# Patient Record
Sex: MALE | Race: WHITE | NOT HISPANIC OR LATINO | Employment: OTHER | URBAN - METROPOLITAN AREA
[De-identification: names, ages, dates, MRNs, and addresses within clinical notes are randomized per-mention and may not be internally consistent; named-entity substitution may affect disease eponyms.]

---

## 2017-01-14 ENCOUNTER — OFFICE VISIT (OUTPATIENT)
Dept: URGENT CARE | Facility: CLINIC | Age: 82
End: 2017-01-14
Payer: MEDICARE

## 2017-01-14 PROCEDURE — G0463 HOSPITAL OUTPT CLINIC VISIT: HCPCS

## 2017-01-14 PROCEDURE — 99213 OFFICE O/P EST LOW 20 MIN: CPT

## 2017-01-16 ENCOUNTER — APPOINTMENT (EMERGENCY)
Dept: RADIOLOGY | Facility: HOSPITAL | Age: 82
End: 2017-01-16
Attending: EMERGENCY MEDICINE
Payer: MEDICARE

## 2017-01-16 ENCOUNTER — HOSPITAL ENCOUNTER (EMERGENCY)
Facility: HOSPITAL | Age: 82
Discharge: HOME | End: 2017-01-16
Admitting: EMERGENCY MEDICINE
Payer: MEDICARE

## 2017-01-16 VITALS
SYSTOLIC BLOOD PRESSURE: 149 MMHG | DIASTOLIC BLOOD PRESSURE: 73 MMHG | HEART RATE: 85 BPM | TEMPERATURE: 97.6 F | OXYGEN SATURATION: 95 % | WEIGHT: 160 LBS | HEIGHT: 66 IN | BODY MASS INDEX: 25.71 KG/M2 | RESPIRATION RATE: 20 BRPM

## 2017-01-16 DIAGNOSIS — J20.9 ACUTE BRONCHITIS: Primary | ICD-10-CM

## 2017-01-16 LAB
ALBUMIN SERPL BCP-MCNC: 3.7 G/DL (ref 3.5–5)
ALP SERPL-CCNC: 88 U/L (ref 46–116)
ALT SERPL W P-5'-P-CCNC: 28 U/L (ref 12–78)
ANION GAP SERPL CALCULATED.3IONS-SCNC: 13 MMOL/L (ref 4–13)
APTT PPP: 37 SECONDS (ref 24–36)
AST SERPL W P-5'-P-CCNC: 30 U/L (ref 5–45)
ATRIAL RATE: 99 BPM
BASOPHILS # BLD AUTO: 0.02 THOUSANDS/ΜL (ref 0–0.1)
BASOPHILS NFR BLD AUTO: 0 % (ref 0–1)
BILIRUB SERPL-MCNC: 0.5 MG/DL (ref 0.2–1)
BUN SERPL-MCNC: 31 MG/DL (ref 5–25)
CALCIUM SERPL-MCNC: 9.1 MG/DL (ref 8.3–10.1)
CHLORIDE SERPL-SCNC: 100 MMOL/L (ref 100–108)
CO2 SERPL-SCNC: 21 MMOL/L (ref 21–32)
CREAT SERPL-MCNC: 2.09 MG/DL (ref 0.6–1.3)
EOSINOPHIL # BLD AUTO: 0.1 THOUSAND/ΜL (ref 0–0.61)
EOSINOPHIL NFR BLD AUTO: 1 % (ref 0–6)
ERYTHROCYTE [DISTWIDTH] IN BLOOD BY AUTOMATED COUNT: 13.6 % (ref 11.6–15.1)
FLUAV AG SPEC QL IA: NEGATIVE
FLUAV AG SPEC QL: DETECTED
FLUBV AG SPEC QL IA: NEGATIVE
FLUBV AG SPEC QL: ABNORMAL
GFR SERPL CREATININE-BSD FRML MDRD: 30.4 ML/MIN/1.73SQ M
GLUCOSE SERPL-MCNC: 102 MG/DL (ref 65–140)
HCT VFR BLD AUTO: 43.4 % (ref 36.5–49.3)
HGB BLD-MCNC: 14.5 G/DL (ref 12–17)
INR PPP: 1.03 (ref 0.86–1.16)
LACTATE SERPL-SCNC: 1.1 MMOL/L (ref 0.5–2)
LYMPHOCYTES # BLD AUTO: 2.16 THOUSANDS/ΜL (ref 0.6–4.47)
LYMPHOCYTES NFR BLD AUTO: 22 % (ref 14–44)
MCH RBC QN AUTO: 31 PG (ref 26.8–34.3)
MCHC RBC AUTO-ENTMCNC: 33.4 G/DL (ref 31.4–37.4)
MCV RBC AUTO: 93 FL (ref 82–98)
MONOCYTES # BLD AUTO: 1.23 THOUSAND/ΜL (ref 0.17–1.22)
MONOCYTES NFR BLD AUTO: 13 % (ref 4–12)
NEUTROPHILS # BLD AUTO: 6.26 THOUSANDS/ΜL (ref 1.85–7.62)
NEUTS SEG NFR BLD AUTO: 64 % (ref 43–75)
P AXIS: 62 DEGREES
PLATELET # BLD AUTO: 149 THOUSANDS/UL (ref 149–390)
PMV BLD AUTO: 9.3 FL (ref 8.9–12.7)
POTASSIUM SERPL-SCNC: 4.1 MMOL/L (ref 3.5–5.3)
PR INTERVAL: 150 MS
PROT SERPL-MCNC: 8.6 G/DL (ref 6.4–8.2)
PROTHROMBIN TIME: 13.4 SECONDS (ref 12–14.3)
QRS AXIS: 43 DEGREES
QRSD INTERVAL: 88 MS
QT INTERVAL: 354 MS
QTC INTERVAL: 454 MS
RBC # BLD AUTO: 4.67 MILLION/UL (ref 3.88–5.62)
RSV B RNA SPEC QL NAA+PROBE: ABNORMAL
SODIUM SERPL-SCNC: 134 MMOL/L (ref 136–145)
T WAVE AXIS: 65 DEGREES
VENTRICULAR RATE: 99 BPM
WBC # BLD AUTO: 9.77 THOUSAND/UL (ref 4.31–10.16)

## 2017-01-16 PROCEDURE — 71020 HB CHEST X-RAY 2VW FRONTAL&LATL: CPT

## 2017-01-16 PROCEDURE — 85730 THROMBOPLASTIN TIME PARTIAL: CPT | Performed by: PHYSICIAN ASSISTANT

## 2017-01-16 PROCEDURE — 93005 ELECTROCARDIOGRAM TRACING: CPT | Performed by: PHYSICIAN ASSISTANT

## 2017-01-16 PROCEDURE — 85025 COMPLETE CBC W/AUTO DIFF WBC: CPT | Performed by: PHYSICIAN ASSISTANT

## 2017-01-16 PROCEDURE — 87400 INFLUENZA A/B EACH AG IA: CPT | Performed by: PHYSICIAN ASSISTANT

## 2017-01-16 PROCEDURE — 87798 DETECT AGENT NOS DNA AMP: CPT | Performed by: PHYSICIAN ASSISTANT

## 2017-01-16 PROCEDURE — 85610 PROTHROMBIN TIME: CPT | Performed by: PHYSICIAN ASSISTANT

## 2017-01-16 PROCEDURE — 87040 BLOOD CULTURE FOR BACTERIA: CPT | Performed by: PHYSICIAN ASSISTANT

## 2017-01-16 PROCEDURE — 36415 COLL VENOUS BLD VENIPUNCTURE: CPT | Performed by: PHYSICIAN ASSISTANT

## 2017-01-16 PROCEDURE — 80053 COMPREHEN METABOLIC PANEL: CPT | Performed by: PHYSICIAN ASSISTANT

## 2017-01-16 PROCEDURE — 96360 HYDRATION IV INFUSION INIT: CPT

## 2017-01-16 PROCEDURE — 83605 ASSAY OF LACTIC ACID: CPT | Performed by: PHYSICIAN ASSISTANT

## 2017-01-16 PROCEDURE — 99285 EMERGENCY DEPT VISIT HI MDM: CPT

## 2017-01-16 RX ORDER — CEFUROXIME AXETIL 500 MG/1
500 TABLET ORAL EVERY 12 HOURS SCHEDULED
COMMUNITY
End: 2017-05-26 | Stop reason: ALTCHOICE

## 2017-01-16 RX ORDER — 0.9 % SODIUM CHLORIDE 0.9 %
3 VIAL (ML) INJECTION AS NEEDED
Status: DISCONTINUED | OUTPATIENT
Start: 2017-01-16 | End: 2017-01-16 | Stop reason: HOSPADM

## 2017-01-16 RX ORDER — BENZONATATE 100 MG/1
100 CAPSULE ORAL 3 TIMES DAILY PRN
COMMUNITY
End: 2017-05-26 | Stop reason: ALTCHOICE

## 2017-01-16 RX ORDER — OMEPRAZOLE 20 MG/1
20 CAPSULE, DELAYED RELEASE ORAL DAILY
COMMUNITY
End: 2018-06-23 | Stop reason: SDUPTHER

## 2017-01-16 RX ADMIN — SODIUM CHLORIDE 2178 ML: 0.9 INJECTION, SOLUTION INTRAVENOUS at 11:27

## 2017-01-17 ENCOUNTER — GENERIC CONVERSION - ENCOUNTER (OUTPATIENT)
Dept: OTHER | Facility: OTHER | Age: 82
End: 2017-01-17

## 2017-01-20 ENCOUNTER — ALLSCRIPTS OFFICE VISIT (OUTPATIENT)
Dept: OTHER | Facility: OTHER | Age: 82
End: 2017-01-20

## 2017-01-21 LAB
BACTERIA BLD CULT: NORMAL
BACTERIA BLD CULT: NORMAL

## 2017-01-31 ENCOUNTER — ALLSCRIPTS OFFICE VISIT (OUTPATIENT)
Dept: OTHER | Facility: OTHER | Age: 82
End: 2017-01-31

## 2017-02-07 ENCOUNTER — ALLSCRIPTS OFFICE VISIT (OUTPATIENT)
Dept: OTHER | Facility: OTHER | Age: 82
End: 2017-02-07

## 2017-02-08 ENCOUNTER — ALLSCRIPTS OFFICE VISIT (OUTPATIENT)
Dept: OTHER | Facility: OTHER | Age: 82
End: 2017-02-08

## 2017-03-13 ENCOUNTER — ALLSCRIPTS OFFICE VISIT (OUTPATIENT)
Dept: OTHER | Facility: OTHER | Age: 82
End: 2017-03-13

## 2017-03-24 DIAGNOSIS — I65.29 OCCLUSION AND STENOSIS OF UNSPECIFIED CAROTID ARTERY: ICD-10-CM

## 2017-05-02 ENCOUNTER — ALLSCRIPTS OFFICE VISIT (OUTPATIENT)
Dept: OTHER | Facility: OTHER | Age: 82
End: 2017-05-02

## 2017-05-12 ENCOUNTER — TRANSCRIBE ORDERS (OUTPATIENT)
Dept: ADMINISTRATIVE | Facility: HOSPITAL | Age: 82
End: 2017-05-12

## 2017-05-12 ENCOUNTER — HOSPITAL ENCOUNTER (OUTPATIENT)
Dept: RADIOLOGY | Facility: HOSPITAL | Age: 82
Discharge: HOME | End: 2017-05-12
Payer: MEDICARE

## 2017-05-12 ENCOUNTER — GENERIC CONVERSION - ENCOUNTER (OUTPATIENT)
Dept: OTHER | Facility: OTHER | Age: 82
End: 2017-05-12

## 2017-05-12 DIAGNOSIS — J44.1 CHRONIC OBSTRUCTIVE PULMONARY DISEASE WITH ACUTE EXACERBATION (HCC): ICD-10-CM

## 2017-05-12 DIAGNOSIS — R93.89 ABNORMAL FINDINGS ON DIAGNOSTIC IMAGING OF OTHER SPECIFIED BODY STRUCTURES: ICD-10-CM

## 2017-05-12 DIAGNOSIS — N18.30 CHRONIC KIDNEY DISEASE, STAGE III (MODERATE) (HCC): Primary | ICD-10-CM

## 2017-05-12 PROCEDURE — 71020 HB CHEST X-RAY 2VW FRONTAL&LATL: CPT

## 2017-05-16 ENCOUNTER — GENERIC CONVERSION - ENCOUNTER (OUTPATIENT)
Dept: OTHER | Facility: OTHER | Age: 82
End: 2017-05-16

## 2017-05-26 ENCOUNTER — APPOINTMENT (OUTPATIENT)
Dept: LAB | Facility: HOSPITAL | Age: 82
End: 2017-05-26
Payer: MEDICARE

## 2017-05-26 ENCOUNTER — HOSPITAL ENCOUNTER (EMERGENCY)
Facility: HOSPITAL | Age: 82
Discharge: HOME | End: 2017-05-26
Attending: EMERGENCY MEDICINE | Admitting: EMERGENCY MEDICINE
Payer: MEDICARE

## 2017-05-26 VITALS
HEART RATE: 73 BPM | WEIGHT: 170 LBS | BODY MASS INDEX: 29.02 KG/M2 | SYSTOLIC BLOOD PRESSURE: 132 MMHG | RESPIRATION RATE: 20 BRPM | HEIGHT: 64 IN | OXYGEN SATURATION: 98 % | DIASTOLIC BLOOD PRESSURE: 72 MMHG | TEMPERATURE: 97.8 F

## 2017-05-26 DIAGNOSIS — M79.10 MYALGIA: Primary | ICD-10-CM

## 2017-05-26 DIAGNOSIS — N18.30 CHRONIC KIDNEY DISEASE, STAGE III (MODERATE) (HCC): ICD-10-CM

## 2017-05-26 LAB
ALBUMIN SERPL BCP-MCNC: 3.5 G/DL (ref 3.5–5)
ALP SERPL-CCNC: 63 U/L (ref 46–116)
ALT SERPL W P-5'-P-CCNC: 21 U/L (ref 12–78)
ANION GAP SERPL CALCULATED.3IONS-SCNC: 8 MMOL/L (ref 4–13)
AST SERPL W P-5'-P-CCNC: 19 U/L (ref 5–45)
BILIRUB SERPL-MCNC: 0.3 MG/DL (ref 0.2–1)
BUN SERPL-MCNC: 26 MG/DL (ref 5–25)
CALCIUM SERPL-MCNC: 9 MG/DL (ref 8.3–10.1)
CHLORIDE SERPL-SCNC: 103 MMOL/L (ref 100–108)
CHOLEST SERPL-MCNC: 204 MG/DL (ref 50–200)
CK SERPL-CCNC: 61 U/L (ref 39–308)
CO2 SERPL-SCNC: 26 MMOL/L (ref 21–32)
CREAT SERPL-MCNC: 1.82 MG/DL (ref 0.6–1.3)
ERYTHROCYTE [DISTWIDTH] IN BLOOD BY AUTOMATED COUNT: 13.9 % (ref 11.6–15.1)
ERYTHROCYTE [SEDIMENTATION RATE] IN BLOOD: 55 MM/HOUR (ref 0–10)
GFR SERPL CREATININE-BSD FRML MDRD: 35.7 ML/MIN/1.73SQ M
GLUCOSE P FAST SERPL-MCNC: 97 MG/DL (ref 65–99)
HCT VFR BLD AUTO: 41.1 % (ref 36.5–49.3)
HDLC SERPL-MCNC: 35 MG/DL (ref 40–60)
HGB BLD-MCNC: 13.1 G/DL (ref 12–17)
LDLC SERPL CALC-MCNC: 125 MG/DL (ref 0–100)
MCH RBC QN AUTO: 29.4 PG (ref 26.8–34.3)
MCHC RBC AUTO-ENTMCNC: 31.9 G/DL (ref 31.4–37.4)
MCV RBC AUTO: 92 FL (ref 82–98)
PLATELET # BLD AUTO: 135 THOUSANDS/UL (ref 149–390)
PMV BLD AUTO: 9.3 FL (ref 8.9–12.7)
POTASSIUM SERPL-SCNC: 4.3 MMOL/L (ref 3.5–5.3)
PROT SERPL-MCNC: 7.5 G/DL (ref 6.4–8.2)
RBC # BLD AUTO: 4.45 MILLION/UL (ref 3.88–5.62)
SODIUM SERPL-SCNC: 137 MMOL/L (ref 136–145)
T4 FREE SERPL-MCNC: 0.89 NG/DL (ref 0.76–1.46)
TRIGL SERPL-MCNC: 218 MG/DL
TSH SERPL DL<=0.05 MIU/L-ACNC: 4.75 UIU/ML (ref 0.36–3.74)
WBC # BLD AUTO: 5.55 THOUSAND/UL (ref 4.31–10.16)

## 2017-05-26 PROCEDURE — 86618 LYME DISEASE ANTIBODY: CPT | Performed by: EMERGENCY MEDICINE

## 2017-05-26 PROCEDURE — 80053 COMPREHEN METABOLIC PANEL: CPT

## 2017-05-26 PROCEDURE — 99283 EMERGENCY DEPT VISIT LOW MDM: CPT

## 2017-05-26 PROCEDURE — 84443 ASSAY THYROID STIM HORMONE: CPT

## 2017-05-26 PROCEDURE — 85652 RBC SED RATE AUTOMATED: CPT | Performed by: EMERGENCY MEDICINE

## 2017-05-26 PROCEDURE — 82550 ASSAY OF CK (CPK): CPT | Performed by: EMERGENCY MEDICINE

## 2017-05-26 PROCEDURE — 84439 ASSAY OF FREE THYROXINE: CPT

## 2017-05-26 PROCEDURE — 36415 COLL VENOUS BLD VENIPUNCTURE: CPT

## 2017-05-26 PROCEDURE — 85027 COMPLETE CBC AUTOMATED: CPT

## 2017-05-26 PROCEDURE — 80061 LIPID PANEL: CPT

## 2017-05-28 LAB
B BURGDOR IGG SER IA-ACNC: 0.24
B BURGDOR IGM SER IA-ACNC: 0.07

## 2017-06-12 ENCOUNTER — ALLSCRIPTS OFFICE VISIT (OUTPATIENT)
Dept: OTHER | Facility: OTHER | Age: 82
End: 2017-06-12

## 2017-06-15 ENCOUNTER — HOSPITAL ENCOUNTER (OUTPATIENT)
Dept: CT IMAGING | Facility: HOSPITAL | Age: 82
Discharge: HOME | End: 2017-06-15
Payer: MEDICARE

## 2017-06-15 ENCOUNTER — HOSPITAL ENCOUNTER (OUTPATIENT)
Dept: NON INVASIVE DIAGNOSTICS | Facility: CLINIC | Age: 82
Discharge: HOME | End: 2017-06-15
Payer: MEDICARE

## 2017-06-15 DIAGNOSIS — R93.89 ABNORMAL FINDINGS ON DIAGNOSTIC IMAGING OF OTHER SPECIFIED BODY STRUCTURES: ICD-10-CM

## 2017-06-15 DIAGNOSIS — I65.29 OCCLUSION AND STENOSIS OF UNSPECIFIED CAROTID ARTERY: ICD-10-CM

## 2017-06-15 PROCEDURE — 71250 CT THORAX DX C-: CPT

## 2017-06-15 PROCEDURE — 93880 EXTRACRANIAL BILAT STUDY: CPT

## 2017-06-19 ENCOUNTER — GENERIC CONVERSION - ENCOUNTER (OUTPATIENT)
Dept: OTHER | Facility: OTHER | Age: 82
End: 2017-06-19

## 2017-07-22 ENCOUNTER — TRANSCRIBE ORDERS (OUTPATIENT)
Dept: ADMINISTRATIVE | Facility: HOSPITAL | Age: 82
End: 2017-07-22

## 2017-07-22 ENCOUNTER — APPOINTMENT (OUTPATIENT)
Dept: LAB | Facility: HOSPITAL | Age: 82
End: 2017-07-22
Attending: UROLOGY
Payer: MEDICARE

## 2017-07-22 DIAGNOSIS — N13.9 URINARY OBSTRUCTION, UNSPECIFIED: Primary | ICD-10-CM

## 2017-07-22 DIAGNOSIS — R97.20 ELEVATED PROSTATE SPECIFIC ANTIGEN (PSA): ICD-10-CM

## 2017-07-22 DIAGNOSIS — R97.20 ELEVATED PROSTATE SPECIFIC ANTIGEN (PSA): Primary | ICD-10-CM

## 2017-07-22 DIAGNOSIS — N28.1 ACQUIRED CYST OF KIDNEY: ICD-10-CM

## 2017-07-22 LAB — PSA SERPL-MCNC: 75.4 NG/ML (ref 0–4)

## 2017-07-22 PROCEDURE — 36415 COLL VENOUS BLD VENIPUNCTURE: CPT

## 2017-07-22 PROCEDURE — G0103 PSA SCREENING: HCPCS

## 2017-07-25 ENCOUNTER — HOSPITAL ENCOUNTER (OUTPATIENT)
Dept: ULTRASOUND IMAGING | Facility: CLINIC | Age: 82
Discharge: HOME | End: 2017-07-25
Payer: MEDICARE

## 2017-07-25 DIAGNOSIS — N13.9 URINARY OBSTRUCTION, UNSPECIFIED: ICD-10-CM

## 2017-07-25 DIAGNOSIS — N28.1 ACQUIRED CYST OF KIDNEY: ICD-10-CM

## 2017-07-25 PROCEDURE — 76770 US EXAM ABDO BACK WALL COMP: CPT

## 2017-07-26 ENCOUNTER — GENERIC CONVERSION - ENCOUNTER (OUTPATIENT)
Dept: OTHER | Facility: OTHER | Age: 82
End: 2017-07-26

## 2017-08-16 ENCOUNTER — ALLSCRIPTS OFFICE VISIT (OUTPATIENT)
Dept: OTHER | Facility: OTHER | Age: 82
End: 2017-08-16

## 2017-08-28 DIAGNOSIS — E03.9 HYPOTHYROIDISM: ICD-10-CM

## 2017-08-28 DIAGNOSIS — E78.5 HYPERLIPIDEMIA: ICD-10-CM

## 2017-08-31 ENCOUNTER — TRANSCRIBE ORDERS (OUTPATIENT)
Dept: ADMINISTRATIVE | Facility: HOSPITAL | Age: 82
End: 2017-08-31

## 2017-08-31 ENCOUNTER — APPOINTMENT (OUTPATIENT)
Dept: LAB | Facility: HOSPITAL | Age: 82
End: 2017-08-31
Payer: MEDICARE

## 2017-08-31 DIAGNOSIS — E03.9 HYPOTHYROIDISM: ICD-10-CM

## 2017-08-31 DIAGNOSIS — E78.5 HYPERLIPIDEMIA: ICD-10-CM

## 2017-08-31 LAB
ALBUMIN SERPL BCP-MCNC: 3.7 G/DL (ref 3.5–5)
ALP SERPL-CCNC: 75 U/L (ref 46–116)
ALT SERPL W P-5'-P-CCNC: 24 U/L (ref 12–78)
ANION GAP SERPL CALCULATED.3IONS-SCNC: 8 MMOL/L (ref 4–13)
AST SERPL W P-5'-P-CCNC: 20 U/L (ref 5–45)
BILIRUB SERPL-MCNC: 0.4 MG/DL (ref 0.2–1)
BUN SERPL-MCNC: 17 MG/DL (ref 5–25)
CALCIUM SERPL-MCNC: 9.3 MG/DL (ref 8.3–10.1)
CHLORIDE SERPL-SCNC: 105 MMOL/L (ref 100–108)
CHOLEST SERPL-MCNC: 184 MG/DL (ref 50–200)
CO2 SERPL-SCNC: 28 MMOL/L (ref 21–32)
CREAT SERPL-MCNC: 1.69 MG/DL (ref 0.6–1.3)
ERYTHROCYTE [DISTWIDTH] IN BLOOD BY AUTOMATED COUNT: 14 % (ref 11.6–15.1)
GFR SERPL CREATININE-BSD FRML MDRD: 36 ML/MIN/1.73SQ M
GLUCOSE P FAST SERPL-MCNC: 90 MG/DL (ref 65–99)
HCT VFR BLD AUTO: 42 % (ref 36.5–49.3)
HDLC SERPL-MCNC: 26 MG/DL (ref 40–60)
HGB BLD-MCNC: 13.7 G/DL (ref 12–17)
LDLC SERPL CALC-MCNC: 112 MG/DL (ref 0–100)
MCH RBC QN AUTO: 30.3 PG (ref 26.8–34.3)
MCHC RBC AUTO-ENTMCNC: 32.6 G/DL (ref 31.4–37.4)
MCV RBC AUTO: 93 FL (ref 82–98)
PLATELET # BLD AUTO: 120 THOUSANDS/UL (ref 149–390)
PMV BLD AUTO: 9.4 FL (ref 8.9–12.7)
POTASSIUM SERPL-SCNC: 4.4 MMOL/L (ref 3.5–5.3)
PROT SERPL-MCNC: 7.6 G/DL (ref 6.4–8.2)
RBC # BLD AUTO: 4.52 MILLION/UL (ref 3.88–5.62)
SODIUM SERPL-SCNC: 141 MMOL/L (ref 136–145)
T4 FREE SERPL-MCNC: 1 NG/DL (ref 0.76–1.46)
TRIGL SERPL-MCNC: 232 MG/DL
TSH SERPL DL<=0.05 MIU/L-ACNC: 4.16 UIU/ML (ref 0.36–3.74)
WBC # BLD AUTO: 5.38 THOUSAND/UL (ref 4.31–10.16)

## 2017-08-31 PROCEDURE — 84443 ASSAY THYROID STIM HORMONE: CPT

## 2017-08-31 PROCEDURE — 85027 COMPLETE CBC AUTOMATED: CPT

## 2017-08-31 PROCEDURE — 80053 COMPREHEN METABOLIC PANEL: CPT

## 2017-08-31 PROCEDURE — 36415 COLL VENOUS BLD VENIPUNCTURE: CPT

## 2017-08-31 PROCEDURE — 80061 LIPID PANEL: CPT

## 2017-08-31 PROCEDURE — 84439 ASSAY OF FREE THYROXINE: CPT

## 2017-09-15 ENCOUNTER — GENERIC CONVERSION - ENCOUNTER (OUTPATIENT)
Dept: OTHER | Facility: OTHER | Age: 82
End: 2017-09-15

## 2017-10-18 ENCOUNTER — HOSPITAL ENCOUNTER (EMERGENCY)
Facility: HOSPITAL | Age: 82
Discharge: HOME | End: 2017-10-18
Admitting: EMERGENCY MEDICINE
Payer: MEDICARE

## 2017-10-18 ENCOUNTER — GENERIC CONVERSION - ENCOUNTER (OUTPATIENT)
Dept: OTHER | Facility: OTHER | Age: 82
End: 2017-10-18

## 2017-10-18 ENCOUNTER — APPOINTMENT (EMERGENCY)
Dept: CT IMAGING | Facility: HOSPITAL | Age: 82
End: 2017-10-18
Payer: MEDICARE

## 2017-10-18 VITALS
HEART RATE: 69 BPM | WEIGHT: 170 LBS | OXYGEN SATURATION: 98 % | RESPIRATION RATE: 20 BRPM | SYSTOLIC BLOOD PRESSURE: 163 MMHG | TEMPERATURE: 97.2 F | BODY MASS INDEX: 29.18 KG/M2 | DIASTOLIC BLOOD PRESSURE: 71 MMHG

## 2017-10-18 DIAGNOSIS — R51.9 HEADACHE: Primary | ICD-10-CM

## 2017-10-18 DIAGNOSIS — F41.9 ANXIETY: ICD-10-CM

## 2017-10-18 LAB
ALBUMIN SERPL BCP-MCNC: 3.5 G/DL (ref 3.5–5)
ALP SERPL-CCNC: 75 U/L (ref 46–116)
ALT SERPL W P-5'-P-CCNC: 28 U/L (ref 12–78)
ANION GAP SERPL CALCULATED.3IONS-SCNC: 10 MMOL/L (ref 4–13)
AST SERPL W P-5'-P-CCNC: 22 U/L (ref 5–45)
BASOPHILS # BLD AUTO: 0.02 THOUSANDS/ΜL (ref 0–0.1)
BASOPHILS NFR BLD AUTO: 0 % (ref 0–1)
BILIRUB SERPL-MCNC: 0.6 MG/DL (ref 0.2–1)
BUN SERPL-MCNC: 25 MG/DL (ref 5–25)
CALCIUM SERPL-MCNC: 9.3 MG/DL (ref 8.3–10.1)
CHLORIDE SERPL-SCNC: 103 MMOL/L (ref 100–108)
CO2 SERPL-SCNC: 25 MMOL/L (ref 21–32)
CREAT SERPL-MCNC: 1.88 MG/DL (ref 0.6–1.3)
EOSINOPHIL # BLD AUTO: 0.14 THOUSAND/ΜL (ref 0–0.61)
EOSINOPHIL NFR BLD AUTO: 2 % (ref 0–6)
ERYTHROCYTE [DISTWIDTH] IN BLOOD BY AUTOMATED COUNT: 14 % (ref 11.6–15.1)
GFR SERPL CREATININE-BSD FRML MDRD: 32 ML/MIN/1.73SQ M
GLUCOSE SERPL-MCNC: 116 MG/DL (ref 65–140)
HCT VFR BLD AUTO: 41.8 % (ref 36.5–49.3)
HGB BLD-MCNC: 13.6 G/DL (ref 12–17)
LYMPHOCYTES # BLD AUTO: 1.89 THOUSANDS/ΜL (ref 0.6–4.47)
LYMPHOCYTES NFR BLD AUTO: 30 % (ref 14–44)
MCH RBC QN AUTO: 29.7 PG (ref 26.8–34.3)
MCHC RBC AUTO-ENTMCNC: 32.5 G/DL (ref 31.4–37.4)
MCV RBC AUTO: 91 FL (ref 82–98)
MONOCYTES # BLD AUTO: 0.82 THOUSAND/ΜL (ref 0.17–1.22)
MONOCYTES NFR BLD AUTO: 13 % (ref 4–12)
NEUTROPHILS # BLD AUTO: 3.41 THOUSANDS/ΜL (ref 1.85–7.62)
NEUTS SEG NFR BLD AUTO: 55 % (ref 43–75)
PLATELET # BLD AUTO: 125 THOUSANDS/UL (ref 149–390)
PMV BLD AUTO: 9.4 FL (ref 8.9–12.7)
POTASSIUM SERPL-SCNC: 4.1 MMOL/L (ref 3.5–5.3)
PROT SERPL-MCNC: 7.4 G/DL (ref 6.4–8.2)
RBC # BLD AUTO: 4.58 MILLION/UL (ref 3.88–5.62)
SODIUM SERPL-SCNC: 138 MMOL/L (ref 136–145)
WBC # BLD AUTO: 6.28 THOUSAND/UL (ref 4.31–10.16)

## 2017-10-18 PROCEDURE — 36415 COLL VENOUS BLD VENIPUNCTURE: CPT | Performed by: PHYSICIAN ASSISTANT

## 2017-10-18 PROCEDURE — 80053 COMPREHEN METABOLIC PANEL: CPT | Performed by: PHYSICIAN ASSISTANT

## 2017-10-18 PROCEDURE — 93005 ELECTROCARDIOGRAM TRACING: CPT | Performed by: PHYSICIAN ASSISTANT

## 2017-10-18 PROCEDURE — 70450 CT HEAD/BRAIN W/O DYE: CPT

## 2017-10-18 PROCEDURE — 99284 EMERGENCY DEPT VISIT MOD MDM: CPT

## 2017-10-18 PROCEDURE — 85025 COMPLETE CBC W/AUTO DIFF WBC: CPT | Performed by: PHYSICIAN ASSISTANT

## 2017-10-18 RX ORDER — ZOLPIDEM TARTRATE 5 MG/1
TABLET ORAL
COMMUNITY
Start: 2016-08-08 | End: 2018-03-16 | Stop reason: SDUPTHER

## 2017-10-18 RX ORDER — LORAZEPAM 0.5 MG/1
0.5 TABLET ORAL EVERY 8 HOURS PRN
Qty: 10 TABLET | Refills: 0 | Status: SHIPPED | OUTPATIENT
Start: 2017-10-18 | End: 2018-01-27 | Stop reason: SDUPTHER

## 2017-10-18 RX ORDER — MORPHINE SULFATE 2 MG/ML
2 INJECTION, SOLUTION INTRAMUSCULAR; INTRAVENOUS ONCE
Status: DISCONTINUED | OUTPATIENT
Start: 2017-10-18 | End: 2017-10-18

## 2017-10-18 RX ORDER — LORAZEPAM 0.5 MG/1
0.5 TABLET ORAL ONCE
Status: COMPLETED | OUTPATIENT
Start: 2017-10-18 | End: 2017-10-18

## 2017-10-18 RX ORDER — LEVOTHYROXINE SODIUM 0.07 MG/1
37.5 TABLET ORAL DAILY
COMMUNITY
Start: 2012-12-12 | End: 2018-03-18 | Stop reason: SDUPTHER

## 2017-10-18 RX ORDER — DOXYCYCLINE HYCLATE 100 MG
TABLET ORAL
COMMUNITY
Start: 2017-08-16 | End: 2018-03-22 | Stop reason: ALTCHOICE

## 2017-10-18 RX ORDER — ONDANSETRON 2 MG/ML
4 INJECTION INTRAMUSCULAR; INTRAVENOUS ONCE
Status: DISCONTINUED | OUTPATIENT
Start: 2017-10-18 | End: 2017-10-18

## 2017-10-18 RX ADMIN — LORAZEPAM 0.5 MG: 0.5 TABLET ORAL at 15:41

## 2017-10-18 NOTE — ED PROVIDER NOTES
History  Chief Complaint   Patient presents with    Pressure Behind the Eyes     To ED with c/o pressure in "head and behind eyes" started approx 2 days ago  Denies any headache  States that he's dfalling apart"  Patient anxious, treaful, crying  Patient presents to the ED with pressure behind his eyes that started 2 days ago  He rates the pressure at a 9/10  Patient did not take anything for his pain  He states he has blurred vision, but this is chronic and seems to be worsening with age  He denies fevers/chills/neck pain or stiffness  He denies sinus pain or congestion  Patient denies any recent head injury  He states he did run out of ativan 1 week ago  History provided by:  Patient  Headache   Pain location:  Frontal  Quality:  Dull  Radiates to:  Does not radiate  Severity currently:  9/10  Severity at highest:  9/10  Onset quality:  Gradual  Duration:  2 days  Timing:  Constant  Progression:  Unchanged  Chronicity:  New  Similar to prior headaches: yes    Relieved by:  Nothing  Worsened by:  Nothing  Ineffective treatments:  None tried  Associated symptoms: blurred vision (chronic problem) and eye pain    Associated symptoms: no abdominal pain, no back pain, no congestion, no cough, no diarrhea (started today), no dizziness, no ear pain, no facial pain, no fever, no focal weakness, no hearing loss, no loss of balance, no myalgias, no nausea, no neck pain, no neck stiffness, no numbness, no paresthesias, no photophobia, no seizures, no sinus pressure, no URI, no visual change, no vomiting and no weakness        Prior to Admission Medications   Prescriptions Last Dose Informant Patient Reported? Taking? B Complex Vitamins (B COMPLEX-B12 PO)   Yes No   Sig: B Complex-B12 Oral Tablet TAKE 1 TABLET DAILY  Refills: 0   , M D ;  Started 6-Mar-2013 Active   LORazepam (ATIVAN) 0 5 mg tablet   Yes No   Sig: LORazepam 0 5 MG Oral Tablet TAKE 1 TABLET 3 TIMES DAILY AS NEEDED    Quantity: 50; Refills: 5    Via Silke Davis MD;  Started  Active   Multiple Vitamins-Minerals (VISION FORMULA/LUTEIN PO)   Yes No   Sig: Vision Formula/Lutein Oral Tablet Take twice a day  Refills: 0    Via Silke Davis MD;  Started 15-Nov-2014 Active   atenolol (TENORMIN) 50 mg tablet   Yes No   Si mg Atenolol 50 MG Oral Tablet take 1/2 tablet by mouth once daily  Quantity: 15; Refills: 5    Via Silke Davis MD;  Started 12-Dec-2012 Active    doxycycline hyclate (VIBRA-TABS) 100 mg tablet   Yes Yes   Sig: Take by mouth   levothyroxine 75 mcg tablet   Yes No   Sig: Levothyroxine Sodium 75 MCG Oral Tablet TAKE 1/2 TALBET IN THE MORNING DAILY  Quantity: 30;  Refills: 5    Via Silke Davis MD;  Started 12-Dec-2012 Active   levothyroxine 75 mcg tablet   Yes Yes   Sig: Take by mouth   omeprazole (PriLOSEC) 20 mg delayed release capsule   Yes No   Sig: Take 20 mg by mouth daily   zolpidem (AMBIEN) 5 mg tablet   Yes Yes   Sig: Take by mouth      Facility-Administered Medications: None       Past Medical History:   Diagnosis Date    COPD [facility]chronic obstructive pulmonary disease) ([facility] [address] )     Disease of thyroid gland     Heart attack     Hyperlipidemia     Hypertension     Renal disorder        Past Surgical History:   Procedure Laterality Date    HEMORRHOID SURGERY      HERNIA REPAIR         Family History   Problem Relation Age of Onset    Hypertension Mother     Hypertension Father      I have reviewed and agree with the history as documented  Social History   Substance Use Topics    Smoking status: Former Smoker    Smokeless tobacco: Never Used    Alcohol use No        Review of Systems   Constitutional: Negative for chills and fever  HENT: Negative for congestion, ear pain, hearing loss and sinus pressure  Eyes: Positive for blurred vision (chronic problem) and pain  Negative for photophobia  Respiratory: Negative for cough and shortness of breath      Cardiovascular: Negative for chest pain and leg swelling  Gastrointestinal: Negative for abdominal pain, diarrhea (started today), nausea and vomiting  Musculoskeletal: Negative for back pain, myalgias, neck pain and neck stiffness  Skin: Negative for color change and rash  Neurological: Positive for headaches  Negative for dizziness, tremors, focal weakness, seizures, syncope, facial asymmetry, speech difficulty, weakness, light-headedness, numbness, paresthesias and loss of balance  Psychiatric/Behavioral: Negative for confusion  All other systems reviewed and are negative  Physical Exam  ED Triage Vitals [10/18/17 1518]   Temperature Pulse Respirations Blood Pressure SpO2   (!) 97 2 °F (36 2 °C) 69 20 163/71 98 %      Temp Source Heart Rate Source Patient Position - Orthostatic VS BP Location FiO2 (%)   Tympanic Monitor Lying Right arm --      Pain Score       No Pain           Physical Exam   Constitutional: He is oriented to person, place, and time  He appears well-developed and well-nourished  He is active and cooperative  He does not appear ill  No distress  HENT:   Head: Normocephalic and atraumatic  Right Ear: Hearing normal    Left Ear: Hearing normal    Nose: Nose normal    Mouth/Throat: Uvula is midline, oropharynx is clear and moist and mucous membranes are normal    Eyes: Conjunctivae and EOM are normal  Pupils are equal, round, and reactive to light  Neck: Normal range of motion  Neck supple  No spinous process tenderness and no muscular tenderness present  Cardiovascular: Normal rate, regular rhythm and normal heart sounds  No murmur heard  Pulmonary/Chest: Effort normal and breath sounds normal  He has no wheezes  He has no rhonchi  He has no rales  Abdominal: Soft  Normal appearance and bowel sounds are normal  There is no tenderness  Musculoskeletal: Normal range of motion  He exhibits no edema or deformity  Neurological: He is alert and oriented to person, place, and time   He has normal strength and normal reflexes  No cranial nerve deficit or sensory deficit  Coordination and gait normal  GCS eye subscore is 4  GCS verbal subscore is 5  GCS motor subscore is 6  Skin: Skin is warm and dry  No rash noted  He is not diaphoretic  No pallor  Psychiatric: His speech is normal  His mood appears anxious  Cognition and memory are normal    Nursing note and vitals reviewed        ED Medications  Medications   LORazepam (ATIVAN) tablet 0 5 mg (0 5 mg Oral Given 10/18/17 1541)       Diagnostic Studies  Labs Reviewed   CBC AND DIFFERENTIAL - Abnormal        Result Value Ref Range Status    Platelets 963 (*) 758 - 390 Thousands/uL Final    Monocytes Relative 13 (*) 4 - 12 % Final    WBC 6 28  4 31 - 10 16 Thousand/uL Final    RBC 4 58  3 88 - 5 62 Million/uL Final    Hemoglobin 13 6  12 0 - 17 0 g/dL Final    Hematocrit 41 8  36 5 - 49 3 % Final    MCV 91  82 - 98 fL Final    MCH 29 7  26 8 - 34 3 pg Final    MCHC 32 5  31 4 - 37 4 g/dL Final    RDW 14 0  11 6 - 15 1 % Final    MPV 9 4  8 9 - 12 7 fL Final    Neutrophils Relative 55  43 - 75 % Final    Lymphocytes Relative 30  14 - 44 % Final    Eosinophils Relative 2  0 - 6 % Final    Basophils Relative 0  0 - 1 % Final    Neutrophils Absolute 3 41  1 85 - 7 62 Thousands/µL Final    Lymphocytes Absolute 1 89  0 60 - 4 47 Thousands/µL Final    Monocytes Absolute 0 82  0 17 - 1 22 Thousand/µL Final    Eosinophils Absolute 0 14  0 00 - 0 61 Thousand/µL Final    Basophils Absolute 0 02  0 00 - 0 10 Thousands/µL Final   COMPREHENSIVE METABOLIC PANEL - Abnormal     Creatinine 1 88 (*) 0 60 - 1 30 mg/dL Final    Comment: Standardized to IDMS reference method    Sodium 138  136 - 145 mmol/L Final    Potassium 4 1  3 5 - 5 3 mmol/L Final    Chloride 103  100 - 108 mmol/L Final    CO2 25  21 - 32 mmol/L Final    Anion Gap 10  4 - 13 mmol/L Final    BUN 25  5 - 25 mg/dL Final    Glucose 116  65 - 140 mg/dL Final    Comment:   If the patient is fasting, the ADA then defines impaired fasting glucose as > 100 mg/dL and diabetes as > or equal to 123 mg/dL  Specimen collection should occur prior to Sulfasalazine administration due to the potential for falsely depressed results  Specimen collection should occur prior to Sulfapyridine administration due to the potential for falsely elevated results  Calcium 9 3  8 3 - 10 1 mg/dL Final    AST 22  5 - 45 U/L Final    Comment: Verified by Repeat Analysis  Specimen collection should occur prior to Sulfasalazine administration due to the potential for falsely depressed results  ALT 28  12 - 78 U/L Final    Comment:   Specimen collection should occur prior to Sulfasalazine administration due to the potential for falsely depressed results  Alkaline Phosphatase 75  46 - 116 U/L Final    Total Protein 7 4  6 4 - 8 2 g/dL Final    Albumin 3 5  3 5 - 5 0 g/dL Final    Total Bilirubin 0 60  0 20 - 1 00 mg/dL Final    eGFR 32  ml/min/1 73sq m Final    Narrative:     National Kidney Disease Education Program recommendations are as follows:  GFR calculation is accurate only with a steady state creatinine  Chronic Kidney disease less than 60 ml/min/1 73 sq  meters  Kidney failure less than 15 ml/min/1 73 sq  meters  CT head without contrast   Final Result      No acute intracranial abnormality  Microangiopathic changes  Workstation performed: [id number]             Procedures  Procedures      Phone Contacts  ED Phone Contact    ED Course  ED Course as of Oct 18 1720   Wed Oct 18, 2017   1645 Patient feeling better, headache 2/10  MDM  Number of Diagnoses or Management Options  Anxiety: minor  Headache: new and requires workup  Diagnosis management comments: Patient with a headache and diarrhea, most likely from benzo withdrawal   Will order CT scan to r/o tumor or hemorrhage  Will give patient a script for a couple days of ativan so patient does not have withdrawal seizure           Amount and/or Complexity of Data Reviewed  Clinical lab tests: ordered and reviewed  Tests in the radiology section of CPT®: ordered and reviewed    Patient Progress  Patient progress: improved    CritCare Time    Disposition  Final diagnoses:   Headache   Anxiety     ED Disposition     ED Disposition Condition Comment    Discharge  Daphne Turpin discharge to home/self care  Condition at discharge: Stable        Follow-up Information     Follow up With Specialties Details Why Contact Info    Elizabeth Leavitt MD Family Medicine In 2 days For recheck Humberto  [address] [facility]  [address] [facility] [phone]          Discharge Medication List as of 10/18/2017  4:50 PM      START taking these medications    Details   !! LORazepam (ATIVAN) 0 5 mg tablet Take 1 tablet by mouth every 8 (eight) hours as needed for anxiety, Starting Wed 10/18/2017, Print       !! - Potential duplicate medications found  Please discuss with provider  CONTINUE these medications which have NOT CHANGED    Details   doxycycline hyclate (VIBRA-TABS) 100 mg tablet Take by mouth, Starting Wed 8/16/2017, Historical Med      !! levothyroxine 75 mcg tablet Take by mouth, Starting Wed 12/12/2012, Historical Med      zolpidem (AMBIEN) 5 mg tablet Take by mouth, Starting Mon 8/8/2016, Historical Med      atenolol (TENORMIN) 50 mg tablet 25 mg Atenolol 50 MG Oral Tablet take 1/2 tablet by mouth once daily  Quantity: 15; Refills: 5    Via Silke Davis MD;  Started 12-Dec-2012 Active , Starting 12/12/2012, Until Discontinued, Historical Med      B Complex Vitamins (B COMPLEX-B12 PO) B Complex-B12 Oral Tablet TAKE 1 TABLET DAILY    Refills: 0   , M D ;  Started 6-Mar-2013 Active, Starting 3/6/2013, Until Discontinued, Historical Med      !! levothyroxine 75 mcg tablet Levothyroxine Sodium 75 MCG Oral Tablet TAKE 1/2 TALBET IN THE MORNING DAILY  Quantity: 30;  Refills: 5    Via Silke Davis MD;  Started 12-Dec-2012 Active, Starting 12/12/2012, Until Discontinued, Historical Med      !! LORazepam (ATIVAN) 0 5 mg tablet LORazepam 0 5 MG Oral Tablet TAKE 1 TABLET 3 TIMES DAILY AS NEEDED  Quantity: 50;  Refills: 5    Juana Sparks MD;  Started 26-June-2013 Active, Starting 6/26/2013, Until Discontinued, Historical Med      Multiple Vitamins-Minerals (VISION FORMULA/LUTEIN PO) Vision Formula/Lutein Oral Tablet Take twice a day  Refills: 0    Juana Sparks MD;  Started 15-Nov-2014 Active, Starting 11/15/2014, Until Discontinued, Historical Med      omeprazole (PriLOSEC) 20 mg delayed release capsule Take 20 mg by mouth daily, Until Discontinued, Historical Med       !! - Potential duplicate medications found  Please discuss with provider  No discharge procedures on file      ED Provider  Electronically Signed by       Luz Vela PA-C  10/18/17 8558

## 2017-10-18 NOTE — DISCHARGE INSTRUCTIONS
Acute Headache, Ambulatory Care   GENERAL INFORMATION:   An acute headache  is pain or discomfort that starts suddenly and gets worse quickly  The cause of an acute headache may not be known  It may be triggered by stress, fatigue, hormones, food, or trauma  Common related symptoms include the following:   · Fever    · Sinus pressure    · Loss of memory    · Nausea or vomiting    · Problems with your vision, such as watery or red eyes, loss of vision, or pain in bright light    · Stiff neck    · Tenderness of the head and neck area    · Trouble staying awake, or being less alert than usual     · Weakness or less energy  Seek immediate care for the following symptoms:   · Severe pain    · A headache that occurs after a blow to the head, a fall, or other trauma     · Confusion or forgetfulness    · Numbness on one side of your face or body  Treatment for an acute headache  may include medicine to decrease pain  You may also need biofeedback or cognitive behavioral therapy  Ask your healthcare provider about these and other treatments for an acute headache  Manage my symptoms:   · Apply heat  on your head for 20 to 30 minutes every 2 hours for as many days as directed  Heat helps decrease pain and muscle spasms  You may alternate heat and ice  · Apply ice  on your head for 15 to 20 minutes every hour or as directed  Use an ice pack, or put crushed ice in a plastic bag  Cover it with a towel  Ice helps decrease pain  · Relax your muscles  Lie down in a comfortable position and close your eyes  Relax your muscles slowly  Start at your toes and work your way up your body  · Keep a record of your headaches  Write down when your headaches start and stop  Include your symptoms and what you were doing when the headache began  Record what you ate or drank for 24 hours before the headache started  Describe the pain and where it hurts  Keep track of what you did to treat your headache and whether it worked    Follow up with your healthcare provider as directed:  Bring your headache record with you when you see your healthcare provider  Write down your questions so you remember to ask them during your visits  CARE AGREEMENT:   You have the right to help plan your care  Learn about your health condition and how it may be treated  Discuss treatment options with your caregivers to decide what care you want to receive  You always have the right to refuse treatment  The above information is an  only  It is not intended as medical advice for individual conditions or treatments  Talk to your doctor, nurse or pharmacist before following any medical regimen to see if it is safe and effective for you  © 2014 [address] is for End User's use only and may not be sold, redistributed or otherwise used for commercial purposes  All illustrations and images included in CareNotes® are the copyrighted property of [employer]  or [employer]  Anxiety   WHAT YOU SHOULD KNOW:   Anxiety is a condition that causes you to feel excessive worry, uneasiness, or fear  Family or work stress, smoking, caffeine, and alcohol can increase your risk for anxiety  Certain medicines or health conditions can also increase your risk  Anxiety may begin gradually, and can become a long-term condition if it is not managed or treated  AFTER YOU LEAVE:   Medicines:   · Medicines  can help you feel more calm and relaxed, and decrease your symptoms  · Take your medicine as directed  Contact your healthcare provider if you think your medicine is not helping or if you have side effects  Tell him if you are allergic to any medicine  Keep a list of the medicines, vitamins, and herbs you take  Include the amounts, and when and why you take them  Bring the list or the pill bottles to follow-up visits  Carry your medicine list with you in case of an emergency    Follow up with your healthcare provider within 2 weeks or as directed:  Write down your questions so you remember to ask them during your visits  Manage anxiety:   · Go to counseling as directed  Cognitive behavioral therapy can help you understand and change how you react to events that trigger your symptoms  · Find ways to manage your symptoms  Activities such as exercise, meditation, or listening to music can help you relax  · Practice deep breathing  Breathing can change how your body reacts to stress  Focus on taking slow, deep breaths several times a day, or during an anxiety attack  Breathe in through your nose, and out through your mouth  · Avoid caffeine  Caffeine can make your symptoms worse  Avoid foods or drinks that are meant to increase your energy level  · Limit or avoid alcohol  Ask your healthcare provider if alcohol is safe for you  You may not be able to drink alcohol if you take certain anxiety or depression medicines  Limit alcohol to 1 drink per day if you are a woman  Limit alcohol to 2 drinks per day if you are a man  A drink of alcohol is 12 ounces of beer, 5 ounces of wine, or 1½ ounces of liquor  Contact your healthcare provider if:   · Your symptoms get worse or do not get better with treatment  · You think your medicine may be causing side effects  · Your anxiety keeps you from doing your regular daily activities  · You have new symptoms since your last visit  · You have questions or concerns about your condition or care  Seek care immediately or call 911 if:   · You have chest pain, tightness, or heaviness that may spread to your shoulders, arms, jaw, neck, or back  · You feel like hurting yourself or someone else  · You feel dizzy, lightheaded, or faint  © 2014 [address] is for End User's use only and may not be sold, redistributed or otherwise used for commercial purposes   All illustrations and images included in CareNotes® are the copyrighted property of [employer]  or [employer]  The above information is an  only  It is not intended as medical advice for individual conditions or treatments  Talk to your doctor, nurse or pharmacist before following any medical regimen to see if it is safe and effective for you

## 2017-10-19 LAB
ATRIAL RATE: 70 BPM
P AXIS: 72 DEGREES
PR INTERVAL: 162 MS
QRS AXIS: 55 DEGREES
QRSD INTERVAL: 90 MS
QT INTERVAL: 392 MS
QTC INTERVAL: 423 MS
T WAVE AXIS: 49 DEGREES
VENTRICULAR RATE: 70 BPM

## 2017-10-24 ENCOUNTER — ALLSCRIPTS OFFICE VISIT (OUTPATIENT)
Dept: OTHER | Facility: OTHER | Age: 82
End: 2017-10-24

## 2017-10-27 NOTE — PROGRESS NOTES
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) (F13 239)   4  Hypertension (401 9) (I10)    Plan  Anxiety disorder due to general medical condition    · LORazepam 0 5 MG Oral Tablet; TAKE 1 TABLET 3 TIMES DAILY AS NEEDED    Discussion/Summary    BP good controlconsistency of Lorazepam doses  Greater risk with irregular dose vs consistency of low dose  Possible side effects of new medications were reviewed with the patient/guardian today  The treatment plan was reviewed with the patient/guardian  The patient/guardian understands and agrees with the treatment plan     Self Referrals: No      Chief Complaint  Patient is here today for follow up of chronic conditions described in HPI  History of Present Illness  Pain followup  in ER for headache and anxiety flare  Caused by withdrawal from Lorazepam because he couldnât get it through his insurance  Out for 5 dayshas been good       Review of Systems    Constitutional: not feeling poorly-- and-- not feeling tired  Cardiovascular: no chest pain,-- no intermittent leg claudication,-- no palpitations-- and-- no extremity edema  Respiratory: no cough-- and-- no shortness of breath during exertion  Musculoskeletal: no arthralgias  Neurological: no headache-- and-- no dizziness  Psychiatric: anxiety  Active Problems  1  Abnormal chest xray (793 2) (R93 8)   2  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)   3  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,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)   4  Arthritis (716 90) (M19 90)   5  Benign prostatic hypertrophy with lower urinary tract symptoms (LUTS) (600 01) (N40 1)   6  Carotid stenosis (433 10) (I65 29)   7  Chronic GERD (530 81) (K21 9)   8  Chronic kidney disease, stage 3 (585 3) (N18 3)   9  Chronic obstructive pulmonary disease (496) (J44 9)   10   Chronic right-sided thoracic back pain (724 1,338 29) (M54 6,G89 29)   11  COPD exacerbation (491 21) (J44 1)   12  Depression with anxiety (300 4) (F41 8)   13  Dermatosis (709 9) (L98 9)   14  Dizziness (780 4) (R42)   15  Flu vaccine need (V04 81) (Z23)   16  Headache (784 0) (R51)   17  Hydronephrosis, right (591) (N13 30)   18  Hyperlipidemia (272 4) (E78 5)   19  Hypertension (401 9) (I10)   20  Hypothyroidism (244 9) (E03 9)   21  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)   22  Insomnia, unspecified type (780 52) (G47 00)   23  Iron deficiency anemia (280 9) (D50 9)   24  Macular degeneration (362 50) (H35 30)   25  Nausea (787 02) (R11 0)   26  Osteoporosis (733 00) (M81 0)   27  Other atopic dermatitis (691 8) (L20 89)   28  Productive cough (786 2) (R05)   29  Pruritus (698 9) (L29 9)   30  Shortness of breath (786 05) (R06 02)   31  Spasmodic cough (786 2) (R05)   32  Transient insomnia (307 41) (F51 02)    Past Medical History  1  History of Alcohol abuse (305 00) (F10 10)   2  History of Benign Polyps Of The Large Intestine (V12 72)   3  History of Compression Fracture Of Thoracic Vertebral Body (805 2)   4  History of polymyalgia rheumatica (V13 59) (Z87 39)   5  Denied: History of substance abuse   6  History of upper gastrointestinal hemorrhage (V12 79) (Z87 19)   7  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)   8  History of Iron deficiency anemia due to chronic blood loss (280 0) (D50 0)   9  History of Ischemic colitis (557 9) (K55 9)   10  Denied: History of Mental health problem   11  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)   12  History of Peptic Ulcer (V12 71)   13  History of Rectal Stricture (569 2)   14  History of Varicose Veins Of Lower Extremities (454 9)    The active problems and past medical history were reviewed and updated today  Surgical History  1  History of CABG   2  History of Carotid Thromboendarterectomy   3  History of Excision Of Renal Cyst   4  History of Hemorrhoidectomy   5   History of Hernia Repair Inguinal Sliding    The surgical history was reviewed and updated today  Family History  Mother    1  Family history of Alcohol abuse  Father    2  Family history of Alcohol abuse  Son    3  Family history of Alcohol abuse  Family History    4  Denied: Family history of substance abuse   5  Denied: Family history of Mental health problem   6  Family history of Reported Family History Of Heart Disease   7  Family history of Stroke Syndrome (V17 1)    The family history was reviewed and updated today  Social History   · Always uses seat belt   · Being A Social Drinker   · Former smoker (L26 76) (O59 769)   · Marital History - Currently   The social history was reviewed and updated today  The social history was reviewed and is unchanged  Current Meds   1  Aleve 220 MG Oral Tablet; TAKE 1 TABLET 3-4 TIMES DAILY AS NEEDED; Therapy: 48DYL2604 to Recorded   2  Atenolol 50 MG Oral Tablet; take 1/2 tablet by mouth once daily; Therapy: 64Gjk8205 to (Evaluate:15Mar2018)  Requested for: 51Njs6086; Last   Rx:25Zlh2178 Ordered   3  B Complex-B12 Oral Tablet; TAKE 1 TABLET DAILY; Therapy: 85ABB4797 to Recorded   4  Calmoseptine 0 44-20 625 % OINT; apply as directed; Therapy: 37FMF8851 to (Last Rx:35Ngl6814) Ordered   5  [employer] Vitamin D CAPS; Therapy: (Min Sizer) to Recorded   6  Doxycycline Hyclate 100 MG Oral Tablet; TAKE ONE TABLET BY MOUTH TWICE A DAY; Therapy: 16INM8524 to (Evaluate:29Fzi4495)  Requested for: 16Oct2017; Last   Rx:38Txs4141 Ordered   7  Iron 65 MG TABS; Therapy: (ZTCTTASQ:10ICZ1955) to Recorded   8  [address] [facility]; Therapy: (JSAKBGRZ:76AMU0682) to Recorded   9  Levothyroxine Sodium 75 MCG Oral Tablet; TAKE ONE TABLET BY MOUTH ONCE DAILY; Therapy: 75Wst1879 to (Last Rx:10Mar2017)  Requested for: 99TIT8879 Ordered   10  LORazepam 0 5 MG Oral Tablet; TAKE 1 TABLET 3 TIMES DAILY AS NEEDED; Therapy: 99JPH5286 to (Evaluate:50Owo3147);  Last Rx:16Oct2017 Ordered   11  Metoprolol Succinate ER 25 MG Oral Tablet Extended Release 24 Hour; TAKE 1 TABLET    ONCE DAILY; Therapy: 30PKQ8084 to (Evaluate:06Czs3908)  Requested for: 51GRV8964; Last    Rx:52Ccu8379 Ordered   12  Nystatin-Triamcinolone 330008-7 1 UNIT/GM-% External Cream; APPLY SPARINGLY TO    AFFECTED AREA(S) TWO TIMES A DAY; Therapy: 45GFD5204 to (Felix Roblero)  Requested for: 76Tqd5559; Last    Rx:99Mqg7006 Ordered   13  Omeprazole 20 MG Oral Capsule Delayed Release; take 1 capsule daily  Requested for:    63EFO9498; Last Rx:27Yhj9941 Ordered   14  Vision Formula/Lutein Oral Tablet; Take twice a day; Therapy: 25GBA8393 to Recorded   15  Zolpidem Tartrate 5 MG Oral Tablet; take 1 tablet at bedtime as needed; Therapy: 24Aym6566 to (Evaluate:08Our5011); Last Rx:78Kqb0221 Ordered    The medication list was reviewed and updated today  Allergies  1  Colestid GRAN   2  Lipitor TABS   3  Livalo TABS   4  Pravachol TABS   5  Statins   6  Tricor TABS   7  Tylenol with Codeine #3 TABS   8  Vicoprofen TABS   9  Niaspan TBCR   10  Pneumovax 23 INJ   11  Zocor TABS  12  IVP Dye    Vitals  Vital Signs    Recorded: 47VXN0693 02:30PM   Temperature 98 5 F, Tympanic   Heart Rate 68   Systolic 604, Sitting   Diastolic 80, Sitting   Height 5 ft 5 in   Weight 169 lb 6 4 oz   BMI Calculated 28 19   BSA Calculated 1 84     Physical Exam    Constitutional   General appearance: No acute distress, well appearing and well nourished  Pulmonary   Respiratory effort: No increased work of breathing or signs of respiratory distress  Auscultation of lungs: Clear to auscultation, equal breath sounds bilaterally, no wheezes, no rales, no rhonci  Cardiovascular   Auscultation of heart: Normal rate and rhythm, normal S1 and S2, without murmurs  Examination of extremities for edema and/or varicosities: Normal     Carotid pulses: Normal     Lymphatic   Palpation of lymph nodes in neck: No lymphadenopathy  Musculoskeletal   Gait and station: Normal     Skin   Skin and subcutaneous tissue: Normal without rashes or lesions  Psychiatric   Orientation to person, place and time: Normal     Mood and affect: Normal          Results/Data  PHQ-9 Adult Depression Screening 59QDX1905 02:34PM User, Lila     Test Name Result Flag Reference   PHQ-9 Adult Depression Score 0     Over the last two weeks, how often have you been bothered by any of the following problems? Little interest or pleasure in doing things: Not at all - 0  Feeling down, depressed, or hopeless: Not at all - 0  Trouble falling or staying asleep, or sleeping too much: Not at all - 0  Feeling tired or having little energy: Not at all - 0  Poor appetite or over eating: Not at all - 0  Feeling bad about yourself - or that you are a failure or have let yourself or your family down: Not at all - 0  Trouble concentrating on things, such as reading the newspaper or watching television: Not at all - 0  Moving or speaking so slowly that other people could have noticed  Or the opposite -  being so fidgety or restless that you have been moving around a lot more than usual: Not at all - 0  Thoughts that you would be better off dead, or of hurting yourself in some way: Not at all - 0   PHQ-9 Adult Depression Screening Negative     PHQ-9 Difficulty Level Not difficult at all     PHQ-9 Severity No Depression         Health Management  History of Encounter for screening colonoscopy   COLONOSCOPY; every 5 years; Last 85CGA5699; Next Due: 58GKW5041; Active  Health Maintenance   COLONOSCOPY; every 5 years; Last 29JCA7854; Next Due: 71LFG0940; Active  Medicare Annual Wellness Visit; every 1 year; Last 11BZR6156; Next Due: 78III5917;  Overdue    Future Appointments    Date/Time Provider Specialty Site   12/18/2017 08:00 AM Lesa Vaughn MD Family Medicine Tiffanie Ramos MD     Signatures   Electronically signed by : Cedric Coleman MD; Oct 26 2017  8:21PM EST (Author)

## 2017-12-05 ENCOUNTER — GENERIC CONVERSION - ENCOUNTER (OUTPATIENT)
Dept: FAMILY MEDICINE CLINIC | Facility: HOSPITAL | Age: 82
End: 2017-12-05

## 2017-12-18 ENCOUNTER — ALLSCRIPTS OFFICE VISIT (OUTPATIENT)
Dept: OTHER | Facility: OTHER | Age: 82
End: 2017-12-18

## 2017-12-19 NOTE — PROGRESS NOTES
Assessment  1  Chronic obstructive pulmonary disease (496) (J44 9)   2  Chronic kidney disease, stage 3 (585 3) (N18 3)   3  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,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)   4  Hypertension (401 9) (I10)   5  Chronic gastroesophageal reflux disease (530 81) (K21 9)    Plan  COPD exacerbation    · Doxycycline Hyclate 100 MG Oral Tablet; TAKE ONE TABLET BY MOUTH TWICE ADAY    Discussion/Summary    BP excellent controlCAD asymptomatic COPD mild exacerbation  Continue Breo and give antibiotic Rx to hold in case of worseningArthralgia of neck likely DDDGERD good control with Omeprazole  The patient was counseled regarding diagnostic results,-- instructions for management,-- risk factor reductions,-- impressions  Possible side effects of new medications were reviewed with the patient/guardian today  The treatment plan was reviewed with the patient/guardian  The patient/guardian understands and agrees with the treatment plan     Self Referrals: No      Chief Complaint  Patient is here today for follow up of chronic conditions described in HPI  History of Present Illness  3 month followup has off and on pain in neck, using BenGay and son massages his neck  Some increase with chest congestion and does best with the blue inhaler  Sputum is staying white  No recent injury or fall  Continues with workouts at the gym      Review of Systems   Constitutional: not feeling poorly,-- no recent weight gain,-- not feeling tired-- and-- no recent weight loss  ENT: no nasal discharge  Cardiovascular: no chest pain,-- no intermittent leg claudication,-- no palpitations-- and-- no extremity edema  Respiratory: cough,-- wheezing-- and-- shortness of breath during exertion  Musculoskeletal: arthralgias, but-- as noted in HPI  Integumentary: no rashes  Neurological: no headache  Hematologic/Lymphatic: no tendency for easy bruising  Active Problems  1  Abnormal chest xray (793 2) (R93 8)   2   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)   3  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,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)   4  Arthritis (716 90) (M19 90)   5  Benign prostatic hypertrophy with lower urinary tract symptoms (LUTS) (600 01) (N40 1)   6  Carotid stenosis (433 10) (I65 29)   7  Chronic gastroesophageal reflux disease (530 81) (K21 9)   8  Chronic kidney disease, stage 3 (585 3) (N18 3)   9  Chronic obstructive pulmonary disease (496) (J44 9)   10  Chronic right-sided thoracic back pain (724 1,338 29) (M54 6,G89 29)   11  COPD exacerbation (491 21) (J44 1)   12  Depression with anxiety (300 4) (F41 8)   13  Dermatosis (709 9) (L98 9)   14  Dizziness (780 4) (R42)   15  Flu vaccine need (V04 81) (Z23)   16  Headache (784 0) (R51)   17  Hydronephrosis, right (591) (N13 30)   18  Hyperlipidemia (272 4) (E78 5)   19  Hypertension (401 9) (I10)   20  Hypothyroidism (244 9) (E03 9)   21  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)   22  Insomnia, unspecified type (780 52) (G47 00)   23  Iron deficiency anemia (280 9) (D50 9)   24  Macular degeneration (362 50) (H35 30)   25  Nausea (787 02) (R11 0)   26  Osteoporosis (733 00) (M81 0)   27  Other atopic dermatitis (691 8) (L20 89)   28  Productive cough (786 2) (R05)   29  Pruritus (698 9) (L29 9)   30  Shortness of breath (786 05) (R06 02)   31  Spasmodic cough (786 2) (R05)   32  Transient insomnia (307 41) (F51 02)   33  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, hypnotic, or anxiolytic drug (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 0,304 10) (F13 239)    Past Medical History  1  History of Alcohol abuse (305 00) (F10 10)   2  History of Benign Polyps Of The Large Intestine (V12 72)   3  History of Compression Fracture Of Thoracic Vertebral Body (805 2)   4  History of polymyalgia rheumatica (V13 59) (Z87 39)   5  Denied: History of substance abuse   6  History of upper gastrointestinal hemorrhage (V12 79) (Z87 19)   7  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)   8   History of Iron deficiency anemia due to chronic blood loss (280 0) (D50 0)   9  History of Ischemic colitis (557 9) (K55 9)   10  Denied: History of Mental health problem   11  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)   12  History of Peptic Ulcer (V12 71)   13  History of Rectal Stricture (569 2)   14  History of Varicose Veins Of Lower Extremities (454 9)    The active problems and past medical history were reviewed and updated today  Surgical History  1  History of CABG   2  History of Carotid Thromboendarterectomy   3  History of Excision Of Renal Cyst   4  History of Hemorrhoidectomy   5  History of Hernia Repair Inguinal Sliding    The surgical history was reviewed and updated today  Family History  Mother    1  Family history of Alcohol abuse  Father    2  Family history of Alcohol abuse  Son    3  Family history of Alcohol abuse  Family History    4  Denied: Family history of substance abuse   5  Denied: Family history of Mental health problem   6  Family history of Reported Family History Of Heart Disease   7  Family history of Stroke Syndrome (V17 1)    The family history was reviewed and updated today  Social History   · Always uses seat belt   · Being A Social Drinker   · Former smoker (Z25 15) (W26 386)   · Marital History - Currently   The social history was reviewed and updated today  The social history was reviewed and is unchanged  Current Meds   1  Aleve 220 MG Oral Tablet; TAKE 1 TABLET 3-4 TIMES DAILY AS NEEDED; Therapy: 42MYU9811 to Recorded   2  Atenolol 50 MG Oral Tablet; take 1/2 tablet by mouth once daily; Therapy: 67Gef3885 to (Evaluate:15Mar2018)  Requested for: 16Sep2017; Last Rx:24Dmt7625 Ordered   3  B Complex-B12 Oral Tablet; TAKE 1 TABLET DAILY; Therapy: 73QXR3615 to Recorded   4  Breo Ellipta 100-25 MCG/INH Inhalation Aerosol Powder Breath Activated; One inhalation daily; Therapy: 33PFB9138 to Recorded   5  Calmoseptine 0 44-20 625 % OINT; apply as directed;  Therapy: 23ABQ1881 to (Last Rx:08Nus1798) Ordered   6  [employer] Vitamin D CAPS; Therapy: (Pia Valerio) to Recorded   7  Doxycycline Hyclate 100 MG Oral Tablet; TAKE ONE TABLET BY MOUTH TWICE A DAY; Therapy: 38UMO5977 to (Evaluate:85Rwl2466)  Requested for: 74VHP6978; Last Rx:20Nov2017 Ordered   8  Iron 65 MG TABS; Therapy: (HQNITAZQ:99DJB5813) to Recorded   9  [address]; Therapy: (ACMAGRZK:24CCW0297) to Recorded   10  Levothyroxine Sodium 75 MCG Oral Tablet; take one tablet once daily; Therapy: 38Cjs0851 to (Evaluate:16May2018)  Requested for: 18UJN2562; Last  Rx:17Nov2017 Ordered   11  LORazepam 0 5 MG Oral Tablet; TAKE 1 TABLET 3 TIMES DAILY AS NEEDED; Therapy: 63VDY1493 to (Evaluate:22Jan2018); Last Rx:24Oct2017 Ordered   12  Metoprolol Succinate ER 25 MG Oral Tablet Extended Release 24 Hour; TAKE 1 TABLET  ONCE DAILY; Therapy: 41TGB4073 to (KJVJJVLB:05KJX0753)  Requested for: 98LNJ8685; Last  Rx:22Oct2017 Ordered   13  Nystatin-Triamcinolone 566573-5 1 UNIT/GM-% External Cream; APPLY TO AFFECTED  AREA(S) TWICE A DAY; USE SPARINGLY; Therapy: 33MDG0723 to (Carlos Leyllor)  Requested for: 72QPG1005; Last  Rx:30Nov2017 Ordered   14  Omeprazole 20 MG Oral Capsule Delayed Release; take 1 capsule daily  Requested for:  83QFP9188; Last Rx:16Jun2017 Ordered   15  Vision Formula/Lutein Oral Tablet; Take twice a day; Therapy: 74DKT4310 to Recorded   16  Zolpidem Tartrate 5 MG Oral Tablet; take 1 tablet at bedtime as needed; Therapy: 64Unh9980 to (Evaluate:60Uqe2780); Last Rx:30Nov2017 Ordered    The medication list was reviewed and updated today  Allergies  1  Colestid GRAN   2  Lipitor TABS   3  Livalo TABS   4  Pravachol TABS   5  Statins   6  Tricor TABS   7  Tylenol with Codeine #3 TABS   8  Vicoprofen TABS   9  Niaspan TBCR   10  Pneumovax 23 INJ   11  Zocor TABS  12   IVP Dye    Vitals  Vital Signs    Recorded: 15TWA7694 07:53AM   Temperature 98 5 F, Tympanic   Heart Rate 76, L Radial   Pulse Quality Regular, L Radial   Systolic 678, LUE, Sitting   Diastolic 76, LUE, Sitting   BP CUFF SIZE Large   Height 5 ft 5 in   Weight 168 lb 12 8 oz   BMI Calculated 28 09   BSA Calculated 1 84     Physical Exam   Constitutional  General appearance: No acute distress, well appearing and well nourished  Pulmonary  Respiratory effort: Abnormal  -- scattered rhonchi  Cardiovascular  Auscultation of heart: Normal rate and rhythm, normal S1 and S2, without murmurs  Examination of extremities for edema and/or varicosities: Normal    Carotid pulses: Normal    Lymphatic  Palpation of lymph nodes in neck: No lymphadenopathy  Musculoskeletal  Gait and station: Normal    Skin  Skin and subcutaneous tissue: Normal without rashes or lesions  Psychiatric  Orientation to person, place and time: Normal    Mood and affect: Normal          Health Management  History of Encounter for screening colonoscopy   COLONOSCOPY; every 5 years; Last 94UEZ7269; Next Due: 13HBY2355; Active  Health Maintenance   COLONOSCOPY; every 5 years; Last 12CUJ1466; Next Due: 30IWI3008; Active  Medicare Annual Wellness Visit; every 1 year; Last 38IYI2214; Next Due: 25QFT5275;  Overdue    Signatures   Electronically signed by : Fadi Schumacher MD; Dec 18 2017  8:17AM EST                       (Author)

## 2018-01-10 NOTE — PROGRESS NOTES
Assessment    1  COPD exacerbation (491 21) (J44 1)   2  Spasmodic cough (786 2) (R05)   3  Other atopic dermatitis (691 8) (L20 89)   4  Hypertension (401 9) (I10)    Plan  COPD exacerbation    · Doxycycline Hyclate 100 MG Oral Tablet; Take one twice a day    Discussion/Summary    Samples Anoro given  Due to chronicity of illness and high frequency of respiratory decline will start Doxycycline  May need Prednisone if not improving quickly  Possible side effects of new medications were reviewed with the patient/guardian today  The treatment plan was reviewed with the patient/guardian  The patient/guardian understands and agrees with the treatment plan     Self Referrals: No      Chief Complaint  Possible croup      History of Present Illness  HPI: coughing for the past month like whooping cough for past 2 months  not keeping him awake  Not using inhaler and not taking any OTC meds      Review of Systems    Constitutional: feeling poorly, but no fever and not feeling tired  ENT: sore throat and nasal discharge, but no earache and no hoarseness  Cardiovascular: no chest pain  Respiratory: cough, wheezing and shortness of breath during exertion  Gastrointestinal: no abdominal pain  Musculoskeletal: no arthralgias  Active Problems    1  Abnormal chest xray (793 2) (R93 8)   2  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)   3  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,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)   4  Arthritis (716 90) (M19 90)   5  Benign prostatic hypertrophy with lower urinary tract symptoms (LUTS) (600 01) (N40 1)   6  Carotid stenosis (433 10) (I65 29)   7  Chronic GERD (530 81) (K21 9)   8  Chronic kidney disease, stage 3 (585 3) (N18 3)   9  Chronic obstructive pulmonary disease (496) (J44 9)   10  Chronic right-sided thoracic back pain (724 1,338 29) (M54 6,G89 29)   11  COPD exacerbation (491 21) (J44 1)   12  Depression with anxiety (300 4) (F41 8)   13   Dizziness (780  4) (R42)   14  Headache (784 0) (R51)   15  Hydronephrosis, right (591) (N13 30)   16  Hyperlipidemia (272 4) (E78 5)   17  Hypertension (401 9) (I10)   18  Hypothyroidism (244 9) (E03 9)   19  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)   20  Insomnia, unspecified type (780 52) (G47 00)   21  Iron deficiency anemia (280 9) (D50 9)   22  Macular degeneration (362 50) (H35 30)   23  Nausea (787 02) (R11 0)   24  Osteoporosis (733 00) (M81 0)   25  Productive cough (786 2) (R05)   26  Pruritus (698 9) (L29 9)   27  Shortness of breath (786 05) (R06 02)   28  Transient insomnia (307 41) (F51 02)    Past Medical History    1  History of Alcohol abuse (305 00) (F10 10)   2  History of Benign Polyps Of The Large Intestine (V12 72)   3  History of Compression Fracture Of Thoracic Vertebral Body (805 2)   4  History of polymyalgia rheumatica (V13 59) (Z87 39)   5  Denied: History of substance abuse   6  History of upper gastrointestinal hemorrhage (V12 79) (Z87 19)   7  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)   8  History of Iron deficiency anemia due to chronic blood loss (280 0) (D50 0)   9  History of Ischemic colitis (557 9) (K55 9)   10  Denied: History of Mental health problem   11  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)   12  History of Peptic Ulcer (V12 71)   13  History of Rectal Stricture (569 2)   14  History of Varicose Veins Of Lower Extremities (454 9)  Active Problems And Past Medical History Reviewed: The active problems and past medical history were reviewed and updated today  Family History  Mother    1  Family history of Alcohol abuse  Father    2  Family history of Alcohol abuse  Son    3  Family history of Alcohol abuse  Family History    4  Denied: Family history of substance abuse   5  Denied: Family history of Mental health problem   6  Family history of Reported Family History Of Heart Disease   7  Family history of Stroke Syndrome (V17 1)  Family History Reviewed:    The family history was reviewed and updated today  Social History    · Always uses seat belt   · Being A Social Drinker   · Former smoker (O10 69) (D17 223)   · Marital History - Currently   The social history was reviewed and updated today  The social history was reviewed and is unchanged  Surgical History    1  History of CABG   2  History of Carotid Thromboendarterectomy   3  History of Excision Of Renal Cyst   4  History of Hemorrhoidectomy   5  History of Hernia Repair Inguinal Sliding  Surgical History Reviewed: The surgical history was reviewed and updated today  Current Meds   1  Aleve 220 MG Oral Tablet; TAKE 1 TABLET 3-4 TIMES DAILY AS NEEDED; Therapy: 14LNO9386 to Recorded   2  Anoro Ellipta 62 5-25 MCG/INH Inhalation Aerosol Powder Breath Activated; One   inhalation once a day; Therapy: 47MZL6547 to Recorded   3  Atenolol 50 MG Oral Tablet; take 1/2 tablet by mouth once daily; Therapy: 21Rhs7115 to (Magdaleno Chauhan)  Requested for: 03Apr2017; Last   Rx:03Apr2017 Ordered   4  B Complex-B12 Oral Tablet; TAKE 1 TABLET DAILY; Therapy: 68ODL9653 to Recorded   5  Calmoseptine 0 44-20 625 % OINT; apply as directed; Therapy: 39KQR3270 to (Last Rx:34Jpy2598) Ordered   6  [employer] Vitamin D CAPS; Therapy: (James Calhoun) to Recorded   7  Levothyroxine Sodium 75 MCG Oral Tablet; TAKE ONE TABLET BY MOUTH ONCE   DAILY; Therapy: 58Hpl2289 to (Last Rx:10Mar2017)  Requested for: 84GRH2310 Ordered   8  LORazepam 0 5 MG Oral Tablet; TAKE 1 TABLET 3 TIMES DAILY AS NEEDED; Therapy: 96GJZ7517 to (Evaluate:66Vwg1068); Last Rx:01Bri4403 Ordered   9  Meclizine HCl - 25 MG Oral Tablet; TAKE 1 TABLET BY MOUTH EVERY 8 HOURS as   needed for dizziness; Therapy: 01Cto8761 to (Evaluate:62Zfy2030)  Requested for: 93Pak6054; Last   Rx:14Wxf4544 Ordered   10  Nystatin-Triamcinolone 895014-0 1 UNIT/GM-% External Cream; apply sparingly to    affected area(s) twice daily;     Therapy: 17WOU8343 to (Evaluate:67Czg3009) Requested for: 59IFQ8681; Last    XF:19HBE1236 Ordered   11  Omeprazole 20 MG Oral Capsule Delayed Release; take 1 capsule daily  Requested for:    18JNU0538; Last Rx:16Jun2017 Ordered   12  Oseltamivir Phosphate 75 MG Oral Capsule; TAKE 1 CAPSULE TWICE DAILY; Therapy: 25DYS7015 to (Evaluate:25Jan2017)  Requested for: 20Jan2017; Last    Rx:20Jan2017 Ordered   13  Vision Formula/Lutein Oral Tablet; Take twice a day; Therapy: 86XOH2744 to Recorded   14  Zolpidem Tartrate 5 MG Oral Tablet; take 1 tablet at bedtime as needed; Therapy: 02Zuv3698 to (Evaluate:06Sep2017); Last Rx:01Cjp5854 Ordered    The medication list was reviewed and updated today  Allergies    1  Colestid GRAN   2  Lipitor TABS   3  Livalo TABS   4  Pravachol TABS   5  Statins   6  Tricor TABS   7  Tylenol with Codeine #3 TABS   8  Vicoprofen TABS   9  Niaspan TBCR   10  Pneumovax 23 INJ   11  Zocor TABS    12  IVP Dye    Vitals   Recorded: 16Aug2017 06:21PM   Temperature 97 6 F, Tympanic   Heart Rate 68   Systolic 854, Sitting   Diastolic 90, Sitting   Height 5 ft 5 in   Weight 175 lb 3 2 oz   BMI Calculated 29 16   BSA Calculated 1 87     Physical Exam    Constitutional   General appearance: No acute distress, well appearing and well nourished  Eyes   Conjunctiva and lids: No swelling, erythema, or discharge  Ears, Nose, Mouth, and Throat   External inspection of ears and nose: Normal     Otoscopic examination: Tympanic membrance translucent with normal light reflex  Canals patent without erythema  Nasal mucosa, septum, and turbinates: Normal without edema or erythema  Oropharynx: Normal with no erythema, edema, exudate or lesions  Pulmonary   Respiratory effort: Abnormal   cough and increased work of breathing  Auscultation of lungs: Abnormal   scattered wheeze and rhonchi  Cardiovascular   Auscultation of heart: Normal rate and rhythm, normal S1 and S2, without murmurs      Examination of extremities for edema and/or varicosities: Normal     Carotid pulses: Normal          Future Appointments    Date/Time Provider Specialty Site   08/28/2017 01:00 PM NANDINI Nixon   Cardiology [facility] CARDIOLOGY [facility]   09/15/2017 08:40 AM Jerri Corral MD 5 [facility] MD     Signatures   Electronically signed by : Lindy Nazario MD; Aug 20 2017  8:54PM EST                       (Author)

## 2018-01-11 NOTE — PROGRESS NOTES
Assessment    1  Encounter for preventive health examination (V70 0) (Z00 00)   2  Chronic kidney disease, stage 3 (585 3) (N18 3)   3  Chronic obstructive pulmonary disease (496) (J44 9)   4  Carotid stenosis (433 10) (I65 29)   5  Abnormal chest xray (793 2) (R93 8)   6  Hypertension (401 9) (I10)   7  Hyperlipidemia (272 4) (E78 5)    Plan  Health Maintenance    · *VB - Fall Risk Assessment  (Dx Z13 89 Screen for Neurologic Disorder);  Status:Complete;   Done: 55IWC0424 07:15AM   · *VB - Urinary Incontinence Screen (Dx Z13 89 Screen for UI); Status:Complete;   Done:  95NLD4276 07:15AM   · Eat a low fat and low cholesterol diet ; Status:Complete;   Done: 71OSM0680   · Stretch and warm up your muscles during the first 10 minutes , then cool down your  muscles for the last 10 minutes of exercise ; Status:Complete;   Done: 56VJD4956   · These are things you can do to prevent falls in and around the home ; Status:Complete;    Done: 40YMH0657   · We recommend that you create an advance directive ; Status:Complete;   Done:  40GDM0534    Discussion/Summary    BP excellent control  COPD stable  Myalgias from Livalo and other statins  OK to use supplement [employer]  OK use supplement glucosamine and curcumin from a friend's recommendations  Impression: Subsequent Annual Wellness Visit, with preventive exam as well as age and risk appropriate counseling completed  Cardiovascular screening and counseling: screening is current  Diabetes screening and counseling: screening is current  Colorectal cancer screening and counseling: screening not indicated  Prostate cancer screening and counseling: screening not indicated  Osteoporosis screening and counseling: screening not indicated  Abdominal aortic aneurysm screening and counseling: screening not indicated  Glaucoma screening and counseling: screening is current  HIV screening and counseling: screening not indicated   Immunizations: influenza vaccine is due today, the lifetime pneumococcal vaccine has been completed, hepatitis B vaccination series is not indicated at this time due to the patient's low risk of sara the disease, Zostavax vaccination up to date, Td vaccination up to date and Tdap vaccination up to date  Advance Directive Planning: complete and up to date  Advice and education were given regarding fall risk reduction  He was referred to podiatry  Medical Equipment/Suppliers: none  Patient Discussion: plan discussed with the patient, follow-up visit needed in 3 months  Possible side effects of new medications were reviewed with the patient/guardian today  The treatment plan was reviewed with the patient/guardian  The patient/guardian understands and agrees with the treatment plan     Self Referrals: No      Chief Complaint  HM      Advance Directives  Advance Directive [facility]:   YES - Patient has an advance health care directive  The patient has a living will located  in patient's home  Capacity/Competence: This patient has full decision making capacity for discussion of advance care planning and This patient has full decision making competency for discussion of advance care planning  History of Present Illness  HPI: Was in ER for myalgias, presumed from Livalo and he did stop this  To get CT chest for STACIE abnormality on CXR       Welcome to Medicare and Wellness Visits: The patient is being seen for the subsequent annual wellness visit  Medicare Screening and Risk Factors   Hospitalizations: he has been previously hospitalizied and he has been hospitalized 5 times  Once per lifetime medicare screening tests: ECG ~U() and AAA screening US has not yet been done  Medicare Screening Tests Risk Questions   Abdominal aortic aneurysm risk assessment: over 72years of age, but none indicated  Osteoporosis risk assessment:  and over 48years of age, but none indicated  HIV risk assessment: none indicated     Drug and Alcohol Use: The patient is a former cigarette smoker and quit smoking 1993  The patient reports rare alcohol use  He has never used illicit drugs  Diet and Physical Activity: Current diet includes well balanced meals, low fat food choices, 3 servings of fruit per day, 3 servings of vegetables per day, 3 servings of meat per day, 2 servings of whole grains per day, 1 servings of simple carbohydrates per day, 3 servings of dairy products per day, 2 cups of coffee per day, 2 cups of tea per day, 0 cans of regular soda per day and 1/2 cans of diet soda per day  He exercises daily and exercises 3-4 times per week  Exercise: walking, stretching, strength training 45 minutes per day  Mood Disorder and Cognitive Impairment Screening: PHQ-9 Depression Scale   Depression screening  negative for symptoms  He denies feeling down, depressed, or hopeless over the past two weeks  He denies feeling little interest or pleasure in doing things over the past two weeks  Cognitive impairment screening: denies difficulty learning/retaining new information, denies difficulty handling complex tasks, denies difficulty with reasoning, denies difficulty with spatial ability and orientation, denies difficulty with language and denies difficulty with behavior  Functional Ability/Level of Safety: Hearing is slightly decreased, slightly decreased in the right ear, slightly decreased in the left ear and a hearing aid is used  He reports hearing difficulties  The patient is currently able to do activities of daily living without limitations, able to do instrumental activities of daily living without limitations, able to participate in social activities without limitations and able to drive without limitations   Activities of daily living details: does not need help using the phone, no transportation help needed, does not need help shopping, no meal preparation help needed, does not need help doing housework, does not need help doing laundry, does not need help managing medications and does not need help managing money  Fall risk factors: The patient fell 0 times in the past 12 months , no polypharmacy, no postural hypotension, no visual impairment, no cognitive impairment and no previous fall  Home safety risk factors:  no unfamiliar surroundings, no loose rugs and no poor household lighting  Advance Directives: Advance directives: living will, durable power of  for health care directives and advance directives  end of life decisions were reviewed with the patient and I agree with the patient's decisions  Co-Managers and Medical Equipment/Suppliers: See Patient Care Team       Reviewed Updated [facility]:   Last Medicare Wellness Visit Information was reviewed, patient interviewed and updates made to the record today  Preventive Quality Program 65 and Older: Falls Risk: The patient fell 0 times in the past 12 months  The patient is currently asymptomatic Symptoms Include:  Associated symptoms:  No associated symptoms are reported  The patient currently has no urinary incontinence symptoms  Patient Care Team    Care Team Member Role Specialty Office Number   Karolina Alfaro MD  Vascular Surgery [phone]   Esvin Hastings MD  Gastroenterology Adult [phone]   Maximiliano Fuller MD  Cardiology [phone]   Uyen Bear MD  Family Medicine [phone]   [facility]  [facility]iatry [phone]     Review of Systems    Constitutional: no malaise  ENT: no nasal congestion  Cardiovascular: the heart is not racing and no lightheadedness  Respiratory: no shortness of breath and no cough  Gastrointestinal: no abdominal pain  Genitourinary: no dysuria  Musculoskeletal: diffuse joint pain and generalized muscle aches  Integumentary and Breasts: no rashes  Neurological: no headache and no leg numbness  Psychiatric: anxiety  Hematologic and Lymphatic: no tendency for easy bruising           Over the past 2 weeks, how often have you been bothered by the following problems? 1 ) Little interest or pleasure in doing things? Not at all    2 ) Feeling down, depressed or hopeless? Not at all    3 ) Trouble falling asleep or sleeping too much? Not at all    5 ) Poor appetite or overeating? Not at all    6 ) Feeling bad about yourself, or that you are a failure, or have let yourself or your family down? Not at all    7 ) Trouble concentrating on things, such as reading a newspaper or watching television? Several days  8 ) Moving or speaking so slowly that other people could have noticed, or the opposite, moving or speaking faster than usual? Not at all  How difficult have these problems made it for you to do your work, take care of things at home, or get along with people? 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,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)   4  Arthritis (716 90) (M19 90)   5  Benign prostatic hypertrophy with lower urinary tract symptoms (LUTS) (600 01) (N40 1)   6  Carotid stenosis (433 10) (I65 29)   7  Chronic GERD (530 81) (K21 9)   8  Chronic kidney disease, stage 3 (585 3) (N18 3)   9  Chronic obstructive pulmonary disease (496) (J44 9)   10  Chronic right-sided thoracic back pain (724 1,338 29) (M54 6,G89 29)   11  COPD exacerbation (491 21) (J44 1)   12  Depression with anxiety (300 4) (F41 8)   13  Dizziness (780 4) (R42)   14  Headache (784 0) (R51)   15  Hydronephrosis, right (591) (N13 30)   16  Hyperlipidemia (272 4) (E78 5)   17  Hypertension (401 9) (I10)   18  Hypothyroidism (244 9) (E03 9)   19  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)   20  Insomnia, unspecified type (780 52) (G47 00)   21  Iron deficiency anemia (280 9) (D50 9)   22  Macular degeneration (362 50) (H35 30)   23  Nausea (787 02) (R11 0)   24  Osteoporosis (733 00) (M81 0)   25   Productive cough (786  2) (R05)   26  Pruritus (698 9) (L29 9)   27  Shortness of breath (786 05) (R06 02)   28  Transient insomnia (307 41) (F51 02)    Past Medical History    · History of Benign Polyps Of The Large Intestine (V12 72)   · History of Compression Fracture Of Thoracic Vertebral Body (805 2)   · History of polymyalgia rheumatica (V13 59) (Z87 39)   · History of upper gastrointestinal hemorrhage (V12 79) (Z87 19)   · 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)   · History of Iron deficiency anemia due to chronic blood loss (280 0) (D50 0)   · History of Ischemic colitis (557 9) (K55 9)   · 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)   · History of Peptic Ulcer (V12 71)   · History of Rectal Stricture (569 2)   · History of Varicose Veins Of Lower Extremities (454 9)    The active problems and past medical history were reviewed and updated today  Surgical History    · History of CABG   · History of Carotid Thromboendarterectomy   · History of Excision Of Renal Cyst   · History of Hemorrhoidectomy   · History of Hernia Repair Inguinal Sliding    The surgical history was reviewed and updated today  Family History  Family History    · Family history of Reported Family History Of Heart Disease   · Family history of Stroke Syndrome (V17 1)    The family history was reviewed and updated today  Social History    · Being A Social Drinker   · Former smoker (H71 23) (W19 220)   · Marital History - Currently   The social history was reviewed and updated today  The social history was reviewed and is unchanged  Current Meds   1  Aleve 220 MG Oral Tablet; TAKE 1 TABLET 3-4 TIMES DAILY AS NEEDED; Therapy: 05ZGM5098 to Recorded   2  Anoro Ellipta 62 5-25 MCG/INH Inhalation Aerosol Powder Breath Activated; One   inhalation once a day; Therapy: 54ODS1502 to Recorded   3  Atenolol 50 MG Oral Tablet; take 1/2 tablet by mouth once daily;    Therapy: 60Tbt9526 to (Stanislaw Deal)  Requested for: 03Apr2017; Last Rx:83Hbn8676 Ordered   4  B Complex-B12 Oral Tablet; TAKE 1 TABLET DAILY; Therapy: 70DER2164 to Recorded   5  Calmoseptine 0 44-20 625 % OINT; apply as directed; Therapy: 51JEG2445 to (Last Rx:50Zsm1379) Ordered   6  [employer] Vitamin D CAPS; Therapy: (Marcelina Bosworth) to Recorded   7  Levothyroxine Sodium 75 MCG Oral Tablet; TAKE ONE TABLET BY MOUTH ONCE   DAILY; Therapy: 85Dqe4232 to (Last Rx:10Mar2017)  Requested for: 68KWS2434 Ordered   8  LORazepam 0 5 MG Oral Tablet; TAKE 1 TABLET 3 TIMES DAILY AS NEEDED; Therapy: 04RBW9959 to (Evaluate:14Oct2017); Last Rx:26Itm8577 Ordered   9  Meclizine HCl - 25 MG Oral Tablet; TAKE 1 TABLET BY MOUTH EVERY 8 HOURS as   needed for dizziness; Therapy: 14Nli9528 to (Evaluate:29Fav2156)  Requested for: 86Xvv0137; Last   Rx:25Tff9773 Ordered   10  Nystatin-Triamcinolone 861079-2 1 UNIT/GM-% External Cream; apply sparingly to    affected area(s) twice daily; Therapy: 07UWU3388 to (Evaluate:13Jga0480)  Requested for: 39JZZ1758; Last    Rx:35Wzh8535 Ordered   11  Omeprazole 20 MG Oral Capsule Delayed Release; take 1 capsule daily  Requested for:    64TUR5719; Last Rx:24Qsp7138 Ordered   12  Oseltamivir Phosphate 75 MG Oral Capsule; TAKE 1 CAPSULE TWICE DAILY; Therapy: 93HQW3198 to (Evaluate:25Jan2017)  Requested for: 20Jan2017; Last    Rx:20Jan2017 Ordered   13  Vision Formula/Lutein Oral Tablet; Take twice a day; Therapy: 38GBN5755 to Recorded   14  Zolpidem Tartrate 5 MG Oral Tablet; take 1 tablet at bedtime as needed; Therapy: 67Dkr7763 to (Evaluate:15Hnf8214); Last Rx:08Mar2017 Ordered    The medication list was reviewed and updated today  Allergies    1  Colestid GRAN   2  Lipitor TABS   3  Livalo TABS   4  Pravachol TABS   5  Statins   6  Tricor TABS   7  Tylenol with Codeine #3 TABS   8  Vicoprofen TABS   9  Niaspan TBCR   10  Pneumovax 23 INJ   11  Zocor TABS    12  IVP Dye    Immunizations   ** Printed in Appendix #1 below       Vitals  Signs Temperature: 98 4 F, Tympanic  Heart Rate: 72, L Radial  Pulse Quality: Regular, L Radial  Systolic: 666, LUE, Sitting  Diastolic: 62, LUE, Sitting  BP Cuff Size: Large  Height: 5 ft 5 in  Weight: 172 lb   BMI Calculated: 28 62  BSA Calculated: 1 86    Physical Exam    Constitutional   General appearance: No acute distress, well appearing and well nourished  Neck   Neck: Supple, symmetric, trachea midline, no masses  Pulmonary   Respiratory effort: No increased work of breathing or signs of respiratory distress  Auscultation of lungs: Clear to auscultation  Cardiovascular   Auscultation of heart: Normal rate and rhythm, normal S1 and S2, no murmurs  Carotid pulses: 2+ bilaterally  Examination of extremities for edema and/or varicosities: Normal     Lymphatic   Palpation of lymph nodes in neck: No lymphadenopathy  Skin   Skin and subcutaneous tissue: Normal without rashes or lesions  Psychiatric   Judgment and insight: Normal     Orientation to person, place and time: Normal     Recent and remote memory: Intact  Mood and affect: Normal        Results/Data  *VB - Fall Risk Assessment  (Dx Z13 89 Screen for Neurologic Disorder) 69NGV2696 07:15AM Ollis Davis     Test Name Result Flag Reference   Falls Risk      No falls in the past year     *VB - Urinary Incontinence Screen (Dx Z13 89 Screen for UI) 66GOA9107 07:15AM Ollis Davis     Test Name Result Flag Reference   Urinary Incontinence Assessment 75Jfl2195         Procedure    Procedure: Visual Acuity Test    Indication: routine screening  Inforrmation supplied by a Snellen chart  Results: 20/70 in the right eye with corrective device, 20/100 in the left eye with corrective device      Health Management  History of Encounter for screening colonoscopy   COLONOSCOPY; every 5 years; Last 27NEY1525; Next Due: 52XXQ4863; Active  Health Maintenance   COLONOSCOPY; every 5 years; Last 11JUW0107; Next Due: 54WUF3858;  Active  Medicare Annual Wellness Visit; every 1 year; Last 52GRE2582; Next Due: 50DHM5315;   Overdue    Signatures   Electronically signed by : Elizabeth Leavitt MD; 2017  9:08AM EST                       (Author)    Appendix #1     Patient: Bree Galvan; : 1932; MRN: [id number]      1 2 3 4 5    Influenza  10-Sep-2012 02-Oct-2013 [id number] 21-Oct-2015 17-Sep-2016    PPSV  Permanently Deferred: Medical Deferral, Had Large Reaction, 92MTE2706 [id number]      Tdap  04-Aug-2014        Zoster  2011

## 2018-01-12 VITALS
WEIGHT: 165.2 LBS | TEMPERATURE: 97 F | DIASTOLIC BLOOD PRESSURE: 76 MMHG | HEIGHT: 65 IN | SYSTOLIC BLOOD PRESSURE: 128 MMHG | BODY MASS INDEX: 27.52 KG/M2 | HEART RATE: 68 BPM

## 2018-01-12 VITALS
HEIGHT: 65 IN | HEART RATE: 64 BPM | TEMPERATURE: 98.2 F | WEIGHT: 169.8 LBS | SYSTOLIC BLOOD PRESSURE: 142 MMHG | BODY MASS INDEX: 28.29 KG/M2 | DIASTOLIC BLOOD PRESSURE: 70 MMHG

## 2018-01-12 NOTE — RESULT NOTES
Message   pt aware     Verified Results  (1) CBC/PLT/DIFF 05Gog5415 06:57AM Delores Perez     Test Name Result Flag Reference   WBC COUNT 6 51 Thousand/uL  4 31-10 16   RBC COUNT 4 87 Million/uL  3 88-5 62   HEMOGLOBIN 14 3 g/dL  12 0-17 0   HEMATOCRIT 44 3 %  36 5-49 3   MCV 91 fL  82-98   MCH 29 4 pg  26 8-34 3   MCHC 32 3 g/dL  31 4-37 4   RDW 13 8 %  11 6-15 1   MPV 9 6 fL  8 9-12 7   PLATELET COUNT 698 Thousands/uL L 149-390   NEUTROPHILS RELATIVE PERCENT 59 %  43-75   LYMPHOCYTES RELATIVE PERCENT 26 %  14-44   MONOCYTES RELATIVE PERCENT 11 %  4-12   EOSINOPHILS RELATIVE PERCENT 4 %  0-6   BASOPHILS RELATIVE PERCENT 0 %  0-1   NEUTROPHILS ABSOLUTE COUNT 3 86 Thousands/?L  1 85-7 62   LYMPHOCYTES ABSOLUTE COUNT 1 70 Thousands/?L  0 60-4 47   MONOCYTES ABSOLUTE COUNT 0 70 Thousand/?L  0 17-1 22   EOSINOPHILS ABSOLUTE COUNT 0 23 Thousand/?L  0 00-0 61   BASOPHILS ABSOLUTE COUNT 0 02 Thousands/?L  0 00-0 10     (1) COMPREHENSIVE METABOLIC PANEL 52CFK5530 23:95RJ Delores Perez     Test Name Result Flag Reference   GLUCOSE,RANDM 96 mg/dL     If the patient is fasting, the ADA then defines impaired fasting glucose as > 100 mg/dL and diabetes as > or equal to 123 mg/dL     SODIUM 139 mmol/L  136-145   POTASSIUM 4 4 mmol/L  3 5-5 3   CHLORIDE 102 mmol/L  100-108   CARBON DIOXIDE 29 mmol/L  21-32   ANION GAP (CALC) 8 mmol/L  4-13   BLOOD UREA NITROGEN 17 mg/dL  5-25   CREATININE 1 49 mg/dL H 0 60-1 30   Standardized to IDMS reference method   CALCIUM 9 1 mg/dL  8 3-10 1   BILI, TOTAL 0 50 mg/dL  0 20-1 00   ALK PHOSPHATAS 72 U/L     ALT (SGPT) 22 U/L  12-78   AST(SGOT) 17 U/L  5-45   ALBUMIN 3 6 g/dL  3 5-5 0   TOTAL PROTEIN 7 6 g/dL  6 4-8 2   eGFR Non-African American 44 9 ml/min/1 73sq [facility] Disease Education Program recommendations are as follows:  GFR calculation is accurate only with a steady state creatinine  Chronic Kidney disease less than 60 ml/min/1 73 sq  meters  Kidney failure less than 15 ml/min/1 73 sq  meters  (1) LIPID PANEL, FASTING 01Aug2016 06:57AM Zhou Heiya     Test Name Result Flag Reference   CHOLESTEROL 176 mg/dL     HDL,DIRECT 28 mg/dL L 40-60   Specimen collection should occur prior to Metamizole administration due to the potential for falsely depressed results  LDL CHOLESTEROL CALCULATED 95 mg/dL  0-100   Triglyceride:         Normal              <150 mg/dl       Borderline High    150-199 mg/dl       High               200-499 mg/dl       Very High          >499 mg/dl  Cholesterol:         Desirable        <200 mg/dl      Borderline High  200-239 mg/dl      High             >239 mg/dl  HDL Cholesterol:        High    >59 mg/dL      Low     <41 mg/dL  LDL CALCULATED:    This screening LDL is a calculated result  It does not have the accuracy of the Direct Measured LDL in the monitoring of patients with hyperlipidemia and/or statin therapy  Direct Measure LDL (GQC029) must be ordered separately in these patients  TRIGLYCERIDES 263 mg/dL H <=150   Specimen collection should occur prior to N-Acetylcysteine or Metamizole administration due to the potential for falsely depressed results  (1) TSH WITH FT4 REFLEX 01Aug2016 06:57AM Zhou Heiya     Test Name Result Flag Reference   TSH 2 416 uIU/mL  0 358-3 740   Patients undergoing fluorescein dye angiography may retain small amounts of fluorescein in the body for 48-72 hours post procedure  Samples containing fluorescein can produce falsely depressed TSH values  If the patient had this procedure,a specimen should be resubmitted post fluorescein clearance  (1) FERRITIN 01Aug2016 06:57AM Oswaldo Sears     Test Name Result Flag Reference   FERRITIN 35 ng/mL  8-388     (1) HEMOGLOBIN A1C 05Rme0240 06:57AM Oswaldo Sears     Test Name Result Flag Reference   HEMOGLOBIN A1C 5 3 %  4 2-6 3   EST  AVG  GLUCOSE 105 mg/dl         Discussion/Summary   All labs are excellent    Keep same doses and medications

## 2018-01-12 NOTE — MISCELLANEOUS
Message   Recorded as Task   Date: 01/16/2017 11:15 AM, Created By: Liv Santana   Task Name: Medical Complaint Callback   Assigned To: [facility]   Regarding Patient: Delia Carballo, Status: Active   Comment:    Ayah Lucas - 16 Jan 2017 11:15 AM     TASK CREATED  CallJoel Shirley, Adult Child; Medical Complaint; [phone]  PT went to [facility], [place] [address] Now Sat 14th  acute bronchitis, a lot of pain from coughing   He is wimmpering when he talks   Sixto Clubs - 16 Jan 2017 11:48 AM     TASK REPLIED TO: Previously Assigned To [facility] for callback   Joy Alexis - 17 Jan 2017 7:27 AM     TASK EDITED  Patient went to ER  Has appt friday with you  Active Problems    1  Abdominal pain (789 00) (R10 9)   2  Abnormal blood chemistry (790 6) (R79 9)   3  Acute bronchitis (466 0) (J20 9)   4  Angina pectoris (413 9) (I20 9)   5  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)   6  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,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)   7  Arthritis (716 90) (M19 90)   8  Benign prostatic hypertrophy with lower urinary tract symptoms (LUTS) (600 01) (N40 1)   9  Carotid stenosis (433 10) (I65 29)   10  Cataract, left (366 9) (H26 9)   11  Cataract, right (366 9) (H26 9)   12  Chronic GERD (530 81) (K21 9)   13  Chronic kidney disease, stage 3 (585 3) (N18 3)   14  Chronic obstructive pulmonary disease (496) (J44 9)   15  Chronic right-sided thoracic back pain (724 1,338 29) (M54 6,G89 29)   16  Depression with anxiety (300 4) (F41 8)   17  Dizziness (780 4) (R42)   18  Encounter for screening colonoscopy (V76 51) (Z12 11)   19  Encounter for screening for malignant neoplasm of prostate (V76 44) (Z12 5)   20  Fatigue (780 79) (R53 83)   21  Fever (780 60) (R50 9)   22  Flu vaccine need (V04 81) (Z23)   23  Grief reaction (309 0) (F43 20)   24  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)   25   Hydronephrosis, right (591) (N13 30)   26  Hyperlipidemia (272 4) (E78 5)   27  Hypertension (401 9) (I10)   28  Hypothyroidism (244 9) (E03 9)   29  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)   30  Insomnia, unspecified type (780 52) (G47 00)   31  Iron deficiency anemia (280 9) (D50 9)   32  Ischemic colitis (557 9) (K55 9)   33  Macular degeneration (362 50) (H35 30)   34  Nausea (787 02) (R11 0)   35  Olecranon bursitis, unspecified laterality (726 33) (M70 20)   36  Osteoporosis (733 00) (M81 0)   37  Productive cough (786 2) (R05)   38  Pruritus (698 9) (L29 9)   39  Scalp pain (784 0) (R51)   40  Screening for genitourinary condition (V81 6) (Z13 89)   41  Shortness of breath (786 05) (R06 02)   42  Tinea cruris (110 3) (B35 6)   43  Transient insomnia (307 41) (F51 02)    Current Meds   1  Atenolol 50 MG Oral Tablet; take 1/2 tablet by mouth once daily; Therapy: 03Ogf2148 to (Evaluate:16Mar2017)  Requested for: 13Fcg8091; Last   Rx:86Nvz9888 Ordered   2  B Complex-B12 Oral Tablet; TAKE 1 TABLET DAILY; Therapy: 36CFD6407 to Recorded   3  Benzonatate 100 MG Oral Capsule; 1 - 2 caps q6h prn; Therapy: 21JJP5982 to (Last Rx:14Jan2017)  Requested for: 70UJU5806 Ordered   4  Calmoseptine 0 44-20 625 % OINT; apply as directed; Therapy: 02YTE5902 to (Last Rx:85Qsy8004) Ordered   5  Cefuroxime Axetil 500 MG Oral Tablet; TAKE 1 TABLET EVERY 12 HOURS DAILY; Therapy: 66CDQ8738 to [phone]  Requested for: 29AOV7009; Last   Rx:14Jan2017 Ordered   6  [employer] Vitamin D CAPS; Therapy: (Sheba Beasley) to Recorded   7  Levothyroxine Sodium 75 MCG Oral Tablet; TAKE ONE TABLET BY MOUTH ONCE   DAILY; Therapy: 27Nwg0022 to (Last Rx:46Oxn1521)  Requested for: 93Hpa1838 Ordered   8  Livalo 4 MG Oral Tablet; Take 1 tablet daily  Requested for: 08NJP3368; Last   Rx:00Nun5708 Ordered   9  LORazepam 0 5 MG Oral Tablet; TAKE 1 TABLET 3 TIMES DAILY AS NEEDED; Therapy: 64YHR8153 to (Evaluate:25Jun2017); Last Rx:50Kkh3707 Ordered   10   Meclizine HCl - 25 MG Oral Tablet; TAKE 1 TABLET BY MOUTH EVERY 8 HOURS as    needed for dizziness; Therapy: 03Ljk6872 to (Evaluate:89Tyh7526)  Requested for: 73Ciz5714; Last    Rx:18Zqo5703 Ordered   11  Nystatin-Triamcinolone 128414-2 1 UNIT/GM-% External Cream; apply sparingly to    affected area(s) twice daily; Therapy: 93HPZ0033 to (Evaluate:96Ril3008)  Requested for: 72Skr6009; Last    Rx:83Tse8218 Ordered   12  Omeprazole 20 MG Oral Capsule Delayed Release; take 1 capsule daily  Requested for:    29JIU7106; Last Rx:91Ezq9896 Ordered   13  Vision Formula/Lutein Oral Tablet; Take twice a day; Therapy: 21RNE7100 to Recorded   14  Zolpidem Tartrate 5 MG Oral Tablet; take 1 tablet at bedtime as needed; Therapy: 34Kus2674 to (Evaluate:26Apr2017); Last Rx:27Uxh3824 Ordered    Allergies    1  Colestid GRAN   2  Lipitor TABS   3  Pravachol TABS   4  Statins   5  Tricor TABS   6  Tylenol with Codeine #3 TABS   7  Vicoprofen TABS   8  Niaspan TBCR   9  Pneumovax 23 INJ   10  Zocor TABS    11   IVP Dye    Signatures   Electronically signed by : Didier Zhu MD; Jan 17 2017  7:34AM EST                       (Author)

## 2018-01-13 VITALS
WEIGHT: 164.5 LBS | SYSTOLIC BLOOD PRESSURE: 134 MMHG | HEART RATE: 89 BPM | DIASTOLIC BLOOD PRESSURE: 60 MMHG | BODY MASS INDEX: 27.41 KG/M2 | RESPIRATION RATE: 16 BRPM | HEIGHT: 65 IN

## 2018-01-13 VITALS
HEART RATE: 76 BPM | DIASTOLIC BLOOD PRESSURE: 70 MMHG | BODY MASS INDEX: 28.46 KG/M2 | HEIGHT: 65 IN | TEMPERATURE: 99 F | WEIGHT: 170.8 LBS | SYSTOLIC BLOOD PRESSURE: 136 MMHG

## 2018-01-13 VITALS
TEMPERATURE: 98.7 F | HEART RATE: 72 BPM | WEIGHT: 163.8 LBS | HEIGHT: 65 IN | DIASTOLIC BLOOD PRESSURE: 68 MMHG | SYSTOLIC BLOOD PRESSURE: 120 MMHG | BODY MASS INDEX: 27.29 KG/M2

## 2018-01-13 VITALS
HEIGHT: 65 IN | SYSTOLIC BLOOD PRESSURE: 140 MMHG | TEMPERATURE: 98.2 F | BODY MASS INDEX: 27.86 KG/M2 | HEART RATE: 72 BPM | WEIGHT: 167.2 LBS | DIASTOLIC BLOOD PRESSURE: 74 MMHG

## 2018-01-13 VITALS
WEIGHT: 172 LBS | HEART RATE: 72 BPM | DIASTOLIC BLOOD PRESSURE: 62 MMHG | SYSTOLIC BLOOD PRESSURE: 110 MMHG | HEIGHT: 65 IN | BODY MASS INDEX: 28.66 KG/M2 | TEMPERATURE: 98.4 F

## 2018-01-14 VITALS
TEMPERATURE: 97.6 F | SYSTOLIC BLOOD PRESSURE: 152 MMHG | HEART RATE: 68 BPM | DIASTOLIC BLOOD PRESSURE: 90 MMHG | HEIGHT: 65 IN | WEIGHT: 175.2 LBS | BODY MASS INDEX: 29.19 KG/M2

## 2018-01-14 VITALS
HEIGHT: 65 IN | DIASTOLIC BLOOD PRESSURE: 80 MMHG | BODY MASS INDEX: 28.22 KG/M2 | HEART RATE: 68 BPM | WEIGHT: 169.4 LBS | TEMPERATURE: 98.5 F | SYSTOLIC BLOOD PRESSURE: 126 MMHG

## 2018-01-15 NOTE — RESULT NOTES
Discussion/Summary    CT chest is good  No tumors or pneumonia  No further testing at this time  pt aware ems 6/19/2017         Verified Results  * CT CHEST WO CONTRAST 28BLH2434 12:07PM Shelly Cordero Order Number: [id number]    - Patient Instructions: To schedule this appointment, please contact Central Scheduling at [phone]  Test Name Result Flag Reference   CT CHEST WO CONTRAST (Report)     This is a summary report  The complete report is available in the patient's medical record  If you cannot access the medical record, please contact the sending organization for a detailed fax or copy  CT CHEST WITHOUT IV CONTRAST     INDICATION: Abnormal findings on diagnostic imaging of other specified body structures [R93 8 (ICD-10-CM)]  Lung nodule seen on chest x-ray  COMPARISON: Chest x-rays of 5/12/2017 and 1/16/2017  Chest CT 2/11/2008  Abdominal CT 5/14/2016  TECHNIQUE: CT examination of the chest was performed without intravenous contrast  Reformatted images were created in axial, sagittal, and coronal planes  Radiation dose length product (DLP) for this visit: 314 16 mGy-cm   This examination, like all CT scans performed in the [facility], was performed utilizing techniques to minimize radiation dose exposure, including the use of iterative   reconstruction and automated exposure control  FINDINGS:     LUNGS: There are no nodules in the left upper lobe to correspond with the findings on the prior plain films  There is a 1 6 cm faint groundglass nodule in the right middle lobe on image 2/27  There is linear atelectasis or scarring in the right upper    lobe  Stable focally dilated left lower lobe segmental artery noted  Mild centrilobular emphysematous changes bilaterally in the upper lobes  There is no tracheal or endobronchial lesion  PLEURA: Unremarkable  HEART/GREAT VESSELS: Normal heart size  Coronary artery calcifications noted   Thoracic aorta within normal limits in caliber, with diffuse atherosclerotic calcifications  MEDIASTINUM AND JOSE C: Unremarkable  CHEST WALL AND LOWER NECK: Unremarkable  VISUALIZED STRUCTURES IN THE UPPER ABDOMEN: Partially imaged bilateral renal cysts, and several tiny calcified granulomas in the spleen again identified  OSSEOUS STRUCTURES: No acute fracture  No destructive osseous lesion  Stable chronic wedge deformities of T7 and T12  IMPRESSION:     1 6 cm groundglass nodule in the right middle lobe  Retrospectively, this has been stable for at least 2 years, since an abdominal CT scan of 2/25/2015  Recommend annual follow-up to ensure stability for a 5 year period from the scan of 2015  No left upper lobe nodule identified to correspond with the radiographic abnormality  Mild COPD       ##fuslh12##fuslh12       Workstation performed: [id number]     Signed by:   Kenn Burdick MD   6/19/17   RAD_DOSE   Modality Radiation Exposure Data    Order Radiation    Type Dose Range    Radiation Dose 314 16 mGy-cm 0 - 6000 mGy-cm

## 2018-01-16 NOTE — MISCELLANEOUS
Message   Recorded as Task   Date: 10/18/2017 02:12 PM, Created By: Vi Ríos   Task Name: Medical Complaint Callback   Assigned To: [facility],Team   Regarding Patient: Tamar Esqueda, Status: In Progress   Comment:    JimMarisol - 18 Oct 2017 2:12 PM     TASK CREATED  pt said they are having extreme head pressure, like it's going to explode  I asked if they had any other symptoms, he said none at all, the only other thing i could get out of him was possible loss of vision but he said its hard to tell because he has macular degeneration  I put him in with [facility] tonight but i just want to make sure he's ok and doesnt need to go to ER, he sounded kind of distressed   Ariadna Paredes - 18 Oct 2017 2:17 PM     TASK IN PROGRESS   Ariadna Paredes - 18 Oct 2017 2:18 PM     TASK REASSIGNED: Previously Assigned To [address] [place] [address]  I spoke with patient, suggested ER and canceled appointment Erasmo Garcia - 18 Oct 2017 4:10 PM     TASK REASSIGNED: Previously Assigned To Erasmo Leija  Noted        Active Problems    1  Abnormal chest xray (793 2) (R93 8)   2  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)   3  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,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)   4  Arthritis (716 90) (M19 90)   5  Benign prostatic hypertrophy with lower urinary tract symptoms (LUTS) (600 01) (N40 1)   6  Carotid stenosis (433 10) (I65 29)   7  Chronic GERD (530 81) (K21 9)   8  Chronic kidney disease, stage 3 (585 3) (N18 3)   9  Chronic obstructive pulmonary disease (496) (J44 9)   10  Chronic right-sided thoracic back pain (724 1,338 29) (M54 6,G89 29)   11  COPD exacerbation (491 21) (J44 1)   12  Depression with anxiety (300 4) (F41 8)   13  Dermatosis (709 9) (L98 9)   14  Dizziness (780 4) (R42)   15  Flu vaccine need (V04 81) (Z23)   16  Headache (784 0) (R51)   17  Hydronephrosis, right (591) (N13 30)   18   Hyperlipidemia (272 4) (E78 5)   19  Hypertension (401 9) (I10)   20  Hypothyroidism (244 9) (E03 9)   21  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)   22  Insomnia, unspecified type (780 52) (G47 00)   23  Iron deficiency anemia (280 9) (D50 9)   24  Macular degeneration (362 50) (H35 30)   25  Nausea (787 02) (R11 0)   26  Osteoporosis (733 00) (M81 0)   27  Other atopic dermatitis (691 8) (L20 89)   28  Productive cough (786 2) (R05)   29  Pruritus (698 9) (L29 9)   30  Shortness of breath (786 05) (R06 02)   31  Spasmodic cough (786 2) (R05)   32  Transient insomnia (307 41) (F51 02)    Current Meds   1  Aleve 220 MG Oral Tablet; TAKE 1 TABLET 3-4 TIMES DAILY AS NEEDED; Therapy: 73MQU4931 to Recorded   2  Anoro Ellipta 62 5-25 MCG/INH Inhalation Aerosol Powder Breath Activated; One   inhalation once a day; Therapy: 07YDY5010 to Recorded   3  Atenolol 50 MG Oral Tablet; take 1/2 tablet by mouth once daily; Therapy: 06Oqz5732 to (Evaluate:15Mar2018)  Requested for: 53Qbj1900; Last   Rx:64Myh5076 Ordered   4  B Complex-B12 Oral Tablet; TAKE 1 TABLET DAILY; Therapy: 88FDZ6957 to Recorded   5  Calmoseptine 0 44-20 625 % OINT; apply as directed; Therapy: 46GYG7755 to (Last Rx:80Wec9338) Ordered   6  [employer] Vitamin D CAPS; Therapy: (Mohit Nathan) to Recorded   7  Doxycycline Hyclate 100 MG Oral Tablet; TAKE ONE TABLET BY MOUTH TWICE A DAY; Therapy: 86FIP2032 to (Evaluate:34Mbz4937)  Requested for: 16Oct2017; Last   Rx:47Zix1394 Ordered   8  Levothyroxine Sodium 75 MCG Oral Tablet; TAKE ONE TABLET BY MOUTH ONCE   DAILY; Therapy: 34Pmk6096 to (Last Rx:10Mar2017)  Requested for: 98LKI4779 Ordered   9  LORazepam 0 5 MG Oral Tablet; TAKE 1 TABLET 3 TIMES DAILY AS NEEDED; Therapy: 17MGM4012 to (Evaluate:15Oeo1793); Last Rx:16Oct2017 Ordered   10  Nystatin-Triamcinolone 383072-2 1 UNIT/GM-% External Cream; APPLY SPARINGLY TO    AFFECTED AREA(S) TWO TIMES A DAY;     Therapy: 09EEF7109 to (Dianne Villalba)  Requested for: 14Hzq5185; Last Rx: 44RVI6697 Ordered   11  Omeprazole 20 MG Oral Capsule Delayed Release; take 1 capsule daily  Requested for:    41QZR6758; Last Rx:16Jun2017 Ordered   12  Vision Formula/Lutein Oral Tablet; Take twice a day; Therapy: 25KTK8922 to Recorded   13  Zolpidem Tartrate 5 MG Oral Tablet; take 1 tablet at bedtime as needed; Therapy: 10Rxf0176 to (Evaluate:58Oto0241); Last Rx:16Oct2017 Ordered    Allergies    1  Colestid GRAN   2  Lipitor TABS   3  Livalo TABS   4  Pravachol TABS   5  Statins   6  Tricor TABS   7  Tylenol with Codeine #3 TABS   8  Vicoprofen TABS   9  Niaspan TBCR   10  Pneumovax 23 INJ   11  Zocor TABS    12   IVP Dye    Signatures   Electronically signed by : Aman Beckford MD; Oct 19 2017  9:30AM EST                       (Author)

## 2018-01-17 NOTE — MISCELLANEOUS
Message   Recorded as Task   Date: 05/12/2017 10:32 AM, Created By: Leah Spears   Task Name: Medical Complaint Callback   Assigned To: [address] [place] [address]   Regarding Patient: Amol Muller, Status: Active   Comment:    Leah Spears - 12 May 2017 10:32 AM     TASK CREATED  Caller: Self; Medical Complaint; [phone] (Home)  just finished abx - still not better - coughing - a lot of mucous - please advise   Frances Mcgregor - 12 May 2017 10:44 AM     TASK REASSIGNED: Previously Assigned To [address] [place] [address]  did you want to do cxr or renew abx? 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)   3  Arthritis (716 90) (M19 90)   4  Benign prostatic hypertrophy with lower urinary tract symptoms (LUTS) (600 01) (N40 1)   5  Carotid stenosis (433 10) (I65 29)   6  Chronic GERD (530 81) (K21 9)   7  Chronic kidney disease, stage 3 (585 3) (N18 3)   8  Chronic obstructive pulmonary disease (496) (J44 9)   9  Chronic right-sided thoracic back pain (724 1,338 29) (M54 6,G89 29)   10  COPD exacerbation (491 21) (J44 1)   11  Depression with anxiety (300 4) (F41 8)   12  Dizziness (780 4) (R42)   13  Headache (784 0) (R51)   14  Hydronephrosis, right (591) (N13 30)   15  Hyperlipidemia (272 4) (E78 5)   16  Hypertension (401 9) (I10)   17  Hypothyroidism (244 9) (E03 9)   18  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)   19  Insomnia, unspecified type (780 52) (G47 00)   20  Iron deficiency anemia (280 9) (D50 9)   21   Macular degeneration (362 50) (H35 30) 22  Nausea (787 02) (R11 0)   23  Osteoporosis (733 00) (M81 0)   24  Productive cough (786 2) (R05)   25  Pruritus (698 9) (L29 9)   26  Shortness of breath (786 05) (R06 02)   27  Transient insomnia (307 41) (F51 02)    Current Meds   1  Aleve 220 MG Oral Tablet; TAKE 1 TABLET 3-4 TIMES DAILY AS NEEDED; Therapy: 26AIS1150 to Recorded   2  Anoro Ellipta 62 5-25 MCG/INH Inhalation Aerosol Powder Breath Activated; One   inhalation once a day; Therapy: 94GEP7892 to Recorded   3  Atenolol 50 MG Oral Tablet; take 1/2 tablet by mouth once daily; Therapy: 38Qwv1875 to (Yong Duane)  Requested for: 03Apr2017; Last   Rx:03Apr2017 Ordered   4  Azithromycin 250 MG Oral Tablet; Take two tablets today, then one daily until done; Therapy: 78CNB9573 to (Last GK:43HDF2656)  Requested for: 67JEI4765 Ordered   5  B Complex-B12 Oral Tablet; TAKE 1 TABLET DAILY; Therapy: 33XTR2877 to Recorded   6  Benzonatate 100 MG Oral Capsule; TAKE ONE TO TWO CAPSULES BY MOUTH EVERY   8 HOURS AS NEEDED FOR COUGH; Therapy: 94YBK0970 to (Last Rx:20Jan2017)  Requested for: 20Jan2017 Ordered   7  Calmoseptine 0 44-20 625 % OINT; apply as directed; Therapy: 75DTL5201 to (Last Rx:92Tnw1388) Ordered   8  [employer]S Vitamin D CAPS; Therapy: (Ingrid Mcgarry) to Recorded   9  LevoFLOXacin 500 MG Oral Tablet; TAKE 1 TABLET DAILY; Therapy: 06PAA7209 to (Evaluate:94Bzd6992)  Requested for: 16TKX7189; Last   Rx:98Tee3822 Ordered   10  Levothyroxine Sodium 75 MCG Oral Tablet; TAKE ONE TABLET BY MOUTH ONCE    DAILY; Therapy: 64Apn1956 to (Last Rx:10Mar2017)  Requested for: 23UXQ4820 Ordered   11  Livalo 4 MG Oral Tablet; Take 1 tablet daily  Requested for: 69RLQ8717; Last    Rx:26Xvc5978 Ordered   12  LORazepam 0 5 MG Oral Tablet; TAKE 1 TABLET 3 TIMES DAILY AS NEEDED; Therapy: 60VLI1112 to (Evaluate:10Zfm4154); Last Rx:51Pyl2647 Ordered   13   Meclizine HCl - 25 MG Oral Tablet; TAKE 1 TABLET BY MOUTH EVERY 8 HOURS as    needed for dizziness; Therapy: 74Cwg7130 to (Evaluate:39Rej9196)  Requested for: 12Dfh7273; Last    Rx:08Ygu1498 Ordered   14  Nystatin-Triamcinolone 270203-7 1 UNIT/GM-% External Cream; apply sparingly to    affected area(s) twice daily; Therapy: 69RAV8312 to (Evaluate:56Fvp5550)  Requested for: 40FBW9257; Last    Rx:46Gox3603 Ordered   15  Omeprazole 20 MG Oral Capsule Delayed Release; take 1 capsule daily  Requested for:    76VPB4499; Last Rx:07Uax0850 Ordered   16  Oseltamivir Phosphate 75 MG Oral Capsule; TAKE 1 CAPSULE TWICE DAILY; Therapy: 20BYC8508 to (Evaluate:25Jan2017)  Requested for: 20Jan2017; Last    Rx:20Jan2017 Ordered   17  PredniSONE 10 MG Oral Tablet; Take 4 tablets today, then 3 tablets for 2 days, 2 tablets    for 2 days, then 1 tablet for 2 days; Therapy: 87DWQ7103 to (Last NC:93GBK1775)  Requested for: 55QPJ8640 Ordered   18  Vision Formula/Lutein Oral Tablet; Take twice a day; Therapy: 01HTY4214 to Recorded   19  Zolpidem Tartrate 5 MG Oral Tablet; take 1 tablet at bedtime as needed; Therapy: 45Lcm6727 to (Evaluate:53Bjp1771); Last Rx:08Mar2017 Ordered    Allergies    1  Colestid GRAN   2  Lipitor TABS   3  Pravachol TABS   4  Statins   5  Tricor TABS   6  Tylenol with Codeine #3 TABS   7  Vicoprofen TABS   8  Niaspan TBCR   9  Pneumovax 23 INJ   10  Zocor TABS    11   IVP Dye    Signatures   Electronically signed by : Ignacio Flores MD; May 12 2017  1:52PM EST                       (Co-author)

## 2018-01-17 NOTE — MISCELLANEOUS
Assessment    1  Chronic GERD (530 81) (K21 9)   2  Chronic obstructive pulmonary disease (496) (J44 9)   3  Depression with anxiety (300 4) (F41 8)   4  Hypertension (401 9) (I10)   5  Hydronephrosis, right (591) (N13 30)    Plan  Chronic obstructive pulmonary disease    · Follow-up visit in 1 month Evaluation and Treatment  Follow-up  Status: Hold For -  Scheduling  Requested for: 35NIB8252   Ordered; For: Chronic obstructive pulmonary disease; Ordered By: Maikol Kohler Performed:  Due: 55JIO6764    Chief Complaint  Chief Complaint Free Text Note Form: [facility] visit      History of Present Illness  TCM Communication [facility]: The patient is being contacted for follow-up after hospitalization  Hospital records were reviewed  He was hospitalized at [facility]  The date of admission: 05/14/2016, date of discharge: 05/17/2016  Diagnosis: epigastric pain/chest pain  He was discharged to home  Medications reviewed and updated today  He scheduled a follow up appointment  The patient is currently asymptomatic  Counseling was provided to the patient  Communication performed and completed by Sonia Crabtree 5/17/2016   HPI: Feeling well at this time  Diagnosed chest pain with GERD  Seeing Dr Yaneth Pena for R hydronephrosis  Active Problems    1  Abdominal pain (789 00) (R10 9)   2  Abnormal blood chemistry (790 6) (R79 9)   3  Angina pectoris (413 9) (I20 9)   4  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)   5  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,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)   6  Arthritis (716 90) (M19 90)   7  Benign prostatic hypertrophy with lower urinary tract symptoms (LUTS) (600 01) (N40 1)   8  Carotid stenosis (433 10) (I65 29)   9  Chronic obstructive pulmonary disease (496) (J44 9)   10  Depression with anxiety (300 4) (F41 8)   11  Dizziness (780 4) (R42)   12  Encounter for screening colonoscopy (V76 51) (Z12 11)   13   Encounter for screening for malignant neoplasm of prostate (V76 44) (Z12 5)   14  Fatigue (780 79) (R53 83)   15  Fever (780 60) (R50 9)   16  Flu vaccine need (V04 81) (Z23)   17  GERD (gastroesophageal reflux disease) (530 81) (K21 9)   18  Grief reaction (309 0) (F43 20)   19  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)   20  Hyperlipidemia (272 4) (E78 5)   21  Hypertension (401 9) (I10)   22  Hypothyroidism (244 9) (E03 9)   23  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)   24  Iron deficiency anemia (280 9) (D50 9)   25  Ischemic colitis (557 9) (K55 9)   26  Macular degeneration (362 50) (H35 30)   27  Nausea (787 02) (R11 0)   28  Olecranon bursitis, unspecified laterality (726 33) (M70 20)   29  Osteoporosis (733 00) (M81 0)   30  Pruritus (698 9) (L29 9)   31  Shortness of breath (786 05) (R06 02)   32  Tinea cruris (110 3) (B35 6)    Past Medical History    1  History of Acute upper respiratory infection (465 9) (J06 9)   2  History of Benign Polyps Of The Large Intestine (V12 72)   3  History of Closed Fracture Of The 10th Rib (807 00)   4  History of Compression Fracture Of Thoracic Vertebral Body (805 2)   5  History of acute bronchitis (V12 69) (Z87 09)   6  History of acute gastritis (V12 70) (Z87 19)   7  History of nausea (V12 79) (Z87 898)   8  History of polymyalgia rheumatica (V13 59) (Z87 39)   9  History of upper gastrointestinal hemorrhage (V12 79) (Z87 19)   10  History of vasomotor rhinitis (V12 69) (Z87 09)   11  History of Iron deficiency anemia due to chronic blood loss (280 0) (D50 0)   12  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)   13  History of Peptic Ulcer (V12 71)   14  History of Rectal Stricture (569 2)   15  History of Skin Rash Generalized   16  History of Sprained Ribs (848 3)   17  History of Tenosynovitis Of The Finger(S) (727 05)   18  History of Trigger middle finger of left hand (727 03) (A86 332)   19  History of Trigger ring finger of left hand (727 03) (U80 342)   20   History of Varicose Veins Of Lower Extremities (454 9)    Surgical History    1  History of CABG   2  History of Carotid Thromboendarterectomy   3  History of Excision Of Renal Cyst   4  History of Hemorrhoidectomy   5  History of Hernia Repair Inguinal Sliding  Surgical History Reviewed: The surgical history was reviewed and updated today  Family History  Family History    1  Family history of Reported Family History Of Heart Disease   2  Family history of Stroke Syndrome (V17 1)  Family History Reviewed: The family history was reviewed and updated today  Social History    · Being A Social Drinker   · Former smoker (S55 63) (H35 128)   · Marital History - Currently    · Non-smoker (V49 89) (Z78 9)  Social History Reviewed: The social history was reviewed and updated today  The social history was reviewed and is unchanged  Current Meds   1  Atenolol 50 MG Oral Tablet; take 1/2 tablet by mouth once daily; Therapy: 52Vmt9835 to (Evaluate:43Hzb7137)  Requested for: 53Ani0374; Last   Rx:21Ulv7962 Ordered   2  B Complex-B12 Oral Tablet; TAKE 1 TABLET DAILY; Therapy: 40NTW0177 to Recorded   3  Calmoseptine 0 44-20 625 % OINT; apply as directed; Therapy: 92UCN2421 to (Last Rx:58Sto3355) Ordered   4  Ferralet 90 90-1 MG Oral Tablet; Take one daily for 15 days; Therapy: 48FPN6921 to Recorded   5  Ketoconazole 2 % External Cream; APPLY A THIN LAYER TO AFFECTED AREA(S) TWICE   DAILY; Therapy: 95JYA3240 to (96 155074)  Requested for: 30Tut6806; Last   Rx:70Lnf8079 Ordered   6  Levothyroxine Sodium 75 MCG Oral Tablet; TAKE ONE TABLET BY MOUTH ONCE DAILY; Therapy: 82Smh0268 to (Evaluate:36Xcg7842)  Requested for: 70VEY2507; Last   Rx:56Ups9610 Ordered   7  Livalo 4 MG Oral Tablet; Take 1 tablet daily  Requested for: 80ZUO2458; Last   Rx:46Mfm4941 Ordered   8  LORazepam 0 5 MG Oral Tablet; TAKE 1 TABLET 3 TIMES DAILY AS NEEDED; Therapy: 13VMB9032 to (Last Rx:29Lgh8697) Ordered   9   Meclizine HCl - 25 MG Oral Tablet; TAKE 1 TABLET BY MOUTH EVERY 8 HOURS as   needed for dizziness; Therapy: 27Mzw2633 to (Evaluate:27Noh7400)  Requested for: 29Epm2943; Last   Rx:75Ghw6484 Ordered   10  Nystatin-Triamcinolone 322207-4 1 UNIT/GM-% External Cream; APPLY SPARINGLY TO    AFFECTED AREA(S) TWICE DAILY; Therapy: 60WIP6175 to (Evaluate:21Jun2016)  Requested for: 25QWG8241; Last    Rx:73Jrn0926 Ordered   11  Omeprazole 20 MG Oral Capsule Delayed Release; take 1 capsule daily; Therapy: (Recorded:60Xrl8549) to Recorded   12  Tramadol-Acetaminophen 37 5-325 MG Oral Tablet; Take one every 6 hours as needed; Therapy: 39ZZS2631 to (Last Rx:40Vmo5940) Ordered   13  Vision Formula/Lutein Oral Tablet; Take twice a day; Therapy: 66VTE8724 to Recorded    Allergies    1  Colestid GRAN   2  Lipitor TABS   3  Pravachol TABS   4  Statins   5  Tricor TABS   6  Tylenol with Codeine #3 TABS   7  Vicoprofen TABS   8  Niaspan TBCR   9  Pneumovax 23 INJ   10  Zocor TABS    11  IVP Dye    Physical Exam    Constitutional   General appearance: No acute distress, well appearing and well nourished  Pulmonary   Respiratory effort: No increased work of breathing or signs of respiratory distress  Auscultation of lungs: Clear to auscultation, equal breath sounds bilaterally, no wheezes, no rales, no rhonci  Cardiovascular   Auscultation of heart: Normal rate and rhythm, normal S1 and S2, without murmurs  Examination of extremities for edema and/or varicosities: Normal     Carotid pulses: Normal     Musculoskeletal   Gait and station: Normal     Skin   Skin and subcutaneous tissue: Normal without rashes or lesions  Psychiatric   Orientation to person, place and time: Normal     Mood and affect: Normal          Health Management  Encounter for screening colonoscopy   COLONOSCOPY; every 5 years; Last 38ALG2209; Next Due: 60CEQ8799; Active  Health Maintenance   COLONOSCOPY; every 5 years; Last 50YFS0130; Next Due: 28JEX7397;  Active  Medicare Annual Wellness Visit; every 1 year; Last 51JEJ9157; Next Due: 53NJZ6624;  Active    Message   Recorded as Task   Date: 05/17/2016 11:28 AM, Created By: System   Task Name: Hospital [facility]   Assigned To: [facility]   Regarding Patient: Chari Morales, Status: Active   Comment:    System - 17 May 2016 11:28 AM     Patient discharged from hospital   Patient Name: Marilyn Johnson  Patient YOB: 1932  Discharge Date: 5/17/2016  Facility: [facility] 02 May 2016 2:29 PM     TASK REASSIGNED: Previously Assigned To Victorina Rizzo     Future Appointments    Date/Time Provider Specialty Site   06/28/2016 08:00 AM Victorina Rizzo  [facility] MD     Signatures   Electronically signed by : Leni Welch, ; May 17 2016  3:16PM EST                       (Author)    Electronically signed by : Enriqueta Romeo MD; Jun 5 2016  9:14PM EST                       (Author)

## 2018-01-22 VITALS
BODY MASS INDEX: 28.49 KG/M2 | SYSTOLIC BLOOD PRESSURE: 142 MMHG | WEIGHT: 171 LBS | HEART RATE: 100 BPM | TEMPERATURE: 97.5 F | HEIGHT: 65 IN | DIASTOLIC BLOOD PRESSURE: 92 MMHG

## 2018-01-22 VITALS
BODY MASS INDEX: 28.12 KG/M2 | WEIGHT: 168.8 LBS | SYSTOLIC BLOOD PRESSURE: 132 MMHG | TEMPERATURE: 98.5 F | HEART RATE: 76 BPM | HEIGHT: 65 IN | DIASTOLIC BLOOD PRESSURE: 76 MMHG

## 2018-01-27 DIAGNOSIS — F41.9 ANXIETY: Primary | ICD-10-CM

## 2018-01-28 RX ORDER — LORAZEPAM 0.5 MG/1
0.5 TABLET ORAL EVERY 8 HOURS PRN
Qty: 90 TABLET | Refills: 0 | OUTPATIENT
Start: 2018-01-28 | End: 2018-03-06 | Stop reason: SDUPTHER

## 2018-03-05 ENCOUNTER — TELEPHONE (OUTPATIENT)
Dept: FAMILY MEDICINE CLINIC | Facility: HOSPITAL | Age: 83
End: 2018-03-05

## 2018-03-06 DIAGNOSIS — F41.9 ANXIETY: ICD-10-CM

## 2018-03-06 RX ORDER — LORAZEPAM 0.5 MG/1
0.5 TABLET ORAL EVERY 8 HOURS PRN
Qty: 90 TABLET | Refills: 0 | OUTPATIENT
Start: 2018-03-06 | End: 2018-03-20 | Stop reason: SDUPTHER

## 2018-03-15 PROBLEM — L20.89 OTHER ATOPIC DERMATITIS: Status: ACTIVE | Noted: 2017-08-16

## 2018-03-15 PROBLEM — R51.9 HEADACHE: Status: ACTIVE | Noted: 2017-01-20

## 2018-03-15 RX ORDER — LEVOTHYROXINE SODIUM 0.07 MG/1
1 TABLET ORAL DAILY
COMMUNITY
Start: 2012-12-12 | End: 2018-03-18 | Stop reason: SDUPTHER

## 2018-03-15 RX ORDER — PNV NO.95/FERROUS FUM/FOLIC AC 28MG-0.8MG
TABLET ORAL EVERY OTHER DAY
COMMUNITY

## 2018-03-15 RX ORDER — DOXYCYCLINE HYCLATE 100 MG
1 TABLET ORAL 2 TIMES DAILY
COMMUNITY
Start: 2017-08-16 | End: 2018-03-18 | Stop reason: SDUPTHER

## 2018-03-15 RX ORDER — OMEPRAZOLE 20 MG/1
1 CAPSULE, DELAYED RELEASE ORAL DAILY
COMMUNITY
Start: 2017-12-18 | End: 2018-03-18 | Stop reason: SDUPTHER

## 2018-03-15 RX ORDER — FLUTICASONE FUROATE AND VILANTEROL 100; 25 UG/1; UG/1
POWDER RESPIRATORY (INHALATION) DAILY
COMMUNITY
Start: 2014-05-27 | End: 2018-06-15

## 2018-03-15 RX ORDER — ZOLPIDEM TARTRATE 5 MG/1
1 TABLET ORAL
COMMUNITY
Start: 2016-08-08 | End: 2018-03-18 | Stop reason: SDUPTHER

## 2018-03-15 RX ORDER — MENTHOL AND ZINC OXIDE .44; 20.625 G/100G; G/100G
OINTMENT TOPICAL
COMMUNITY
Start: 2014-08-04 | End: 2019-03-28

## 2018-03-15 RX ORDER — METOPROLOL SUCCINATE 25 MG/1
1 TABLET, EXTENDED RELEASE ORAL DAILY
COMMUNITY
Start: 2017-10-22 | End: 2018-04-14 | Stop reason: SDUPTHER

## 2018-03-15 RX ORDER — BIOTIN 5 MG
TABLET ORAL
COMMUNITY
End: 2019-03-28

## 2018-03-15 RX ORDER — LORAZEPAM 0.5 MG/1
1 TABLET ORAL 3 TIMES DAILY PRN
COMMUNITY
Start: 2013-06-26 | End: 2018-03-18 | Stop reason: SDUPTHER

## 2018-03-15 RX ORDER — NAPROXEN SODIUM 220 MG
1 TABLET ORAL
COMMUNITY
Start: 2017-01-20 | End: 2019-04-01 | Stop reason: HOSPADM

## 2018-03-16 ENCOUNTER — OFFICE VISIT (OUTPATIENT)
Dept: FAMILY MEDICINE CLINIC | Facility: HOSPITAL | Age: 83
End: 2018-03-16
Payer: MEDICARE

## 2018-03-16 VITALS
TEMPERATURE: 97.9 F | BODY MASS INDEX: 29.09 KG/M2 | HEART RATE: 68 BPM | HEIGHT: 64 IN | SYSTOLIC BLOOD PRESSURE: 154 MMHG | DIASTOLIC BLOOD PRESSURE: 74 MMHG | WEIGHT: 170.4 LBS

## 2018-03-16 DIAGNOSIS — F51.01 PRIMARY INSOMNIA: ICD-10-CM

## 2018-03-16 DIAGNOSIS — I10 ESSENTIAL HYPERTENSION: Primary | ICD-10-CM

## 2018-03-16 DIAGNOSIS — I25.10 CORONARY ARTERY DISEASE INVOLVING NATIVE HEART WITHOUT ANGINA PECTORIS, UNSPECIFIED VESSEL OR LESION TYPE: ICD-10-CM

## 2018-03-16 DIAGNOSIS — E03.9 ACQUIRED HYPOTHYROIDISM: ICD-10-CM

## 2018-03-16 DIAGNOSIS — N18.30 CKD (CHRONIC KIDNEY DISEASE), STAGE III (HCC): ICD-10-CM

## 2018-03-16 DIAGNOSIS — E78.2 MIXED HYPERLIPIDEMIA: ICD-10-CM

## 2018-03-16 PROCEDURE — 99214 OFFICE O/P EST MOD 30 MIN: CPT | Performed by: FAMILY MEDICINE

## 2018-03-16 RX ORDER — ZOLPIDEM TARTRATE 5 MG/1
5 TABLET ORAL
Qty: 30 TABLET | Refills: 5 | Status: SHIPPED | OUTPATIENT
Start: 2018-03-16 | End: 2018-09-28 | Stop reason: SDUPTHER

## 2018-03-16 NOTE — PROGRESS NOTES
Assessment/Plan:       Diagnoses and all orders for this visit:    Essential hypertension  Comments:  Excellent control  Orders:  -     Comprehensive metabolic panel; Future    Primary insomnia  Comments:  OK continue low dose Zolpidem  Orders:  -     zolpidem (AMBIEN) 5 mg tablet; Take 1 tablet (5 mg total) by mouth daily at bedtime as needed for sleep    Acquired hypothyroidism  Comments:  Continue 1/2 of 75 mcg  Orders:  -     TSH, 3rd generation with T4 reflex; Future    Coronary artery disease involving native heart without angina pectoris, unspecified vessel or lesion type  Comments:  Asymptomatic    CKD (chronic kidney disease), stage III    Mixed hyperlipidemia  -     CBC and differential; Future  -     Lipid Panel with Direct LDL reflex; Future          Subjective:      Patient ID: Rut Ochoa is a 80 y o  male  Having neck pain for past few weeks, goes up the back of his head  Using [employer] with benefit  Working out at [employer] regularly  No illness recently  Breathing has been good  Needs Zolpidem for sleep        The following portions of the patient's history were reviewed and updated as appropriate: allergies, current medications, past family history, past medical history, past social history, past surgical history and problem list     Review of Systems   Constitutional: Negative for chills, fatigue and fever  HENT: Positive for congestion  Negative for sinus pressure  Eyes: Negative for pain  Respiratory: Negative for cough, chest tightness and shortness of breath  Cardiovascular: Negative for chest pain, palpitations and leg swelling  Gastrointestinal: Negative for abdominal pain  Genitourinary: Negative for difficulty urinating  Musculoskeletal: Positive for arthralgias and neck pain  Neurological: Negative for dizziness  Psychiatric/Behavioral: Positive for sleep disturbance  The patient is nervous/anxious            Objective:      /74 (BP Location: Left arm, Patient Position: Sitting, Cuff Size: Large)   Pulse 68   Temp 97 9 °F (36 6 °C) (Tympanic)   Ht 5' 4" (1 626 m)   Wt 77 3 kg (170 lb 6 4 oz)   BMI 29 25 kg/m²          Physical Exam   Constitutional: He is oriented to person, place, and time  He appears well-developed and well-nourished  Neck: Neck supple  Cardiovascular: Normal rate and regular rhythm  Pulmonary/Chest: Effort normal and breath sounds normal    Musculoskeletal: He exhibits no edema  Neurological: He is alert and oriented to person, place, and time  Psychiatric: He has a normal mood and affect  His behavior is normal  Judgment and thought content normal    Nursing note and vitals reviewed

## 2018-03-18 ENCOUNTER — HOSPITAL ENCOUNTER (EMERGENCY)
Facility: HOSPITAL | Age: 83
Discharge: HOME | End: 2018-03-18
Attending: EMERGENCY MEDICINE
Payer: MEDICARE

## 2018-03-18 ENCOUNTER — APPOINTMENT (EMERGENCY)
Dept: RADIOLOGY | Facility: HOSPITAL | Age: 83
End: 2018-03-18
Payer: MEDICARE

## 2018-03-18 VITALS
WEIGHT: 170 LBS | TEMPERATURE: 99.6 F | HEART RATE: 95 BPM | RESPIRATION RATE: 28 BRPM | SYSTOLIC BLOOD PRESSURE: 150 MMHG | HEIGHT: 64 IN | DIASTOLIC BLOOD PRESSURE: 76 MMHG | OXYGEN SATURATION: 98 % | BODY MASS INDEX: 29.02 KG/M2

## 2018-03-18 DIAGNOSIS — R51.9 HEADACHE: Primary | ICD-10-CM

## 2018-03-18 DIAGNOSIS — B34.9 ACUTE VIRAL SYNDROME: ICD-10-CM

## 2018-03-18 DIAGNOSIS — E87.1 ACUTE HYPONATREMIA: ICD-10-CM

## 2018-03-18 DIAGNOSIS — E86.0 DEHYDRATION: ICD-10-CM

## 2018-03-18 LAB
ALBUMIN SERPL BCP-MCNC: 4 G/DL (ref 3.5–5)
ALP SERPL-CCNC: 99 U/L (ref 46–116)
ALT SERPL W P-5'-P-CCNC: 32 U/L (ref 12–78)
ANION GAP SERPL CALCULATED.3IONS-SCNC: 3 MMOL/L (ref 4–13)
AST SERPL W P-5'-P-CCNC: 23 U/L (ref 5–45)
BACTERIA UR QL AUTO: ABNORMAL /HPF
BASOPHILS # BLD AUTO: 0.02 THOUSANDS/ΜL (ref 0–0.1)
BASOPHILS NFR BLD AUTO: 0 % (ref 0–1)
BILIRUB SERPL-MCNC: 0.7 MG/DL (ref 0.2–1)
BILIRUB UR QL STRIP: NEGATIVE
BUN SERPL-MCNC: 24 MG/DL (ref 5–25)
CALCIUM SERPL-MCNC: 9.6 MG/DL (ref 8.3–10.1)
CHLORIDE SERPL-SCNC: 96 MMOL/L (ref 100–108)
CLARITY UR: CLEAR
CO2 SERPL-SCNC: 27 MMOL/L (ref 21–32)
COLOR UR: YELLOW
CREAT SERPL-MCNC: 1.77 MG/DL (ref 0.6–1.3)
EOSINOPHIL # BLD AUTO: 0.18 THOUSAND/ΜL (ref 0–0.61)
EOSINOPHIL NFR BLD AUTO: 3 % (ref 0–6)
ERYTHROCYTE [DISTWIDTH] IN BLOOD BY AUTOMATED COUNT: 14.2 % (ref 11.6–15.1)
GFR SERPL CREATININE-BSD FRML MDRD: 34 ML/MIN/1.73SQ M
GLUCOSE SERPL-MCNC: 100 MG/DL (ref 65–140)
GLUCOSE UR STRIP-MCNC: NEGATIVE MG/DL
HCT VFR BLD AUTO: 47.6 % (ref 36.5–49.3)
HGB BLD-MCNC: 15.6 G/DL (ref 12–17)
HGB UR QL STRIP.AUTO: NEGATIVE
KETONES UR STRIP-MCNC: NEGATIVE MG/DL
LACTATE SERPL-SCNC: 1.1 MMOL/L (ref 0.5–2)
LEUKOCYTE ESTERASE UR QL STRIP: NEGATIVE
LYMPHOCYTES # BLD AUTO: 1.03 THOUSANDS/ΜL (ref 0.6–4.47)
LYMPHOCYTES NFR BLD AUTO: 16 % (ref 14–44)
MCH RBC QN AUTO: 30.5 PG (ref 26.8–34.3)
MCHC RBC AUTO-ENTMCNC: 32.8 G/DL (ref 31.4–37.4)
MCV RBC AUTO: 93 FL (ref 82–98)
MONOCYTES # BLD AUTO: 1.05 THOUSAND/ΜL (ref 0.17–1.22)
MONOCYTES NFR BLD AUTO: 17 % (ref 4–12)
MUCOUS THREADS UR QL AUTO: ABNORMAL
NEUTROPHILS # BLD AUTO: 4.1 THOUSANDS/ΜL (ref 1.85–7.62)
NEUTS SEG NFR BLD AUTO: 64 % (ref 43–75)
NITRITE UR QL STRIP: NEGATIVE
NON-SQ EPI CELLS URNS QL MICRO: ABNORMAL /HPF
PH UR STRIP.AUTO: 6 [PH] (ref 4.5–8)
PLATELET # BLD AUTO: 112 THOUSANDS/UL (ref 149–390)
PMV BLD AUTO: 9.4 FL (ref 8.9–12.7)
POTASSIUM SERPL-SCNC: 4.5 MMOL/L (ref 3.5–5.3)
PROT SERPL-MCNC: 8.6 G/DL (ref 6.4–8.2)
PROT UR STRIP-MCNC: ABNORMAL MG/DL
RBC # BLD AUTO: 5.11 MILLION/UL (ref 3.88–5.62)
RBC #/AREA URNS AUTO: ABNORMAL /HPF
SODIUM SERPL-SCNC: 126 MMOL/L (ref 136–145)
SP GR UR STRIP.AUTO: 1.02 (ref 1–1.03)
UROBILINOGEN UR QL STRIP.AUTO: 0.2 E.U./DL
WBC # BLD AUTO: 6.38 THOUSAND/UL (ref 4.31–10.16)
WBC #/AREA URNS AUTO: ABNORMAL /HPF

## 2018-03-18 PROCEDURE — 81001 URINALYSIS AUTO W/SCOPE: CPT | Performed by: EMERGENCY MEDICINE

## 2018-03-18 PROCEDURE — 71046 X-RAY EXAM CHEST 2 VIEWS: CPT

## 2018-03-18 PROCEDURE — 96374 THER/PROPH/DIAG INJ IV PUSH: CPT

## 2018-03-18 PROCEDURE — 87798 DETECT AGENT NOS DNA AMP: CPT | Performed by: EMERGENCY MEDICINE

## 2018-03-18 PROCEDURE — 87040 BLOOD CULTURE FOR BACTERIA: CPT | Performed by: EMERGENCY MEDICINE

## 2018-03-18 PROCEDURE — 99284 EMERGENCY DEPT VISIT MOD MDM: CPT

## 2018-03-18 PROCEDURE — 85025 COMPLETE CBC W/AUTO DIFF WBC: CPT | Performed by: EMERGENCY MEDICINE

## 2018-03-18 PROCEDURE — 80053 COMPREHEN METABOLIC PANEL: CPT | Performed by: EMERGENCY MEDICINE

## 2018-03-18 PROCEDURE — 96361 HYDRATE IV INFUSION ADD-ON: CPT

## 2018-03-18 PROCEDURE — 83605 ASSAY OF LACTIC ACID: CPT | Performed by: EMERGENCY MEDICINE

## 2018-03-18 PROCEDURE — 36415 COLL VENOUS BLD VENIPUNCTURE: CPT | Performed by: EMERGENCY MEDICINE

## 2018-03-18 RX ORDER — ACETAMINOPHEN 325 MG/1
650 TABLET ORAL ONCE
Status: COMPLETED | OUTPATIENT
Start: 2018-03-18 | End: 2018-03-18

## 2018-03-18 RX ORDER — SODIUM CHLORIDE 9 MG/ML
250 INJECTION, SOLUTION INTRAVENOUS CONTINUOUS
Status: DISCONTINUED | OUTPATIENT
Start: 2018-03-18 | End: 2018-03-18

## 2018-03-18 RX ORDER — OSELTAMIVIR PHOSPHATE 30 MG/1
30 CAPSULE ORAL ONCE
Status: COMPLETED | OUTPATIENT
Start: 2018-03-18 | End: 2018-03-18

## 2018-03-18 RX ORDER — OSELTAMIVIR PHOSPHATE 30 MG/1
30 CAPSULE ORAL DAILY
Qty: 4 CAPSULE | Refills: 0 | Status: SHIPPED | OUTPATIENT
Start: 2018-03-19 | End: 2018-03-23

## 2018-03-18 RX ORDER — LORAZEPAM 2 MG/ML
0.5 INJECTION INTRAMUSCULAR ONCE
Status: COMPLETED | OUTPATIENT
Start: 2018-03-18 | End: 2018-03-18

## 2018-03-18 RX ADMIN — SODIUM CHLORIDE 250 ML/HR: 0.9 INJECTION, SOLUTION INTRAVENOUS at 20:27

## 2018-03-18 RX ADMIN — LORAZEPAM 0.5 MG: 2 INJECTION INTRAMUSCULAR; INTRAVENOUS at 20:27

## 2018-03-18 RX ADMIN — SODIUM CHLORIDE 1000 ML: 0.9 INJECTION, SOLUTION INTRAVENOUS at 21:18

## 2018-03-18 RX ADMIN — OSELTAMIVIR PHOSPHATE 30 MG: 30 CAPSULE ORAL at 21:17

## 2018-03-18 RX ADMIN — ACETAMINOPHEN 650 MG: 325 TABLET, FILM COATED ORAL at 18:41

## 2018-03-18 NOTE — ED PROVIDER NOTES
History  Chief Complaint   Patient presents with    URI     Cough and fever since yesterday (or possibly longer?)     Rapid onset fever yesterday afternoon  + cough  History provided by:  Patient   used: No    URI   Presenting symptoms: cough and fever    Severity:  Moderate  Onset quality:  Sudden  Duration:  1 day  Progression:  Worsening  Chronicity:  New  Relieved by:  Nothing  Worsened by:  Nothing  Risk factors: being elderly        Prior to Admission Medications   Prescriptions Last Dose Informant Patient Reported? Taking? B Complex Vitamins (B COMPLEX-B12 PO)   Yes No   Sig: Take 1 tablet by mouth daily   Cholecalciferol ([employer] VITAMIN D) 2000 units CAPS   Yes No   Sig: Take by mouth   Ferrous Sulfate (IRON) 325 (65 Fe) MG TABS   Yes No   Sig: Take by mouth   Krill Oil 1000 MG CAPS   Yes No   Sig: Take by mouth   LORazepam (ATIVAN) 0 5 mg tablet   No No   Sig: Take 1 tablet (0 5 mg total) by mouth every 8 (eight) hours as needed for anxiety   Multiple Vitamins-Minerals (VISION FORMULA/LUTEIN PO)   Yes No   Sig: Take by mouth   atenolol (TENORMIN) 50 mg tablet   Yes No   Si mg Atenolol 50 MG Oral Tablet take 1/2 tablet by mouth once daily  Quantity: 15;   Refills: 5    Tenzin Moctezuma MD;  Started 12-Dec-2012 Active    doxycycline hyclate (VIBRA-TABS) 100 mg tablet   Yes No   Sig: Take by mouth   fluticasone furoate-vilanterol (BREO ELLIPTA)   Yes No   Sig: Inhale daily   levothyroxine 75 mcg tablet   Yes No   Sig: Levothyroxine Sodium 75 MCG Oral Tablet TAKE 1/2 TALBET IN THE MORNING DAILY  Quantity: 30;  Refills: 5    Tenzin Moctezuma MD;  Started 12-Dec-2012 Active   menthol-zinc oxide (CALMOSPETINE) 0 44-20 625 %   Yes No   Sig: Apply topically   metoprolol succinate (TOPROL-XL) 25 mg 24 hr tablet   Yes No   Sig: Take 1 tablet by mouth daily   naproxen sodium (ALEVE) 220 MG tablet   Yes No   Sig: Take 1 tablet by mouth   nystatin-triamcinolone (MYCOLOG-II) cream   Yes No Sig: Apply topically   omeprazole (PriLOSEC) 20 mg delayed release capsule   Yes No   Sig: Take 20 mg by mouth daily   zolpidem (AMBIEN) 5 mg tablet   No No   Sig: Take 1 tablet (5 mg total) by mouth daily at bedtime as needed for sleep      Facility-Administered Medications: None       Past Medical History:   Diagnosis Date    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    Compression fracture of thoracic vertebra ([facility] )     last assessed 05/07/2012    COPD (chronic obstructive pulmonary disease) ([facility] )     Disease of thyroid gland     Heart attack     Hollenhorst plaque, right eye     last assessed 04/08/2013    Hyperlipidemia     Hypertension     Iron deficiency anemia due to chronic blood loss     last assessed 09/10/2012    Ischemic colitis ([facility] )     last assessed 05/27/2014    Osteoarthritis of both hands     last assessedn 04/30/2013    Peptic ulcer     last assessed 06/14/2013    Polymyalgia rheumatica ([facility] )     last assessesd 10/09/2012    Renal disorder     Upper GI hemorrhage     last assessed 01/29/2015    Varicose veins of lower extremity     last assessed 04/26/2013       Past Surgical History:   Procedure Laterality Date    CORONARY ARTERY BYPASS GRAFT      HEMORRHOID SURGERY      HERNIA REPAIR      RENAL CYST EXCISION      resolved 05/2010    THROMBOENDARTERECTOMY Right     carotid, last assessed 06/18/2013       Family History   Problem Relation Age of Onset    Hypertension Mother     Alcohol abuse Mother     Hypertension Father     Alcohol abuse Father     Alcohol abuse Son     Heart disease Family     Stroke Family      syndrome     I have reviewed and agree with the history as documented  Social History   Substance Use Topics    Smoking status: Former Smoker    Smokeless tobacco: Never Used    Alcohol use No      Comment: Social Drinker per [employer]        Review of Systems   Constitutional: Positive for fever  Respiratory: Positive for cough  All other systems reviewed and are negative  Physical Exam  ED Triage Vitals   Temperature Pulse Respirations Blood Pressure SpO2   03/18/18 1753 03/18/18 1753 03/18/18 1753 03/18/18 1753 03/18/18 1753   99 6 °F (37 6 °C) 98 (!) 26 (!) 186/93 97 %      Temp Source Heart Rate Source Patient Position - Orthostatic VS BP Location FiO2 (%)   03/18/18 1753 03/18/18 1753 03/18/18 1753 03/18/18 1753 --   Temporal Monitor Lying Right arm       Pain Score       03/18/18 1800       7           Orthostatic Vital Signs  Vitals:    03/18/18 2030 03/18/18 2100 03/18/18 2130 03/18/18 2145   BP:   150/76    Pulse: 98 100 92 95   Patient Position - Orthostatic VS:           Physical Exam   Constitutional: He is oriented to person, place, and time  He appears well-developed and well-nourished  HENT:   Nose: Nose normal    Eyes: Pupils are equal, round, and reactive to light  Neck: Normal range of motion  Cardiovascular: Normal rate and regular rhythm  Pulmonary/Chest: Effort normal  He has rales (bibasilar rhonchi)  Abdominal: Soft  Neurological: He is alert and oriented to person, place, and time  Skin: Skin is warm and dry  Psychiatric: He has a normal mood and affect  His behavior is normal  Judgment and thought content normal    Nursing note and vitals reviewed        ED Medications  Medications   acetaminophen (TYLENOL) tablet 650 mg (650 mg Oral Given 3/18/18 1841)   LORazepam (ATIVAN) 2 mg/mL injection 0 5 mg (0 5 mg Intravenous Given 3/18/18 2027)   sodium chloride 0 9 % bolus 1,000 mL (0 mL Intravenous Stopped 3/18/18 2210)   oseltamivir (TAMIFLU) capsule 30 mg (30 mg Oral Given 3/18/18 2117)       Diagnostic Studies  Results Reviewed     Procedure Component Value Units Date/Time    Influenza A/B and RSV by PCR (indicated for patients >2 mo of age) [[id number]]  (Normal) Collected:  03/18/18 1851    Lab Status:  Final result Specimen:  Nasopharyngeal from Nasopharyngeal Swab Updated:  03/19/18 0102 INFLU A PCR None Detected     INFLU B PCR None Detected     RSV PCR None Detected    Urine Microscopic [[id number]]  (Abnormal) Collected:  03/18/18 2011    Lab Status:  Final result Specimen:  Urine from Urine, Clean Catch Updated:  03/18/18 2120     RBC, UA 0-1 (A) /hpf      WBC, UA 0-1 (A) /hpf      Epithelial Cells Occasional /hpf      Bacteria, UA Occasional /hpf      MUCOUS THREADS Occasional    UA w Reflex to Microscopic [[id number]]  (Abnormal) Collected:  03/18/18 2011    Lab Status:  Final result Specimen:  Urine from Urine, Clean Catch Updated:  03/18/18 2040     Color, UA Yellow     Clarity, UA Clear     Specific [place], UA 1 020     pH, UA 6 0     Leukocytes, UA Negative     Nitrite, UA Negative     Protein, UA Trace (A) mg/dl      Glucose, UA Negative mg/dl      Ketones, UA Negative mg/dl      Urobilinogen, UA 0 2 E U /dl      Bilirubin, UA Negative     Blood, UA Negative    Lactic acid, plasma [[id number]]  (Normal) Collected:  03/18/18 1820    Lab Status:  Final result Specimen:  Blood Updated:  03/18/18 1843     LACTIC ACID 1 1 mmol/L     Narrative:         Result may be elevated if tourniquet was used during collection      Comprehensive metabolic panel [[id number]]  (Abnormal) Collected:  03/18/18 1820    Lab Status:  Final result Specimen:  Blood Updated:  03/18/18 1840     Sodium 126 (L) mmol/L      Potassium 4 5 mmol/L      Chloride 96 (L) mmol/L      CO2 27 mmol/L      Anion Gap 3 (L) mmol/L      BUN 24 mg/dL      Creatinine 1 77 (H) mg/dL      Glucose 100 mg/dL      Calcium 9 6 mg/dL      AST 23 U/L      ALT 32 U/L      Alkaline Phosphatase 99 U/L      Total Protein 8 6 (H) g/dL      Albumin 4 0 g/dL      Total Bilirubin 0 70 mg/dL      eGFR 34 ml/min/1 73sq m     Narrative:         National Kidney Disease Education Program recommendations are as follows:  GFR calculation is accurate only with a steady state creatinine  Chronic Kidney disease less than 60 ml/min/1 73 sq  meters  Kidney failure less than 15 ml/min/1 73 sq  meters  CBC and differential [[id number]]  (Abnormal) Collected:  03/18/18 1820    Lab Status:  Final result Specimen:  Blood Updated:  03/18/18 1823     WBC 6 38 Thousand/uL      RBC 5 11 Million/uL      Hemoglobin 15 6 g/dL      Hematocrit 47 6 %      MCV 93 fL      MCH 30 5 pg      MCHC 32 8 g/dL      RDW 14 2 %      MPV 9 4 fL      Platelets 199 (L) Thousands/uL      Neutrophils Relative 64 %      Lymphocytes Relative 16 %      Monocytes Relative 17 (H) %      Eosinophils Relative 3 %      Basophils Relative 0 %      Neutrophils Absolute 4 10 Thousands/µL      Lymphocytes Absolute 1 03 Thousands/µL      Monocytes Absolute 1 05 Thousand/µL      Eosinophils Absolute 0 18 Thousand/µL      Basophils Absolute 0 02 Thousands/µL     Blood culture #1 [[id number]] Collected:  03/18/18 1820    Lab Status: In process Specimen:  Blood Updated:  03/18/18 1820    Blood culture #2 [[id number]] Collected:  03/18/18 1819    Lab Status: In process Specimen:  Blood Updated:  03/18/18 1819                 XR chest 2 views   ED Interpretation by Carolyn Truong DO (03/18 1956)   No acute abnl      Final Result by Darlene Alvarado MD (03/18 2048)      No acute cardiopulmonary disease  Workstation performed: [id number]                    Procedures  Procedures       Phone Contacts  ED Phone Contact    ED Course  ED Course                          Initial Sepsis Screening     [address] Name 03/18/18 1810                Is the patient's history suggestive of a new or worsening infection?  (!)  Yes (Proceed)  -TF        Suspected source of infection pneumonia  -TF        Are two or more of the following signs & symptoms of infection both present and new to the patient?  --        Indicate SIRS criteria Tachycardia > 90 bpm;Tachypnea > 20 resp per min  -TF        If the answer is yes to both questions, suspicion of sepsis is present  --        If severe sepsis is present AND tissue hypoperfusion perists in the hour after fluid resuscitation or lactate > 4, the patient meets criteria for SEPTIC SHOCK  --        Are any of the following organ dysfunction criteria present within 6 hours of suspected infection and SIRS criteria that are NOT considered to be chronic conditions?  No  -MARIA C        Organ dysfunction  --        Date of presentation of severe sepsis  --        Time of presentation of severe sepsis  --        Tissue hypoperfusion persists in the hour after crystalloid fluid administration, evidenced, by either:  --        Was hypotension present within one hour of the conclusion of crystalloid fluid administration?  --        Date of presentation of septic shock  --        Time of presentation of septic shock  --          User Key  (r) = Recorded By, (t) = Taken By, (c) = Cosigned By    [address] Name Provider Type    Chelsea Baez DO Physician    TF Veena Hillman MD Physician                  MDM  Number of Diagnoses or Management Options  Acute hyponatremia: new and requires workup  Acute viral syndrome: new and requires workup  Dehydration: new and requires workup  Headache: new and requires workup     Amount and/or Complexity of Data Reviewed  Clinical lab tests: ordered and reviewed  Tests in the radiology section of CPT®: ordered and reviewed    Patient Progress  Patient progress: stable    CritCare Time    Disposition  Final diagnoses:   Headache   Acute hyponatremia   Dehydration   Acute viral syndrome     Time reflects when diagnosis was documented in both MDM as applicable and the Disposition within this note     Time User Action Codes Description Comment    3/18/2018  9:27 PM Genia Hoops Add [R51] Headache     3/18/2018  9:27 PM Genia Hoops Add [E87 1] Acute hyponatremia     3/18/2018  9:27 PM Genia Hoops Add [E86 0] Dehydration     3/18/2018  9:27 PM Genia Hoops Add [B34 9] Acute viral syndrome       ED Disposition     ED Disposition Condition Comment    Discharge  Sunny Marquez discharge to home/self care  Condition at discharge: Good        Follow-up Information     Follow up With Specialties Details Why Contact Info    Enriqueta Romeo MD Family Medicine In 2 days  [address]  [phone]          Discharge Medication List as of 3/18/2018  9:57 PM      START taking these medications    Details   oseltamivir (TAMIFLU) 30 MG capsule Take 1 capsule (30 mg total) by mouth daily for 4 days, Starting Mon 3/19/2018, Until Fri 3/23/2018, Normal         CONTINUE these medications which have NOT CHANGED    Details   atenolol (TENORMIN) 50 mg tablet 25 mg Atenolol 50 MG Oral Tablet take 1/2 tablet by mouth once daily  Quantity: 15;   Refills: 5    Victorina Rizzo MD;  Started 12-Dec-2012 Active , Starting 12/12/2012, Until Discontinued, Historical Med      B Complex Vitamins (B COMPLEX-B12 PO) Take 1 tablet by mouth daily, Starting Wed 3/6/2013, Historical Med      Cholecalciferol ([employer] VITAMIN D) 2000 units CAPS Take by mouth, Historical Med      doxycycline hyclate (VIBRA-TABS) 100 mg tablet Take by mouth, Starting Wed 8/16/2017, Historical Med      Ferrous Sulfate (IRON) 325 (65 Fe) MG TABS Take by mouth, Historical Med      fluticasone furoate-vilanterol (BREO ELLIPTA) Inhale daily, Starting Tue 5/27/2014, Historical Med      Krill Oil 1000 MG CAPS Take by mouth, Historical Med      levothyroxine 75 mcg tablet Levothyroxine Sodium 75 MCG Oral Tablet TAKE 1/2 TALBET IN THE MORNING DAILY  Quantity: 30;  Refills: 5    Victorina Rizzo MD;  Started 12-Dec-2012 Active, Starting 12/12/2012, Until Discontinued, Historical Med      LORazepam (ATIVAN) 0 5 mg tablet Take 1 tablet (0 5 mg total) by mouth every 8 (eight) hours as needed for anxiety, Starting Tue 3/6/2018, Phone In      menthol-zinc oxide (CALMOSPETINE) 0 44-20 625 % Apply topically, Starting Mon 8/4/2014, Historical Med      metoprolol succinate (TOPROL-XL) 25 mg 24 hr tablet Take 1 tablet by mouth daily, Starting Sun 10/22/2017, Historical Med      Multiple Vitamins-Minerals (VISION FORMULA/LUTEIN PO) Take by mouth, Starting Sat 11/15/2014, Historical Med      naproxen sodium (ALEVE) 220 MG tablet Take 1 tablet by mouth, Starting Fri 1/20/2017, Historical Med      nystatin-triamcinolone (MYCOLOG-II) cream Apply topically, Starting Tue 11/9/2010, Historical Med      omeprazole (PriLOSEC) 20 mg delayed release capsule Take 20 mg by mouth daily, Until Discontinued, Historical Med      zolpidem (AMBIEN) 5 mg tablet Take 1 tablet (5 mg total) by mouth daily at bedtime as needed for sleep, Starting Fri 3/16/2018, Print           No discharge procedures on file      ED Provider  Electronically Signed by           Edwin Ortega MD  03/19/18 6222

## 2018-03-18 NOTE — ED CARE HANDOFF
Emergency Department Sign Out Note        Sign out and transfer of care from [facility]*  See Separate Emergency Department note  The patient, Timmy Kelly, was evaluated by the previous provider for *cough and fever**  Workup Completed:  Sodium 126 - patient now has headache    ED Course / Workup Pending (followup): Reviewed records, headache 10-17 relieved with lorazepam in emergency dept  CT negative then  Patient feels better after small dose of lorazepam   Giving fluids then recommend short follow-up with primary care  ED Course as of Mar 19 0519   Mya Due Mar 18, 2018   2007 Probable hypotonic hyponatremia - serum osm 266  Appears dehydrated      Procedures  MDM  Number of Diagnoses or Management Options  Acute hyponatremia: new and requires workup  Acute viral syndrome: new and requires workup  Dehydration: new and requires workup  Headache: new and requires workup     Amount and/or Complexity of Data Reviewed  Clinical lab tests: reviewed  Tests in the radiology section of CPT®: reviewed  Discuss the patient with other providers: yes    Patient Progress  Patient progress: improved    CritCare Time      Disposition  Final diagnoses:   Headache   Acute hyponatremia   Dehydration   Acute viral syndrome     Time reflects when diagnosis was documented in both MDM as applicable and the Disposition within this note     Time User Action Codes Description Comment    3/18/2018  9:27 PM Larance Natividad Add [R51] Headache     3/18/2018  9:27 PM Larance Natividad Add [E87 1] Acute hyponatremia     3/18/2018  9:27 PM Larance Natividad Add [E86 0] Dehydration     3/18/2018  9:27 PM Larance Natividad Add [B34 9] Acute viral syndrome       ED Disposition     ED Disposition Condition Comment    Discharge  Timmy Kelly discharge to home/self care      Condition at discharge: Good        Follow-up Information     Follow up With Specialties Details Why Camilla Campos MD [facility] Medicine In 2 days  [place]  [address] [facility]  [address] [facility] [address] [phone]          Discharge Medication List as of 3/18/2018  9:57 PM      START taking these medications    Details   oseltamivir (TAMIFLU) 30 MG capsule Take 1 capsule (30 mg total) by mouth daily for 4 days, Starting Mon 3/19/2018, Until Fri 3/23/2018, Normal         CONTINUE these medications which have NOT CHANGED    Details   atenolol (TENORMIN) 50 mg tablet 25 mg Atenolol 50 MG Oral Tablet take 1/2 tablet by mouth once daily  Quantity: 15;   Refills: 5    Ayush Oneill MD;  Started 12-Dec-2012 Active , Starting 12/12/2012, Until Discontinued, Historical Med      B Complex Vitamins (B COMPLEX-B12 PO) Take 1 tablet by mouth daily, Starting Wed 3/6/2013, Historical Med      Cholecalciferol ([employer]S VITAMIN D) 2000 units CAPS Take by mouth, Historical Med      doxycycline hyclate (VIBRA-TABS) 100 mg tablet Take by mouth, Starting Wed 8/16/2017, Historical Med      Ferrous Sulfate (IRON) 325 (65 Fe) MG TABS Take by mouth, Historical Med      fluticasone furoate-vilanterol (BREO ELLIPTA) Inhale daily, Starting Tue 5/27/2014, Historical Med      Krill Oil 1000 MG CAPS Take by mouth, Historical Med      levothyroxine 75 mcg tablet Levothyroxine Sodium 75 MCG Oral Tablet TAKE 1/2 TALBET IN THE MORNING DAILY  Quantity: 30;  Refills: 5    yAush Oneill MD;  Started 12-Dec-2012 Active, Starting 12/12/2012, Until Discontinued, Historical Med      LORazepam (ATIVAN) 0 5 mg tablet Take 1 tablet (0 5 mg total) by mouth every 8 (eight) hours as needed for anxiety, Starting Tue 3/6/2018, Phone In      menthol-zinc oxide (CALMOSPETINE) 0 44-20 625 % Apply topically, Starting Mon 8/4/2014, Historical Med      metoprolol succinate (TOPROL-XL) 25 mg 24 hr tablet Take 1 tablet by mouth daily, Starting Sun 10/22/2017, Historical Med      Multiple Vitamins-Minerals (VISION FORMULA/LUTEIN PO) Take by mouth, Starting Sat 11/15/2014, Historical Med      naproxen sodium (ALEVE) 220 MG tablet Take 1 tablet by mouth, Starting Fri 1/20/2017, Historical Med      nystatin-triamcinolone (MYCOLOG-II) cream Apply topically, Starting Tue 11/9/2010, Historical Med      omeprazole (PriLOSEC) 20 mg delayed release capsule Take 20 mg by mouth daily, Until Discontinued, Historical Med      zolpidem (AMBIEN) 5 mg tablet Take 1 tablet (5 mg total) by mouth daily at bedtime as needed for sleep, Starting Fri 3/16/2018, Print           No discharge procedures on file         ED Provider  Electronically Signed by     Mian Gusman, DO  03/19/18 9660

## 2018-03-19 ENCOUNTER — TELEPHONE (OUTPATIENT)
Dept: FAMILY MEDICINE CLINIC | Facility: HOSPITAL | Age: 83
End: 2018-03-19

## 2018-03-19 LAB
FLUAV AG SPEC QL: NORMAL
FLUBV AG SPEC QL: NORMAL
RSV B RNA SPEC QL NAA+PROBE: NORMAL

## 2018-03-19 NOTE — ED NOTES
Pt ambulatory to the bathroom independently without any difficulty       Elizabeth Ziegler RN  03/18/18 6868

## 2018-03-19 NOTE — DISCHARGE INSTRUCTIONS
Acute Headache   AMBULATORY CARE:   An acute headache  is pain or discomfort that starts suddenly and gets worse quickly  You may have an acute headache only when you feel stress or eat certain foods  Other acute headache pain can happen every day, and sometimes several times a day  The cause of an acute headache may not be known  It may be triggered by stress, fatigue, hormones, food, or trauma  Common types of acute headache:   · Tension headache  is the most common type of headache  These headaches typically occur in the late afternoon and go away by evening  The pain is usually mild or moderate  You may have problems tolerating bright light or loud noise  The pain is usually across the forehead or in the back of the head, often only on one side  These headaches may occur every day  · Migraine headaches  cause moderate or severe pain  The headache generally lasts from 1 to 3 days and tends to come back  Pain is usually on only one side, but it may change sides  Migraines often occur in the temple, the back of the head, or behind the eye  The pain may throb or be sharp and steady  · A migraine with aura  means you see or feel something before a migraine  You may see a small spot surrounded by bright zigzag lines  Other signs or symptoms may follow the aura  · Cluster headache  pain is usually only on one side  It often causes severe pain, and can last for 30 minutes to 2 hours  These headaches may occur 1 or 2 times each day, more often at night  The pain may wake you  Seek care immediately if:   · You have severe pain  · You have numbness or weakness on one side of your face or body  · You have a headache that occurs after a blow to the head, a fall, or other trauma  · You have a headache, are forgetful or confused, or have trouble speaking  · You have a headache, stiff neck, and a fever  Contact your healthcare provider if:   · You have a constant headache and are vomiting      · You have a headache each day that does not get better, even after treatment  · You have changes in your headaches, or new symptoms that occur when you have a headache  · You have questions or concerns about your condition or care  Treatment:   · Medicine  may be given to decrease pain  The medicine your healthcare provider recommends will depend on the kind of headaches you have  You will need to take prescription headache medicines as directed to prevent a problem called rebound headache  These headaches happen with regular use of pain relievers for headache disorders  NSAIDs or acetaminophen may help some kinds of headaches  · Biofeedback  may help you learn how to change stress reactions  For example, you learn to slow your heart rate when you become upset  You may also learn to prevent certain headaches by combining heat with relaxation  · Cognitive behavior therapy,  or stress management, may be used with other therapies to prevent headaches  Manage your symptoms:   · Apply heat or ice  on the headache area  Use a heat or ice pack  For an ice pack, you can also put crushed ice in a plastic bag  Cover the pack or bag with a towel before you apply it to your skin  Ice and heat both help decrease pain, and heat helps decrease muscle spasms  Apply heat for 20 to 30 minutes every 2 hours  Apply ice for 15 to 20 minutes every hour  Apply heat or ice for as long and for as many days as directed  You may alternate heat and ice  · Relax your muscles  Lie down in a comfortable position and close your eyes  Relax your muscles slowly  Start at your toes and work your way up your body  · Keep a record of your headaches  Write down when your headaches start and stop  Include your symptoms and what you were doing when the headache began  Record what you ate or drank for 24 hours before the headache started  Describe the pain and where it hurts   Keep track of what you did to treat your headache and if it worked  Prevent an acute headache:   · Avoid anything that triggers an acute headache  Examples include exposure to chemicals, going to high altitude, or not getting enough sleep  Create a regular sleep routine  Go to sleep at the same time and wake up at the same time each day  Do not use electronic devices before bedtime  These may trigger a headache or prevent you from sleeping well  · Do not smoke  Nicotine and other chemicals in cigarettes and cigars can trigger an acute headache or make it worse  Ask your healthcare provider for information if you currently smoke and need help to quit  E-cigarettes or smokeless tobacco still contain nicotine  Talk to your healthcare provider before you use these products  · Limit alcohol as directed  Alcohol can trigger an acute headache or make it worse  If you have cluster headaches, do not drink alcohol during an episode  For other types of headaches, ask your healthcare provider if it is safe for you to drink alcohol  Ask how much is safe for you to drink, and how often  · Exercise as directed  Exercise can reduce tension and help with headache pain  Aim for 30 minutes of physical activity on most days of the week  Your healthcare provider can help you create an exercise plan  · Eat a variety of healthy foods  Healthy foods include fruits, vegetables, low-fat dairy products, lean meats, fish, whole grains, and cooked beans  Your healthcare provider or dietitian can help you create meals plans if you need to avoid foods that trigger headaches  Follow up with your healthcare provider as directed:  Bring your headache record with you when you see your healthcare provider  Write down your questions so you remember to ask them during your visits  © 2017 [address] [employer] Information is for End User's use only and may not be sold, redistributed or otherwise used for commercial purposes   All illustrations and images included in CareNotes® are the copyrighted property of [employer]  or [employer]  The above information is an  only  It is not intended as medical advice for individual conditions or treatments  Talk to your doctor, nurse or pharmacist before following any medical regimen to see if it is safe and effective for you  Dehydration   WHAT YOU NEED TO KNOW:   Dehydration is a condition that develops when your body does not have enough fluid  You may become dehydrated if you do not drink enough water or lose too much fluid  Fluid loss may also cause loss of electrolytes (minerals), such as sodium  DISCHARGE INSTRUCTIONS:   Seek care immediately if:   · You have a seizure  · You are confused or cannot think clearly  · You are extremely sleepy, or another person cannot wake you  · You become dizzy or faint when you stand  · You are not able to urinate  · You have trouble breathing  · You have a fast or irregular heartbeat  · Your hands or feet are cold, or your face is pale  Contact your healthcare provider if:   · You have trouble drinking liquids because you are vomiting  · Your symptoms get worse  · You have a fever  · You feel very weak or tired  · You have questions or concerns about your condition or care  Follow up with your healthcare provider as directed:  Write down your questions so you remember to ask them during your visits  Prevent or manage dehydration:   · Drink liquids as directed  Liquids that contain water, sugar, and minerals can help your body hold in fluid and help prevent dehydration  Drink liquids throughout the day, not just when you feel thirsty  Men should drink about 3 liters (13 eight-ounce cups) of liquid each day  Women should drink about 2 liters (9 eight-ounce cups) of liquid each day  Drink even more liquid if you will be outdoors, in the sun for a long time, or exercising  · Stay cool    Limit the time you spend outdoors during the hottest part of the day  Dress in lightweight clothes  · Keep track of how often you urinate  If you urinate less than usual or your urine is darker, drink more liquids  © 2017 [address] [employer] Information is for End User's use only and may not be sold, redistributed or otherwise used for commercial purposes  All illustrations and images included in CareNotes® are the copyrighted property of [employer]  or [employer] Craw[employer]  The above information is an  only  It is not intended as medical advice for individual conditions or treatments  Talk to your doctor, nurse or pharmacist before following any medical regimen to see if it is safe and effective for you  Hyponatremia   WHAT YOU NEED TO KNOW:   What is hyponatremia? Hyponatremia occurs when the amount of sodium (salt) in your blood is lower than normal  Sodium is an electrolyte (mineral) that helps your muscles, heart, and digestive system work properly  It helps control blood pressure and fluid balance  What causes hyponatremia? Hyponatremia happens when too much sodium leaves your body, or when more water than sodium stays in your blood  Any of the following conditions can lead to hyponatremia:  · A diet that is low in sodium    · Drinking too much water or receiving too much fluid through an IV    · Intense and prolonged exercise that causes excessive sweating    · Medical conditions, such as Becker disease, kidney disease, congestive heart failure, liver cirrhosis, or cancer    · Medicines, such as diuretics, antidepressants, pain medicines, or illegal drugs such as ecstasy    · Dehydration  What are the signs and symptoms of hyponatremia? You may have no signs or symptoms   Symptoms may start to appear when the amount of sodium in your blood drops too low or too fast  You may have any of the following:  · Abdominal cramps, nausea, or vomiting    · Headache, confusion, hallucinations, or trouble staying awake    · Muscle weakness or cramps     · Seizures or coma  How is hyponatremia diagnosed? Your healthcare provider will ask you about the medicines you take  He will do a physical exam to look for signs of swelling caused by fluid retention (extra water in your body)  · Blood tests  will be done to check the level of sodium in your blood  They may also be done to find the cause of your hyponatremia  · Urine sodium  is a test that checks the level of sodium in your urine  A sample of your urine is collected and is sent to a lab for tests  How is hyponatremia treated? Treatment depends on the cause of your hyponatremia and how severe it is  Healthcare providers may limit the amount of liquids you drink if you are retaining water  A salt solution may be given through an IV to increase the amount of sodium in your blood  Medicines may also be given to help get rid of extra fluid in your body  You may urinate more often while taking these medicines  When should I contact my healthcare provider? · You have muscle cramps or twitching  · You feel very weak or tired  · You have nausea or are vomiting  · You have questions or concerns about your condition or care  When should I seek immediate care or call 911? · You have a seizure  · You have an irregular heartbeat  · You have trouble breathing  · You cannot move your arms and legs  · You are confused or cannot think clearly  CARE AGREEMENT:   You have the right to help plan your care  Learn about your health condition and how it may be treated  Discuss treatment options with your caregivers to decide what care you want to receive  You always have the right to refuse treatment  The above information is an  only  It is not intended as medical advice for individual conditions or treatments  Talk to your doctor, nurse or pharmacist before following any medical regimen to see if it is safe and effective for you    © 2017 [employer] [address] is for End User's use only and may not be sold, redistributed or otherwise used for commercial purposes  All illustrations and images included in CareNotes® are the copyrighted property of [employer]  or [employer] Crawley  Viral Syndrome   AMBULATORY CARE:   Viral syndrome  is a term used for general symptoms of a viral infection that has no clear cause  Viruses are spread easily from person to person through the air and on shared items  Common symptoms include the following:   · Fever and chills    · A runny or stuffy nose     · Cough, sore throat, or hoarseness     · Headache, or pain and pressure around your eyes     · Muscle aches and joint pain     · Shortness of breath or wheezing     · Abdominal pain, cramps, and diarrhea     · Nausea, vomiting, or loss of appetite  Call 911 for the following:   · You have a seizure  · You cannot be woken  · You have chest pain or trouble breathing  Seek care immediately if:   · You have a stiff neck, a bad headache, and sensitivity to light  · You feel weak, dizzy, or confused  · You stop urinating or urinate a lot less than normal      · You cough up blood or thick, yellow or green, mucus  · You have severe abdominal pain or your abdomen is larger than usual   Contact your healthcare provider if:   · Your symptoms do not get better with treatment, or get worse, after 3 days  · You have a rash or ear pain  · You have burning when you urinate  · You have questions or concerns about your condition or care  Treatment for viral syndrome  may include medicines to manage your symptoms  You may  need any of the following:  · Acetaminophen  decreases pain and fever  It is available without a doctor's order  Ask how much medicine to take and how often to take it  Follow directions  Acetaminophen can cause liver damage if not taken correctly       · NSAIDs , such as ibuprofen, help decrease swelling, pain, and fever  NSAIDs can cause stomach bleeding or kidney problems in certain people  If you take blood thinner medicine, always ask your healthcare provider if NSAIDs are safe for you  Always read the medicine label and follow directions  · Cold medicine  helps decrease swelling, control a cough, and relieve chest or nasal congestion  · Saline nasal spray  helps decrease nasal congestion  · Take your medicine as directed  Contact your healthcare provider if you think your medicine is not helping or if you have side effects  Tell him of her if you are allergic to any medicine  Keep a list of the medicines, vitamins, and herbs you take  Include the amounts, and when and why you take them  Bring the list or the pill bottles to follow-up visits  Carry your medicine list with you in case of an emergency  Manage your symptoms:   · Drink liquids as directed  to prevent dehydration  Ask how much liquid to drink each day and which liquids are best for you  Ask if you should drink an oral rehydration solution (ORS)  An ORS has the right amounts of water, salts, and sugar you need to replace body fluids  This may help prevent dehydration caused by vomiting or diarrhea  Do not drink liquids with caffeine  Drinks with caffeine can make dehydration worse  · Get plenty of rest  to help your body heal  Take naps throughout the day  Ask your healthcare provider when you can return to work and your normal activities  · Use a cool mist humidifier  to help you breathe easier if you have nasal or chest congestion  Ask your healthcare provider how to use a cool mist humidifier  · Eat honey or use cough drops  to help decrease throat discomfort  Ask your healthcare provider how much honey you should eat each day  Cough drops are available without a doctor's order  Follow directions for taking cough drops  · Do not smoke and stay away from others who smoke    Nicotine and other chemicals in cigarettes and cigars can cause lung damage  Smoking can also delay healing  Ask your healthcare provider for information if you currently smoke and need help to quit  E-cigarettes or smokeless tobacco still contain nicotine  Talk to your healthcare provider before you use these products  · Wash your hands frequently  to prevent the spread of germs to others  Use soap and water  Use gel hand  when soap and water are not available  Wash your hands after you use the bathroom, cough, or sneeze  Wash your hands before you prepare or eat food  Follow up with your healthcare provider as directed:  Write down your questions so you remember to ask them during your visits  © 2017 [facility] [facility] Information is for End User's use only and may not be sold, redistributed or otherwise used for commercial purposes  All illustrations and images included in CareNotes® are the copyrighted property of [employer]  or [employer]  The above information is an  only  It is not intended as medical advice for individual conditions or treatments  Talk to your doctor, nurse or pharmacist before following any medical regimen to see if it is safe and effective for you

## 2018-03-19 NOTE — SEPSIS NOTE
Sepsis Note   Kalani Matthew 80 y o  male MRN: [id number]  Unit/Bed#: ED 01 Encounter: [id number]            Initial Sepsis Screening     Row Name 03/18/18 1810                Is the patient's history suggestive of a new or worsening infection? (!)  Yes (Proceed)  -TF        Suspected source of infection pneumonia  -TF        Are two or more of the following signs & symptoms of infection both present and new to the patient?  --        Indicate SIRS criteria Tachycardia > 90 bpm;Tachypnea > 20 resp per min  -TF        If the answer is yes to both questions, suspicion of sepsis is present  --        If severe sepsis is present AND tissue hypoperfusion perists in the hour after fluid resuscitation or lactate > 4, the patient meets criteria for SEPTIC SHOCK  --        Are any of the following organ dysfunction criteria present within 6 hours of suspected infection and SIRS criteria that are NOT considered to be chronic conditions?  No  -MARIA C        Organ dysfunction  --        Date of presentation of severe sepsis  --        Time of presentation of severe sepsis  --        Tissue hypoperfusion persists in the hour after crystalloid fluid administration, evidenced, by either:  --        Was hypotension present within one hour of the conclusion of crystalloid fluid administration?  --        Date of presentation of septic shock  --        Time of presentation of septic shock  --          User Key  (r) = Recorded By, (t) = Taken By, (c) = Cosigned By    Initials Name Provider Type    Myesha Cruz DO Physician    TF Liza Lewis MD Physician

## 2018-03-20 DIAGNOSIS — F41.9 ANXIETY: ICD-10-CM

## 2018-03-20 RX ORDER — LORAZEPAM 0.5 MG/1
0.5 TABLET ORAL EVERY 8 HOURS PRN
Qty: 90 TABLET | Refills: 0 | OUTPATIENT
Start: 2018-03-20 | End: 2018-04-25 | Stop reason: SDUPTHER

## 2018-03-22 ENCOUNTER — OFFICE VISIT (OUTPATIENT)
Dept: FAMILY MEDICINE CLINIC | Facility: HOSPITAL | Age: 83
End: 2018-03-22
Payer: MEDICARE

## 2018-03-22 VITALS
RESPIRATION RATE: 14 BRPM | HEART RATE: 84 BPM | DIASTOLIC BLOOD PRESSURE: 60 MMHG | HEIGHT: 64 IN | WEIGHT: 167.6 LBS | TEMPERATURE: 97.7 F | BODY MASS INDEX: 28.61 KG/M2 | SYSTOLIC BLOOD PRESSURE: 118 MMHG

## 2018-03-22 DIAGNOSIS — J20.9 ACUTE BRONCHITIS, UNSPECIFIED ORGANISM: ICD-10-CM

## 2018-03-22 DIAGNOSIS — J44.9 CHRONIC OBSTRUCTIVE PULMONARY DISEASE, UNSPECIFIED COPD TYPE (HCC): Primary | ICD-10-CM

## 2018-03-22 PROCEDURE — 99214 OFFICE O/P EST MOD 30 MIN: CPT | Performed by: FAMILY MEDICINE

## 2018-03-22 RX ORDER — ALBUTEROL SULFATE 90 UG/1
2 AEROSOL, METERED RESPIRATORY (INHALATION) EVERY 6 HOURS PRN
Qty: 1 INHALER | Refills: 1 | Status: SHIPPED | OUTPATIENT
Start: 2018-03-22 | End: 2018-06-13 | Stop reason: SDUPTHER

## 2018-03-22 RX ORDER — CODEINE PHOSPHATE AND GUAIFENESIN 10; 100 MG/5ML; MG/5ML
5 SOLUTION ORAL 3 TIMES DAILY PRN
Qty: 118 ML | Refills: 0 | Status: SHIPPED | OUTPATIENT
Start: 2018-03-22 | End: 2019-03-28

## 2018-03-22 RX ORDER — DOXYCYCLINE HYCLATE 100 MG/1
100 CAPSULE ORAL EVERY 12 HOURS SCHEDULED
Qty: 20 CAPSULE | Refills: 0 | Status: SHIPPED | OUTPATIENT
Start: 2018-03-22 | End: 2018-04-01

## 2018-03-22 NOTE — PATIENT INSTRUCTIONS
COPD (Chronic Obstructive Pulmonary Disease)   WHAT YOU NEED TO KNOW:   Chronic obstructive pulmonary disease (COPD) is a lung disease that makes it hard for you to breathe  It is usually a result of lung damage caused by years of irritation and inflammation in your lungs  DISCHARGE INSTRUCTIONS:   Call 911 if:   · You feel lightheaded, short of breath, and have chest pain  Return to the emergency department if:   · You are confused, dizzy, or feel faint  · Your arm or leg feels warm, tender, and painful  It may look swollen and red  · You cough up blood  Contact your healthcare provider if:   · You have more shortness of breath than usual      · You need more medicine than usual to control your symptoms  · You are coughing or wheezing more than usual      · You are coughing up more mucus, or it is a different color or has a different odor  · You gain more than 3 pounds in a week  · You have a fever, a runny or stuffy nose, and a sore throat, or other cold or flu symptoms  · Your skin, lips, or nails start to turn blue  · You have swelling in your legs or ankles  · You are very tired or weak for more than a day  · You notice changes in your mood, or changes in your ability to think or concentrate  · You have questions or concerns about your condition or care  Medicines:   · Medicines  may be used to open your airways, decrease swelling and inflammation in your lungs, or treat an infection  You may need 2 or more medicines  A short-acting medicine relieves symptoms quickly  Long-acting medicines will control or prevent symptoms  Ask for more information about the medicines you are given and how to use them safely  · Take your medicine as directed  Contact your healthcare provider if you think your medicine is not helping or if you have side effects  Tell him or her if you are allergic to any medicine  Keep a list of the medicines, vitamins, and herbs you take  Include the amounts, and when and why you take them  Bring the list or the pill bottles to follow-up visits  Carry your medicine list with you in case of an emergency  Help make breathing easier:   · Use pursed-lip breathing any time you feel short of breath  Take a deep breath in through your nose  Slowly breathe out through your mouth with your lips pursed for twice as long as you inhaled  You can also practice this breathing pattern while you bend, lift, climb stairs, or exercise  It slows down your breathing and helps move more air in and out of your lungs  · Do not smoke, and avoid others who smoke  Nicotine and other substances can cause lung irritation or damage and make it harder for you to breathe  Do not use e-cigarettes or smokeless tobacco  They still contain nicotine  Ask your healthcare provider for information if you currently smoke and need help to quit  For support and more information:  ¨ [employer]  Phone: 0- 945 - 597-0854  Web Address: [employer]      · Be aware of and avoid anything that makes your symptoms worse  Stay out of high altitudes and places with high humidity  Stay inside, or cover your mouth and nose with a scarf when you are outside during cold weather  Stay inside on days when air pollution or pollen counts are high  Do not use aerosol sprays such as deodorant, bug spray, and hair spray  Manage COPD and help prevent exacerbations:  COPD is a serious condition that gets worse over time  A COPD exacerbation means your symptoms suddenly get worse  It is important to prevent exacerbations  An exacerbation can cause more lung damage  COPD cannot be cured, but you can take action to feel better and prevent COPD exacerbations:  · Protect yourself from germs  Germs can get into your lungs and cause an infection  An infection in your lungs can create more mucus and make it harder to breathe   An infection can also create swelling in your airways and prevent air from getting in  You can decrease your risk for infection by doing the following:     [facility] your hands often with soap and water  Carry germ-killing gel with you  You can use the gel to clean your hands when soap and water are not available  ¨ Do not touch your eyes, nose, or mouth unless you have washed your hands first      ¨ Always cover your mouth when you cough  Cough into a tissue or your shirtsleeve so you do not spread germs from your hands  ¨ Try to avoid people who have a cold or the flu  If you are sick, stay away from others as much as possible  · Drink more liquids  This will help to keep your air passages moist and help you cough up mucus  Ask how much liquid to drink each day and which liquids are best for you  · Exercise daily  Exercise for at least 20 minutes each day to help increase your energy and decrease shortness of breath  Walking or riding a bike are good ways to exercise  Talk to your healthcare provider about the best exercise plan for you  · Ask about vaccines  Your healthcare provider may recommend that you get regular flu and pneumonia vaccines  Pneumonia can become life-threatening for a person who has COPD  Ask about other vaccines you may need  Ask your healthcare provider about the flu and pneumonia vaccines  All adults should get the flu (influenza) vaccine every year as soon as it becomes available  The pneumonia vaccine is given to adults aged 72 or older to prevent pneumococcal disease, such as pneumonia  Adults aged 23 to 59 years who are at high risk for pneumococcal disease also should get the pneumococcal vaccine  It may need to be repeated 1 or 5 years later  Pulmonary rehabilitation:  Your healthcare provider may recommend a program to help you manage your symptoms and improve your quality of life  It may include nutritional counseling and exercise to strengthen your lungs     Make decisions about your choices for future treatment:  Ask for information about advanced medical directives and living chaudhari  These documents help you decide and write down your choices for treatment and end-of-life care  It is best to complete them when you feel well and can think clearly about your wishes  The information can then be kept for future use if you are in the hospital or become very ill  Follow up with your healthcare provider as directed: You may need more tests  Your healthcare provider may refer you to a pulmonary (lung) specialist  Write down your questions so you remember to ask them during your visits  © 2017 [facility] Information is for End User's use only and may not be sold, redistributed or otherwise used for commercial purposes  All illustrations and images included in CareNotes® are the copyrighted property of [employer]  or [employer] Craw[employer]  The above information is an  only  It is not intended as medical advice for individual conditions or treatments  Talk to your doctor, nurse or pharmacist before following any medical regimen to see if it is safe and effective for you  Acute Bronchitis   WHAT YOU NEED TO KNOW:   Acute bronchitis is swelling and irritation in the air passages of your lungs  This irritation may cause you to cough or have other breathing problems  Acute bronchitis often starts because of another illness, such as a cold or the flu  The illness spreads from your nose and throat to your windpipe and airways  Bronchitis is often called a chest cold  Acute bronchitis lasts about 3 to 6 weeks and is usually not a serious illness  Your cough can last for several weeks  DISCHARGE INSTRUCTIONS:   Return to the emergency department if:   · You cough up blood  · Your lips or fingernails turn blue  · You feel like you are not getting enough air when you breathe  Contact your healthcare provider if:   · You have a fever  · Your breathing problems do not go away or get worse      · Your cough does not get better within 4 weeks  · You have questions or concerns about your condition or care  Self-care:   · Get more rest   Rest helps your body to heal  Slowly start to do more each day  Rest when you feel it is needed  · Avoid irritants in the air  Avoid chemicals, fumes, and dust  Wear a face mask if you must work around dust or fumes  Stay inside on days when air pollution levels are high  If you have allergies, stay inside when pollen counts are high  Do not use aerosol products, such as spray-on deodorant, bug spray, and hair spray  · Do not smoke or be around others who smoke  Nicotine and other chemicals in cigarettes and cigars damages the cilia that move mucus out of your lungs  Ask your healthcare provider for information if you currently smoke and need help to quit  E-cigarettes or smokeless tobacco still contain nicotine  Talk to your healthcare provider before you use these products  · Drink liquids as directed  Liquids help keep your air passages moist and help you cough up mucus  You may need to drink more liquids when you have acute bronchitis  Ask how much liquid to drink each day and which liquids are best for you  · Use a humidifier or vaporizer  Use a cool mist humidifier or a vaporizer to increase air moisture in your home  This may make it easier for you to breathe and help decrease your cough  Decrease risk for acute bronchitis:   · Get the vaccinations you need  Ask your healthcare provider if you should get vaccinated against the flu or pneumonia  · Prevent the spread of germs  You can decrease your risk of acute bronchitis and other illnesses by doing the following:     [facility] your hands often with soap and water  Carry germ-killing hand lotion or gel with you  You can use the lotion or gel to clean your hands when soap and water are not available      ¨ Do not touch your eyes, nose, or mouth unless you have washed your hands first     ¨ Always cover your mouth when you cough to prevent the spread of germs  It is best to cough into a tissue or your shirt sleeve instead of into your hand  Ask those around you cover their mouths when they cough  ¨ Try to avoid people who have a cold or the flu  If you are sick, stay away from others as much as possible  Medicines: Your healthcare provider may  give you any of the following:  · Ibuprofen or acetaminophen  are medicines that help lower your fever  They are available without a doctor's order  Ask your healthcare provider which medicine is right for you  Ask how much to take and how often to take it  Follow directions  These medicines can cause stomach bleeding if not taken correctly  Ibuprofen can cause kidney damage  Do not take ibuprofen if you have kidney disease, an ulcer, or allergies to aspirin  Acetaminophen can cause liver damage  Do not take more than 4,000 milligrams in 24 hours  · Decongestants  help loosen mucus in your lungs and make it easier to cough up  This can help you breathe easier  · Cough suppressants  decrease your urge to cough  If your cough produces mucus, do not take a cough suppressant unless your healthcare provider tells you to  Your healthcare provider may suggest that you take a cough suppressant at night so you can rest     · Inhalers  may be given  Your healthcare provider may give you one or more inhalers to help you breathe easier and cough less  An inhaler gives your medicine to open your airways  Ask your healthcare provider to show you how to use your inhaler correctly  · Take your medicine as directed  Contact your healthcare provider if you think your medicine is not helping or if you have side effects  Tell him of her if you are allergic to any medicine  Keep a list of the medicines, vitamins, and herbs you take  Include the amounts, and when and why you take them  Bring the list or the pill bottles to follow-up visits   Carry your medicine list with you in case of an emergency  Follow up with your healthcare provider as directed:  Write down questions you have so you will remember to ask them during your follow-up visits  © 2017 [facility] Information is for End User's use only and may not be sold, redistributed or otherwise used for commercial purposes  All illustrations and images included in CareNotes® are the copyrighted property of [employer]  or [employer] Crawley  The above information is an  only  It is not intended as medical advice for individual conditions or treatments  Talk to your doctor, nurse or pharmacist before following any medical regimen to see if it is safe and effective for you

## 2018-03-22 NOTE — PROGRESS NOTES
[facility]  [address] [place] [address]  Tel: [phone]    Patient is here for an acute visit     51-year-old male with a history of COPD  He is here as 5 days ago he was seen in the emergency room for shortness of breath, coughing, fever  He was treated as influenza  Was given renal dose of Tamiflu  He is here for follow-up  Noting increased shortness of breath, increased sputum production, increased coughing  Cough is worse at night  He has had no fever or muscle aches  He does could take Breo on a regular basis  Review of emergency room visit shows a negative chest x-ray  Influenza a and B were negative  His blood work was unremarkable  Cough   Associated symptoms include shortness of breath and wheezing  Pertinent negatives include no chest pain or chills  -----------------------------  Review of Systems   Constitutional: Negative  Negative for activity change, appetite change, chills and diaphoresis  HENT: Negative for congestion, dental problem, sinus pain, sinus pressure, trouble swallowing and voice change  Respiratory: Positive for cough, shortness of breath and wheezing  Negative for apnea and chest tightness  Cardiovascular: Negative  Negative for chest pain, palpitations and leg swelling  Gastrointestinal: Negative  Negative for abdominal distention, abdominal pain, constipation, diarrhea and nausea  Genitourinary: Negative  Negative for difficulty urinating, dysuria and frequency  Social Hx reviewed:    Social History     Social History    Marital status:      Spouse name: N/A    Number of children: N/A    Years of education: N/A     Occupational History    Not on file       Social History Main Topics    Smoking status: Former Smoker    Smokeless tobacco: Never Used    Alcohol use No      Comment: Social Drinker per [employer]    Drug use: No    Sexual activity: Not on file     Other Topics Concern    Not on file     Social History Narrative     per [employer]    Always uses seat belt       Past Medical History:   Diagnosis Date    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    [facility] thoracic vertebra ([facility] )     last assessed 05/07/2012    COPD (chronic obstructive pulmonary disease) ([place] [address] )     Disease of thyroid gland     Heart attack     Hollenhorst plaque, right eye     last assessed 04/08/2013    Hyperlipidemia     Hypertension     Iron deficiency anemia due to chronic blood loss     last assessed 09/10/2012    Ischemic colitis ([place] [address] )     last assessed 05/27/2014    Osteoarthritis of both hands     last assessedn 04/30/2013    Peptic ulcer     last assessed 06/14/2013    Polymyalgia rheumatica ([place] [address] )     last assessesd 10/09/2012    Renal disorder     Upper GI hemorrhage     last assessed 01/29/2015    Varicose veins of lower extremity     last assessed 04/26/2013           Allergies   Allergen Reactions    Pravastatin Anaphylaxis, Photosensitivity and Headache     Reaction Date: [id number]; Other reaction(s): Headache    Acetaminophen-Codeine GI Intolerance    Atorvastatin      Other reaction(s): Leg Cramps  Other reaction(s): Leg Cramps    Codeine Nausea Only    Colestipol GI Intolerance     Reaction Date: [id number];     Contrast  [Iodinated Diagnostic Agents]     Fenofibrate GI Intolerance     Reaction Date: [id number];     Hydrocodone Vomiting    Niacin      Reaction Date: [id number];     Niaspan  [Niacin Er]     Pitavastatin Myalgia    Pneumococcal Polysaccharide Vaccine     Pravastatin Sodium     Simvastatin     Statins Myalgia    Vicoprofen  [Hydrocodone-Ibuprofen] GI Intolerance    Cyclophosphamide Rash    Ioversol Hives         Vitals:    03/22/18 1438   BP: 118/60   Pulse: 84   Resp: 14   Temp: 97 7 °F (36 5 °C)     Physical Exam   Constitutional: He is oriented to person, place, and time   He appears well-developed and well-nourished  HENT:   Head: Normocephalic and atraumatic  Eyes: EOM are normal  Pupils are equal, round, and reactive to light  Neck: Normal range of motion  Neck supple  Cardiovascular: Normal rate, regular rhythm and normal heart sounds  Pulmonary/Chest: Effort normal  He has no decreased breath sounds  He has no wheezes  He has no rhonchi  He has no rales  Abdominal: Soft  Bowel sounds are normal    Neurological: He is alert and oriented to person, place, and time  Skin: Skin is warm and dry  Psychiatric: He has a normal mood and affect  His behavior is normal    Nursing note and vitals reviewed  Problem List Items Addressed This Visit     Chronic obstructive pulmonary disease (HCC) - Primary    Relevant Medications    doxycycline hyclate (VIBRAMYCIN) 100 mg capsule    guaiFENesin-codeine (ROBITUSSIN AC) 100-10 mg/5 mL oral solution    albuterol (VENTOLIN HFA) 90 mcg/act inhaler      Other Visit Diagnoses     Acute bronchitis, unspecified organism        Relevant Medications    doxycycline hyclate (VIBRAMYCIN) 100 mg capsule    guaiFENesin-codeine (ROBITUSSIN AC) 100-10 mg/5 mL oral solution    albuterol (VENTOLIN HFA) 90 mcg/act inhaler      Patient with an acute bronchitis with underlying COPD  Due to worsening of symptoms will treat with doxycycline 100 mg twice a day  Given cough medicine as well as albuterol to use  He is to call our office within next few days if he is not getting better  To call our office if any concerns/questions at [phone]

## 2018-03-23 LAB
BACTERIA BLD CULT: NORMAL
BACTERIA BLD CULT: NORMAL

## 2018-04-14 DIAGNOSIS — I10 ESSENTIAL HYPERTENSION: Primary | ICD-10-CM

## 2018-04-15 RX ORDER — METOPROLOL SUCCINATE 25 MG/1
TABLET, EXTENDED RELEASE ORAL
Qty: 30 TABLET | Refills: 5 | Status: ON HOLD | OUTPATIENT
Start: 2018-04-15 | End: 2019-03-30 | Stop reason: ALTCHOICE

## 2018-04-21 DIAGNOSIS — J01.90 SUBACUTE SINUSITIS, UNSPECIFIED LOCATION: Primary | ICD-10-CM

## 2018-04-22 RX ORDER — DOXYCYCLINE HYCLATE 100 MG
TABLET ORAL
Qty: 20 TABLET | Refills: 0 | Status: SHIPPED | OUTPATIENT
Start: 2018-04-22 | End: 2018-05-08 | Stop reason: SDUPTHER

## 2018-04-25 DIAGNOSIS — F41.9 ANXIETY: ICD-10-CM

## 2018-04-25 RX ORDER — LORAZEPAM 0.5 MG/1
TABLET ORAL
Qty: 90 TABLET | Refills: 0 | Status: SHIPPED | OUTPATIENT
Start: 2018-04-25 | End: 2018-05-25 | Stop reason: SDUPTHER

## 2018-05-08 DIAGNOSIS — J01.90 SUBACUTE SINUSITIS, UNSPECIFIED LOCATION: ICD-10-CM

## 2018-05-09 RX ORDER — DOXYCYCLINE HYCLATE 100 MG
TABLET ORAL
Qty: 20 TABLET | Refills: 0 | Status: SHIPPED | OUTPATIENT
Start: 2018-05-09 | End: 2018-05-19

## 2018-05-21 DIAGNOSIS — J01.90 SUBACUTE SINUSITIS, UNSPECIFIED LOCATION: ICD-10-CM

## 2018-05-25 DIAGNOSIS — F41.9 ANXIETY: ICD-10-CM

## 2018-05-25 RX ORDER — LORAZEPAM 0.5 MG/1
TABLET ORAL
Qty: 90 TABLET | Refills: 3 | Status: SHIPPED | OUTPATIENT
Start: 2018-05-25 | End: 2018-09-28 | Stop reason: SDUPTHER

## 2018-05-29 ENCOUNTER — TELEPHONE (OUTPATIENT)
Dept: FAMILY MEDICINE CLINIC | Facility: HOSPITAL | Age: 83
End: 2018-05-29

## 2018-05-30 RX ORDER — DOXYCYCLINE HYCLATE 100 MG
TABLET ORAL
Qty: 20 TABLET | Refills: 0 | OUTPATIENT
Start: 2018-05-30 | End: 2018-06-09

## 2018-06-01 ENCOUNTER — HOSPITAL ENCOUNTER (INPATIENT)
Facility: HOSPITAL | Age: 83
LOS: 1 days | Discharge: HOME | DRG: 303 | End: 2018-06-02
Attending: EMERGENCY MEDICINE | Admitting: INTERNAL MEDICINE
Payer: MEDICARE

## 2018-06-01 ENCOUNTER — APPOINTMENT (EMERGENCY)
Dept: CT IMAGING | Facility: HOSPITAL | Age: 83
DRG: 303 | End: 2018-06-01
Payer: MEDICARE

## 2018-06-01 ENCOUNTER — APPOINTMENT (EMERGENCY)
Dept: NON INVASIVE DIAGNOSTICS | Facility: HOSPITAL | Age: 83
DRG: 303 | End: 2018-06-01
Payer: MEDICARE

## 2018-06-01 ENCOUNTER — APPOINTMENT (EMERGENCY)
Dept: RADIOLOGY | Facility: HOSPITAL | Age: 83
DRG: 303 | End: 2018-06-01
Payer: MEDICARE

## 2018-06-01 DIAGNOSIS — I20.0 UNSTABLE ANGINA (HCC): Primary | ICD-10-CM

## 2018-06-01 DIAGNOSIS — I25.10 CORONARY ARTERY DISEASE INVOLVING NATIVE HEART WITHOUT ANGINA PECTORIS, UNSPECIFIED VESSEL OR LESION TYPE: ICD-10-CM

## 2018-06-01 LAB
ALBUMIN SERPL BCP-MCNC: 3.9 G/DL (ref 3.5–5)
ALP SERPL-CCNC: 81 U/L (ref 46–116)
ALT SERPL W P-5'-P-CCNC: 26 U/L (ref 12–78)
ANION GAP SERPL CALCULATED.3IONS-SCNC: 11 MMOL/L (ref 4–13)
APTT PPP: 36 SECONDS (ref 24–36)
AST SERPL W P-5'-P-CCNC: 24 U/L (ref 5–45)
ATRIAL RATE: 100 BPM
BASOPHILS # BLD AUTO: 0.02 THOUSANDS/ΜL (ref 0–0.1)
BASOPHILS NFR BLD AUTO: 0 % (ref 0–1)
BILIRUB SERPL-MCNC: 0.7 MG/DL (ref 0.2–1)
BUN SERPL-MCNC: 36 MG/DL (ref 5–25)
CALCIUM SERPL-MCNC: 9.4 MG/DL (ref 8.3–10.1)
CHLORIDE SERPL-SCNC: 101 MMOL/L (ref 100–108)
CO2 SERPL-SCNC: 25 MMOL/L (ref 21–32)
CREAT SERPL-MCNC: 1.72 MG/DL (ref 0.6–1.3)
EOSINOPHIL # BLD AUTO: 0.13 THOUSAND/ΜL (ref 0–0.61)
EOSINOPHIL NFR BLD AUTO: 2 % (ref 0–6)
ERYTHROCYTE [DISTWIDTH] IN BLOOD BY AUTOMATED COUNT: 13.9 % (ref 11.6–15.1)
GFR SERPL CREATININE-BSD FRML MDRD: 35 ML/MIN/1.73SQ M
GLUCOSE SERPL-MCNC: 120 MG/DL (ref 65–140)
HCT VFR BLD AUTO: 44.7 % (ref 36.5–49.3)
HGB BLD-MCNC: 14.9 G/DL (ref 12–17)
IMM GRANULOCYTES # BLD AUTO: 0.02 THOUSAND/UL (ref 0–0.2)
IMM GRANULOCYTES NFR BLD AUTO: 0 % (ref 0–2)
INR PPP: 1.07 (ref 0.86–1.17)
LYMPHOCYTES # BLD AUTO: 1.46 THOUSANDS/ΜL (ref 0.6–4.47)
LYMPHOCYTES NFR BLD AUTO: 17 % (ref 14–44)
MCH RBC QN AUTO: 30.5 PG (ref 26.8–34.3)
MCHC RBC AUTO-ENTMCNC: 33.3 G/DL (ref 31.4–37.4)
MCV RBC AUTO: 91 FL (ref 82–98)
MONOCYTES # BLD AUTO: 1.2 THOUSAND/ΜL (ref 0.17–1.22)
MONOCYTES NFR BLD AUTO: 14 % (ref 4–12)
NEUTROPHILS # BLD AUTO: 5.9 THOUSANDS/ΜL (ref 1.85–7.62)
NEUTS SEG NFR BLD AUTO: 67 % (ref 43–75)
NT-PROBNP SERPL-MCNC: 201 PG/ML
P AXIS: 66 DEGREES
PLATELET # BLD AUTO: 133 THOUSANDS/UL (ref 149–390)
PMV BLD AUTO: 9.3 FL (ref 8.9–12.7)
POTASSIUM SERPL-SCNC: 4.2 MMOL/L (ref 3.5–5.3)
PR INTERVAL: 158 MS
PROT SERPL-MCNC: 8.2 G/DL (ref 6.4–8.2)
PROTHROMBIN TIME: 13.3 SECONDS (ref 11.8–14.2)
QRS AXIS: 62 DEGREES
QRSD INTERVAL: 88 MS
QT INTERVAL: 340 MS
QTC INTERVAL: 438 MS
RBC # BLD AUTO: 4.89 MILLION/UL (ref 3.88–5.62)
SODIUM SERPL-SCNC: 137 MMOL/L (ref 136–145)
SPECIMEN SOURCE: NORMAL
T WAVE AXIS: 47 DEGREES
TROPONIN I BLD-MCNC: 0 NG/ML (ref 0–0.08)
TROPONIN I SERPL-MCNC: <0.02 NG/ML
TROPONIN I SERPL-MCNC: <0.02 NG/ML
VENTRICULAR RATE: 100 BPM
WBC # BLD AUTO: 8.73 THOUSAND/UL (ref 4.31–10.16)

## 2018-06-01 PROCEDURE — 96361 HYDRATE IV INFUSION ADD-ON: CPT

## 2018-06-01 PROCEDURE — 71046 X-RAY EXAM CHEST 2 VIEWS: CPT

## 2018-06-01 PROCEDURE — 96376 TX/PRO/DX INJ SAME DRUG ADON: CPT

## 2018-06-01 PROCEDURE — 93005 ELECTROCARDIOGRAM TRACING: CPT

## 2018-06-01 PROCEDURE — 84484 ASSAY OF TROPONIN QUANT: CPT | Performed by: EMERGENCY MEDICINE

## 2018-06-01 PROCEDURE — 71250 CT THORAX DX C-: CPT

## 2018-06-01 PROCEDURE — 85610 PROTHROMBIN TIME: CPT | Performed by: EMERGENCY MEDICINE

## 2018-06-01 PROCEDURE — 85025 COMPLETE CBC W/AUTO DIFF WBC: CPT | Performed by: EMERGENCY MEDICINE

## 2018-06-01 PROCEDURE — 99285 EMERGENCY DEPT VISIT HI MDM: CPT

## 2018-06-01 PROCEDURE — 94664 DEMO&/EVAL PT USE INHALER: CPT

## 2018-06-01 PROCEDURE — 85730 THROMBOPLASTIN TIME PARTIAL: CPT | Performed by: EMERGENCY MEDICINE

## 2018-06-01 PROCEDURE — 93306 TTE W/DOPPLER COMPLETE: CPT

## 2018-06-01 PROCEDURE — 83880 ASSAY OF NATRIURETIC PEPTIDE: CPT | Performed by: EMERGENCY MEDICINE

## 2018-06-01 PROCEDURE — 80053 COMPREHEN METABOLIC PANEL: CPT | Performed by: EMERGENCY MEDICINE

## 2018-06-01 PROCEDURE — 96374 THER/PROPH/DIAG INJ IV PUSH: CPT

## 2018-06-01 PROCEDURE — 93010 ELECTROCARDIOGRAM REPORT: CPT | Performed by: INTERNAL MEDICINE

## 2018-06-01 PROCEDURE — 84484 ASSAY OF TROPONIN QUANT: CPT

## 2018-06-01 PROCEDURE — 84484 ASSAY OF TROPONIN QUANT: CPT | Performed by: INTERNAL MEDICINE

## 2018-06-01 PROCEDURE — 36415 COLL VENOUS BLD VENIPUNCTURE: CPT

## 2018-06-01 PROCEDURE — 99223 1ST HOSP IP/OBS HIGH 75: CPT | Performed by: INTERNAL MEDICINE

## 2018-06-01 RX ORDER — ZOLPIDEM TARTRATE 5 MG/1
5 TABLET ORAL
Status: DISCONTINUED | OUTPATIENT
Start: 2018-06-01 | End: 2018-06-02 | Stop reason: HOSPADM

## 2018-06-01 RX ORDER — LEVOTHYROXINE SODIUM 0.07 MG/1
75 TABLET ORAL
Status: DISCONTINUED | OUTPATIENT
Start: 2018-06-02 | End: 2018-06-02 | Stop reason: HOSPADM

## 2018-06-01 RX ORDER — KETOROLAC TROMETHAMINE 30 MG/ML
15 INJECTION, SOLUTION INTRAMUSCULAR; INTRAVENOUS ONCE
Status: COMPLETED | OUTPATIENT
Start: 2018-06-01 | End: 2018-06-01

## 2018-06-01 RX ORDER — ALBUTEROL SULFATE 90 UG/1
2 AEROSOL, METERED RESPIRATORY (INHALATION) EVERY 6 HOURS PRN
Status: DISCONTINUED | OUTPATIENT
Start: 2018-06-01 | End: 2018-06-01 | Stop reason: SDUPTHER

## 2018-06-01 RX ORDER — ASPIRIN 325 MG
325 TABLET ORAL DAILY
Status: DISCONTINUED | OUTPATIENT
Start: 2018-06-02 | End: 2018-06-02 | Stop reason: HOSPADM

## 2018-06-01 RX ORDER — METOPROLOL SUCCINATE 25 MG/1
25 TABLET, EXTENDED RELEASE ORAL DAILY
Status: DISCONTINUED | OUTPATIENT
Start: 2018-06-02 | End: 2018-06-02 | Stop reason: HOSPADM

## 2018-06-01 RX ORDER — ASPIRIN 81 MG/1
81 TABLET, CHEWABLE ORAL ONCE
Status: DISCONTINUED | OUTPATIENT
Start: 2018-06-01 | End: 2018-06-02 | Stop reason: HOSPADM

## 2018-06-01 RX ORDER — SODIUM CHLORIDE 9 MG/ML
75 INJECTION, SOLUTION INTRAVENOUS CONTINUOUS
Status: DISCONTINUED | OUTPATIENT
Start: 2018-06-01 | End: 2018-06-02 | Stop reason: HOSPADM

## 2018-06-01 RX ORDER — ACETAMINOPHEN 325 MG/1
650 TABLET ORAL ONCE
Status: COMPLETED | OUTPATIENT
Start: 2018-06-01 | End: 2018-06-01

## 2018-06-01 RX ORDER — FENTANYL CITRATE 50 UG/ML
25 INJECTION, SOLUTION INTRAMUSCULAR; INTRAVENOUS ONCE
Status: COMPLETED | OUTPATIENT
Start: 2018-06-01 | End: 2018-06-01

## 2018-06-01 RX ORDER — ATENOLOL 25 MG/1
25 TABLET ORAL DAILY
Status: DISCONTINUED | OUTPATIENT
Start: 2018-06-02 | End: 2018-06-02 | Stop reason: HOSPADM

## 2018-06-01 RX ORDER — ALBUTEROL SULFATE 90 UG/1
2 AEROSOL, METERED RESPIRATORY (INHALATION) EVERY 4 HOURS PRN
Status: DISCONTINUED | OUTPATIENT
Start: 2018-06-01 | End: 2018-06-02 | Stop reason: HOSPADM

## 2018-06-01 RX ORDER — SODIUM CHLORIDE 9 MG/ML
125 INJECTION, SOLUTION INTRAVENOUS CONTINUOUS
Status: DISCONTINUED | OUTPATIENT
Start: 2018-06-01 | End: 2018-06-01

## 2018-06-01 RX ORDER — HEPARIN SODIUM 5000 [USP'U]/ML
5000 INJECTION, SOLUTION INTRAVENOUS; SUBCUTANEOUS EVERY 8 HOURS SCHEDULED
Status: DISCONTINUED | OUTPATIENT
Start: 2018-06-01 | End: 2018-06-02 | Stop reason: HOSPADM

## 2018-06-01 RX ORDER — ONDANSETRON 2 MG/ML
4 INJECTION INTRAMUSCULAR; INTRAVENOUS EVERY 6 HOURS PRN
Status: DISCONTINUED | OUTPATIENT
Start: 2018-06-01 | End: 2018-06-02 | Stop reason: HOSPADM

## 2018-06-01 RX ORDER — IPRATROPIUM BROMIDE AND ALBUTEROL SULFATE 2.5; .5 MG/3ML; MG/3ML
3 SOLUTION RESPIRATORY (INHALATION) ONCE
Status: COMPLETED | OUTPATIENT
Start: 2018-06-01 | End: 2018-06-01

## 2018-06-01 RX ORDER — LORAZEPAM 0.5 MG/1
0.5 TABLET ORAL EVERY 8 HOURS PRN
Status: DISCONTINUED | OUTPATIENT
Start: 2018-06-01 | End: 2018-06-02 | Stop reason: HOSPADM

## 2018-06-01 RX ORDER — PANTOPRAZOLE SODIUM 40 MG/1
40 TABLET, DELAYED RELEASE ORAL
Status: DISCONTINUED | OUTPATIENT
Start: 2018-06-01 | End: 2018-06-02 | Stop reason: HOSPADM

## 2018-06-01 RX ORDER — SODIUM CHLORIDE FOR INHALATION 0.9 %
VIAL, NEBULIZER (ML) INHALATION
Status: COMPLETED
Start: 2018-06-01 | End: 2018-06-01

## 2018-06-01 RX ORDER — NITROGLYCERIN 0.4 MG/1
0.4 TABLET SUBLINGUAL
Status: DISCONTINUED | OUTPATIENT
Start: 2018-06-01 | End: 2018-06-02 | Stop reason: HOSPADM

## 2018-06-01 RX ADMIN — FENTANYL CITRATE 25 MCG: 50 INJECTION, SOLUTION INTRAMUSCULAR; INTRAVENOUS at 16:19

## 2018-06-01 RX ADMIN — FENTANYL CITRATE 25 MCG: 50 INJECTION, SOLUTION INTRAMUSCULAR; INTRAVENOUS at 15:15

## 2018-06-01 RX ADMIN — SODIUM CHLORIDE 75 ML/HR: 0.9 INJECTION, SOLUTION INTRAVENOUS at 18:24

## 2018-06-01 RX ADMIN — KETOROLAC TROMETHAMINE 15 MG: 30 INJECTION, SOLUTION INTRAMUSCULAR; INTRAVENOUS at 18:27

## 2018-06-01 RX ADMIN — IPRATROPIUM BROMIDE AND ALBUTEROL SULFATE 3 ML: 2.5; .5 SOLUTION RESPIRATORY (INHALATION) at 13:12

## 2018-06-01 RX ADMIN — SODIUM CHLORIDE 125 ML/HR: 0.9 INJECTION, SOLUTION INTRAVENOUS at 13:45

## 2018-06-01 RX ADMIN — ACETAMINOPHEN 650 MG: 325 TABLET, FILM COATED ORAL at 14:02

## 2018-06-01 RX ADMIN — HEPARIN SODIUM 5000 UNITS: 5000 INJECTION, SOLUTION INTRAVENOUS; SUBCUTANEOUS at 21:25

## 2018-06-01 RX ADMIN — NITROGLYCERIN 0.5 INCH: 20 OINTMENT TOPICAL at 13:09

## 2018-06-01 RX ADMIN — FENTANYL CITRATE 25 MCG: 50 INJECTION INTRAMUSCULAR; INTRAVENOUS at 23:51

## 2018-06-01 RX ADMIN — ISODIUM CHLORIDE 3 ML: 0.03 SOLUTION RESPIRATORY (INHALATION) at 13:12

## 2018-06-01 NOTE — H&P
History and Physical  Trisha Mercer 80 y o  male MRN: [id number]  Unit/Bed#: ED 05 Encounter: [id number]    Admitting Diagnosis:   Principal Problem:    Unstable angina ([address] )  Active Problems:    CAD (coronary artery disease)    HTN (hypertension)    CKD (chronic kidney disease), stage III    Anxiety disorder due to general medical condition    Benign prostatic hyperplasia with lower urinary tract symptoms    GERD (gastroesophageal reflux disease)    Chronic obstructive pulmonary disease (HCC)    Hyperlipidemia    Hypothyroidism  Resolved Problems:    * No resolved hospital problems  *      Plan:  Admit to med surgical floor on telemetry  · Unstable angina rule out ACS  Monitor on telemetry  Serial EKG, troponin and echocardiogram   Cardiology consult  Lipid profile  Continue on aspirin, atenolol, Toprol-XL  Patient is allergic to statins  Nitro sublingual p r n  Gentle IV fluids  · Chronic kidney disease stage 3-stable  Monitor renal function  · Hypothyroidism-on Synthroid  · COPD without exacerbation  Continue on bronchodilators  · Anxiety disorder-on p r n  Ativan  · GI/DVT prophylaxis  · Discussed with patient in detail  Anticipated length of stay greater than 2 midnights  Chief Complaint   Patient presents with    Chest Pain     Patient reports pressure acorss entire chest that woke him from sleepo at 0200  reports that it has been continuous  Denies that pain radiates  Reports pain is 10/10  Denies n/v, diaphoresis  Admits to increased SOB        HPI:  Trisha Mercer is a 80 y o  male who presented to the emergency department with complain of chest pain  Patient states that he told chest pain which woke him up at 8:00 p m  a m  His chest pain is substernal in location, 10/10 intensity, sharp, constant, was radiating into his neck this morning  Denies any associated nausea, vomiting, diaphoresis, palpitation, shortness of breath or any other complaints    Patient has history of coronary artery disease status post MI  He is a former smoker  In ER patient complained of chest pain but stated it is not radiating anymore  Patient was given aspirin, fentanyl, nitro patch in the ER but chest pain did not resolve or improve  At this time of my interview patient still complains of chest pain which is 8/10 in intensity, sharp, constant, nonradiating  He did tell this writer that he went bowling yesterday  Denies any fever, chills, abdominal pain, nausea, vomiting, diarrhea, dizziness or any other complaints  Patient had CT chest done in ER which showed some element of pulmonary artery hypertension      Historical Information   Past Medical History:   Diagnosis Date    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    Compression fracture of thoracic vertebra (HCC)     last assessed 05/07/2012    COPD [facility]chronic obstructive [facility] disease) [facility]     Disease of thyroid gland     Heart attack ([facility] )     Hollenhorst plaque, right eye     last assessed 04/08/2013    Hyperlipidemia     Hypertension     Iron deficiency anemia due to chronic blood loss     last assessed 09/10/2012    [facility]     last assessed 05/27/2014    Osteoarthritis of both hands     last assessedn 04/30/2013    Peptic ulcer     last assessed 06/14/2013    Polymyalgia rheumatica ([facility] )     last assessesd 10/09/2012    Renal disorder     Upper GI hemorrhage     last assessed 01/29/2015    Varicose veins of lower extremity     last assessed 04/26/2013     Past Surgical History:   Procedure Laterality Date    CORONARY ARTERY BYPASS GRAFT      HEMORRHOID SURGERY      HERNIA REPAIR      RENAL CYST EXCISION      resolved 05/2010    THROMBOENDARTERECTOMY Right     carotid, last assessed 06/18/2013     Social History   History   Alcohol Use No     Comment: Social Drinker per [employer]     History   Drug Use No     History   Smoking Status    Former Smoker   Smokeless Tobacco    Never Used     Family History   Problem Relation Age of Onset    Hypertension Mother     Alcohol abuse Mother     Hypertension Father     Alcohol abuse Father     Alcohol abuse Son     Heart disease Family     Stroke Family      syndrome       Meds/Allergies   Allergies   Allergen Reactions    Pravastatin Anaphylaxis, Photosensitivity and Headache     Reaction Date: [id number]; Other reaction(s): Headache    Acetaminophen-Codeine GI Intolerance    Atorvastatin      Other reaction(s): Leg Cramps  Other reaction(s): Leg Cramps    Codeine Nausea Only    Colestipol GI Intolerance     Reaction Date: [id number];     Contrast  [Iodinated Diagnostic Agents]     Fenofibrate GI Intolerance     Reaction Date: [id number];     Hydrocodone Vomiting    Niacin      Reaction Date: [id number];     Niaspan  [Niacin Er]     Pitavastatin Myalgia    Pneumococcal Polysaccharide Vaccine     Pravastatin Sodium     Simvastatin     Statins Myalgia    Vicoprofen  [Hydrocodone-Ibuprofen] GI Intolerance    Cyclophosphamide Rash    Ioversol Hives       Meds:    Current Facility-Administered Medications:     aspirin chewable tablet 81 mg, 81 mg, Oral, Once, Luis Daniel Harrington DO    sodium chloride 0 9 % infusion, 125 mL/hr, Intravenous, Continuous, Luis Daniel Rhymstu, DO, Last Rate: 125 mL/hr at 06/01/18 1345, 125 mL/hr at 06/01/18 1345    Current Outpatient Prescriptions:     albuterol (VENTOLIN HFA) 90 mcg/act inhaler, Inhale 2 puffs every 6 (six) hours as needed for wheezing, Disp: 1 Inhaler, Rfl: 1    atenolol (TENORMIN) 50 mg tablet, 25 mg Atenolol 50 MG Oral Tablet take 1/2 tablet by mouth once daily  Quantity: 15;   Refills: 5    Neo Parker MD;  Started 12-Dec-2012 Active , Disp: , Rfl:     B Complex Vitamins (B COMPLEX-B12 PO), Take 1 tablet by mouth daily, Disp: , Rfl:     Cholecalciferol (CVS VITAMIN D) 2000 units CAPS, Take by mouth, Disp: , Rfl:     Ferrous Sulfate (IRON) 325 (65 Fe) MG TABS, Take by mouth, Disp: , Rfl:     fluticasone furoate-vilanterol (BREO ELLIPTA), Inhale daily, Disp: , Rfl:     guaiFENesin-codeine (ROBITUSSIN AC) 100-10 mg/5 mL oral solution, Take 5 mL by mouth 3 (three) times a day as needed for cough, Disp: 118 mL, Rfl: 0    Krill Oil 1000 MG CAPS, Take by mouth, Disp: , Rfl:     levothyroxine 75 mcg tablet, Levothyroxine Sodium 75 MCG Oral Tablet TAKE 1/2 TALBET IN THE MORNING DAILY  Quantity: 30;  Refills: 5    Lon Lara MD;  Started 12-Dec-2012 Active, Disp: , Rfl:     LORazepam (ATIVAN) 0 5 mg tablet, TAKE ONE TABLET BY MOUTH EVERY 8 HOURS AS NEEDED FOR ANXIETY, Disp: 90 tablet, Rfl: 3    menthol-zinc oxide (CALMOSPETINE) 0 44-20 625 %, Apply topically, Disp: , Rfl:     metoprolol succinate (TOPROL-XL) 25 mg 24 hr tablet, TAKE ONE TABLET BY MOUTH EVERY DAY, Disp: 30 tablet, Rfl: 5    Multiple Vitamins-Minerals (VISION FORMULA/LUTEIN PO), Take by mouth, Disp: , Rfl:     naproxen sodium (ALEVE) 220 MG tablet, Take 1 tablet by mouth, Disp: , Rfl:     nystatin-triamcinolone (MYCOLOG-II) cream, Apply topically, Disp: , Rfl:     omeprazole (PriLOSEC) 20 mg delayed release capsule, Take 20 mg by mouth daily, Disp: , Rfl:     zolpidem (AMBIEN) 5 mg tablet, Take 1 tablet (5 mg total) by mouth daily at bedtime as needed for sleep, Disp: 30 tablet, Rfl: 5      (Not in a hospital admission)      Review of Systems   Constitutional: Positive for activity change and fatigue  HENT: Negative  Eyes: Negative  Respiratory: Negative  Cardiovascular: Positive for chest pain  Gastrointestinal: Negative  Endocrine: Negative  Genitourinary: Negative  Musculoskeletal: Negative  Skin: Negative  Allergic/Immunologic: Negative  Neurological: Negative  Hematological: Negative  Psychiatric/Behavioral: Negative          Current Vitals:   Blood Pressure: 161/73 (06/01/18 1600)  Pulse: (!) 107 (06/01/18 1600)  Temperature: 97 9 °F (36 6 °C) (06/01/18 1232)  Temp Source: Temporal (06/01/18 1232)  Respirations: 16 (06/01/18 1600)  Height: 5' 6" (167 6 cm) (06/01/18 1232)  Weight - Scale: 75 3 kg (166 lb 0 1 oz) (06/01/18 1232)  SpO2: 95 % (06/01/18 1545)  SPO2 RA Rest      ED from 6/1/2018 in [facility]   SpO2  95 %   SpO2 Activity  At Rest   O2 Device  None (Room air)   O2 Flow Rate            Intake/Output Summary (Last 24 hours) at 06/01/18 1723  Last data filed at 06/01/18 1504   Gross per 24 hour   Intake                0 ml   Output              225 ml   Net             -225 ml     Body mass index is 26 79 kg/m²  Physical Exam   Constitutional: He is oriented to person, place, and time  He appears well-developed and well-nourished  No distress  HENT:   Head: Normocephalic and atraumatic  Eyes: Conjunctivae and EOM are normal  Pupils are equal, round, and reactive to light  No scleral icterus  Neck: Normal range of motion  Neck supple  No JVD present  Cardiovascular: Normal rate, regular rhythm, normal heart sounds and intact distal pulses  Pulmonary/Chest: Effort normal and breath sounds normal  He has no wheezes  He has no rales  Abdominal: Soft  Bowel sounds are normal  There is no tenderness  There is no rebound and no guarding  Musculoskeletal: Normal range of motion  He exhibits no edema, tenderness or deformity  Neurological: He is alert and oriented to person, place, and time  No cranial nerve deficit  No focal deficits   Skin: Skin is warm  No rash noted  No erythema  Psychiatric: He has a normal mood and affect  His behavior is normal  Judgment normal    Nursing note and vitals reviewed        Lab Results:   CBC:   Lab Results   Component Value Date    WBC 8 73 06/01/2018    HGB 14 9 06/01/2018    HCT 44 7 06/01/2018    MCV 91 06/01/2018     (L) 06/01/2018    MCH 30 5 06/01/2018    MCHC 33 3 06/01/2018    RDW 13 9 06/01/2018    MPV 9 3 06/01/2018     CMP:  Lab Results   Component Value Date    NA 137 06/01/2018     08/28/2015     06/01/2018     08/28/2015    CO2 25 06/01/2018    CO2 28 4 08/28/2015    ANIONGAP 11 06/01/2018    ANIONGAP 10 08/28/2015    BUN 36 (H) 06/01/2018    BUN 28 (H) 08/28/2015    CREATININE 1 72 (H) 06/01/2018    CREATININE 1 79 (H) 08/28/2015    GLUCOSE 120 06/01/2018    GLUCOSE 92 08/28/2015    CALCIUM 9 4 06/01/2018    CALCIUM 9 7 08/28/2015    AST 24 06/01/2018    AST 18 08/28/2015    ALT 26 06/01/2018    ALT 27 08/28/2015    ALKPHOS 81 06/01/2018    ALKPHOS 73 08/28/2015    PROT 8 2 06/01/2018    PROT 7 7 08/28/2015    BILITOT 0 70 06/01/2018    BILITOT 0 45 08/28/2015    EGFR 35 06/01/2018     Lab Results   Component Value Date    TROPONINI <0 02 06/01/2018    TROPONINI <0 04 01/21/2015    CKTOTAL 61 05/26/2017    CKTOTAL 75 04/06/2014     Coagulation:   Lab Results   Component Value Date    INR 1 07 06/01/2018    INR 1 05 08/28/2015    Urinalysis:  Lab Results   Component Value Date    COLORU Yellow 03/18/2018    COLORU Yellow 03/14/2015    CLARITYU Clear 03/18/2018    CLARITYU Clear 03/14/2015    SPECGRAV 1 020 03/18/2018    SPECGRAV <=1 005 03/14/2015    PHUR 6 0 03/18/2018    PHUR 6 0 03/14/2015    LEUKOCYTESUR Negative 03/18/2018    LEUKOCYTESUR Negative 03/14/2015    NITRITE Negative 03/18/2018    NITRITE Negative 03/14/2015    PROTEINUA Trace (A) 03/18/2018    PROTEINUA Negative 03/14/2015    GLUCOSEU Negative 03/18/2018    GLUCOSEU Negative 03/14/2015    KETONESU Negative 03/18/2018    KETONESU Negative 03/14/2015    BILIRUBINUR Negative 03/18/2018    BILIRUBINUR Negative 03/14/2015    BLOODU Negative 03/18/2018    BLOODU Negative 03/14/2015      Amylase: No results found for: AMYLASE  Lipase:   Lab Results   Component Value Date    LIPASE 225 05/14/2016    LIPASE 250 03/14/2015        Imaging: X-ray Chest 2 Views    Result Date: 6/1/2018  Narrative: CHEST INDICATION:   chest pain  Shortness of breath  COMPARISON:  March 18, 2018  February 15, 2017  EXAM PERFORMED/VIEWS:  XR CHEST PA & LATERAL FINDINGS: Heart shadow appears unremarkable  Atherosclerotic vascular calcifications are noted  Subsegmental atelectatic changes are noted in the anterior right midlung  The lungs are otherwise clear  No pneumothorax or pleural effusion  Osseous structures are osteopenic  Mild anterior wedge compression deformity of lower thoracic vertebral body is noted, unchanged from March 18, 2018  Impression: No acute cardiopulmonary disease  Workstation performed: [id number]     Ct Chest Without Contrast    Result Date: 6/1/2018  Narrative: CT CHEST WITHOUT IV CONTRAST INDICATION:   Lung nodules  Chest pain and shortness of breath  Patient is allergic to intravenous contrast  COMPARISON: Lili 15, 2017  TECHNIQUE: CT examination of the chest was performed without intravenous contrast   Axial, sagittal, and coronal 2D reformatted images were created from the source data and submitted for interpretation  Radiation dose length product (DLP) for this visit:  293 43 mGy-cm   This examination, like all CT scans performed in the [facility], was performed utilizing techniques to minimize radiation dose exposure, including the use of iterative  reconstruction and automated exposure control  FINDINGS: LUNGS: Again noted are a 1 6 cm faint groundglass nodule in the right middle lobe and a subtle groundglass density with lucent center measuring 10 mm in the superior segment right lower lobe, both visible on image 28 of series 2, both unchanged from prior examination, and both demonstrating no associated solid tissue  Again noted is linear atelectasis or scarring in the right upper lobe  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 measuring up to 15 mm is increased from 13 mm on the prior examination  Mild centrilobular emphysematous changes bilaterally in the upper lobes  No new or suspicious pulmonary mass  No focal airspace opacity to suggest pneumonia  There is no tracheal or endobronchial lesion  PLEURA:  Unremarkable  HEART/GREAT VESSELS:  Normal heart size  Coronary artery calcifications noted  Thoracic aorta within normal limits in caliber, with diffuse atherosclerotic calcifications  Prominence of central pulmonary arterial tree suggests some element of pulmonary arterial hypertension  MEDIASTINUM AND JOSE C:  Unremarkable  CHEST WALL AND LOWER NECK:  Unremarkable  VISUALIZED STRUCTURES IN THE UPPER ABDOMEN:  Partially imaged bilateral renal cysts, and several tiny calcified granulomas in the enlarged spleen again identified  OSSEOUS STRUCTURES:  No acute fracture  No destructive osseous lesion  Stable chronic wedge deformities of T7 and T12  Impression: No acute noncontrast CT abnormality in the chest to account for the patient's shortness of breath  Chronic findings which are unchanged from prior examinations include emphysematous changes as well as prominence of central pulmonary arterial tree suggesting some element of pulmonary artery hypertension  Coronary artery calcifications and cardiomegaly  are noted  Unchanged groundglass right mid chest pulmonary nodules are reidentified  Additional follow-up chest CT in 2 years is recommended for these groundglass nodules without associated solid tissue, similar at least as far back as 2015  Renal cysts, splenomegaly, and compression deformities, similar from prior examination  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, currently measuring 15 mm in greatest dimension increased from 13 mm in June 2017  This demonstrated vascular enhancement on abdomen CT of April 23, 2010 when this measured only 9 mm  Workstation performed: [id number]     EKG, Pathology, and Other Studies: I have personally reviewed the results    VTE Pharmacologic Prophylaxis: Heparin  VTE Mechanical Prophylaxis: sequential compression device    Code Status: Prior    Counseling / Coordination of Care  Total floor / unit time spent today 75 minutes  Greater than 50% of total time was spent with the patient and / or family counseling and / or coordination of care       "This note has been constructed using a voice recognition system"      Padmini Lemus MD  6/1/2018, 5:23 PM

## 2018-06-01 NOTE — ED NOTES
Patient reports that CP remains the same  Denies any improvement  Will continue to monitor        Charity Harley, BRADEN  06/01/18 [id number]

## 2018-06-01 NOTE — ED NOTES
Patient reports that he is not allergic tylenol just the tylenol with codeine        Rosalva Lanes, RN  06/01/18 4620

## 2018-06-01 NOTE — ED NOTES
Physician notified that chest pain continues   Awaiting physician orders     Dagoberto Graner, RN  06/01/18 2245

## 2018-06-01 NOTE — ED PROVIDER NOTES
History  Chief Complaint   Patient presents with    Chest Pain     Patient reports pressure acorss entire chest that woke him from sleepo at 0200  reports that it has been continuous  Denies that pain radiates  Reports pain is 10/10  Denies n/v, diaphoresis  Admits to increased SOB      This is an 60-year-old male who presents with substernal chest pressure that radiates into his neck since this morning associated with shortness of breath  Denies any nausea vomiting  He does have a history myocardial infarction in the past he also has hypertension hypercholesterolemia  and is a former smoker  History provided by:  Patient  Chest Pain   Pain location:  Substernal area  Pain quality: pressure    Pain radiates to:  Neck  Pain radiates to the back: no    Pain severity:  Moderate  Onset quality:  Unable to specify  Duration: This an  Timing:  Constant  Progression:  Unchanged  Chronicity:  New  Relieved by:  Nothing  Worsened by:  Exertion  Associated symptoms: shortness of breath    Risk factors: coronary artery disease, high cholesterol and hypertension    Risk factors: no smoking        Prior to Admission Medications   Prescriptions Last Dose Informant Patient Reported? Taking?    B Complex Vitamins (B COMPLEX-B12 PO)   Yes No   Sig: Take 1 tablet by mouth daily   Cholecalciferol ([employer] VITAMIN D) 2000 units CAPS   Yes No   Sig: Take by mouth   Ferrous Sulfate (IRON) 325 (65 Fe) MG TABS   Yes No   Sig: Take by mouth   Krill Oil 1000 MG CAPS   Yes No   Sig: Take by mouth   LORazepam (ATIVAN) 0 5 mg tablet   No No   Sig: TAKE ONE TABLET BY MOUTH EVERY 8 HOURS AS NEEDED FOR ANXIETY   Multiple Vitamins-Minerals (VISION FORMULA/LUTEIN PO)   Yes No   Sig: Take by mouth   albuterol (VENTOLIN HFA) 90 mcg/act inhaler   No No   Sig: Inhale 2 puffs every 6 (six) hours as needed for wheezing   atenolol (TENORMIN) 50 mg tablet   Yes No   Si mg Atenolol 50 MG Oral Tablet take 1/2 tablet by mouth once daily  Quantity: 15;  Refills: 5    Crescencio Perez MD;  Started 12-Dec-2012 Active    fluticasone furoate-vilanterol (BREO ELLIPTA)   Yes No   Sig: Inhale daily   guaiFENesin-codeine (ROBITUSSIN AC) 100-10 mg/5 mL oral solution   No No   Sig: Take 5 mL by mouth 3 (three) times a day as needed for cough   levothyroxine 75 mcg tablet   Yes No   Sig: Levothyroxine Sodium 75 MCG Oral Tablet TAKE 1/2 TALBET IN THE MORNING DAILY  Quantity: 30;  Refills: 5    Crescencio Perez MD;  Started 12-Dec-2012 Active   menthol-zinc oxide (CALMOSPETINE) 0 44-20 625 %   Yes No   Sig: Apply topically   metoprolol succinate (TOPROL-XL) 25 mg 24 hr tablet   No No   Sig: TAKE ONE TABLET BY MOUTH EVERY DAY   naproxen sodium (ALEVE) 220 MG tablet   Yes No   Sig: Take 1 tablet by mouth   nystatin-triamcinolone (MYCOLOG-II) cream   Yes No   Sig: Apply topically   omeprazole (PriLOSEC) 20 mg delayed release capsule   Yes No   Sig: Take 20 mg by mouth daily   zolpidem (AMBIEN) 5 mg tablet   No No   Sig: Take 1 tablet (5 mg total) by mouth daily at bedtime as needed for sleep      Facility-Administered Medications: None       Past Medical History:   Diagnosis Date    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    Compression fracture of thoracic vertebra (HCC)     last assessed 05/07/2012    COPD (chronic obstructive pulmonary disease) (HCC)     Disease of thyroid gland     [facility] [address] [facility]     Hollenhorst plaque, right eye     last assessed 04/08/2013    Hyperlipidemia     Hypertension     Iron deficiency anemia due to chronic blood loss     last assessed 09/10/2012    Ischemic colitis ([address] )     last assessed 05/27/2014    Osteoarthritis of both hands     last assessedn 04/30/2013    Peptic ulcer     last assessed 06/14/2013    Polymyalgia rheumatica ([facility] [address] )     last assessesd 10/09/2012    Renal disorder     Upper GI hemorrhage     last assessed 01/29/2015    Varicose veins of lower extremity     last assessed 04/26/2013 Past Surgical History:   Procedure Laterality Date    CORONARY ARTERY BYPASS GRAFT      HEMORRHOID SURGERY      HERNIA REPAIR      RENAL CYST EXCISION      resolved 05/2010    THROMBOENDARTERECTOMY Right     carotid, last assessed 06/18/2013       Family History   Problem Relation Age of Onset    Hypertension Mother     Alcohol abuse Mother     Hypertension Father     Alcohol abuse Father     Alcohol abuse Son     Heart disease Family     Stroke Family      syndrome     I have reviewed and agree with the history as documented  Social History   Substance Use Topics    Smoking status: Former Smoker    Smokeless tobacco: Never Used    Alcohol use No      Comment: Social Drinker per [employer]        Review of Systems   Respiratory: Positive for shortness of breath  Cardiovascular: Positive for chest pain  All other systems reviewed and are negative  Physical Exam  Physical Exam   Constitutional: He is oriented to person, place, and time  He appears well-developed and well-nourished  No distress  HENT:   Head: Normocephalic and atraumatic  Right Ear: External ear normal    Left Ear: External ear normal    Nose: Nose normal    Mouth/Throat: Oropharynx is clear and moist    Eyes: EOM are normal  Pupils are equal, round, and reactive to light  No scleral icterus  Neck: Normal range of motion  Neck supple  No tracheal deviation present  Cardiovascular: Normal rate and regular rhythm  Exam reveals no gallop and no friction rub  No murmur heard  Pulmonary/Chest: Effort normal  No stridor  No respiratory distress  He has wheezes  He has no rales  Abdominal: Soft  Bowel sounds are normal  He exhibits no distension  There is no tenderness  Musculoskeletal: Normal range of motion  He exhibits no edema, tenderness or deformity  Neurological: He is alert and oriented to person, place, and time  No cranial nerve deficit  Skin: Skin is warm and dry  He is not diaphoretic  Psychiatric: He has a normal mood and affect  His behavior is normal  Thought content normal    Nursing note and vitals reviewed        Vital Signs  ED Triage Vitals   Temperature Pulse Respirations Blood Pressure SpO2   06/01/18 1232 06/01/18 1232 06/01/18 1232 06/01/18 1315 06/01/18 1232   97 9 °F (36 6 °C) 97 22 127/72 98 %      Temp Source Heart Rate Source Patient Position - Orthostatic VS BP Location FiO2 (%)   06/01/18 1232 06/01/18 1232 06/01/18 1315 06/01/18 1315 --   Temporal Monitor Sitting Left arm       Pain Score       06/01/18 1235       Worst Possible Pain           Vitals:    06/01/18 1415 06/01/18 1518 06/01/18 1545 06/01/18 1600   BP: 131/70 138/73 135/69 161/73   Pulse: (!) 108 (!) 110 105 (!) 107   Patient Position - Orthostatic VS: Sitting Lying         Visual Acuity      ED Medications  Medications   aspirin chewable tablet 81 mg (81 mg Oral Not Given 6/1/18 1309)   sodium chloride 0 9 % infusion (125 mL/hr Intravenous New Bag 6/1/18 1345)   ipratropium-albuterol (DUO-NEB) 0 5-2 5 mg/3 mL inhalation solution 3 mL (3 mL Nebulization Given 6/1/18 1312)   nitroglycerin (NITRO-BID) 2 % TD ointment 0 5 inch (0 5 inches Topical Given 6/1/18 1309)   sodium chloride 0 9 % inhalation solution **AcuDose Override Pull** (3 mL  Given 6/1/18 1312)   acetaminophen (TYLENOL) tablet 650 mg (650 mg Oral Given 6/1/18 1402)   fentanyl citrate (PF) 100 MCG/2ML 25 mcg (25 mcg Intravenous Given 6/1/18 1515)   fentanyl citrate (PF) 100 MCG/2ML 25 mcg (25 mcg Intravenous Given 6/1/18 1619)       Diagnostic Studies  Results Reviewed     Procedure Component Value Units Date/Time    POCT troponin [[id number]] Collected:  06/01/18 1558    Lab Status:  Final result Updated:  06/01/18 1611     POC Troponin I 0 00 ng/ml      Specimen Type VENOUS    Narrative:         [employer] i-Stat handheld analyzer 99% cutoff is > 0 08ng/mL in network Emergency Departments    o cTnI 99% cutoff is useful only when applied to patients in the clinical setting of myocardial ischemia  o cTnI 99% cutoff should be interpreted in the context of clinical history, ECG findings and possibly cardiac imaging to establish correct diagnosis  o cTnI 99% cutoff may be suggestive but clearly not indicative of a coronary event without the clinical setting of myocardial ischemia  Comprehensive metabolic panel [[id number]]  (Abnormal) Collected:  06/01/18 1237    Lab Status:  Final result Specimen:  Blood from Arm, Right Updated:  06/01/18 1435     Sodium 137 mmol/L      Potassium 4 2 mmol/L      Chloride 101 mmol/L      CO2 25 mmol/L      Anion Gap 11 mmol/L      BUN 36 (H) mg/dL      Creatinine 1 72 (H) mg/dL      Glucose 120 mg/dL      Calcium 9 4 mg/dL      AST 24 U/L      ALT 26 U/L      Alkaline Phosphatase 81 U/L      Total Protein 8 2 g/dL      Albumin 3 9 g/dL      Total Bilirubin 0 70 mg/dL      eGFR 35 ml/min/1 73sq m     Narrative:         National Kidney Disease Education Program recommendations are as follows:  GFR calculation is accurate only with a steady state creatinine  Chronic Kidney disease less than 60 ml/min/1 73 sq  meters  Kidney failure less than 15 ml/min/1 73 sq  meters  B-type natriuretic peptide [[id number]]  (Normal) Collected:  06/01/18 1237    Lab Status:  Final result Specimen:  Blood from Arm, Right Updated:  06/01/18 1322     NT-proBNP 201 pg/mL     Troponin I [[id number]]  (Normal) Collected:  06/01/18 1237    Lab Status:  Final result Specimen:  Blood from Arm, Right Updated:  06/01/18 1316     Troponin I <0 02 ng/mL     Narrative:         [employer] Chemistry analyzer 99% cutoff is > 0 04 ng/mL in network labs    o cTnI 99% cutoff is useful only when applied to patients in the clinical setting of myocardial ischemia  o cTnI 99% cutoff should be interpreted in the context of clinical history, ECG findings and possibly cardiac imaging to establish correct diagnosis    o cTnI 99% cutoff may be suggestive but clearly not indicative of a coronary event without the clinical setting of myocardial ischemia  Protime-INR [[id number]]  (Normal) Collected:  06/01/18 1237    Lab Status:  Final result Specimen:  Blood from Arm, Right Updated:  06/01/18 1309     Protime 13 3 seconds      INR 1 07    APTT [[id number]]  (Normal) Collected:  06/01/18 1237    Lab Status:  Final result Specimen:  Blood from Arm, Right Updated:  06/01/18 1309     PTT 36 seconds     CBC and differential [[id number]]  (Abnormal) Collected:  06/01/18 1237    Lab Status:  Final result Specimen:  Blood from Arm, Right Updated:  06/01/18 1255     WBC 8 73 Thousand/uL      RBC 4 89 Million/uL      Hemoglobin 14 9 g/dL      Hematocrit 44 7 %      MCV 91 fL      MCH 30 5 pg      MCHC 33 3 g/dL      RDW 13 9 %      MPV 9 3 fL      Platelets 504 (L) Thousands/uL      Neutrophils Relative 67 %      Immat GRANS % 0 %      Lymphocytes Relative 17 %      Monocytes Relative 14 (H) %      Eosinophils Relative 2 %      Basophils Relative 0 %      Neutrophils Absolute 5 90 Thousands/µL      Immature Grans Absolute 0 02 Thousand/uL      Lymphocytes Absolute 1 46 Thousands/µL      Monocytes Absolute 1 20 Thousand/µL      Eosinophils Absolute 0 13 Thousand/µL      Basophils Absolute 0 02 Thousands/µL                  CT chest without contrast   Final Result by Nan Vargas MD (06/01 1526)      No acute noncontrast CT abnormality in the chest to account for the patient's shortness of breath  Chronic findings which are unchanged from prior examinations include emphysematous changes as well as prominence of central pulmonary arterial tree suggesting some element of pulmonary artery hypertension  Coronary artery calcifications and cardiomegaly    are noted  Unchanged groundglass right mid chest pulmonary nodules are reidentified  Additional follow-up chest CT in 2 years is recommended for these groundglass nodules without associated solid tissue, similar at least as far back as 2015        Renal cysts, splenomegaly, and compression deformities, similar from prior examination  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, currently measuring 15 mm in greatest dimension increased from 13 mm in June 2017  This demonstrated vascular enhancement on abdomen CT of April 23, 2010 when this    measured only 9 mm  Workstation performed: [id number]         X-ray chest 2 views   ED Interpretation by Juliann Chavez DO (06/01 1258)   No acute infiltrate or pneumothorax      Final Result by Elisa Mae MD (06/01 9699)      No acute cardiopulmonary disease  Workstation performed: [id number]                    Procedures  ECG 12 Lead Documentation  Date/Time: 6/1/2018 12:55 PM  Performed by: Rashi Rodgers by: Leno Littlejohn     ECG reviewed by me, the ED Provider: yes    Patient location:  ED  Rhythm:     Rhythm: sinus rhythm    Ectopy:     Ectopy: none    T waves:     T waves: normal             Phone Contacts  ED Phone Contact    ED Course  ED Course as of Jun 01 1632   Fri Jun 01, 2018   1537  Discussed case with Dr Laird Paget  He requests repeat troponin    1629  Repeat EKG and troponin are negative awaiting  return call from Internal Medicine patient and family aware that Cardiology may not be present in the hospital over the weekend  Patient and family were also informed that he needs a repeat CT scan follow-up in 2 years for the pulmonary nodule            HEART Risk Score      Most Recent Value   History  1 Filed at: 06/01/2018 1347   ECG  0 Filed at: 06/01/2018 1347   Age  2 Filed at: 06/01/2018 1347   Risk Factors  2 Filed at: 06/01/2018 1347   Troponin  0 Filed at: 06/01/2018 1347   Heart Score Risk Calculator   History  1 Filed at: 06/01/2018 1347   ECG  0 Filed at: 06/01/2018 1347   Age  2 Filed at: 06/01/2018 1347   Risk Factors  2 Filed at: 06/01/2018 1347   Troponin  0 Filed at: 06/01/2018 1347   HEART Score  5 Filed at: 06/01/2018 1347   HEART Score  5 Filed at: 06/01/2018 1347                            [facility]  Number of Diagnoses or Management Options  Diagnosis management comments:  Chest pain differential includes acute coronary syndrome versus pneumonia pneumothorax musculoskeletal pain  Low clinical suspicion for pulmonary embolism       Amount and/or Complexity of Data Reviewed  Clinical lab tests: ordered  Tests in the radiology section of CPT®: ordered      CritCare Time    Disposition  Final diagnoses:   Unstable angina ([address] )     Time reflects when diagnosis was documented in both MDM as applicable and the Disposition within this note     Time User Action Codes Description Comment    6/1/2018  4:31 PM Diaz Johnson [I20 [id number]] Unstable angina [facility])       ED Disposition     ED Disposition Condition Comment    Admit  Case was discussed with *Dr Maria Luisa Lacey** and the patient's admission status was agreed to be Admission Status: inpatient status to the service of Dr Maria Luisa Lacey   Follow-up Information    None         Patient's Medications   Discharge Prescriptions    No medications on file     No discharge procedures on file      ED Provider  Electronically Signed by           Ashlyn Amin DO  06/01/18 3804

## 2018-06-01 NOTE — PLAN OF CARE
Problem: PAIN - ADULT  Goal: Verbalizes/displays adequate comfort level or baseline comfort level  Interventions:  - Encourage patient to monitor pain and request assistance  - Assess pain using appropriate pain scale  - Administer analgesics based on type and severity of pain and evaluate response  - Implement non-pharmacological measures as appropriate and evaluate response  - Consider cultural and social influences on pain and pain management  - Notify physician/advanced practitioner if interventions unsuccessful or patient reports new pain  Outcome: Progressing      Problem: SAFETY ADULT  Goal: Patient will remain free of falls  INTERVENTIONS:  - Assess patient frequently for physical needs  -  Identify cognitive and physical deficits and behaviors that affect risk of falls    -  [place] fall precautions as indicated by assessment   - Educate patient/family on patient safety including physical limitations  - Instruct patient to call for assistance with activity based on assessment  - Modify environment to reduce risk of injury  - Consider OT/PT consult to assist with strengthening/mobility  Outcome: Progressing      Problem: DISCHARGE PLANNING  Goal: Discharge to home or other facility with appropriate resources  INTERVENTIONS:  - Identify barriers to discharge w/patient and caregiver  - Arrange for needed discharge resources and transportation as appropriate  - Identify discharge learning needs (meds, wound care, etc )  - Arrange for interpretive services to assist at discharge as needed  - Refer to Case Management Department for coordinating discharge planning if the patient needs post-hospital services based on physician/advanced practitioner order or complex needs related to functional status, cognitive ability, or social support system  Outcome: Progressing      Problem: Knowledge Deficit  Goal: Patient/family/caregiver demonstrates understanding of disease process, treatment plan, medications, and discharge instructions  Complete learning assessment and assess knowledge base  Interventions:  - Provide teaching at level of understanding  - Provide teaching via preferred learning methods  Outcome: Progressing      Problem: CARDIOVASCULAR - ADULT  Goal: Maintains optimal cardiac output and hemodynamic stability  INTERVENTIONS:  - Monitor I/O, vital signs and rhythm  - Monitor for S/S and trends of decreased cardiac output i e  bleeding, hypotension  - Administer and titrate ordered vasoactive medications to optimize hemodynamic stability  - Assess quality of pulses, skin color and temperature  - Assess for signs of decreased coronary artery perfusion - ex   Angina  - Instruct patient to report change in severity of symptoms  Outcome: Progressing    Goal: Absence of cardiac dysrhythmias or at baseline rhythm  INTERVENTIONS:  - Continuous cardiac monitoring, monitor vital signs, obtain 12 lead EKG if indicated  - Administer antiarrhythmic and heart rate control medications as ordered  - Monitor electrolytes and administer replacement therapy as ordered  Outcome: Progressing      Problem: METABOLIC, FLUID AND ELECTROLYTES - ADULT  Goal: Electrolytes maintained within normal limits  INTERVENTIONS:  - Monitor labs and assess patient for signs and symptoms of electrolyte imbalances  - Administer electrolyte replacement as ordered  - Monitor response to electrolyte replacements, including repeat lab results as appropriate  - Instruct patient on fluid and nutrition as appropriate  Outcome: Progressing      Problem: SKIN/TISSUE INTEGRITY - ADULT  Goal: Skin integrity remains intact  INTERVENTIONS  - Identify patients at risk for skin breakdown  - Assess and monitor skin integrity  - Assess and monitor nutrition and hydration status  - Monitor labs (i e  albumin)  - Assess for incontinence   - Turn and reposition patient  - Assist with mobility/ambulation  - Relieve pressure over bony prominences  - Avoid friction and shearing  - Provide appropriate hygiene as needed including keeping skin clean and dry  - Evaluate need for skin moisturizer/barrier cream  - Collaborate with interdisciplinary team (i e  Nutrition, Rehabilitation, etc )   - Patient/family teaching  Outcome: Progressing      Problem: MUSCULOSKELETAL - ADULT  Goal: Maintain or return mobility to safest level of function  INTERVENTIONS:  - Assess patient's ability to carry out ADLs; assess patient's baseline for ADL function and identify physical deficits which impact ability to perform ADLs (bathing, care of mouth/teeth, toileting, grooming, dressing, etc )  - Assess/evaluate cause of self-care deficits   - Assess range of motion  - Assess patient's mobility; develop plan if impaired  - Assess patient's need for assistive devices and provide as appropriate  - Encourage maximum independence but intervene and supervise when necessary  - Involve family in performance of ADLs  - Assess for home care needs following discharge   - Request OT consult to assist with ADL evaluation and planning for discharge  - Provide patient education as appropriate  Outcome: Progressing      Problem: Nutrition/Hydration-ADULT  Goal: Nutrient/Hydration intake appropriate for improving, restoring or maintaining nutritional needs  Monitor and assess patient's nutrition/hydration status for malnutrition (ex- brittle hair, bruises, dry skin, pale skin and conjunctiva, muscle wasting, smooth red tongue, and disorientation)  Collaborate with interdisciplinary team and initiate plan and interventions as ordered  Monitor patient's weight and dietary intake as ordered or per policy  Utilize nutrition screening tool and intervene per policy  Determine patient's food preferences and provide high-protein, high-caloric foods as appropriate       INTERVENTIONS:  - Monitor oral intake, urinary output, labs, and treatment plans  - Assess nutrition and hydration status and recommend course of action  - Evaluate amount of meals eaten  - Assist patient with eating if necessary   - Allow adequate time for meals  - Recommend/ encourage appropriate diets, oral nutritional supplements, and vitamin/mineral supplements  - Order, calculate, and assess calorie counts as needed  - Recommend, monitor, and adjust tube feedings and TPN/PPN based on assessed needs  - Assess need for intravenous fluids  - Provide specific nutrition/hydration education as appropriate  - Include patient/family/caregiver in decisions related to nutrition  Outcome: Progressing

## 2018-06-02 VITALS
TEMPERATURE: 98.7 F | HEART RATE: 89 BPM | OXYGEN SATURATION: 93 % | HEIGHT: 66 IN | WEIGHT: 168.65 LBS | SYSTOLIC BLOOD PRESSURE: 113 MMHG | RESPIRATION RATE: 18 BRPM | BODY MASS INDEX: 27.1 KG/M2 | DIASTOLIC BLOOD PRESSURE: 63 MMHG

## 2018-06-02 PROBLEM — I20.0 UNSTABLE ANGINA (HCC): Status: RESOLVED | Noted: 2018-06-01 | Resolved: 2018-06-02

## 2018-06-02 LAB
ALBUMIN SERPL BCP-MCNC: 3 G/DL (ref 3.5–5)
ALP SERPL-CCNC: 64 U/L (ref 46–116)
ALT SERPL W P-5'-P-CCNC: 16 U/L (ref 12–78)
ANION GAP SERPL CALCULATED.3IONS-SCNC: 10 MMOL/L (ref 4–13)
AST SERPL W P-5'-P-CCNC: 15 U/L (ref 5–45)
BASOPHILS # BLD AUTO: 0.03 THOUSANDS/ΜL (ref 0–0.1)
BASOPHILS NFR BLD AUTO: 0 % (ref 0–1)
BILIRUB SERPL-MCNC: 1 MG/DL (ref 0.2–1)
BUN SERPL-MCNC: 28 MG/DL (ref 5–25)
CALCIUM SERPL-MCNC: 8.5 MG/DL (ref 8.3–10.1)
CHLORIDE SERPL-SCNC: 105 MMOL/L (ref 100–108)
CHOLEST SERPL-MCNC: 165 MG/DL (ref 50–200)
CO2 SERPL-SCNC: 23 MMOL/L (ref 21–32)
CREAT SERPL-MCNC: 1.67 MG/DL (ref 0.6–1.3)
EOSINOPHIL # BLD AUTO: 0.1 THOUSAND/ΜL (ref 0–0.61)
EOSINOPHIL NFR BLD AUTO: 1 % (ref 0–6)
ERYTHROCYTE [DISTWIDTH] IN BLOOD BY AUTOMATED COUNT: 13.3 % (ref 11.6–15.1)
GFR SERPL CREATININE-BSD FRML MDRD: 37 ML/MIN/1.73SQ M
GLUCOSE SERPL-MCNC: 82 MG/DL (ref 65–140)
HCT VFR BLD AUTO: 37.6 % (ref 36.5–49.3)
HDLC SERPL-MCNC: 29 MG/DL (ref 40–60)
HGB BLD-MCNC: 12.5 G/DL (ref 12–17)
IMM GRANULOCYTES # BLD AUTO: 0.04 THOUSAND/UL (ref 0–0.2)
IMM GRANULOCYTES NFR BLD AUTO: 1 % (ref 0–2)
LDLC SERPL CALC-MCNC: 111 MG/DL (ref 0–100)
LYMPHOCYTES # BLD AUTO: 1.35 THOUSANDS/ΜL (ref 0.6–4.47)
LYMPHOCYTES NFR BLD AUTO: 18 % (ref 14–44)
MAGNESIUM SERPL-MCNC: 1.7 MG/DL (ref 1.6–2.6)
MCH RBC QN AUTO: 30.6 PG (ref 26.8–34.3)
MCHC RBC AUTO-ENTMCNC: 33.2 G/DL (ref 31.4–37.4)
MCV RBC AUTO: 92 FL (ref 82–98)
MONOCYTES # BLD AUTO: 1.13 THOUSAND/ΜL (ref 0.17–1.22)
MONOCYTES NFR BLD AUTO: 15 % (ref 4–12)
NEUTROPHILS # BLD AUTO: 4.79 THOUSANDS/ΜL (ref 1.85–7.62)
NEUTS SEG NFR BLD AUTO: 65 % (ref 43–75)
NONHDLC SERPL-MCNC: 136 MG/DL
NRBC BLD AUTO-RTO: 0 /100 WBCS
PHOSPHATE SERPL-MCNC: 2.7 MG/DL (ref 2.3–4.1)
PLATELET # BLD AUTO: 93 THOUSANDS/UL (ref 149–390)
PMV BLD AUTO: 9.7 FL (ref 8.9–12.7)
POTASSIUM SERPL-SCNC: 4.1 MMOL/L (ref 3.5–5.3)
PROT SERPL-MCNC: 6.6 G/DL (ref 6.4–8.2)
RBC # BLD AUTO: 4.09 MILLION/UL (ref 3.88–5.62)
SODIUM SERPL-SCNC: 138 MMOL/L (ref 136–145)
TRIGL SERPL-MCNC: 123 MG/DL
TROPONIN I SERPL-MCNC: <0.02 NG/ML
WBC # BLD AUTO: 7.44 THOUSAND/UL (ref 4.31–10.16)

## 2018-06-02 PROCEDURE — 93005 ELECTROCARDIOGRAM TRACING: CPT

## 2018-06-02 PROCEDURE — 83735 ASSAY OF MAGNESIUM: CPT | Performed by: INTERNAL MEDICINE

## 2018-06-02 PROCEDURE — 84100 ASSAY OF PHOSPHORUS: CPT | Performed by: INTERNAL MEDICINE

## 2018-06-02 PROCEDURE — 94760 N-INVAS EAR/PLS OXIMETRY 1: CPT

## 2018-06-02 PROCEDURE — 85025 COMPLETE CBC W/AUTO DIFF WBC: CPT | Performed by: INTERNAL MEDICINE

## 2018-06-02 PROCEDURE — 80053 COMPREHEN METABOLIC PANEL: CPT | Performed by: INTERNAL MEDICINE

## 2018-06-02 PROCEDURE — 80061 LIPID PANEL: CPT | Performed by: INTERNAL MEDICINE

## 2018-06-02 PROCEDURE — 99239 HOSP IP/OBS DSCHRG MGMT >30: CPT | Performed by: INTERNAL MEDICINE

## 2018-06-02 RX ORDER — ACETAMINOPHEN 325 MG/1
650 TABLET ORAL EVERY 4 HOURS PRN
Status: DISCONTINUED | OUTPATIENT
Start: 2018-06-02 | End: 2018-06-02 | Stop reason: HOSPADM

## 2018-06-02 RX ORDER — ASPIRIN 81 MG/1
81 TABLET, CHEWABLE ORAL DAILY
Status: DISCONTINUED | OUTPATIENT
Start: 2018-06-02 | End: 2018-06-02 | Stop reason: HOSPADM

## 2018-06-02 RX ORDER — FENTANYL CITRATE 50 UG/ML
50 INJECTION, SOLUTION INTRAMUSCULAR; INTRAVENOUS
Status: DISCONTINUED | OUTPATIENT
Start: 2018-06-02 | End: 2018-06-02 | Stop reason: HOSPADM

## 2018-06-02 RX ORDER — ACETAMINOPHEN 325 MG/1
650 TABLET ORAL EVERY 6 HOURS PRN
Status: DISCONTINUED | OUTPATIENT
Start: 2018-06-02 | End: 2018-06-02

## 2018-06-02 RX ADMIN — PANTOPRAZOLE SODIUM 40 MG: 40 TABLET, DELAYED RELEASE ORAL at 05:44

## 2018-06-02 RX ADMIN — ACETAMINOPHEN 650 MG: 325 TABLET, FILM COATED ORAL at 01:39

## 2018-06-02 RX ADMIN — FENTANYL CITRATE 50 MCG: 50 INJECTION INTRAMUSCULAR; INTRAVENOUS at 01:38

## 2018-06-02 RX ADMIN — ASPIRIN 325 MG ORAL TABLET 325 MG: 325 PILL ORAL at 08:16

## 2018-06-02 RX ADMIN — ATENOLOL 25 MG: 25 TABLET ORAL at 08:16

## 2018-06-02 RX ADMIN — HEPARIN SODIUM 5000 UNITS: 5000 INJECTION, SOLUTION INTRAVENOUS; SUBCUTANEOUS at 05:44

## 2018-06-02 RX ADMIN — ACETAMINOPHEN 650 MG: 325 TABLET, FILM COATED ORAL at 05:44

## 2018-06-02 RX ADMIN — METOPROLOL SUCCINATE 25 MG: 25 TABLET, EXTENDED RELEASE ORAL at 08:16

## 2018-06-02 RX ADMIN — LEVOTHYROXINE SODIUM 75 MCG: 75 TABLET ORAL at 05:44

## 2018-06-02 NOTE — DISCHARGE INSTRUCTIONS
Follow-up with PCP in 1 week  Follow-up with Cardiology as outpatient for possible stress test   Return to ER with any worsening chest pain, shortness of breath, palpitation or any other alarming symptoms

## 2018-06-02 NOTE — DISCHARGE SUMMARY
Discharge Summary - Valerie Lynn 80 y o  male MRN: [id number]  Unit/Bed#: [address] [place] [address] Encounter: [id number]    Admission Date:    6/1/2018   Discharge Date:   06/02/18   Admitting Diagnosis:   Unstable angina ([address] ) [I20 0]  Chest pain [R07 9]  Admitting Provider:   Judi Cherry MD  Discharge Provider:   Judi Cherry MD     Primary Care Physician at Discharge:   KG Eaton,[phone]    HPI:   44-year-old male who presented to emergency department with complain of chest pain  For a detailed HPI please refer to the admission note  Procedures Performed:   Orders Placed This Encounter   Procedures    ED ECG Documentation Only       Hospital Course:   Patient was admitted to med surgical floor with chest pain/unstable angina  Patient had serial troponins done which were all negative  Patient follows up with cardiologist Dr Lance Herring as outpatient  Dr Lance Herring is on call and discussed the case with him and as per him patient's echocardiogram showed normal ejection fraction with mild MR and mild TR  He recommended that patient can be discharged home and follow up with him as outpatient  This morning patient is completely chest pain free  He is ambulating freely in the hallway without any discomfort  Patient made unexpected improvement and will be discharged home in hemodynamically stable condition  Patient refused any home care services  Follow-up with PCP in 1 week  Follow-up with Cardiology as outpatient for possible stress test   Return to ER with any worsening chest pain, shortness of breath, palpitation or any other alarming symptoms      Consulting Providers   Cardiology    Complications:  None    Labs:   Lab Results   Component Value Date    WBC 7 44 06/02/2018    WBC 5 52 10/16/2015    RBC 4 09 06/02/2018    RBC 4 73 10/16/2015    HGB 12 5 06/02/2018    HGB 13 8 10/16/2015    HCT 37 6 06/02/2018    HCT 43 0 10/16/2015    MCV 92 06/02/2018    MCV 91 10/16/2015    MCH 30 6 06/02/2018    MCH 29 2 10/16/2015    RDW 13 3 06/02/2018    RDW 13 7 10/16/2015    PLT 93 (L) 06/02/2018     (L) 10/16/2015     Lab Results   Component Value Date    CREATININE 1 67 (H) 06/02/2018    CREATININE 1 79 (H) 08/28/2015    BUN 28 (H) 06/02/2018    BUN 28 (H) 08/28/2015     06/02/2018     08/28/2015    K 4 1 06/02/2018    K 4 5 08/28/2015     06/02/2018     08/28/2015    CO2 23 06/02/2018    CO2 28 4 08/28/2015    GLUCOSE 82 06/02/2018    GLUCOSE 92 08/28/2015    PROT 6 6 06/02/2018    PROT 7 7 08/28/2015    ALKPHOS 64 06/02/2018    ALKPHOS 73 08/28/2015    ALT 16 06/02/2018    ALT 27 08/28/2015    AST 15 06/02/2018    AST 18 08/28/2015       Treatments:  Toprol-XL, aspirin, Synthroid, atenolol and pain medication  Discharge Diagnosis:   Principal Problem:    Unstable angina (HCC)  Active Problems:    CAD (coronary artery disease)    HTN (hypertension)    CKD (chronic kidney disease), stage III    Anxiety disorder due to general medical condition    Benign prostatic hyperplasia with lower urinary tract symptoms    GERD (gastroesophageal reflux disease)    Chronic obstructive pulmonary disease (HCC)    Hyperlipidemia    Hypothyroidism  Resolved Problems:    * No resolved hospital problems  *      Condition at Discharge:   Good     Code Status: Level 1 - Full Code  Advance Directive and Living Will: Received  Power of :    POLST:      Discharge instructions/Information to patient and family:   See after visit summary for information provided to patient and family  Provisions for Follow-Up Care:  See after visit summary for information related to follow-up care and any pertinent home health orders  Disposition:   Home    Planned Readmission:   No    Discharge Statement   I spent 35 minutes discharging the patient  This time was spent on the day of discharge  I had direct contact with the patient on the day of discharge  Greater than 50% of the total time was spent examining patient, answering all patient questions, arranging and discussing plan of care with patient as well as directly providing post-discharge instructions  Additional time then spent on discharge activities  Discharge Medications:  See after visit summary for reconciled discharge medications provided to patient and family        "This note has been constructed using a voice recognition system"    Kemal Irizarry MD  6/2/2018,10:29 AM

## 2018-06-02 NOTE — PROGRESS NOTES
Pt awoke from sleep with c/o 10/10 aching/stabbing chest pain  First 2 troponins normal  3rd troponin being drawn now  EKG obtained  No ST changes noted on EKG  Upon exam pain is worsened with light palpation  Will order 1x dose of Fentanyl

## 2018-06-03 LAB
ATRIAL RATE: 82 BPM
ATRIAL RATE: 99 BPM
P AXIS: 67 DEGREES
P AXIS: 68 DEGREES
PR INTERVAL: 158 MS
PR INTERVAL: 158 MS
QRS AXIS: 20 DEGREES
QRS AXIS: 44 DEGREES
QRSD INTERVAL: 94 MS
QRSD INTERVAL: 96 MS
QT INTERVAL: 352 MS
QT INTERVAL: 372 MS
QTC INTERVAL: 434 MS
QTC INTERVAL: 451 MS
T WAVE AXIS: 33 DEGREES
T WAVE AXIS: 41 DEGREES
VENTRICULAR RATE: 82 BPM
VENTRICULAR RATE: 99 BPM

## 2018-06-03 PROCEDURE — 93010 ELECTROCARDIOGRAM REPORT: CPT | Performed by: INTERNAL MEDICINE

## 2018-06-04 ENCOUNTER — TRANSITIONAL CARE MANAGEMENT (OUTPATIENT)
Dept: FAMILY MEDICINE CLINIC | Facility: HOSPITAL | Age: 83
End: 2018-06-04

## 2018-06-04 ENCOUNTER — APPOINTMENT (EMERGENCY)
Dept: RADIOLOGY | Facility: HOSPITAL | Age: 83
DRG: 191 | End: 2018-06-04
Payer: MEDICARE

## 2018-06-04 ENCOUNTER — HOSPITAL ENCOUNTER (INPATIENT)
Facility: HOSPITAL | Age: 83
LOS: 3 days | Discharge: LEFT BEFORE DISCHARGE | DRG: 191 | End: 2018-06-07
Attending: EMERGENCY MEDICINE | Admitting: INTERNAL MEDICINE
Payer: MEDICARE

## 2018-06-04 ENCOUNTER — APPOINTMENT (INPATIENT)
Dept: NON INVASIVE DIAGNOSTICS | Facility: HOSPITAL | Age: 83
DRG: 191 | End: 2018-06-04
Payer: MEDICARE

## 2018-06-04 ENCOUNTER — APPOINTMENT (INPATIENT)
Dept: NUCLEAR MEDICINE | Facility: HOSPITAL | Age: 83
DRG: 191 | End: 2018-06-04
Payer: MEDICARE

## 2018-06-04 DIAGNOSIS — R06.00 DYSPNEA, UNSPECIFIED TYPE: Primary | ICD-10-CM

## 2018-06-04 DIAGNOSIS — R06.00 DYSPNEA: ICD-10-CM

## 2018-06-04 PROBLEM — R06.02 SHORTNESS OF BREATH: Status: ACTIVE | Noted: 2018-06-04

## 2018-06-04 PROBLEM — R00.0 TACHYCARDIA: Status: ACTIVE | Noted: 2018-06-04

## 2018-06-04 LAB
ALBUMIN SERPL BCP-MCNC: 3.6 G/DL (ref 3.5–5)
ALP SERPL-CCNC: 75 U/L (ref 46–116)
ALT SERPL W P-5'-P-CCNC: 21 U/L (ref 12–78)
ANION GAP SERPL CALCULATED.3IONS-SCNC: 15 MMOL/L (ref 4–13)
APTT PPP: 36 SECONDS (ref 24–36)
ARTERIAL PATENCY WRIST A: ABNORMAL
AST SERPL W P-5'-P-CCNC: 19 U/L (ref 5–45)
ATRIAL RATE: 130 BPM
BASE EXCESS BLDA CALC-SCNC: -5 MMOL/L (ref -2–3)
BASOPHILS # BLD AUTO: 0.02 THOUSANDS/ΜL (ref 0–0.1)
BASOPHILS NFR BLD AUTO: 0 % (ref 0–1)
BILIRUB SERPL-MCNC: 0.8 MG/DL (ref 0.2–1)
BUN SERPL-MCNC: 26 MG/DL (ref 5–25)
CALCIUM SERPL-MCNC: 10 MG/DL (ref 8.3–10.1)
CHLORIDE SERPL-SCNC: 102 MMOL/L (ref 100–108)
CO2 SERPL-SCNC: 22 MMOL/L (ref 21–32)
CREAT SERPL-MCNC: 1.82 MG/DL (ref 0.6–1.3)
DEPRECATED D DIMER PPP: 2025 NG/ML (FEU) (ref 0–424)
EOSINOPHIL # BLD AUTO: 0.18 THOUSAND/ΜL (ref 0–0.61)
EOSINOPHIL NFR BLD AUTO: 2 % (ref 0–6)
ERYTHROCYTE [DISTWIDTH] IN BLOOD BY AUTOMATED COUNT: 13.6 % (ref 11.6–15.1)
FIO2 GAS DIL.REBREATH: 21 L
GFR SERPL CREATININE-BSD FRML MDRD: 33 ML/MIN/1.73SQ M
GLUCOSE SERPL-MCNC: 159 MG/DL (ref 65–140)
HCO3 BLDA-SCNC: 18.1 MMOL/L (ref 22–28)
HCT VFR BLD AUTO: 44.9 % (ref 36.5–49.3)
HGB BLD-MCNC: 15 G/DL (ref 12–17)
IMM GRANULOCYTES # BLD AUTO: 0.03 THOUSAND/UL (ref 0–0.2)
IMM GRANULOCYTES NFR BLD AUTO: 0 % (ref 0–2)
INR PPP: 1.07 (ref 0.86–1.17)
LYMPHOCYTES # BLD AUTO: 1.15 THOUSANDS/ΜL (ref 0.6–4.47)
LYMPHOCYTES NFR BLD AUTO: 13 % (ref 14–44)
MCH RBC QN AUTO: 30.9 PG (ref 26.8–34.3)
MCHC RBC AUTO-ENTMCNC: 33.4 G/DL (ref 31.4–37.4)
MCV RBC AUTO: 92 FL (ref 82–98)
MONOCYTES # BLD AUTO: 1.04 THOUSAND/ΜL (ref 0.17–1.22)
MONOCYTES NFR BLD AUTO: 11 % (ref 4–12)
NEUTROPHILS # BLD AUTO: 6.69 THOUSANDS/ΜL (ref 1.85–7.62)
NEUTS SEG NFR BLD AUTO: 74 % (ref 43–75)
NT-PROBNP SERPL-MCNC: 966 PG/ML
P AXIS: 59 DEGREES
PCO2 BLD: 19 MMOL/L (ref 21–32)
PCO2 BLD: 29.1 MM HG (ref 36–44)
PH BLD: 7.4 [PH] (ref 7.35–7.45)
PLATELET # BLD AUTO: 155 THOUSANDS/UL (ref 149–390)
PMV BLD AUTO: 9.3 FL (ref 8.9–12.7)
PO2 BLD: 71 MM HG (ref 75–129)
POTASSIUM SERPL-SCNC: 4.2 MMOL/L (ref 3.5–5.3)
PR INTERVAL: 156 MS
PROT SERPL-MCNC: 8.6 G/DL (ref 6.4–8.2)
PROTHROMBIN TIME: 13.3 SECONDS (ref 11.8–14.2)
QRS AXIS: 77 DEGREES
QRSD INTERVAL: 84 MS
QT INTERVAL: 306 MS
QTC INTERVAL: 450 MS
RBC # BLD AUTO: 4.86 MILLION/UL (ref 3.88–5.62)
SAMPLE SITE: ABNORMAL
SAO2 % BLD FROM PO2: 94 % (ref 95–98)
SODIUM SERPL-SCNC: 139 MMOL/L (ref 136–145)
SPECIMEN SOURCE: ABNORMAL
T WAVE AXIS: 51 DEGREES
TROPONIN I SERPL-MCNC: 0.02 NG/ML
TROPONIN I SERPL-MCNC: 0.04 NG/ML
TSH SERPL DL<=0.05 MIU/L-ACNC: 2.82 UIU/ML (ref 0.36–3.74)
VENTRICULAR RATE: 130 BPM
WBC # BLD AUTO: 9.11 THOUSAND/UL (ref 4.31–10.16)

## 2018-06-04 PROCEDURE — 94760 N-INVAS EAR/PLS OXIMETRY 1: CPT

## 2018-06-04 PROCEDURE — 99223 1ST HOSP IP/OBS HIGH 75: CPT | Performed by: INTERNAL MEDICINE

## 2018-06-04 PROCEDURE — 80053 COMPREHEN METABOLIC PANEL: CPT

## 2018-06-04 PROCEDURE — 84484 ASSAY OF TROPONIN QUANT: CPT

## 2018-06-04 PROCEDURE — 36415 COLL VENOUS BLD VENIPUNCTURE: CPT

## 2018-06-04 PROCEDURE — 84443 ASSAY THYROID STIM HORMONE: CPT | Performed by: EMERGENCY MEDICINE

## 2018-06-04 PROCEDURE — 94640 AIRWAY INHALATION TREATMENT: CPT

## 2018-06-04 PROCEDURE — 84484 ASSAY OF TROPONIN QUANT: CPT | Performed by: EMERGENCY MEDICINE

## 2018-06-04 PROCEDURE — 93010 ELECTROCARDIOGRAM REPORT: CPT | Performed by: INTERNAL MEDICINE

## 2018-06-04 PROCEDURE — 71046 X-RAY EXAM CHEST 2 VIEWS: CPT

## 2018-06-04 PROCEDURE — 93970 EXTREMITY STUDY: CPT | Performed by: SURGERY

## 2018-06-04 PROCEDURE — 85025 COMPLETE CBC W/AUTO DIFF WBC: CPT

## 2018-06-04 PROCEDURE — 85610 PROTHROMBIN TIME: CPT

## 2018-06-04 PROCEDURE — 82803 BLOOD GASES ANY COMBINATION: CPT

## 2018-06-04 PROCEDURE — 36600 WITHDRAWAL OF ARTERIAL BLOOD: CPT

## 2018-06-04 PROCEDURE — 85379 FIBRIN DEGRADATION QUANT: CPT | Performed by: EMERGENCY MEDICINE

## 2018-06-04 PROCEDURE — 93005 ELECTROCARDIOGRAM TRACING: CPT

## 2018-06-04 PROCEDURE — 93970 EXTREMITY STUDY: CPT

## 2018-06-04 PROCEDURE — 83880 ASSAY OF NATRIURETIC PEPTIDE: CPT

## 2018-06-04 PROCEDURE — 78582 LUNG VENTILAT&PERFUS IMAGING: CPT

## 2018-06-04 PROCEDURE — 94664 DEMO&/EVAL PT USE INHALER: CPT

## 2018-06-04 PROCEDURE — 85730 THROMBOPLASTIN TIME PARTIAL: CPT

## 2018-06-04 PROCEDURE — 93306 TTE W/DOPPLER COMPLETE: CPT | Performed by: INTERNAL MEDICINE

## 2018-06-04 PROCEDURE — 99285 EMERGENCY DEPT VISIT HI MDM: CPT

## 2018-06-04 PROCEDURE — A9558 XE133 XENON 10MCI: HCPCS

## 2018-06-04 PROCEDURE — A9540 TC99M MAA: HCPCS

## 2018-06-04 RX ORDER — ACETAMINOPHEN 325 MG/1
975 TABLET ORAL ONCE
Status: COMPLETED | OUTPATIENT
Start: 2018-06-04 | End: 2018-06-04

## 2018-06-04 RX ORDER — HEPARIN SODIUM 1000 [USP'U]/ML
6000 INJECTION, SOLUTION INTRAVENOUS; SUBCUTANEOUS AS NEEDED
Status: DISCONTINUED | OUTPATIENT
Start: 2018-06-04 | End: 2018-06-04

## 2018-06-04 RX ORDER — HEPARIN SODIUM 1000 [USP'U]/ML
6000 INJECTION, SOLUTION INTRAVENOUS; SUBCUTANEOUS ONCE
Status: COMPLETED | OUTPATIENT
Start: 2018-06-04 | End: 2018-06-04

## 2018-06-04 RX ORDER — GUAIFENESIN 100 MG/5ML
200 SOLUTION ORAL EVERY 4 HOURS PRN
Status: DISCONTINUED | OUTPATIENT
Start: 2018-06-04 | End: 2018-06-07 | Stop reason: HOSPADM

## 2018-06-04 RX ORDER — ALBUTEROL SULFATE 90 UG/1
2 AEROSOL, METERED RESPIRATORY (INHALATION) EVERY 6 HOURS PRN
Status: DISCONTINUED | OUTPATIENT
Start: 2018-06-04 | End: 2018-06-07 | Stop reason: HOSPADM

## 2018-06-04 RX ORDER — ACETAMINOPHEN 325 MG/1
650 TABLET ORAL EVERY 6 HOURS PRN
Status: DISCONTINUED | OUTPATIENT
Start: 2018-06-04 | End: 2018-06-07 | Stop reason: HOSPADM

## 2018-06-04 RX ORDER — HEPARIN SODIUM 5000 [USP'U]/ML
5000 INJECTION, SOLUTION INTRAVENOUS; SUBCUTANEOUS EVERY 12 HOURS SCHEDULED
Status: DISCONTINUED | OUTPATIENT
Start: 2018-06-05 | End: 2018-06-07 | Stop reason: HOSPADM

## 2018-06-04 RX ORDER — ASPIRIN 81 MG/1
81 TABLET ORAL DAILY
Status: DISCONTINUED | OUTPATIENT
Start: 2018-06-04 | End: 2018-06-07 | Stop reason: HOSPADM

## 2018-06-04 RX ORDER — LORAZEPAM 0.5 MG/1
0.5 TABLET ORAL EVERY 8 HOURS PRN
Status: DISCONTINUED | OUTPATIENT
Start: 2018-06-04 | End: 2018-06-07 | Stop reason: HOSPADM

## 2018-06-04 RX ORDER — LORAZEPAM 0.5 MG/1
0.5 TABLET ORAL ONCE
Status: COMPLETED | OUTPATIENT
Start: 2018-06-04 | End: 2018-06-04

## 2018-06-04 RX ORDER — HEPARIN SODIUM 10000 [USP'U]/100ML
3-30 INJECTION, SOLUTION INTRAVENOUS
Status: DISCONTINUED | OUTPATIENT
Start: 2018-06-04 | End: 2018-06-04

## 2018-06-04 RX ORDER — TEMAZEPAM 15 MG/1
15 CAPSULE ORAL
Status: DISCONTINUED | OUTPATIENT
Start: 2018-06-04 | End: 2018-06-07 | Stop reason: HOSPADM

## 2018-06-04 RX ORDER — ATENOLOL 50 MG/1
50 TABLET ORAL DAILY
Status: DISCONTINUED | OUTPATIENT
Start: 2018-06-04 | End: 2018-06-07 | Stop reason: HOSPADM

## 2018-06-04 RX ORDER — PANTOPRAZOLE SODIUM 40 MG/1
40 TABLET, DELAYED RELEASE ORAL
Status: DISCONTINUED | OUTPATIENT
Start: 2018-06-05 | End: 2018-06-07 | Stop reason: HOSPADM

## 2018-06-04 RX ORDER — LEVALBUTEROL 1.25 MG/.5ML
1.25 SOLUTION, CONCENTRATE RESPIRATORY (INHALATION) ONCE
Status: COMPLETED | OUTPATIENT
Start: 2018-06-04 | End: 2018-06-04

## 2018-06-04 RX ORDER — LEVOTHYROXINE SODIUM 0.07 MG/1
75 TABLET ORAL
Status: DISCONTINUED | OUTPATIENT
Start: 2018-06-05 | End: 2018-06-07 | Stop reason: HOSPADM

## 2018-06-04 RX ORDER — HEPARIN SODIUM 1000 [USP'U]/ML
3000 INJECTION, SOLUTION INTRAVENOUS; SUBCUTANEOUS AS NEEDED
Status: DISCONTINUED | OUTPATIENT
Start: 2018-06-04 | End: 2018-06-04

## 2018-06-04 RX ADMIN — LEVALBUTEROL 1.25 MG: 1.25 SOLUTION, CONCENTRATE RESPIRATORY (INHALATION) at 10:59

## 2018-06-04 RX ADMIN — ATENOLOL 50 MG: 50 TABLET ORAL at 16:56

## 2018-06-04 RX ADMIN — LORAZEPAM 0.5 MG: 0.5 TABLET ORAL at 19:10

## 2018-06-04 RX ADMIN — HEPARIN SODIUM 6000 UNITS: 1000 INJECTION, SOLUTION INTRAVENOUS; SUBCUTANEOUS at 11:38

## 2018-06-04 RX ADMIN — TEMAZEPAM 15 MG: 15 CAPSULE ORAL at 21:57

## 2018-06-04 RX ADMIN — ACETAMINOPHEN 975 MG: 325 TABLET, FILM COATED ORAL at 09:57

## 2018-06-04 RX ADMIN — ACETAMINOPHEN 650 MG: 325 TABLET, FILM COATED ORAL at 19:10

## 2018-06-04 RX ADMIN — HEPARIN SODIUM AND DEXTROSE 18 UNITS/KG/HR: 10000; 5 INJECTION INTRAVENOUS at 11:37

## 2018-06-04 RX ADMIN — LORAZEPAM 0.5 MG: 0.5 TABLET ORAL at 09:57

## 2018-06-04 RX ADMIN — GUAIFENESIN 200 MG: 100 SOLUTION ORAL at 21:57

## 2018-06-04 RX ADMIN — FLUTICASONE PROPIONATE AND SALMETEROL 1 PUFF: 50; 250 POWDER RESPIRATORY (INHALATION) at 19:58

## 2018-06-04 NOTE — ED NOTES
Patient reports that left arm pain has ceased and he feels more relaxed  Patient restign in bed with eyes closed, symmetrical chest rise, no facial grimace, and comfortable position noted  Physician notified of continued tachcardia  Will continue to monitor             Katherine Perez RN  06/04/18 0389

## 2018-06-04 NOTE — CASE MANAGEMENT
Initial Clinical Review    Admission: Date/Time/Statement: 6/1/18 @ 1632     Orders Placed This Encounter   Procedures    Inpatient Admission (expected length of stay for this patient is greater than two midnights)     Standing Status:   Standing     Number of Occurrences:   1     Order Specific Question:   Admitting Physician     Answer:   Cary Edmonds [[id number]]     Order Specific Question:   Level of Care     Answer:   Med Surg [16]     Order Specific Question:   Estimated length of stay     Answer:   More than 2 Midnights     Order Specific Question:   Certification     Answer:   I certify that inpatient services are medically necessary for this patient for a duration of greater than two midnights  See H&P and MD Progress Notes for additional information about the patient's course of treatment  ED: Date/Time/Mode of Arrival:   ED Arrival Information     Expected Arrival Acuity Means of Arrival Escorted By Service Admission Type    - 6/1/2018 12:26 Emergent [address]  [facility] Emergency    Arrival Complaint    chest pain      Chief Complaint:   Chief Complaint   Patient presents with    Chest Pain     Patient reports pressure acorss entire chest that woke him from sleepo at 0200  reports that it has been continuous  Denies that pain radiates  Reports pain is 10/10  Denies n/v, diaphoresis  Admits to increased SOB   History of Illness:   55-year-old male who presents with substernal chest pressure that radiates into his neck since this morning associated with shortness of breath  Denies any nausea vomiting  He does have a history myocardial infarction in the past he also has hypertension hypercholesterolemia  and is a former smoker    ED Vital Signs:   ED Triage Vitals   Temperature Pulse Respirations Blood Pressure SpO2   06/01/18 1232 06/01/18 1232 06/01/18 1232 06/01/18 1315 06/01/18 1232   97 9 °F (36 6 °C) 97 22 127/72 98 %      Temp Source Heart Rate Source Patient Position - Orthostatic VS BP Location FiO2 (%)   06/01/18 1232 06/01/18 1232 06/01/18 1315 06/01/18 1315 --   Temporal Monitor Sitting Left arm       Pain Score       06/01/18 1235       Worst Possible Pain        Wt Readings from Last 1 Encounters:   06/04/18 76 5 kg (168 lb 10 4 oz)   Vital Signs (abnormal):   , 100, 100  Abnormal Labs/Diagnostic Test Results:   BUN 36 CR 1 72 GFR 35 PLT  133   TROP NEG    CXR=No acute cardiopulmonary disease  CT CHEST= No acute noncontrast CT abnormality in the chest to account for the patient's shortness of breath  Chronic findings which are unchanged from prior examinations include emphysematous changes as well as prominence of central pulmonary arterial tree suggesting some element of pulmonary artery hypertension  Coronary artery calcifications and cardiomegaly   are noted  Unchanged groundglass right mid chest pulmonary nodules are reidentified  Additional follow-up chest CT in 2 years is recommended for these groundglass nodules without associated solid tissue, similar at least as far back as 2015  Renal cysts, splenomegaly, and compression deformities, similar from prior examination  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, currently measuring 15 mm in greatest dimension increased from 13 mm in June 2017  This demonstrated vascular enhancement on abdomen CT of April 23, 2010 when this   measured only 9 mm    EKG=  Ventricular Rate     Atrial Rate     IA Interval ms 158    QRSD Interval ms 88    QT Interval ms 340    QTC Interval ms 438    P Axis degrees 66    QRS Axis degrees 62    T Wave Axis degrees 47      Normal sinus rhythm  Low voltage QRS  Borderline ECG  When compared with ECG of 18-OCT-2017 15:17,  Premature atrial complexes are no longer Present      ED Treatment:   Medication Administration from 06/01/2018 1225 to 06/01/2018 1800       Date/Time Order Dose Route Action Action by Comments     06/01/2018 1312 ipratropium-albuterol (DUO-NEB) 0 5-2 5 mg/3 mL inhalation solution 3 mL 3 mL Nebulization Given Tobin Hernandez, RN      06/01/2018 1309 nitroglycerin (NITRO-BID) 2 % TD ointment 0 5 inch 0 5 inch Topical Given Tobin Hernandez, RN      06/01/2018 1309 aspirin chewable tablet 81 mg 81 mg Oral Not Given Tobin Hernandez, RN states makes his "belly bleed"     06/01/2018 1312 sodium chloride 0 9 % inhalation solution **AcuDose Override Pull** 3 mL  Given Tobin Hernandez, BRADEN      06/01/2018 1345 sodium chloride 0 9 % infusion 125 mL/hr Intravenous New Bag Tobin Hernandez, RN      06/01/2018 1402 acetaminophen (TYLENOL) tablet 650 mg 650 mg Oral Given Tobin Hernandez, BRADEN      06/01/2018 1515 fentanyl citrate (PF) 100 MCG/2ML 25 mcg 25 mcg Intravenous Given Francisca Louis RN      06/01/2018 1619 fentanyl citrate (PF) 100 MCG/2ML 25 mcg 25 mcg Intravenous Given Francisca Louis RN       Past Medical/Surgical History:    Active Ambulatory Problems     Diagnosis Date Noted    Hydronephrosis of right kidney 05/14/2016    CAD (coronary artery disease) 05/14/2016    Carotid stenosis 05/14/2016    HTN (hypertension) 05/14/2016    CKD (chronic kidney disease), stage III 05/14/2016    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/26/2013    Benign prostatic hyperplasia with lower urinary tract symptoms 05/07/2012    GERD (gastroesophageal reflux disease) 06/06/2014    Chronic obstructive pulmonary disease ([facility] ) 05/07/2012    Depression with anxiety 04/08/2013    Headache 01/20/2017    Hyperlipidemia 05/07/2012    Hypothyroidism 05/07/2012    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[facility] ) 06/11/2015    Insomnia 08/18/2016    Iron deficiency anemia 10/09/2012    Macular degeneration 11/15/2014    Osteoporosis 05/07/2012    Other atopic dermatitis 08/16/2017    Shortness of breath 06/04/2018    Tachycardia 06/04/2018     Resolved Ambulatory Problems     Diagnosis Date Noted    Chest pain 05/14/2016    Epigastric pain 05/14/2016  H/O: GI bleed 05/14/2016     Past Medical History:   Diagnosis Date    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    [facility]ression [facility] thoracic vertebra [facility])     COPD (chronic obstructive pulmonary disease) ([facility])     Disease of thyroid gland     Heart attack ([facility] )     Hollenhorst plaque, right eye     Hyperlipidemia     Hypertension     Iron deficiency anemia due to chronic blood loss     Ischemic colitis ([facility] )     Osteoarthritis of both hands     Peptic ulcer     Polymyalgia rheumatica ([facility])     Renal disorder     Upper GI hemorrhage     Varicose veins of lower extremity    Admitting Diagnosis: Unstable angina ([place] [address] ) [I20 0]  Chest pain [R07 9]  Age/Sex: 80 y o  male  Assessment/Plan:   Admit to med surgical floor on telemetry  · Unstable angina rule out ACS  Monitor on telemetry  Serial EKG, troponin and echocardiogram   Cardiology consult  Lipid profile  Continue on aspirin, atenolol, Toprol-XL  Patient is allergic to statins  Nitro sublingual p r n  Gentle IV fluids  · Chronic kidney disease stage 3-stable  Monitor renal function  · Hypothyroidism-on Synthroid  · COPD without exacerbation  Continue on bronchodilators  · Anxiety disorder-on p r n  Ativan  · GI/DVT prophylaxis    Admission Orders:  TELEMETRY  CONSULT INTERNAL MEDICINE  PT EVAL & TX  CONSULT CARDIO  O2 TO KEEP SATS>92%  VENODYNES  SCHEDULED MEDS  HEPARIN SQ Q8H  IVF @ 75CC/HR

## 2018-06-04 NOTE — ED NOTES
Called lab to notify them of added specimens   Troy Mccullough verbalized understanding      Duran Canales RN  06/04/18 2457

## 2018-06-04 NOTE — H&P
History and Physical - Saniya Black 80 y o  male MRN: [id number]  Unit/Bed#: ED 07 Encounter: [id number]    Assessment/Plan     Assessment:  Principal Problem:    Shortness of breath  Active Problems:    CAD (coronary artery disease)    HTN (hypertension)    CKD (chronic kidney disease), stage III    Chronic obstructive pulmonary disease (HCC)    Hypothyroidism    Tachycardia      Plan:  Admit Med surge telemetry  Will require greater than 2 midnight stay  Because of the resting tachycardia in the abrupt onset of shortness of breath, need to rule out pulmonary emboli  Because of his chronic kidney disease CT scan cannot be done thus will do V/Q scan as well as scan his legs  Patient is empirically on heparin  He does have a remote history of peptic ulcer disease  Alternatively this could be his COPD  He the patient does have some wheezing  She requests level 3 DNR status  Also consult Cardiology  I do not believe they saw him last week in the echocardiogram that was done has an interpretation that is pending  History of Present Illness   HPI: Saniya Black is a 80y o  year old male who presents with shortness of breath  Patient was admitted as an observation patient last week for chest pain which lasted for about 10-12 hours  He was discharged after myocardial ischemia was ruled out  He has had no recurrence of that pain  However yesterday the patient went to bed feeling fine  This morning when he got out of bed to make his breakfast he had exaggerated shortness of breath with minimal exertion  He had no chest pain  The patient had a transition of care call from his primary care physician's office andthey noted he was so dyspneic they sent to the emergency room for evaluation  While in the emergency room patient was noted to have a resting tachycardia with a rate of 115-120 per minute sitting there  This was not the case on his EKG last week    He will be admitted the [facility] for evaluation stabilization    Review of Systems   Constitutional: Negative  Respiratory: Positive for shortness of breath  Cardiovascular: Negative  Negative for leg swelling  Gastrointestinal: Negative  Genitourinary: Negative  Historical Information   Past Medical History:   Diagnosis Date    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    Compression fracture of thoracic vertebra ([facility] )     last assessed 05/07/2012    COPD (chronic obstructive pulmonary disease) ([facility] )     Disease of thyroid gland     Heart attack ([facility] )     Hollenhorst plaque, right eye     last assessed 04/08/2013    Hyperlipidemia     Hypertension     Iron deficiency anemia due to chronic blood loss     last assessed 09/10/2012    Ischemic colitis ([facility] )     last assessed 05/27/2014    Osteoarthritis of both hands     last assessedn 04/30/2013    Peptic ulcer     last assessed 06/14/2013    Polymyalgia rheumatica ([facility] )     last assessesd 10/09/2012    Renal disorder     Upper GI hemorrhage     last assessed 01/29/2015    Varicose veins of lower extremity     last assessed 04/26/2013     Past Surgical History:   Procedure Laterality Date    CORONARY ARTERY BYPASS GRAFT      HEMORRHOID SURGERY      HERNIA REPAIR      RENAL CYST EXCISION      resolved 05/2010    THROMBOENDARTERECTOMY Right     carotid, last assessed 06/18/2013     Social History   History   Alcohol Use No     Comment: Last drink 30 yrs  ago     History   Drug Use No     History   Smoking Status    Former Smoker   Smokeless Tobacco    Never Used     Comment: Quit 40 yrs   ago     Family History:   Family History   Problem Relation Age of Onset    Hypertension Mother     Alcohol abuse Mother     Hypertension Father     Alcohol abuse Father     Alcohol abuse Son     Heart disease Family     Stroke Family      syndrome       Meds/Allergies      Current Facility-Administered Medications   Medication Dose Route Frequency    heparin (porcine) 25,000 units in 250 mL infusion (premix)  3-30 Units/kg/hr (Order-Specific) Intravenous Titrated    heparin (porcine) injection 3,000 Units  3,000 Units Intravenous PRN    heparin (porcine) injection 6,000 Units  6,000 Units Intravenous PRN         (Not in a hospital admission)  Allergies   Allergen Reactions    Pravastatin Anaphylaxis, Photosensitivity and Headache     Reaction Date: [id number]; Other reaction(s): Headache    Acetaminophen-Codeine GI Intolerance    Atorvastatin      Other reaction(s): Leg Cramps  Other reaction(s): Leg Cramps    Codeine Nausea Only    Colestipol GI Intolerance     Reaction Date: [id number];     Contrast  [Iodinated Diagnostic Agents]     Fenofibrate GI Intolerance     Reaction Date: [id number];     Hydrocodone Vomiting    Niacin      Reaction Date: [id number];     Niaspan  [Niacin Er]     Pitavastatin Myalgia    Pneumococcal Polysaccharide Vaccine     Pravastatin Sodium     Simvastatin     Statins Myalgia    Vicoprofen  [Hydrocodone-Ibuprofen] GI Intolerance    Cyclophosphamide Rash    Ioversol Hives       Objective   Vitals: Blood pressure 127/60, pulse (!) 115, temperature 98 9 °F (37 2 °C), temperature source Temporal, resp  rate (!) 24, height 5' 6" (1 676 m), weight 76 5 kg (168 lb 10 4 oz), SpO2 96 %  No intake or output data in the 24 hours ending 06/04/18 1225  Invasive Devices     Peripheral Intravenous Line            Peripheral IV 06/04/18 Right Antecubital less than 1 day                Physical Exam   Constitutional: He is oriented to person, place, and time  He appears well-developed and well-nourished  HENT:   Head: Normocephalic  Neck: No JVD present  No thyromegaly present  Cardiovascular: Normal rate and regular rhythm  No murmur heard  Pulmonary/Chest:   Few wheezes are noted  Abdominal: Soft   Bowel sounds are normal    Musculoskeletal:   Perhaps right calf is slightly bigger than the left there is no warmth or rubor   Neurological: He is alert and oriented to person, place, and time  Skin: Skin is warm and dry         Lab Results:   Admission on 06/04/2018   Component Date Value    Sodium 06/04/2018 139     Potassium 06/04/2018 4 2     Chloride 06/04/2018 102     CO2 06/04/2018 22     Anion Gap 06/04/2018 15*    BUN 06/04/2018 26*    Creatinine 06/04/2018 1 82*    Glucose 06/04/2018 159*    Calcium 06/04/2018 10 0     AST 06/04/2018 19     ALT 06/04/2018 21     Alkaline Phosphatase 06/04/2018 75     Total Protein 06/04/2018 8 6*    Albumin 06/04/2018 3 6     Total Bilirubin 06/04/2018 0 80     eGFR 06/04/2018 33     WBC 06/04/2018 9 11     RBC 06/04/2018 4 86     Hemoglobin 06/04/2018 15 0     Hematocrit 06/04/2018 44 9     MCV 06/04/2018 92     MCH 06/04/2018 30 9     MCHC 06/04/2018 33 4     RDW 06/04/2018 13 6     MPV 06/04/2018 9 3     Platelets 34/18/4924 155     Neutrophils Relative 06/04/2018 74     Immat GRANS % 06/04/2018 0     Lymphocytes Relative 06/04/2018 13*    Monocytes Relative 06/04/2018 11     Eosinophils Relative 06/04/2018 2     Basophils Relative 06/04/2018 0     Neutrophils Absolute 06/04/2018 6 69     Immature Grans Absolute 06/04/2018 0 03     Lymphocytes Absolute 06/04/2018 1 15     Monocytes Absolute 06/04/2018 1 04     Eosinophils Absolute 06/04/2018 0 18     Basophils Absolute 06/04/2018 0 02     Troponin I 06/04/2018 0 04     Protime 06/04/2018 13 3     INR 06/04/2018 1 07     PTT 06/04/2018 36     NT-proBNP 06/04/2018 966*    TSH 3RD GENERATON 06/04/2018 2 820     D-Dimer, Quant 06/04/2018 2025*    Troponin I 06/04/2018 0 02     pH, Art i-STAT 06/04/2018 7 400     pCO2, Art i-STAT 06/04/2018 29 1*    pO2, ART i-STAT 06/04/2018 71 0*    BE, i-STAT 06/04/2018 -5*    HCO3, Art i-STAT 06/04/2018 18 1*    CO2, i-STAT 06/04/2018 19*    O2 Sat, i-STAT 06/04/2018 94*    POC FIO2 06/04/2018 21     Specimen Type 06/04/2018 ARTERIAL  SITE 06/04/2018 Right Radial     CATARINO TEST 06/04/2018 Postive Catarino Test     Ventricular Rate 06/04/2018 130     Atrial Rate 06/04/2018 130     [place] Interval 06/04/2018 156     QRSD Interval 06/04/2018 84     QT Interval 06/04/2018 306     QTC Interval 06/04/2018 450     P Axis 06/04/2018 59     QRS Axis 06/04/2018 77     T Wave Axis 06/04/2018 51      Imaging Studies: I have personally reviewed pertinent reports  EKG, Pathology, and Other Studies: I have personally reviewed pertinent reports  VTE  Prophylaxis: Algorithmic determination of appropriate prophylaxis determined  This note has been constructed using a voice recognition system         Julian Han MD

## 2018-06-04 NOTE — ED PROVIDER NOTES
History  Chief Complaint   Patient presents with    Shortness of Breath     Patient states he became SOB with left shoulder pain this morning  Patient complains of short of breath, white mucous cough, left shoulder pain started about 1 hour ago while making breakfast   Pain sharp worse with movement  Patient took his ativan this morning  Took an inhaler as well without relief  Patient was called by his primary care provider and they informed him to come in  His neighbor brought him in  Prior to Admission Medications   Prescriptions Last Dose Informant Patient Reported? Taking? B Complex Vitamins (B COMPLEX-B12 PO)   Yes No   Sig: Take 1 tablet by mouth daily   Cholecalciferol ([employer] VITAMIN D) 2000 units CAPS   Yes No   Sig: Take by mouth   Ferrous Sulfate (IRON) 325 (65 Fe) MG TABS   Yes No   Sig: Take by mouth   Krill Oil 1000 MG CAPS   Yes No   Sig: Take by mouth   LORazepam (ATIVAN) 0 5 mg tablet   No No   Sig: TAKE ONE TABLET BY MOUTH EVERY 8 HOURS AS NEEDED FOR ANXIETY   Multiple Vitamins-Minerals (VISION FORMULA/LUTEIN PO)   Yes No   Sig: Take by mouth   albuterol (VENTOLIN HFA) 90 mcg/act inhaler   No No   Sig: Inhale 2 puffs every 6 (six) hours as needed for wheezing   aspirin 81 MG tablet   No No   Sig: Take 1 tablet (81 mg total) by mouth daily for 30 days   atenolol (TENORMIN) 50 mg tablet   Yes No   Si mg Atenolol 50 MG Oral Tablet take 1/2 tablet by mouth once daily  Quantity: 15;   Refills: 5    Oswaldo Navarrete MD;  [facility] 12-Dec-2012 Active    fluticasone furoate-vilanterol (BREO ELLIPTA)   Yes No   Sig: Inhale daily   guaiFENesin-codeine (ROBITUSSIN AC) 100-10 mg/5 mL oral solution   No No   Sig: Take 5 mL by mouth 3 (three) times a day as needed for cough   levothyroxine 75 mcg tablet   Yes No   Sig: Levothyroxine Sodium 75 MCG Oral Tablet TAKE 1/2 TALBET IN THE MORNING DAILY  Quantity: 30;  Refills: 5    Oswaldo Navarrete MD;  Started 12-Dec-2012 Active   menthol-zinc oxide (CALMOSPETINE) 0 44-20 625 %   Yes No   Sig: Apply topically   metoprolol succinate (TOPROL-XL) 25 mg 24 hr tablet   No No   Sig: TAKE ONE TABLET BY MOUTH EVERY DAY   naproxen sodium (ALEVE) 220 MG tablet   Yes No   Sig: Take 1 tablet by mouth   nystatin-triamcinolone (MYCOLOG-II) cream   Yes No   Sig: Apply topically   omeprazole (PriLOSEC) 20 mg delayed release capsule   Yes No   Sig: Take 20 mg by mouth daily   zolpidem (AMBIEN) 5 mg tablet   No No   Sig: Take 1 tablet (5 mg total) by mouth daily at bedtime as needed for sleep      Facility-Administered Medications: None       Past Medical History:   Diagnosis Date    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    Compression fracture of thoracic vertebra (HCC)     last assessed 05/07/2012    COPD [facility]chronic obstructive pulmonary disease) ([facility])     Disease of thyroid gland     Heart attack ([facility] [address] )     Hollenhorst plaque, right eye     last assessed 04/08/2013    Hyperlipidemia     Hypertension     Iron deficiency anemia due to chronic blood loss     last assessed 09/10/2012    Ischemic colitis ([facility] [address] )     last assessed 05/27/2014    Osteoarthritis of both hands     last assessedn 04/30/2013    Peptic ulcer     last assessed 06/14/2013    Polymyalgia rheumatica ([facility] [address] )     last assessesd 10/09/2012    Renal disorder     Upper GI hemorrhage     last assessed 01/29/2015    Varicose veins of lower extremity     last assessed 04/26/2013       Past Surgical History:   Procedure Laterality Date    CORONARY ARTERY BYPASS GRAFT      HEMORRHOID SURGERY      HERNIA REPAIR      RENAL CYST EXCISION      resolved 05/2010    THROMBOENDARTERECTOMY Right     carotid, last assessed 06/18/2013       Family History   Problem Relation Age of Onset    Hypertension Mother     Alcohol abuse Mother     Hypertension Father     Alcohol abuse Father     Alcohol abuse Son     Heart disease Family     Stroke Family      syndrome     I have reviewed and agree with the history as documented  Social History   Substance Use Topics    Smoking status: Former Smoker     Types: Cigarettes    Smokeless tobacco: Never Used      Comment: Quit 40 yrs  ago    Alcohol use No      Comment: Last drink 30 yrs  ago        Review of Systems   Constitutional: Negative for fever  Respiratory: Positive for shortness of breath  All other systems reviewed and are negative  Physical Exam  Physical Exam   Constitutional: He is oriented to person, place, and time  He appears well-developed and well-nourished  HENT:   Mouth/Throat: Oropharynx is clear and moist    Eyes: Conjunctivae and EOM are normal  Pupils are equal, round, and reactive to light  Neck: Normal range of motion  Neck supple  No spinous process tenderness present  Cardiovascular: Regular rhythm, normal heart sounds and intact distal pulses  Tachycardia present  Pulmonary/Chest: Effort normal and breath sounds normal  No respiratory distress  He has no wheezes  He exhibits no tenderness  Abdominal: Soft  Bowel sounds are normal  He exhibits no distension  There is no tenderness  Musculoskeletal: Normal range of motion  Left shoulder: He exhibits tenderness, pain and decreased strength  He exhibits normal range of motion, no swelling and normal pulse  Weakness and pain with left shoulder abduction   Neurological: He is alert and oriented to person, place, and time  He has normal strength  No sensory deficit  GCS eye subscore is 4  GCS verbal subscore is 5  GCS motor subscore is 6  Skin: Skin is warm and dry  No rash noted  Psychiatric: His mood appears anxious  Nursing note and vitals reviewed        Vital Signs  ED Triage Vitals [06/04/18 0916]   Temperature Pulse Respirations Blood Pressure SpO2   98 9 °F (37 2 °C) (!) 123 (!) 24 154/87 95 %      Temp Source Heart Rate Source Patient Position - Orthostatic VS BP Location FiO2 (%)   Temporal Monitor Lying Right arm --      Pain Score       7           Vitals:    06/04/18 1130 06/04/18 1500 06/04/18 1648 06/04/18 2320   BP: 127/60 135/74 131/78 107/63   Pulse: (!) 115 103 (!) 117 75   Patient Position - Orthostatic VS: Lying Lying Lying Lying       Visual Acuity  Visual Acuity      Most Recent Value   L Pupil Size (mm)  3   R Pupil Size (mm)  3          ED Medications  Medications   albuterol (PROVENTIL HFA,VENTOLIN HFA) inhaler 2 puff (not administered)   aspirin (ECOTRIN LOW STRENGTH) EC tablet 81 mg (81 mg Oral Not Given 6/4/18 1654)   atenolol (TENORMIN) tablet 50 mg (50 mg Oral Given 6/4/18 1656)   fluticasone-salmeterol (ADVAIR) 250-50 mcg/dose inhaler 1 puff (1 puff Inhalation Not Given 6/4/18 2000)   levothyroxine tablet 75 mcg (75 mcg Oral Given 6/5/18 0512)   LORazepam (ATIVAN) tablet 0 5 mg (0 5 mg Oral Given 6/5/18 0418)   pantoprazole (PROTONIX) EC tablet 40 mg (40 mg Oral Given 6/5/18 0512)   acetaminophen (TYLENOL) tablet 650 mg (650 mg Oral Given 6/5/18 0417)   temazepam (RESTORIL) capsule 15 mg (15 mg Oral Given 6/4/18 2157)   heparin (porcine) subcutaneous injection 5,000 Units (5,000 Units Subcutaneous Given 6/5/18 0512)   guaiFENesin (ROBITUSSIN) oral solution 200 mg (200 mg Oral Given 6/5/18 0418)   LORazepam (ATIVAN) tablet 0 5 mg (0 5 mg Oral Given 6/4/18 0957)   acetaminophen (TYLENOL) tablet 975 mg (975 mg Oral Given 6/4/18 0957)   levalbuterol (XOPENEX) inhalation solution 1 25 mg (1 25 mg Nebulization Given 6/4/18 1059)   heparin (porcine) injection 6,000 Units (6,000 Units Intravenous Given 6/4/18 1138)       Diagnostic Studies  Results Reviewed     Procedure Component Value Units Date/Time    Basic metabolic panel [[id number]]  (Abnormal) Collected:  06/05/18 0515    Lab Status:  Final result Specimen:  Blood from Arm, Right Updated:  06/05/18 0645     Sodium 138 mmol/L      Potassium 4 1 mmol/L      Chloride 105 mmol/L      CO2 22 mmol/L      Anion Gap 11 mmol/L      BUN 22 mg/dL      Creatinine 1 62 (H) mg/dL Glucose 107 mg/dL      Calcium 9 0 mg/dL      eGFR 38 ml/min/1 73sq m     Narrative:         National Kidney Disease Education Program recommendations are as follows:  GFR calculation is accurate only with a steady state creatinine  Chronic Kidney disease less than 60 ml/min/1 73 sq  meters  Kidney failure less than 15 ml/min/1 73 sq  meters  POCT Blood Gas (G3+) [[id number]]  (Abnormal) Collected:  06/04/18 1139    Lab Status:  Final result Updated:  06/04/18 1143     pH, Art i-STAT 7 400     pCO2, Art i-STAT 29 1 (LL) mm HG      pO2, ART i-STAT 71 0 (L) mm HG      BE, i-STAT -5 (L) mmol/L      HCO3, Art i-STAT 18 1 (L) mmol/L      CO2, i-STAT 19 (L) mmol/L      O2 Sat, i-STAT 94 (L) %      POC FIO2 21 L      Specimen Type ARTERIAL     SITE Right Radial     CATARINO TEST Postive Catarino Test    Troponin I [[id number]]  (Normal) Collected:  06/04/18 1104    Lab Status:  Final result Specimen:  Blood from Arm, Right Updated:  06/04/18 1140     Troponin I 0 02 ng/mL     Narrative:         [employer] Chemistry analyzer 99% cutoff is > 0 04 ng/mL in network labs    o cTnI 99% cutoff is useful only when applied to patients in the clinical setting of myocardial ischemia  o cTnI 99% cutoff should be interpreted in the context of clinical history, ECG findings and possibly cardiac imaging to establish correct diagnosis  o cTnI 99% cutoff may be suggestive but clearly not indicative of a coronary event without the clinical setting of myocardial ischemia  TSH [[id number]]  (Normal) Collected:  06/04/18 0917    Lab Status:  Final result Specimen:  Blood from Arm, Right Updated:  06/04/18 1018     TSH 3RD GENERATON 2 820 uIU/mL     Narrative:         Patients undergoing fluorescein dye angiography may retain small amounts of fluorescein in the body for 48-72 hours post procedure  Samples containing fluorescein can produce falsely depressed TSH values   If the patient had this procedure,a specimen should be resubmitted post fluorescein clearance  D-dimer, quantitative [[id number]]  (Abnormal) Collected:  06/04/18 0917    Lab Status:  Final result Specimen:  Blood from Arm, Right Updated:  06/04/18 1006     D-Dimer, Quant 2,025 (H) ng/ml (FEU)     B-type natriuretic peptide [[id number]]  (Abnormal) Collected:  06/04/18 0917    Lab Status:  Final result Specimen:  Blood from Arm, Right Updated:  06/04/18 0957     NT-proBNP 966 (H) pg/mL     Comprehensive metabolic panel [[id number]]  (Abnormal) Collected:  06/04/18 0916    Lab Status:  Final result Specimen:  Blood from Arm, Right Updated:  06/04/18 0957     Sodium 139 mmol/L      Potassium 4 2 mmol/L      Chloride 102 mmol/L      CO2 22 mmol/L      Anion Gap 15 (H) mmol/L      BUN 26 (H) mg/dL      Creatinine 1 82 (H) mg/dL      Glucose 159 (H) mg/dL      Calcium 10 0 mg/dL      AST 19 U/L      ALT 21 U/L      Alkaline Phosphatase 75 U/L      Total Protein 8 6 (H) g/dL      Albumin 3 6 g/dL      Total Bilirubin 0 80 mg/dL      eGFR 33 ml/min/1 73sq m     Narrative:         National Kidney Disease Education Program recommendations are as follows:  GFR calculation is accurate only with a steady state creatinine  Chronic Kidney disease less than 60 ml/min/1 73 sq  meters  Kidney failure less than 15 ml/min/1 73 sq  meters  Troponin I [[id number]]  (Normal) Collected:  06/04/18 0917    Lab Status:  Final result Specimen:  Blood from Arm, Right Updated:  06/04/18 0952     Troponin I 0 04 ng/mL     Narrative:         [employer] Chemistry analyzer 99% cutoff is > 0 04 ng/mL in network labs    o cTnI 99% cutoff is useful only when applied to patients in the clinical setting of myocardial ischemia  o cTnI 99% cutoff should be interpreted in the context of clinical history, ECG findings and possibly cardiac imaging to establish correct diagnosis  o cTnI 99% cutoff may be suggestive but clearly not indicative of a coronary event without the clinical setting of myocardial ischemia      Protime-INR [[id number]]  (Normal) Collected:  06/04/18 0917    Lab Status:  Final result Specimen:  Blood from Arm, Right Updated:  06/04/18 0940     Protime 13 3 seconds      INR 1 07    APTT [[id number]]  (Normal) Collected:  06/04/18 0917    Lab Status:  Final result Specimen:  Blood from Arm, Right Updated:  06/04/18 0940     PTT 36 seconds     CBC and differential [[id number]]  (Abnormal) Collected:  06/04/18 0917    Lab Status:  Final result Specimen:  Blood from Arm, Right Updated:  06/04/18 0929     WBC 9 11 Thousand/uL      RBC 4 86 Million/uL      Hemoglobin 15 0 g/dL      Hematocrit 44 9 %      MCV 92 fL      MCH 30 9 pg      MCHC 33 4 g/dL      RDW 13 6 %      MPV 9 3 fL      Platelets 351 Thousands/uL      Neutrophils Relative 74 %      Immat GRANS % 0 %      Lymphocytes Relative 13 (L) %      Monocytes Relative 11 %      Eosinophils Relative 2 %      Basophils Relative 0 %      Neutrophils Absolute 6 69 Thousands/µL      Immature Grans Absolute 0 03 Thousand/uL      Lymphocytes Absolute 1 15 Thousands/µL      Monocytes Absolute 1 04 Thousand/µL      Eosinophils Absolute 0 18 Thousand/µL      Basophils Absolute 0 02 Thousands/µL                  VAS lower limb venous duplex study, complete bilateral   Final Result by DO Conchis (06/04 1914)      NM lung ventilation / perfusion   Final Result by Zhen Richards MD (06/04 1519)      The probability for pulmonary embolus is low  Workstation performed: [id number]         X-ray chest 2 views   ED Interpretation by Loki Musa DO (06/04 7030)   No acute abnl      Final Result by Ben Staples MD (06/04 1011)      Stable cardiomegaly  No active pulmonary disease              Workstation performed: [id number]                    Procedures  ECG 12 Lead Documentation  Date/Time: 6/4/2018 9:21 AM  Performed by: Zaki Balderas  Authorized by: Zaki Balderas     Indications / Diagnosis:  SOB  ECG reviewed by me, the ED Provider: yes    Patient location:  ED  Previous ECG:     Previous ECG:  Compared to current    Comparison ECG info:  2 DAYS AGO    Similarity:  Changes noted  Interpretation:     Interpretation: non-specific    Rate:     ECG rate:  130    ECG rate assessment: tachycardic    Rhythm:     Rhythm: sinus tachycardia    Ectopy:     Ectopy: PVCs      PVCs:  Infrequent  QRS:     QRS axis:  Normal    QRS intervals:  Normal  Conduction:     Conduction: normal    ST segments:     ST segments:  Normal  T waves:     T waves: normal               Phone Contacts  ED Phone Contact    ED Course  ED Course as of Jun 05 0756   Mon Jun 04, 2018   2832 Patient signed and I faxed back the 201 and intake to [phone] Paged Jase Barefoot to review - shoulder plan resolved, still SOB tachypnea, tachycardia at rest       1056 Reviewed records as patient was admitted here recently a few days ago and serial troponins were negative  Recent CT chest without contrast revealed no acute abnormality  CT showed chronic emphysematous changes as well as prominence of central pulmonary arterial tree suggesting some element of pulmonary artery hypertension  Coronary artery calcifications and cardiomegaly   are noted  Unchanged groundglass right mid chest pulmonary nodules are reidentified  Additional follow-up chest CT in 2 years is recommended for these groundglass nodules without associated solid tissue, similar at least as far back as 2015  Renal cysts, splenomegaly, and compression deformities, similar from prior examination  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 from vascular notified of study ordered    454 1364 ABG with resp alk and compensation possible hyperventilation but patient still tachycardic while sleeping in the room    1200 Spoke with Mendy Mehta - she will scan legs now then Claudene Bush will do Vq scan 2 pm                                [facility]  Number of Diagnoses or Management Options  Dyspnea, unspecified type: new and requires workup     Amount and/or Complexity of Data Reviewed  Clinical lab tests: ordered and reviewed  Tests in the radiology section of CPT®: ordered and reviewed  Obtain history from someone other than the patient: yes  Discuss the patient with other providers: yes    Patient Progress  Patient progress: improved    CritCare Time    Disposition  Final diagnoses:   Dyspnea, unspecified type - possible pulmonary embolism     Time reflects when diagnosis was documented in both MDM as applicable and the Disposition within this note     Time User Action Codes Description Comment    6/4/2018 10:48 AM Arnoldo Whit Add [R06 00] Dyspnea, unspecified type     6/4/2018 11:16 AM Arnoldo Whit Modify [R06 00] Dyspnea, unspecified type possible pulmonary embolism    6/4/2018  3:56 PM Frankie Mae Add [R06 00] Dyspnea       ED Disposition     ED Disposition Condition Comment    Admit  Case was discussed with Reyes Urrutia** and the patient's admission status was agreed to be Admission Status: inpatient status to the service of Dr Jamilah Bautista           Follow-up Information    None         Current Discharge Medication List      CONTINUE these medications which have NOT CHANGED    Details   albuterol (VENTOLIN HFA) 90 mcg/act inhaler Inhale 2 puffs every 6 (six) hours as needed for wheezing  Qty: 1 Inhaler, Refills: 1    Associated Diagnoses: Chronic obstructive pulmonary disease, unspecified COPD type ([facility] [address] )      aspirin 81 MG tablet Take 1 tablet (81 mg total) by mouth daily for 30 days  Qty: 30 tablet, Refills: 0    Associated Diagnoses: Coronary artery disease involving native heart without angina pectoris, unspecified vessel or lesion type      atenolol (TENORMIN) 50 mg tablet 25 mg Atenolol 50 MG Oral Tablet take 1/2 tablet by mouth once daily  Quantity: 15;  Refills: 5    Oswaldo Navarrete MD;  Started 12-Dec-2012 Active       B Complex Vitamins (B COMPLEX-B12 PO) Take 1 tablet by mouth daily      Cholecalciferol ([employer] VITAMIN D) 2000 units CAPS Take by mouth      Ferrous Sulfate (IRON) 325 (65 Fe) MG TABS Take by mouth      fluticasone furoate-vilanterol (BREO ELLIPTA) Inhale daily      guaiFENesin-codeine (ROBITUSSIN AC) 100-10 mg/5 mL oral solution Take 5 mL by mouth 3 (three) times a day as needed for cough  Qty: 118 mL, Refills: 0    Comments: Has had this in the past and tolerates well  Other opiods in the past have caused Gi upset  Associated Diagnoses: Acute bronchitis, unspecified organism      Krill Oil 1000 MG CAPS Take by mouth      levothyroxine 75 mcg tablet Levothyroxine Sodium 75 MCG Oral Tablet TAKE 1/2 TALBET IN THE MORNING DAILY  Quantity: 30;  Refills: 5    Oswaldo Navarrete MD;  Nicolette Passer 12-Dec-2012 Active      LORazepam (ATIVAN) 0 5 mg tablet TAKE ONE TABLET BY MOUTH EVERY 8 HOURS AS NEEDED FOR ANXIETY  Qty: 90 tablet, Refills: 3    Associated Diagnoses: Anxiety      menthol-zinc oxide (CALMOSPETINE) 0 44-20 625 % Apply topically      metoprolol succinate (TOPROL-XL) 25 mg 24 hr tablet TAKE ONE TABLET BY MOUTH EVERY DAY  Qty: 30 tablet, Refills: 5    Associated Diagnoses: Essential hypertension      Multiple Vitamins-Minerals (VISION FORMULA/LUTEIN PO) Take by mouth      naproxen sodium (ALEVE) 220 MG tablet Take 1 tablet by mouth      nystatin-triamcinolone (MYCOLOG-II) cream Apply topically      omeprazole (PriLOSEC) 20 mg delayed release capsule Take 20 mg by mouth daily      zolpidem (AMBIEN) 5 mg tablet Take 1 tablet (5 mg total) by mouth daily at bedtime as needed for sleep  Qty: 30 tablet, Refills: 5    Associated Diagnoses: Primary insomnia           No discharge procedures on file      ED Provider  Electronically Signed by           Victoria Benavides DO  06/05/18 9320

## 2018-06-05 LAB
ANION GAP SERPL CALCULATED.3IONS-SCNC: 11 MMOL/L (ref 4–13)
BUN SERPL-MCNC: 22 MG/DL (ref 5–25)
CALCIUM SERPL-MCNC: 9 MG/DL (ref 8.3–10.1)
CHLORIDE SERPL-SCNC: 105 MMOL/L (ref 100–108)
CO2 SERPL-SCNC: 22 MMOL/L (ref 21–32)
CREAT SERPL-MCNC: 1.62 MG/DL (ref 0.6–1.3)
GFR SERPL CREATININE-BSD FRML MDRD: 38 ML/MIN/1.73SQ M
GLUCOSE SERPL-MCNC: 107 MG/DL (ref 65–140)
POTASSIUM SERPL-SCNC: 4.1 MMOL/L (ref 3.5–5.3)
SODIUM SERPL-SCNC: 138 MMOL/L (ref 136–145)

## 2018-06-05 PROCEDURE — 94760 N-INVAS EAR/PLS OXIMETRY 1: CPT

## 2018-06-05 PROCEDURE — 94640 AIRWAY INHALATION TREATMENT: CPT

## 2018-06-05 PROCEDURE — 99222 1ST HOSP IP/OBS MODERATE 55: CPT | Performed by: INTERNAL MEDICINE

## 2018-06-05 PROCEDURE — 80048 BASIC METABOLIC PNL TOTAL CA: CPT | Performed by: INTERNAL MEDICINE

## 2018-06-05 PROCEDURE — 99232 SBSQ HOSP IP/OBS MODERATE 35: CPT | Performed by: INTERNAL MEDICINE

## 2018-06-05 RX ORDER — METHYLPREDNISOLONE SODIUM SUCCINATE 40 MG/ML
20 INJECTION, POWDER, LYOPHILIZED, FOR SOLUTION INTRAMUSCULAR; INTRAVENOUS 3 TIMES DAILY
Status: DISCONTINUED | OUTPATIENT
Start: 2018-06-05 | End: 2018-06-06

## 2018-06-05 RX ADMIN — GUAIFENESIN 200 MG: 100 SOLUTION ORAL at 10:04

## 2018-06-05 RX ADMIN — FLUTICASONE PROPIONATE AND SALMETEROL 1 PUFF: 50; 250 POWDER RESPIRATORY (INHALATION) at 08:23

## 2018-06-05 RX ADMIN — TEMAZEPAM 15 MG: 15 CAPSULE ORAL at 21:18

## 2018-06-05 RX ADMIN — LORAZEPAM 0.5 MG: 0.5 TABLET ORAL at 12:43

## 2018-06-05 RX ADMIN — LORAZEPAM 0.5 MG: 0.5 TABLET ORAL at 21:18

## 2018-06-05 RX ADMIN — METHYLPREDNISOLONE SODIUM SUCCINATE 20 MG: 40 INJECTION, POWDER, FOR SOLUTION INTRAMUSCULAR; INTRAVENOUS at 12:45

## 2018-06-05 RX ADMIN — GUAIFENESIN 200 MG: 100 SOLUTION ORAL at 17:01

## 2018-06-05 RX ADMIN — ATENOLOL 50 MG: 50 TABLET ORAL at 10:06

## 2018-06-05 RX ADMIN — METHYLPREDNISOLONE SODIUM SUCCINATE 20 MG: 40 INJECTION, POWDER, FOR SOLUTION INTRAMUSCULAR; INTRAVENOUS at 21:22

## 2018-06-05 RX ADMIN — ACETAMINOPHEN 650 MG: 325 TABLET, FILM COATED ORAL at 04:17

## 2018-06-05 RX ADMIN — ACETAMINOPHEN 650 MG: 325 TABLET, FILM COATED ORAL at 17:01

## 2018-06-05 RX ADMIN — HEPARIN SODIUM 5000 UNITS: 5000 INJECTION, SOLUTION INTRAVENOUS; SUBCUTANEOUS at 05:12

## 2018-06-05 RX ADMIN — HEPARIN SODIUM 5000 UNITS: 5000 INJECTION, SOLUTION INTRAVENOUS; SUBCUTANEOUS at 17:05

## 2018-06-05 RX ADMIN — METHYLPREDNISOLONE SODIUM SUCCINATE 20 MG: 40 INJECTION, POWDER, FOR SOLUTION INTRAMUSCULAR; INTRAVENOUS at 16:58

## 2018-06-05 RX ADMIN — PANTOPRAZOLE SODIUM 40 MG: 40 TABLET, DELAYED RELEASE ORAL at 05:12

## 2018-06-05 RX ADMIN — LORAZEPAM 0.5 MG: 0.5 TABLET ORAL at 04:18

## 2018-06-05 RX ADMIN — LEVOTHYROXINE SODIUM 75 MCG: 75 TABLET ORAL at 05:12

## 2018-06-05 RX ADMIN — GUAIFENESIN 200 MG: 100 SOLUTION ORAL at 04:18

## 2018-06-05 RX ADMIN — GUAIFENESIN 200 MG: 100 SOLUTION ORAL at 21:18

## 2018-06-05 NOTE — CONSULTS
Consultation - Cardiology   Gregorio Phillip 80 y o  male MRN: [id number]  Unit/Bed#: [address] [place] [address] Encounter: [id number]      Assessment:  Principal Problem:    Shortness of breath  Active Problems:    CAD (coronary artery disease)    HTN (hypertension)    CKD (chronic kidney disease), stage III    Chronic obstructive pulmonary disease (HCC)    Hypothyroidism    Tachycardia      Plan:    1  Shortness of breath with exertion - Chronically this appears to be multifactorial, with contributing factors that include CHF and COPD  However I do feel the acuity of his event and this hospitalization appear to be more pulmonary related  With his blood pressure under control, given his COPD, I would suggest decreasing his atenolol dose or even switching to another agent  We could consider a repeat ischemic workup, as his last stress test was in 2014  I did discuss this with him him he did not wish to pursue a stress test at this time  2   Chronic diastolic CHF - He appears to carry this history  His echocardiogram shows no significant change from his prior  He is not on diuretic therapy at home, and this could be considered, especially if he does not have as good of a response as expected to his COPD treatment  3   Hypertension - His med rec has him on both metoprolol and atenolol, which should be sorted out  As stated above we could even decrease or change his beta-blocker given his COPD and wheezing  4  CAD - He carries a prior history of an inferior MI  As stated we could do updated ischemic testing, but this could be reserved and discussed as an outpatient with his primary cardiologist Dr Itz Pham  History of Present Illness   Physician Requesting Consult: Priyank Mariano MD  Reason for Consult / Principal Problem: SOB    HPI: Gregorio Phillip is a 80y o  year old male who presents with worsening shortness of breath over the last few days    The patient was in the hospital this past Friday due to chest pain and shortness of breath and was ruled out for any acute coronary issue  An echocardiogram was performed which showed no significant change from prior echoes  The patient did go home, however came back within a few days with worsening shortness of breath with minimal exertion  He denies any current chest pain at this admission  He denies any lightheadedness, presyncope or syncope  He is not showing any worsening signs of volume overload  He appears to have a chronic diastolic CHF component to his shortness of breath, but also does have COPD  He does carry a history of coronary artery disease and a prior history of an inferior MI, and is overdue for an appointment with Dr Melanie Murillo  His last stress nuclear study was in 2014 which did not have any significant findings  His ejection fraction is normal     Upon arrival use very tachycardic, and given his recent hospitalization for chest pain as well he was ruled out for pulmonary embolism and DV T  He had a V/Q scan and lower extremity duplexes which were normal   He was on IV heparin, but this was discontinued  He is currently feeling better with treatment of his COPD, but does not quite feel back to his baseline  Consults    Review of Systems:  Please refer to the HPI for all cardiac and pulmonary review of systems  All other 10 systems were reviewed and are negative      Historical Information   Past Medical History:   Diagnosis Date    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    Compression fracture of thoracic vertebra (HCC)     last assessed 05/07/2012    COPD [facility]chronic obstructive pulmonary disease) [facility] [address] [facility]     Disease of thyroid gland     Heart attack ([facility] [address] )     Hollenhorst plaque, right eye     last assessed 04/08/2013    Hyperlipidemia     Hypertension     Iron deficiency anemia due to chronic blood loss     last assessed 09/10/2012    Ischemic colitis ([facility] [address] )     last assessed 05/27/2014    Osteoarthritis of both hands     last assessedn 04/30/2013    Peptic ulcer     last assessed 06/14/2013    Polymyalgia rheumatica ([facility] [address] )     last assessesd 10/09/2012    Renal disorder     Upper GI hemorrhage     last assessed 01/29/2015    Varicose veins of lower extremity     last assessed 04/26/2013     Past Surgical History:   Procedure Laterality Date    CORONARY ARTERY BYPASS GRAFT      HEMORRHOID SURGERY      HERNIA REPAIR      RENAL CYST EXCISION      resolved 05/2010    THROMBOENDARTERECTOMY Right     carotid, last assessed 06/18/2013     History   Alcohol Use No     Comment: Last drink 30 yrs  ago     History   Drug Use No     History   Smoking Status    Former Smoker    Types: Cigarettes   Smokeless Tobacco    Never Used     Comment: Quit 40 yrs  ago     Family History: non-contributory    Meds/Allergies   all current active meds have been reviewed  Allergies   Allergen Reactions    Pravastatin Anaphylaxis, Photosensitivity and Headache     Reaction Date: [id number];    Other reaction(s): Headache    Acetaminophen-Codeine GI Intolerance    Atorvastatin      Other reaction(s): Leg Cramps  Other reaction(s): Leg Cramps    Codeine Nausea Only    Colestipol GI Intolerance     Reaction Date: [id number];     Contrast  [Iodinated Diagnostic Agents]     Fenofibrate GI Intolerance     Reaction Date: [id number];     Hydrocodone Vomiting    Niacin      Reaction Date: [id number];     Niaspan  [Niacin Er]     Pitavastatin Myalgia    Pneumococcal Polysaccharide Vaccine     Pravastatin Sodium     Simvastatin     Statins Myalgia    Vicoprofen  [Hydrocodone-Ibuprofen] GI Intolerance    Cyclophosphamide Rash    Ioversol Hives         Current Facility-Administered Medications:     acetaminophen (TYLENOL) tablet 650 mg, 650 mg, Oral, Q6H PRN, Carol Vaughn MD, 650 mg at 06/05/18 0417    albuterol (PROVENTIL HFA,VENTOLIN HFA) inhaler 2 puff, 2 puff, Inhalation, Q6H PRN, Carol Vaughn MD    aspirin (ECOTRIN LOW STRENGTH) EC tablet 81 mg, 81 mg, Oral, Daily, Lyla Sarmiento MD    atenolol (TENORMIN) tablet 50 mg, 50 mg, Oral, Daily, Lyla Sarmiento MD, 50 mg at 06/05/18 1006    fluticasone-salmeterol (ADVAIR) 250-50 mcg/dose inhaler 1 puff, 1 puff, Inhalation, Q12H [address], Lyla Sarmiento MD, 1 puff at 06/05/18 0823    guaiFENesin (ROBITUSSIN) oral solution 200 mg, 200 mg, Oral, Q4H PRN, Tan Rivas PA-C, 200 mg at 06/05/18 1004    heparin (porcine) subcutaneous injection 5,000 Units, 5,000 Units, Subcutaneous, Q12H [address], Lyla Sarmiento MD, 5,000 Units at 06/05/18 7751    levothyroxine tablet 75 mcg, 75 mcg, Oral, Early Morning, Lyla Sarmiento MD, 75 mcg at 06/05/18 7955    LORazepam (ATIVAN) tablet 0 5 mg, 0 5 mg, Oral, Q8H PRN, Lyla Sarmiento MD, 0 5 mg at 06/05/18 1243    methylPREDNISolone sodium succinate (Solu-MEDROL) injection 20 mg, 20 mg, Intravenous, TID, Michelle Morales DO, 20 mg at 06/05/18 1245    pantoprazole (PROTONIX) EC tablet 40 mg, 40 mg, Oral, Early Morning, Lyla Sarmiento MD, 40 mg at 06/05/18 0512    temazepam (RESTORIL) capsule 15 mg, 15 mg, Oral, HS PRN, Lyla Sarmiento MD, 15 mg at 06/04/18 7117    Objective   Vitals: Blood pressure 126/58, pulse 75, temperature (!) 97 3 °F (36 3 °C), temperature source Oral, resp  rate 20, height 5' 6" (1 676 m), weight 76 kg (167 lb 8 8 oz), SpO2 96 %  , Body mass index is 27 04 kg/m² , Orthostatic Blood Pressures      Most Recent Value   Blood Pressure  126/58 filed at 06/05/2018 0755   Patient Position - Orthostatic VS  Sitting filed at 06/05/2018 0755          No intake or output data in the 24 hours ending 06/05/18 3824    Physical Exam:  GEN: Bishop Marr appears well, alert and oriented x 3, pleasant and cooperative   HEENT: pupils equal, round, and reactive to light; extraocular muscles intact  NECK: supple, no carotid bruits   Mild JVD lying flat   HEART: regular rhythm, Distant S1 and S2, soft YESENIA, no clicks, gallops or rubs   LUNGS:  Diminished bilaterally; no wheezes, rales, or rhonchi   ABDOMEN: normal bowel sounds, soft, no tenderness, no distention  EXTREMITIES: peripheral pulses normal; no clubbing, cyanosis, or significant edema  NEURO: no focal findings   SKIN: normal without suspicious lesions on exposed skin    Lab Results:   Admission on 06/04/2018   Component Date Value    Sodium 06/04/2018 139     Potassium 06/04/2018 4 2     Chloride 06/04/2018 102     CO2 06/04/2018 22     Anion Gap 06/04/2018 15*    BUN 06/04/2018 26*    Creatinine 06/04/2018 1 82*    Glucose 06/04/2018 159*    Calcium 06/04/2018 10 0     AST 06/04/2018 19     ALT 06/04/2018 21     Alkaline Phosphatase 06/04/2018 75     Total Protein 06/04/2018 8 6*    Albumin 06/04/2018 3 6     Total Bilirubin 06/04/2018 0 80     eGFR 06/04/2018 33     WBC 06/04/2018 9 11     RBC 06/04/2018 4 86     Hemoglobin 06/04/2018 15 0     Hematocrit 06/04/2018 44 9     MCV 06/04/2018 92     MCH 06/04/2018 30 9     MCHC 06/04/2018 33 4     RDW 06/04/2018 13 6     MPV 06/04/2018 9 3     Platelets 55/87/5549 155     Neutrophils Relative 06/04/2018 74     Immat GRANS % 06/04/2018 0     Lymphocytes Relative 06/04/2018 13*    Monocytes Relative 06/04/2018 11     Eosinophils Relative 06/04/2018 2     Basophils Relative 06/04/2018 0     Neutrophils Absolute 06/04/2018 6 69     Immature Grans Absolute 06/04/2018 0 03     Lymphocytes Absolute 06/04/2018 1 15     Monocytes Absolute 06/04/2018 1 04     Eosinophils Absolute 06/04/2018 0 18     Basophils Absolute 06/04/2018 0 02     Troponin I 06/04/2018 0 04     Protime 06/04/2018 13 3     INR 06/04/2018 1 07     PTT 06/04/2018 36     NT-proBNP 06/04/2018 966*    TSH 3RD GENERATON 06/04/2018 2 820     D-Dimer, Quant 06/04/2018 2025*    Troponin I 06/04/2018 0 02     pH, Art i-STAT 06/04/2018 7 400     pCO2, Art i-STAT 06/04/2018 29 1*    pO2, ART i-STAT 06/04/2018 71 0*    BE, i-STAT 06/04/2018 -5*    HCO3, Art i-STAT 06/04/2018 18 1*  CO2, i-STAT 06/04/2018 19*    O2 Sat, i-STAT 06/04/2018 94*    POC FIO2 06/04/2018 21     Specimen Type 06/04/2018 ARTERIAL     SITE 06/04/2018 Right Radial     CATARINO TEST 06/04/2018 Postive Catarino Test     Ventricular Rate 06/04/2018 130     Atrial Rate 06/04/2018 130     [place] Interval 06/04/2018 156     QRSD Interval 06/04/2018 84     QT Interval 06/04/2018 306     QTC Interval 06/04/2018 450     P Axis 06/04/2018 59     QRS Axis 06/04/2018 77     T Wave Axis 06/04/2018 51     Sodium 06/05/2018 138     Potassium 06/05/2018 4 1     Chloride 06/05/2018 105     CO2 06/05/2018 22     Anion Gap 06/05/2018 11     BUN 06/05/2018 22     Creatinine 06/05/2018 1 62*    Glucose 06/05/2018 107     Calcium 06/05/2018 9 0     eGFR 06/05/2018 38      Labs & Results:      Results from last 7 days  Lab Units 06/04/18  1104 06/04/18  0917 06/01/18  2334   TROPONIN I ng/mL 0 02 0 04 <0 02       Results from last 7 days  Lab Units 06/04/18  0917 06/02/18  0456 06/01/18  1237   WBC Thousand/uL 9 11 7 44 8 73   HEMOGLOBIN g/dL 15 0 12 5 14 9   HEMATOCRIT % 44 9 37 6 44 7   PLATELETS Thousands/uL 155 93* 133*       Results from last 7 days  Lab Units 06/02/18  0456   CHOLESTEROL mg/dL 165   TRIGLYCERIDES mg/dL 123   HDL mg/dL 29*       Results from last 7 days  Lab Units 06/05/18  0515 06/04/18  0916 06/02/18  0456 06/01/18  1237   SODIUM mmol/L 138 139 138 137   POTASSIUM mmol/L 4 1 4 2 4 1 4 2   CHLORIDE mmol/L 105 102 105 101   CO2 mmol/L 22 22 23 25   BUN mg/dL 22 26* 28* 36*   CREATININE mg/dL 1 62* 1 82* 1 67* 1 72*   CALCIUM mg/dL 9 0 10 0 8 5 9 4   TOTAL PROTEIN g/dL  --  8 6* 6 6 8 2   BILIRUBIN TOTAL mg/dL  --  0 80 1 00 0 70   ALK PHOS U/L  --  75 64 81   ALT U/L  --  21 16 26   AST U/L  --  19 15 24   GLUCOSE RANDOM mg/dL 107 159* 82 120       Results from last 7 days  Lab Units 06/04/18  0917 06/01/18  1237   INR  1 07 1 07   PTT seconds 36 36       Results from last 7 days  Lab Units 06/02/18  0454 MAGNESIUM mg/dL 1 7         Imaging: I have personally reviewed pertinent reports  X-ray Chest 2 Views    Result Date: 6/4/2018  Narrative: CHEST INDICATION:   Chest pain  COMPARISON:  Chest x-ray of 6/1/2018 EXAM PERFORMED/VIEWS:  AP portable semierect and lateral FINDINGS: There is stable moderate cardiomegaly  Thoracic aorta is uncoiled  Right hemidiaphragm is mild to moderately elevated, stable  The lungs are clear  No pneumothorax or pleural effusion  There is moderate anterior wedging of a mid thoracic and lower thoracic vertebral body, unchanged  Scattered degenerative spurring is noted along the spine  Impression: Stable cardiomegaly  No active pulmonary disease  Workstation performed: [id number]     X-ray Chest 2 Views    Result Date: 6/1/2018  Narrative: CHEST INDICATION:   chest pain  Shortness of breath  COMPARISON:  March 18, 2018  February 15, 2017  EXAM PERFORMED/VIEWS:  XR CHEST PA & LATERAL FINDINGS: Heart shadow appears unremarkable  Atherosclerotic vascular calcifications are noted  Subsegmental atelectatic changes are noted in the anterior right midlung  The lungs are otherwise clear  No pneumothorax or pleural effusion  Osseous structures are osteopenic  Mild anterior wedge compression deformity of lower thoracic vertebral body is noted, unchanged from March 18, 2018  Impression: No acute cardiopulmonary disease  Workstation performed: [id number]     Ct Chest Without Contrast    Result Date: 6/1/2018  Narrative: CT CHEST WITHOUT IV CONTRAST INDICATION:   Lung nodules  Chest pain and shortness of breath  Patient is allergic to intravenous contrast  COMPARISON: Lili 15, 2017  TECHNIQUE: CT examination of the chest was performed without intravenous contrast   Axial, sagittal, and coronal 2D reformatted images were created from the source data and submitted for interpretation  Radiation dose length product (DLP) for this visit:  293 43 mGy-cm     This examination, like all CT scans performed in the [facility], was performed utilizing techniques to minimize radiation dose exposure, including the use of iterative  reconstruction and automated exposure control  FINDINGS: LUNGS: Again noted are a 1 6 cm faint groundglass nodule in the right middle lobe and a subtle groundglass density with lucent center measuring 10 mm in the superior segment right lower lobe, both visible on image 28 of series 2, both unchanged from prior examination, and both demonstrating no associated solid tissue  Again noted is linear atelectasis or scarring in the right upper lobe  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 measuring up to 15 mm is increased from 13 mm on the prior examination  Mild centrilobular emphysematous changes bilaterally in the upper lobes  No new or suspicious pulmonary mass  No focal airspace opacity to suggest pneumonia  There is no tracheal or endobronchial lesion  PLEURA:  Unremarkable  HEART/GREAT VESSELS:  Normal heart size  Coronary artery calcifications noted  Thoracic aorta within normal limits in caliber, with diffuse atherosclerotic calcifications  Prominence of central pulmonary arterial tree suggests some element of pulmonary arterial hypertension  MEDIASTINUM AND JOSE C:  Unremarkable  CHEST WALL AND LOWER NECK:  Unremarkable  VISUALIZED STRUCTURES IN THE UPPER ABDOMEN:  Partially imaged bilateral renal cysts, and several tiny calcified granulomas in the enlarged spleen again identified  OSSEOUS STRUCTURES:  No acute fracture  No destructive osseous lesion  Stable chronic wedge deformities of T7 and T12  Impression: No acute noncontrast CT abnormality in the chest to account for the patient's shortness of breath  Chronic findings which are unchanged from prior examinations include emphysematous changes as well as prominence of central pulmonary arterial tree suggesting some element of pulmonary artery hypertension    Coronary artery calcifications and cardiomegaly  are noted  Unchanged groundglass right mid chest pulmonary nodules are reidentified  Additional follow-up chest CT in 2 years is recommended for these groundglass nodules without associated solid tissue, similar at least as far back as 2015  Renal cysts, splenomegaly, and compression deformities, similar from prior examination  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, currently measuring 15 mm in greatest dimension increased from 13 mm in June 2017  This demonstrated vascular enhancement on abdomen CT of April 23, 2010 when this measured only 9 mm  Workstation performed: [id number]     Nm Lung Ventilation / Perfusion    Result Date: 6/4/2018  Narrative: VENTILATION AND PERFUSION SCAN INDICATION:  Chest pain, shortness of breath  Dyspnea, cardiac origin suspected COMPARISON:  Chest radiograph  6/4/2018 TECHNIQUE:  Posterior ventilation imaging was performed after the inhalation of 13 35 mCi Xe-133 gas  Multiplanar perfusion imaging was next performed following the intravenous administration of 4 mCi Tc-99m labeled MAA  FINDINGS:  Ventilation imaging demonstrates minimally heterogeneous distribution of radiopharmaceutical throughout both lungs  Mildly diminished ventilation in the left lung as compared to the right  Mild left lung air trapping  Perfusion imaging demonstrates mildly heterogeneous distribution of the radiopharmaceutical throughout both lungs  Overall pattern matches the ventilation pattern  There is no significant  segmental or subsegmental mismatched defect  Impression: The probability for pulmonary embolus is low  Workstation performed: [id number]     Vas Lower Limb Venous Duplex Study, Complete Bilateral    Result Date: 6/4/2018  Narrative:  [facility] REPORT CLINICAL: Indications: Patient presents with shortness of breath for the past 4 days   Patient reports he has had shortness of breath since last week with no pain or swelling of his legs  Currently on blood thinner  Operative History: 2013-06-10 Right Standard (ICA, ECA and CCA) endarterectomy  FINDINGS:  Segment  Right            Left              Impression       Impression       CFV      Normal (Patent)  Normal (Patent)     CONCLUSION:  Impression: RIGHT LOWER LIMB: NORMAL No evidence of acute or chronic deep vein thrombosis  No evidence of superficial thrombophlebitis noted  Doppler evaluation shows a normal response to augmentation maneuvers  Popliteal and posterior tibial arterial Doppler waveforms are biphasic  LEFT LOWER LIMB: NORMAL No evidence of acute or chronic deep vein thrombosis  No evidence of superficial thrombophlebitis noted  Doppler evaluation shows a normal response to augmentation maneuvers  Popliteal and posterior tibial arterial Doppler waveforms are biphasic/monophasic  SIGNATURE: Electronically Signed by: Harris Dominique on 2018-06-04 07:14:37 PM      EKG:  Sinus tachycardia with PVCs  No significant arrhythmias seen on telemetry review  ECHO:  LEFT VENTRICLE:  Systolic function was normal  Ejection fraction was estimated to be 60 %  Although no diagnostic regional wall motion abnormality was identified, this possibility cannot be completely excluded on the basis of this study  Wall thickness was at the upper limits of normal   Doppler parameters were consistent with abnormal left ventricular relaxation (grade 1 diastolic dysfunction)      LEFT ATRIUM:  The atrium was mildly dilated      MITRAL VALVE:  There was mild annular calcification  There was mild regurgitation      AORTIC VALVE:  There was very mild stenosis  There was trace regurgitation      TRICUSPID VALVE:  There was mild regurgitation  Pulmonary artery systolic pressure was mildly increased  Estimated peak PA pressure was 40 mmHg      AORTA:  There was mild dilatation of the ascending aorta        Counseling / Coordination of Care  Total floor / unit time spent today 40 minutes  Greater than 50% of total time was spent with the patient and / or family counseling and / or coordination of care

## 2018-06-05 NOTE — SOCIAL WORK
Met with patient  Explained role of care management  Patient lives in a one story home alone with 3 PILAR  He is independent adl's and ambulation, provides own meals, son or friends transport  DME - denies  Past services - denies  Pharmacy of choice is [facility] in [place]  He plans on returning home at discharge and does not anticipate any discharge needs  Called and confirmed above with son Con Bell and he is agreeable to patient returning home without services

## 2018-06-05 NOTE — PROGRESS NOTES
Progress Note - Andrea Bella 80 y o  male MRN: [id number]    Unit/Bed#: [address] [place] [address] Encounter: [id number]      Assessment:  Principal Problem:    Shortness of breath  Active Problems:    Chronic obstructive pulmonary disease (HCC)    Tachycardia    CAD (coronary artery disease)    HTN (hypertension)    CKD (chronic kidney disease), stage III    Hypothyroidism  Resolved Problems:    * No resolved hospital problems  *        Plan:  · Dyspnea -likely due to exacerbation of his COPD-he states he was on 3 inhalers with his primary physician Tay Nguyen, but he is unsure of the names of those now  Will give IV steroids as he does have some wheezing today  V/Q scan was negative for PE  · Chronic kidney disease stage 3-improving creatinine down to 1 62 today troponins stable 0 04 and 0 02  · Chronic diastolic CHF-BNP  elevated at 966 on arrival  · Hypertension-stable control    Subjective:   Patient reports having less shortness of breath today  No productive sputum but has occasional cough  Denies fever chills  ROS  Comprehensive system review negative other than noted above    Objective:     Vitals: Blood pressure 126/58, pulse 75, temperature (!) 97 3 °F (36 3 °C), temperature source Oral, resp  rate 20, height 5' 6" (1 676 m), weight 76 kg (167 lb 8 8 oz), SpO2 96 %  ,Body mass index is 27 04 kg/m²    Current Facility-Administered Medications   Medication Dose Route Frequency Provider Last Rate Last Dose    acetaminophen (TYLENOL) tablet 650 mg  650 mg Oral Q6H PRN Julian Han MD   650 mg at 06/05/18 0417    albuterol (PROVENTIL HFA,VENTOLIN HFA) inhaler 2 puff  2 puff Inhalation Q6H PRN Julian Han MD        aspirin (ECOTRIN LOW STRENGTH) EC tablet 81 mg  81 mg Oral Daily Julian Han MD        atenolol (TENORMIN) tablet 50 mg  50 mg Oral Daily Julian Han MD   50 mg at 06/05/18 1006    fluticasone-salmeterol (ADVAIR) 250-50 mcg/dose inhaler 1 puff  1 puff Inhalation Q12H [address] Julian Han MD   1 puff at 06/05/18 0823    guaiFENesin (ROBITUSSIN) oral solution 200 mg  200 mg Oral Q4H PRN Johan Angeles PA-C   200 mg at 06/05/18 0418    heparin (porcine) subcutaneous injection 5,000 Units  5,000 Units Subcutaneous Q12H [facility] Priyank Mariano MD   5,000 Units at 06/05/18 2112    levothyroxine tablet 75 mcg  75 mcg Oral Early Morning Priyank Mariano MD   75 mcg at 06/05/18 7431    LORazepam (ATIVAN) tablet 0 5 mg  0 5 mg Oral Q8H PRN Priyank Mariano MD   0 5 mg at 06/05/18 0418    pantoprazole (PROTONIX) EC tablet 40 mg  40 mg Oral Early Morning Priyank Mariano MD   40 mg at 06/05/18 6458    temazepam (RESTORIL) capsule 15 mg  15 mg Oral HS PRN Priyank Mariano MD   15 mg at 06/04/18 2157     Prescriptions Prior to Admission   Medication    albuterol (VENTOLIN HFA) 90 mcg/act inhaler    aspirin 81 MG tablet    atenolol (TENORMIN) 50 mg tablet    B Complex Vitamins (B COMPLEX-B12 PO)    Cholecalciferol ([employer] VITAMIN D) 2000 units CAPS    Ferrous Sulfate (IRON) 325 (65 Fe) MG TABS    fluticasone furoate-vilanterol (BREO ELLIPTA)    guaiFENesin-codeine (ROBITUSSIN AC) 100-10 mg/5 mL oral solution    Krill Oil 1000 MG CAPS    levothyroxine 75 mcg tablet    LORazepam (ATIVAN) 0 5 mg tablet    menthol-zinc oxide (CALMOSPETINE) 0 44-20 625 %    metoprolol succinate (TOPROL-XL) 25 mg 24 hr tablet    Multiple Vitamins-Minerals (VISION FORMULA/LUTEIN PO)    naproxen sodium (ALEVE) 220 MG tablet    nystatin-triamcinolone (MYCOLOG-II) cream    omeprazole (PriLOSEC) 20 mg delayed release capsule    zolpidem (AMBIEN) 5 mg tablet       No intake or output data in the 24 hours ending 06/05/18 1132    Physical Exam:  General appearance: alert and oriented, in no acute distress  Neck: no adenopathy, no JVD, supple, symmetrical, trachea midline and thyroid not enlarged, symmetric, no tenderness/mass/nodules  Lungs: wheezes  Heart: regular rate and rhythm, S1, S2 normal, no murmur, click, rub or gallop  Abdomen: soft, non-tender; bowel sounds normal; no masses,  no organomegaly  Extremities: extremities normal, warm and well-perfused; no cyanosis, clubbing, or edema  Skin: Skin color, texture, turgor normal  No rashes or lesions  Neurologic: Grossly normal       Lab, Imaging and other studies: I have personally reviewed pertinent reports  Results from last 7 days  Lab Units 06/04/18  0917 06/02/18  0456 06/01/18  1237   WBC Thousand/uL 9 11 7 44 8 73   HEMOGLOBIN g/dL 15 0 12 5 14 9   HEMATOCRIT % 44 9 37 6 44 7   PLATELETS Thousands/uL 155 93* 133*   NEUTROS PCT % 74 65 67   LYMPHS PCT % 13* 18 17   MONOS PCT % 11 15* 14*   EOS PCT % 2 1 2       Results from last 7 days  Lab Units 06/05/18  0515 06/04/18  0916 06/02/18  0456 06/01/18  1237   SODIUM mmol/L 138 139 138 137   POTASSIUM mmol/L 4 1 4 2 4 1 4 2   CHLORIDE mmol/L 105 102 105 101   CO2 mmol/L 22 22 23 25   BUN mg/dL 22 26* 28* 36*   CREATININE mg/dL 1 62* 1 82* 1 67* 1 72*   CALCIUM mg/dL 9 0 10 0 8 5 9 4   TOTAL PROTEIN g/dL  --  8 6* 6 6 8 2   BILIRUBIN TOTAL mg/dL  --  0 80 1 00 0 70   ALK PHOS U/L  --  75 64 81   ALT U/L  --  21 16 26   AST U/L  --  19 15 24   GLUCOSE RANDOM mg/dL 107 159* 82 120     Lab Results   Component Value Date    TROPONINI 0 02 06/04/2018    TROPONINI 0 04 06/04/2018    TROPONINI <0 02 06/01/2018    TROPONINI <0 04 01/21/2015    TROPONINI <0 04 01/21/2015    TROPONINI 0 04 01/21/2015    CKTOTAL 61 05/26/2017    CKTOTAL 75 04/06/2014       Results from last 7 days  Lab Units 06/04/18  0917 06/01/18  1237   INR  1 07 1 07     Lab Results   Component Value Date    BLOODCX No Growth After 5 Days  03/18/2018    BLOODCX No Growth After 5 Days  03/18/2018    BLOODCX No Growth After 5 Days  01/16/2017    BLOODCX No Growth After 5 Days  01/16/2017       Imaging:  Results for orders placed in visit on 01/21/15   XR chest portable    Narrative CHEST  PORTABLE   INDICATION- Vomiting blood since 4-30 AM   Tachycardia      COMPARISON-  Chest radiographs April 6, 2014  VIEWS-   AP frontal    1 image   FINDINGS-   The cardiomediastinal silhouette is enlarged  Atherosclerotic changes   in the aorta  Lung volumes diminished  Elevation of right hemidiaphragm  Bony thorax unremarkable  IMPRESSION- Diminished inspiration  No active disease  Transcribed on- [id number]           [facility]         Reading Radiologist- DANNY Ding DO        Electronically Signed- DANNY Ding DO        Released Date Time- 01/21/15 0912      ------------------------------------------------------------------------------   Muna ROUSE      Results for orders placed during the hospital encounter of 06/04/18   X-ray chest 2 views    Narrative CHEST     INDICATION:   Chest pain  COMPARISON:  Chest x-ray of 6/1/2018    EXAM PERFORMED/VIEWS:  AP portable semierect and lateral      FINDINGS:    There is stable moderate cardiomegaly  Thoracic aorta is uncoiled  Right hemidiaphragm is mild to moderately elevated, stable  The lungs are clear  No pneumothorax or pleural effusion  There is moderate anterior wedging of a mid thoracic and lower thoracic vertebral body, unchanged  Scattered degenerative spurring is noted along the spine  Impression Stable cardiomegaly  No active pulmonary disease  Workstation performed: [id number]         PATIENT CENTERED ROUNDS: I have performed rounds with the nursing staff            Ang De La Cruz DO

## 2018-06-05 NOTE — CASE MANAGEMENT
Initial Clinical Review    Admission: Date/Time/Statement: 6/4/18 @ 1117     Orders Placed This Encounter   Procedures    Inpatient Admission (expected length of stay for this patient is greater than two midnights)     Standing Status:   Standing     Number of Occurrences:   1     Order Specific Question:   Admitting Physician     Answer:   Anel Elmore [[id number]]     Order Specific Question:   Level of Care     Answer:   Med Surg [[id number]]     Order Specific Question:   Estimated length of stay     Answer:   More than 2 Midnights     Order Specific Question:   Certification     Answer:   I certify that inpatient services are medically necessary for this patient for a duration of greater than two midnights  See H&P and MD Progress Notes for additional information about the patient's course of treatment  ED: Date/Time/Mode of Arrival:   ED Arrival Information     Expected Arrival Acuity Means of Arrival Escorted By Service Admission Type    - 6/4/2018 09:04 Emergent Walk-In Other General Medicine Emergency    Arrival Complaint    chest pain          Chief Complaint:   Chief Complaint   Patient presents with    Shortness of Breath     Patient states he became SOB with left shoulder pain this morning  History of Illness: Ivanna Rose is a 80y o  year old male who presents with shortness of breath  Patient was admitted as an observation patient last week for chest pain which lasted for about 10-12 hours  He was discharged after myocardial ischemia was ruled out  He has had no recurrence of that pain  However yesterday the patient went to bed feeling fine  This morning when he got out of bed to make his breakfast he had exaggerated shortness of breath with minimal exertion  He had no chest pain  The patient had a transition of care call from his primary care physician's office andthey noted he was so dyspneic they sent to the emergency room for evaluation    While in the emergency room patient was noted to have a resting tachycardia with a rate of 115-120 per minute sitting there  This was not the case on his EKG last week  He will be admitted the hospital for evaluation stabilization    ED Vital Signs:   ED Triage Vitals [06/04/18 0916]   Temperature Pulse Respirations Blood Pressure SpO2   98 9 °F (37 2 °C) (!) 123 (!) 24 154/87 95 %      Temp Source Heart Rate Source Patient Position - Orthostatic VS BP Location FiO2 (%)   Temporal Monitor Lying Right arm --      Pain Score       7        Wt Readings from Last 1 Encounters:   06/04/18 76 kg (167 lb 8 8 oz)       Vital Signs (abnormal):   above    Abnormal Labs/Diagnostic Test Results:    VAS  B/L  LE:  No evidence  DVT  D dimer   2,025  BNP   966  AG  15  BUN/Creat   26/1 82  NM   Lung scan;  Low  prob  PE  CXR:    NAD   CM    ED Treatment:   Medication Administration from 06/04/2018 0904 to 06/04/2018 1619       Date/Time Order Dose Route Action Action by Comments     06/04/2018 0957 LORazepam (ATIVAN) tablet 0 5 mg 0 5 mg Oral Given Abigail Carrera RN      06/04/2018 0957 acetaminophen (TYLENOL) tablet 975 mg 975 mg Oral Given Abigali Carrera RN      06/04/2018 1059 levalbuterol (Radha Richer) inhalation solution 1 25 mg 1 25 mg Nebulization Given Mike Feldman RN      06/04/2018 1138 heparin (porcine) injection 6,000 Units 6,000 Units Intravenous Given Mike Feldman RN      06/04/2018 1137 heparin (porcine) 25,000 units in 250 mL infusion (premix) 18 Units/kg/hr Intravenous New Bag Mike Feldman RN           Past Medical/Surgical History:    Active Ambulatory Problems     Diagnosis Date Noted    Hydronephrosis of right kidney 05/14/2016    CAD (coronary artery disease) 05/14/2016    Carotid stenosis 05/14/2016    HTN (hypertension) 05/14/2016    CKD (chronic kidney disease), stage III 05/14/2016    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/26/2013    Benign prostatic hyperplasia with lower urinary tract symptoms 05/07/2012    GERD (gastroesophageal reflux disease) 06/06/2014    Chronic obstructive pulmonary disease ([place] [address] ) 05/07/2012    Depression with anxiety 04/08/2013    Headache 01/20/2017    Hyperlipidemia 05/07/2012    Hypothyroidism 05/07/2012    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    Insomnia 08/18/2016    Iron deficiency anemia 10/09/2012    Macular degeneration 11/15/2014    Osteoporosis 05/07/2012    Other atopic dermatitis 08/16/2017     Resolved Ambulatory Problems     Diagnosis Date Noted    Chest pain 05/14/2016    Epigastric pain 05/14/2016    H/O: GI bleed 05/14/2016    Unstable angina ([place] [address] ) 06/01/2018     Past Medical History:   Diagnosis Date    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    [facility] vertebra [facility]     COPD (chronic obstructive pulmonary disease) [facility]     Disease of thyroid gland     Heart attack ([place] [address] )     Hollenhorst plaque, right eye     Hyperlipidemia     Hypertension     Iron deficiency anemia due to chronic blood loss     Ischemic colitis ([place] [address] )     Osteoarthritis of both hands     Peptic ulcer     Polymyalgia rheumatica ([facility])     Renal disorder     Upper GI hemorrhage     Varicose veins of lower extremity        Admitting Diagnosis: Chest pain [R07 9]  Dyspnea [R06 00]  Dyspnea, unspecified type [R06 00]    Age/Sex: 80 y o  male    Assessment/Plan: Shortness of breath  Active Problems:    CAD (coronary artery disease)    HTN (hypertension)    CKD (chronic kidney disease), stage III    Chronic obstructive pulmonary disease (HCC)    Hypothyroidism    Tachycardia        Plan:  Admit Med surge telemetry  Will require greater than 2 midnight stay  Because of the resting tachycardia in the abrupt onset of shortness of breath, need to rule out pulmonary emboli  Because of his chronic kidney disease CT scan cannot be done thus will do V/Q scan as well as scan his legs  Patient is empirically on heparin    He does have a remote history of peptic ulcer disease      Alternatively this could be his COPD  He the patient does have some wheezing       She requests level 3 DNR status      Admission Orders:   IP    6/4  @   1117  Scheduled Meds:   Current Facility-Administered Medications:  acetaminophen 650 mg Oral Q6H PRN Tahir Marin MD   albuterol 2 puff Inhalation Q6H PRN Tahir Marin MD   aspirin 81 mg Oral Daily Tahir Marin MD   atenolol 50 mg Oral Daily Tahir Marin MD   fluticasone-salmeterol 1 puff Inhalation Q12H [facility] Tahir Marin MD   guaiFENesin 200 mg Oral Q4H PRN Shanell Gonsales PA-C   heparin (porcine) 5,000 Units Subcutaneous Q12H [facility] Tahir Marin MD   levothyroxine 75 mcg Oral Early Morning aThir Marin MD   LORazepam 0 5 mg Oral Q8H PRN Tahir Marin MD   methylPREDNISolone sodium succinate 20 mg Intravenous TID Danya Serrano DO   pantoprazole 40 mg Oral Early Morning Tahir Marin MD   temazepam 15 mg Oral HS PRN Tahir Marin MD     Continuous Infusions:    PRN Meds:   acetaminophen    albuterol    guaiFENesin    LORazepam    temazepam     Cons cardiology  4 Gm Na  diet

## 2018-06-05 NOTE — PHYSICIAN ADVISOR
Current patient class: Inpatient  The patient is currently on Hospital Day: 2      The patient was admitted to the hospital at 03 91 12 17 13 on 6/1/18 for the following diagnosis:  Unstable angina ([address] ) [I20 0]  Chest pain [R07 9]       There is documentation in the medical record of an expected length of stay of at least 2 midnights  The patient is therefore expected to satisfy the 2 midnight benchmark and given the 2 midnight presumption is appropriate for INPATIENT ADMISSION  Given this expectation of a satisfying stay, CMS instructs us that the patient is most often appropriate for inpatient admission under part A provided medical necessity is documented in the chart  After review of the relevant documentation, labs, vital signs and test results, the patient is appropriate for INPATIENT ADMISSION  Admission to the hospital as an inpatient is a complex decision making process which requires the practitioner to consider the patients presenting complaint, history and physical examination and all relevant testing  With this in mind, in this case, the patient was deemed appropriate for INPATIENT ADMISSION  After review of the documentation and testing available at the time of the admission I concur with this clinical determination of medical necessity  Rationale is as follows: The patient is a 80 yrs old Male who presented to the ED at 6/1/2018 12:28 PM with a chief complaint of Chest Pain (Patient reports pressure acorss entire chest that woke him from sleepo at 0200  reports that it has been continuous  Denies that pain radiates  Reports pain is 10/10  Denies n/v, diaphoresis  Admits to increased SOB)     The patient was admitted to the hospital as an inpatient with an expected length of stay  There is documentation present in the medical record that the patient had an unexpected early recovery   Given the information present at the time of admission and the expectation of a 2 midnight length of stay an inpatient order was appropriate  Given the documentation of unexpected early recovery, which is one of the exclusion criteria for the 2 midnight benchmark, the patient is appropriate to remain in an inpatient status  The patient is appropriate for INPATIENT ADMISSION  Given the need for further hospitalization, and along with the documentation of medical necessity present in the chart, the patient is appropriate for inpatient admission  The patient is expected to satisfy the 2 midnight benchmark, and will require further acute medical care  The patient does have comorbid conditions which increases the risk for significant adverse outcome  Given this the patient is appropriate for inpatient admission        The patients vitals on arrival were ED Triage Vitals   Temperature Pulse Respirations Blood Pressure SpO2   06/01/18 1232 06/01/18 1232 06/01/18 1232 06/01/18 1315 06/01/18 1232   97 9 °F (36 6 °C) 97 22 127/72 98 %      Temp Source Heart Rate Source Patient Position - Orthostatic VS BP Location FiO2 (%)   06/01/18 1232 06/01/18 1232 06/01/18 1315 06/01/18 1315 --   Temporal Monitor Sitting Left arm       Pain Score       06/01/18 1235       Worst Possible Pain           Past Medical History:   Diagnosis Date    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    Compression fracture of thoracic vertebra (HCC)     last assessed 05/07/2012    [facility]     Disease of thyroid gland     [facility]     Hollenhorst plaque, right eye     last assessed 04/08/2013    Hyperlipidemia     Hypertension     Iron deficiency anemia due to chronic blood loss     last assessed 09/10/2012    Ischemic colitis ([facility] )     last assessed 05/27/2014    Osteoarthritis of both hands     last assessedn 04/30/2013    Peptic ulcer     last assessed 06/14/2013    Polymyalgia rheumatica ([facility] )     last assessesd 10/09/2012    Renal disorder     Upper GI hemorrhage last assessed 01/29/2015    Varicose veins of lower extremity     last assessed 04/26/2013     Past Surgical History:   Procedure Laterality Date    CORONARY ARTERY BYPASS GRAFT      HEMORRHOID SURGERY      HERNIA REPAIR      RENAL CYST EXCISION      resolved 05/2010    THROMBOENDARTERECTOMY Right     carotid, last assessed 06/18/2013           Consults have been placed to:   None    Vitals:    06/01/18 1825 06/01/18 2343 06/02/18 0700 06/02/18 0817   BP: 136/72 144/70 113/63    BP Location: Right arm Left arm Left arm    Pulse: 100 100 89    Resp: 22 20 18    Temp: 98 1 °F (36 7 °C) 99 1 °F (37 3 °C) 98 7 °F (37 1 °C)    TempSrc: Oral Oral Oral    SpO2: 94% 91% 93% 93%   Weight: 75 kg (165 lb 5 5 oz)  76 5 kg (168 lb 10 4 oz)    Height: 5' 6" (1 676 m)          Most recent labs:    Recent Labs      06/02/18   0456  06/04/18   0916  06/04/18   0917  06/04/18   1104   WBC  7 44   --   9 11   --    HGB  12 5   --   15 0   --    HCT  37 6   --   44 9   --    PLT  93*   --   155   --    K  4 1  4 2   --    --    NA  138  139   --    --    CALCIUM  8 5  10 0   --    --    BUN  28*  26*   --    --    CREATININE  1 67*  1 82*   --    --    INR   --    --   1 07   --    TROPONINI   --    --   0 04  0 02   AST  15  19   --    --    ALT  16  21   --    --    ALKPHOS  64  75   --    --    BILITOT  1 00  0 80   --    --        Scheduled Meds:  Facility-Administered Medications Ordered in Other Encounters:  acetaminophen 650 mg Oral Q6H PRN Tete Martin MD   albuterol 2 puff Inhalation Q6H PRN Tete Martin MD   aspirin 81 mg Oral Daily Tete Martin MD   atenolol 50 mg Oral Daily Tete Martin MD   fluticasone-salmeterol 1 puff Inhalation Q12H [address] Tete Martin MD   guaiFENesin 200 mg Oral Q4H PRN Hernán Cosme PA-C   [START ON 6/5/2018] heparin (porcine) 5,000 Units Subcutaneous Q12H [address] Tete Martin MD   [START ON 6/5/2018] levothyroxine 75 mcg Oral Early Morning Tete Martin MD   LORazepam 0 5 mg Oral Q8H PRN MELANIE Daniel Espinoza MD   [START ON 6/5/2018] pantoprazole 40 mg Oral Early Morning Desmond Cottrell MD   temazepam 15 mg Oral HS PRN Desmond Cottrell MD     Continuous Infusions:  No current facility-administered medications for this encounter  PRN Meds:      Surgical procedures (if appropriate):

## 2018-06-05 NOTE — PLAN OF CARE
Problem: Potential for Falls  Goal: Patient will remain free of falls  INTERVENTIONS:  - Assess patient frequently for physical needs  -  Identify cognitive and physical deficits and behaviors that affect risk of falls    -  [place] fall precautions as indicated by assessment   - Educate patient/family on patient safety including physical limitations  - Instruct patient to call for assistance with activity based on assessment  - Modify environment to reduce risk of injury  - Consider OT/PT consult to assist with strengthening/mobility   Outcome: Progressing

## 2018-06-05 NOTE — RESPIRATORY THERAPY NOTE
RT Protocol Note  Cristiana Mage 80 y o  male MRN: [id number]  Unit/Bed#: [address] [place] [address] Encounter: [id number]    Assessment    Principal Problem:    Shortness of breath  Active Problems:    CAD (coronary artery disease)    HTN (hypertension)    CKD (chronic kidney disease), stage III    Chronic obstructive pulmonary disease (HCC)    Hypothyroidism    Tachycardia      Home Pulmonary Medications:Albuterol 2p q 6 prn       Past Medical History:   Diagnosis Date    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    Compression fracture of thoracic vertebra (HCC)     last assessed 05/07/2012    COPD [facility]chronic obstructive [facility] disease) [facility] [address] [facility]     Disease of thyroid gland     [facility] attack ([facility] )     Hollenhorst plaque, right eye     last assessed 04/08/2013    Hyperlipidemia     Hypertension     Iron deficiency anemia due to chronic blood loss     last assessed 09/10/2012    Ischemic colitis ([facility] )     last assessed 05/27/2014    Osteoarthritis of both hands     last assessedn 04/30/2013    Peptic ulcer     last assessed 06/14/2013    Polymyalgia rheumatica ([facility] )     last assessesd 10/09/2012    Renal disorder     Upper GI hemorrhage     last assessed 01/29/2015    Varicose veins of lower extremity     last assessed 04/26/2013     Social History     Social History    Marital status:      Spouse name: N/A    Number of children: N/A    Years of education: N/A     Social History Main Topics    Smoking status: Former Smoker    Smokeless tobacco: Never Used      Comment: Quit 40 yrs  ago    Alcohol use No      Comment: Last drink 30 yrs   ago    Drug use: No    Sexual activity: Not Asked     Other Topics Concern    None     Social History Narrative     per [employer]    Always uses seat belt       Subjective         Objective    Physical Exam:   Assessment Type: Pre-treatment  General Appearance: Alert  Respiratory Pattern: Normal  Chest Assessment: Chest expansion symmetrical  Bilateral Breath Sounds: Diminished, Clear  Cough: Strong, Dry, Non-productive    Vitals:  Blood pressure 131/78, pulse (!) 117, temperature 97 9 °F (36 6 °C), temperature source Oral, resp  rate 21, height 5' 6" (1 676 m), weight 76 kg (167 lb 8 8 oz), SpO2 95 %  Results from last 7 days  Lab Units 06/04/18  1139   CATARINO TEST  Postive Catarino Test       Imaging and other studies: I have personally reviewed pertinent reports              Plan    Respiratory Plan: Home Bronchodilator Patient pathway

## 2018-06-06 VITALS
RESPIRATION RATE: 18 BRPM | HEART RATE: 66 BPM | SYSTOLIC BLOOD PRESSURE: 120 MMHG | DIASTOLIC BLOOD PRESSURE: 68 MMHG | BODY MASS INDEX: 26.93 KG/M2 | OXYGEN SATURATION: 93 % | WEIGHT: 167.55 LBS | HEIGHT: 66 IN | TEMPERATURE: 97.5 F

## 2018-06-06 PROBLEM — R00.0 TACHYCARDIA: Status: RESOLVED | Noted: 2018-06-04 | Resolved: 2018-06-06

## 2018-06-06 PROCEDURE — 94761 N-INVAS EAR/PLS OXIMETRY MLT: CPT

## 2018-06-06 PROCEDURE — 94760 N-INVAS EAR/PLS OXIMETRY 1: CPT

## 2018-06-06 PROCEDURE — 99232 SBSQ HOSP IP/OBS MODERATE 35: CPT | Performed by: INTERNAL MEDICINE

## 2018-06-06 PROCEDURE — 94640 AIRWAY INHALATION TREATMENT: CPT

## 2018-06-06 RX ORDER — FUROSEMIDE 20 MG/1
20 TABLET ORAL DAILY
Status: DISCONTINUED | OUTPATIENT
Start: 2018-06-06 | End: 2018-06-07 | Stop reason: HOSPADM

## 2018-06-06 RX ORDER — PREDNISONE 20 MG/1
20 TABLET ORAL 2 TIMES DAILY WITH MEALS
Status: DISCONTINUED | OUTPATIENT
Start: 2018-06-06 | End: 2018-06-07 | Stop reason: HOSPADM

## 2018-06-06 RX ADMIN — PREDNISONE 20 MG: 20 TABLET ORAL at 17:10

## 2018-06-06 RX ADMIN — FUROSEMIDE 20 MG: 20 TABLET ORAL at 12:35

## 2018-06-06 RX ADMIN — FLUTICASONE PROPIONATE AND SALMETEROL 1 PUFF: 50; 250 POWDER RESPIRATORY (INHALATION) at 08:39

## 2018-06-06 RX ADMIN — HEPARIN SODIUM 5000 UNITS: 5000 INJECTION, SOLUTION INTRAVENOUS; SUBCUTANEOUS at 05:53

## 2018-06-06 RX ADMIN — ACETAMINOPHEN 650 MG: 325 TABLET, FILM COATED ORAL at 02:05

## 2018-06-06 RX ADMIN — HEPARIN SODIUM 5000 UNITS: 5000 INJECTION, SOLUTION INTRAVENOUS; SUBCUTANEOUS at 17:10

## 2018-06-06 RX ADMIN — LORAZEPAM 0.5 MG: 0.5 TABLET ORAL at 19:50

## 2018-06-06 RX ADMIN — LEVOTHYROXINE SODIUM 75 MCG: 75 TABLET ORAL at 05:53

## 2018-06-06 RX ADMIN — GUAIFENESIN 200 MG: 100 SOLUTION ORAL at 06:51

## 2018-06-06 RX ADMIN — GUAIFENESIN 200 MG: 100 SOLUTION ORAL at 02:05

## 2018-06-06 RX ADMIN — METHYLPREDNISOLONE SODIUM SUCCINATE 20 MG: 40 INJECTION, POWDER, FOR SOLUTION INTRAMUSCULAR; INTRAVENOUS at 09:57

## 2018-06-06 RX ADMIN — PANTOPRAZOLE SODIUM 40 MG: 40 TABLET, DELAYED RELEASE ORAL at 05:53

## 2018-06-06 RX ADMIN — GUAIFENESIN 200 MG: 100 SOLUTION ORAL at 14:58

## 2018-06-06 RX ADMIN — FLUTICASONE PROPIONATE AND SALMETEROL 1 PUFF: 50; 250 POWDER RESPIRATORY (INHALATION) at 19:46

## 2018-06-06 RX ADMIN — ATENOLOL 50 MG: 50 TABLET ORAL at 09:57

## 2018-06-06 RX ADMIN — TEMAZEPAM 15 MG: 15 CAPSULE ORAL at 21:10

## 2018-06-06 RX ADMIN — GUAIFENESIN 200 MG: 100 SOLUTION ORAL at 10:10

## 2018-06-06 RX ADMIN — LORAZEPAM 0.5 MG: 0.5 TABLET ORAL at 09:57

## 2018-06-06 RX ADMIN — GUAIFENESIN 200 MG: 100 SOLUTION ORAL at 20:27

## 2018-06-06 NOTE — PHYSICIAN ADVISOR
Current patient class: Inpatient  The patient is currently on Hospital Day: 3      The patient was admitted to the hospital  on 6/4/18 at 1117 for the following diagnosis:  Chest pain [R07 9]  Dyspnea [R06 00]  Dyspnea, unspecified type [R06 00]       There is documentation in the medical record of an expected length of stay of at least 2 midnights  The patient is therefore expected to satisfy the 2 midnight benchmark and given the 2 midnight presumption is appropriate for INPATIENT ADMISSION  Given this expectation of a satisfying stay, CMS instructs us that the patient is most often appropriate for inpatient admission under part A provided medical necessity is documented in the chart  After review of the relevant documentation, labs, vital signs and test results, the patient is appropriate for INPATIENT ADMISSION  Admission to the hospital as an inpatient is a complex decision making process which requires the practitioner to consider the patients presenting complaint, history and physical examination and all relevant testing  With this in mind, in this case, the patient was deemed appropriate for INPATIENT ADMISSION  After review of the documentation and testing available at the time of the admission I concur with this clinical determination of medical necessity  The patient does have an inpatient admission within the previous 30 days  The patient was admitted on 6/1/18 and discharged on 6/2/18 as an inpatient  The patient therefore required readmission review  In this case the patient should be considered a RELATED INPATIENT ADMISSION  Rationale is as follows:     The patient is a 80 yrs   Male who presented to the ED at 6/4/2018  9:07 AM with a chief complaint of Shortness of Breath (Patient states he became SOB with left shoulder pain this morning )     Given the need for further hospitalization, and along with the documentation of medical necessity present in the chart, the patient is appropriate for inpatient admission  The patient is expected to satisfy the 2 midnight benchmark, and will require further acute medical care  The patient does have comorbid conditions which increases the risk for significant adverse outcome  Given this the patient is appropriate for inpatient admission        The patients vitals on arrival were ED Triage Vitals [06/04/18 0916]   Temperature Pulse Respirations Blood Pressure SpO2   98 9 °F (37 2 °C) (!) 123 (!) 24 154/87 95 %      Temp Source Heart Rate Source Patient Position - Orthostatic VS BP Location FiO2 (%)   Temporal Monitor Lying Right arm --      Pain Score       7           Past Medical History:   Diagnosis Date    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    Compression fracture of thoracic vertebra ([facility] [address] [facility] )     last assessed 05/07/2012    COPD (chronic obstructive pulmonary disease) ([facility] [address] )     Disease of thyroid gland     Heart attack ([facility] [address] )     Hollenhorst plaque, right eye     last assessed 04/08/2013    Hyperlipidemia     Hypertension     Iron deficiency anemia due to chronic blood loss     last assessed 09/10/2012    Ischemic colitis ([facility] [address] )     last assessed 05/27/2014    Osteoarthritis of both hands     last assessedn 04/30/2013    Peptic ulcer     last assessed 06/14/2013    Polymyalgia rheumatica ([facility] [address] )     last assessesd 10/09/2012    Renal disorder     Upper GI hemorrhage     last assessed 01/29/2015    Varicose veins of lower extremity     last assessed 04/26/2013     Past Surgical History:   Procedure Laterality Date    CORONARY ARTERY BYPASS GRAFT      HEMORRHOID SURGERY      HERNIA REPAIR      RENAL CYST EXCISION      resolved 05/2010    THROMBOENDARTERECTOMY Right     carotid, last assessed 06/18/2013           Consults have been placed to:   IP CONSULT TO CARDIOLOGY  IP CONSULT TO CASE MANAGEMENT    Vitals:    06/05/18 0755 06/05/18 0829 06/05/18 1533 06/05/18 2348   BP: 126/58  123/61 153/75   BP Location: Left arm  Right arm Right arm   Pulse: 75  79 80   Resp: 20  20 18   Temp: (!) 97 3 °F (36 3 °C)  98 5 °F (36 9 °C) (!) 97 3 °F (36 3 °C)   TempSrc: Oral  Oral Oral   SpO2: 93% 96% 92% 94%   Weight:       Height:           Most recent labs:    Recent Labs      06/04/18   0916   06/04/18   0917  06/04/18   1104  06/05/18   0515   WBC   --    --   9 11   --    --    HGB   --    --   15 0   --    --    HCT   --    --   44 9   --    --    PLT   --    --   155   --    --    K  4 2   --    --    --   4 1   NA  139   --    --    --   138   CALCIUM  10 0   --    --    --   9 0   BUN  26*   --    --    --   22   CREATININE  1 82*   --    --    --   1 62*   INR   --    --   1 07   --    --    TROPONINI   --    < >  0 04  0 02   --    AST  19   --    --    --    --    ALT  21   --    --    --    --    ALKPHOS  75   --    --    --    --    BILITOT  0 80   --    --    --    --     < > = values in this interval not displayed         Scheduled Meds:  Current Facility-Administered Medications:  acetaminophen 650 mg Oral Q6H PRN Lilliam Grissom MD   albuterol 2 puff Inhalation Q6H PRN Lilliam Grissom MD   aspirin 81 mg Oral Daily Lilliam Grissom MD   atenolol 50 mg Oral Daily Lilliam Grissom MD   fluticasone-salmeterol 1 puff Inhalation Q12H [address] Lilliam Grissom MD   guaiFENesin 200 mg Oral Q4H PRN Jerilee Sandifer, PA-C   heparin (porcine) 5,000 Units Subcutaneous Q12H [address] Lilliam Grissom MD   levothyroxine 75 mcg Oral Early Morning Lilliam Grissom MD   LORazepam 0 5 mg Oral Q8H PRN Lilliam Grissom MD   methylPREDNISolone sodium succinate 20 mg Intravenous TID Henrique Foote DO   pantoprazole 40 mg Oral Early Morning Lilliam Grissom MD   temazepam 15 mg Oral HS PRN Ephriam MD Praveena     Continuous Infusions:   PRN Meds:   acetaminophen    albuterol    guaiFENesin    LORazepam    temazepam    Surgical procedures (if appropriate):

## 2018-06-06 NOTE — PROGRESS NOTES
Pt observed to be sleeping for approx half of hrly rounds overnight; states Tylenol effective for c/o headache and PRN cough med effective  Denies pain/dyspnea

## 2018-06-06 NOTE — PROGRESS NOTES
Progress Note - Daphne Turpin 80 y o  male MRN: [id number]    Unit/Bed#: [address] [place] [address] [id number] Encounter: [id number]      Assessment:  Principal Problem:    Shortness of breath  Active Problems:    Chronic obstructive pulmonary disease (HCC)    CAD (coronary artery disease)    HTN (hypertension)    CKD (chronic kidney disease), stage III    Hypothyroidism  Resolved Problems:    Tachycardia        Plan:  · COPD exacerbation-will change to p o  prednisone from the IV steroids--shortness of breath is gradually improving-hope to have him stable for discharge tomorrow  · Coronary artery disease-cardiology input appreciated-will start on low-dose Lasix at 20 mg daily as well  · Hypertension-had been listed as being on 2 beta-blockers on presentation now on atenolol and blood pressure control is good at 138/65  · Hypothyroidism-TSH is normal at 2 82-continue on current doses of Synthroid 75 mcg daily  · Chronic kidney disease stage 3-stable-baseline creatinine now down to 1 62 with estimated GFR of 38    Subjective:   Patient with less cough and shortness of breath this morning  He denies any chest pain or lightheadedness  ROS  Comprehensive system review negative other than noted above    Objective:     Vitals: Blood pressure 138/65, pulse 76, temperature 98 7 °F (37 1 °C), temperature source Axillary, resp  rate 18, height 5' 6" (1 676 m), weight 76 kg (167 lb 8 8 oz), SpO2 94 %  ,Body mass index is 27 04 kg/m²    Current Facility-Administered Medications   Medication Dose Route Frequency Provider Last Rate Last Dose    acetaminophen (TYLENOL) tablet 650 mg  650 mg Oral Q6H PRN Yudith Bueno MD   650 mg at 06/06/18 0205    albuterol (PROVENTIL HFA,VENTOLIN HFA) inhaler 2 puff  2 puff Inhalation Q6H PRN Yudith Bueon MD        aspirin (ECOTRIN LOW STRENGTH) EC tablet 81 mg  81 mg Oral Daily Yudith Bueno MD        atenolol (TENORMIN) tablet 50 mg  50 mg Oral Daily Yudith Bueno MD   50 mg at 06/06/18 0957    fluticasone-salmeterol (ADVAIR) 250-50 mcg/dose inhaler 1 puff  1 puff Inhalation Q12H [address] Ingris London MD   1 puff at 06/06/18 0839    furosemide (LASIX) tablet 20 mg  20 mg Oral Daily Michelle Fly, DO        guaiFENesin (ROBITUSSIN) oral solution 200 mg  200 mg Oral Q4H PRN Marjorie Espinal PA-C   200 mg at 06/06/18 1010    heparin (porcine) subcutaneous injection 5,000 Units  5,000 Units Subcutaneous Q12H [address] Ingris London MD   5,000 Units at 06/06/18 0553    levothyroxine tablet 75 mcg  75 mcg Oral Early Morning Ingris London MD   75 mcg at 06/06/18 0553    LORazepam (ATIVAN) tablet 0 5 mg  0 5 mg Oral Q8H PRN Ingris London MD   0 5 mg at 06/06/18 0957    pantoprazole (PROTONIX) EC tablet 40 mg  40 mg Oral Early Morning Ingris London MD   40 mg at 06/06/18 0553    predniSONE tablet 20 mg  20 mg Oral BID With Meals Michelle Fly, DO        temazepam (RESTORIL) capsule 15 mg  15 mg Oral HS PRN Inrgis London MD   15 mg at 06/05/18 2118     Prescriptions Prior to Admission   Medication    albuterol (VENTOLIN HFA) 90 mcg/act inhaler    aspirin 81 MG tablet    atenolol (TENORMIN) 50 mg tablet    B Complex Vitamins (B COMPLEX-B12 PO)    Cholecalciferol ([employer] VITAMIN D) 2000 units CAPS    Ferrous Sulfate (IRON) 325 (65 Fe) MG TABS    fluticasone furoate-vilanterol (BREO ELLIPTA)    guaiFENesin-codeine (ROBITUSSIN AC) 100-10 mg/5 mL oral solution    Krill Oil 1000 MG CAPS    levothyroxine 75 mcg tablet    LORazepam (ATIVAN) 0 5 mg tablet    menthol-zinc oxide (CALMOSPETINE) 0 44-20 625 %    metoprolol succinate (TOPROL-XL) 25 mg 24 hr tablet    Multiple Vitamins-Minerals (VISION FORMULA/LUTEIN PO)    naproxen sodium (ALEVE) 220 MG tablet    nystatin-triamcinolone (MYCOLOG-II) cream    omeprazole (PriLOSEC) 20 mg delayed release capsule    zolpidem (AMBIEN) 5 mg tablet       No intake or output data in the 24 hours ending 06/06/18 1122    Physical Exam:  General appearance: distracted  Neck: no adenopathy, no JVD and thyroid not enlarged, symmetric, no tenderness/mass/nodules  Lungs: No stridor, minimal forced end-expiratory wheezes  No crackles in bases  Heart: regular rate and rhythm, S1, S2 normal, no murmur, click, rub or gallop  Abdomen: soft, non-tender; bowel sounds normal; no masses,  no organomegaly  Extremities: extremities normal, warm and well-perfused; no cyanosis, clubbing, or edema  Skin: Skin color, texture, turgor normal  No rashes or lesions  Neurologic:  No focal motor deficits  Speech is clear       Lab, Imaging and other studies: I have personally reviewed pertinent reports  Results from last 7 days  Lab Units 06/04/18  0917 06/02/18  0456 06/01/18  1237   WBC Thousand/uL 9 11 7 44 8 73   HEMOGLOBIN g/dL 15 0 12 5 14 9   HEMATOCRIT % 44 9 37 6 44 7   PLATELETS Thousands/uL 155 93* 133*   NEUTROS PCT % 74 65 67   LYMPHS PCT % 13* 18 17   MONOS PCT % 11 15* 14*   EOS PCT % 2 1 2       Results from last 7 days  Lab Units 06/05/18  0515 06/04/18  0916 06/02/18  0456 06/01/18  1237   SODIUM mmol/L 138 139 138 137   POTASSIUM mmol/L 4 1 4 2 4 1 4 2   CHLORIDE mmol/L 105 102 105 101   CO2 mmol/L 22 22 23 25   BUN mg/dL 22 26* 28* 36*   CREATININE mg/dL 1 62* 1 82* 1 67* 1 72*   CALCIUM mg/dL 9 0 10 0 8 5 9 4   TOTAL PROTEIN g/dL  --  8 6* 6 6 8 2   BILIRUBIN TOTAL mg/dL  --  0 80 1 00 0 70   ALK PHOS U/L  --  75 64 81   ALT U/L  --  21 16 26   AST U/L  --  19 15 24   GLUCOSE RANDOM mg/dL 107 159* 82 120     Lab Results   Component Value Date    TROPONINI 0 02 06/04/2018    TROPONINI 0 04 06/04/2018    TROPONINI <0 02 06/01/2018    TROPONINI <0 04 01/21/2015    TROPONINI <0 04 01/21/2015    TROPONINI 0 04 01/21/2015    CKTOTAL 61 05/26/2017    CKTOTAL 75 04/06/2014       Results from last 7 days  Lab Units 06/04/18  0917 06/01/18  1237   INR  1 07 1 07     Lab Results   Component Value Date    BLOODCX No Growth After 5 Days  03/18/2018    BLOODCX No Growth After 5 Days   03/18/2018    BLOODCX No Growth After 5 Days  01/16/2017    BLOODCX No Growth After 5 Days  01/16/2017       Imaging:  Results for orders placed in visit on 01/21/15   XR chest portable    Narrative CHEST  PORTABLE   INDICATION- Vomiting blood since 4-30 AM   Tachycardia  COMPARISON-  Chest radiographs April 6, 2014  VIEWS-   AP frontal    1 image   FINDINGS-   The cardiomediastinal silhouette is enlarged  Atherosclerotic changes   in the aorta  Lung volumes diminished  Elevation of right hemidiaphragm  Bony thorax unremarkable  IMPRESSION- Diminished inspiration  No active disease  Transcribed on- [id number]           [address] [facility]         Reading Radiologist- DANNY Herrera DO        Electronically Signed- DANNY Herrera DO        Released Date Time- 01/21/15 0912      ------------------------------------------------------------------------------   Rubin ROUSE      Results for orders placed during the hospital encounter of 06/04/18   X-ray chest 2 views    Narrative CHEST     INDICATION:   Chest pain  COMPARISON:  Chest x-ray of 6/1/2018    EXAM PERFORMED/VIEWS:  AP portable semierect and lateral      FINDINGS:    There is stable moderate cardiomegaly  Thoracic aorta is uncoiled  Right hemidiaphragm is mild to moderately elevated, stable  The lungs are clear  No pneumothorax or pleural effusion  There is moderate anterior wedging of a mid thoracic and lower thoracic vertebral body, unchanged  Scattered degenerative spurring is noted along the spine  Impression Stable cardiomegaly  No active pulmonary disease  Workstation performed: [id number]         PATIENT CENTERED ROUNDS: I have performed rounds with the nursing staff            Hugo Kahn,

## 2018-06-06 NOTE — RESPIRATORY THERAPY NOTE
sl oxygen          Home Oxygen Qualifying Test       Patient name: Ortiz Barajas        : 1932   Date of Test:  2018  Diagnosis:      Home Oxygen Test:    **Medicare Guidelines require item(s) 1-5 on all ambulatory patients or 1 and 2 on on-ambulatory patients  1   Baseline SPO2 on Room Air at rest  95  %  If = or < 88% on room air add O2 via NC and titrate patient  Patient must be ambulated with O2 and titrated to > 88% with exertion  2   SPO2 on Oxygen at rest 95 %  3   SPO2 during exercise on Room Air 92% to 95 %  4   SPO2 during exercise on Oxygen  % at a liter flow of  lpm     5   Exercise performed:    [x] Walking     [] Stairs     [x] Duration  (min )     [x] Distance 350  (ft )       []  Supplemental Home Oxygen is indicated  [x]  Client does not qualify for home oxygen        King Crum , RT

## 2018-06-07 ENCOUNTER — TRANSITIONAL CARE MANAGEMENT (OUTPATIENT)
Dept: FAMILY MEDICINE CLINIC | Facility: HOSPITAL | Age: 83
End: 2018-06-07

## 2018-06-07 ENCOUNTER — PATIENT OUTREACH (OUTPATIENT)
Dept: FAMILY MEDICINE CLINIC | Facility: HOSPITAL | Age: 83
End: 2018-06-07

## 2018-06-07 PROCEDURE — 99238 HOSP IP/OBS DSCHRG MGMT 30/<: CPT | Performed by: NURSE PRACTITIONER

## 2018-06-07 NOTE — PROGRESS NOTES
Outpatient Care Management Note:    Hernán Ortezorest following up after recent hospital admission SLQ  He left AMA at 01:15 am 6/7/18  He states that he was frustrated with medication issues: stating, " I had to tell them when my meds were due or I never got them"  He complained that he was getting conflicting instructions on how to use his inhalers and then he never got his sleeping med  He decided that it would be better to leave  Care manager expressed my concern that he was on Lasix and Prednisone in the hospital to help with his SOB, and now he does not have these prescriptions  Maciel Casas states he scheduled an appointment with Dr Celine Burkett on 6/13/18  He denies SOB currently  He told me that he needs to get off the phone now  Care manager gave him my contact number and requested he call me if he has any issues or concerns

## 2018-06-07 NOTE — DISCHARGE SUMMARY
Discharge- Lan Wilson 6/30/1932, 80 y o  male MRN: [id number]    Unit/Bed#: [address] [place] [address] Encounter: [id number]    Primary Care Provider: Surjit Caban MD   Date and time admitted to hospital: 6/4/2018  9:07 AM        Hypothyroidism   Assessment & Plan    Continues on levothyroxine  Chronic obstructive pulmonary disease (HCC)   Assessment & Plan    Placed on IV steroids and changed to PO as breathing has improved  CKD (chronic kidney disease), stage III   Assessment & Plan    Creatinine at baseline  HTN (hypertension)   Assessment & Plan    Controlled on atenolol  CAD (coronary artery disease)   Assessment & Plan    Remains on aspirin  * Shortness of breath   Assessment & Plan    Most likely due to exacerbation of his COPD  V/Q scan negative for PE  Resolved Problems  Date Reviewed: 6/7/2018          Resolved    Tachycardia 6/6/2018     Resolved by  Sajan Chou DO          Admission Date:   Admission Orders     Ordered        06/04/18 1117  Inpatient Admission (expected length of stay for this patient is greater than two midnights)  Once               Admitting Diagnosis: Chest pain [R07 9]  Dyspnea [R06 00]  Dyspnea, unspecified type [R06 00]    HPI: Pt presented to ED with SOB worse with exertion  While in ED pt was noted to have resting tachycardia  Procedures Performed:   Orders Placed This Encounter   Procedures    ED ECG Documentation Only       Summary of Hospital Course:  He was placed on Heparin drip for possible PE  Due to CKD a CT to rule out PE could not be done  A V/Q scan was done along with venous duplex which ruled out a PE  Pt was placed on IV steroids for COPD exacerbation which were then changed to PO as pt was improving  He was started on lasix 20mg daily by Cardiology due to CAD  Pt became frustrated with not getting his medications when he needed them  Pt called his son to pick him up and he left AMA         Significant Findings, Care, Treatment and Services Provided:None    Complications: None    Condition at Discharge: stable         Discharge instructions/Information to patient and family:   See after visit summary for information provided to patient and family  Provisions for Follow-Up Care:  See after visit summary for information related to follow-up care and any pertinent home health orders  PCP: Aman Beckford MD    Disposition: Left against medical advice    Planned Readmission: No    Discharge Statement   I spent 15 minutes discharging the patient  This time was spent on the day of discharge  I had direct contact with the patient on the day of discharge  Additional documentation is required if more than 30 minutes were spent on discharge  Discharge Medications:  See after visit summary for reconciled discharge medications provided to patient and family

## 2018-06-07 NOTE — PLAN OF CARE

## 2018-06-13 ENCOUNTER — OFFICE VISIT (OUTPATIENT)
Dept: FAMILY MEDICINE CLINIC | Facility: HOSPITAL | Age: 83
End: 2018-06-13
Payer: MEDICARE

## 2018-06-13 ENCOUNTER — TELEPHONE (OUTPATIENT)
Dept: PULMONOLOGY | Facility: CLINIC | Age: 83
End: 2018-06-13

## 2018-06-13 VITALS
TEMPERATURE: 98.9 F | DIASTOLIC BLOOD PRESSURE: 78 MMHG | HEIGHT: 66 IN | HEART RATE: 100 BPM | WEIGHT: 163.6 LBS | SYSTOLIC BLOOD PRESSURE: 130 MMHG | BODY MASS INDEX: 26.29 KG/M2

## 2018-06-13 DIAGNOSIS — I25.10 CORONARY ARTERY DISEASE INVOLVING NATIVE CORONARY ARTERY OF NATIVE HEART WITHOUT ANGINA PECTORIS: ICD-10-CM

## 2018-06-13 DIAGNOSIS — I10 ESSENTIAL HYPERTENSION: ICD-10-CM

## 2018-06-13 DIAGNOSIS — F06.4 ANXIETY DISORDER DUE TO GENERAL MEDICAL CONDITION: ICD-10-CM

## 2018-06-13 DIAGNOSIS — J44.9 CHRONIC OBSTRUCTIVE PULMONARY DISEASE, UNSPECIFIED COPD TYPE (HCC): ICD-10-CM

## 2018-06-13 DIAGNOSIS — J44.1 CHRONIC OBSTRUCTIVE PULMONARY DISEASE WITH ACUTE EXACERBATION (HCC): Primary | ICD-10-CM

## 2018-06-13 PROCEDURE — 99496 TRANSJ CARE MGMT HIGH F2F 7D: CPT | Performed by: FAMILY MEDICINE

## 2018-06-13 RX ORDER — ALBUTEROL SULFATE 90 UG/1
2 AEROSOL, METERED RESPIRATORY (INHALATION) EVERY 6 HOURS PRN
Qty: 1 INHALER | Refills: 3 | Status: ON HOLD | OUTPATIENT
Start: 2018-06-13 | End: 2019-03-30 | Stop reason: CLARIF

## 2018-06-13 NOTE — TELEPHONE ENCOUNTER
Alessandra Redman from Dr Anirudh Ellis office calling saying that Dr Real Ruiz would like Renzo Goldman seen ASAP  He has been admitted into the hospital twice this month already  He has COPD and stable lung nodules  The doctor says it is not cardiac related  He is requesting for him to be seen in [facility]  Please check with Dr Omaira Hurtado and Dr Derek Love to see who will squeeze him

## 2018-06-13 NOTE — PROGRESS NOTES
Assessment/Plan:         Diagnoses and all orders for this visit:    Chronic obstructive pulmonary disease with acute exacerbation ([facility] )  Comments:  Oximetry on RA at rest was 98  Orders:  -     fluticasone-umeclidinium-vilanterol (TRELEGY ELLIPTA) 100-62 5-25 MCG/INH inhaler; Inhale 1 puff daily Rinse mouth after use  -     Ambulatory referral to Pulmonology; Future    Coronary artery disease involving native coronary artery of native heart without angina pectoris  Comments:  Currently stable cardiac status, will need to follow up with Dr Dana Hernandez in the future    Essential hypertension  Comments:  Excellent stability    Chronic obstructive pulmonary disease, unspecified COPD type ([facility] )  -     albuterol (VENTOLIN HFA) 90 mcg/act inhaler; Inhale 2 puffs every 6 (six) hours as needed for wheezing    Anxiety disorder due to general medical condition  Comments:  OK for now to continue Lorazepam to help reduce his dyspnea exacerbated by anxiety          Subjective:      Patient ID: Gwendolyn Jalloh is a 80 y o  male  Seen in hospital followup with his daughter-in-law for two recent hospital stays  First inpatient for chest pain and SOB, evaluated for cardiac and pulmonary sources and was to follow up with myself and with Dr Dana Hernandez  Before those followups could occur, he returned to hospital for increasing cough, SOB and chest pain  He was very unhappy with his nursing care and that he wasn't having anything done that he couldn't do at home and so left AMA in the middle of the night    Since then he is continuing to cough with anterior chest pain and some whitish sputum production  He is frequently using his rescue inhaler and stopped the Breo inhaler last week because he couldn't tell that he was getting anything out of it  He also has been taking Coricidin every 4 hours for several weeks and had been seeking repeated refills of Doxycycline      He is unable to drive and hasnt been back to the gym as was his regular habit  Family is very concerned because he is seeming to be inadequately managed on what we are doing  He is chronically anxious and has been reliant on Lorazepam dosing 3X a day  The following portions of the patient's history were reviewed and updated as appropriate: allergies, current medications, past family history, past medical history, past social history, past surgical history and problem list     Review of Systems   Constitutional: Positive for chills  Negative for fever and unexpected weight change  HENT: Positive for congestion and postnasal drip  Negative for nosebleeds and sore throat  Eyes: Negative for itching  Respiratory: Positive for cough, shortness of breath and wheezing  Negative for chest tightness  Cardiovascular: Positive for chest pain  Negative for palpitations and leg swelling  Gastrointestinal: Negative  Genitourinary: Negative  Musculoskeletal: Negative  Neurological: Positive for dizziness and light-headedness  Negative for headaches  Psychiatric/Behavioral: The patient is nervous/anxious  Objective:      /78 (BP Location: Left arm, Patient Position: Sitting, Cuff Size: Large)   Pulse 100   Temp 98 9 °F (37 2 °C) (Tympanic)   Ht 5' 6" (1 676 m)   Wt 74 2 kg (163 lb 9 6 oz)   BMI 26 41 kg/m²          Physical Exam   Constitutional: He is oriented to person, place, and time  He appears well-developed and well-nourished  Cardiovascular: Normal rate, regular rhythm, normal heart sounds and intact distal pulses  Musculoskeletal: Normal range of motion  He exhibits no edema  Neurological: He is alert and oriented to person, place, and time  Psychiatric: His speech is normal and behavior is normal  Judgment and thought content normal  His mood appears anxious  Cognition and memory are normal  He exhibits a depressed mood  Nursing note and vitals reviewed

## 2018-06-14 ENCOUNTER — PATIENT OUTREACH (OUTPATIENT)
Dept: FAMILY MEDICINE CLINIC | Facility: HOSPITAL | Age: 83
End: 2018-06-14

## 2018-06-14 NOTE — PROGRESS NOTES
Outpatient Care Management Note:    Vladimir Arzate  He states that he is feeling well  He denies any further chest pain or SOB  He states that he still has a little cough and is bringing up a small amount of white sputum  He does have his pulmonary appointment tomorrow with Dr Sonia Dewitt at 9:20 am  Danny Smith states he filled his prescription for the trelegy ellipta inhaler and has no questions about how to use it  He states that he is taking his medications as ordered  He takes them from the bottles directly and states he never misses a dose  He lives alone, but his son and daughter in law come each week to assist him and drive him to appointments  He denies any falls or pain  Danny Smith has not scheduled a cardiology f/u appt  Care manager will call and get him an appointment  Patient notified of upcoming cardiology appointment: First available appointment is Tuesday 8/21/18 at 10:40 am  Danyn Smith notified that the office has placed him on a cancellation list and will call if a sooner appointment comes available  He does not want to be placed on a cancellation list for other offices-he prefers [facility] only

## 2018-06-15 ENCOUNTER — OFFICE VISIT (OUTPATIENT)
Dept: PULMONOLOGY | Facility: HOSPITAL | Age: 83
End: 2018-06-15
Payer: MEDICARE

## 2018-06-15 VITALS
BODY MASS INDEX: 27.31 KG/M2 | SYSTOLIC BLOOD PRESSURE: 118 MMHG | HEART RATE: 123 BPM | HEIGHT: 64 IN | WEIGHT: 160 LBS | OXYGEN SATURATION: 95 % | RESPIRATION RATE: 14 BRPM | TEMPERATURE: 98.4 F | DIASTOLIC BLOOD PRESSURE: 60 MMHG

## 2018-06-15 DIAGNOSIS — J84.9 INTERSTITIAL LUNG DISORDERS (HCC): ICD-10-CM

## 2018-06-15 DIAGNOSIS — R06.02 SHORTNESS OF BREATH: Primary | ICD-10-CM

## 2018-06-15 DIAGNOSIS — G47.19 EXCESSIVE DAYTIME SLEEPINESS: ICD-10-CM

## 2018-06-15 DIAGNOSIS — R05.9 COUGH: ICD-10-CM

## 2018-06-15 DIAGNOSIS — J44.9 CHRONIC OBSTRUCTIVE PULMONARY DISEASE, UNSPECIFIED COPD TYPE (HCC): ICD-10-CM

## 2018-06-15 DIAGNOSIS — R06.83 SNORING: ICD-10-CM

## 2018-06-15 PROCEDURE — 94618 PULMONARY STRESS TESTING: CPT | Performed by: INTERNAL MEDICINE

## 2018-06-15 PROCEDURE — 99215 OFFICE O/P EST HI 40 MIN: CPT | Performed by: INTERNAL MEDICINE

## 2018-06-15 PROCEDURE — 94060 EVALUATION OF WHEEZING: CPT | Performed by: INTERNAL MEDICINE

## 2018-06-15 RX ORDER — ALBUTEROL SULFATE 2.5 MG/3ML
2.5 SOLUTION RESPIRATORY (INHALATION)
Status: DISCONTINUED | OUTPATIENT
Start: 2018-06-15 | End: 2019-03-30 | Stop reason: CLARIF

## 2018-06-15 RX ORDER — ARFORMOTEROL TARTRATE 15 UG/2ML
15 SOLUTION RESPIRATORY (INHALATION) 2 TIMES DAILY
Qty: 60 VIAL | Refills: 3 | Status: SHIPPED | OUTPATIENT
Start: 2018-06-15 | End: 2018-10-20 | Stop reason: SDUPTHER

## 2018-06-15 RX ORDER — BUDESONIDE 0.5 MG/2ML
0.5 INHALANT ORAL 2 TIMES DAILY
Qty: 60 VIAL | Refills: 3 | Status: SHIPPED | OUTPATIENT
Start: 2018-06-15 | End: 2018-10-20 | Stop reason: SDUPTHER

## 2018-06-15 NOTE — LETTER
June 17, 2018     MD Humberto Trevizo  [address] [facility]  [address] [address]    Patient: Chuy Pandya   YOB: 1932   Date of Visit: 6/15/2018       Dear Dr Bakari Peterson: Thank you for referring Rodrigo Tam to me for evaluation  Below are my notes for this consultation  If you have questions, please do not hesitate to call me  I look forward to following your patient along with you  Sincerely,        Krissy Curtis DO        CC: No Recipients  Krissy Curtis DO  6/15/2018 10:28 PM  Sign at close encounter  Pulmonary Consultation   Chuy Pandya 80 y o  male MRN: [id number]      Reason for consultation: COPD    Requesting physician: Dr Bakari Peterson    Assessment/Plan  79 y/o M with PMHx of HTN, HLD, CAD s/p MI, ischemic colitis, anxiety and COPD with chronic bronchitis who comes in for evaluation and treatment of COPD  1  Dyspnea - multifactorial   A component related to emphysema, interstitial lung disease and pulmonary hypertension secondary to above      -  Manageent of each as below    2  COPD - emphysema noted on CT but PFTS show no obstruction         - Patient is already on Trelegy  More samples of Trelegy were given        -  Overall I think he has poor inhaler technique and will benefit more from nebulizers        -  Will order brovana and budesonide BID via nebulizer to see if that helps         -  Will add on PRN duonebs as needed    3  Abnormal CT - with ground glass and nodular opacities - stable for 2 years  No known exposures       -  Follow repeat CT in the future    4  Chronic cough/post nasal gtt       -  I explained the unconrolled post nasal drip is what is contributing the most to the cough  I recommended a 2 week trial of mucinex and nebulizer  5  Snoring/Excessive daytime sleepiness and pulmonary hypertension  He is at risk for obstructive sleep apnea based on ST0P BANG survey        -  Check a home study to assess for obstructive sleep apnea      -  I discussed in depth the diagnostic studies and treatment options involved with obstructive sleep apnea      -  I also discussed in depth the risk of leaving sleep apnea untreated including hypertension, heart failure, arrhythmia, MI and stroke  -  The patient is agreeable to undergo testing and treatment of obstructive sleep apnea  He understands that pitfalls she may encounter along the way and is willing to attempt CPAP treatment    HPI  79 y/o M with PMHx of th PMHx of HTN, HLD, CAD s/p MI, ischemic colitis, anxiety and COPD w bronchitis who comes in for evaluation and treatment of COPD with exacerbation  He states that his breathing has been progressively getting worse  In addition he has had a bad cough which has been getting worse as well  He complains of lots of post nasal gtt  He has tried over the counter nasal sprays but has not done them with any consistency so as a result they have not been helpful  His breathing has also been getting worse and his PCP had given him samples of Trelegy to replace anoro  He has not yet tried it as he still has anoro left  He also admits to chronic cough and has summered with recurring bronchitis, especially in the cold air for winter  He had a significant smoking history and in addition he had 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[address], 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  COPD    As far as sleep goes he does take ambien to fall asleep  He will still wake up in the middle of the night  He also had snoring and possible witnessed apneas  He is very tired through the day and would like help with that as well        ROS:   Review of Systems Constitutional: Positive for chills, fatigue and fever  Negative for activity change and unexpected weight change  HENT: Positive for congestion, hearing loss and postnasal drip  Negative for drooling, sinus pain and sore throat  Eyes: Negative  Cardiovascular: Negative  Gastrointestinal: Negative  Endocrine: Negative  Genitourinary: Negative  Musculoskeletal: Negative  Skin: Negative  Allergic/Immunologic: Negative  Neurological: Positive for weakness  Negative for syncope and facial asymmetry  Hematological: Bruises/bleeds easily  Psychiatric/Behavioral: Negative for agitation and decreased concentration  The patient is nervous/anxious          Historical Information   Past Medical History:   Diagnosis Date    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    Compression fracture of thoracic vertebra (HCC)     last assessed 05/07/2012    COPD (chronic obstructive pulmonary disease) ([facility] )     Disease of thyroid gland     Heart attack ([facility] )     Hollenhorst plaque, right eye     last assessed 04/08/2013    Hyperlipidemia     Hypertension     Iron deficiency anemia due to chronic blood loss     last assessed 09/10/2012    Ischemic colitis ([facility] )     last assessed 05/27/2014    Osteoarthritis of both hands     last assessedn 04/30/2013    Peptic ulcer     last assessed 06/14/2013    Polymyalgia rheumatica ([facility] )     last assessesd 10/09/2012    Renal disorder     Upper GI hemorrhage     last assessed 01/29/2015    Varicose veins of lower extremity     last assessed 04/26/2013     Past Surgical History:   Procedure Laterality Date    CORONARY ARTERY BYPASS GRAFT      HEMORRHOID SURGERY      HERNIA REPAIR      RENAL CYST EXCISION      resolved 05/2010    THROMBOENDARTERECTOMY Right     carotid, last assessed 06/18/2013     Family History   Problem Relation Age of Onset    Hypertension Mother     Alcohol abuse Mother     Hypertension Father     Alcohol abuse Father     Alcohol abuse Son     Heart disease Family     Stroke Family         syndrome     Social History     Social History    Marital status:      Spouse name: N/A    Number of children: N/A    Years of education: N/A     Occupational History    Not on file  Social History Main Topics    Smoking status: Former Smoker     Packs/day: 2 00     Years: 50 00     Types: Cigarettes     Quit date: 1993    Smokeless tobacco: Never Used      Comment: Quit 40 yrs  ago    Alcohol use No      Comment: Last drink 30 yrs  ago    Drug use: No    Sexual activity: Not on file     Other Topics Concern    Not on file     Social History Narrative     per [employer]    Always uses seat belt       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[address], welding, , 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    Meds/Allergies   Allergies   Allergen Reactions    Pravastatin Anaphylaxis, Photosensitivity and Headache     Reaction Date: [id number]; Other reaction(s): Headache    Acetaminophen-Codeine GI Intolerance    Atorvastatin      Other reaction(s): Leg Cramps  Other reaction(s): Leg Cramps    Codeine Nausea Only    Colestipol GI Intolerance     Reaction Date: [id number];     Contrast  [Iodinated Diagnostic Agents]     Fenofibrate GI Intolerance     Reaction Date: [id number];     Hydrocodone Vomiting    Niacin      Reaction Date: [id number];     Niaspan  [Niacin Er]     Pitavastatin Myalgia    Pneumococcal Polysaccharide Vaccine     Pravastatin Sodium     Simvastatin     Statins Myalgia    Vicoprofen  [Hydrocodone-Ibuprofen] GI Intolerance    Cyclophosphamide Rash    Ioversol Hives       Home medications:  Prior to Admission medications    Medication Sig Start Date End Date Taking?  Authorizing Provider   albuterol (VENTOLIN HFA) 90 mcg/act inhaler Inhale 2 puffs every 6 (six) hours as needed for wheezing 6/13/18  Yes Liberty Renae MD   B Complex Vitamins (B COMPLEX-B12 PO) Take 1 tablet by mouth daily 3/6/13  Yes Historical Provider, MD   Cholecalciferol ([employer] VITAMIN D) 2000 units CAPS Take by mouth   Yes Historical Provider, MD   Ferrous Sulfate (IRON) 325 (65 Fe) MG TABS Take by mouth   Yes Historical Provider, MD   fluticasone-umeclidinium-vilanterol (TRELEGY ELLIPTA) 100-62 5-25 MCG/INH inhaler Inhale 1 puff daily Rinse mouth after use  6/13/18  Yes MD Brandon Scott Islam Oil 1000 MG CAPS Take by mouth   Yes Historical Provider, MD   levothyroxine 75 mcg tablet Levothyroxine Sodium 75 MCG Oral Tablet TAKE 1/2 TALBET IN THE MORNING DAILY  Quantity: 30;  Refills: 5    Sharmaine Thompson MD;  Started 12-Dec-2012 Active 12/12/12  Yes Historical Provider, MD   LORazepam (ATIVAN) 0 5 mg tablet TAKE ONE TABLET BY MOUTH EVERY 8 HOURS AS NEEDED FOR ANXIETY 5/25/18  Yes Elizabeth Leavitt MD   menthol-zinc oxide (CALMOSPETINE) 0 44-20 625 % Apply topically 8/4/14  Yes Historical Provider, MD   metoprolol succinate (TOPROL-XL) 25 mg 24 hr tablet TAKE ONE TABLET BY MOUTH EVERY DAY 4/15/18  Yes Elizabeth Leavitt MD   Multiple Vitamins-Minerals (VISION FORMULA/LUTEIN PO) Take by mouth 11/15/14  Yes Historical Provider, MD   naproxen sodium (ALEVE) 220 MG tablet Take 1 tablet by mouth 1/20/17  Yes Historical Provider, MD   omeprazole (PriLOSEC) 20 mg delayed release capsule Take 20 mg by mouth daily   Yes Historical Provider, MD   zolpidem (AMBIEN) 5 mg tablet Take 1 tablet (5 mg total) by mouth daily at bedtime as needed for sleep 3/16/18  Yes Elizabeth Leavitt MD   arformoterol (BROVANA) 15 mcg/2 mL nebulizer solution Take 1 vial (15 mcg total) by nebulization 2 (two) times a day 6/15/18   Mervin Wetzel DO   atenolol (TENORMIN) 50 mg tablet 25 mg Atenolol 50 MG Oral Tablet take 1/2 tablet by mouth once daily  Quantity: 15;   Refills: 5    Sharmaine Thompson MD;  Started 12-Dec-2012 Active  12/12/12   Historical Provider, MD   budesonide (PULMICORT) 0 5 mg/2 mL nebulizer solution Take 1 vial (0 5 mg total) by nebulization 2 (two) times a day Rinse mouth after use  6/15/18   Anu Ewing DO   guaiFENesin-codeine (ROBITUSSIN AC) 100-10 mg/5 mL oral solution Take 5 mL by mouth 3 (three) times a day as needed for cough 3/22/18   Erasmo Leija MD   ipratropium (ATROVENT) 0 02 % nebulizer solution Take 1 vial (0 5 mg total) by nebulization 4 (four) times a day 6/15/18   Anu Ewing DO   nystatin-triamcinolone (MYCOLOG-II) cream Apply topically 11/9/10   Historical Provider, MD   aspirin 81 MG tablet Take 1 tablet (81 mg total) by mouth daily for 30 days 6/2/18 6/15/18  Man Prater MD   fluticasone furoate-vilanterol (BREO ELLIPTA) Inhale daily 5/27/14 6/15/18  Historical Provider, MD       Vitals:   Blood pressure 118/60, pulse (!) 123, temperature 98 4 °F (36 9 °C), resp  rate 14, height 5' 4" (1 626 m), weight 72 6 kg (160 lb), SpO2 95 % , RA, Body mass index is 27 46 kg/m²  Physical Exam  General: Thin, Awake alert and oriented x 3, conversant without conversational dyspnea, NAD, normal affect  HEENT:  PERRL, Sclera noninjected, nonicteric OU, Nares patent,  no craniofacial abnormalities, Mucous membranes, moist, no oral lesions, normal dentition,   NECK:  Trachea midline, no accessory muscle use, no stridor, no cervical or supraclavicular adenopathy, JVP not elevated  CARDIAC: Reg, single s1/S2, no m/r/g  PULM: Decreased breath sounds, no wheezing or rhonchi  ABD: Normoactive bowel sounds, soft nontender, nondistended, no rebound, no rigidity, no guarding  EXT: No cyanosis, no clubbing, no edema, normal capillary refill  NEURO: no focal neurologic deficits, AAOx3, moving all extremities appropriately    Labs: I have personally reviewed pertinent lab results    Lab Results   Component Value Date    WBC 9 11 06/04/2018    HGB 15 0 06/04/2018    HCT 44 9 06/04/2018    MCV 92 06/04/2018     06/04/2018      Lab Results   Component Value Date    GLUCOSE 107 06/05/2018    CALCIUM 9 0 06/05/2018     06/05/2018    K 4 1 06/05/2018    CO2 22 06/05/2018     06/05/2018    BUN 22 06/05/2018    CREATININE 1 62 (H) 06/05/2018       Imaging and other studies: I have personally reviewed pertinent reports  V/Q scan - 6/4/18  low probability for PE    US lower extremities 6/4/18 - negative for DVT    6/1/18 - IMPRESSION:     No acute noncontrast CT abnormality in the chest to account for the patient's shortness of breath      Chronic findings which are unchanged from prior examinations include emphysematous changes as well as prominence of central pulmonary arterial tree suggesting some element of pulmonary artery hypertension  Coronary artery calcifications and cardiomegaly   are noted      Unchanged groundglass right mid chest pulmonary nodules are reidentified  Additional follow-up chest CT in 2 years is recommended for these groundglass nodules without associated solid tissue, similar at least as far back as 2015      Renal cysts, splenomegaly, and compression deformities, similar from prior examination      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, currently measuring 15 mm in greatest dimension increased from 13 mm in June 2017  This demonstrated vascular enhancement on abdomen CT of April 23, 2010 when this   measured only 9 mm  Pulmonary function testing:   Ching Cruise - FVC - 79%               FEV1 - 85%               FEV1/FVC - 103%    EKG, Pathology, and Other Studies: I have personally reviewed pertinent reports  SUMMARY     LEFT VENTRICLE:  Systolic function was normal  Ejection fraction was estimated to be 60 %  Although no diagnostic regional wall motion abnormality was identified, this possibility cannot be completely excluded on the basis of this study    Wall thickness was at the upper limits of normal   Doppler parameters were consistent with abnormal left ventricular relaxation (grade 1 diastolic dysfunction)      LEFT ATRIUM:  The atrium was mildly dilated      MITRAL VALVE:  There was mild annular calcification  There was mild regurgitation      AORTIC VALVE:  There was very mild stenosis  There was trace regurgitation      TRICUSPID VALVE:  There was mild regurgitation  Pulmonary artery systolic pressure was mildly increased    Estimated peak PA pressure was 40 mmHg      AORTA:  There was mild dilatation of the ascending aorta    Dixon Arroyo, DO  [facility]

## 2018-06-16 NOTE — PROGRESS NOTES
Pulmonary Consultation   Bishop Marr 80 y o  male MRN: [id number]      Reason for consultation: COPD    Requesting physician: Dr Blane Ronquillo    Assessment/Plan  81 y/o M with PMHx of HTN, HLD, CAD s/p MI, ischemic colitis, anxiety and COPD with chronic bronchitis who comes in for evaluation and treatment of COPD  1  Dyspnea - multifactorial   A component related to emphysema, interstitial lung disease and pulmonary hypertension secondary to above      -  Management of each as below    2  COPD - emphysema noted on CT but PFTS show no obstruction         - Patient is already on Trelegy  More samples of Trelegy were given        -  Overall I think he has poor inhaler technique and will benefit more from nebulizers        -  Will order brovana and budesonide BID via nebulizer to see if that helps         -  Will add on PRN duonebs as needed    3  Abnormal CT - with ground glass and nodular opacities - stable for 2 years  No known exposures       -  Follow repeat CT in the future    4  Chronic cough/post nasal gtt       -  I explained the unconrolled post nasal drip is what is contributing the most to the cough  I recommended a 2 week trial of mucinex and nebulizer  5  Snoring/Excessive daytime sleepiness and pulmonary hypertension  He is at risk for obstructive sleep apnea based on ST0P BANG survey  -  Check a home study to assess for obstructive sleep apnea      -  I discussed in depth the diagnostic studies and treatment options involved with obstructive sleep apnea      -  I also discussed in depth the risk of leaving sleep apnea untreated including hypertension, heart failure, arrhythmia, MI and stroke  -  The patient is agreeable to undergo testing and treatment of obstructive sleep apnea  He understands that pitfalls she may encounter along the way and is willing to attempt CPAP treatment  Laura Cutting Laura Cutting Laura Cutting   HPI  79 y/o M with PMHx of th PMHx of HTN, HLD, CAD s/p MI, ischemic colitis, anxiety and COPD w bronchitis who comes in for evaluation and treatment of COPD with exacerbation  He states that his breathing has been progressively getting worse  In addition he has had a bad cough which has been getting worse as well  He complains of lots of post nasal gtt  He has tried over the counter nasal sprays but has not done them with any consistency so as a result they have not been helpful  His breathing has also been getting worse and his PCP had given him samples of Trelegy to replace anoro  He has not yet tried it as he still has anoro left  He also admits to chronic cough and has summered with recurring bronchitis, especially in the cold air for winter  He had a significant smoking history and in addition he had 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[address], 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  COPD    As far as sleep goes he does take ambien to fall asleep  He will still wake up in the middle of the night  He also had snoring and possible witnessed apneas  He is very tired through the day and would like help with that as well  ROS:   Review of Systems   Constitutional: Positive for chills, fatigue and fever  Negative for activity change and unexpected weight change  HENT: Positive for congestion, hearing loss and postnasal drip  Negative for drooling, sinus pain and sore throat  Eyes: Negative  Cardiovascular: Negative  Gastrointestinal: Negative  Endocrine: Negative  Genitourinary: Negative  Musculoskeletal: Negative  Skin: Negative  Allergic/Immunologic: Negative      Neurological: Positive for weakness  Negative for syncope and facial asymmetry  Hematological: Bruises/bleeds easily  Psychiatric/Behavioral: Negative for agitation and decreased concentration  The patient is nervous/anxious  Historical Information   Past Medical History:   Diagnosis Date    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    Compression fracture of thoracic vertebra (HCC)     last assessed 05/07/2012    [facility]ch[facility] obstructive pulmonary disease) ([facility] )     Disease of thyroid gland     Heart attack ([facility] )     Hollenhorst plaque, right eye     last assessed 04/08/2013    Hyperlipidemia     Hypertension     Iron deficiency anemia due to chronic blood loss     last assessed 09/10/2012    Ischemic colitis ([facility] )     last assessed 05/27/2014    Osteoarthritis of both hands     last assessedn 04/30/2013    Peptic ulcer     last assessed 06/14/2013    Polymyalgia rheumatica ([facility] )     last assessesd 10/09/2012    Renal disorder     Upper GI hemorrhage     last assessed 01/29/2015    Varicose veins of lower extremity     last assessed 04/26/2013     Past Surgical History:   Procedure Laterality Date    CORONARY ARTERY BYPASS GRAFT      HEMORRHOID SURGERY      HERNIA REPAIR      RENAL CYST EXCISION      resolved 05/2010    THROMBOENDARTERECTOMY Right     carotid, last assessed 06/18/2013     Family History   Problem Relation Age of Onset    Hypertension Mother     Alcohol abuse Mother     Hypertension Father     Alcohol abuse Father     Alcohol abuse Son     Heart disease Family     Stroke Family         syndrome     Social History     Social History    Marital status:      Spouse name: N/A    Number of children: N/A    Years of education: N/A     Occupational History    Not on file       Social History Main Topics    Smoking status: Former Smoker     Packs/day: 2 00     Years: 50 00     Types: Cigarettes     Quit date: 1993    Smokeless tobacco: Never Used      Comment: Quit 40 yrs  ago    Alcohol use No      Comment: Last drink 30 yrs  ago    Drug use: No    Sexual activity: Not on file     Other Topics Concern    Not on file     Social History Narrative     per [employer]    Always uses seat belt       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[address], welding, , 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    Meds/Allergies   Allergies   Allergen Reactions    Pravastatin Anaphylaxis, Photosensitivity and Headache     Reaction Date: [id number]; Other reaction(s): Headache    Acetaminophen-Codeine GI Intolerance    Atorvastatin      Other reaction(s): Leg Cramps  Other reaction(s): Leg Cramps    Codeine Nausea Only    Colestipol GI Intolerance     Reaction Date: [id number];     Contrast  [Iodinated Diagnostic Agents]     Fenofibrate GI Intolerance     Reaction Date: [id number];     Hydrocodone Vomiting    Niacin      Reaction Date: [id number];     Niaspan  [Niacin Er]     Pitavastatin Myalgia    Pneumococcal Polysaccharide Vaccine     Pravastatin Sodium     Simvastatin     Statins Myalgia    Vicoprofen  [Hydrocodone-Ibuprofen] GI Intolerance    Cyclophosphamide Rash    Ioversol Hives       Home medications:  Prior to Admission medications    Medication Sig Start Date End Date Taking? Authorizing Provider   albuterol (VENTOLIN HFA) 90 mcg/act inhaler Inhale 2 puffs every 6 (six) hours as needed for wheezing 6/13/18  Yes Jorge Alberto Crockett MD   B Complex Vitamins (B COMPLEX-B12 PO) Take 1 tablet by mouth daily 3/6/13  Yes Historical Provider, MD   Cholecalciferol ([employer] VITAMIN D) 2000 units CAPS Take by mouth   Yes Historical Provider, MD   Ferrous Sulfate (IRON) 325 (65 Fe) MG TABS Take by mouth   Yes Historical Provider, MD   fluticasone-umeclidinium-vilanterol (TRELEGY ELLIPTA) 100-62 5-25 MCG/INH inhaler Inhale 1 puff daily Rinse mouth after use   6/13/18  Yes Jorge Alberto Crockett MD   Mari Like Oil 1000 MG CAPS Take by mouth   Yes Historical Provider, MD   levothyroxine 75 mcg tablet Levothyroxine Sodium 75 MCG Oral Tablet TAKE 1/2 TALBET IN THE MORNING DAILY  Quantity: 30;  Refills: 5    Fortino Pisano MD;  Started 12-Dec-2012 Active 12/12/12  Yes Historical Provider, MD   LORazepam (ATIVAN) 0 5 mg tablet TAKE ONE TABLET BY MOUTH EVERY 8 HOURS AS NEEDED FOR ANXIETY 5/25/18  Yes Fadi Schumacher MD   menthol-zinc oxide (CALMOSPETINE) 0 44-20 625 % Apply topically 8/4/14  Yes Historical Provider, MD   metoprolol succinate (TOPROL-XL) 25 mg 24 hr tablet TAKE ONE TABLET BY MOUTH EVERY DAY 4/15/18  Yes Fadi Schumacher MD   Multiple Vitamins-Minerals (VISION FORMULA/LUTEIN PO) Take by mouth 11/15/14  Yes Historical Provider, MD   naproxen sodium (ALEVE) 220 MG tablet Take 1 tablet by mouth 1/20/17  Yes Historical Provider, MD   omeprazole (PriLOSEC) 20 mg delayed release capsule Take 20 mg by mouth daily   Yes Historical Provider, MD   zolpidem (AMBIEN) 5 mg tablet Take 1 tablet (5 mg total) by mouth daily at bedtime as needed for sleep 3/16/18  Yes Fadi Schumacher MD   arformoterol (BROVANA) 15 mcg/2 mL nebulizer solution Take 1 vial (15 mcg total) by nebulization 2 (two) times a day 6/15/18   Octavia Kirby DO   atenolol (TENORMIN) 50 mg tablet 25 mg Atenolol 50 MG Oral Tablet take 1/2 tablet by mouth once daily  Quantity: 15;   Refills: 5    Fortino Pisano MD;  Started 12-Dec-2012 Active  12/12/12   Historical Provider, MD   budesonide (PULMICORT) 0 5 mg/2 mL nebulizer solution Take 1 vial (0 5 mg total) by nebulization 2 (two) times a day Rinse mouth after use  6/15/18   Octavia Kirby DO   guaiFENesin-codeine (ROBITUSSIN AC) 100-10 mg/5 mL oral solution Take 5 mL by mouth 3 (three) times a day as needed for cough 3/22/18   Erasmo Leija MD   ipratropium (ATROVENT) 0 02 % nebulizer solution Take 1 vial (0 5 mg total) by nebulization 4 (four) times a day 6/15/18   Media [place], DO   nystatin-triamcinolone (MYCOLOG-II) cream Apply topically 11/9/10   Historical Provider, MD   aspirin 81 MG tablet Take 1 tablet (81 mg total) by mouth daily for 30 days 6/2/18 6/15/18  Chalo Cast MD   fluticasone furoate-vilanterol (BREO ELLIPTA) Inhale daily 5/27/14 6/15/18  Historical Provider, MD       Vitals:   Blood pressure 118/60, pulse (!) 123, temperature 98 4 °F (36 9 °C), resp  rate 14, height 5' 4" (1 626 m), weight 72 6 kg (160 lb), SpO2 95 % , RA, Body mass index is 27 46 kg/m²  Physical Exam  General: Thin, Awake alert and oriented x 3, conversant without conversational dyspnea, NAD, normal affect  HEENT:  PERRL, Sclera noninjected, nonicteric OU, Nares patent,  no craniofacial abnormalities, Mucous membranes, moist, no oral lesions, normal dentition,   NECK:  Trachea midline, no accessory muscle use, no stridor, no cervical or supraclavicular adenopathy, JVP not elevated  CARDIAC: Reg, single s1/S2, no m/r/g  PULM: Decreased breath sounds, no wheezing or rhonchi  ABD: Normoactive bowel sounds, soft nontender, nondistended, no rebound, no rigidity, no guarding  EXT: No cyanosis, no clubbing, no edema, normal capillary refill  NEURO: no focal neurologic deficits, AAOx3, moving all extremities appropriately    Labs: I have personally reviewed pertinent lab results  Lab Results   Component Value Date    WBC 9 11 06/04/2018    HGB 15 0 06/04/2018    HCT 44 9 06/04/2018    MCV 92 06/04/2018     06/04/2018      Lab Results   Component Value Date    GLUCOSE 107 06/05/2018    CALCIUM 9 0 06/05/2018     06/05/2018    K 4 1 06/05/2018    CO2 22 06/05/2018     06/05/2018    BUN 22 06/05/2018    CREATININE 1 62 (H) 06/05/2018       Imaging and other studies: I have personally reviewed pertinent reports      V/Q scan - 6/4/18  low probability for PE    US lower extremities 6/4/18 - negative for DVT    6/1/18 - IMPRESSION:     No acute noncontrast CT abnormality in the chest to account for the patient's shortness of breath      Chronic findings which are unchanged from prior examinations include emphysematous changes as well as prominence of central pulmonary arterial tree suggesting some element of pulmonary artery hypertension  Coronary artery calcifications and cardiomegaly   are noted      Unchanged groundglass right mid chest pulmonary nodules are reidentified  Additional follow-up chest CT in 2 years is recommended for these groundglass nodules without associated solid tissue, similar at least as far back as 2015      Renal cysts, splenomegaly, and compression deformities, similar from prior examination      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, currently measuring 15 mm in greatest dimension increased from 13 mm in June 2017  This demonstrated vascular enhancement on abdomen CT of April 23, 2010 when this   measured only 9 mm  Pulmonary function testing:   Unk Moy - FVC - 79%               FEV1 - 85%               FEV1/FVC - 103%    EKG, Pathology, and Other Studies: I have personally reviewed pertinent reports  SUMMARY     LEFT VENTRICLE:  Systolic function was normal  Ejection fraction was estimated to be 60 %  Although no diagnostic regional wall motion abnormality was identified, this possibility cannot be completely excluded on the basis of this study  Wall thickness was at the upper limits of normal   Doppler parameters were consistent with abnormal left ventricular relaxation (grade 1 diastolic dysfunction)      LEFT ATRIUM:  The atrium was mildly dilated      MITRAL VALVE:  There was mild annular calcification  There was mild regurgitation      AORTIC VALVE:  There was very mild stenosis  There was trace regurgitation      TRICUSPID VALVE:  There was mild regurgitation  Pulmonary artery systolic pressure was mildly increased    Estimated peak PA pressure was 40 mmHg      AORTA:  There was mild dilatation of the ascending aorta    DO Frey[facility]

## 2018-06-18 ENCOUNTER — DOCUMENTATION (OUTPATIENT)
Dept: PULMONOLOGY | Facility: CLINIC | Age: 83
End: 2018-06-18

## 2018-06-23 DIAGNOSIS — K21.00 GASTROESOPHAGEAL REFLUX DISEASE WITH ESOPHAGITIS: Primary | ICD-10-CM

## 2018-06-24 RX ORDER — OMEPRAZOLE 20 MG/1
CAPSULE, DELAYED RELEASE ORAL
Qty: 30 CAPSULE | Refills: 5 | Status: SHIPPED | OUTPATIENT
Start: 2018-06-24 | End: 2018-12-15 | Stop reason: SDUPTHER

## 2018-06-25 ENCOUNTER — APPOINTMENT (OUTPATIENT)
Dept: LAB | Facility: HOSPITAL | Age: 83
End: 2018-06-25
Payer: MEDICARE

## 2018-06-25 DIAGNOSIS — I10 ESSENTIAL HYPERTENSION: ICD-10-CM

## 2018-06-25 DIAGNOSIS — E78.2 MIXED HYPERLIPIDEMIA: ICD-10-CM

## 2018-06-25 DIAGNOSIS — E03.9 ACQUIRED HYPOTHYROIDISM: ICD-10-CM

## 2018-06-25 LAB
ALBUMIN SERPL BCP-MCNC: 3.3 G/DL (ref 3.5–5)
ALP SERPL-CCNC: 83 U/L (ref 46–116)
ALT SERPL W P-5'-P-CCNC: 21 U/L (ref 12–78)
ANION GAP SERPL CALCULATED.3IONS-SCNC: 11 MMOL/L (ref 4–13)
AST SERPL W P-5'-P-CCNC: 16 U/L (ref 5–45)
BASOPHILS # BLD AUTO: 0.03 THOUSANDS/ΜL (ref 0–0.1)
BASOPHILS NFR BLD AUTO: 0 % (ref 0–1)
BILIRUB SERPL-MCNC: 0.7 MG/DL (ref 0.2–1)
BUN SERPL-MCNC: 30 MG/DL (ref 5–25)
CALCIUM SERPL-MCNC: 9.1 MG/DL (ref 8.3–10.1)
CHLORIDE SERPL-SCNC: 101 MMOL/L (ref 100–108)
CHOLEST SERPL-MCNC: 193 MG/DL (ref 50–200)
CO2 SERPL-SCNC: 26 MMOL/L (ref 21–32)
CREAT SERPL-MCNC: 1.87 MG/DL (ref 0.6–1.3)
EOSINOPHIL # BLD AUTO: 0.15 THOUSAND/ΜL (ref 0–0.61)
EOSINOPHIL NFR BLD AUTO: 2 % (ref 0–6)
ERYTHROCYTE [DISTWIDTH] IN BLOOD BY AUTOMATED COUNT: 13.4 % (ref 11.6–15.1)
GFR SERPL CREATININE-BSD FRML MDRD: 32 ML/MIN/1.73SQ M
GLUCOSE P FAST SERPL-MCNC: 102 MG/DL (ref 65–99)
HCT VFR BLD AUTO: 40.8 % (ref 36.5–49.3)
HDLC SERPL-MCNC: 28 MG/DL (ref 40–60)
HGB BLD-MCNC: 13.4 G/DL (ref 12–17)
IMM GRANULOCYTES # BLD AUTO: 0.04 THOUSAND/UL (ref 0–0.2)
IMM GRANULOCYTES NFR BLD AUTO: 1 % (ref 0–2)
LDLC SERPL CALC-MCNC: 129 MG/DL (ref 0–100)
LYMPHOCYTES # BLD AUTO: 1.46 THOUSANDS/ΜL (ref 0.6–4.47)
LYMPHOCYTES NFR BLD AUTO: 18 % (ref 14–44)
MCH RBC QN AUTO: 30.1 PG (ref 26.8–34.3)
MCHC RBC AUTO-ENTMCNC: 32.8 G/DL (ref 31.4–37.4)
MCV RBC AUTO: 92 FL (ref 82–98)
MONOCYTES # BLD AUTO: 1.04 THOUSAND/ΜL (ref 0.17–1.22)
MONOCYTES NFR BLD AUTO: 13 % (ref 4–12)
NEUTROPHILS # BLD AUTO: 5.42 THOUSANDS/ΜL (ref 1.85–7.62)
NEUTS SEG NFR BLD AUTO: 66 % (ref 43–75)
NRBC BLD AUTO-RTO: 0 /100 WBCS
PLATELET # BLD AUTO: 183 THOUSANDS/UL (ref 149–390)
PMV BLD AUTO: 9 FL (ref 8.9–12.7)
POTASSIUM SERPL-SCNC: 4.7 MMOL/L (ref 3.5–5.3)
PROT SERPL-MCNC: 7.8 G/DL (ref 6.4–8.2)
RBC # BLD AUTO: 4.45 MILLION/UL (ref 3.88–5.62)
SODIUM SERPL-SCNC: 138 MMOL/L (ref 136–145)
TRIGL SERPL-MCNC: 180 MG/DL
TSH SERPL DL<=0.05 MIU/L-ACNC: 2.43 UIU/ML (ref 0.36–3.74)
WBC # BLD AUTO: 8.14 THOUSAND/UL (ref 4.31–10.16)

## 2018-06-25 PROCEDURE — 85025 COMPLETE CBC W/AUTO DIFF WBC: CPT

## 2018-06-25 PROCEDURE — 80053 COMPREHEN METABOLIC PANEL: CPT

## 2018-06-25 PROCEDURE — 36415 COLL VENOUS BLD VENIPUNCTURE: CPT

## 2018-06-25 PROCEDURE — 84443 ASSAY THYROID STIM HORMONE: CPT

## 2018-06-25 PROCEDURE — 80061 LIPID PANEL: CPT

## 2018-06-26 ENCOUNTER — PATIENT OUTREACH (OUTPATIENT)
Dept: FAMILY MEDICINE CLINIC | Facility: HOSPITAL | Age: 83
End: 2018-06-26

## 2018-06-26 NOTE — PROGRESS NOTES
Outpatient Care Management Note:    Called Mr Carissa Nunn  He states that he is doing okay  He denies any SOB symptoms  He did receive his nebulizer and states he is using it without difficulty  He states it takes time, because there are multiple medications, and it has to be done multiple times per day  He denies any issues with any medications  He clearly did not wish to remain on the phone and discuss other concerns  He thanked me for calling and hung up

## 2018-06-27 ENCOUNTER — TELEPHONE (OUTPATIENT)
Dept: PULMONOLOGY | Facility: CLINIC | Age: 83
End: 2018-06-27

## 2018-06-27 NOTE — TELEPHONE ENCOUNTER
Patient's daughter Jovani Sykes called stated she received a call this morning from patient complaining that since using Nebulizer 06/23/18 condition has gotten worse   Jovani Sykes requesting a phone call to Mr Calderón this morning

## 2018-06-29 NOTE — TELEPHONE ENCOUNTER
Daughter Stan Neri calling back saying she called the other day about her father and said the nurse was supposed to call him to get more info and let the doctor know what is going on  I advised Stan Neir the MA did call her father to get more info and passes a message on to the doctor  Rashad Marcelo I would call her father now to get more info as well  Talked to Robel Montanez  He says since he started using his nebulizer, he is not feeling any better  He is coughing up white sputum  He says he is tired and sleeping all the time  He has no energy  Denies wheeze or SOB  Afebrile  Robel Montanez stopped using his nebulizer the past two nights because he is not feeling better and thinks he is getting too much medicine  Checked with Todd Browne and she will Tiger text Dr Nadege Brannon to have him call patient

## 2018-06-29 NOTE — PROGRESS NOTES
I called the patient and discussed his nebulizers  He states that he has had a lot of fatigue after initiating the nebulizers  He states his breathing and cough have significantly improved but he attributes significant fatigue to the nebulizers  As a results I asked that he trial them off for the weekend to see if it is related though I explained that likely it is not  We will discuss it again on Monday

## 2018-07-13 ENCOUNTER — OFFICE VISIT (OUTPATIENT)
Dept: FAMILY MEDICINE CLINIC | Facility: HOSPITAL | Age: 83
End: 2018-07-13
Payer: MEDICARE

## 2018-07-13 VITALS
TEMPERATURE: 97.6 F | HEART RATE: 90 BPM | HEIGHT: 64 IN | DIASTOLIC BLOOD PRESSURE: 80 MMHG | BODY MASS INDEX: 27.45 KG/M2 | WEIGHT: 160.8 LBS | SYSTOLIC BLOOD PRESSURE: 122 MMHG

## 2018-07-13 DIAGNOSIS — F41.8 DEPRESSION WITH ANXIETY: ICD-10-CM

## 2018-07-13 DIAGNOSIS — J44.1 CHRONIC OBSTRUCTIVE PULMONARY DISEASE WITH ACUTE EXACERBATION (HCC): ICD-10-CM

## 2018-07-13 DIAGNOSIS — F06.4 ANXIETY DISORDER DUE TO GENERAL MEDICAL CONDITION: ICD-10-CM

## 2018-07-13 DIAGNOSIS — Z00.00 MEDICARE ANNUAL WELLNESS VISIT, SUBSEQUENT: Primary | ICD-10-CM

## 2018-07-13 DIAGNOSIS — E78.2 MIXED HYPERLIPIDEMIA: ICD-10-CM

## 2018-07-13 DIAGNOSIS — N18.30 CKD (CHRONIC KIDNEY DISEASE), STAGE III (HCC): ICD-10-CM

## 2018-07-13 DIAGNOSIS — I10 ESSENTIAL HYPERTENSION: ICD-10-CM

## 2018-07-13 PROCEDURE — G0439 PPPS, SUBSEQ VISIT: HCPCS | Performed by: FAMILY MEDICINE

## 2018-07-13 RX ORDER — ATENOLOL 25 MG/1
25 TABLET ORAL DAILY
Qty: 30 TABLET | Refills: 5 | Status: SHIPPED | OUTPATIENT
Start: 2018-07-13 | End: 2019-01-12 | Stop reason: SDUPTHER

## 2018-07-13 NOTE — PROGRESS NOTES
Assessment/Plan:         Diagnoses and all orders for this visit:    Medicare annual wellness visit, subsequent    Anxiety disorder due to general medical condition    Depression with anxiety    Mixed hyperlipidemia    CKD (chronic kidney disease), stage III    Essential hypertension  -     atenolol (TENORMIN) 25 mg tablet; Take 1 tablet (25 mg total) by mouth daily Take 1 tablet daily    Chronic obstructive pulmonary disease with acute exacerbation (HCC)          Subjective:      Patient ID: Hodan Boston is a 80 y o  male  Post nasal drip better  Using nebulizer with         The following portions of the patient's history were reviewed and updated as appropriate: allergies, current medications, past family history, past medical history, past social history, past surgical history and problem list     Review of Systems   Constitutional: Negative  HENT: Negative  Eyes: Negative  Respiratory: Positive for cough, shortness of breath and wheezing  Cardiovascular: Negative  Endocrine: Negative  Genitourinary: Negative  Musculoskeletal: Positive for arthralgias  Psychiatric/Behavioral: Negative          AWV Clinical     ISAR:   Previous hospitalizations?:  Yes   How many hospitalizations have you had in the last year?:  1-2       Once in a Lifetime Medicare Screening:   AAA screening performed? (if performed, please add date to Health Maintenance):  No       Medicare Screening Tests and Risk Assessment:   AAA Risk Assessment     Tobacco use (males only):  No   Age over 72 (males only):  Yes Family history of AAA:  No   Osteoporosis Risk Assessment     Female:  No   :  Yes :  No   Age over 48:  Yes Low body weight (<127lbs):  No   Tobacco use:  No Alcohol use:  No   Low calcium diet:  No PMHX of fractures:  No   FHX of fractures:  No    HIV Risk Assessment        Drug and Alcohol Use:   Tobacco use    Cigarettes:  former smoker    Tobacco use duration    Tobacco Cessation Readiness    Alcohol use    Alcohol use:  rare use    Alcohol Treatment Readiness   Illicit Drug Use    Drug use:  never        Diet & Exercise:   Diet   What is your diet?:  Regular   How many servings a day of the following:   Fruits and Vegetables:  3-4 Meat:  1-2   Whole Grains:  2, 1    Dairy:  2 Soda:  0, 1   Coffee:  1 Tea:  1   Exercise    Do you currently exercise?:  currently not exercising       Cognitive Impairment Screening:   Depression screening preformed:  Yes    Cognitive Impairment Screening    Do you have difficulty learning or retaining new information?:  Yes Do you have difficulty handling new tasks?:  Yes   Do you have difficulty with reasoning?:  No Do you have difficulty with spatial ability and orientation?:  No   Do you have difficulty with language?:  No Do you have difficulty with behavior?:  No       Functional Ability/Level of Safety:   Hearing    Hearing difficulties:  Yes Bilateral:  slightly decreased   Hearing Impairment Assessment    Hearing status:  Hard of hearing   Current Activities    Status:  limited ADL's   Help needed with the folllowing:     Preparing Meals:  No   ADL    Feeding:  Independant   Oral hygiene and Facial grooming:  Independant   Bathing:  Independant   Upper Body Dressing:  Independant   Lower Body Dressing:  Independant   Toileting:  Independant   Bed Mobility:  Independant   Fall Risk   Have you fallen in the last 12 months?:  No    Injury History       Home Safety:   Are there hazards in your environment?:  No   If you fell, would you need help to get back up from the ground?:  Yes Do you have problems or concerns getting in/out of a bed, chair, tub, or toilet?:  No   Do you feel unsteady when walking?:  No Is your activity limited by pain?:  No   Do you have handrails and grab-bars in the home?:  No Are emergency numbers kept by the phone and regularly updated?:  Yes   Are you and/or family members aware of the dangers of smoking in bed?:  No Are firearms stored securely?:  Yes   Do you have working smoke alarms and fire extinguisher?:  Yes    Have you left the stove on unsupervised?:  No    Home Safety Risk Factors   Unfamilar with surroundings:  No Uneven floors:  No   Stairs or handrail saftey risk:  Yes Loose rugs:  No   Household clutter:  No Poor household lighting:  No   No grab bars in bathroom:  Yes        Advanced Directives:   Advanced Directives    Living Will:  Yes Durable POA for healthcare: Yes   Advanced directive:  Yes    Patient's End of Life Decisions        Urinary Incontinence:       Glaucoma:           Provider Screening     Preventative Screening/Counseling:   Cardiovascular Screening/Counseling:   (Labs Q5 years, EKG optional one-time)   General:  Screening Current           Diabetes Screening/Counseling:   (2 tests/year if Pre-Diabetes or 1 test/year if no Diabetes)   General:  Screening Current           Colorectal Cancer Screening/Counseling:   (FOBT Q1 yr; Flex Sig Q4 yrs or Q10 yrs after Screening Colonoscopy; Screening Colonoscpy Q2 yrs High Risk or Q10 yrs Low Risk; Barium Enema Q2 yrs High Risk or Q4 yrs Low Risk)   General:  Screening Not Indicated           Prostate Cancer Screening/Counseling:   (Annual)    General:  Screening Not Indicated          Breast Cancer Screening/Counseling:   (Baseline Age 28 - 43; Annual Age 36+)         Cervical Cancer Screening/Counseling:   (Annual for High Risk or Childbearing Age with Abnormal Pap in Last 3 yrs; Every 2 all others)         Osteoporosis Screening/Counseling:   (Every 2 Yrs if at risk or more if medically necessary)   General:  Screening Not Indicated           AAA Screening/Counseling:   (Once per Lifetime with risk factors)    Family History of AAA:  No Age over 72 (males only):  Yes Tobacco use (males only):  No   General:  Screening Not Indicated           Glaucoma Screening/Counseling:   (Annual)   General:  Screening Current          HIV Screening/Counseling:   (Voluntary;  Once annually for high risk OR 3 times for Pregnancy at diagnosis of IUP; 3rd trimester; and at Labor   General:  Screening Not Indicated           Hepatitis C Screening:             Immunizations:   Influenza (annual): Influenza UTD This Year   Pneumococcal (Once in a Lifetime):  Lifetime Vaccine Completed   Hepatitis B Series (low risk patients):  Series Not Indicated   Zostavax (Medicare D Coverage, Pt >66 yo):  Zostavax Vaccine UTD   TD (Non-Medicare Wellness  Visit required):  Patient Declines   Tdap (Non-Medicare Wellness Visit required):  Patient Declines       Other Preventative Couseling (Non-Medicare Wellness Visit Required):   fall prevention education provided       Referrals (Non-Medicare Wellness Visit Required):       Medical Equipment/Suppliers:   none             Objective:      /80   Pulse 90   Temp 97 6 °F (36 4 °C)   Ht 5' 4 25" (1 632 m)   Wt 72 9 kg (160 lb 12 8 oz)   BMI 27 39 kg/m²          Physical Exam   Constitutional: He is oriented to person, place, and time  He appears well-developed and well-nourished  Cardiovascular: Normal rate and S1 normal     Pulmonary/Chest: He has decreased breath sounds  He has rhonchi  Musculoskeletal: He exhibits no edema  Neurological: He is oriented to person, place, and time  He has normal reflexes  Psychiatric: He has a normal mood and affect  His behavior is normal  Judgment and thought content normal    Nursing note and vitals reviewed

## 2018-07-13 NOTE — PATIENT INSTRUCTIONS

## 2018-07-17 ENCOUNTER — PATIENT OUTREACH (OUTPATIENT)
Dept: FAMILY MEDICINE CLINIC | Facility: HOSPITAL | Age: 83
End: 2018-07-17

## 2018-07-17 NOTE — PROGRESS NOTES
Outpatient Care Management Note:    Care manager called Olive Garner and Nito Velásquez answered the phone  I reviewed with him his father's c/o redness around his eye and throat pain  Nito Velásquez stated that the throat pain is the c/o he had when he last went in the hospital  I explained that he is not having any pain currently, but he mentioned that this occurred  MELANIE did not want me to make an appointment with Dr David Velásquez and Olive Garner will follow up on his complaints  I gave Nito Velásquez my contact number and informed him to call with any issues  Informed Nito Velásquez that I instructed MELANIE to call [phone] with any unrelieved chest pain

## 2018-07-17 NOTE — PROGRESS NOTES
Outpatient Care Management Note:    Care manager called Bri Archer following up on how he is feeling  He states that he is doing well  He did c/o redness around his eye which he states has been there a couple of weeks  He saw Dr Santi Lawler 7/13/18 and there was no mention of redness  He also c/o neck/throat pain in the area where his clavicle meets his neck  He stated that the pain went away on it's own, and he has not had it since  I asked If he ever had issues with indigestion or reflux, and he stated I do burp  He did not recall if he was laying down when he had the pain or the time of day it occurred  I asked if he would like an appointment with Dr Santi Lawler to address these concerns and he said no  He stated that his daughter in law, Sharon Toure, manages all of his medical appointments  He gave me permission to speak with Sharon Toure  He did not take my phone number, because he said he would not be able to read it once he wrote it down  He states he is taking all of his medications and using the nebulizer as ordered  He is aware to call 911 if he has any unrelieved pain in chest area

## 2018-07-24 ENCOUNTER — TELEPHONE (OUTPATIENT)
Dept: PULMONOLOGY | Facility: CLINIC | Age: 83
End: 2018-07-24

## 2018-07-24 NOTE — TELEPHONE ENCOUNTER
Daughter in law called saying Renzo Martínez called her to let us know he is using his nebulizer's but still having a hard time breathing  She did not know any more information  I called patient and he says since last night when he uses his nebulizer's, he feels like he is breathing heavy afterwards  He then used his nebulizer's this morning and afternoon and still feels the same  He is SOB while resting and on exertion, which he says is not normal for him  He says he is tired more than usual as well  Denies cough, wheeze, fever, chills or body aches  He is not using any of his inhalers because he said we told him not to  Please advise

## 2018-07-26 ENCOUNTER — TELEPHONE (OUTPATIENT)
Dept: OTHER | Facility: HOSPITAL | Age: 83
End: 2018-07-26

## 2018-07-26 NOTE — TELEPHONE ENCOUNTER
Patient was called and discussed his concerns with his nebulizer therapy  He reports SOB has resolved with proper nebulizer technique    He reports SOB and cough has improved and denies further questions or concerns

## 2018-08-03 ENCOUNTER — OFFICE VISIT (OUTPATIENT)
Dept: CARDIOLOGY CLINIC | Facility: CLINIC | Age: 83
End: 2018-08-03
Payer: MEDICARE

## 2018-08-03 VITALS
DIASTOLIC BLOOD PRESSURE: 80 MMHG | BODY MASS INDEX: 27.9 KG/M2 | WEIGHT: 163.4 LBS | HEIGHT: 64 IN | HEART RATE: 90 BPM | SYSTOLIC BLOOD PRESSURE: 138 MMHG

## 2018-08-03 DIAGNOSIS — I48.91 ATRIAL FIBRILLATION, UNSPECIFIED TYPE (HCC): Primary | ICD-10-CM

## 2018-08-03 PROCEDURE — 93000 ELECTROCARDIOGRAM COMPLETE: CPT | Performed by: INTERNAL MEDICINE

## 2018-08-03 PROCEDURE — 99214 OFFICE O/P EST MOD 30 MIN: CPT | Performed by: INTERNAL MEDICINE

## 2018-08-03 RX ORDER — FLUTICASONE PROPIONATE 50 MCG
1 SPRAY, SUSPENSION (ML) NASAL 2 TIMES DAILY
COMMUNITY

## 2018-08-03 RX ORDER — GUAIFENESIN 600 MG
1200 TABLET, EXTENDED RELEASE 12 HR ORAL EVERY 12 HOURS SCHEDULED
COMMUNITY

## 2018-08-03 NOTE — PROGRESS NOTES
Cardiology Follow Up    Chuy Pandya  6/30/1932  [id number]  [address] [facility] Akila Salas  [address] [place] [address] [phone]    1  Atrial fibrillation, unspecified type ([facility] [address] )  POCT ECG       Interval History: Recent hospitalization for COPD    He is now on nebulizers for his COPD  He has no angina  He appears stable from a CV standpoint       Problem List     Hydronephrosis of right kidney    CAD (coronary artery disease)    Carotid stenosis    HTN (hypertension)    CKD (chronic kidney disease), stage III    Anxiety disorder due to general medical condition    Benign prostatic hyperplasia with lower urinary tract symptoms    GERD (gastroesophageal reflux disease)    Chronic obstructive pulmonary disease (HCC)    Depression with anxiety    Headache    Hyperlipidemia    Hypothyroidism    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[address] )    Insomnia    Iron deficiency anemia    Overview Signed 3/15/2018 10:04 AM by Kayleen Guzman MD     Transitioned From: Anemia due to GI blood loss         Macular degeneration    Osteoporosis    Other atopic dermatitis    Shortness of breath    Medicare annual wellness visit, subsequent        Past Medical History:   Diagnosis Date    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    Compression fracture of thoracic vertebra ([facility] [address] [facility] )     last assessed 05/07/2012    COPD (chronic obstructive pulmonary disease) ([facility] [address] [facility] )     Disease of thyroid gland     Heart attack ([facility] )     Hollenhorst plaque, right eye     last assessed 04/08/2013    Hyperlipidemia     Hypertension     Iron deficiency anemia due to chronic blood loss     last assessed 09/10/2012    Ischemic colitis ([facility] [address] [facility] )     last assessed 05/27/2014    Osteoarthritis of both hands     last assessedn 04/30/2013    Peptic ulcer     last assessed 06/14/2013    Polymyalgia rheumatica ([facility] [address] )     last assessesd 10/09/2012    Renal disorder     Upper GI hemorrhage     last assessed 01/29/2015    Varicose veins of lower extremity     last assessed 04/26/2013     Social History     Social History    Marital status:      Spouse name: N/A    Number of children: N/A    Years of education: N/A     Occupational History    Not on file  Social History Main Topics    Smoking status: Former Smoker     Packs/day: 2 00     Years: 50 00     Types: Cigarettes     Quit date: 1993    Smokeless tobacco: Never Used      Comment: Quit 40 yrs  ago    Alcohol use No      Comment: Last drink 30 yrs   ago    Drug use: No    Sexual activity: Not on file     Other Topics Concern    Not on file     Social History Narrative     per [employer]    Always uses seat belt      Family History   Problem Relation Age of Onset    Hypertension Mother     Alcohol abuse Mother     Hypertension Father     Alcohol abuse Father     Alcohol abuse Son     Heart disease Family     Stroke Family         syndrome     Past Surgical History:   Procedure Laterality Date    CORONARY ARTERY BYPASS GRAFT      HEMORRHOID SURGERY      HERNIA REPAIR      RENAL CYST EXCISION      resolved 05/2010    THROMBOENDARTERECTOMY Right     carotid, last assessed 06/18/2013       Current Outpatient Prescriptions:     albuterol (VENTOLIN HFA) 90 mcg/act inhaler, Inhale 2 puffs every 6 (six) hours as needed for wheezing, Disp: 1 Inhaler, Rfl: 3    arformoterol (BROVANA) 15 mcg/2 mL nebulizer solution, Take 1 vial (15 mcg total) by nebulization 2 (two) times a day, Disp: 60 vial, Rfl: 3    atenolol (TENORMIN) 25 mg tablet, Take 1 tablet (25 mg total) by mouth daily Take 1 tablet daily, Disp: 30 tablet, Rfl: 5    B Complex Vitamins (B COMPLEX-B12 PO), Take 1 tablet by mouth daily, Disp: , Rfl:     budesonide (PULMICORT) 0 5 mg/2 mL nebulizer solution, Take 1 vial (0 5 mg total) by nebulization 2 (two) times a day Rinse mouth after use , Disp: 60 vial, Rfl: 3    Cholecalciferol (CVS VITAMIN D) 2000 units CAPS, Take by mouth, Disp: , Rfl:     Ferrous Sulfate (IRON) 325 (65 Fe) MG TABS, Take by mouth, Disp: , Rfl:     fluticasone (FLONASE) 50 mcg/act nasal spray, 1 spray into each nostril 2 (two) times a day, Disp: , Rfl:     guaiFENesin (MUCINEX) 600 mg 12 hr tablet, Take 1,200 mg by mouth every 12 (twelve) hours, Disp: , Rfl:     guaiFENesin-codeine (ROBITUSSIN AC) 100-10 mg/5 mL oral solution, Take 5 mL by mouth 3 (three) times a day as needed for cough, Disp: 118 mL, Rfl: 0    ipratropium (ATROVENT) 0 02 % nebulizer solution, Take 1 vial (0 5 mg total) by nebulization 4 (four) times a day, Disp: 120 vial, Rfl: 3    Krill Oil 1000 MG CAPS, Take by mouth, Disp: , Rfl:     levothyroxine 75 mcg tablet, Levothyroxine Sodium 75 MCG Oral Tablet TAKE 1/2 TALBET IN THE MORNING DAILY  Quantity: 30;  Refills: 5    Delores Perez MD;  Started 12-Dec-2012 Active, Disp: , Rfl:     LORazepam (ATIVAN) 0 5 mg tablet, TAKE ONE TABLET BY MOUTH EVERY 8 HOURS AS NEEDED FOR ANXIETY, Disp: 90 tablet, Rfl: 3    menthol-zinc oxide (CALMOSPETINE) 0 44-20 625 %, Apply topically, Disp: , Rfl:     Multiple Vitamins-Minerals (VISION FORMULA/LUTEIN PO), Take by mouth, Disp: , Rfl:     naproxen sodium (ALEVE) 220 MG tablet, Take 1 tablet by mouth, Disp: , Rfl:     nystatin-triamcinolone (MYCOLOG-II) cream, Apply topically, Disp: , Rfl:     zolpidem (AMBIEN) 5 mg tablet, Take 1 tablet (5 mg total) by mouth daily at bedtime as needed for sleep, Disp: 30 tablet, Rfl: 5    fluticasone-umeclidinium-vilanterol (TRELEGY ELLIPTA) 100-62 5-25 MCG/INH inhaler, Inhale 1 puff daily Rinse mouth after use , Disp: 1 Inhaler, Rfl: 0    metoprolol succinate (TOPROL-XL) 25 mg 24 hr tablet, TAKE ONE TABLET BY MOUTH EVERY DAY, Disp: 30 tablet, Rfl: 5    omeprazole (PriLOSEC) 20 mg delayed release capsule, TAKE ONE CAPSULE BY MOUTH EVERY DAY, Disp: 30 capsule, Rfl: 5    Current Facility-Administered Medications:     albuterol inhalation solution 2 5 mg, 2 5 mg, Nebulization, 4x Daily, Niels Runner,   Allergies   Allergen Reactions    Pravastatin Anaphylaxis, Photosensitivity and Headache     Reaction Date: [id number];    Other reaction(s): Headache    Acetaminophen-Codeine GI Intolerance    Atorvastatin      Other reaction(s): Leg Cramps  Other reaction(s): Leg Cramps    Codeine Nausea Only    Colestipol GI Intolerance     Reaction Date: [id number];     Contrast  [Iodinated Diagnostic Agents]     Fenofibrate GI Intolerance     Reaction Date: [id number];     Hydrocodone Vomiting    Niacin      Reaction Date: [id number];     Niaspan  [Niacin Er]     Pitavastatin Myalgia    Pneumococcal Polysaccharide Vaccine     Pravastatin Sodium     Simvastatin     Statins Myalgia    Vicoprofen  [Hydrocodone-Ibuprofen] GI Intolerance    Cyclophosphamide Rash    Ioversol Hives       Labs:     Chemistry        Component Value Date/Time     06/25/2018 0905     08/28/2015 1730    K 4 7 06/25/2018 0905    K 4 5 08/28/2015 1730     06/25/2018 0905     08/28/2015 1730    CO2 26 06/25/2018 0905    CO2 28 4 08/28/2015 1730    BUN 30 (H) 06/25/2018 0905    BUN 28 (H) 08/28/2015 1730    CREATININE 1 87 (H) 06/25/2018 0905    CREATININE 1 79 (H) 08/28/2015 1730        Component Value Date/Time    CALCIUM 9 1 06/25/2018 0905    CALCIUM 9 7 08/28/2015 1730    ALKPHOS 83 06/25/2018 0905    ALKPHOS 73 08/28/2015 1730    AST 16 06/25/2018 0905    AST 18 08/28/2015 1730    ALT 21 06/25/2018 0905    ALT 27 08/28/2015 1730    BILITOT 0 70 06/25/2018 0905    BILITOT 0 45 08/28/2015 1730            Lab Results   Component Value Date    CHOL 193 06/25/2018    CHOL 165 06/02/2018    CHOL 184 08/31/2017     Lab Results   Component Value Date    HDL 28 (L) 06/25/2018    HDL 29 (L) 06/02/2018    HDL 26 (L) 08/31/2017     Lab Results   Component Value Date    LDLCALC 129 (H) 06/25/2018    LDLCALC 111 (H) 06/02/2018    LDLCALC 112 (H) 08/31/2017     Lab Results   Component Value Date    TRIG 180 (H) 06/25/2018    TRIG 123 06/02/2018    TRIG 232 (H) 08/31/2017     No components found for: CHOLHDL    Imaging: No results found  EKG: Normal Sinus Rhythm  Review of Systems   Constitution: Negative  HENT: Negative  Eyes: Negative  Cardiovascular: Positive for dyspnea on exertion  Respiratory: Positive for shortness of breath  Endocrine: Negative  Hematologic/Lymphatic: Negative  Skin: Negative  Musculoskeletal: Negative  Gastrointestinal: Negative  Genitourinary: Negative  Neurological: Negative  Psychiatric/Behavioral: Negative  Allergic/Immunologic: Negative  Vitals:    08/03/18 1505   BP: 138/80   Pulse: 90           Physical Exam   Constitutional: He is oriented to person, place, and time  No distress  HENT:   Mouth/Throat: No oropharyngeal exudate  Eyes: No scleral icterus  Neck: No JVD present  Cardiovascular: Normal rate  No murmur heard  Pulmonary/Chest: Effort normal and breath sounds normal  No respiratory distress  He has no wheezes  He has no rales  Abdominal: Soft  Bowel sounds are normal  He exhibits no distension  There is no tenderness  There is no rebound  Musculoskeletal: He exhibits no edema  Neurological: He is alert and oriented to person, place, and time  Skin: Skin is warm and dry  He is not diaphoretic  Psychiatric: He has a normal mood and affect  His behavior is normal        Discussion/Summary:    1  Coronary Artery Disease/History of Inferior MI: He has no angina  He is off aspirin due to GI intolerance/bleeding    Continue Atenolol  He has no angina at this time       2  Dyslipidemia: Continue Livalo  LDL was 129  Mildred Goldmann He is taking this every other day  No myalgia       3  Carotid Stenosis s/p right CEA  The patient was counseled regarding diagnostic results, instructions for management, risk factor reductions, impressions   total time of encounter was 25 minutes and 15 minutes was spent counseling

## 2018-08-22 ENCOUNTER — OFFICE VISIT (OUTPATIENT)
Dept: FAMILY MEDICINE CLINIC | Facility: HOSPITAL | Age: 83
End: 2018-08-22
Payer: MEDICARE

## 2018-08-22 ENCOUNTER — PATIENT OUTREACH (OUTPATIENT)
Dept: FAMILY MEDICINE CLINIC | Facility: HOSPITAL | Age: 83
End: 2018-08-22

## 2018-08-22 VITALS
TEMPERATURE: 97.5 F | DIASTOLIC BLOOD PRESSURE: 72 MMHG | SYSTOLIC BLOOD PRESSURE: 120 MMHG | WEIGHT: 162.2 LBS | HEIGHT: 64 IN | BODY MASS INDEX: 27.69 KG/M2 | HEART RATE: 90 BPM

## 2018-08-22 DIAGNOSIS — L82.0 SEBORRHEIC KERATOSES, INFLAMED: ICD-10-CM

## 2018-08-22 DIAGNOSIS — L30.9 ECZEMA, UNSPECIFIED TYPE: Primary | ICD-10-CM

## 2018-08-22 PROCEDURE — 99213 OFFICE O/P EST LOW 20 MIN: CPT | Performed by: FAMILY MEDICINE

## 2018-08-22 NOTE — PROGRESS NOTES
Assessment/Plan:         Diagnoses and all orders for this visit:    Eczema, unspecified type  Comments:  OK to continue cortisone  Reassured rash is not shingles  Advised Shingrix vaccine, had Zostavax done 2011  Seborrheic keratoses, inflamed          Subjective:      Patient ID: Sona Ann is a 80 y o  male  Has rash around the R axilla that he is concerned about being shingles  Itchy for the pat week, using cortisone 1 % cream with benefit  Has skin spot L lower abdomen    Seeing eye doctor in [facility] for special prism or camera glasses to help vision    Scheduled for sleep study next week        The following portions of the patient's history were reviewed and updated as appropriate: allergies, current medications, past family history, past medical history, past social history, past surgical history and problem list     Review of Systems   Eyes: Positive for visual disturbance  Skin: Positive for rash  Hematological: Does not bruise/bleed easily  All other systems reviewed and are negative  Objective:      /72   Pulse 90   Temp 97 5 °F (36 4 °C)   Ht 5' 4 25" (1 632 m)   Wt 73 6 kg (162 lb 3 2 oz)   BMI 27 63 kg/m²          Physical Exam   Constitutional: He appears well-developed and well-nourished  Skin:        R pectoral eczema plaque  L lower abdomen seborrheic keratosis   Nursing note and vitals reviewed

## 2018-08-22 NOTE — PROGRESS NOTES
Outpatient Care Management Note:    Care manager called and spoke with MELANIE  He states that he is doing well  He saw Dr Umang Bautista today for a skin issue, and Dr Umang Bautista reassured him that everything was fine  He denies any SOB  He is using his nebulizer and taking his meds as ordered  He states that he has no other issues or concerns  He will call if he needs anything  He has my contact information

## 2018-09-28 ENCOUNTER — PATIENT OUTREACH (OUTPATIENT)
Dept: FAMILY MEDICINE CLINIC | Facility: HOSPITAL | Age: 83
End: 2018-09-28

## 2018-09-28 DIAGNOSIS — F41.9 ANXIETY: ICD-10-CM

## 2018-09-28 DIAGNOSIS — F51.01 PRIMARY INSOMNIA: ICD-10-CM

## 2018-09-28 RX ORDER — ZOLPIDEM TARTRATE 5 MG/1
5 TABLET ORAL
Qty: 30 TABLET | Refills: 3 | Status: SHIPPED | OUTPATIENT
Start: 2018-09-28 | End: 2019-09-28

## 2018-09-28 RX ORDER — LORAZEPAM 0.5 MG/1
TABLET ORAL
Qty: 90 TABLET | Refills: 3 | Status: SHIPPED | OUTPATIENT
Start: 2018-09-28 | End: 2018-09-28 | Stop reason: SDUPTHER

## 2018-09-28 RX ORDER — LORAZEPAM 0.5 MG/1
0.5 TABLET ORAL EVERY 8 HOURS PRN
Qty: 90 TABLET | Refills: 3 | Status: SHIPPED | OUTPATIENT
Start: 2018-09-28 | End: 2019-01-21 | Stop reason: SDUPTHER

## 2018-09-28 RX ORDER — ZOLPIDEM TARTRATE 5 MG/1
TABLET ORAL
Qty: 30 TABLET | Refills: 5 | Status: SHIPPED | OUTPATIENT
Start: 2018-09-28 | End: 2018-09-28 | Stop reason: SDUPTHER

## 2018-09-28 NOTE — PROGRESS NOTES
Outpatient Care Management Note:    Care Manager called Jefferson Funez  He states that he requested 2 med refills, and they are not available yet through his pharmacy  CM will call Dr Hoda Luna office and request that the order be submitted today  Jefferson Funez states he needs to pick them up tomorrow  He is using his nebulizer as ordered  He states that sometimes it makes him cough  He denies bringing up any green or yellow sputum  I instructed him to call Dr Babar Trammell if he develops any signs of infection  He denies any SOB  He is staying active with bowling and other activities  His son and daughter in law are visiting this weekend to assist with various items  CM will close him from care management services  I instructed him to call with any questions or concerns  He has my contact information

## 2018-10-03 ENCOUNTER — HOSPITAL ENCOUNTER (OUTPATIENT)
Dept: RADIOLOGY | Facility: HOSPITAL | Age: 83
Discharge: HOME | End: 2018-10-03
Payer: MEDICARE

## 2018-10-03 ENCOUNTER — OFFICE VISIT (OUTPATIENT)
Dept: FAMILY MEDICINE CLINIC | Facility: HOSPITAL | Age: 83
End: 2018-10-03
Payer: MEDICARE

## 2018-10-03 VITALS
HEART RATE: 76 BPM | BODY MASS INDEX: 28.82 KG/M2 | WEIGHT: 168.8 LBS | DIASTOLIC BLOOD PRESSURE: 80 MMHG | HEIGHT: 64 IN | TEMPERATURE: 98.1 F | SYSTOLIC BLOOD PRESSURE: 132 MMHG

## 2018-10-03 DIAGNOSIS — R49.0 HOARSENESS OF VOICE: ICD-10-CM

## 2018-10-03 DIAGNOSIS — Z91.89 AT RISK FOR RETAINED FOREIGN BODY: ICD-10-CM

## 2018-10-03 DIAGNOSIS — J39.2 THROAT DISORDER: Primary | ICD-10-CM

## 2018-10-03 PROCEDURE — 73130 X-RAY EXAM OF HAND: CPT

## 2018-10-03 PROCEDURE — 99214 OFFICE O/P EST MOD 30 MIN: CPT | Performed by: NURSE PRACTITIONER

## 2018-10-03 NOTE — PROGRESS NOTES
Assessment/Plan:     Throat symptoms along with hoarseness may be r/t postnasal drainage vs AE from Pulmicort  Continue with Flonase twice daily  Switch to mucinex-D for short term  Call if any difficulty swallowing liquids or solids or choking while eating or drinking  Possible retained steel in finger  Check xray to r/o  Diagnoses and all orders for this visit:    Throat disorder    Hoarseness of voice    At risk for retained foreign body  -     XR hand 3+ vw right; Future          Subjective:     Patient ID: Kal Mchugh is a 80 y o  male  Throat feels like it is closing  No throat pain  Has been going on for about 1-2 weeks  Comes and goes  No difficulty swallowing  No coughing with eating or drinking  Just feels like something is in throat closing off his 'windpipe"  Has hoarse voice for the last 2 days  No fever,chills  Has cough  Started before throat sensation  Started Pulmicort neb about 1 1/2 months ago  Has COPD  Follows with pulmonary  Occasional sob and wheezing  Takes Mucinex for cough and uses Flonase twice/day  Denies nasal congestion, runny nose  States his nose used to run a lot until he started the "vaporizer"  Does bring up white to clear mucus from throat  Noticed his right 3rd finger was hurting this morning while pham  Noticed a cut and thinks he may have steel in there  Works around [address] [place] [address] and could have cut himself but does recall such  No redness or swelling of finger  Review of Systems   Constitutional: Negative for chills and fever  HENT: Positive for voice change  Negative for congestion, postnasal drip, rhinorrhea, sore throat and trouble swallowing  Respiratory: Positive for cough, shortness of breath and wheezing      Musculoskeletal:        Finger pain         The following portions of the patient's history were reviewed and updated as appropriate: allergies, current medications, past family history, past medical history, past social history, past surgical history and problem list     Objective:  Vitals:    10/03/18 1513   BP: 132/80   Pulse: 76   Temp: 98 1 °F (36 7 °C)      Physical Exam   Constitutional: He is oriented to person, place, and time  He appears well-developed and well-nourished  HENT:   Right Ear: Tympanic membrane, external ear and ear canal normal    Left Ear: Tympanic membrane, external ear and ear canal normal    Mouth/Throat: Uvula is midline, oropharynx is clear and moist and mucous membranes are normal    Cardiovascular: Normal rate, regular rhythm and normal heart sounds  No murmur heard  Pulmonary/Chest: Effort normal  He has decreased breath sounds  Lymphadenopathy:     He has no cervical adenopathy  Neurological: He is alert and oriented to person, place, and time  Skin: Skin is warm and dry  Palmar aspect of right third finger with 5 mm area of peeling skin  Tender to palpation but unable to feel or see any foreign body  Psychiatric: He has a normal mood and affect  Vitals reviewed

## 2018-10-11 ENCOUNTER — HOSPITAL ENCOUNTER (EMERGENCY)
Facility: HOSPITAL | Age: 83
Discharge: HOME | End: 2018-10-11
Attending: EMERGENCY MEDICINE | Admitting: EMERGENCY MEDICINE
Payer: MEDICARE

## 2018-10-11 VITALS
OXYGEN SATURATION: 96 % | TEMPERATURE: 97.8 F | WEIGHT: 187 LBS | RESPIRATION RATE: 18 BRPM | BODY MASS INDEX: 31.92 KG/M2 | HEART RATE: 94 BPM | HEIGHT: 64 IN

## 2018-10-11 DIAGNOSIS — L85.3 DRY SKIN DERMATITIS: Primary | ICD-10-CM

## 2018-10-11 DIAGNOSIS — J02.9 PHARYNGITIS: ICD-10-CM

## 2018-10-11 DIAGNOSIS — J31.0 RHINITIS: ICD-10-CM

## 2018-10-11 PROCEDURE — 99283 EMERGENCY DEPT VISIT LOW MDM: CPT

## 2018-10-11 RX ADMIN — DEXAMETHASONE SODIUM PHOSPHATE 10 MG: 10 INJECTION, SOLUTION INTRAMUSCULAR; INTRAVENOUS at 21:39

## 2018-10-12 NOTE — DISCHARGE INSTRUCTIONS

## 2018-10-12 NOTE — ED PROVIDER NOTES
History  Chief Complaint   Patient presents with    Rash     Pt presents to the er with a rash all over body thta started three weeks ago  Pt states it started after he got a shot for shingles  80year old male presents for evaluation of diffuse pruritis for the past 3 weeks as well as nasal congestion and intermittent sore throat for the past month  Patient states that he had seen his primary care physician for the nasal congestion and sore throat and was prescribed a nasal spray which helps intermittently  Bob Moreer states that the sore throat seems to worsen after using his nebulizer  He had received a shingles vaccination 3 weeks ago as well  Patient states that when he initially developed the pruritis, he tried calamine lotion briefly with no help  He then used alcohol followed by powder for the symptoms which only seemed to worsen  He denies recent fevers or chills  No shortness of breath or swelling  No nausea or vomiting  History provided by:  Patient  Sore Throat   Location:  Generalized  Quality:  Sore  Severity:  Mild  Onset quality:  Gradual  Duration:  1 month  Timing:  Intermittent  Progression:  Waxing and waning  Chronicity:  New  Relieved by:  Nothing  Exacerbated by: using nebulizer  Ineffective treatments:  None tried  Associated symptoms: postnasal drip    Associated symptoms: no abdominal pain, no chest pain, no cough, no fever, no headaches, no neck stiffness, no rhinorrhea and no shortness of breath        Prior to Admission Medications   Prescriptions Last Dose Informant Patient Reported? Taking?    B Complex Vitamins (B COMPLEX-B12 PO)   Yes No   Sig: Take 1 tablet by mouth daily   Cholecalciferol ([employer] VITAMIN D) 2000 units CAPS   Yes No   Sig: Take by mouth   Ferrous Sulfate (IRON) 325 (65 Fe) MG TABS   Yes No   Sig: Take by mouth   Krill Oil 1000 MG CAPS   Yes No   Sig: Take by mouth   LORazepam (ATIVAN) 0 5 mg tablet   No No   Sig: Take 1 tablet (0 5 mg total) by mouth every 8 (eight) hours as needed for anxiety   Multiple Vitamins-Minerals (VISION FORMULA/LUTEIN PO)   Yes No   Sig: Take by mouth   albuterol (VENTOLIN HFA) 90 mcg/act inhaler   No No   Sig: Inhale 2 puffs every 6 (six) hours as needed for wheezing   arformoterol (BROVANA) 15 mcg/2 mL nebulizer solution   No No   Sig: Take 1 vial (15 mcg total) by nebulization 2 (two) times a day   atenolol (TENORMIN) 25 mg tablet   No No   Sig: Take 1 tablet (25 mg total) by mouth daily Take 1 tablet daily   budesonide (PULMICORT) 0 5 mg/2 mL nebulizer solution   No No   Sig: Take 1 vial (0 5 mg total) by nebulization 2 (two) times a day Rinse mouth after use  fluticasone (FLONASE) 50 mcg/act nasal spray   Yes No   Si spray into each nostril 2 (two) times a day   fluticasone-umeclidinium-vilanterol (TRELEGY ELLIPTA) 100-62 5-25 MCG/INH inhaler   No No   Sig: Inhale 1 puff daily Rinse mouth after use     guaiFENesin (MUCINEX) 600 mg 12 hr tablet   Yes No   Sig: Take 1,200 mg by mouth every 12 (twelve) hours   guaiFENesin-codeine (ROBITUSSIN AC) 100-10 mg/5 mL oral solution   No No   Sig: Take 5 mL by mouth 3 (three) times a day as needed for cough   ipratropium (ATROVENT) 0 02 % nebulizer solution   No No   Sig: Take 1 vial (0 5 mg total) by nebulization 4 (four) times a day   levothyroxine 75 mcg tablet   Yes No   Sig: Levothyroxine Sodium 75 MCG Oral Tablet TAKE 1/2 TALBET IN THE MORNING DAILY  Quantity: 30;  Refills: 5    Senia Galarza MD;  Started 12-Dec-2012 Active   menthol-zinc oxide (CALMOSPETINE) 0 44-20 625 %   Yes No   Sig: Apply topically   metoprolol succinate (TOPROL-XL) 25 mg 24 hr tablet   No No   Sig: TAKE ONE TABLET BY MOUTH EVERY DAY   naproxen sodium (ALEVE) 220 MG tablet   Yes No   Sig: Take 1 tablet by mouth   nystatin-triamcinolone (MYCOLOG-II) cream   Yes No   Sig: Apply topically   omeprazole (PriLOSEC) 20 mg delayed release capsule   No No   Sig: TAKE ONE CAPSULE BY MOUTH EVERY DAY   zolpidem (AMBIEN) 5 mg tablet   No No   Sig: Take 1 tablet (5 mg total) by mouth daily at bedtime as needed for sleep      Facility-Administered Medications Last Administration Doses Remaining   albuterol inhalation solution 2 5 mg None recorded 1460          Past Medical History:   Diagnosis Date    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    Compression fracture of thoracic vertebra (HCC)     last assessed 05/07/2012    COPD (chronic obstructive pulmonary disease) ([facility] )     Disease of thyroid gland     Heart attack ([facility] )     Hollenhorst plaque, right eye     last assessed 04/08/2013    Hyperlipidemia     Hypertension     Iron deficiency anemia due to chronic blood loss     last assessed 09/10/2012    Ischemic colitis ([facility] )     last assessed 05/27/2014    Osteoarthritis of both hands     last assessedn 04/30/2013    Peptic ulcer     last assessed 06/14/2013    Polymyalgia rheumatica ([facility] )     last assessesd 10/09/2012    Renal disorder     Upper GI hemorrhage     last assessed 01/29/2015    Varicose veins of lower extremity     last assessed 04/26/2013       Past Surgical History:   Procedure Laterality Date    CORONARY ARTERY BYPASS GRAFT      HEMORRHOID SURGERY      HERNIA REPAIR      RENAL CYST EXCISION      resolved 05/2010    THROMBOENDARTERECTOMY Right     carotid, last assessed 06/18/2013       Family History   Problem Relation Age of Onset    Hypertension Mother     Alcohol abuse Mother     Hypertension Father     Alcohol abuse Father     Alcohol abuse Son     Heart disease Family     Stroke Family         syndrome     I have reviewed and agree with the history as documented  Social History   Substance Use Topics    Smoking status: Former Smoker     Packs/day: 2 00     Years: 50 00     Types: Cigarettes     Quit date: 1993    Smokeless tobacco: Never Used      Comment: Quit 40 yrs  ago    Alcohol use No      Comment: Last drink 30 yrs   ago        Review of Systems Constitutional: Negative for appetite change, diaphoresis, fatigue and fever  HENT: Positive for congestion, postnasal drip and sore throat  Negative for rhinorrhea  Respiratory: Negative for cough, chest tightness and shortness of breath  Cardiovascular: Negative for chest pain, palpitations and leg swelling  Gastrointestinal: Negative for abdominal pain, constipation, diarrhea, nausea and vomiting  Genitourinary: Negative for difficulty urinating, dysuria, frequency and hematuria  Musculoskeletal: Negative for myalgias, neck pain and neck stiffness  Skin: Negative for pallor  Neurological: Negative for syncope, weakness and headaches  All other systems reviewed and are negative  Physical Exam  Physical Exam   Constitutional: He appears well-developed and well-nourished  Non-toxic appearance  No distress  HENT:   Head: Normocephalic and atraumatic  Nose: Mucosal edema present  Mouth/Throat: Uvula is midline  No posterior oropharyngeal erythema  Eyes: Pupils are equal, round, and reactive to light  EOM are normal    Neck: Normal range of motion  No tracheal deviation present  No thyromegaly present  Cardiovascular: Normal rate, regular rhythm, normal heart sounds and intact distal pulses  Pulmonary/Chest: Effort normal and breath sounds normal    Abdominal: Soft  Bowel sounds are normal  He exhibits no distension  There is no tenderness  Lymphadenopathy:     He has no cervical adenopathy  Skin: Skin is warm and dry  No rash noted  He is not diaphoretic  Diffuse dry, flaky skin   Nursing note and vitals reviewed        Vital Signs  ED Triage Vitals [10/11/18 2119]   Temperature Pulse Respirations BP SpO2   97 8 °F (36 6 °C) 94 18 -- 96 %      Temp Source Heart Rate Source Patient Position - Orthostatic VS BP Location FiO2 (%)   Tympanic Monitor Lying Right arm --      Pain Score       --           Vitals:    10/11/18 2119   Pulse: 94   Patient Position - Orthostatic VS: Lying       Visual Acuity      ED Medications  Medications   dexamethasone 10 mg/mL oral liquid 10 mg 1 mL (10 mg Oral Given 10/11/18 2139)       Diagnostic Studies  Results Reviewed     None                 No orders to display              Procedures  Procedures       Phone Contacts  ED Phone Contact    ED Course                               MDM  Number of Diagnoses or Management Options  Dry skin dermatitis: new and requires workup  Pharyngitis: established and worsening  Rhinitis: established and worsening  Diagnosis management comments: 80year old male parents for evaluation of sore throat and diffuse pruritis  No rash on exam; however, patient has very dry skin throughout  Decadron given for sore throat with significant improvement  Lotion provided for dry skin with significant improvement as well  Patient advised to follow up with his PCP  Discussed return precautions with patient  Patient Progress  Patient progress: stable    CritCare Time    Disposition  Final diagnoses:   Dry skin dermatitis   Rhinitis   Pharyngitis     Time reflects when diagnosis was documented in both MDM as applicable and the Disposition within this note     Time User Action Codes Description Comment    10/11/2018  9:53 PM Claudia TANNER Add [L85 3] Dry skin dermatitis     10/11/2018  9:53 PM Claudia TANNER Add [J31 0] Rhinitis     10/11/2018  9:53 PM Daylin Ohara Add [J02 9] Pharyngitis       ED Disposition     ED Disposition Condition Comment    Discharge  Rut Ochoa discharge to home/self care      Condition at discharge: Stable        Follow-up Information     Follow up With Specialties Details Why Contact Info Additional Information    Abril Villegas MD Family Medicine Schedule an appointment as soon as possible for a visit in 4 days for re-evaluation [address] Emergency Department Emergency Medicine Go to If symptoms worsen [facility]  [address] ED, [address], [place], [place], [address]          Discharge Medication List as of 10/11/2018  9:54 PM      CONTINUE these medications which have NOT CHANGED    Details   albuterol (VENTOLIN HFA) 90 mcg/act inhaler Inhale 2 puffs every 6 (six) hours as needed for wheezing, Starting Wed 6/13/2018, Normal      arformoterol (BROVANA) 15 mcg/2 mL nebulizer solution Take 1 vial (15 mcg total) by nebulization 2 (two) times a day, Starting Fri 6/15/2018, Normal      atenolol (TENORMIN) 25 mg tablet Take 1 tablet (25 mg total) by mouth daily Take 1 tablet daily, Starting Fri 7/13/2018, Normal      B Complex Vitamins (B COMPLEX-B12 PO) Take 1 tablet by mouth daily, Starting Wed 3/6/2013, Historical Med      budesonide (PULMICORT) 0 5 mg/2 mL nebulizer solution Take 1 vial (0 5 mg total) by nebulization 2 (two) times a day Rinse mouth after use , Starting Fri 6/15/2018, Normal      Cholecalciferol ([employer]S VITAMIN D) 2000 units CAPS Take by mouth, Historical Med      Ferrous Sulfate (IRON) 325 (65 Fe) MG TABS Take by mouth, Historical Med      fluticasone (FLONASE) 50 mcg/act nasal spray 1 spray into each nostril 2 (two) times a day, Historical Med      fluticasone-umeclidinium-vilanterol (TRELEGY ELLIPTA) 100-62 5-25 MCG/INH inhaler Inhale 1 puff daily Rinse mouth after use , Starting Wed 6/13/2018, Sample      guaiFENesin (MUCINEX) 600 mg 12 hr tablet Take 1,200 mg by mouth every 12 (twelve) hours, Historical Med      guaiFENesin-codeine (ROBITUSSIN AC) 100-10 mg/5 mL oral solution Take 5 mL by mouth 3 (three) times a day as needed for cough, Starting Thu 3/22/2018, Print      ipratropium (ATROVENT) 0 02 % nebulizer solution Take 1 vial (0 5 mg total) by nebulization 4 (four) times a day, Starting Fri 6/15/2018, Normal      Krill Oil 1000 MG CAPS Take by mouth, Historical Med      levothyroxine 75 mcg tablet Levothyroxine Sodium 75 MCG Oral Tablet TAKE 1/2 TALBET IN THE MORNING DAILY  Quantity: 30;  Refills: 5    Jerri Corral MD;  Started 12-Dec-2012 Active, Starting 12/12/2012, Until Discontinued, Historical Med      LORazepam (ATIVAN) 0 5 mg tablet Take 1 tablet (0 5 mg total) by mouth every 8 (eight) hours as needed for anxiety, Starting Fri 9/28/2018, Normal      menthol-zinc oxide (CALMOSPETINE) 0 44-20 625 % Apply topically, Starting Mon 8/4/2014, Historical Med      metoprolol succinate (TOPROL-XL) 25 mg 24 hr tablet TAKE ONE TABLET BY MOUTH EVERY DAY, Normal      Multiple Vitamins-Minerals (VISION FORMULA/LUTEIN PO) Take by mouth, Starting Sat 11/15/2014, Historical Med      naproxen sodium (ALEVE) 220 MG tablet Take 1 tablet by mouth, Starting Fri 1/20/2017, Historical Med      nystatin-triamcinolone (MYCOLOG-II) cream Apply topically, Starting Tue 11/9/2010, Historical Med      omeprazole (PriLOSEC) 20 mg delayed release capsule TAKE ONE CAPSULE BY MOUTH EVERY DAY, Normal      zolpidem (AMBIEN) 5 mg tablet Take 1 tablet (5 mg total) by mouth daily at bedtime as needed for sleep, Starting Fri 9/28/2018, Normal           No discharge procedures on file      ED Provider  Electronically Signed by           Caroline Dc MD  10/11/18 1538

## 2018-10-20 DIAGNOSIS — J84.9 INTERSTITIAL LUNG DISORDERS (HCC): ICD-10-CM

## 2018-10-20 DIAGNOSIS — J44.9 CHRONIC OBSTRUCTIVE PULMONARY DISEASE, UNSPECIFIED COPD TYPE (HCC): ICD-10-CM

## 2018-10-20 RX ORDER — ARFORMOTEROL TARTRATE 15 UG/2ML
SOLUTION RESPIRATORY (INHALATION)
Qty: 120 ML | Refills: 3 | Status: SHIPPED | OUTPATIENT
Start: 2018-10-20 | End: 2019-03-28

## 2018-10-20 RX ORDER — BUDESONIDE 0.5 MG/2ML
INHALANT ORAL
Qty: 120 ML | Refills: 3 | Status: ON HOLD | OUTPATIENT
Start: 2018-10-20 | End: 2019-03-30 | Stop reason: SDDI

## 2018-10-23 ENCOUNTER — OFFICE VISIT (OUTPATIENT)
Dept: FAMILY MEDICINE CLINIC | Facility: HOSPITAL | Age: 83
End: 2018-10-23
Payer: MEDICARE

## 2018-10-23 VITALS
HEART RATE: 78 BPM | DIASTOLIC BLOOD PRESSURE: 78 MMHG | HEIGHT: 64 IN | BODY MASS INDEX: 28.34 KG/M2 | WEIGHT: 166 LBS | SYSTOLIC BLOOD PRESSURE: 138 MMHG | TEMPERATURE: 97.5 F

## 2018-10-23 DIAGNOSIS — J44.0 CHRONIC OBSTRUCTIVE PULMONARY DISEASE WITH ACUTE LOWER RESPIRATORY INFECTION (HCC): ICD-10-CM

## 2018-10-23 DIAGNOSIS — L20.84 INTRINSIC ECZEMA: ICD-10-CM

## 2018-10-23 DIAGNOSIS — I25.10 CORONARY ARTERY DISEASE INVOLVING NATIVE CORONARY ARTERY OF NATIVE HEART WITHOUT ANGINA PECTORIS: ICD-10-CM

## 2018-10-23 DIAGNOSIS — Z23 NEED FOR INFLUENZA VACCINATION: ICD-10-CM

## 2018-10-23 DIAGNOSIS — Z13.1 SCREENING FOR DIABETES MELLITUS: ICD-10-CM

## 2018-10-23 DIAGNOSIS — Z23 NEED FOR PNEUMOCOCCAL VACCINATION: ICD-10-CM

## 2018-10-23 DIAGNOSIS — E78.2 MIXED HYPERLIPIDEMIA: ICD-10-CM

## 2018-10-23 DIAGNOSIS — D50.8 OTHER IRON DEFICIENCY ANEMIA: ICD-10-CM

## 2018-10-23 DIAGNOSIS — F06.4 ANXIETY DISORDER DUE TO GENERAL MEDICAL CONDITION: ICD-10-CM

## 2018-10-23 DIAGNOSIS — E03.9 ACQUIRED HYPOTHYROIDISM: ICD-10-CM

## 2018-10-23 DIAGNOSIS — I10 ESSENTIAL HYPERTENSION: Primary | ICD-10-CM

## 2018-10-23 PROCEDURE — 90662 IIV NO PRSV INCREASED AG IM: CPT

## 2018-10-23 PROCEDURE — 90670 PCV13 VACCINE IM: CPT

## 2018-10-23 PROCEDURE — G0008 ADMIN INFLUENZA VIRUS VAC: HCPCS

## 2018-10-23 PROCEDURE — G0009 ADMIN PNEUMOCOCCAL VACCINE: HCPCS

## 2018-10-23 PROCEDURE — 99214 OFFICE O/P EST MOD 30 MIN: CPT | Performed by: FAMILY MEDICINE

## 2018-10-23 NOTE — PROGRESS NOTES
Assessment/Plan:         Diagnoses and all orders for this visit:    Essential hypertension  Comments:  Excellent BP control  Orders:  -     Comprehensive metabolic panel; Future    Coronary artery disease involving native coronary artery of native heart without angina pectoris  Comments:  CAD asymptomatic    Chronic obstructive pulmonary disease with acute lower respiratory infection ([facility] )  Comments:  Stable on current inhalers  Hoarseness probably from steroid inhaler  Intrinsic eczema    Anxiety disorder due to general medical condition  Comments:  Stable mood    Mixed hyperlipidemia  -     Lipid Panel with Direct LDL reflex; Future    Other iron deficiency anemia  -     CBC and differential; Future    Screening for diabetes mellitus  -     HEMOGLOBIN A1C W/ EAG ESTIMATION; Future    Acquired hypothyroidism  -     TSH, 3rd generation with Free T4 reflex; Future    Need for influenza vaccination  -     influenza vaccine, 5069-2751, high-dose, PF 0 5 mL, for patients 65 yr+ (FLUZONE HIGH-DOSE)    Need for pneumococcal vaccination  -     PNEUMOCOCCAL CONJUGATE VACCINE 13-VALENT GREATER THAN 6 MONTHS          Subjective:      Patient ID: Ivania Lawrence is a 80 y o  male  3 month followup  Some URI and hoarseness that has been ongoing for quite awhile  No color to his sputum  Not doing too much exercise but he does have his arm spinning exerciser  No chest pain or dizziness  Seen in visit with his daughter-in-law who closely watches his status  Discussed plus and minus about flu shot and I strongly advised getting it  The following portions of the patient's history were reviewed and updated as appropriate: allergies, current medications, past family history, past medical history, past social history, past surgical history and problem list     Review of Systems   Constitutional: Positive for fatigue  Negative for appetite change and unexpected weight change     HENT: Positive for congestion and voice change  Negative for sore throat  Eyes: Positive for visual disturbance  Respiratory: Positive for cough and shortness of breath  Cardiovascular: Negative  Musculoskeletal: Positive for arthralgias  Neurological: Negative for tremors and headaches  Psychiatric/Behavioral: Negative for confusion and hallucinations  The patient is not nervous/anxious  All other systems reviewed and are negative  Objective:      /78   Pulse 78   Temp 97 5 °F (36 4 °C)   Ht 5' 4" (1 626 m)   Wt 75 3 kg (166 lb)   BMI 28 49 kg/m²          Physical Exam   Constitutional: He is oriented to person, place, and time  He appears well-developed and well-nourished  Cardiovascular: Normal rate and regular rhythm  Pulmonary/Chest: He has decreased breath sounds  He has no rales  Musculoskeletal: Normal range of motion  He exhibits no edema  Neurological: He is oriented to person, place, and time  Psychiatric: He has a normal mood and affect  His behavior is normal  Judgment and thought content normal    Nursing note and vitals reviewed

## 2018-11-24 DIAGNOSIS — E03.9 ACQUIRED HYPOTHYROIDISM: Primary | ICD-10-CM

## 2018-11-25 RX ORDER — LEVOTHYROXINE SODIUM 0.07 MG/1
TABLET ORAL
Qty: 30 TABLET | Refills: 5 | Status: SHIPPED | OUTPATIENT
Start: 2018-11-25 | End: 2019-03-28

## 2018-12-15 DIAGNOSIS — K21.00 GASTROESOPHAGEAL REFLUX DISEASE WITH ESOPHAGITIS: ICD-10-CM

## 2018-12-16 RX ORDER — OMEPRAZOLE 20 MG/1
CAPSULE, DELAYED RELEASE ORAL
Qty: 30 CAPSULE | Refills: 5 | Status: SHIPPED | OUTPATIENT
Start: 2018-12-16 | End: 2019-06-29 | Stop reason: SDUPTHER

## 2019-01-03 ENCOUNTER — APPOINTMENT (OUTPATIENT)
Dept: LAB | Facility: HOSPITAL | Age: 84
End: 2019-01-03
Payer: MEDICARE

## 2019-01-03 DIAGNOSIS — I10 ESSENTIAL HYPERTENSION: ICD-10-CM

## 2019-01-03 DIAGNOSIS — Z13.1 SCREENING FOR DIABETES MELLITUS: ICD-10-CM

## 2019-01-03 DIAGNOSIS — E03.9 ACQUIRED HYPOTHYROIDISM: ICD-10-CM

## 2019-01-03 DIAGNOSIS — D50.8 OTHER IRON DEFICIENCY ANEMIA: ICD-10-CM

## 2019-01-03 DIAGNOSIS — E78.2 MIXED HYPERLIPIDEMIA: ICD-10-CM

## 2019-01-03 LAB
ALBUMIN SERPL BCP-MCNC: 3.7 G/DL (ref 3.5–5)
ALP SERPL-CCNC: 76 U/L (ref 46–116)
ALT SERPL W P-5'-P-CCNC: 31 U/L (ref 12–78)
ANION GAP SERPL CALCULATED.3IONS-SCNC: 6 MMOL/L (ref 4–13)
AST SERPL W P-5'-P-CCNC: 20 U/L (ref 5–45)
BASOPHILS # BLD AUTO: 0.02 THOUSANDS/ΜL (ref 0–0.1)
BASOPHILS NFR BLD AUTO: 0 % (ref 0–1)
BILIRUB SERPL-MCNC: 0.6 MG/DL (ref 0.2–1)
BUN SERPL-MCNC: 29 MG/DL (ref 5–25)
CALCIUM SERPL-MCNC: 9.2 MG/DL (ref 8.3–10.1)
CHLORIDE SERPL-SCNC: 98 MMOL/L (ref 100–108)
CHOLEST SERPL-MCNC: 208 MG/DL (ref 50–200)
CO2 SERPL-SCNC: 29 MMOL/L (ref 21–32)
CREAT SERPL-MCNC: 1.74 MG/DL (ref 0.6–1.3)
EOSINOPHIL # BLD AUTO: 0.13 THOUSAND/ΜL (ref 0–0.61)
EOSINOPHIL NFR BLD AUTO: 2 % (ref 0–6)
ERYTHROCYTE [DISTWIDTH] IN BLOOD BY AUTOMATED COUNT: 13.9 % (ref 11.6–15.1)
EST. AVERAGE GLUCOSE BLD GHB EST-MCNC: 111 MG/DL
GFR SERPL CREATININE-BSD FRML MDRD: 35 ML/MIN/1.73SQ M
GLUCOSE P FAST SERPL-MCNC: 87 MG/DL (ref 65–99)
HBA1C MFR BLD: 5.5 % (ref 4.2–6.3)
HCT VFR BLD AUTO: 44.8 % (ref 36.5–49.3)
HDLC SERPL-MCNC: 29 MG/DL (ref 40–60)
HGB BLD-MCNC: 14.2 G/DL (ref 12–17)
IMM GRANULOCYTES # BLD AUTO: 0.03 THOUSAND/UL (ref 0–0.2)
IMM GRANULOCYTES NFR BLD AUTO: 1 % (ref 0–2)
LDLC SERPL CALC-MCNC: 147 MG/DL (ref 0–100)
LYMPHOCYTES # BLD AUTO: 1.63 THOUSANDS/ΜL (ref 0.6–4.47)
LYMPHOCYTES NFR BLD AUTO: 26 % (ref 14–44)
MCH RBC QN AUTO: 30.3 PG (ref 26.8–34.3)
MCHC RBC AUTO-ENTMCNC: 31.7 G/DL (ref 31.4–37.4)
MCV RBC AUTO: 96 FL (ref 82–98)
MONOCYTES # BLD AUTO: 0.73 THOUSAND/ΜL (ref 0.17–1.22)
MONOCYTES NFR BLD AUTO: 12 % (ref 4–12)
NEUTROPHILS # BLD AUTO: 3.81 THOUSANDS/ΜL (ref 1.85–7.62)
NEUTS SEG NFR BLD AUTO: 59 % (ref 43–75)
NRBC BLD AUTO-RTO: 0 /100 WBCS
PLATELET # BLD AUTO: 138 THOUSANDS/UL (ref 149–390)
PMV BLD AUTO: 9.3 FL (ref 8.9–12.7)
POTASSIUM SERPL-SCNC: 4.6 MMOL/L (ref 3.5–5.3)
PROT SERPL-MCNC: 8 G/DL (ref 6.4–8.2)
RBC # BLD AUTO: 4.68 MILLION/UL (ref 3.88–5.62)
SODIUM SERPL-SCNC: 133 MMOL/L (ref 136–145)
TRIGL SERPL-MCNC: 160 MG/DL
TSH SERPL DL<=0.05 MIU/L-ACNC: 2.78 UIU/ML (ref 0.36–3.74)
WBC # BLD AUTO: 6.35 THOUSAND/UL (ref 4.31–10.16)

## 2019-01-03 PROCEDURE — 80061 LIPID PANEL: CPT

## 2019-01-03 PROCEDURE — 36415 COLL VENOUS BLD VENIPUNCTURE: CPT

## 2019-01-03 PROCEDURE — 84443 ASSAY THYROID STIM HORMONE: CPT

## 2019-01-03 PROCEDURE — 83036 HEMOGLOBIN GLYCOSYLATED A1C: CPT

## 2019-01-03 PROCEDURE — 80053 COMPREHEN METABOLIC PANEL: CPT

## 2019-01-03 PROCEDURE — 85025 COMPLETE CBC W/AUTO DIFF WBC: CPT

## 2019-01-12 ENCOUNTER — OFFICE VISIT (OUTPATIENT)
Dept: URGENT CARE | Facility: CLINIC | Age: 84
End: 2019-01-12
Payer: MEDICARE

## 2019-01-12 VITALS
DIASTOLIC BLOOD PRESSURE: 74 MMHG | HEIGHT: 64 IN | HEART RATE: 61 BPM | OXYGEN SATURATION: 98 % | TEMPERATURE: 97.2 F | SYSTOLIC BLOOD PRESSURE: 130 MMHG | WEIGHT: 172.2 LBS | BODY MASS INDEX: 29.4 KG/M2 | RESPIRATION RATE: 61 BRPM

## 2019-01-12 DIAGNOSIS — J20.8 ACUTE BACTERIAL BRONCHITIS: Primary | ICD-10-CM

## 2019-01-12 DIAGNOSIS — R05.9 COUGH: ICD-10-CM

## 2019-01-12 DIAGNOSIS — B96.89 ACUTE BACTERIAL BRONCHITIS: Primary | ICD-10-CM

## 2019-01-12 DIAGNOSIS — I10 ESSENTIAL HYPERTENSION: ICD-10-CM

## 2019-01-12 PROCEDURE — 99213 OFFICE O/P EST LOW 20 MIN: CPT | Performed by: NURSE PRACTITIONER

## 2019-01-12 PROCEDURE — G0463 HOSPITAL OUTPT CLINIC VISIT: HCPCS | Performed by: NURSE PRACTITIONER

## 2019-01-12 RX ORDER — AZITHROMYCIN 250 MG/1
TABLET, FILM COATED ORAL
Qty: 6 TABLET | Refills: 0 | Status: SHIPPED | OUTPATIENT
Start: 2019-01-12 | End: 2019-01-16

## 2019-01-12 RX ORDER — BENZONATATE 100 MG/1
100 CAPSULE ORAL 3 TIMES DAILY PRN
Qty: 20 CAPSULE | Refills: 0 | Status: ON HOLD | OUTPATIENT
Start: 2019-01-12 | End: 2019-03-30 | Stop reason: ALTCHOICE

## 2019-01-12 NOTE — PATIENT INSTRUCTIONS
Follow up with pcp  Take meds as directed   Take zyrtec or allegra as directed  Use antihistamine as directed    Use neb treatment as directed, albuterol   Increase fluids  If symptoms worsen go to the ER

## 2019-01-12 NOTE — PROGRESS NOTES
NAME: Catie Easley is a 80 y o  male  : 1932    MRN: [id number]      Assessment and Plan   Acute bacterial bronchitis [J20 8, B96 89]  1  Acute bacterial bronchitis  azithromycin (ZITHROMAX) 250 mg tablet    benzonatate (TESSALON PERLES) 100 mg capsule   2  Cough  benzonatate (TESSALON PERLES) 100 mg capsule       Tito Wang was seen today for cough  Diagnoses and all orders for this visit:    Acute bacterial bronchitis  -     azithromycin (ZITHROMAX) 250 mg tablet; Take 2 tablets today and only 1 pill daily until finished  -     benzonatate (TESSALON PERLES) 100 mg capsule; Take 1 capsule (100 mg total) by mouth 3 (three) times a day as needed for cough    Cough  -     benzonatate (TESSALON PERLES) 100 mg capsule; Take 1 capsule (100 mg total) by mouth 3 (three) times a day as needed for cough        Patient Instructions   Patient Instructions   Follow up with pcp  Take meds as directed   Take zyrtec or allegra as directed  Use antihistamine as directed    Use neb treatment as directed, albuterol   Increase fluids  If symptoms worsen go to the ER      Proceed to ER if symptoms worsen  Chief Complaint     Chief Complaint   Patient presents with    Cough     x1 week, chills, b/l eye drainage; denies fevers         History of Present Illness     Pt has chest congstion present for the past few days, increased blowing his nose, chest pain when coughing only and when he goes to bed and lays down it keeps him up, coughing up a lot, he is currently taking cholorasetin and taking tylenol for HA and he has had increased drainage from the eyes as well  Clear drainage  He ahs pain between his eyes, and headaches  He has no body aches and has been more tired today than the past few days  He is awake and alert and answering all questions appropriately   He has had loose stools as well for the past two days, no diarrhea but looser than normal          Review of Systems   Review of Systems   Constitutional: Positive for fatigue  Negative for fever  HENT: Positive for congestion, ear pain, postnasal drip, sinus pressure and sore throat  Eyes: Negative  Respiratory: Positive for cough and shortness of breath  Cardiovascular: Negative  Negative for chest pain  Gastrointestinal: Negative  Musculoskeletal: Negative  Neurological: Positive for headaches  Negative for dizziness           Current Medications       Current Outpatient Prescriptions:     atenolol (TENORMIN) 25 mg tablet, Take 1 tablet (25 mg total) by mouth daily Take 1 tablet daily, Disp: 30 tablet, Rfl: 5    B Complex Vitamins (B COMPLEX-B12 PO), Take 1 tablet by mouth daily, Disp: , Rfl:     Cholecalciferol ([employer] VITAMIN D) 2000 units CAPS, Take by mouth, Disp: , Rfl:     Ferrous Sulfate (IRON) 325 (65 Fe) MG TABS, Take by mouth, Disp: , Rfl:     fluticasone (FLONASE) 50 mcg/act nasal spray, 1 spray into each nostril 2 (two) times a day, Disp: , Rfl:     guaiFENesin (MUCINEX) 600 mg 12 hr tablet, Take 1,200 mg by mouth every 12 (twelve) hours, Disp: , Rfl:     Krill Oil 1000 MG CAPS, Take by mouth, Disp: , Rfl:     levothyroxine 75 mcg tablet, TAKE ONE TABLET ONCE DAILY, Disp: 30 tablet, Rfl: 5    LORazepam (ATIVAN) 0 5 mg tablet, Take 1 tablet (0 5 mg total) by mouth every 8 (eight) hours as needed for anxiety, Disp: 90 tablet, Rfl: 3    metoprolol succinate (TOPROL-XL) 25 mg 24 hr tablet, TAKE ONE TABLET BY MOUTH EVERY DAY, Disp: 30 tablet, Rfl: 5    Multiple Vitamins-Minerals (VISION FORMULA/LUTEIN PO), Take by mouth, Disp: , Rfl:     naproxen sodium (ALEVE) 220 MG tablet, Take 1 tablet by mouth, Disp: , Rfl:     omeprazole (PriLOSEC) 20 mg delayed release capsule, TAKE ONE CAPSULE BY MOUTH EVERY DAY, Disp: 30 capsule, Rfl: 5    SHINGRIX 50 MCG SUSR, , Disp: , Rfl:     zolpidem (AMBIEN) 5 mg tablet, Take 1 tablet (5 mg total) by mouth daily at bedtime as needed for sleep, Disp: 30 tablet, Rfl: 3    albuterol (VENTOLIN HFA) 90 mcg/act inhaler, Inhale 2 puffs every 6 (six) hours as needed for wheezing (Patient not taking: Reported on 10/23/2018 ), Disp: 1 Inhaler, Rfl: 3    azithromycin (ZITHROMAX) 250 mg tablet, Take 2 tablets today and only 1 pill daily until finished , Disp: 6 tablet, Rfl: 0    benzonatate (TESSALON PERLES) 100 mg capsule, Take 1 capsule (100 mg total) by mouth 3 (three) times a day as needed for cough, Disp: 20 capsule, Rfl: 0    BROVANA 15 MCG/2ML nebulizer solution, INHALE ONE VIAL (2ML) BY NEBULIZER TWO TIMES A DAY (Patient not taking: Reported on 1/12/2019), Disp: 120 mL, Rfl: 3    budesonide (PULMICORT) 0 5 mg/2 mL nebulizer solution, INHALE ONE VIAL (2ML) BY NEBULIZER TWO TIMES A DAY; RINSE MOUTH AFTER EACH USE (Patient not taking: Reported on 1/12/2019), Disp: 120 mL, Rfl: 3    fluticasone-umeclidinium-vilanterol (TRELEGY ELLIPTA) 100-62 5-25 MCG/INH inhaler, Inhale 1 puff daily Rinse mouth after use   (Patient not taking: Reported on 10/23/2018 ), Disp: 1 Inhaler, Rfl: 0    guaiFENesin-codeine (ROBITUSSIN AC) 100-10 mg/5 mL oral solution, Take 5 mL by mouth 3 (three) times a day as needed for cough (Patient not taking: Reported on 10/23/2018 ), Disp: 118 mL, Rfl: 0    ipratropium (ATROVENT) 0 02 % nebulizer solution, INHALE ONE VIAL (2 5ML) BY NEBULIZER FOUR TIMES A DAY (Patient not taking: Reported on 1/12/2019), Disp: 300 mL, Rfl: 3    menthol-zinc oxide (CALMOSPETINE) 0 44-20 625 %, Apply topically, Disp: , Rfl:     nystatin-triamcinolone (MYCOLOG-II) cream, Apply topically, Disp: , Rfl:     Current Facility-Administered Medications:     albuterol inhalation solution 2 5 mg, 2 5 mg, Nebulization, 4x Daily, Moreno Bedoya, DO    Current Allergies     Allergies as of 01/12/2019 - Reviewed 01/12/2019   Allergen Reaction Noted    Pravastatin Anaphylaxis, Photosensitivity, and Headache 05/18/2011    Acetaminophen-codeine GI Intolerance 05/14/2016    Atorvastatin  05/18/2011    Codeine Nausea Only 06/17/2015    Colestipol GI Intolerance 05/18/2011    Contrast  [iodinated diagnostic agents]  03/27/2013    Fenofibrate GI Intolerance 05/18/2011    Hydrocodone Vomiting 05/07/2012    Niacin  04/22/2012    Niaspan  [niacin er]  05/14/2016    Pitavastatin Myalgia 06/12/2017    Pneumococcal polysaccharide vaccine  05/07/2012    Pravastatin sodium  05/14/2016    Simvastatin  05/14/2016    Statins Myalgia 02/05/2014    Vicoprofen  [hydrocodone-ibuprofen] GI Intolerance 05/14/2016    Cyclophosphamide Rash 05/14/2016    Ioversol Hives 05/14/2016              Past Medical History:   Diagnosis Date    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    Compression fracture of thoracic vertebra (HCC)     last assessed 05/07/2012    [facility] obstructive [facility]) [facility] [address] [facility]     Disease of thyroid gland     Heart attack [facility] [address] [facility]     Hollenhorst plaque, right eye     last assessed 04/08/2013    Hyperlipidemia     Hypertension     Iron deficiency anemia due to chronic blood loss     last assessed 09/10/2012    Ischemic colitis ([facility] [address] )     last assessed 05/27/2014    Osteoarthritis of both hands     last assessedn 04/30/2013    Peptic ulcer     last assessed 06/14/2013    Polymyalgia rheumatica ([facility] [address] )     last assessesd 10/09/2012    Renal disorder     Upper GI hemorrhage     last assessed 01/29/2015    Varicose veins of lower extremity     last assessed 04/26/2013       Past Surgical History:   Procedure Laterality Date    CORONARY ARTERY BYPASS GRAFT      HEMORRHOID SURGERY      HERNIA REPAIR      RENAL CYST EXCISION      resolved 05/2010    THROMBOENDARTERECTOMY Right     carotid, last assessed 06/18/2013       Family History   Problem Relation Age of Onset    Hypertension Mother     Alcohol abuse Mother     Hypertension Father     Alcohol abuse Father     Alcohol abuse Son     Heart disease Family     Stroke Family         syndrome         Medications have been verified  The following portions of the patient's history were reviewed and updated as appropriate: allergies, current medications, past family history, past medical history, past social history, past surgical history and problem list     Objective   /74   Pulse 61   Temp (!) 97 2 °F (36 2 °C) (Tympanic)   Resp (!) 61   Ht 5' 4" (1 626 m)   Wt 78 1 kg (172 lb 3 2 oz)   SpO2 98%   BMI 29 56 kg/m²      Physical Exam     Physical Exam   Constitutional: Vital signs are normal  He appears well-developed and well-nourished  He is cooperative  HENT:   Head: Normocephalic  Right Ear: Decreased hearing is noted  Left Ear: Decreased hearing is noted  Nose: Mucosal edema present  Right sinus exhibits frontal sinus tenderness  Left sinus exhibits frontal sinus tenderness  Mouth/Throat: Uvula is midline  Posterior oropharyngeal edema and posterior oropharyngeal erythema present  Wears hearing aids to both ears, when removed there is dull to Light reflex on the right side, clear on the left ,no FB noted or drainage or erythema present  Mucus noted in back of throat, yellow in color,    Eyes: Pupils are equal, round, and reactive to light  Conjunctivae and EOM are normal    Clear drainage from both eyes present,    Cardiovascular: Normal rate and regular rhythm  Pulmonary/Chest: Effort normal  No respiratory distress  He has decreased breath sounds  Abdominal: Soft  Normal appearance  There is no tenderness  Soft nontender, loose stool from increased post nasal drip   Neurological: He is alert  Skin: Skin is warm and dry         ELISABET Beard

## 2019-01-13 RX ORDER — ATENOLOL 25 MG/1
TABLET ORAL
Qty: 30 TABLET | Refills: 5 | Status: SHIPPED | OUTPATIENT
Start: 2019-01-13 | End: 2019-07-20 | Stop reason: SDUPTHER

## 2019-01-19 DIAGNOSIS — B37.9 MONILIASIS: Primary | ICD-10-CM

## 2019-01-21 ENCOUNTER — OFFICE VISIT (OUTPATIENT)
Dept: FAMILY MEDICINE CLINIC | Facility: HOSPITAL | Age: 84
End: 2019-01-21
Payer: MEDICARE

## 2019-01-21 VITALS
SYSTOLIC BLOOD PRESSURE: 140 MMHG | DIASTOLIC BLOOD PRESSURE: 82 MMHG | HEIGHT: 64 IN | HEART RATE: 76 BPM | WEIGHT: 166 LBS | BODY MASS INDEX: 28.34 KG/M2 | TEMPERATURE: 98.1 F

## 2019-01-21 DIAGNOSIS — F41.9 ANXIETY: ICD-10-CM

## 2019-01-21 DIAGNOSIS — E03.9 ACQUIRED HYPOTHYROIDISM: ICD-10-CM

## 2019-01-21 DIAGNOSIS — D50.8 OTHER IRON DEFICIENCY ANEMIA: ICD-10-CM

## 2019-01-21 DIAGNOSIS — N18.30 CKD (CHRONIC KIDNEY DISEASE), STAGE III (HCC): ICD-10-CM

## 2019-01-21 DIAGNOSIS — J43.9 PULMONARY EMPHYSEMA, UNSPECIFIED EMPHYSEMA TYPE (HCC): ICD-10-CM

## 2019-01-21 DIAGNOSIS — I10 ESSENTIAL HYPERTENSION: Primary | ICD-10-CM

## 2019-01-21 DIAGNOSIS — E78.2 MIXED HYPERLIPIDEMIA: ICD-10-CM

## 2019-01-21 PROCEDURE — 99214 OFFICE O/P EST MOD 30 MIN: CPT | Performed by: FAMILY MEDICINE

## 2019-01-21 RX ORDER — LORAZEPAM 0.5 MG/1
0.5 TABLET ORAL EVERY 8 HOURS PRN
Qty: 90 TABLET | Refills: 3 | Status: SHIPPED | OUTPATIENT
Start: 2019-01-21 | End: 2019-05-13 | Stop reason: SDUPTHER

## 2019-01-21 NOTE — PROGRESS NOTES
Assessment/Plan:         Diagnoses and all orders for this visit:    Essential hypertension  Comments:  Excellent BP control    Acquired hypothyroidism  Comments:  Euthyroid on 75mcg     CKD (chronic kidney disease), stage III (HCC)  Comments:  Stable creatinine 1 74    Other iron deficiency anemia    Mixed hyperlipidemia  Comments:  Adequate lipid control    Pulmonary emphysema, unspecified emphysema type (HCC)  Comments:  Stable COPD, OK to observe off of prior nebulized medications but I gave samples Duoneb to use in nebulizer if flares occur  Anxiety  Comments:  OK tocontinue Lorazepam since benefit exceeds his risk  Orders:  -     LORazepam (ATIVAN) 0 5 mg tablet; Take 1 tablet (0 5 mg total) by mouth every 8 (eight) hours as needed for anxiety    Other orders  -     Cyanocobalamin (B-12 PO); Take by mouth daily  -     B Complex Vitamins (B COMPLEX PO); Take by mouth daily          Subjective:      Patient ID: Leah Monroy is a 80 y o  male  3 month follow up  Had URI and was seen in Urgent Care, put on Azithromycin and Tessalon  Cut out inhaler and nebulizer of Pulmicort, Brovana and Ipratropium    Gets out about three days a week for social interactions  Appetite has been very good  The following portions of the patient's history were reviewed and updated as appropriate: allergies, current medications, past family history, past medical history, past social history, past surgical history and problem list     Review of Systems   Constitutional: Positive for fatigue  Negative for unexpected weight change  Respiratory: Positive for cough and shortness of breath  Gastrointestinal: Negative  Endocrine: Negative  Genitourinary: Positive for frequency and urgency  Musculoskeletal: Negative  Neurological: Negative  Hematological: Negative  Psychiatric/Behavioral: The patient is nervous/anxious  All other systems reviewed and are negative          Objective:      BP 140/82   Pulse 76   Temp 98 1 °F (36 7 °C)   Ht 5' 4" (1 626 m)   Wt 75 3 kg (166 lb)   BMI 28 49 kg/m²          Physical Exam   Constitutional: He appears well-developed and well-nourished  Cardiovascular: Normal rate and regular rhythm  Pulmonary/Chest: He has decreased breath sounds  Musculoskeletal: He exhibits no edema  Skin: No rash noted  Psychiatric: He has a normal mood and affect  His behavior is normal  Judgment and thought content normal    Nursing note and vitals reviewed

## 2019-01-22 ENCOUNTER — TELEPHONE (OUTPATIENT)
Dept: FAMILY MEDICINE CLINIC | Facility: HOSPITAL | Age: 84
End: 2019-01-22

## 2019-01-22 DIAGNOSIS — J01.01 ACUTE RECURRENT MAXILLARY SINUSITIS: Primary | ICD-10-CM

## 2019-01-22 RX ORDER — DOXYCYCLINE HYCLATE 100 MG/1
100 CAPSULE ORAL EVERY 12 HOURS SCHEDULED
Qty: 14 CAPSULE | Refills: 0 | Status: SHIPPED | OUTPATIENT
Start: 2019-01-22 | End: 2019-01-29

## 2019-01-22 NOTE — TELEPHONE ENCOUNTER
patient's sinus infection is not better from his urgent care visit - patient is asking for more ABX - please call back on his cell phone

## 2019-03-28 ENCOUNTER — HOSPITAL ENCOUNTER (INPATIENT)
Facility: HOSPITAL | Age: 84
LOS: 4 days | Discharge: HOME | DRG: 871 | End: 2019-04-01
Attending: EMERGENCY MEDICINE | Admitting: INTERNAL MEDICINE
Payer: MEDICARE

## 2019-03-28 ENCOUNTER — APPOINTMENT (EMERGENCY)
Dept: RADIOLOGY | Facility: HOSPITAL | Age: 84
DRG: 871 | End: 2019-03-28
Payer: MEDICARE

## 2019-03-28 DIAGNOSIS — A41.9 SEPSIS (HCC): ICD-10-CM

## 2019-03-28 DIAGNOSIS — R19.5 LOOSE STOOLS: ICD-10-CM

## 2019-03-28 DIAGNOSIS — J18.9 COMMUNITY ACQUIRED PNEUMONIA: Primary | ICD-10-CM

## 2019-03-28 DIAGNOSIS — R65.20 SEVERE SEPSIS (HCC): ICD-10-CM

## 2019-03-28 DIAGNOSIS — A41.9 SEVERE SEPSIS (HCC): ICD-10-CM

## 2019-03-28 DIAGNOSIS — R10.819 SUPRAPUBIC TENDERNESS: ICD-10-CM

## 2019-03-28 PROBLEM — Z00.00 MEDICARE ANNUAL WELLNESS VISIT, SUBSEQUENT: Status: RESOLVED | Noted: 2018-07-13 | Resolved: 2019-03-28

## 2019-03-28 PROBLEM — R06.02 SHORTNESS OF BREATH: Status: RESOLVED | Noted: 2018-06-04 | Resolved: 2019-03-28

## 2019-03-28 LAB
ALBUMIN SERPL BCP-MCNC: 3.6 G/DL (ref 3.5–5)
ALP SERPL-CCNC: 88 U/L (ref 46–116)
ALT SERPL W P-5'-P-CCNC: 33 U/L (ref 12–78)
ANION GAP SERPL CALCULATED.3IONS-SCNC: 10 MMOL/L (ref 4–13)
APTT PPP: 35 SECONDS (ref 26–38)
AST SERPL W P-5'-P-CCNC: 21 U/L (ref 5–45)
BASOPHILS # BLD AUTO: 0.04 THOUSANDS/ΜL (ref 0–0.1)
BASOPHILS NFR BLD AUTO: 1 % (ref 0–1)
BILIRUB SERPL-MCNC: 0.5 MG/DL (ref 0.2–1)
BUN SERPL-MCNC: 23 MG/DL (ref 5–25)
CALCIUM SERPL-MCNC: 9.7 MG/DL (ref 8.3–10.1)
CHLORIDE SERPL-SCNC: 101 MMOL/L (ref 100–108)
CO2 SERPL-SCNC: 26 MMOL/L (ref 21–32)
CREAT SERPL-MCNC: 1.86 MG/DL (ref 0.6–1.3)
EOSINOPHIL # BLD AUTO: 0.18 THOUSAND/ΜL (ref 0–0.61)
EOSINOPHIL NFR BLD AUTO: 3 % (ref 0–6)
ERYTHROCYTE [DISTWIDTH] IN BLOOD BY AUTOMATED COUNT: 13.2 % (ref 11.6–15.1)
GFR SERPL CREATININE-BSD FRML MDRD: 32 ML/MIN/1.73SQ M
GLUCOSE SERPL-MCNC: 103 MG/DL (ref 65–140)
HCT VFR BLD AUTO: 43.8 % (ref 36.5–49.3)
HGB BLD-MCNC: 14.1 G/DL (ref 12–17)
IMM GRANULOCYTES # BLD AUTO: 0.02 THOUSAND/UL (ref 0–0.2)
IMM GRANULOCYTES NFR BLD AUTO: 0 % (ref 0–2)
INR PPP: 1.1 (ref 0.86–1.17)
LACTATE SERPL-SCNC: 1.5 MMOL/L (ref 0.5–2)
LACTATE SERPL-SCNC: 2.1 MMOL/L (ref 0.5–2)
LYMPHOCYTES # BLD AUTO: 2.16 THOUSANDS/ΜL (ref 0.6–4.47)
LYMPHOCYTES NFR BLD AUTO: 34 % (ref 14–44)
MCH RBC QN AUTO: 30.9 PG (ref 26.8–34.3)
MCHC RBC AUTO-ENTMCNC: 32.2 G/DL (ref 31.4–37.4)
MCV RBC AUTO: 96 FL (ref 82–98)
MONOCYTES # BLD AUTO: 1.05 THOUSAND/ΜL (ref 0.17–1.22)
MONOCYTES NFR BLD AUTO: 17 % (ref 4–12)
NEUTROPHILS # BLD AUTO: 2.92 THOUSANDS/ΜL (ref 1.85–7.62)
NEUTS SEG NFR BLD AUTO: 45 % (ref 43–75)
NRBC BLD AUTO-RTO: 0 /100 WBCS
PLATELET # BLD AUTO: 130 THOUSANDS/UL (ref 149–390)
PMV BLD AUTO: 9.2 FL (ref 8.9–12.7)
POTASSIUM SERPL-SCNC: 4.1 MMOL/L (ref 3.5–5.3)
PROT SERPL-MCNC: 7.7 G/DL (ref 6.4–8.2)
PROTHROMBIN TIME: 13.6 SECONDS (ref 11.8–14.2)
RBC # BLD AUTO: 4.56 MILLION/UL (ref 3.88–5.62)
SODIUM SERPL-SCNC: 137 MMOL/L (ref 136–145)
WBC # BLD AUTO: 6.37 THOUSAND/UL (ref 4.31–10.16)

## 2019-03-28 PROCEDURE — 85610 PROTHROMBIN TIME: CPT | Performed by: PHYSICIAN ASSISTANT

## 2019-03-28 PROCEDURE — 85730 THROMBOPLASTIN TIME PARTIAL: CPT | Performed by: PHYSICIAN ASSISTANT

## 2019-03-28 PROCEDURE — 36415 COLL VENOUS BLD VENIPUNCTURE: CPT | Performed by: PHYSICIAN ASSISTANT

## 2019-03-28 PROCEDURE — 87631 RESP VIRUS 3-5 TARGETS: CPT | Performed by: PHYSICIAN ASSISTANT

## 2019-03-28 PROCEDURE — 80053 COMPREHEN METABOLIC PANEL: CPT | Performed by: PHYSICIAN ASSISTANT

## 2019-03-28 PROCEDURE — 87040 BLOOD CULTURE FOR BACTERIA: CPT | Performed by: PHYSICIAN ASSISTANT

## 2019-03-28 PROCEDURE — 96361 HYDRATE IV INFUSION ADD-ON: CPT

## 2019-03-28 PROCEDURE — 99285 EMERGENCY DEPT VISIT HI MDM: CPT

## 2019-03-28 PROCEDURE — 93005 ELECTROCARDIOGRAM TRACING: CPT

## 2019-03-28 PROCEDURE — 83605 ASSAY OF LACTIC ACID: CPT | Performed by: PHYSICIAN ASSISTANT

## 2019-03-28 PROCEDURE — 94760 N-INVAS EAR/PLS OXIMETRY 1: CPT

## 2019-03-28 PROCEDURE — 96374 THER/PROPH/DIAG INJ IV PUSH: CPT

## 2019-03-28 PROCEDURE — 99223 1ST HOSP IP/OBS HIGH 75: CPT | Performed by: NURSE PRACTITIONER

## 2019-03-28 PROCEDURE — 87081 CULTURE SCREEN ONLY: CPT | Performed by: NURSE PRACTITIONER

## 2019-03-28 PROCEDURE — 85025 COMPLETE CBC W/AUTO DIFF WBC: CPT | Performed by: PHYSICIAN ASSISTANT

## 2019-03-28 PROCEDURE — 71046 X-RAY EXAM CHEST 2 VIEWS: CPT

## 2019-03-28 PROCEDURE — 84145 PROCALCITONIN (PCT): CPT | Performed by: NURSE PRACTITIONER

## 2019-03-28 PROCEDURE — 94640 AIRWAY INHALATION TREATMENT: CPT

## 2019-03-28 RX ORDER — FLUTICASONE PROPIONATE 50 MCG
1 SPRAY, SUSPENSION (ML) NASAL 2 TIMES DAILY
Status: DISCONTINUED | OUTPATIENT
Start: 2019-03-28 | End: 2019-04-01 | Stop reason: HOSPADM

## 2019-03-28 RX ORDER — ACETAMINOPHEN 325 MG/1
650 TABLET ORAL EVERY 6 HOURS PRN
Status: DISCONTINUED | OUTPATIENT
Start: 2019-03-28 | End: 2019-04-01 | Stop reason: HOSPADM

## 2019-03-28 RX ORDER — ONDANSETRON 2 MG/ML
4 INJECTION INTRAMUSCULAR; INTRAVENOUS EVERY 6 HOURS PRN
Status: DISCONTINUED | OUTPATIENT
Start: 2019-03-28 | End: 2019-04-01 | Stop reason: HOSPADM

## 2019-03-28 RX ORDER — HEPARIN SODIUM 5000 [USP'U]/ML
5000 INJECTION, SOLUTION INTRAVENOUS; SUBCUTANEOUS EVERY 8 HOURS SCHEDULED
Status: DISCONTINUED | OUTPATIENT
Start: 2019-03-28 | End: 2019-03-31

## 2019-03-28 RX ORDER — SODIUM CHLORIDE 9 MG/ML
75 INJECTION, SOLUTION INTRAVENOUS CONTINUOUS
Status: DISCONTINUED | OUTPATIENT
Start: 2019-03-28 | End: 2019-04-01 | Stop reason: HOSPADM

## 2019-03-28 RX ORDER — ALBUTEROL SULFATE 90 UG/1
2 AEROSOL, METERED RESPIRATORY (INHALATION) EVERY 6 HOURS PRN
Status: DISCONTINUED | OUTPATIENT
Start: 2019-03-28 | End: 2019-04-01 | Stop reason: HOSPADM

## 2019-03-28 RX ORDER — ATENOLOL 25 MG/1
25 TABLET ORAL DAILY
Status: DISCONTINUED | OUTPATIENT
Start: 2019-03-29 | End: 2019-03-28

## 2019-03-28 RX ORDER — ZOLPIDEM TARTRATE 5 MG/1
5 TABLET ORAL
Status: DISCONTINUED | OUTPATIENT
Start: 2019-03-28 | End: 2019-04-01 | Stop reason: HOSPADM

## 2019-03-28 RX ORDER — PANTOPRAZOLE SODIUM 40 MG/1
40 TABLET, DELAYED RELEASE ORAL
Status: DISCONTINUED | OUTPATIENT
Start: 2019-03-29 | End: 2019-04-01 | Stop reason: HOSPADM

## 2019-03-28 RX ORDER — LORAZEPAM 0.5 MG/1
0.5 TABLET ORAL EVERY 8 HOURS PRN
Status: DISCONTINUED | OUTPATIENT
Start: 2019-03-28 | End: 2019-04-01 | Stop reason: HOSPADM

## 2019-03-28 RX ORDER — CHOLECALCIFEROL (VITAMIN D3) 10 MCG
1 TABLET ORAL DAILY
Status: DISCONTINUED | OUTPATIENT
Start: 2019-03-29 | End: 2019-04-01 | Stop reason: HOSPADM

## 2019-03-28 RX ORDER — BENZONATATE 100 MG/1
100 CAPSULE ORAL 3 TIMES DAILY PRN
Status: DISCONTINUED | OUTPATIENT
Start: 2019-03-28 | End: 2019-04-01 | Stop reason: HOSPADM

## 2019-03-28 RX ORDER — MELATONIN
2000 DAILY
Status: DISCONTINUED | OUTPATIENT
Start: 2019-03-29 | End: 2019-04-01 | Stop reason: HOSPADM

## 2019-03-28 RX ORDER — GUAIFENESIN 600 MG
1200 TABLET, EXTENDED RELEASE 12 HR ORAL EVERY 12 HOURS SCHEDULED
Status: DISCONTINUED | OUTPATIENT
Start: 2019-03-28 | End: 2019-04-01 | Stop reason: HOSPADM

## 2019-03-28 RX ORDER — IPRATROPIUM BROMIDE AND ALBUTEROL SULFATE 2.5; .5 MG/3ML; MG/3ML
3 SOLUTION RESPIRATORY (INHALATION)
Status: DISCONTINUED | OUTPATIENT
Start: 2019-03-28 | End: 2019-03-30

## 2019-03-28 RX ORDER — METOPROLOL SUCCINATE 25 MG/1
25 TABLET, EXTENDED RELEASE ORAL DAILY
Status: DISCONTINUED | OUTPATIENT
Start: 2019-03-29 | End: 2019-03-30

## 2019-03-28 RX ORDER — CEFTRIAXONE 1 G/50ML
1000 INJECTION, SOLUTION INTRAVENOUS ONCE
Status: COMPLETED | OUTPATIENT
Start: 2019-03-28 | End: 2019-03-28

## 2019-03-28 RX ORDER — BUDESONIDE 0.5 MG/2ML
0.5 INHALANT ORAL
Status: DISCONTINUED | OUTPATIENT
Start: 2019-03-28 | End: 2019-03-28

## 2019-03-28 RX ORDER — CEFTRIAXONE 1 G/50ML
1000 INJECTION, SOLUTION INTRAVENOUS EVERY 24 HOURS
Status: DISCONTINUED | OUTPATIENT
Start: 2019-03-29 | End: 2019-04-01 | Stop reason: HOSPADM

## 2019-03-28 RX ADMIN — IPRATROPIUM BROMIDE AND ALBUTEROL SULFATE 3 ML: 2.5; .5 SOLUTION RESPIRATORY (INHALATION) at 21:23

## 2019-03-28 RX ADMIN — HEPARIN SODIUM 5000 UNITS: 5000 INJECTION, SOLUTION INTRAVENOUS; SUBCUTANEOUS at 22:02

## 2019-03-28 RX ADMIN — GUAIFENESIN 1200 MG: 600 TABLET, EXTENDED RELEASE ORAL at 22:02

## 2019-03-28 RX ADMIN — SODIUM CHLORIDE 1000 ML: 0.9 INJECTION, SOLUTION INTRAVENOUS at 18:38

## 2019-03-28 RX ADMIN — AZITHROMYCIN MONOHYDRATE 500 MG: 500 INJECTION, POWDER, LYOPHILIZED, FOR SOLUTION INTRAVENOUS at 20:34

## 2019-03-28 RX ADMIN — BUDESONIDE 0.5 MG: 0.5 INHALANT RESPIRATORY (INHALATION) at 21:23

## 2019-03-28 RX ADMIN — SODIUM CHLORIDE 75 ML/HR: 0.9 INJECTION, SOLUTION INTRAVENOUS at 22:03

## 2019-03-28 RX ADMIN — CEFTRIAXONE 1000 MG: 1 INJECTION, SOLUTION INTRAVENOUS at 19:35

## 2019-03-29 PROBLEM — R10.819 SUPRAPUBIC TENDERNESS: Status: ACTIVE | Noted: 2019-03-29

## 2019-03-29 PROBLEM — R19.5 LOOSE STOOLS: Status: ACTIVE | Noted: 2019-03-29

## 2019-03-29 LAB
ANION GAP SERPL CALCULATED.3IONS-SCNC: 8 MMOL/L (ref 4–13)
ATRIAL RATE: 103 BPM
BASOPHILS # BLD AUTO: 0.02 THOUSANDS/ΜL (ref 0–0.1)
BASOPHILS NFR BLD AUTO: 0 % (ref 0–1)
BILIRUB UR QL STRIP: NEGATIVE
BUN SERPL-MCNC: 21 MG/DL (ref 5–25)
CALCIUM SERPL-MCNC: 8.9 MG/DL (ref 8.3–10.1)
CHLORIDE SERPL-SCNC: 103 MMOL/L (ref 100–108)
CLARITY UR: CLEAR
CO2 SERPL-SCNC: 27 MMOL/L (ref 21–32)
COLOR UR: YELLOW
CREAT SERPL-MCNC: 1.69 MG/DL (ref 0.6–1.3)
EOSINOPHIL # BLD AUTO: 0.12 THOUSAND/ΜL (ref 0–0.61)
EOSINOPHIL NFR BLD AUTO: 2 % (ref 0–6)
ERYTHROCYTE [DISTWIDTH] IN BLOOD BY AUTOMATED COUNT: 13.2 % (ref 11.6–15.1)
FLUAV AG SPEC QL: NOT DETECTED
FLUBV AG SPEC QL: NOT DETECTED
GFR SERPL CREATININE-BSD FRML MDRD: 36 ML/MIN/1.73SQ M
GLUCOSE SERPL-MCNC: 92 MG/DL (ref 65–140)
GLUCOSE UR STRIP-MCNC: NEGATIVE MG/DL
HCT VFR BLD AUTO: 42 % (ref 36.5–49.3)
HGB BLD-MCNC: 13 G/DL (ref 12–17)
HGB UR QL STRIP.AUTO: NEGATIVE
IMM GRANULOCYTES # BLD AUTO: 0.02 THOUSAND/UL (ref 0–0.2)
IMM GRANULOCYTES NFR BLD AUTO: 0 % (ref 0–2)
KETONES UR STRIP-MCNC: NEGATIVE MG/DL
L PNEUMO1 AG UR QL IA.RAPID: NEGATIVE
LEUKOCYTE ESTERASE UR QL STRIP: NEGATIVE
LYMPHOCYTES # BLD AUTO: 1.31 THOUSANDS/ΜL (ref 0.6–4.47)
LYMPHOCYTES NFR BLD AUTO: 26 % (ref 14–44)
MCH RBC QN AUTO: 30 PG (ref 26.8–34.3)
MCHC RBC AUTO-ENTMCNC: 31 G/DL (ref 31.4–37.4)
MCV RBC AUTO: 97 FL (ref 82–98)
MONOCYTES # BLD AUTO: 0.81 THOUSAND/ΜL (ref 0.17–1.22)
MONOCYTES NFR BLD AUTO: 16 % (ref 4–12)
NEUTROPHILS # BLD AUTO: 2.72 THOUSANDS/ΜL (ref 1.85–7.62)
NEUTS SEG NFR BLD AUTO: 56 % (ref 43–75)
NITRITE UR QL STRIP: NEGATIVE
NRBC BLD AUTO-RTO: 0 /100 WBCS
P AXIS: 59 DEGREES
PH UR STRIP.AUTO: 6 [PH]
PLATELET # BLD AUTO: 103 THOUSANDS/UL (ref 149–390)
PMV BLD AUTO: 9.4 FL (ref 8.9–12.7)
POTASSIUM SERPL-SCNC: 4.7 MMOL/L (ref 3.5–5.3)
PR INTERVAL: 162 MS
PROCALCITONIN SERPL-MCNC: <0.05 NG/ML
PROT UR STRIP-MCNC: NEGATIVE MG/DL
QRS AXIS: 60 DEGREES
QRSD INTERVAL: 90 MS
QT INTERVAL: 340 MS
QTC INTERVAL: 445 MS
RBC # BLD AUTO: 4.33 MILLION/UL (ref 3.88–5.62)
RSV B RNA SPEC QL NAA+PROBE: NOT DETECTED
S PNEUM AG UR QL: NEGATIVE
SODIUM SERPL-SCNC: 138 MMOL/L (ref 136–145)
SP GR UR STRIP.AUTO: 1.01 (ref 1–1.03)
T WAVE AXIS: 39 DEGREES
UROBILINOGEN UR QL STRIP.AUTO: 0.2 E.U./DL
VENTRICULAR RATE: 103 BPM
WBC # BLD AUTO: 5 THOUSAND/UL (ref 4.31–10.16)

## 2019-03-29 PROCEDURE — 87493 C DIFF AMPLIFIED PROBE: CPT | Performed by: INTERNAL MEDICINE

## 2019-03-29 PROCEDURE — 94640 AIRWAY INHALATION TREATMENT: CPT

## 2019-03-29 PROCEDURE — G8980 MOBILITY D/C STATUS: HCPCS

## 2019-03-29 PROCEDURE — 97163 PT EVAL HIGH COMPLEX 45 MIN: CPT

## 2019-03-29 PROCEDURE — G8979 MOBILITY GOAL STATUS: HCPCS

## 2019-03-29 PROCEDURE — 93010 ELECTROCARDIOGRAM REPORT: CPT | Performed by: INTERNAL MEDICINE

## 2019-03-29 PROCEDURE — 99232 SBSQ HOSP IP/OBS MODERATE 35: CPT | Performed by: INTERNAL MEDICINE

## 2019-03-29 PROCEDURE — G8978 MOBILITY CURRENT STATUS: HCPCS

## 2019-03-29 PROCEDURE — 87070 CULTURE OTHR SPECIMN AEROBIC: CPT | Performed by: NURSE PRACTITIONER

## 2019-03-29 PROCEDURE — 87147 CULTURE TYPE IMMUNOLOGIC: CPT | Performed by: INTERNAL MEDICINE

## 2019-03-29 PROCEDURE — 87205 SMEAR GRAM STAIN: CPT | Performed by: NURSE PRACTITIONER

## 2019-03-29 PROCEDURE — 81003 URINALYSIS AUTO W/O SCOPE: CPT | Performed by: INTERNAL MEDICINE

## 2019-03-29 PROCEDURE — 80048 BASIC METABOLIC PNL TOTAL CA: CPT | Performed by: NURSE PRACTITIONER

## 2019-03-29 PROCEDURE — 85025 COMPLETE CBC W/AUTO DIFF WBC: CPT | Performed by: NURSE PRACTITIONER

## 2019-03-29 PROCEDURE — 87449 NOS EACH ORGANISM AG IA: CPT | Performed by: NURSE PRACTITIONER

## 2019-03-29 PROCEDURE — 94760 N-INVAS EAR/PLS OXIMETRY 1: CPT

## 2019-03-29 PROCEDURE — 87086 URINE CULTURE/COLONY COUNT: CPT | Performed by: INTERNAL MEDICINE

## 2019-03-29 RX ORDER — TAMSULOSIN HYDROCHLORIDE 0.4 MG/1
0.4 CAPSULE ORAL
Status: DISCONTINUED | OUTPATIENT
Start: 2019-03-29 | End: 2019-04-01 | Stop reason: HOSPADM

## 2019-03-29 RX ADMIN — ALBUTEROL SULFATE 2 PUFF: 90 AEROSOL, METERED RESPIRATORY (INHALATION) at 04:58

## 2019-03-29 RX ADMIN — IPRATROPIUM BROMIDE AND ALBUTEROL SULFATE 3 ML: 2.5; .5 SOLUTION RESPIRATORY (INHALATION) at 07:11

## 2019-03-29 RX ADMIN — GUAIFENESIN 1200 MG: 600 TABLET, EXTENDED RELEASE ORAL at 09:13

## 2019-03-29 RX ADMIN — NEPHROCAP 1 CAPSULE: 1 CAP ORAL at 09:13

## 2019-03-29 RX ADMIN — HEPARIN SODIUM 5000 UNITS: 5000 INJECTION, SOLUTION INTRAVENOUS; SUBCUTANEOUS at 05:23

## 2019-03-29 RX ADMIN — BENZONATATE 100 MG: 100 CAPSULE ORAL at 04:55

## 2019-03-29 RX ADMIN — PANTOPRAZOLE SODIUM 40 MG: 40 TABLET, DELAYED RELEASE ORAL at 04:56

## 2019-03-29 RX ADMIN — FLUTICASONE PROPIONATE 1 SPRAY: 50 SPRAY, METERED NASAL at 09:13

## 2019-03-29 RX ADMIN — ZOLPIDEM TARTRATE 5 MG: 5 TABLET, FILM COATED ORAL at 21:17

## 2019-03-29 RX ADMIN — TAMSULOSIN HYDROCHLORIDE 0.4 MG: 0.4 CAPSULE ORAL at 17:44

## 2019-03-29 RX ADMIN — FLUTICASONE PROPIONATE 1 SPRAY: 50 SPRAY, METERED NASAL at 17:44

## 2019-03-29 RX ADMIN — GUAIFENESIN 1200 MG: 600 TABLET, EXTENDED RELEASE ORAL at 21:17

## 2019-03-29 RX ADMIN — IPRATROPIUM BROMIDE AND ALBUTEROL SULFATE 3 ML: 2.5; .5 SOLUTION RESPIRATORY (INHALATION) at 01:28

## 2019-03-29 RX ADMIN — IPRATROPIUM BROMIDE AND ALBUTEROL SULFATE 3 ML: 2.5; .5 SOLUTION RESPIRATORY (INHALATION) at 13:39

## 2019-03-29 RX ADMIN — VITAMIN D, TAB 1000IU (100/BT) 2000 UNITS: 25 TAB at 09:13

## 2019-03-29 RX ADMIN — CEFTRIAXONE 1000 MG: 1 INJECTION, SOLUTION INTRAVENOUS at 21:18

## 2019-03-29 RX ADMIN — ZOLPIDEM TARTRATE 5 MG: 5 TABLET, FILM COATED ORAL at 01:40

## 2019-03-29 RX ADMIN — METOPROLOL SUCCINATE 25 MG: 25 TABLET, EXTENDED RELEASE ORAL at 09:13

## 2019-03-29 RX ADMIN — HEPARIN SODIUM 5000 UNITS: 5000 INJECTION, SOLUTION INTRAVENOUS; SUBCUTANEOUS at 21:22

## 2019-03-29 RX ADMIN — HEPARIN SODIUM 5000 UNITS: 5000 INJECTION, SOLUTION INTRAVENOUS; SUBCUTANEOUS at 14:07

## 2019-03-29 RX ADMIN — BENZONATATE 100 MG: 100 CAPSULE ORAL at 21:55

## 2019-03-29 RX ADMIN — LORAZEPAM 0.5 MG: 0.5 TABLET ORAL at 21:17

## 2019-03-30 LAB
ANION GAP SERPL CALCULATED.3IONS-SCNC: 11 MMOL/L (ref 4–13)
BACTERIA UR CULT: NORMAL
BASOPHILS # BLD AUTO: 0.02 THOUSANDS/ΜL (ref 0–0.1)
BASOPHILS NFR BLD AUTO: 0 % (ref 0–1)
BUN SERPL-MCNC: 18 MG/DL (ref 5–25)
C DIFF TOX GENS STL QL NAA+PROBE: NORMAL
CALCIUM SERPL-MCNC: 9.1 MG/DL (ref 8.3–10.1)
CHLORIDE SERPL-SCNC: 102 MMOL/L (ref 100–108)
CO2 SERPL-SCNC: 24 MMOL/L (ref 21–32)
CREAT SERPL-MCNC: 1.73 MG/DL (ref 0.6–1.3)
EOSINOPHIL # BLD AUTO: 0.07 THOUSAND/ΜL (ref 0–0.61)
EOSINOPHIL NFR BLD AUTO: 1 % (ref 0–6)
ERYTHROCYTE [DISTWIDTH] IN BLOOD BY AUTOMATED COUNT: 13.3 % (ref 11.6–15.1)
GFR SERPL CREATININE-BSD FRML MDRD: 35 ML/MIN/1.73SQ M
GLUCOSE SERPL-MCNC: 105 MG/DL (ref 65–140)
HCT VFR BLD AUTO: 43.3 % (ref 36.5–49.3)
HGB BLD-MCNC: 13.9 G/DL (ref 12–17)
IMM GRANULOCYTES # BLD AUTO: 0.02 THOUSAND/UL (ref 0–0.2)
IMM GRANULOCYTES NFR BLD AUTO: 0 % (ref 0–2)
LYMPHOCYTES # BLD AUTO: 1.15 THOUSANDS/ΜL (ref 0.6–4.47)
LYMPHOCYTES NFR BLD AUTO: 20 % (ref 14–44)
MCH RBC QN AUTO: 30.5 PG (ref 26.8–34.3)
MCHC RBC AUTO-ENTMCNC: 32.1 G/DL (ref 31.4–37.4)
MCV RBC AUTO: 95 FL (ref 82–98)
MONOCYTES # BLD AUTO: 0.88 THOUSAND/ΜL (ref 0.17–1.22)
MONOCYTES NFR BLD AUTO: 15 % (ref 4–12)
MRSA NOSE QL CULT: NORMAL
NEUTROPHILS # BLD AUTO: 3.63 THOUSANDS/ΜL (ref 1.85–7.62)
NEUTS SEG NFR BLD AUTO: 64 % (ref 43–75)
NRBC BLD AUTO-RTO: 0 /100 WBCS
PLATELET # BLD AUTO: 115 THOUSANDS/UL (ref 149–390)
PMV BLD AUTO: 9.5 FL (ref 8.9–12.7)
POTASSIUM SERPL-SCNC: 4.6 MMOL/L (ref 3.5–5.3)
RBC # BLD AUTO: 4.55 MILLION/UL (ref 3.88–5.62)
SODIUM SERPL-SCNC: 137 MMOL/L (ref 136–145)
WBC # BLD AUTO: 5.77 THOUSAND/UL (ref 4.31–10.16)

## 2019-03-30 PROCEDURE — 99232 SBSQ HOSP IP/OBS MODERATE 35: CPT | Performed by: INTERNAL MEDICINE

## 2019-03-30 PROCEDURE — 80048 BASIC METABOLIC PNL TOTAL CA: CPT | Performed by: INTERNAL MEDICINE

## 2019-03-30 PROCEDURE — 85025 COMPLETE CBC W/AUTO DIFF WBC: CPT | Performed by: INTERNAL MEDICINE

## 2019-03-30 RX ORDER — ATENOLOL 25 MG/1
TABLET ORAL
COMMUNITY
Start: 2019-01-13 | End: 2019-04-10

## 2019-03-30 RX ORDER — LEVOTHYROXINE SODIUM 0.07 MG/1
75 TABLET ORAL DAILY
COMMUNITY
End: 2020-01-15 | Stop reason: SDUPTHER

## 2019-03-30 RX ORDER — LEVOTHYROXINE SODIUM 0.07 MG/1
37.5 TABLET ORAL DAILY
Status: DISCONTINUED | OUTPATIENT
Start: 2019-03-31 | End: 2019-04-01 | Stop reason: HOSPADM

## 2019-03-30 RX ORDER — GUAIFENESIN 100 MG/5ML
200 SOLUTION ORAL ONCE
Status: COMPLETED | OUTPATIENT
Start: 2019-03-30 | End: 2019-03-30

## 2019-03-30 RX ORDER — IPRATROPIUM BROMIDE AND ALBUTEROL SULFATE 2.5; .5 MG/3ML; MG/3ML
3 SOLUTION RESPIRATORY (INHALATION) EVERY 6 HOURS PRN
Status: DISCONTINUED | OUTPATIENT
Start: 2019-03-30 | End: 2019-04-01 | Stop reason: HOSPADM

## 2019-03-30 RX ORDER — ATENOLOL 25 MG/1
25 TABLET ORAL DAILY
Status: DISCONTINUED | OUTPATIENT
Start: 2019-03-31 | End: 2019-04-01 | Stop reason: HOSPADM

## 2019-03-30 RX ADMIN — HEPARIN SODIUM 5000 UNITS: 5000 INJECTION, SOLUTION INTRAVENOUS; SUBCUTANEOUS at 15:00

## 2019-03-30 RX ADMIN — METOPROLOL SUCCINATE 25 MG: 25 TABLET, EXTENDED RELEASE ORAL at 09:14

## 2019-03-30 RX ADMIN — SODIUM CHLORIDE 75 ML/HR: 0.9 INJECTION, SOLUTION INTRAVENOUS at 00:26

## 2019-03-30 RX ADMIN — NEPHROCAP 1 CAPSULE: 1 CAP ORAL at 09:14

## 2019-03-30 RX ADMIN — HEPARIN SODIUM 5000 UNITS: 5000 INJECTION, SOLUTION INTRAVENOUS; SUBCUTANEOUS at 20:59

## 2019-03-30 RX ADMIN — LORAZEPAM 0.5 MG: 0.5 TABLET ORAL at 20:53

## 2019-03-30 RX ADMIN — TAMSULOSIN HYDROCHLORIDE 0.4 MG: 0.4 CAPSULE ORAL at 16:39

## 2019-03-30 RX ADMIN — CEFTRIAXONE 1000 MG: 1 INJECTION, SOLUTION INTRAVENOUS at 20:52

## 2019-03-30 RX ADMIN — FLUTICASONE PROPIONATE 1 SPRAY: 50 SPRAY, METERED NASAL at 09:54

## 2019-03-30 RX ADMIN — GUAIFENESIN 1200 MG: 600 TABLET, EXTENDED RELEASE ORAL at 09:14

## 2019-03-30 RX ADMIN — ZOLPIDEM TARTRATE 5 MG: 5 TABLET, FILM COATED ORAL at 20:53

## 2019-03-30 RX ADMIN — HEPARIN SODIUM 5000 UNITS: 5000 INJECTION, SOLUTION INTRAVENOUS; SUBCUTANEOUS at 05:24

## 2019-03-30 RX ADMIN — PANTOPRAZOLE SODIUM 40 MG: 40 TABLET, DELAYED RELEASE ORAL at 05:24

## 2019-03-30 RX ADMIN — FLUTICASONE PROPIONATE 1 SPRAY: 50 SPRAY, METERED NASAL at 17:48

## 2019-03-30 RX ADMIN — SODIUM CHLORIDE 75 ML/HR: 0.9 INJECTION, SOLUTION INTRAVENOUS at 13:44

## 2019-03-30 RX ADMIN — GUAIFENESIN 200 MG: 100 SOLUTION ORAL at 00:22

## 2019-03-30 RX ADMIN — VITAMIN D, TAB 1000IU (100/BT) 2000 UNITS: 25 TAB at 09:14

## 2019-03-30 RX ADMIN — BENZONATATE 100 MG: 100 CAPSULE ORAL at 20:53

## 2019-03-30 RX ADMIN — GUAIFENESIN 1200 MG: 600 TABLET, EXTENDED RELEASE ORAL at 20:53

## 2019-03-31 PROBLEM — D69.1 THROMBOCYTOPATHIA (HCC): Status: ACTIVE | Noted: 2019-03-31

## 2019-03-31 PROBLEM — D69.6 THROMBOCYTOPENIA (HCC): Status: ACTIVE | Noted: 2019-03-31

## 2019-03-31 LAB
BACTERIA SPT RESP CULT: ABNORMAL
BACTERIA SPT RESP CULT: ABNORMAL
GRAM STN SPEC: ABNORMAL

## 2019-03-31 PROCEDURE — 99232 SBSQ HOSP IP/OBS MODERATE 35: CPT | Performed by: INTERNAL MEDICINE

## 2019-03-31 RX ORDER — HEPARIN SODIUM 5000 [USP'U]/ML
5000 INJECTION, SOLUTION INTRAVENOUS; SUBCUTANEOUS EVERY 12 HOURS SCHEDULED
Status: DISCONTINUED | OUTPATIENT
Start: 2019-03-31 | End: 2019-04-01 | Stop reason: HOSPADM

## 2019-03-31 RX ORDER — LOPERAMIDE HYDROCHLORIDE 2 MG/1
2 CAPSULE ORAL EVERY 4 HOURS PRN
Status: DISCONTINUED | OUTPATIENT
Start: 2019-03-31 | End: 2019-04-01 | Stop reason: HOSPADM

## 2019-03-31 RX ADMIN — HEPARIN SODIUM 5000 UNITS: 5000 INJECTION, SOLUTION INTRAVENOUS; SUBCUTANEOUS at 20:47

## 2019-03-31 RX ADMIN — BENZONATATE 100 MG: 100 CAPSULE ORAL at 09:50

## 2019-03-31 RX ADMIN — ACETAMINOPHEN 650 MG: 325 TABLET ORAL at 01:42

## 2019-03-31 RX ADMIN — TAMSULOSIN HYDROCHLORIDE 0.4 MG: 0.4 CAPSULE ORAL at 15:59

## 2019-03-31 RX ADMIN — LOPERAMIDE HYDROCHLORIDE 2 MG: 2 CAPSULE ORAL at 20:46

## 2019-03-31 RX ADMIN — LEVOTHYROXINE SODIUM 37.5 MCG: 75 TABLET ORAL at 06:02

## 2019-03-31 RX ADMIN — ACETAMINOPHEN 650 MG: 325 TABLET ORAL at 23:25

## 2019-03-31 RX ADMIN — HEPARIN SODIUM 5000 UNITS: 5000 INJECTION, SOLUTION INTRAVENOUS; SUBCUTANEOUS at 06:02

## 2019-03-31 RX ADMIN — ATENOLOL 25 MG: 25 TABLET ORAL at 09:14

## 2019-03-31 RX ADMIN — CEFTRIAXONE 1000 MG: 1 INJECTION, SOLUTION INTRAVENOUS at 20:45

## 2019-03-31 RX ADMIN — ACETAMINOPHEN 650 MG: 325 TABLET ORAL at 09:48

## 2019-03-31 RX ADMIN — GUAIFENESIN 1200 MG: 600 TABLET, EXTENDED RELEASE ORAL at 09:14

## 2019-03-31 RX ADMIN — FLUTICASONE PROPIONATE 1 SPRAY: 50 SPRAY, METERED NASAL at 17:49

## 2019-03-31 RX ADMIN — GUAIFENESIN 1200 MG: 600 TABLET, EXTENDED RELEASE ORAL at 20:46

## 2019-03-31 RX ADMIN — LORAZEPAM 0.5 MG: 0.5 TABLET ORAL at 06:07

## 2019-03-31 RX ADMIN — VITAMIN D, TAB 1000IU (100/BT) 2000 UNITS: 25 TAB at 09:14

## 2019-03-31 RX ADMIN — ZOLPIDEM TARTRATE 5 MG: 5 TABLET, FILM COATED ORAL at 22:00

## 2019-03-31 RX ADMIN — BENZONATATE 100 MG: 100 CAPSULE ORAL at 20:37

## 2019-03-31 RX ADMIN — FLUTICASONE PROPIONATE 1 SPRAY: 50 SPRAY, METERED NASAL at 09:15

## 2019-03-31 RX ADMIN — PANTOPRAZOLE SODIUM 40 MG: 40 TABLET, DELAYED RELEASE ORAL at 06:02

## 2019-03-31 RX ADMIN — LORAZEPAM 0.5 MG: 0.5 TABLET ORAL at 22:00

## 2019-03-31 RX ADMIN — NEPHROCAP 1 CAPSULE: 1 CAP ORAL at 09:14

## 2019-04-01 VITALS
BODY MASS INDEX: 30.11 KG/M2 | HEIGHT: 64 IN | WEIGHT: 176.37 LBS | SYSTOLIC BLOOD PRESSURE: 116 MMHG | HEART RATE: 85 BPM | OXYGEN SATURATION: 95 % | DIASTOLIC BLOOD PRESSURE: 65 MMHG | RESPIRATION RATE: 20 BRPM | TEMPERATURE: 97.4 F

## 2019-04-01 PROBLEM — J40 BRONCHITIS: Status: ACTIVE | Noted: 2019-03-28

## 2019-04-01 PROBLEM — E66.09 CLASS 1 OBESITY DUE TO EXCESS CALORIES WITH SERIOUS COMORBIDITY AND BODY MASS INDEX (BMI) OF 30.0 TO 30.9 IN ADULT: Status: ACTIVE | Noted: 2019-04-01

## 2019-04-01 LAB
ANION GAP SERPL CALCULATED.3IONS-SCNC: 11 MMOL/L (ref 4–13)
BASOPHILS # BLD AUTO: 0.03 THOUSANDS/ΜL (ref 0–0.1)
BASOPHILS NFR BLD AUTO: 0 % (ref 0–1)
BUN SERPL-MCNC: 22 MG/DL (ref 5–25)
CALCIUM SERPL-MCNC: 9.5 MG/DL (ref 8.3–10.1)
CHLORIDE SERPL-SCNC: 101 MMOL/L (ref 100–108)
CO2 SERPL-SCNC: 25 MMOL/L (ref 21–32)
CREAT SERPL-MCNC: 1.77 MG/DL (ref 0.6–1.3)
EOSINOPHIL # BLD AUTO: 0.17 THOUSAND/ΜL (ref 0–0.61)
EOSINOPHIL NFR BLD AUTO: 3 % (ref 0–6)
ERYTHROCYTE [DISTWIDTH] IN BLOOD BY AUTOMATED COUNT: 13.3 % (ref 11.6–15.1)
GFR SERPL CREATININE-BSD FRML MDRD: 34 ML/MIN/1.73SQ M
GLUCOSE SERPL-MCNC: 100 MG/DL (ref 65–140)
HCT VFR BLD AUTO: 44.9 % (ref 36.5–49.3)
HGB BLD-MCNC: 14.5 G/DL (ref 12–17)
IMM GRANULOCYTES # BLD AUTO: 0.02 THOUSAND/UL (ref 0–0.2)
IMM GRANULOCYTES NFR BLD AUTO: 0 % (ref 0–2)
LYMPHOCYTES # BLD AUTO: 1.96 THOUSANDS/ΜL (ref 0.6–4.47)
LYMPHOCYTES NFR BLD AUTO: 29 % (ref 14–44)
MCH RBC QN AUTO: 31.1 PG (ref 26.8–34.3)
MCHC RBC AUTO-ENTMCNC: 32.3 G/DL (ref 31.4–37.4)
MCV RBC AUTO: 96 FL (ref 82–98)
MONOCYTES # BLD AUTO: 0.94 THOUSAND/ΜL (ref 0.17–1.22)
MONOCYTES NFR BLD AUTO: 14 % (ref 4–12)
NEUTROPHILS # BLD AUTO: 3.58 THOUSANDS/ΜL (ref 1.85–7.62)
NEUTS SEG NFR BLD AUTO: 54 % (ref 43–75)
NRBC BLD AUTO-RTO: 0 /100 WBCS
PLATELET # BLD AUTO: 140 THOUSANDS/UL (ref 149–390)
PMV BLD AUTO: 9.2 FL (ref 8.9–12.7)
POTASSIUM SERPL-SCNC: 4.1 MMOL/L (ref 3.5–5.3)
RBC # BLD AUTO: 4.66 MILLION/UL (ref 3.88–5.62)
SODIUM SERPL-SCNC: 137 MMOL/L (ref 136–145)
WBC # BLD AUTO: 6.7 THOUSAND/UL (ref 4.31–10.16)

## 2019-04-01 PROCEDURE — 80048 BASIC METABOLIC PNL TOTAL CA: CPT | Performed by: INTERNAL MEDICINE

## 2019-04-01 PROCEDURE — 85025 COMPLETE CBC W/AUTO DIFF WBC: CPT | Performed by: INTERNAL MEDICINE

## 2019-04-01 PROCEDURE — 99239 HOSP IP/OBS DSCHRG MGMT >30: CPT | Performed by: HOSPITALIST

## 2019-04-01 RX ORDER — TAMSULOSIN HYDROCHLORIDE 0.4 MG/1
0.4 CAPSULE ORAL
Qty: 30 CAPSULE | Refills: 0 | Status: SHIPPED | OUTPATIENT
Start: 2019-04-01 | End: 2019-04-10 | Stop reason: SDUPTHER

## 2019-04-01 RX ORDER — CEFUROXIME AXETIL 500 MG/1
500 TABLET ORAL EVERY 12 HOURS SCHEDULED
Qty: 10 TABLET | Refills: 0 | Status: SHIPPED | OUTPATIENT
Start: 2019-04-01 | End: 2019-04-06

## 2019-04-01 RX ORDER — LOPERAMIDE HYDROCHLORIDE 2 MG/1
2 CAPSULE ORAL EVERY 4 HOURS PRN
Qty: 30 CAPSULE | Refills: 0
Start: 2019-04-01 | End: 2019-04-10

## 2019-04-01 RX ADMIN — TAMSULOSIN HYDROCHLORIDE 0.4 MG: 0.4 CAPSULE ORAL at 16:47

## 2019-04-01 RX ADMIN — LORAZEPAM 0.5 MG: 0.5 TABLET ORAL at 05:29

## 2019-04-01 RX ADMIN — ATENOLOL 25 MG: 25 TABLET ORAL at 09:20

## 2019-04-01 RX ADMIN — GUAIFENESIN 1200 MG: 600 TABLET, EXTENDED RELEASE ORAL at 09:20

## 2019-04-01 RX ADMIN — LEVOTHYROXINE SODIUM 37.5 MCG: 75 TABLET ORAL at 05:15

## 2019-04-01 RX ADMIN — NEPHROCAP 1 CAPSULE: 1 CAP ORAL at 09:24

## 2019-04-01 RX ADMIN — BENZONATATE 100 MG: 100 CAPSULE ORAL at 16:31

## 2019-04-01 RX ADMIN — PANTOPRAZOLE SODIUM 40 MG: 40 TABLET, DELAYED RELEASE ORAL at 05:16

## 2019-04-01 RX ADMIN — LOPERAMIDE HYDROCHLORIDE 2 MG: 2 CAPSULE ORAL at 05:23

## 2019-04-01 RX ADMIN — VITAMIN D, TAB 1000IU (100/BT) 2000 UNITS: 25 TAB at 09:21

## 2019-04-01 RX ADMIN — FLUTICASONE PROPIONATE 1 SPRAY: 50 SPRAY, METERED NASAL at 09:20

## 2019-04-01 RX ADMIN — ACETAMINOPHEN 650 MG: 325 TABLET ORAL at 16:31

## 2019-04-01 RX ADMIN — HEPARIN SODIUM 5000 UNITS: 5000 INJECTION, SOLUTION INTRAVENOUS; SUBCUTANEOUS at 09:21

## 2019-04-02 ENCOUNTER — EPISODE CHANGES (OUTPATIENT)
Dept: CASE MANAGEMENT | Facility: HOSPITAL | Age: 84
End: 2019-04-02

## 2019-04-02 ENCOUNTER — TRANSITIONAL CARE MANAGEMENT (OUTPATIENT)
Dept: FAMILY MEDICINE CLINIC | Facility: HOSPITAL | Age: 84
End: 2019-04-02

## 2019-04-02 ENCOUNTER — TELEPHONE (OUTPATIENT)
Dept: FAMILY MEDICINE CLINIC | Facility: HOSPITAL | Age: 84
End: 2019-04-02

## 2019-04-03 LAB
BACTERIA BLD CULT: NORMAL
BACTERIA BLD CULT: NORMAL

## 2019-04-04 ENCOUNTER — PATIENT OUTREACH (OUTPATIENT)
Dept: FAMILY MEDICINE CLINIC | Facility: HOSPITAL | Age: 84
End: 2019-04-04

## 2019-04-10 ENCOUNTER — OFFICE VISIT (OUTPATIENT)
Dept: FAMILY MEDICINE CLINIC | Facility: HOSPITAL | Age: 84
End: 2019-04-10
Payer: MEDICARE

## 2019-04-10 VITALS
BODY MASS INDEX: 28.92 KG/M2 | HEART RATE: 80 BPM | HEIGHT: 65 IN | DIASTOLIC BLOOD PRESSURE: 70 MMHG | WEIGHT: 173.6 LBS | OXYGEN SATURATION: 96 % | SYSTOLIC BLOOD PRESSURE: 130 MMHG | TEMPERATURE: 98.9 F

## 2019-04-10 DIAGNOSIS — I10 ESSENTIAL HYPERTENSION: ICD-10-CM

## 2019-04-10 DIAGNOSIS — I48.91 ATRIAL FIBRILLATION, UNSPECIFIED TYPE (HCC): ICD-10-CM

## 2019-04-10 DIAGNOSIS — I50.32 CHRONIC DIASTOLIC HEART FAILURE (HCC): ICD-10-CM

## 2019-04-10 DIAGNOSIS — N13.9 URINARY OBSTRUCTION: ICD-10-CM

## 2019-04-10 DIAGNOSIS — R10.819 SUPRAPUBIC TENDERNESS: ICD-10-CM

## 2019-04-10 DIAGNOSIS — J18.9 COMMUNITY ACQUIRED PNEUMONIA, UNSPECIFIED LATERALITY: Primary | ICD-10-CM

## 2019-04-10 DIAGNOSIS — J41.8 MIXED SIMPLE AND MUCOPURULENT CHRONIC BRONCHITIS (HCC): ICD-10-CM

## 2019-04-10 PROCEDURE — 99495 TRANSJ CARE MGMT MOD F2F 14D: CPT | Performed by: FAMILY MEDICINE

## 2019-04-10 RX ORDER — TAMSULOSIN HYDROCHLORIDE 0.4 MG/1
0.4 CAPSULE ORAL
Qty: 30 CAPSULE | Refills: 5 | Status: SHIPPED | OUTPATIENT
Start: 2019-04-10 | End: 2019-11-30 | Stop reason: SDUPTHER

## 2019-04-13 DIAGNOSIS — F51.01 PRIMARY INSOMNIA: ICD-10-CM

## 2019-04-13 RX ORDER — ZOLPIDEM TARTRATE 5 MG/1
TABLET ORAL
Qty: 30 TABLET | Refills: 5 | Status: SHIPPED | OUTPATIENT
Start: 2019-04-13 | End: 2019-07-10

## 2019-05-13 DIAGNOSIS — F41.9 ANXIETY: ICD-10-CM

## 2019-05-13 RX ORDER — LORAZEPAM 0.5 MG/1
TABLET ORAL
Qty: 90 TABLET | Refills: 3 | Status: SHIPPED | OUTPATIENT
Start: 2019-05-13 | End: 2019-08-31 | Stop reason: SDUPTHER

## 2019-06-17 ENCOUNTER — APPOINTMENT (OUTPATIENT)
Dept: RADIOLOGY | Facility: CLINIC | Age: 84
End: 2019-06-17
Payer: MEDICARE

## 2019-06-17 ENCOUNTER — TELEPHONE (OUTPATIENT)
Dept: FAMILY MEDICINE CLINIC | Facility: HOSPITAL | Age: 84
End: 2019-06-17

## 2019-06-17 ENCOUNTER — OFFICE VISIT (OUTPATIENT)
Dept: URGENT CARE | Facility: CLINIC | Age: 84
End: 2019-06-17
Payer: MEDICARE

## 2019-06-17 VITALS
BODY MASS INDEX: 30.05 KG/M2 | WEIGHT: 176 LBS | SYSTOLIC BLOOD PRESSURE: 124 MMHG | HEIGHT: 64 IN | TEMPERATURE: 98.1 F | DIASTOLIC BLOOD PRESSURE: 72 MMHG | RESPIRATION RATE: 24 BRPM | HEART RATE: 86 BPM

## 2019-06-17 DIAGNOSIS — M54.2 NECK PAIN, ACUTE: ICD-10-CM

## 2019-06-17 DIAGNOSIS — M54.2 NECK PAIN, ACUTE: Primary | ICD-10-CM

## 2019-06-17 PROCEDURE — 99213 OFFICE O/P EST LOW 20 MIN: CPT | Performed by: NURSE PRACTITIONER

## 2019-06-17 PROCEDURE — G0463 HOSPITAL OUTPT CLINIC VISIT: HCPCS | Performed by: NURSE PRACTITIONER

## 2019-06-17 PROCEDURE — 72050 X-RAY EXAM NECK SPINE 4/5VWS: CPT

## 2019-06-17 RX ORDER — METAXALONE 400 MG/1
400 TABLET ORAL 3 TIMES DAILY
Qty: 15 TABLET | Refills: 0 | Status: SHIPPED | OUTPATIENT
Start: 2019-06-17 | End: 2019-06-24 | Stop reason: SDUPTHER

## 2019-06-17 RX ORDER — ALBUTEROL SULFATE 90 UG/1
2 AEROSOL, METERED RESPIRATORY (INHALATION) EVERY 6 HOURS PRN
COMMUNITY
End: 2019-06-25 | Stop reason: SDUPTHER

## 2019-06-17 RX ORDER — NAPROXEN SODIUM 220 MG
220 TABLET ORAL 2 TIMES DAILY WITH MEALS
COMMUNITY
End: 2019-09-11 | Stop reason: HOSPADM

## 2019-06-17 RX ORDER — BUDESONIDE 0.5 MG/2ML
0.5 INHALANT ORAL 2 TIMES DAILY
COMMUNITY
End: 2021-01-25 | Stop reason: ALTCHOICE

## 2019-06-17 RX ORDER — ARFORMOTEROL TARTRATE 15 UG/2ML
15 SOLUTION RESPIRATORY (INHALATION) 2 TIMES DAILY
COMMUNITY
End: 2021-01-25 | Stop reason: ALTCHOICE

## 2019-06-20 PROBLEM — M47.812 CERVICAL SPINE ARTHRITIS: Status: ACTIVE | Noted: 2019-06-20

## 2019-06-24 DIAGNOSIS — M54.2 NECK PAIN, ACUTE: ICD-10-CM

## 2019-06-24 RX ORDER — METAXALONE 400 MG/1
400 TABLET ORAL 3 TIMES DAILY
Qty: 30 TABLET | Refills: 0 | Status: SHIPPED | OUTPATIENT
Start: 2019-06-24 | End: 2019-07-10 | Stop reason: SDUPTHER

## 2019-06-25 ENCOUNTER — OFFICE VISIT (OUTPATIENT)
Dept: FAMILY MEDICINE CLINIC | Facility: HOSPITAL | Age: 84
End: 2019-06-25
Payer: MEDICARE

## 2019-06-25 VITALS
TEMPERATURE: 97.5 F | DIASTOLIC BLOOD PRESSURE: 72 MMHG | SYSTOLIC BLOOD PRESSURE: 126 MMHG | HEART RATE: 74 BPM | WEIGHT: 179 LBS | OXYGEN SATURATION: 96 % | HEIGHT: 64 IN | BODY MASS INDEX: 30.56 KG/M2

## 2019-06-25 DIAGNOSIS — M47.812 CERVICAL SPINE ARTHRITIS: ICD-10-CM

## 2019-06-25 DIAGNOSIS — J42 CHRONIC BRONCHITIS, UNSPECIFIED CHRONIC BRONCHITIS TYPE (HCC): Primary | ICD-10-CM

## 2019-06-25 DIAGNOSIS — I10 ESSENTIAL HYPERTENSION: ICD-10-CM

## 2019-06-25 PROCEDURE — 99213 OFFICE O/P EST LOW 20 MIN: CPT | Performed by: FAMILY MEDICINE

## 2019-06-25 RX ORDER — PREDNISONE 10 MG/1
TABLET ORAL
Qty: 16 TABLET | Refills: 0 | Status: SHIPPED | OUTPATIENT
Start: 2019-06-25 | End: 2019-07-10

## 2019-06-25 RX ORDER — METAXALONE 800 MG/1
TABLET ORAL
COMMUNITY
Start: 2019-06-17 | End: 2019-07-10

## 2019-06-25 RX ORDER — ALBUTEROL SULFATE 90 UG/1
2 AEROSOL, METERED RESPIRATORY (INHALATION) EVERY 6 HOURS PRN
Qty: 1 INHALER | Refills: 3 | Status: SHIPPED | OUTPATIENT
Start: 2019-06-25 | End: 2020-09-17 | Stop reason: SDUPTHER

## 2019-06-29 DIAGNOSIS — K21.00 GASTROESOPHAGEAL REFLUX DISEASE WITH ESOPHAGITIS: ICD-10-CM

## 2019-06-29 RX ORDER — OMEPRAZOLE 20 MG/1
CAPSULE, DELAYED RELEASE ORAL
Qty: 30 CAPSULE | Refills: 5 | Status: SHIPPED | OUTPATIENT
Start: 2019-06-29 | End: 2019-12-21 | Stop reason: SDUPTHER

## 2019-07-01 ENCOUNTER — PATIENT OUTREACH (OUTPATIENT)
Dept: FAMILY MEDICINE CLINIC | Facility: HOSPITAL | Age: 84
End: 2019-07-01

## 2019-07-01 NOTE — PROGRESS NOTES
Outpatient Care Management Note:    No response to telephone calls or unable to reach letter mailed to patient  BPCI episode ended 6/29/19  Patient closed to care management

## 2019-07-10 ENCOUNTER — OFFICE VISIT (OUTPATIENT)
Dept: FAMILY MEDICINE CLINIC | Facility: HOSPITAL | Age: 84
End: 2019-07-10
Payer: MEDICARE

## 2019-07-10 VITALS
HEART RATE: 83 BPM | WEIGHT: 175.8 LBS | OXYGEN SATURATION: 94 % | DIASTOLIC BLOOD PRESSURE: 76 MMHG | SYSTOLIC BLOOD PRESSURE: 118 MMHG | BODY MASS INDEX: 30.01 KG/M2 | TEMPERATURE: 98.3 F | HEIGHT: 64 IN

## 2019-07-10 DIAGNOSIS — J41.8 MIXED SIMPLE AND MUCOPURULENT CHRONIC BRONCHITIS (HCC): ICD-10-CM

## 2019-07-10 DIAGNOSIS — I10 ESSENTIAL HYPERTENSION: ICD-10-CM

## 2019-07-10 DIAGNOSIS — I25.10 CORONARY ARTERY DISEASE INVOLVING NATIVE CORONARY ARTERY OF NATIVE HEART WITHOUT ANGINA PECTORIS: ICD-10-CM

## 2019-07-10 DIAGNOSIS — H35.3230 BILATERAL EXUDATIVE AGE-RELATED MACULAR DEGENERATION, UNSPECIFIED STAGE (HCC): ICD-10-CM

## 2019-07-10 DIAGNOSIS — D50.8 OTHER IRON DEFICIENCY ANEMIA: ICD-10-CM

## 2019-07-10 DIAGNOSIS — E66.09 CLASS 1 OBESITY DUE TO EXCESS CALORIES WITH SERIOUS COMORBIDITY AND BODY MASS INDEX (BMI) OF 30.0 TO 30.9 IN ADULT: ICD-10-CM

## 2019-07-10 DIAGNOSIS — M54.2 NECK PAIN, ACUTE: ICD-10-CM

## 2019-07-10 DIAGNOSIS — F06.4 ANXIETY DISORDER DUE TO GENERAL MEDICAL CONDITION: ICD-10-CM

## 2019-07-10 DIAGNOSIS — E78.2 MIXED HYPERLIPIDEMIA: ICD-10-CM

## 2019-07-10 DIAGNOSIS — Z00.00 MEDICARE ANNUAL WELLNESS VISIT, SUBSEQUENT: Primary | ICD-10-CM

## 2019-07-10 DIAGNOSIS — M47.812 CERVICAL SPINE ARTHRITIS: ICD-10-CM

## 2019-07-10 PROCEDURE — 99214 OFFICE O/P EST MOD 30 MIN: CPT | Performed by: FAMILY MEDICINE

## 2019-07-10 PROCEDURE — G0439 PPPS, SUBSEQ VISIT: HCPCS | Performed by: FAMILY MEDICINE

## 2019-07-10 RX ORDER — METAXALONE 400 MG/1
TABLET ORAL
Qty: 30 TABLET | Refills: 0 | Status: SHIPPED | OUTPATIENT
Start: 2019-07-10 | End: 2019-08-01 | Stop reason: SDUPTHER

## 2019-07-10 NOTE — PROGRESS NOTES
Assessment/Plan:         Diagnoses and all orders for this visit:    Medicare annual wellness visit, subsequent    Mixed simple and mucopurulent chronic bronchitis (HCC)    Bilateral exudative age-related macular degeneration, unspecified stage (HCC)    Other iron deficiency anemia    Class 1 obesity due to excess calories with serious comorbidity and body mass index (BMI) of 30 0 to 30 9 in adult    Mixed hyperlipidemia    Anxiety disorder due to general medical condition    Coronary artery disease involving native coronary artery of native heart without angina pectoris    Essential hypertension    Cervical spine arthritis          Subjective:      Patient ID: Thais Correa is a 80 y o  male  F/U multiple issues  Seen in visit with son and also with daughter in law on speaker phone  Damon Granado has been somewhat better since last visit   Breathing improved with use of steroid taper and rescue inhaler  He wondered how often to use it and I advised 2 puffs 2-3 times a day especially before activity  Doing well with muscle relaxer for neck pain but too sedating during the day  Daughter in law asked about CBD cream which I was fine with, advising them to look for a reputable source and continue muscle relaxer at bedtime  The following portions of the patient's history were reviewed and updated as appropriate: allergies, current medications, past family history, past medical history, past social history, past surgical history and problem list     Review of Systems   Constitutional: Positive for unexpected weight change  Negative for fatigue and fever  HENT: Negative  Eyes: Positive for visual disturbance  Respiratory: Positive for cough, shortness of breath and wheezing  Negative for chest tightness  Cardiovascular: Negative  Gastrointestinal: Negative  Genitourinary: Negative  Musculoskeletal: Positive for arthralgias, neck pain and neck stiffness  Skin: Negative  Neurological: Positive for headaches  Hematological: Negative  Psychiatric/Behavioral: The patient is nervous/anxious  All other systems reviewed and are negative  Objective:      /76 (Patient Position: Sitting, Cuff Size: Standard)   Pulse 83   Temp 98 3 °F (36 8 °C) (Tympanic)   Ht 5' 4" (1 626 m)   Wt 79 7 kg (175 lb 12 8 oz)   SpO2 94%   BMI 30 18 kg/m²          Physical Exam   Constitutional: He is oriented to person, place, and time  He appears well-developed and well-nourished  Neck: No thyromegaly present  Cardiovascular: Normal rate, regular rhythm, normal heart sounds and intact distal pulses  Pulmonary/Chest: He has decreased breath sounds  He has no wheezes  He has no rales  Musculoskeletal: He exhibits no edema  Cervical back: He exhibits decreased range of motion and spasm  Neurological: He is alert and oriented to person, place, and time  Skin: No rash noted  Nursing note and vitals reviewed  BMI Counseling: Body mass index is 30 18 kg/m²  Discussed the patient's BMI with him  The BMI is above average  BMI counseling and education was provided to the patient  Nutrition recommendations include reducing portion sizes and moderation in carbohydrate intake  Exercise recommendations include strength training exercises

## 2019-07-10 NOTE — PROGRESS NOTES
Assessment and Plan:     Problem List Items Addressed This Visit        Respiratory    Mixed simple and mucopurulent chronic bronchitis (HCC)       Cardiovascular and Mediastinum    CAD (coronary artery disease)    Essential hypertension       Musculoskeletal and Integument    Cervical spine arthritis       Other    Anxiety disorder due to general medical condition    Mixed hyperlipidemia    Iron deficiency anemia    Macular degeneration    Medicare annual wellness visit, subsequent - Primary    Class 1 obesity due to excess calories with serious comorbidity and body mass index (BMI) of 30 0 to 30 9 in adult         History of Present Illness:     Patient presents for Medicare Annual Wellness visit    Patient Care Team:  Liz Bose MD as PCP - MD Rylan Mccord DPM Kennyth Manos, MD Colene Barren, MD     Problem List:     Patient Active Problem List   Diagnosis    Hydronephrosis of right kidney    CAD (coronary artery disease)    Carotid stenosis    Essential hypertension    Stage 3 chronic kidney disease ([address]    Anxiety disorder due to general medical condition    Benign prostatic hyperplasia with lower urinary tract symptoms    GERD (gastroesophageal reflux disease)    Mixed simple and mucopurulent chronic bronchitis ([address]    Headache    Mixed hyperlipidemia    Acquired hypothyroidism    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[address]    Insomnia    Iron deficiency anemia    Macular degeneration    Osteoporosis    Other atopic dermatitis    Medicare annual wellness visit, subsequent    Eczema    Seborrheic keratoses, inflamed    Bronchitis    Severe sepsis ([address]    Suprapubic tenderness    Loose stools    Thrombocytopenia (HCC)    Class 1 obesity due to excess calories with serious comorbidity and body mass index (BMI) of 30 0 to 30 9 in adult    Chronic diastolic heart failure ([address] )    Atrial fibrillation ([address] )    Community acquired pneumonia    Urinary obstruction    Cervical spine arthritis      Past Medical and Surgical History:     Past Medical History:   Diagnosis Date    Anxiety disorder due to general medical condition 2013    Compression fracture of thoracic vertebra (HCC)     last assessed 2012    COPD (chronic obstructive pulmonary disease) ([facility] )     Disease of thyroid gland     Heart attack ([facility] )     History of methicillin resistant staphylococcus aureus (MRSA) 2019    negative nasal swab     Hollenhorst plaque, right eye     last assessed 2013    Hyperlipidemia     Hypertension     Iron deficiency anemia due to chronic blood loss     last assessed 09/10/2012    Ischemic colitis ([facility] )     last assessed 2014    Osteoarthritis of both hands     last assessedn 2013    Peptic ulcer     last assessed 2013    Polymyalgia rheumatica ([facility] )     last assessesd 10/09/2012    Renal disorder     Upper GI hemorrhage     last assessed 2015    Varicose veins of lower extremity     last assessed 2013     Past Surgical History:   Procedure Laterality Date    CORONARY ARTERY BYPASS GRAFT      HEMORRHOID SURGERY      HERNIA REPAIR      RENAL CYST EXCISION      resolved 2010    THROMBOENDARTERECTOMY Right     carotid, last assessed 2013      Family History:     Family History   Problem Relation Age of Onset    Hypertension Mother     Alcohol abuse Mother     Hypertension Father     Alcohol abuse Father     Alcohol abuse Son     Heart disease Family     Stroke Family         syndrome      Social History:     Social History     Tobacco Use   Smoking Status Former Smoker    Packs/day: 2 00    Years: 50 00    Pack years: 100 00    Types: Cigarettes    Last attempt to quit:     Years since quittin 5   Smokeless Tobacco Never Used   Tobacco Comment    Quit 40 yrs   ago     Social History     Substance and Sexual Activity   Alcohol Use Yes    Alcohol/week: 1 0 standard drinks    Types: 1 Glasses of wine per week    Comment: social     Social History     Substance and Sexual Activity   Drug Use No      Medications and Allergies:     Current Outpatient Medications   Medication Sig Dispense Refill    albuterol (PROVENTIL HFA,VENTOLIN HFA) 90 mcg/act inhaler Inhale 2 puffs every 6 (six) hours as needed for wheezing 1 Inhaler 3    arformoterol (BROVANA) 15 mcg/2 mL nebulizer solution Take 15 mcg by nebulization 2 (two) times a day      atenolol (TENORMIN) 25 mg tablet TAKE ONE TABLET BY MOUTH EVERY DAY 30 tablet 5    B Complex Vitamins (B COMPLEX PO) Take by mouth daily      budesonide (PULMICORT) 0 5 mg/2 mL nebulizer solution Take 0 5 mg by nebulization 2 (two) times a day Rinse mouth after use        Chlorphen-PE-Acetaminophen (CORICIDIN D COLD/FLU/SINUS PO) Take by mouth      Cholecalciferol ([employer] VITAMIN D) 2000 units CAPS Take by mouth daily       Cyanocobalamin (B-12 PO) Take by mouth daily      Ferrous Sulfate (IRON) 325 (65 Fe) MG TABS Take by mouth every other day       fluticasone (FLONASE) 50 mcg/act nasal spray 1 spray into each nostril 2 (two) times a day      guaiFENesin (MUCINEX) 600 mg 12 hr tablet Take 1,200 mg by mouth every 12 (twelve) hours      levothyroxine 75 mcg tablet Take 75 mcg by mouth daily 37 5 mg Daily      LORazepam (ATIVAN) 0 5 mg tablet TAKE ONE TABLET BY MOUTH EVERY 8 HOURS AS NEEDED FOR ANXIETY 90 tablet 3    metaxalone (SKELAXIN) 400 MG tablet TAKE ONE TABLET BY MOUTH THREE TIMES A DAY 30 tablet 0    Misc Natural Products (CORTISOL PO) Take by mouth      Multiple Vitamins-Minerals (VISION FORMULA/LUTEIN PO) Take by mouth      naproxen sodium (ALEVE) 220 MG tablet Take 220 mg by mouth 2 (two) times a day with meals      nystatin-triamcinolone (MYCOLOG-II) cream APPLY SPARINGLY TO THE AFFECTED AREA(S) TWO TIMES A DAY 60 g 2    omeprazole (PriLOSEC) 20 mg delayed release capsule TAKE ONE CAPSULE BY MOUTH EVERY DAY 30 capsule 5    tamsulosin (FLOMAX) 0 4 mg Take 1 capsule (0 4 mg total) by mouth daily with dinner 30 capsule 5    zolpidem (AMBIEN) 5 mg tablet Take 1 tablet (5 mg total) by mouth daily at bedtime as needed for sleep 30 tablet 3     No current facility-administered medications for this visit  Allergies   Allergen Reactions    Pravastatin Anaphylaxis, Photosensitivity and Headache     Reaction Date: [id number]; Other reaction(s): Headache    Acetaminophen-Codeine GI Intolerance    Atorvastatin      Other reaction(s): Leg Cramps  Other reaction(s): Leg Cramps    Codeine Nausea Only    Colestipol GI Intolerance     Reaction Date: [id number];     Contrast  [Iodinated Diagnostic Agents]     Fenofibrate GI Intolerance     Reaction Date: [id number];     Hydrocodone Vomiting    Niacin      Reaction Date: [id number];     Niaspan  [Niacin Er]     Pitavastatin Myalgia    Pneumococcal Polysaccharide Vaccine     Pravastatin Sodium     Simvastatin     Statins Myalgia    Vicoprofen  [Hydrocodone-Ibuprofen] GI Intolerance    Cyclophosphamide Rash    Ioversol Hives      Immunizations:     Immunization History   Administered Date(s) Administered    Influenza Split High Dose Preservative Free IM 09/10/2012, 10/02/2013, 11/15/2014, 10/21/2015, 09/17/2016, 09/15/2017    Influenza, high dose seasonal 0 5 mL 10/23/2018    Pneumococcal Conjugate 13-Valent 10/23/2018    Pneumococcal Polysaccharide PPV23 04/01/1999, 01/05/2005    Tdap 08/04/2014    Zoster 01/01/2011    Zoster Vaccine Recombinant 11/10/2018      Medicare Screening Tests and Risk Assessments:     Krish Alejandro is here for his Subsequent Wellness visit  Last Medicare Wellness visit information reviewed, patient interviewed, no change since last AWV  Health Risk Assessment:  Patient rates overall health as fair  Patient feels that their physical health rating is Same  Eyesight was rated as Much worse  Hearing was rated as Much worse   Patient feels that their emotional and mental health rating is Slightly better  Pain experienced by patient in the last 7 days has been A lot  Patient's pain rating has been 8/10  Patient states that he has experienced no weight loss or gain in last 6 months  Emotional/Mental Health:  Patient has been feeling nervous/anxious  PHQ-9 Depression Screening:    Frequency of the following problems over the past two weeks:      1  Little interest or pleasure in doing things: 0 - not at all      2  Feeling down, depressed, or hopeless: 0 - not at all  PHQ-2 Score: 0          Broken Bones/Falls: Fall Risk Assessment:    In the past year, patient has experienced: History of falling in past year    Number of falls: 1          Bladder/Bowel:  Patient has not leaked urine accidently in the last six months  Patient reports no loss of bowel control  Immunizations:  Patient has had a flu vaccination within the last year  Patient has received a pneumonia shot  Patient has received a shingles shot  Patient has received tetanus/diphtheria shot  Date of tetanus/diphtheria shot: 8/4/2014    Home Safety:  Patient does not have trouble with stairs inside or outside of their home  Patient currently reports that there are no safety hazards present in home, working smoke alarms, no working carbon monoxide detectors  Preventative Screenings:   prostate cancer screen performed, colon cancer screen completed, cholesterol screen completed, 1/9/2019  glaucoma eye exam completed,     Nutrition:  Current diet: Regular with servings of the following:    Medications:  Patient is currently taking over-the-counter supplements  Patient is able to manage medications  Lifestyle Choices:  Patient reports no tobacco use  Patient has smoked or used tobacco in the past   Patient has stopped his tobacco use  Tobacco use quit date: 27 years ago  Patient reports no alcohol use  Patient does not drive a vehicle  Patient wears seat belt      Current level of exercise of physical activity described by patient as: sationary bike three times a week  Activities of Daily Living:  Can get out of bed by his or her self, able to dress self, able to make own meals, able to do own shopping, able to bathe self, can do own laundry/housekeeping, can manage own money, pay bills and track expenses    Previous Hospitalizations:  Hospitalization or ED visit in past 12 months  Number of hospitalizations within the last year: 1-2        Advanced Directives:  Patient has decided on a power of   Patient has spoken to designated power of   Patient has completed advanced directive  Preventative Screening/Counseling:      Cardiovascular:      General: Screening Current          Diabetes:      General: Screening Current          Colorectal Cancer:      General: Screening Not Indicated          Prostate Cancer:      General: Screening Not Indicated          Osteoporosis:      General: Screening Not Indicated          AAA:      General: Screening Not Indicated          Glaucoma:      General: Screening Current          HIV:      General: Screening Not Indicated          Hepatitis C:      General: Screening Not Indicated        Advanced Directives:   Patient has living will for healthcare, has durable POA for healthcare, patient has an advanced directive  Information on ACP and/or AD not provided  No 5 wishes given  End of life assessment reviewed with patient  Provider agrees with end of life decisions

## 2019-07-12 NOTE — PATIENT INSTRUCTIONS
Wellness Visit for Adults   AMBULATORY CARE:   A wellness visit  is when you see your healthcare provider to get screened for health problems  You can also get advice on how to stay healthy  Write down your questions so you remember to ask them  Ask your healthcare provider how often you should have a wellness visit  What happens at a wellness visit:  Your healthcare provider will ask about your health, and your family history of health problems  This includes high blood pressure, heart disease, and cancer  He or she will ask if you have symptoms that concern you, if you smoke, and about your mood  You may also be asked about your intake of medicines, supplements, food, and alcohol  Any of the following may be done:  · Your weight  will be checked  Your height may also be checked so your body mass index (BMI) can be calculated  Your BMI shows if you are at a healthy weight  · Your blood pressure  and heart rate will be checked  Your temperature may also be checked  · Blood and urine tests  may be done  Blood tests may be done to check your cholesterol levels  Abnormal cholesterol levels increase your risk for heart disease and stroke  You may also need a blood or urine test to check for diabetes if you are at increased risk  Urine tests may be done to look for signs of an infection or kidney disease  · A physical exam  includes checking your heartbeat and lungs with a stethoscope  Your healthcare provider may also check your skin to look for sun damage  · Screening tests  may be recommended  A screening test is done to check for diseases that may not cause symptoms  The screening tests you may need depend on your age, gender, family history, and lifestyle habits  For example, colorectal screening may be recommended if you are 48years old or older  Screening tests you need if you are a woman:   · A Pap smear  is used to screen for cervical cancer   Pap smears are usually done every 3 to 5 years depending on your age  You may need them more often if you have had abnormal Pap smear test results in the past  Ask your healthcare provider how often you should have a Pap smear  · A mammogram  is an x-ray of your breasts to screen for breast cancer  Experts recommend mammograms every 2 years starting at age 48 years  You may need a mammogram at age 52 years or younger if you have an increased risk for breast cancer  Talk to your healthcare provider about when you should start having mammograms and how often you need them  Vaccines you may need:   · Get an influenza vaccine  every year  The influenza vaccine protects you from the flu  Several types of viruses cause the flu  The viruses change over time, so new vaccines are made each year  · Get a tetanus-diphtheria (Td) booster vaccine  every 10 years  This vaccine protects you against tetanus and diphtheria  Tetanus is a severe infection that may cause painful muscle spasms and lockjaw  Diphtheria is a severe bacterial infection that causes a thick covering in the back of your mouth and throat  · Get a human papillomavirus (HPV) vaccine  if you are female and aged 23 to 32 or male 23 to 24 and never received it  This vaccine protects you from HPV infection  HPV is the most common infection spread by sexual contact  HPV may also cause vaginal, penile, and anal cancers  · Get a pneumococcal vaccine  if you are aged 72 years or older  The pneumococcal vaccine is an injection given to protect you from pneumococcal disease  Pneumococcal disease is an infection caused by pneumococcal bacteria  The infection may cause pneumonia, meningitis, or an ear infection  · Get a shingles vaccine  if you are aged 61 or older, even if you have had shingles before  The shingles vaccine is an injection to protect you from the varicella-zoster virus  This is the same virus that causes chickenpox   Shingles is a painful rash that develops in people who had chickenpox or have been exposed to the virus  How to eat healthy:  My Plate is a model for planning healthy meals  It shows the types and amounts of foods that should go on your plate  Fruits and vegetables make up about half of your plate, and grains and protein make up the other half  A serving of dairy is included on the side of your plate  The amount of calories and serving sizes you need depends on your age, gender, weight, and height  Examples of healthy foods are listed below:  · Eat a variety of vegetables  such as dark green, red, and orange vegetables  You can also include canned vegetables low in sodium (salt) and frozen vegetables without added butter or sauces  · Eat a variety of fresh fruits , canned fruit in 100% juice, frozen fruit, and dried fruit  · Include whole grains  At least half of the grains you eat should be whole grains  Examples include whole-wheat bread, wheat pasta, brown rice, and whole-grain cereals such as oatmeal     · Eat a variety of protein foods such as seafood (fish and shellfish), lean meat, and poultry without skin (turkey and chicken)  Examples of lean meats include pork leg, shoulder, or tenderloin, and beef round, sirloin, tenderloin, and extra lean ground beef  Other protein foods include eggs and egg substitutes, beans, peas, soy products, nuts, and seeds  · Choose low-fat dairy products such as skim or 1% milk or low-fat yogurt, cheese, and cottage cheese  · Limit unhealthy fats  such as butter, hard margarine, and shortening  Exercise:  Exercise at least 30 minutes per day on most days of the week  Some examples of exercise include walking, biking, dancing, and swimming  You can also fit in more physical activity by taking the stairs instead of the elevator or parking farther away from stores  Include muscle strengthening activities 2 days each week  Regular exercise provides many health benefits   It helps you manage your weight, and decreases your risk for type 2 diabetes, heart disease, stroke, and high blood pressure  Exercise can also help improve your mood  Ask your healthcare provider about the best exercise plan for you  General health and safety guidelines:   · Do not smoke  Nicotine and other chemicals in cigarettes and cigars can cause lung damage  Ask your healthcare provider for information if you currently smoke and need help to quit  E-cigarettes or smokeless tobacco still contain nicotine  Talk to your healthcare provider before you use these products  · Limit alcohol  A drink of alcohol is 12 ounces of beer, 5 ounces of wine, or 1½ ounces of liquor  · Lose weight, if needed  Being overweight increases your risk of certain health conditions  These include heart disease, high blood pressure, type 2 diabetes, and certain types of cancer  · Protect your skin  Do not sunbathe or use tanning beds  Use sunscreen with a SPF 15 or higher  Apply sunscreen at least 15 minutes before you go outside  Reapply sunscreen every 2 hours  Wear protective clothing, hats, and sunglasses when you are outside  · Drive safely  Always wear your seatbelt  Make sure everyone in your car wears a seatbelt  A seatbelt can save your life if you are in an accident  Do not use your cell phone when you are driving  This could distract you and cause an accident  Pull over if you need to make a call or send a text message  · Practice safe sex  Use latex condoms if are sexually active and have more than one partner  Your healthcare provider may recommend screening tests for sexually transmitted infections (STIs)  · Wear helmets, lifejackets, and protective gear  Always wear a helmet when you ride a bike or motorcycle, go skiing, or play sports that could cause a head injury  Wear protective equipment when you play sports  Wear a lifejacket when you are on a boat or doing water sports    © 2017 [address] [employer] Information is for End User's use only and may not be sold, redistributed or otherwise used for commercial purposes  All illustrations and images included in CareNotes® are the copyrighted property of [employer]  or [employer] Crawley  The above information is an  only  It is not intended as medical advice for individual conditions or treatments  Talk to your doctor, nurse or pharmacist before following any medical regimen to see if it is safe and effective for you  Obesity   AMBULATORY CARE:   Obesity  is when your body mass index (BMI) is greater than 30  Your healthcare provider will use your height and weight to measure your BMI  The risks of obesity include  many health problems, such as injuries or physical disability  You may need tests to check for the following:  · Diabetes     · High blood pressure or high cholesterol     · Heart disease     · Gallbladder or liver disease     · Cancer of the colon, breast, prostate, liver, or kidney     · Sleep apnea     · Arthritis or gout  Seek care immediately if:   · You have a severe headache, confusion, or difficulty speaking  · You have weakness on one side of your body  · You have chest pain, sweating, or shortness of breath  Contact your healthcare provider if:   · You have symptoms of gallbladder or liver disease, such as pain in your upper abdomen  · You have knee or hip pain and discomfort while walking  · You have symptoms of diabetes, such as intense hunger and thirst, and frequent urination  · You have symptoms of sleep apnea, such as snoring or daytime sleepiness  · You have questions or concerns about your condition or care  Treatment for obesity  focuses on helping you lose weight to improve your health  Even a small decrease in BMI can reduce the risk for many health problems  Your healthcare provider will help you set a weight-loss goal   · Lifestyle changes  are the first step in treating obesity   These include making healthy food choices and getting regular physical activity  Your healthcare provider may suggest a weight-loss program that involves coaching, education, and therapy  · Medicine  may help you lose weight when it is used with a healthy diet and physical activity  · Surgery  can help you lose weight if you are very obese and have other health problems  There are several types of weight-loss surgery  Ask your healthcare provider for more information  Be successful losing weight:   · Set small, realistic goals  An example of a small goal is to walk for 20 minutes 5 days a week  Anther goal is to lose 5% of your body weight  · Tell friends, family members, and coworkers about your goals  and ask for their support  Ask a friend to lose weight with you, or join a weight-loss support group  · Identify foods or triggers that may cause you to overeat , and find ways to avoid them  Remove tempting high-calorie foods from your home and workplace  Place a bowl of fresh fruit on your kitchen counter  If stress causes you to eat, then find other ways to cope with stress  · Keep a diary to track what you eat and drink  Also write down how many minutes of physical activity you do each day  Weigh yourself once a week and record it in your diary  Eating changes: You will need to eat 500 to 1,000 fewer calories each day than you currently eat to lose 1 to 2 pounds a week  The following changes will help you cut calories:  · Eat smaller portions  Use small plates, no larger than 9 inches in diameter  Fill your plate half full of fruits and vegetables  Measure your food using measuring cups until you know what a serving size looks like  · Eat 3 meals and 1 or 2 snacks each day  Plan your meals in advance  Rudi Martínez and eat at home most of the time  Eat slowly  · Eat fruits and vegetables at every meal   They are low in calories and high in fiber, which makes you feel full   Do not add butter, margarine, or cream sauce to vegetables  Use herbs to season steamed vegetables  · Eat less fat and fewer fried foods  Eat more baked or grilled chicken and fish  These protein sources are lower in calories and fat than red meat  Limit fast food  Dress your salads with olive oil and vinegar instead of bottled dressing  · Limit the amount of sugar you eat  Do not drink sugary beverages  Limit alcohol  Activity changes:  Physical activity is good for your body in many ways  It helps you burn calories and build strong muscles  It decreases stress and depression, and improves your mood  It can also help you sleep better  Talk to your healthcare provider before you begin an exercise program   · Exercise for at least 30 minutes 5 days a week  Start slowly  Set aside time each day for physical activity that you enjoy and that is convenient for you  It is best to do both weight training and an activity that increases your heart rate, such as walking, bicycling, or swimming  · Find ways to be more active  Do yard work and housecleaning  Walk up the stairs instead of using elevators  Spend your leisure time going to events that require walking, such as outdoor festivals or fairs  This extra physical activity can help you lose weight and keep it off  Follow up with your healthcare provider as directed: You may need to meet with a dietitian  Write down your questions so you remember to ask them during your visits  © 2017 [employer] [employer] Information is for End User's use only and may not be sold, redistributed or otherwise used for commercial purposes  All illustrations and images included in CareNotes® are the copyrighted property of [employer]  or [employer] Craw[employer]  The above information is an  only  It is not intended as medical advice for individual conditions or treatments   Talk to your doctor, nurse or pharmacist before following any medical regimen to see if it is safe and effective for you

## 2019-07-20 DIAGNOSIS — I10 ESSENTIAL HYPERTENSION: ICD-10-CM

## 2019-07-21 RX ORDER — ATENOLOL 25 MG/1
TABLET ORAL
Qty: 90 TABLET | Refills: 1 | Status: SHIPPED | OUTPATIENT
Start: 2019-07-21 | End: 2020-01-25

## 2019-07-26 ENCOUNTER — OFFICE VISIT (OUTPATIENT)
Dept: CARDIOLOGY CLINIC | Facility: CLINIC | Age: 84
End: 2019-07-26
Payer: MEDICARE

## 2019-07-26 VITALS
BODY MASS INDEX: 30.22 KG/M2 | HEIGHT: 64 IN | SYSTOLIC BLOOD PRESSURE: 104 MMHG | DIASTOLIC BLOOD PRESSURE: 64 MMHG | HEART RATE: 75 BPM | WEIGHT: 177 LBS

## 2019-07-26 DIAGNOSIS — I48.91 ATRIAL FIBRILLATION, UNSPECIFIED TYPE (HCC): Primary | ICD-10-CM

## 2019-07-26 PROCEDURE — 93000 ELECTROCARDIOGRAM COMPLETE: CPT | Performed by: INTERNAL MEDICINE

## 2019-07-26 PROCEDURE — 99214 OFFICE O/P EST MOD 30 MIN: CPT | Performed by: INTERNAL MEDICINE

## 2019-07-26 PROCEDURE — 1123F ACP DISCUSS/DSCN MKR DOCD: CPT | Performed by: INTERNAL MEDICINE

## 2019-07-26 NOTE — PROGRESS NOTES
Cardiology Follow Up    Vern eJrnigan  6/30/1932  [id number]  [facility] [address] [address] [phone]    1  Atrial fibrillation, unspecified type ([facility] [address] )  POCT ECG       Interval History: Followup cad    Has exertional dyspnea however he does feel better with inhaled BDA       Problem List     Hydronephrosis of right kidney    CAD (coronary artery disease)    Carotid stenosis    Essential hypertension    Stage 3 chronic kidney disease (HCC)    Anxiety disorder due to general medical condition    Benign prostatic hyperplasia with lower urinary tract symptoms    GERD (gastroesophageal reflux disease)    Mixed simple and mucopurulent chronic bronchitis (HCC)    Headache    Mixed hyperlipidemia    Acquired hypothyroidism    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[address] )    Insomnia    Iron deficiency anemia    Overview Signed 3/15/2018 10:04 AM by Padma Vail MD     Transitioned From: Anemia due to GI blood loss         Macular degeneration    Osteoporosis    Other atopic dermatitis    Medicare annual wellness visit, subsequent    Eczema    Seborrheic keratoses, inflamed    Bronchitis    Severe sepsis (HCC)    Suprapubic tenderness    Loose stools    Thrombocytopenia (HCC)    Overview Deleted 3/31/2019 11:38 AM by Maria Luisa Rodriguez MD            Class 1 obesity due to excess calories with serious comorbidity and body mass index (BMI) of 30 0 to 30 9 in adult    Chronic diastolic heart failure (HCC)    Wt Readings from Last 3 Encounters:   07/26/19 80 3 kg (177 lb)   07/10/19 79 7 kg (175 lb 12 8 oz)   06/25/19 81 2 kg (179 lb)                 Atrial fibrillation ([address] )    Community acquired pneumonia    Urinary obstruction    Cervical spine arthritis        Past Medical History:   Diagnosis Date    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    Compression fracture of thoracic vertebra ([address] )     last assessed 2012    COPD (chronic obstructive pulmonary disease) (HCC)     Disease of thyroid gland     [facility] ([facility] )     History of methicillin resistant staphylococcus aureus (MRSA) 2019    negative nasal swab     Hollenhorst plaque, right eye     last assessed 2013    Hyperlipidemia     Hypertension     Iron deficiency anemia due to chronic blood loss     last assessed 09/10/2012    Ischemic colitis ([facility] )     last assessed 2014    Osteoarthritis of both hands     last assessedn 2013    Peptic ulcer     last assessed 2013    Polymyalgia rheumatica ([place] [address] )     last assessesd 10/09/2012    Renal disorder     Upper GI hemorrhage     last assessed 2015    Varicose veins of lower extremity     last assessed 2013     Social History     Socioeconomic History    Marital status:      Spouse name: Not on file    Number of children: Not on file    Years of education: Not on file    Highest education level: Not on file   Occupational History    Not on file   Social Needs    Financial resource strain: Not on file    Food insecurity:     Worry: Not on file     Inability: Not on file    Transportation needs:     Medical: Not on file     Non-medical: Not on file   Tobacco Use    Smoking status: Former Smoker     Packs/day: 2 00     Years: 50 00     Pack years: 100 00     Types: Cigarettes     Last attempt to quit:      Years since quittin 5    Smokeless tobacco: Never Used    Tobacco comment: Quit 40 yrs  ago   Substance and Sexual Activity    Alcohol use:  Yes     Alcohol/week: 1 0 standard drinks     Types: 1 Glasses of wine per week     Comment: social    Drug use: No    Sexual activity: Not on file   Lifestyle    Physical activity:     Days per week: Not on file     Minutes per session: Not on file    Stress: Not on file   Relationships    Social connections:     Talks on phone: Not on file     Gets together: Not on file     Attends Anabaptism service: Not on file     Active member of club or organization: Not on file     Attends meetings of clubs or organizations: Not on file     Relationship status: Not on file    Intimate partner violence:     Fear of current or ex partner: Not on file     Emotionally abused: Not on file     Physically abused: Not on file     Forced sexual activity: Not on file   Other Topics Concern    Not on file   Social History Narrative     per [employer]    Always uses seat belt      Family History   Problem Relation Age of Onset    Hypertension Mother     Alcohol abuse Mother     Hypertension Father     Alcohol abuse Father     Alcohol abuse Son     Heart disease Family     Stroke Family         syndrome     Past Surgical History:   Procedure Laterality Date    CORONARY ARTERY BYPASS GRAFT      HEMORRHOID SURGERY      HERNIA REPAIR      RENAL CYST EXCISION      resolved 05/2010    THROMBOENDARTERECTOMY Right     carotid, last assessed 06/18/2013       Current Outpatient Medications:     albuterol (PROVENTIL HFA,VENTOLIN HFA) 90 mcg/act inhaler, Inhale 2 puffs every 6 (six) hours as needed for wheezing, Disp: 1 Inhaler, Rfl: 3    atenolol (TENORMIN) 25 mg tablet, TAKE ONE TABLET BY MOUTH EVERY DAY, Disp: 90 tablet, Rfl: 1    B Complex Vitamins (B COMPLEX PO), Take by mouth daily, Disp: , Rfl:     Chlorphen-PE-Acetaminophen (CORICIDIN D COLD/FLU/SINUS PO), Take by mouth, Disp: , Rfl:     Cholecalciferol ([employer] VITAMIN D) 2000 units CAPS, Take by mouth daily , Disp: , Rfl:     Cyanocobalamin (B-12 PO), Take by mouth daily, Disp: , Rfl:     Ferrous Sulfate (IRON) 325 (65 Fe) MG TABS, Take by mouth every other day , Disp: , Rfl:     fluticasone (FLONASE) 50 mcg/act nasal spray, 1 spray into each nostril 2 (two) times a day, Disp: , Rfl:     guaiFENesin (MUCINEX) 600 mg 12 hr tablet, Take 1,200 mg by mouth every 12 (twelve) hours, Disp: , Rfl:     levothyroxine 75 mcg tablet, Take 75 mcg by mouth daily 37 5 mg Daily, Disp: , Rfl:     LORazepam (ATIVAN) 0 5 mg tablet, TAKE ONE TABLET BY MOUTH EVERY 8 HOURS AS NEEDED FOR ANXIETY, Disp: 90 tablet, Rfl: 3    metaxalone (SKELAXIN) 400 MG tablet, TAKE ONE TABLET BY MOUTH THREE TIMES A DAY, Disp: 30 tablet, Rfl: 0    Multiple Vitamins-Minerals (VISION FORMULA/LUTEIN PO), Take by mouth, Disp: , Rfl:     naproxen sodium (ALEVE) 220 MG tablet, Take 220 mg by mouth 2 (two) times a day with meals, Disp: , Rfl:     nystatin-triamcinolone (MYCOLOG-II) cream, APPLY SPARINGLY TO THE AFFECTED AREA(S) TWO TIMES A DAY, Disp: 60 g, Rfl: 2    omeprazole (PriLOSEC) 20 mg delayed release capsule, TAKE ONE CAPSULE BY MOUTH EVERY DAY, Disp: 30 capsule, Rfl: 5    tamsulosin (FLOMAX) 0 4 mg, Take 1 capsule (0 4 mg total) by mouth daily with dinner, Disp: 30 capsule, Rfl: 5    zolpidem (AMBIEN) 5 mg tablet, Take 1 tablet (5 mg total) by mouth daily at bedtime as needed for sleep, Disp: 30 tablet, Rfl: 3    arformoterol (BROVANA) 15 mcg/2 mL nebulizer solution, Take 15 mcg by nebulization 2 (two) times a day, Disp: , Rfl:     budesonide (PULMICORT) 0 5 mg/2 mL nebulizer solution, Take 0 5 mg by nebulization 2 (two) times a day Rinse mouth after use , Disp: , Rfl:     Misc Natural Products (CORTISOL PO), Take by mouth, Disp: , Rfl:   Allergies   Allergen Reactions    Pravastatin Anaphylaxis, Photosensitivity and Headache     Reaction Date: [id number];    Other reaction(s): Headache    Acetaminophen-Codeine GI Intolerance    Atorvastatin      Other reaction(s): Leg Cramps  Other reaction(s): Leg Cramps    Codeine Nausea Only    Colestipol GI Intolerance     Reaction Date: [id number];     Contrast  [Iodinated Diagnostic Agents]     Fenofibrate GI Intolerance     Reaction Date: [id number];     Hydrocodone Vomiting    Niacin      Reaction Date: [id number];     Niaspan  [Niacin Er]     Pitavastatin Myalgia    Pneumococcal Polysaccharide Vaccine     Pravastatin Sodium     Simvastatin     Statins Myalgia    Vicoprofen  [Hydrocodone-Ibuprofen] GI Intolerance    Cyclophosphamide Rash    Ioversol Hives       Labs:     Chemistry        Component Value Date/Time     08/28/2015 1730    K 4 1 04/01/2019 0519    K 4 5 08/28/2015 1730     04/01/2019 0519     08/28/2015 1730    CO2 25 04/01/2019 0519    CO2 19 (L) 06/04/2018 1139    BUN 22 04/01/2019 0519    BUN 28 (H) 08/28/2015 1730    CREATININE 1 77 (H) 04/01/2019 0519    CREATININE 1 79 (H) 08/28/2015 1730        Component Value Date/Time    CALCIUM 9 5 04/01/2019 0519    CALCIUM 9 7 08/28/2015 1730    ALKPHOS 88 03/28/2019 1848    ALKPHOS 73 08/28/2015 1730    AST 21 03/28/2019 1848    AST 18 08/28/2015 1730    ALT 33 03/28/2019 1848    ALT 27 08/28/2015 1730    BILITOT 0 45 08/28/2015 1730            Lab Results   Component Value Date    CHOL 191 06/10/2015    CHOL 155 08/05/2014    CHOL 133 01/16/2014     Lab Results   Component Value Date    HDL 29 (L) 01/03/2019    HDL 28 (L) 06/25/2018    HDL 29 (L) 06/02/2018     Lab Results   Component Value Date    LDLCALC 147 (H) 01/03/2019    LDLCALC 129 (H) 06/25/2018    LDLCALC 111 (H) 06/02/2018     Lab Results   Component Value Date    TRIG 160 (H) 01/03/2019    TRIG 180 (H) 06/25/2018    TRIG 123 06/02/2018     No results found for: CHOLHDL    Imaging: No results found  Review of Systems   Constitution: Negative  HENT: Negative  Eyes: Negative  Cardiovascular: Negative  Respiratory: Positive for shortness of breath  Endocrine: Negative  Hematologic/Lymphatic: Negative  Skin: Negative  Musculoskeletal: Negative  Gastrointestinal: Negative  Genitourinary: Negative  Neurological: Negative  Psychiatric/Behavioral: Negative  Allergic/Immunologic: Negative  Vitals:    07/26/19 0903   BP: 104/64   Pulse: 75           Physical Exam   Constitutional: He is oriented to person, place, and time  No distress     Eyes: No scleral icterus  Neck: No JVD present  Cardiovascular: Normal rate and regular rhythm  No murmur heard  Pulmonary/Chest: Effort normal and breath sounds normal  No stridor  No respiratory distress  He has no wheezes  Abdominal: Soft  Bowel sounds are normal  He exhibits no distension  There is no tenderness  There is no guarding  Musculoskeletal: He exhibits no edema  Neurological: He is alert and oriented to person, place, and time  Skin: Skin is warm and dry  He is not diaphoretic  Psychiatric: He has a normal mood and affect  His behavior is normal        Discussion/Summary:      1  Coronary Artery Disease/History of Inferior MI: He has no angina  He is off aspirin due to GI intolerance/bleeding    Continue Atenolol  He has no angina at this time       2  Dyslipidemia: has had myalgia on statin therapy         3  Carotid Stenosis s/p right CEA  The patient was counseled regarding diagnostic results, instructions for management, risk factor reductions, impressions  total time of encounter was 25 minutes and 15 minutes was spent counseling

## 2019-08-01 DIAGNOSIS — M54.2 NECK PAIN, ACUTE: ICD-10-CM

## 2019-08-01 RX ORDER — METAXALONE 400 MG/1
TABLET ORAL
Qty: 30 TABLET | Refills: 2 | Status: SHIPPED | OUTPATIENT
Start: 2019-08-01 | End: 2019-09-27 | Stop reason: SDUPTHER

## 2019-08-31 DIAGNOSIS — F41.9 ANXIETY: ICD-10-CM

## 2019-09-01 RX ORDER — LORAZEPAM 0.5 MG/1
TABLET ORAL
Qty: 90 TABLET | Refills: 3 | Status: SHIPPED | OUTPATIENT
Start: 2019-09-01 | End: 2020-06-09

## 2019-09-07 DIAGNOSIS — F41.9 ANXIETY: ICD-10-CM

## 2019-09-08 RX ORDER — LORAZEPAM 0.5 MG/1
TABLET ORAL
Qty: 90 TABLET | Refills: 3 | Status: SHIPPED | OUTPATIENT
Start: 2019-09-08 | End: 2019-09-11 | Stop reason: HOSPADM

## 2019-09-09 ENCOUNTER — APPOINTMENT (OUTPATIENT)
Dept: RADIOLOGY | Facility: CLINIC | Age: 84
End: 2019-09-09
Payer: MEDICARE

## 2019-09-09 ENCOUNTER — OFFICE VISIT (OUTPATIENT)
Dept: URGENT CARE | Facility: CLINIC | Age: 84
End: 2019-09-09
Payer: MEDICARE

## 2019-09-09 ENCOUNTER — HOSPITAL ENCOUNTER (INPATIENT)
Facility: HOSPITAL | Age: 84
LOS: 2 days | Discharge: HOME | DRG: 191 | End: 2019-09-11
Attending: EMERGENCY MEDICINE | Admitting: INTERNAL MEDICINE
Payer: MEDICARE

## 2019-09-09 VITALS
RESPIRATION RATE: 24 BRPM | HEIGHT: 64 IN | BODY MASS INDEX: 29.16 KG/M2 | TEMPERATURE: 98.6 F | WEIGHT: 170.8 LBS | HEART RATE: 107 BPM | OXYGEN SATURATION: 92 %

## 2019-09-09 DIAGNOSIS — J44.1 COPD WITH ACUTE EXACERBATION (HCC): ICD-10-CM

## 2019-09-09 DIAGNOSIS — J44.1 COPD WITH ACUTE EXACERBATION (HCC): Primary | ICD-10-CM

## 2019-09-09 DIAGNOSIS — N17.9 ACUTE KIDNEY INJURY (HCC): ICD-10-CM

## 2019-09-09 DIAGNOSIS — J44.1 COPD EXACERBATION (HCC): Primary | ICD-10-CM

## 2019-09-09 LAB
ALBUMIN SERPL BCP-MCNC: 3.5 G/DL (ref 3.5–5)
ALP SERPL-CCNC: 104 U/L (ref 46–116)
ALT SERPL W P-5'-P-CCNC: 19 U/L (ref 12–78)
ANION GAP SERPL CALCULATED.3IONS-SCNC: 12 MMOL/L (ref 4–13)
APTT PPP: 34 SECONDS (ref 23–37)
AST SERPL W P-5'-P-CCNC: 15 U/L (ref 5–45)
BASOPHILS # BLD AUTO: 0.02 THOUSANDS/ΜL (ref 0–0.1)
BASOPHILS NFR BLD AUTO: 0 % (ref 0–1)
BILIRUB SERPL-MCNC: 0.6 MG/DL (ref 0.2–1)
BILIRUB UR QL STRIP: NEGATIVE
BUN SERPL-MCNC: 27 MG/DL (ref 5–25)
CALCIUM SERPL-MCNC: 9.2 MG/DL (ref 8.3–10.1)
CHLORIDE SERPL-SCNC: 104 MMOL/L (ref 100–108)
CLARITY UR: CLEAR
CO2 SERPL-SCNC: 21 MMOL/L (ref 21–32)
COLOR UR: YELLOW
CREAT SERPL-MCNC: 2.18 MG/DL (ref 0.6–1.3)
EOSINOPHIL # BLD AUTO: 0.17 THOUSAND/ΜL (ref 0–0.61)
EOSINOPHIL NFR BLD AUTO: 2 % (ref 0–6)
ERYTHROCYTE [DISTWIDTH] IN BLOOD BY AUTOMATED COUNT: 14 % (ref 11.6–15.1)
GFR SERPL CREATININE-BSD FRML MDRD: 26 ML/MIN/1.73SQ M
GLUCOSE SERPL-MCNC: 161 MG/DL (ref 65–140)
GLUCOSE UR STRIP-MCNC: NEGATIVE MG/DL
HCT VFR BLD AUTO: 46.8 % (ref 36.5–49.3)
HGB BLD-MCNC: 14.7 G/DL (ref 12–17)
HGB UR QL STRIP.AUTO: NEGATIVE
IMM GRANULOCYTES # BLD AUTO: 0.03 THOUSAND/UL (ref 0–0.2)
IMM GRANULOCYTES NFR BLD AUTO: 0 % (ref 0–2)
INR PPP: 1.09 (ref 0.84–1.19)
KETONES UR STRIP-MCNC: NEGATIVE MG/DL
LACTATE SERPL-SCNC: 1.7 MMOL/L (ref 0.5–2)
LEUKOCYTE ESTERASE UR QL STRIP: NEGATIVE
LYMPHOCYTES # BLD AUTO: 2.05 THOUSANDS/ΜL (ref 0.6–4.47)
LYMPHOCYTES NFR BLD AUTO: 24 % (ref 14–44)
MCH RBC QN AUTO: 29.7 PG (ref 26.8–34.3)
MCHC RBC AUTO-ENTMCNC: 31.4 G/DL (ref 31.4–37.4)
MCV RBC AUTO: 95 FL (ref 82–98)
MONOCYTES # BLD AUTO: 0.99 THOUSAND/ΜL (ref 0.17–1.22)
MONOCYTES NFR BLD AUTO: 12 % (ref 4–12)
NEUTROPHILS # BLD AUTO: 5.14 THOUSANDS/ΜL (ref 1.85–7.62)
NEUTS SEG NFR BLD AUTO: 62 % (ref 43–75)
NITRITE UR QL STRIP: NEGATIVE
PH UR STRIP.AUTO: 5.5 [PH]
PLATELET # BLD AUTO: 114 THOUSANDS/UL (ref 149–390)
PLATELET # BLD AUTO: 132 THOUSANDS/UL (ref 149–390)
PMV BLD AUTO: 9.1 FL (ref 8.9–12.7)
PMV BLD AUTO: 9.9 FL (ref 8.9–12.7)
POTASSIUM SERPL-SCNC: 4.1 MMOL/L (ref 3.5–5.3)
PROCALCITONIN SERPL-MCNC: 0.09 NG/ML
PROT SERPL-MCNC: 7.8 G/DL (ref 6.4–8.2)
PROT UR STRIP-MCNC: NEGATIVE MG/DL
PROTHROMBIN TIME: 13.8 SECONDS (ref 11.6–14.5)
RBC # BLD AUTO: 4.95 MILLION/UL (ref 3.88–5.62)
SODIUM SERPL-SCNC: 137 MMOL/L (ref 136–145)
SP GR UR STRIP.AUTO: 1.02 (ref 1–1.03)
UROBILINOGEN UR QL STRIP.AUTO: 0.2 E.U./DL
WBC # BLD AUTO: 8.4 THOUSAND/UL (ref 4.31–10.16)

## 2019-09-09 PROCEDURE — 87070 CULTURE OTHR SPECIMN AEROBIC: CPT | Performed by: NURSE PRACTITIONER

## 2019-09-09 PROCEDURE — 71046 X-RAY EXAM CHEST 2 VIEWS: CPT

## 2019-09-09 PROCEDURE — 36415 COLL VENOUS BLD VENIPUNCTURE: CPT

## 2019-09-09 PROCEDURE — 83605 ASSAY OF LACTIC ACID: CPT

## 2019-09-09 PROCEDURE — 87184 SC STD DISK METHOD PER PLATE: CPT | Performed by: NURSE PRACTITIONER

## 2019-09-09 PROCEDURE — 87040 BLOOD CULTURE FOR BACTERIA: CPT

## 2019-09-09 PROCEDURE — 94640 AIRWAY INHALATION TREATMENT: CPT | Performed by: NURSE PRACTITIONER

## 2019-09-09 PROCEDURE — G0463 HOSPITAL OUTPT CLINIC VISIT: HCPCS | Performed by: NURSE PRACTITIONER

## 2019-09-09 PROCEDURE — 85730 THROMBOPLASTIN TIME PARTIAL: CPT

## 2019-09-09 PROCEDURE — 99213 OFFICE O/P EST LOW 20 MIN: CPT | Performed by: NURSE PRACTITIONER

## 2019-09-09 PROCEDURE — 85610 PROTHROMBIN TIME: CPT

## 2019-09-09 PROCEDURE — 94760 N-INVAS EAR/PLS OXIMETRY 1: CPT

## 2019-09-09 PROCEDURE — 87205 SMEAR GRAM STAIN: CPT | Performed by: NURSE PRACTITIONER

## 2019-09-09 PROCEDURE — 96372 THER/PROPH/DIAG INJ SC/IM: CPT | Performed by: NURSE PRACTITIONER

## 2019-09-09 PROCEDURE — 96375 TX/PRO/DX INJ NEW DRUG ADDON: CPT

## 2019-09-09 PROCEDURE — 99223 1ST HOSP IP/OBS HIGH 75: CPT | Performed by: INTERNAL MEDICINE

## 2019-09-09 PROCEDURE — 85025 COMPLETE CBC W/AUTO DIFF WBC: CPT

## 2019-09-09 PROCEDURE — 93005 ELECTROCARDIOGRAM TRACING: CPT | Performed by: NURSE PRACTITIONER

## 2019-09-09 PROCEDURE — 99285 EMERGENCY DEPT VISIT HI MDM: CPT

## 2019-09-09 PROCEDURE — 94664 DEMO&/EVAL PT USE INHALER: CPT

## 2019-09-09 PROCEDURE — 1123F ACP DISCUSS/DSCN MKR DOCD: CPT | Performed by: INTERNAL MEDICINE

## 2019-09-09 PROCEDURE — 99285 EMERGENCY DEPT VISIT HI MDM: CPT | Performed by: PHYSICIAN ASSISTANT

## 2019-09-09 PROCEDURE — 80053 COMPREHEN METABOLIC PANEL: CPT

## 2019-09-09 PROCEDURE — 87185 SC STD ENZYME DETCJ PER NZM: CPT | Performed by: NURSE PRACTITIONER

## 2019-09-09 PROCEDURE — 85049 AUTOMATED PLATELET COUNT: CPT | Performed by: NURSE PRACTITIONER

## 2019-09-09 PROCEDURE — 84145 PROCALCITONIN (PCT): CPT | Performed by: NURSE PRACTITIONER

## 2019-09-09 PROCEDURE — 81003 URINALYSIS AUTO W/O SCOPE: CPT | Performed by: NURSE PRACTITIONER

## 2019-09-09 PROCEDURE — 94640 AIRWAY INHALATION TREATMENT: CPT

## 2019-09-09 PROCEDURE — 87077 CULTURE AEROBIC IDENTIFY: CPT | Performed by: NURSE PRACTITIONER

## 2019-09-09 PROCEDURE — 96365 THER/PROPH/DIAG IV INF INIT: CPT

## 2019-09-09 RX ORDER — IPRATROPIUM BROMIDE AND ALBUTEROL SULFATE 2.5; .5 MG/3ML; MG/3ML
3 SOLUTION RESPIRATORY (INHALATION) ONCE
Status: COMPLETED | OUTPATIENT
Start: 2019-09-09 | End: 2019-09-09

## 2019-09-09 RX ORDER — HEPARIN SODIUM 5000 [USP'U]/ML
5000 INJECTION, SOLUTION INTRAVENOUS; SUBCUTANEOUS EVERY 8 HOURS SCHEDULED
Status: DISCONTINUED | OUTPATIENT
Start: 2019-09-10 | End: 2019-09-11

## 2019-09-09 RX ORDER — LEVOTHYROXINE SODIUM 0.07 MG/1
75 TABLET ORAL
Status: DISCONTINUED | OUTPATIENT
Start: 2019-09-10 | End: 2019-09-11 | Stop reason: HOSPADM

## 2019-09-09 RX ORDER — CHOLECALCIFEROL (VITAMIN D3) 10 MCG
1 TABLET ORAL
Status: DISCONTINUED | OUTPATIENT
Start: 2019-09-09 | End: 2019-09-11 | Stop reason: HOSPADM

## 2019-09-09 RX ORDER — METAXALONE 800 MG/1
400 TABLET ORAL 3 TIMES DAILY
Status: DISPENSED | OUTPATIENT
Start: 2019-09-09 | End: 2019-09-10

## 2019-09-09 RX ORDER — FLUTICASONE PROPIONATE 50 MCG
1 SPRAY, SUSPENSION (ML) NASAL 2 TIMES DAILY
Status: DISCONTINUED | OUTPATIENT
Start: 2019-09-09 | End: 2019-09-11 | Stop reason: HOSPADM

## 2019-09-09 RX ORDER — METHYLPREDNISOLONE SODIUM SUCCINATE 40 MG/ML
40 INJECTION, POWDER, LYOPHILIZED, FOR SOLUTION INTRAMUSCULAR; INTRAVENOUS EVERY 8 HOURS
Status: DISCONTINUED | OUTPATIENT
Start: 2019-09-09 | End: 2019-09-10

## 2019-09-09 RX ORDER — SODIUM CHLORIDE FOR INHALATION 0.9 %
3 VIAL, NEBULIZER (ML) INHALATION
Status: DISCONTINUED | OUTPATIENT
Start: 2019-09-09 | End: 2019-09-10

## 2019-09-09 RX ORDER — METHYLPREDNISOLONE SODIUM SUCCINATE 40 MG/ML
40 INJECTION, POWDER, LYOPHILIZED, FOR SOLUTION INTRAMUSCULAR; INTRAVENOUS ONCE
Status: DISCONTINUED | OUTPATIENT
Start: 2019-09-09 | End: 2019-09-09 | Stop reason: HOSPADM

## 2019-09-09 RX ORDER — PANTOPRAZOLE SODIUM 40 MG/1
40 TABLET, DELAYED RELEASE ORAL
Status: DISCONTINUED | OUTPATIENT
Start: 2019-09-10 | End: 2019-09-11 | Stop reason: HOSPADM

## 2019-09-09 RX ORDER — IPRATROPIUM BROMIDE AND ALBUTEROL SULFATE 2.5; .5 MG/3ML; MG/3ML
3 SOLUTION RESPIRATORY (INHALATION) ONCE
Status: DISCONTINUED | OUTPATIENT
Start: 2019-09-09 | End: 2019-09-09 | Stop reason: HOSPADM

## 2019-09-09 RX ORDER — LORAZEPAM 0.5 MG/1
0.5 TABLET ORAL EVERY 8 HOURS PRN
Status: DISCONTINUED | OUTPATIENT
Start: 2019-09-09 | End: 2019-09-11 | Stop reason: HOSPADM

## 2019-09-09 RX ORDER — ZOLPIDEM TARTRATE 5 MG/1
5 TABLET ORAL
Status: DISCONTINUED | OUTPATIENT
Start: 2019-09-09 | End: 2019-09-11 | Stop reason: HOSPADM

## 2019-09-09 RX ORDER — TAMSULOSIN HYDROCHLORIDE 0.4 MG/1
0.4 CAPSULE ORAL
Status: DISCONTINUED | OUTPATIENT
Start: 2019-09-09 | End: 2019-09-11 | Stop reason: HOSPADM

## 2019-09-09 RX ORDER — GUAIFENESIN 600 MG
600 TABLET, EXTENDED RELEASE 12 HR ORAL 2 TIMES DAILY
Status: DISCONTINUED | OUTPATIENT
Start: 2019-09-09 | End: 2019-09-11 | Stop reason: HOSPADM

## 2019-09-09 RX ORDER — SODIUM CHLORIDE FOR INHALATION 0.9 %
3 VIAL, NEBULIZER (ML) INHALATION ONCE
Status: COMPLETED | OUTPATIENT
Start: 2019-09-09 | End: 2019-09-09

## 2019-09-09 RX ORDER — ATENOLOL 25 MG/1
25 TABLET ORAL DAILY
Status: DISCONTINUED | OUTPATIENT
Start: 2019-09-10 | End: 2019-09-11 | Stop reason: HOSPADM

## 2019-09-09 RX ORDER — LEVALBUTEROL 1.25 MG/.5ML
1.25 SOLUTION, CONCENTRATE RESPIRATORY (INHALATION)
Status: DISCONTINUED | OUTPATIENT
Start: 2019-09-09 | End: 2019-09-11 | Stop reason: HOSPADM

## 2019-09-09 RX ORDER — AZITHROMYCIN 250 MG/1
500 TABLET, FILM COATED ORAL EVERY 24 HOURS
Status: DISCONTINUED | OUTPATIENT
Start: 2019-09-10 | End: 2019-09-11 | Stop reason: HOSPADM

## 2019-09-09 RX ORDER — METHYLPREDNISOLONE SODIUM SUCCINATE 40 MG/ML
40 INJECTION, POWDER, LYOPHILIZED, FOR SOLUTION INTRAMUSCULAR; INTRAVENOUS ONCE
Status: COMPLETED | OUTPATIENT
Start: 2019-09-09 | End: 2019-09-09

## 2019-09-09 RX ORDER — HEPARIN SODIUM 5000 [USP'U]/ML
5000 INJECTION, SOLUTION INTRAVENOUS; SUBCUTANEOUS EVERY 8 HOURS SCHEDULED
Status: DISCONTINUED | OUTPATIENT
Start: 2019-09-09 | End: 2019-09-09

## 2019-09-09 RX ORDER — GUAIFENESIN 600 MG
1200 TABLET, EXTENDED RELEASE 12 HR ORAL EVERY 12 HOURS SCHEDULED
Status: DISCONTINUED | OUTPATIENT
Start: 2019-09-09 | End: 2019-09-09 | Stop reason: SDUPTHER

## 2019-09-09 RX ORDER — DOCUSATE SODIUM 100 MG/1
100 CAPSULE, LIQUID FILLED ORAL 2 TIMES DAILY
Status: DISCONTINUED | OUTPATIENT
Start: 2019-09-09 | End: 2019-09-11 | Stop reason: HOSPADM

## 2019-09-09 RX ADMIN — IPRATROPIUM BROMIDE AND ALBUTEROL SULFATE 3 ML: 2.5; .5 SOLUTION RESPIRATORY (INHALATION) at 15:43

## 2019-09-09 RX ADMIN — DOCUSATE SODIUM 100 MG: 100 CAPSULE, LIQUID FILLED ORAL at 18:36

## 2019-09-09 RX ADMIN — METHYLPREDNISOLONE SODIUM SUCCINATE 40 MG: 40 INJECTION, POWDER, FOR SOLUTION INTRAMUSCULAR; INTRAVENOUS at 16:08

## 2019-09-09 RX ADMIN — METHYLPREDNISOLONE SODIUM SUCCINATE 40 MG: 40 INJECTION, POWDER, FOR SOLUTION INTRAMUSCULAR; INTRAVENOUS at 21:18

## 2019-09-09 RX ADMIN — HEPARIN SODIUM 5000 UNITS: 5000 INJECTION, SOLUTION INTRAVENOUS; SUBCUTANEOUS at 18:36

## 2019-09-09 RX ADMIN — TAMSULOSIN HYDROCHLORIDE 0.4 MG: 0.4 CAPSULE ORAL at 20:41

## 2019-09-09 RX ADMIN — ZOLPIDEM TARTRATE 5 MG: 5 TABLET, FILM COATED ORAL at 21:18

## 2019-09-09 RX ADMIN — Medication 1 CAPSULE: at 18:35

## 2019-09-09 RX ADMIN — IPRATROPIUM BROMIDE AND ALBUTEROL SULFATE 3 ML: 2.5; .5 SOLUTION RESPIRATORY (INHALATION) at 14:53

## 2019-09-09 RX ADMIN — AZITHROMYCIN MONOHYDRATE 500 MG: 500 INJECTION, POWDER, LYOPHILIZED, FOR SOLUTION INTRAVENOUS at 16:10

## 2019-09-09 RX ADMIN — GUAIFENESIN 600 MG: 600 TABLET ORAL at 18:36

## 2019-09-09 RX ADMIN — METHYLPREDNISOLONE SODIUM SUCCINATE 40 MG: 40 INJECTION, POWDER, LYOPHILIZED, FOR SOLUTION INTRAMUSCULAR; INTRAVENOUS at 14:53

## 2019-09-09 RX ADMIN — ISODIUM CHLORIDE 3 ML: 0.03 SOLUTION RESPIRATORY (INHALATION) at 15:43

## 2019-09-09 NOTE — ASSESSMENT & PLAN NOTE
-  Reports 2 weeks history of increasing mucus production, cough and wheezing  -  patient initiated on IV Solu-Medrol 40 mg Q 8  -  continue Xopenex and Atrovent  -  Mucinex 600 mg q 12 hours p o   -  respiratory protocol  -  sputum culture  -  pulmonary consult, patient follows at [facility] pulmonology outpatient

## 2019-09-09 NOTE — ASSESSMENT & PLAN NOTE
Wt Readings from Last 3 Encounters:   09/09/19 77 1 kg (170 lb)   09/09/19 77 5 kg (170 lb 12 8 oz)   07/26/19 80 3 kg (177 lb)     -previous echocardiogram completed June 2018 revealed EF of 60% with grade 1 diastolic dysfunction  -  patient currently on atenolol

## 2019-09-09 NOTE — ASSESSMENT & PLAN NOTE
-  Reports 2 weeks history of increasing mucus production, cough and wheezing  -  patient initiated on IV Solu-Medrol 40 mg Q 8  -  continue Xopenex and Atrovent  -  Mucinex 600 mg q 12 hours p o   -  respiratory protocol  -  sputum culture  -  pulmonary consult, patient follows at [address] [place] [address] pulmonology outpatient

## 2019-09-09 NOTE — PATIENT INSTRUCTIONS
Wheezing   WHAT YOU NEED TO KNOW:   Wheezing happens when air flows through a narrowed airway  Wheezing can happen when you breathe in, breathe out, or both  Wheezes may sound like a whistle, squeal, groan, or creak  Wheezes may also sound musical or high-pitched  Wheezing cannot be stopped by coughing  Asthma, allergies, or infection are the most common causes of wheezing  A foreign body, asthma, extra mucus, or smoking can also cause wheezing  DISCHARGE INSTRUCTIONS:   Call 911 if:   · You have sudden trouble breathing  · Your throat feels like it is swelling or feels tight  · You are dizzy, lightheaded, confused, or feel faint  · You have chest pain or tightness  Return to the emergency department if:   · You have shortness of breath  · You are coughing up blood  · You have chest pain  Contact your healthcare provider if:   · You have a fever  · Your wheezing does not get better or it gets worse  · You have questions or concerns about your condition or care  Medicines:   · Medicines  decrease inflammation, open airways, and make it easier to breathe  · Take your medicine as directed  Contact your healthcare provider if you think your medicine is not helping or if you have side effects  Tell him of her if you are allergic to any medicine  Keep a list of the medicines, vitamins, and herbs you take  Include the amounts, and when and why you take them  Bring the list or the pill bottles to follow-up visits  Carry your medicine list with you in case of an emergency  Follow up with your healthcare provider as directed: You may be referred to a specialist  Write down your questions so you remember to ask them during your visits  Manage your symptoms:   · Avoid allergy triggers , such as animals, grass, pollen, or dust     · Return to your usual activity as directed  You may need to limit certain activities until you follow up with your healthcare provider or your symptoms improve  Your child may need to avoid sports until his symptoms improve  · Take deep breaths and cough several times a day  This will decrease your risk for a lung infection and help decrease wheezing  Take a deep breath and hold it for as long as you can  Let the air out and then cough strongly  Deep breaths help open your airway  You may be given an incentive spirometer to help you take deep breaths  Put the plastic piece in your mouth and take a slow, deep breath, then let the air out and cough  Repeat these steps 10 times every hour  · Drink liquids as directed  You may need to drink more liquids than usual to thin your mucus and prevent dehydration  Ask how much liquid to drink each day and which liquids are best for you  © 2017 [address] [facility] Information is for End User's use only and may not be sold, redistributed or otherwise used for commercial purposes  All illustrations and images included in CareNotes® are the copyrighted property of A D A M [employer] Inc  or [employer] Crawley  The above information is an  only  It is not intended as medical advice for individual conditions or treatments  Talk to your doctor, nurse or pharmacist before following any medical regimen to see if it is safe and effective for you

## 2019-09-09 NOTE — ASSESSMENT & PLAN NOTE
Wt Readings from Last 3 Encounters:   09/09/19 77 1 kg (170 lb)   09/09/19 77 5 kg (170 lb 12 8 oz)   07/26/19 80 3 kg (177 lb)     Echo 6/18:  EF 60% with grade 1 diastolic dysfunction  -  follows with Dr Otoole outpatient

## 2019-09-09 NOTE — H&P
H&P- Gwendolyn Jalloh 6/30/1932, 80 y o  male MRN: [id number]    Unit/Bed#: [address] [place] [address] Encounter: [id number]    Primary Care Provider: Sandy Narayanan MD   Date and time admitted to hospital: 9/9/2019  3:24 PM        * COPD with acute exacerbation [facility])  Assessment & Plan  -  Reports 2 weeks history of increasing mucus production, cough and wheezing  -  patient initiated on IV Solu-Medrol 40 mg Q 8  -  continue Xopenex and Atrovent  -  Mucinex 600 mg q 12 hours p o   -  respiratory protocol  -  sputum culture  -  pulmonary consult, patient follows at [address] [place] [address] pulmonology outpatient  Acute renal failure superimposed on stage 3 chronic kidney disease (HCC)  Assessment & Plan  Baseline creatinine 1 6-1 8,      Chronic diastolic heart failure (HCC)  Assessment & Plan  Wt Readings from Last 3 Encounters:   09/09/19 77 1 kg (170 lb)   09/09/19 77 5 kg (170 lb 12 8 oz)   07/26/19 80 3 kg (177 lb)     Echo 6/18:  EF 60% with grade 1 diastolic dysfunction  -  follows with Dr Otoole outpatient      CAD (coronary artery disease)  Assessment & Plan  -  patient follows with Dr Dana Hernandez outpatient  -  continue atenolol    Acquired hypothyroidism  Assessment & Plan  -  continue home Synthroid dose      VTE Prophylaxis: Heparin  / sequential compression device   Code Status:  Level 3 DNR DNI  POLST: POLST form is not discussed and not completed at this time  Anticipated Length of Stay:  Patient will be admitted on an Inpatient basis with an anticipated length of stay of 2 midnights  Justification for Hospital Stay:  Treatment of acute COPD exacerbation  Total Time for Visit, including Counseling / Coordination of Care: 30 minutes  Greater than 50% of this total time spent on direct patient counseling and coordination of care      Chief Complaint:   Shortness of breath and wheezing  History of Present Illness:    Gwendolyn Jalloh is a 80 y o  male who presented originally to urgent care with complaints of 2 weeks of increased wheezing and sputum production  He was given IV Solu-Medrol 40 mg as well as a DuoNeb at the urgent care and was subsequently sent to the emergency department for evaluation  At the emergency department he was given another 40 of Solu-Medrol as well as a DuoNeb with no relief of wheezing  Patient remain mildly tachycardic with a heart rate 100-110 and mildly tachypneic with respiratory rate of 18 to 26  The patient maintained adequate saturations of greater than 91% on room air  A chest x-ray was completed at the urgent care which showed some patchy subsegmental atelectasis in the right base however that no areas concerning for pneumonia or infiltrate  The patient remained afebrile he denies any fever chills or exposure to sick contacts  He also denies any known exposures to fumes, inhalants  He does report increased production of green yellow sputum which is thick  Sputum culture will be sent  Laboratory values obtained on arrival did reveal and a KI on CKD with a creatinine of 2 18, baseline creatinine and identified as 1 6-1 8  Definite cause of a KI unknown at this time however the patient does state he was not drinking well for the past couple of weeks  Review of Systems:    Review of Systems   Constitutional: Negative for chills and fever  Respiratory: Positive for cough, shortness of breath and wheezing  Gastrointestinal: Negative for abdominal distention, diarrhea, nausea and vomiting  Endocrine: Negative for polydipsia, polyphagia and polyuria  Neurological: Positive for weakness  Negative for tremors, light-headedness and headaches         Past Medical and Surgical History:     Past Medical History:   Diagnosis Date    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    Compression fracture of thoracic vertebra (HCC)     last assessed 05/07/2012    COPD (chronic obstructive pulmonary disease) (HCC)     Disease of thyroid gland     Heart attack ([place] [address] )     History of methicillin resistant staphylococcus aureus (MRSA) 03/28/2019    negative nasal swab     Hollenhorst plaque, right eye     last assessed 04/08/2013    Hyperlipidemia     Hypertension     Iron deficiency anemia due to chronic blood loss     last assessed 09/10/2012    Ischemic colitis ([facility] )     last assessed 05/27/2014    Osteoarthritis of both hands     last assessedn 04/30/2013    Peptic ulcer     last assessed 06/14/2013    Polymyalgia rheumatica ([place] [address] )     last assessesd 10/09/2012    Renal disorder     Upper GI hemorrhage     last assessed 01/29/2015    Varicose veins of lower extremity     last assessed 04/26/2013       Past Surgical History:   Procedure Laterality Date    CORONARY ARTERY BYPASS GRAFT      HEMORRHOID SURGERY      HERNIA REPAIR      RENAL CYST EXCISION      resolved 05/2010    THROMBOENDARTERECTOMY Right     carotid, last assessed 06/18/2013       Meds/Allergies:    Prior to Admission medications    Medication Sig Start Date End Date Taking? Authorizing Provider   albuterol (PROVENTIL HFA,VENTOLIN HFA) 90 mcg/act inhaler Inhale 2 puffs every 6 (six) hours as needed for wheezing 6/25/19   Antonio Graham MD   arformoterol (BROVANA) 15 mcg/2 mL nebulizer solution Take 15 mcg by nebulization 2 (two) times a day    Historical Provider, MD   atenolol (TENORMIN) 25 mg tablet TAKE ONE TABLET BY MOUTH EVERY DAY 7/21/19   Antonio Graham MD   B Complex Vitamins (B COMPLEX PO) Take by mouth daily    Historical Provider, MD   budesonide (PULMICORT) 0 5 mg/2 mL nebulizer solution Take 0 5 mg by nebulization 2 (two) times a day Rinse mouth after use      Historical Provider, MD   Chlorphen-PE-Acetaminophen (CORICIDIN D COLD/FLU/SINUS PO) Take by mouth    Historical Provider, MD   Cholecalciferol ([employer] VITAMIN D) 2000 units CAPS Take by mouth daily     Historical Provider, MD   Cyanocobalamin (B-12 PO) Take by mouth daily    Historical Provider, MD   Ferrous Sulfate (IRON) 325 (65 Fe) MG TABS Take by mouth every other day     Historical Provider, MD   fluticasone (FLONASE) 50 mcg/act nasal spray 1 spray into each nostril 2 (two) times a day    Historical Provider, MD   guaiFENesin (MUCINEX) 600 mg 12 hr tablet Take 1,200 mg by mouth every 12 (twelve) hours    Historical Provider, MD   levothyroxine 75 mcg tablet Take 75 mcg by mouth daily 37 5 mg Daily    Historical Provider, MD   LORazepam (ATIVAN) 0 5 mg tablet TAKE ONE TABLET BY MOUTH EVERY 8 HOURS AS NEEDED FOR ANXIETY 9/1/19   Jose M Millard MD   LORazepam (ATIVAN) 0 5 mg tablet TAKE ONE TABLET BY MOUTH EVERY 8 HOURS AS NEEDED FOR ANXIETY 9/8/19   Jose M Millard MD   metaxalone (SKELAXIN) 400 MG tablet TAKE ONE TABLET BY MOUTH THREE TIMES A DAY 8/1/19   Jose M Millard MD   Misc Natural Products (CORTISOL PO) Take by mouth    Historical Provider, MD   Multiple Vitamins-Minerals (VISION FORMULA/LUTEIN PO) Take by mouth 11/15/14   Historical Provider, MD   naproxen sodium (ALEVE) 220 MG tablet Take 220 mg by mouth 2 (two) times a day with meals    Historical Provider, MD   nystatin-triamcinolone (MYCOLOG-II) cream APPLY SPARINGLY TO THE AFFECTED AREA(S) TWO TIMES A DAY 1/19/19   Jose M Millard MD   omeprazole (PriLOSEC) 20 mg delayed release capsule TAKE ONE CAPSULE BY MOUTH EVERY DAY 6/29/19   Jose M Millard MD   tamsulosin [facility]) 0 4 mg Take 1 capsule (0 4 mg total) by mouth daily with dinner 4/10/19   Jose M Millard MD   zolpidem (AMBIEN) 5 mg tablet Take 1 tablet (5 mg total) by mouth daily at bedtime as needed for sleep 9/28/18   Jose M Millard MD     I have reviewed home medications with patient personally  Allergies: Allergies   Allergen Reactions    Pravastatin Anaphylaxis, Photosensitivity and Headache     Reaction Date: [id number];    Other reaction(s): Headache    Acetaminophen-Codeine GI Intolerance    Atorvastatin      Other reaction(s): Leg Cramps  Other reaction(s): Leg Cramps    Codeine Nausea Only    Colestipol GI Intolerance     Reaction Date: ;     Contrast  [Iodinated Diagnostic Agents]     Fenofibrate GI Intolerance     Reaction Date: ;     Hydrocodone Vomiting    Niacin      Reaction Date: ;     Niaspan  [Niacin Er]     Pitavastatin Myalgia    Pneumococcal Polysaccharide Vaccine     Pravastatin Sodium     Simvastatin     Statins Myalgia    Vicoprofen  [Hydrocodone-Ibuprofen] GI Intolerance    Cyclophosphamide Rash    Ioversol Hives       Social History:     Marital Status:    Occupation:  Retired  Patient Pre-hospital Living Situation:  Independent with grandson  Patient Pre-hospital Level of Mobility:  Independent  Patient Pre-hospital Diet Restrictions:  None  Substance Use History:   Social History     Substance and Sexual Activity   Alcohol Use Yes    Alcohol/week: 1 0 standard drinks    Types: 1 Glasses of wine per week    Comment: social     Social History     Tobacco Use   Smoking Status Former Smoker    Packs/day: 2 00    Years: 50 00    Pack years: 100 00    Types: Cigarettes    Last attempt to quit:     Years since quittin 7   Smokeless Tobacco Never Used   Tobacco Comment    Quit 40 yrs  ago     Social History     Substance and Sexual Activity   Drug Use No       Family History:    Family History   Problem Relation Age of Onset    Hypertension Mother     Alcohol abuse Mother     Hypertension Father     Alcohol abuse Father     Alcohol abuse Son     Heart disease Family     Stroke Family         syndrome       Physical Exam:     Vitals:   Blood Pressure: 121/65 (19)  Pulse: 105 (19)  Temperature: 97 5 °F (36 4 °C) (19)  Temp Source: Oral (19)  Respirations: 18 (19)  Height: 5' 4" (162 6 cm) (19)  Weight - Scale: 77 1 kg (170 lb) (19)  SpO2: 93 % (19)    Physical Exam   Constitutional: He appears well-developed and well-nourished   He is cooperative  He appears distressed  HENT:   Head: Normocephalic and atraumatic  Eyes: Pupils are equal, round, and reactive to light  Conjunctivae and EOM are normal    Neck: Normal range of motion  Cardiovascular: Regular rhythm, normal heart sounds and intact distal pulses  Tachycardia present  Exam reveals no gallop and no friction rub  No murmur heard  Pulmonary/Chest: Tachypnea noted  He has decreased breath sounds in the right lower field and the left lower field  He has wheezes in the right upper field, the right middle field, the left upper field and the left middle field  Abdominal: Soft  Bowel sounds are normal  There is no tenderness  Neurological: He is alert  He has normal strength  Additional Data:     Lab Results: I have personally reviewed pertinent reports  Results from last 7 days   Lab Units 09/09/19  1535   WBC Thousand/uL 8 40   HEMOGLOBIN g/dL 14 7   HEMATOCRIT % 46 8   PLATELETS Thousands/uL 132*   NEUTROS PCT % 62   LYMPHS PCT % 24   MONOS PCT % 12   EOS PCT % 2     Results from last 7 days   Lab Units 09/09/19  1535   SODIUM mmol/L 137   POTASSIUM mmol/L 4 1   CHLORIDE mmol/L 104   CO2 mmol/L 21   BUN mg/dL 27*   CREATININE mg/dL 2 18*   ANION GAP mmol/L 12   CALCIUM mg/dL 9 2   ALBUMIN g/dL 3 5   TOTAL BILIRUBIN mg/dL 0 60   ALK PHOS U/L 104   ALT U/L 19   AST U/L 15   GLUCOSE RANDOM mg/dL 161*     Results from last 7 days   Lab Units 09/09/19  1535   INR  1 09             Results from last 7 days   Lab Units 09/09/19  1535   LACTIC ACID mmol/L 1 7       Imaging: I have personally reviewed pertinent reports  No orders to display       [employer] Records Reviewed: Yes     ** Please Note: This note has been constructed using a voice recognition system   **

## 2019-09-09 NOTE — ED PROVIDER NOTES
History  Chief Complaint   Patient presents with    Shortness of Breath     patient presents to the ED with c/o SOB for the past two weeks  patient states he has a hx of COPD      A 9year-old male with history of hypertension, hyperlipidemia, polymyalgia rheumatica, and COPD presents emergency department for evaluation increasing shortness of breath and persistent coughing x2 weeks  He also reports increasing purulent sputum production over the past several days  Denies associated fevers, chest pain, nausea, vomiting, back pain, abdominal pain, or leg swelling  He was seen at urgent care just prior to arrival, treated with 40 mg IM Solu-Medrol and a DuoNeb, states that neither of these treatments benefit in his symptoms  Pre-hospital chest x-ray is reviewed by me, unremarkable for acute infiltrate or consolidation  Prior to Admission Medications   Prescriptions Last Dose Informant Patient Reported? Taking?    B Complex Vitamins (B COMPLEX PO)  Self Yes No   Sig: Take by mouth daily   Chlorphen-PE-Acetaminophen (CORICIDIN D COLD/FLU/SINUS PO)  Self Yes No   Sig: Take by mouth   Cholecalciferol ([employer] VITAMIN D) 2000 units CAPS  Self Yes No   Sig: Take by mouth daily    Cyanocobalamin (B-12 PO)  Self Yes No   Sig: Take by mouth daily   Ferrous Sulfate (IRON) 325 (65 Fe) MG TABS  Self Yes No   Sig: Take by mouth every other day    LORazepam (ATIVAN) 0 5 mg tablet   No No   Sig: TAKE ONE TABLET BY MOUTH EVERY 8 HOURS AS NEEDED FOR ANXIETY   LORazepam (ATIVAN) 0 5 mg tablet   No No   Sig: TAKE ONE TABLET BY MOUTH EVERY 8 HOURS AS NEEDED FOR ANXIETY   Misc Natural Products (CORTISOL PO)  Self Yes No   Sig: Take by mouth   Multiple Vitamins-Minerals (VISION FORMULA/LUTEIN PO)  Self Yes No   Sig: Take by mouth   albuterol (PROVENTIL HFA,VENTOLIN HFA) 90 mcg/act inhaler  Self No No   Sig: Inhale 2 puffs every 6 (six) hours as needed for wheezing   arformoterol (BROVANA) 15 mcg/2 mL nebulizer solution  Self Yes No Sig: Take 15 mcg by nebulization 2 (two) times a day   atenolol (TENORMIN) 25 mg tablet  Self No No   Sig: TAKE ONE TABLET BY MOUTH EVERY DAY   budesonide (PULMICORT) 0 5 mg/2 mL nebulizer solution  Self Yes No   Sig: Take 0 5 mg by nebulization 2 (two) times a day Rinse mouth after use     fluticasone (FLONASE) 50 mcg/act nasal spray  Self Yes No   Si spray into each nostril 2 (two) times a day   guaiFENesin (MUCINEX) 600 mg 12 hr tablet  Self Yes No   Sig: Take 1,200 mg by mouth every 12 (twelve) hours   levothyroxine 75 mcg tablet  Self Yes No   Sig: Take 75 mcg by mouth daily 37 5 mg Daily   metaxalone (SKELAXIN) 400 MG tablet   No No   Sig: TAKE ONE TABLET BY MOUTH THREE TIMES A DAY   naproxen sodium (ALEVE) 220 MG tablet  Self Yes No   Sig: Take 220 mg by mouth 2 (two) times a day with meals   nystatin-triamcinolone (MYCOLOG-II) cream  Self No No   Sig: APPLY SPARINGLY TO THE AFFECTED AREA(S) TWO TIMES A DAY   omeprazole (PriLOSEC) 20 mg delayed release capsule  Self No No   Sig: TAKE ONE CAPSULE BY MOUTH EVERY DAY   tamsulosin (FLOMAX) 0 4 mg  Self No No   Sig: Take 1 capsule (0 4 mg total) by mouth daily with dinner   zolpidem (AMBIEN) 5 mg tablet  Self No No   Sig: Take 1 tablet (5 mg total) by mouth daily at bedtime as needed for sleep      Facility-Administered Medications Last Administration Doses Remaining   ipratropium-albuterol (DUO-NEB) 0 5-2 5 mg/3 mL inhalation solution 3 mL 2019  2:53 PM 0   methylPREDNISolone sodium succinate (Solu-MEDROL) injection 40 mg 2019  2:53 PM 0          Past Medical History:   Diagnosis Date    Anxiety disorder due to general medical condition 2013    Compression fracture of thoracic vertebra (HCC)     last assessed 2012    COPD (chronic obstructive pulmonary disease) (HCC)     Disease of thyroid gland     Heart attack (HCC)     History of methicillin resistant staphylococcus aureus (MRSA) 2019    negative nasal swab     Hollenhorst plaque, right eye     last assessed 2013    Hyperlipidemia     Hypertension     Iron deficiency anemia due to chronic blood loss     last assessed 09/10/2012    Ischemic colitis ([facility] )     last assessed 2014    Osteoarthritis of both hands     last assessedn 2013    Peptic ulcer     last assessed 2013    Polymyalgia rheumatica ([facility] )     last assessesd 10/09/2012    Renal disorder     Upper GI hemorrhage     last assessed 2015    Varicose veins of lower extremity     last assessed 2013       Past Surgical History:   Procedure Laterality Date    CORONARY ARTERY BYPASS GRAFT      HEMORRHOID SURGERY      HERNIA REPAIR      RENAL CYST EXCISION      resolved 2010    THROMBOENDARTERECTOMY Right     carotid, last assessed 2013       Family History   Problem Relation Age of Onset    Hypertension Mother     Alcohol abuse Mother     Hypertension Father     Alcohol abuse Father     Alcohol abuse Son     Heart disease Family     Stroke Family         syndrome     I have reviewed and agree with the history as documented  Social History     Tobacco Use    Smoking status: Former Smoker     Packs/day: 2 00     Years: 50 00     Pack years: 100 00     Types: Cigarettes     Last attempt to quit:      Years since quittin 7    Smokeless tobacco: Never Used    Tobacco comment: Quit 40 yrs  ago   Substance Use Topics    Alcohol use: Yes     Alcohol/week: 1 0 standard drinks     Types: 1 Glasses of wine per week     Comment: social    Drug use: No        Review of Systems   Constitutional: Positive for fatigue  Negative for chills, diaphoresis and fever  Eyes: Negative for visual disturbance  Respiratory: Positive for cough, chest tightness and shortness of breath  Cardiovascular: Negative for chest pain and palpitations  Gastrointestinal: Negative for abdominal pain, diarrhea, nausea and vomiting     Genitourinary: Negative for dysuria, flank pain and frequency  Musculoskeletal: Negative for arthralgias and myalgias  Skin: Negative for color change, rash and wound  Allergic/Immunologic: Negative for immunocompromised state  Neurological: Negative for dizziness and light-headedness  Hematological: Does not bruise/bleed easily  Psychiatric/Behavioral: Negative for confusion  The patient is not nervous/anxious  Physical Exam  Physical Exam   Constitutional: He is oriented to person, place, and time  He appears well-developed and well-nourished  He does not appear ill  No distress  HENT:   Head: Normocephalic and atraumatic  Mouth/Throat: Oropharynx is clear and moist    Eyes: Pupils are equal, round, and reactive to light  No scleral icterus  Neck: No JVD present  Cardiovascular: Normal rate and regular rhythm  Exam reveals no gallop and no friction rub  No murmur heard  Pulmonary/Chest: No accessory muscle usage  Tachypnea noted  No respiratory distress  He has wheezes in the right middle field, the right lower field, the left middle field and the left lower field  He has rhonchi in the right lower field and the left lower field  He has no rales  Abdominal: Soft  There is no tenderness  There is no rebound and no guarding  Musculoskeletal:        Right lower leg: He exhibits no edema  Left lower leg: He exhibits no edema  Neurological: He is alert and oriented to person, place, and time  Skin: Skin is warm and dry  Capillary refill takes less than 2 seconds  He is not diaphoretic  Psychiatric: He has a normal mood and affect  His behavior is normal  His mood appears not anxious  He is not agitated  Vitals reviewed        Vital Signs  ED Triage Vitals [09/09/19 1523]   Temperature Pulse Respirations Blood Pressure SpO2   97 7 °F (36 5 °C) (!) 110 (!) 25 127/65 94 %      Temp Source Heart Rate Source Patient Position - Orthostatic VS BP Location FiO2 (%)   Temporal Monitor Lying Right arm --      Pain Score       8 Vitals:    09/09/19 1600 09/09/19 1615 09/09/19 1630 09/09/19 1645   BP: 114/71 116/72 119/75 120/73   Pulse: 102 (!) 107 (!) 109 (!) 109   Patient Position - Orthostatic VS:             Visual Acuity      ED Medications  Medications   azithromycin (ZITHROMAX) 500 mg in sodium chloride 0 9% 250mL IVPB 500 mg (500 mg Intravenous New Bag 9/9/19 1610)   ipratropium-albuterol (DUO-NEB) 0 5-2 5 mg/3 mL inhalation solution 3 mL (3 mL Nebulization Given 9/9/19 1543)   sodium chloride 0 9 % inhalation solution 3 mL (3 mL Nebulization Given 9/9/19 1543)   methylPREDNISolone sodium succinate (Solu-MEDROL) injection 40 mg (40 mg Intravenous Given 9/9/19 1608)       Diagnostic Studies  Results Reviewed     Procedure Component Value Units Date/Time    Comprehensive metabolic panel [[id number]]  (Abnormal) Collected:  09/09/19 1535    Lab Status:  Final result Specimen:  Blood from Arm, Right Updated:  09/09/19 1610     Sodium 137 mmol/L      Potassium 4 1 mmol/L      Chloride 104 mmol/L      CO2 21 mmol/L      ANION GAP 12 mmol/L      BUN 27 mg/dL      Creatinine 2 18 mg/dL      Glucose 161 mg/dL      Calcium 9 2 mg/dL      AST 15 U/L      ALT 19 U/L      Alkaline Phosphatase 104 U/L      Total Protein 7 8 g/dL      Albumin 3 5 g/dL      Total Bilirubin 0 60 mg/dL      eGFR 26 ml/min/1 73sq m     Narrative:       Robbin guidelines for Chronic Kidney Disease (CKD):     Stage 1 with normal or high GFR (GFR > 90 mL/min/1 73 square meters)    Stage 2 Mild CKD (GFR = 60-89 mL/min/1 73 square meters)    Stage 3A Moderate CKD (GFR = 45-59 mL/min/1 73 square meters)    Stage 3B Moderate CKD (GFR = 30-44 mL/min/1 73 square meters)    Stage 4 Severe CKD (GFR = 15-29 mL/min/1 73 square meters)    Stage 5 End Stage CKD (GFR <15 mL/min/1 73 square meters)  Note: GFR calculation is accurate only with a steady state creatinine    Lactic acid x2 [[id number]]  (Normal) Collected:  09/09/19 1535    Lab Status:  Final result Specimen:  Blood from Arm, Right Updated:  09/09/19 1601     LACTIC ACID 1 7 mmol/L     Narrative:       Result may be elevated if tourniquet was used during collection  Protime-INR [[id number]]  (Normal) Collected:  09/09/19 1535    Lab Status:  Final result Specimen:  Blood from Arm, Right Updated:  09/09/19 1555     Protime 13 8 seconds      INR 1 09    APTT [[id number]]  (Normal) Collected:  09/09/19 1535    Lab Status:  Final result Specimen:  Blood from Arm, Right Updated:  09/09/19 1555     PTT 34 seconds     CBC and differential [[id number]]  (Abnormal) Collected:  09/09/19 1535    Lab Status:  Final result Specimen:  Blood from Arm, Right Updated:  09/09/19 1543     WBC 8 40 Thousand/uL      RBC 4 95 Million/uL      Hemoglobin 14 7 g/dL      Hematocrit 46 8 %      MCV 95 fL      MCH 29 7 pg      MCHC 31 4 g/dL      RDW 14 0 %      MPV 9 1 fL      Platelets 103 Thousands/uL      Neutrophils Relative 62 %      Immat GRANS % 0 %      Lymphocytes Relative 24 %      Monocytes Relative 12 %      Eosinophils Relative 2 %      Basophils Relative 0 %      Neutrophils Absolute 5 14 Thousands/µL      Immature Grans Absolute 0 03 Thousand/uL      Lymphocytes Absolute 2 05 Thousands/µL      Monocytes Absolute 0 99 Thousand/µL      Eosinophils Absolute 0 17 Thousand/µL      Basophils Absolute 0 02 Thousands/µL     Blood culture #1 [[id number]] Collected:  09/09/19 1535    Lab Status: In process Specimen:  Blood from Arm, Right Updated:  09/09/19 1539    Blood culture #2 [[id number]] Collected:  09/09/19 1535    Lab Status:   In process Specimen:  Blood from Hand, Right Updated:  09/09/19 1539    UA w Reflex to Microscopic w Reflex to Culture [[id number]]     Lab Status:  No result Specimen:  Urine     Lactic acid x2 [[id number]]     Lab Status:  No result Specimen:  Blood                  No orders to display              Procedures  ECG 12 Lead Documentation Only  Date/Time: 9/9/2019 3:40 PM  Performed by: Munir Martinez PA-C  Authorized by: Munir Martinez PA-C     Indications / Diagnosis:  SOB  ECG reviewed by me, the ED Provider: yes    Patient location:  ED  Previous ECG:     Previous ECG:  Compared to current    Similarity:  No change  Interpretation:     Interpretation: abnormal    Rate:     ECG rate:  108    ECG rate assessment: tachycardic    Rhythm:     Rhythm: sinus rhythm    Ectopy:     Ectopy: none    QRS:     QRS axis:  Normal    QRS intervals:  Normal  Conduction:     Conduction: normal    ST segments:     ST segments:  Normal  T waves:     T waves: normal             ED Course                               MDM  Number of Diagnoses or Management Options  Acute kidney injury [facility]): new and requires workup  COPD exacerbation [facility]): new and requires workup  Diagnosis management comments: 81 yo male presents with acute COPD exacerbation  No focal consolidation on CXR, no leukocytosis or fever concerning for pneumonia  Tachycardia likely secondary to albuterol usage  Copious amounts of yellow/green sputum on exam  Will cover for bacterial chelsie with azithromycin, improved with nebulized albuterol/ipratropium in the ED          Amount and/or Complexity of Data Reviewed  Clinical lab tests: ordered and reviewed  Tests in the radiology section of CPT®: ordered and reviewed  Tests in the medicine section of CPT®: ordered and reviewed  Review and summarize past medical records: yes  Discuss the patient with other providers: yes  Independent visualization of images, tracings, or specimens: yes        Disposition  Final diagnoses:   COPD exacerbation ([facility] )   Acute kidney injury ([facility] )     Time reflects when diagnosis was documented in both MDM as applicable and the Disposition within this note     Time User Action Codes Description Comment    9/9/2019  4:30 PM Meme Zuleta Add [J44 1] COPD exacerbation ([facility] )     9/9/2019  4:30 PM Meme Learn Add [N17 9] Acute kidney injury ([facility] )     9/9/2019  5:06 PM Mahnaz Valadez Add [J44 1] COPD with acute exacerbation ([facility] )     9/9/2019  5:06 PM Jurgen Flores, April D Modify [J44 1] COPD with acute exacerbation [facility])       ED Disposition     ED Disposition Condition Date/Time Comment    Admit Stable Mon Sep 9, 2019  4:30 PM Case was discussed with Dr Mara Vaughan and the patient's admission status was agreed to be Admission Status: inpatient status to the service of Dr Mara Vaughan   Follow-up Information    None         Patient's Medications   Discharge Prescriptions    No medications on file     No discharge procedures on file      ED Provider  Electronically Signed by           Jersey Ortiz PA-C  09/09/19 3820

## 2019-09-09 NOTE — RESPIRATORY THERAPY NOTE
RT Protocol Note  Hodan Boston 80 y o  male MRN: [id number]  Unit/Bed#: [address] [place] [address] Encounter: [id number]    Assessment    Principal Problem:    COPD with acute exacerbation ([place] [address] )  Active Problems:    CAD (coronary artery disease)    Acute renal failure superimposed on stage 3 chronic kidney disease (HCC)    Acquired hypothyroidism    Chronic diastolic heart failure (HCC)      Home Pulmonary Medications:  yes       Past Medical History:   Diagnosis Date    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    Compression fracture of thoracic vertebra (HCC)     last assessed 05/07/2012    COPD (chronic obstructive pulmonary disease) ([place] [address] )     Disease of thyroid gland     Heart attack ([place] [address] )     History of methicillin resistant staphylococcus aureus (MRSA) 03/28/2019    negative nasal swab     Hollenhorst plaque, right eye     last assessed 04/08/2013    Hyperlipidemia     Hypertension     Iron deficiency anemia due to chronic blood loss     last assessed 09/10/2012    Ischemic colitis ([place] [address] )     last assessed 05/27/2014    Osteoarthritis of both hands     last assessedn 04/30/2013    Peptic ulcer     last assessed 06/14/2013    Polymyalgia rheumatica ([place] [address] )     last assessesd 10/09/2012    Renal disorder     Upper GI hemorrhage     last assessed 01/29/2015    Varicose veins of lower extremity     last assessed 04/26/2013     Social History     Socioeconomic History    Marital status:       Spouse name: None    Number of children: None    Years of education: None    Highest education level: None   Occupational History    None   Social Needs    Financial resource strain: None    Food insecurity:     Worry: None     Inability: None    Transportation needs:     Medical: None     Non-medical: None   Tobacco Use    Smoking status: Former Smoker     Packs/day: 2 00     Years: 50 00     Pack years: 100 00     Types: Cigarettes     Last attempt to quit: 1993     Years since quittin 7    Smokeless tobacco: Never Used    Tobacco comment: Quit 40 yrs  ago   Substance and Sexual Activity    Alcohol use: Yes     Alcohol/week: 1 0 standard drinks     Types: 1 Glasses of wine per week     Comment: social    Drug use: No    Sexual activity: None   Lifestyle    Physical activity:     Days per week: None     Minutes per session: None    Stress: None   Relationships    Social connections:     Talks on phone: None     Gets together: None     Attends Zoroastrianism service: None     Active member of club or organization: None     Attends meetings of clubs or organizations: None     Relationship status: None    Intimate partner violence:     Fear of current or ex partner: None     Emotionally abused: None     Physically abused: None     Forced sexual activity: None   Other Topics Concern    None   Social History Narrative     per [employer]    Always uses seat belt       Subjective         Objective    Physical Exam:   Assessment Type: Assess only  General Appearance: Alert, Awake  Respiratory Pattern: Normal  Chest Assessment: Chest expansion symmetrical  Bilateral Breath Sounds: Diminished  Cough: Productive    Vitals:  Blood pressure 121/65, pulse 105, temperature 97 5 °F (36 4 °C), temperature source Oral, resp  rate 18, height 5' 4" (1 626 m), weight 77 1 kg (170 lb), SpO2 93 %  Imaging and other studies: I have personally reviewed pertinent reports              Plan    Respiratory Plan: Mild Distress pathway

## 2019-09-09 NOTE — ASSESSMENT & PLAN NOTE
-  patient's history of CKD stage 3 with a baseline creatinine of 1 6-1 8, current creatinine 2 18  -  unclear etiology, patient reports decreased fluid intake over the past 2 weeks  -  patient denies seen nephrologist outpatient

## 2019-09-10 LAB
ATRIAL RATE: 116 BPM
P AXIS: 52 DEGREES
PR INTERVAL: 170 MS
PROCALCITONIN SERPL-MCNC: 0.11 NG/ML
QRS AXIS: 38 DEGREES
QRSD INTERVAL: 94 MS
QT INTERVAL: 330 MS
QTC INTERVAL: 458 MS
T WAVE AXIS: 22 DEGREES
VENTRICULAR RATE: 116 BPM

## 2019-09-10 PROCEDURE — 84145 PROCALCITONIN (PCT): CPT | Performed by: NURSE PRACTITIONER

## 2019-09-10 PROCEDURE — 99232 SBSQ HOSP IP/OBS MODERATE 35: CPT | Performed by: INTERNAL MEDICINE

## 2019-09-10 PROCEDURE — 94640 AIRWAY INHALATION TREATMENT: CPT

## 2019-09-10 PROCEDURE — 94760 N-INVAS EAR/PLS OXIMETRY 1: CPT

## 2019-09-10 PROCEDURE — 99222 1ST HOSP IP/OBS MODERATE 55: CPT | Performed by: INTERNAL MEDICINE

## 2019-09-10 PROCEDURE — 93010 ELECTROCARDIOGRAM REPORT: CPT | Performed by: INTERNAL MEDICINE

## 2019-09-10 PROCEDURE — 93005 ELECTROCARDIOGRAM TRACING: CPT

## 2019-09-10 RX ORDER — METAXALONE 400 MG/1
400 TABLET ORAL 3 TIMES DAILY
Status: DISCONTINUED | OUTPATIENT
Start: 2019-09-11 | End: 2019-09-11 | Stop reason: HOSPADM

## 2019-09-10 RX ORDER — METAXALONE 800 MG/1
400 TABLET ORAL 3 TIMES DAILY
Status: DISCONTINUED | OUTPATIENT
Start: 2019-09-10 | End: 2019-09-10

## 2019-09-10 RX ORDER — PREDNISONE 20 MG/1
40 TABLET ORAL DAILY
Status: DISCONTINUED | OUTPATIENT
Start: 2019-09-11 | End: 2019-09-11 | Stop reason: HOSPADM

## 2019-09-10 RX ORDER — METHYLPREDNISOLONE SODIUM SUCCINATE 40 MG/ML
40 INJECTION, POWDER, LYOPHILIZED, FOR SOLUTION INTRAMUSCULAR; INTRAVENOUS EVERY 12 HOURS SCHEDULED
Status: COMPLETED | OUTPATIENT
Start: 2019-09-10 | End: 2019-09-10

## 2019-09-10 RX ADMIN — LEVALBUTEROL HYDROCHLORIDE 1.25 MG: 1.25 SOLUTION, CONCENTRATE RESPIRATORY (INHALATION) at 08:46

## 2019-09-10 RX ADMIN — PANTOPRAZOLE SODIUM 40 MG: 40 TABLET, DELAYED RELEASE ORAL at 05:52

## 2019-09-10 RX ADMIN — TAMSULOSIN HYDROCHLORIDE 0.4 MG: 0.4 CAPSULE ORAL at 17:49

## 2019-09-10 RX ADMIN — ZOLPIDEM TARTRATE 5 MG: 5 TABLET, FILM COATED ORAL at 21:34

## 2019-09-10 RX ADMIN — FLUTICASONE PROPIONATE 1 SPRAY: 50 SPRAY, METERED NASAL at 08:53

## 2019-09-10 RX ADMIN — METHYLPREDNISOLONE SODIUM SUCCINATE 40 MG: 40 INJECTION, POWDER, FOR SOLUTION INTRAMUSCULAR; INTRAVENOUS at 05:52

## 2019-09-10 RX ADMIN — DOCUSATE SODIUM 100 MG: 100 CAPSULE, LIQUID FILLED ORAL at 17:48

## 2019-09-10 RX ADMIN — METAXALONE 400 MG: 800 TABLET ORAL at 21:34

## 2019-09-10 RX ADMIN — METHYLPREDNISOLONE SODIUM SUCCINATE 40 MG: 40 INJECTION, POWDER, FOR SOLUTION INTRAMUSCULAR; INTRAVENOUS at 13:37

## 2019-09-10 RX ADMIN — IPRATROPIUM BROMIDE 0.5 MG: 0.5 SOLUTION RESPIRATORY (INHALATION) at 08:46

## 2019-09-10 RX ADMIN — HEPARIN SODIUM 5000 UNITS: 5000 INJECTION, SOLUTION INTRAVENOUS; SUBCUTANEOUS at 17:49

## 2019-09-10 RX ADMIN — LEVALBUTEROL HYDROCHLORIDE 1.25 MG: 1.25 SOLUTION, CONCENTRATE RESPIRATORY (INHALATION) at 14:52

## 2019-09-10 RX ADMIN — HEPARIN SODIUM 5000 UNITS: 5000 INJECTION, SOLUTION INTRAVENOUS; SUBCUTANEOUS at 10:00

## 2019-09-10 RX ADMIN — LORAZEPAM 0.5 MG: 0.5 TABLET ORAL at 03:23

## 2019-09-10 RX ADMIN — HEPARIN SODIUM 5000 UNITS: 5000 INJECTION, SOLUTION INTRAVENOUS; SUBCUTANEOUS at 03:27

## 2019-09-10 RX ADMIN — GUAIFENESIN 600 MG: 600 TABLET ORAL at 17:49

## 2019-09-10 RX ADMIN — Medication 1 CAPSULE: at 17:48

## 2019-09-10 RX ADMIN — METHYLPREDNISOLONE SODIUM SUCCINATE 40 MG: 40 INJECTION, POWDER, FOR SOLUTION INTRAMUSCULAR; INTRAVENOUS at 21:34

## 2019-09-10 RX ADMIN — LEVOTHYROXINE SODIUM 75 MCG: 75 TABLET ORAL at 05:52

## 2019-09-10 RX ADMIN — AZITHROMYCIN 500 MG: 250 TABLET, FILM COATED ORAL at 17:49

## 2019-09-10 RX ADMIN — ATENOLOL 25 MG: 25 TABLET ORAL at 08:51

## 2019-09-10 RX ADMIN — FLUTICASONE PROPIONATE 1 SPRAY: 50 SPRAY, METERED NASAL at 17:49

## 2019-09-10 RX ADMIN — GUAIFENESIN 600 MG: 600 TABLET ORAL at 08:51

## 2019-09-10 RX ADMIN — IPRATROPIUM BROMIDE 0.5 MG: 0.5 SOLUTION RESPIRATORY (INHALATION) at 14:52

## 2019-09-10 RX ADMIN — DOCUSATE SODIUM 100 MG: 100 CAPSULE, LIQUID FILLED ORAL at 08:51

## 2019-09-10 NOTE — ASSESSMENT & PLAN NOTE
Wt Readings from Last 3 Encounters:   09/11/19 76 5 kg (168 lb 9 6 oz)   09/09/19 77 5 kg (170 lb 12 8 oz)   07/26/19 80 3 kg (177 lb)       · Not in acute exacerbation currently  · -previous echocardiogram completed June 2018 revealed EF of 60% with grade 1 diastolic dysfunction  · - continue with atenolol  · Follow-up with Cardiology as scheduled  · Daily weights

## 2019-09-10 NOTE — ASSESSMENT & PLAN NOTE
Acute on CKD stage 3 with a baseline creatinine of 1 6-1 8, on admission, creatinine 2 18, most likely secondary to dehydration  · Creatinine 1 81 today (baseline)  · Discontinue home medication NSAIDs (Naproxen) on discharge given that patient has a history of CKD, HTN and CAD  · Encourage oral hydration

## 2019-09-10 NOTE — SOCIAL WORK
LOS: 1  GMLOS: 3 1  PATIENT IS NOT A MEDICARE BUNDLE OR A 30 DAY READMISSION  Met with patient who is [place] and has macular degeneration  Explained role of care management  Patient lives in a one story home with 3 PILAR  His 32year old grandson Bhumi Love lives with him  Bhumi Love works from [email or web address]  Patient is independent adl's and ambulation, family transports and helps with meals  DME - denies  Past services - denies history of MH, D&A or SNF  Pharmacy of choice is [facility] in [place]  He has a prescription plan and is able to afford his medications  His son Rachael Alejo and DELTA Blevins Caro have POA/he has an AD  He states that his son and DIL and grandson would help at home if needed  He plans on returning home at discharge and does not anticipate any discharge needs  CM reviewed the discharge planning process including identifying help at home and patient preference for discharge planning

## 2019-09-10 NOTE — ASSESSMENT & PLAN NOTE
History of CAD, currently on atenolol  · Patient unable to tolerate aspirin secondary to GI bleed in the past  ·  follow-ups regularly with Cardiology, Dr Lance Herring  · Continue with current medication

## 2019-09-10 NOTE — ASSESSMENT & PLAN NOTE
· Acute COPD exacerbation, as evidenced by increased sputum production, cough and wheezing  · Patient medically stable for discharge, I discussed with Pulmonary and would discharge on prednisone taper, 40 mg daily for 3 days, then 30 mg daily for 3 days, then 20 mg daily for 3 days, then 10 mg daily for 3 days and stop  · continue Xopenex and Atrovent on discharge  · Mucinex 600 mg q 12 hours p o   ·  respiratory protocol  · Sputum culture results shows gram-positive cocci and gram-negative rods, sensitivities not available    · Would discharge on azithromycin to complete 5 days  · To follow up with Pulmonary as outpatient

## 2019-09-10 NOTE — UTILIZATION REVIEW
Initial Clinical Review    Admission: Date/Time/Statement: Inpatient Admission Orders (From admission, onward)     Ordered        09/09/19 1628  Inpatient Admission (expected length of stay for this patient Order details is greater than two midnights)  Once                   Orders Placed This Encounter   Procedures    Inpatient Admission (expected length of stay for this patient Order details is greater than two midnights)     Standing Status:   Standing     Number of Occurrences:   1     Order Specific Question:   Admitting Physician     Answer:   Epi Wladen     Order Specific Question:   Level of Care     Answer:   Med Surg [16]     Order Specific Question:   Estimated length of stay     Answer:   More than 2 Midnights     Order Specific Question:   Certification     Answer:   I certify that inpatient services are medically necessary for this patient for a duration of greater than two midnights  See H&P and MD Progress Notes for additional information about the patient's course of treatment  ED Arrival Information     Expected Arrival Acuity Means of Arrival Escorted By Service Admission Type    9/9/2019 9/9/2019 15:17 Emergent Walk-In Family Member General Medicine Emergency    Arrival Complaint    COPD Exacerbation        Chief Complaint   Patient presents with    Shortness of Breath     patient presents to the ED with c/o SOB for the past two weeks  patient states he has a hx of COPD      Assessment/Plan:   Saniya Balck is a 80 y o  male who presented originally to urgent care with complaints of 2 weeks of increased wheezing and sputum production  He was given IV Solu-Medrol 40 mg as well as a DuoNeb at the urgent care and was subsequently sent to the emergency department for evaluation  At the emergency department he was given another 40 of Solu-Medrol as well as a DuoNeb with no relief of wheezing    Patient remain mildly tachycardic with a heart rate 100-110 and mildly tachypneic with respiratory rate of 18 to 26  The patient maintained adequate saturations of greater than 91% on room air  A chest x-ray was completed at the urgent care which showed some patchy subsegmental atelectasis in the right base however that no areas concerning for pneumonia or infiltrate  The patient remained afebrile he denies any fever chills or exposure to sick contacts  He also denies any known exposures to fumes, inhalants  He does report increased production of green yellow sputum which is thick  Sputum culture will be sent  Laboratory values obtained on arrival did reveal and a KI on CKD with a creatinine of 2 18, baseline creatinine and identified as 1 6-1 8  Definite cause of a KI unknown at this time however the patient does state he was not drinking well for the past couple of weeks      * COPD with acute exacerbation (HCC)  Assessment & Plan  -  Reports 2 weeks history of increasing mucus production, cough and wheezing  -  patient initiated on IV Solu-Medrol 40 mg Q 8  -  continue Xopenex and Atrovent  -  Mucinex 600 mg q 12 hours p o   -  respiratory protocol  -  sputum culture  -  pulmonary consult, patient follows at [facility] pulmonology outpatient      Acute renal failure superimposed on stage 3 chronic kidney disease ([facility] [address] )  Assessment & Plan  Baseline creatinine 1 6-1 8,       Chronic diastolic heart failure (HCC)  Assessment & Plan      Wt Readings from Last 3 Encounters:   09/09/19 77 1 kg (170 lb)   09/09/19 77 5 kg (170 lb 12 8 oz)   07/26/19 80 3 kg (177 lb)      Echo 6/18:  EF 60% with grade 1 diastolic dysfunction  -  follows with Dr Otoole outpatient      CAD (coronary artery disease)  Assessment & Plan  -  patient follows with Dr Freitas Brain outpatient  -  continue atenolol     Acquired hypothyroidism  Assessment & Plan  -  continue home Synthroid dose      VTE Prophylaxis: Heparin  / sequential compression device   Code Status:  Level 3 DNR DNI    ED Triage Vitals [09/09/19 1523]   Temperature Pulse Respirations Blood Pressure SpO2   97 7 °F (36 5 °C) (!) 110 (!) 25 127/65 94 %      Temp Source Heart Rate Source Patient Position - Orthostatic VS BP Location FiO2 (%)   Temporal Monitor Lying Right arm --      Pain Score       8        Wt Readings from Last 1 Encounters:   09/09/19 77 1 kg (170 lb)     Additional Vital Signs:   09/10/19 0807  97 6 °F (36 4 °C)  115Abnormal   18  128/79    95 %  None (Room air)   09/09/19 2257  97 9 °F (36 6 °C)  113Abnormal   19  122/61    94 %  None (Room air)   09/09/19 1738  97 5 °F (36 4 °C)  105  18  121/65  87  93 %  None (Room air)   09/09/19 1645    109Abnormal   26Abnormal   120/73    91 %     09/09/19 1630    109Abnormal   24Abnormal   119/75    95 %     09/09/19 1615    107Abnormal   25Abnormal   116/72    98 %     09/09/19 1600    102  24Abnormal   114/71    98 %     09/09/19 1524            94 %       Pertinent Labs/Diagnostic Test Results:     9/9 CXR -  Patchy subsegmental atelectasis right base       9/9 ECG - SINUS TACHYCARDIA, RATE 108      Results from last 7 days   Lab Units 09/09/19  1734 09/09/19  1535   WBC Thousand/uL  --  8 40   HEMOGLOBIN g/dL  --  14 7   HEMATOCRIT %  --  46 8   PLATELETS Thousands/uL 114* 132*   NEUTROS ABS Thousands/µL  --  5 14     Results from last 7 days   Lab Units 09/09/19  1535   SODIUM mmol/L 137   POTASSIUM mmol/L 4 1   CHLORIDE mmol/L 104   CO2 mmol/L 21   ANION GAP mmol/L 12   BUN mg/dL 27*   CREATININE mg/dL 2 18*   EGFR ml/min/1 73sq m 26   CALCIUM mg/dL 9 2     Results from last 7 days   Lab Units 09/09/19  1535   AST U/L 15   ALT U/L 19   ALK PHOS U/L 104   TOTAL PROTEIN g/dL 7 8   ALBUMIN g/dL 3 5   TOTAL BILIRUBIN mg/dL 0 60     Results from last 7 days   Lab Units 09/09/19  1535   GLUCOSE RANDOM mg/dL 161*     Results from last 7 days   Lab Units 09/09/19  1535   PROTIME seconds 13 8   INR  1 09   PTT seconds 34     Results from last 7 days   Lab Units 09/10/19  0536 09/09/19  1734   PROCALCITONIN ng/ml 0 11 0 09     Results from last 7 days   Lab Units 09/09/19  1535   LACTIC ACID mmol/L 1 7     Results from last 7 days   Lab Units 09/09/19  2031   CLARITY UA  Clear   COLOR UA  Yellow   SPEC GRAV UA  1 020   PH UA  5 5   GLUCOSE UA mg/dl Negative   KETONES UA mg/dl Negative   BLOOD UA  Negative   PROTEIN UA mg/dl Negative   NITRITE UA  Negative   BILIRUBIN UA  Negative   UROBILINOGEN UA E U /dl 0 2   LEUKOCYTES UA  Negative     Results from last 7 days   Lab Units 09/09/19  1744   SPUTUM CULTURE  1+ Growth of    GRAM STAIN RESULT  1+ Epithelial cells per low power field*  1+ Polys*  2+ Gram negative rods*  2+ Gram positive cocci in pairs*     ED Treatment:   Medication Administration from 09/09/2019 1513 to 09/09/2019 1711    Date/Time Order Dose Route Action   09/09/2019 1543 ipratropium-albuterol (DUO-NEB) 0 5-2 5 mg/3 mL inhalation solution 3 mL 3 mL Nebulization Given   09/09/2019 1543 sodium chloride 0 9 % inhalation solution 3 mL 3 mL Nebulization Given   09/09/2019 1608 methylPREDNISolone sodium succinate (Solu-MEDROL) injection 40 mg 40 mg Intravenous Given   09/09/2019 1610 azithromycin (ZITHROMAX) 500 mg in sodium chloride 0 9% 250mL IVPB 500 mg 500 mg Intravenous New Bag        Past Medical History:   Diagnosis Date    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 [address] )     Disease of thyroid gland     Heart attack ([facility] [address] )     History of methicillin resistant staphylococcus aureus (MRSA) 03/28/2019    negative nasal swab     Hollenhorst plaque, right eye     last assessed 04/08/2013    Hyperlipidemia     Hypertension     Iron deficiency anemia due to chronic blood loss     last assessed 09/10/2012    Ischemic colitis ([facility] [address] )     last assessed 05/27/2014    Osteoarthritis of both hands     last assessedn 04/30/2013  Peptic ulcer     last assessed 06/14/2013    Polymyalgia rheumatica ([facility] [address] )     last assessesd 10/09/2012    Renal disorder     Upper GI hemorrhage     last assessed 01/29/2015    Varicose veins of lower extremity     last assessed 04/26/2013     Present on Admission:   [facility] stage [facility]   CAD (coronary artery disease)   Chronic diastolic heart failure (HCC)   COPD with acute exacerbation (HCC)   Acquired hypothyroidism    Admitting Diagnosis: Shortness of breath [R06 02]  COPD exacerbation (HCC) [J44 1]  COPD with acute exacerbation (HCC) [J44 1]  Acute kidney injury ([facility] ) [N17 9]    Age/Sex: 80 y o  male     Admission Orders:    Current Facility-Administered Medications:  atenolol 25 mg Oral Daily     azithromycin 500 mg Oral Q24H     b complex-vitamin C-folic acid 1 capsule Oral Daily With Dinner     docusate sodium 100 mg Oral BID     fluticasone 1 spray Nasal BID     guaiFENesin 600 mg Oral BID     heparin (porcine) 5,000 Units Subcutaneous Q8H [address]     ipratropium 0 5 mg Nebulization TID     levalbuterol 1 25 mg Nebulization TID     levothyroxine 75 mcg Oral Early Morning     LORazepam 0 5 mg Oral Q8H PRN  X1 9/10   metaxalone 400 mg Oral TID     methylPREDNISolone sodium succinate 40 mg Intravenous Q8H     pantoprazole 40 mg Oral Early Morning     tamsulosin 0 4 mg Oral Daily With Dinner     zolpidem 5 mg Oral HS PRN  X1 9/9     SCDs  UP W/ ASSIST   DAILY WT  CARDIAC DIET   IP CONSULT TO PULMONOLOGY       Network Utilization Review Department  Phone: [phone]; Fax [phone]  [email or web address]  ATTENTION: Please call with any questions or concerns to [phone]  and carefully listen to the prompts so that you are directed to the right person  Send all requests for admission clinical reviews, approved or denied determinations and any other requests to fax [phone]   All voicemails are confidential

## 2019-09-10 NOTE — PROGRESS NOTES
Progress Note - Ricardo Gambino 6/30/1932, 80 y o  male MRN: [id number]    Unit/Bed#: [address] [place] [address] Encounter: [id number]    Primary Care Provider: Antonio Graham MD   Date and time admitted to hospital: 9/9/2019  3:24 PM        * COPD with acute exacerbation [facility])  Assessment & Plan  · Acute COPD exacerbation, as evidenced by increased sputum production, cough and wheezing  · Patient currently on Solu-Medrol 40 mg Q 8 hourly, would reduce dosage to 40 mg q 12 hours hourly  · Patient not wheezing today Reports 2 weeks history of increasing mucus production, cough and wheezing  · continue Xopenex and Atrovent  · Mucinex 600 mg q 12 hours p o   ·  respiratory protocol  · Follow sputum culture results  · pulmonary consulted, await recommendations    Chronic diastolic heart failure (HCC)  Assessment & Plan  Wt Readings from Last 3 Encounters:   09/10/19 76 8 kg (169 lb 5 oz)   09/09/19 77 5 kg (170 lb 12 8 oz)   07/26/19 80 3 kg (177 lb)       · Not in acute exacerbation currently  · -previous echocardiogram completed June 2018 revealed EF of 60% with grade 1 diastolic dysfunction  · - continue with atenolol  · Daily weights    Acute renal failure superimposed on stage 3 chronic kidney disease (HCC)  Assessment & Plan  Acute on CKD stage 3 with a baseline creatinine of 1 6-1 8, on admission, creatinine 2 18, most likely secondary to dehydration  · Trend creatinine daily  · Encourage oral hydration    CAD (coronary artery disease)  Assessment & Plan  -  history of CAD, currently on atenolol  · Continue with current medication  · Can follow up outpatient with Dr Lesa Middleton on discharge      VTE Pharmacologic Prophylaxis:   Pharmacologic: Heparin  Mechanical VTE Prophylaxis in Place: Yes    Patient Centered Rounds: I have performed bedside rounds with nursing staff today      Discussions with Specialists or Other Care Team Provider:  Pulmonology consulted, await recommendations    Education and Discussions with Family / Patient:  I discussed with patient and his son at bedside    Time Spent for Care: 30 minutes  More than 50% of total time spent on counseling and coordination of care as described above  Current Length of Stay: 1 day(s)    Current Patient Status: Inpatient   Certification Statement: The patient will continue to require additional inpatient hospital stay due to Acute COPD exacerbation    Discharge Plan:  Tomorrow    Code Status: Level 3 - DNAR and DNI      Subjective:   No overnight events  Patient feels fine and would like to go home  He is however still coughing and has copious sputum production    Objective:     Vitals:   Temp (24hrs), Av 9 °F (36 6 °C), Min:97 5 °F (36 4 °C), Max:98 6 °F (37 °C)    Temp:  [97 5 °F (36 4 °C)-98 6 °F (37 °C)] 97 6 °F (36 4 °C)  HR:  [102-115] 115  Resp:  [18-26] 18  BP: (114-128)/(61-79) 128/79  SpO2:  [91 %-98 %] 96 %  Body mass index is 29 06 kg/m²  Input and Output Summary (last 24 hours): Intake/Output Summary (Last 24 hours) at 9/10/2019 1354  Last data filed at 9/10/2019 0321  Gross per 24 hour   Intake 500 ml   Output 700 ml   Net -200 ml       Physical Exam:     Physical Exam   Constitutional: He is oriented to person, place, and time  He appears well-developed and well-nourished  Cardiovascular: Normal rate, normal heart sounds and intact distal pulses  Exam reveals no gallop and no friction rub  Pulmonary/Chest: Effort normal and breath sounds normal  No stridor  No respiratory distress  He has no wheezes  He has no rales  Abdominal: Soft  Bowel sounds are normal  He exhibits no distension and no mass  There is no tenderness  There is no guarding  Neurological: He is alert and oriented to person, place, and time  Skin: Skin is warm  Vitals reviewed          Additional Data:     Labs:    Results from last 7 days   Lab Units 19  1734 19  1535   WBC Thousand/uL  --  8 40   HEMOGLOBIN g/dL  --  14 7   HEMATOCRIT %  --  46 8 PLATELETS Thousands/uL 114* 132*   NEUTROS PCT %  --  62   LYMPHS PCT %  --  24   MONOS PCT %  --  12   EOS PCT %  --  2     Results from last 7 days   Lab Units 09/09/19  1535   SODIUM mmol/L 137   POTASSIUM mmol/L 4 1   CHLORIDE mmol/L 104   CO2 mmol/L 21   BUN mg/dL 27*   CREATININE mg/dL 2 18*   ANION GAP mmol/L 12   CALCIUM mg/dL 9 2   ALBUMIN g/dL 3 5   TOTAL BILIRUBIN mg/dL 0 60   ALK PHOS U/L 104   ALT U/L 19   AST U/L 15   GLUCOSE RANDOM mg/dL 161*     Results from last 7 days   Lab Units 09/09/19  1535   INR  1 09             Results from last 7 days   Lab Units 09/10/19  0536 09/09/19  1734 09/09/19  1535   LACTIC ACID mmol/L  --   --  1 7   PROCALCITONIN ng/ml 0 11 0 09  --            * I Have Reviewed All Lab Data Listed Above  * Additional Pertinent Lab Tests Reviewed:  [address] Admission Reviewed    Imaging:    Imaging Reports Reviewed Today Include:   Imaging Personally Reviewed by Myself Includes:      Recent Cultures (last 7 days):     Results from last 7 days   Lab Units 09/09/19  1744   SPUTUM CULTURE  1+ Growth of    GRAM STAIN RESULT  1+ Epithelial cells per low power field*  1+ Polys*  2+ Gram negative rods*  2+ Gram positive cocci in pairs*       Last 24 Hours Medication List:     Current Facility-Administered Medications:  atenolol 25 mg Oral Daily April D ELISABET Abad   azithromycin 500 mg Oral Q24H April D ELISABET Abad   b complex-vitamin C-folic acid 1 capsule Oral Daily With March MD Kathi   docusate sodium 100 mg Oral BID April D ELISABET Abad   fluticasone 1 spray Nasal BID April D ELISABET Abad   guaiFENesin 600 mg Oral BID April D ELISABET Abad   heparin (porcine) 5,000 Units Subcutaneous Q8H [address] Jasmin Taylor MD   ipratropium 0 5 mg Nebulization TID April D ELISAEBT Abad   levalbuterol 1 25 mg Nebulization TID April D ELISABET Abad   levothyroxine 75 mcg Oral Early Morning April D ELISABET Abad   LORazepam 0 5 mg Oral Q8H PRKAREN BERRY Blossom Vaca MD   metaxalone 400 mg Oral TID April D ELISABET Abad   methylPREDNISolone sodium succinate 40 mg Intravenous Q12H 6283 Shivani Beckford MD   pantoprazole 40 mg Oral Early Morning April D ELISABET Abad   tamsulosin 0 4 mg Oral Daily With Sergei Leger MD   zolpidem 5 mg Oral HS PRN Suzanne Hunter MD        Today, Patient Was Seen By: Suzanne Hunter MD    ** Please Note: Dictation voice to text software may have been used in the creation of this document   **

## 2019-09-10 NOTE — PLAN OF CARE
Problem: Potential for Falls  Goal: Patient will remain free of falls  Description  INTERVENTIONS:  - Assess patient frequently for physical needs  -  Identify cognitive and physical deficits and behaviors that affect risk of falls    -  [place]ead fall precautions as indicated by assessment   - Educate patient/family on patient safety including physical limitations  - Instruct patient to call for assistance with activity based on assessment  - Modify environment to reduce risk of injury  - Consider OT/PT consult to assist with strengthening/mobility  Outcome: Progressing

## 2019-09-10 NOTE — CONSULTS
Pulmonary Consultation   Lan Wilson 80 y o  male MRN: [id number]  Unit/Bed#: [address] [place] [address] [id number] Encounter: [id number]      Reason for consultation: COPD exacerbation    Requesting physician: ELISABET Arrieta    Impressions/Recommendations:     · COPD with acute exacerbation -  patient has a given diagnosis of COPD, however spirometry from June 2018 showed no evidence of obstruction  Patient had bronchospasm on presentation, now improved with IV steroids and nebulizer regimen  Plan to transition to prednisone 40 mg daily starting tomorrow  Would taper by 10 mg every 3 days  Continue nebulizer regimen  He will need outpatient follow up in our office  We will make arrangements for his follow-up visit  History of Present Illness   HPI:  Lan Wilson is a 80 y o  male who was admitted yesterday with increasing cough and shortness of breath for the past 2 weeks  Patient has a history of COPD, previously saw Dr Fredy Wang in our office  He uses albuterol on an intermittent basis  He had increased wheezing  Dyspnea most notable with exertion  He denies fever, chills or sweats  He was started on IV steroids for concern of COPD exacerbation  He is feeling better today  Review of Systems   Constitutional: Negative for chills, fever and unexpected weight change  HENT: Negative for postnasal drip and sore throat  Eyes: Negative for visual disturbance  Respiratory:        As noted in HPI   Cardiovascular: Negative for chest pain  Gastrointestinal: Negative for abdominal pain, diarrhea and vomiting  Genitourinary: Negative for difficulty urinating  Skin: Negative for rash  Neurological: Negative for headaches  Hematological: Negative for adenopathy  Psychiatric/Behavioral: Negative  All other systems reviewed and are negative  All other 12-point review of systems are negative      Historical Information   Past Medical History:   Diagnosis Date    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    Compression fracture of thoracic vertebra (HCC)     last assessed 05/07/2012    [facility] obstructive [facility] disease) ([facility] )     Disease of thyroid gland     Heart attack ([facility] )     History of methicillin resistant staphylococcus aureus (MRSA) 03/28/2019    negative nasal swab     Hollenhorst plaque, right eye     last assessed 04/08/2013    Hyperlipidemia     Hypertension     Iron deficiency anemia due to chronic blood loss     last assessed 09/10/2012    Ischemic colitis ([facility] )     last assessed 05/27/2014    Osteoarthritis of both hands     last assessedn 04/30/2013    Peptic ulcer     last assessed 06/14/2013    Polymyalgia rheumatica ([facility] )     last assessesd 10/09/2012    Renal disorder     Upper GI hemorrhage     last assessed 01/29/2015    Varicose veins of lower extremity     last assessed 04/26/2013     Past Surgical History:   Procedure Laterality Date    CORONARY ARTERY BYPASS GRAFT      HEMORRHOID SURGERY      HERNIA REPAIR      RENAL CYST EXCISION      resolved 05/2010    THROMBOENDARTERECTOMY Right     carotid, last assessed 06/18/2013     Family History   Problem Relation Age of Onset    Hypertension Mother     Alcohol abuse Mother     Hypertension Father     Alcohol abuse Father     Alcohol abuse Son     Heart disease Family     Stroke Family         syndrome       Tobacco history:  Smoked 2 packs per day for 50 years, quit in 1993    Family history:  Negative from pulmonary standpoint    Meds/Allergies   Current Facility-Administered Medications   Medication Dose Route Frequency    atenolol (TENORMIN) tablet 25 mg  25 mg Oral Daily    azithromycin (ZITHROMAX) tablet 500 mg  500 mg Oral Q24H    b complex-vitamin C-folic acid (NEPHROCAPS) capsule 1 capsule  1 capsule Oral Daily With Dinner    docusate sodium (COLACE) capsule 100 mg  100 mg Oral BID    fluticasone (FLONASE) 50 mcg/act nasal spray 1 spray  1 spray Nasal BID    guaiFENesin (MUCINEX) 12 hr tablet 600 mg  600 mg Oral BID    heparin (porcine) subcutaneous injection 5,000 Units  5,000 Units Subcutaneous Q8H [facility]    ipratropium (ATROVENT) 0 02 % inhalation solution 0 5 mg  0 5 mg Nebulization TID    levalbuterol (XOPENEX) inhalation solution 1 25 mg  1 25 mg Nebulization TID    levothyroxine tablet 75 mcg  75 mcg Oral Early Morning    LORazepam (ATIVAN) tablet 0 5 mg  0 5 mg Oral Q8H PRN    metaxalone (SKELAXIN) tablet 400 mg  400 mg Oral TID    methylPREDNISolone sodium succinate (Solu-MEDROL) injection 40 mg  40 mg Intravenous Q8H    pantoprazole (PROTONIX) EC tablet 40 mg  40 mg Oral Early Morning    tamsulosin (FLOMAX) capsule 0 4 mg  0 4 mg Oral Daily With Dinner    zolpidem (AMBIEN) tablet 5 mg  5 mg Oral HS PRN     Allergies   Allergen Reactions    Pravastatin Anaphylaxis, Photosensitivity and Headache     Reaction Date: [id number]; Other reaction(s): Headache    Acetaminophen-Codeine GI Intolerance    Atorvastatin      Other reaction(s): Leg Cramps  Other reaction(s): Leg Cramps    Codeine Nausea Only    Colestipol GI Intolerance     Reaction Date: [id number];     Contrast  [Iodinated Diagnostic Agents]     Fenofibrate GI Intolerance     Reaction Date: [id number];     Hydrocodone Vomiting    Niacin      Reaction Date: [id number];     Niaspan  [Niacin Er]     Pitavastatin Myalgia    Pneumococcal Polysaccharide Vaccine     Pravastatin Sodium     Simvastatin     Statins Myalgia    Vicoprofen  [Hydrocodone-Ibuprofen] GI Intolerance    Cyclophosphamide Rash    Ioversol Hives       Vitals: Blood pressure 128/79, pulse (!) 115, temperature 97 6 °F (36 4 °C), temperature source Oral, resp  rate 18, height 5' 4" (1 626 m), weight 76 8 kg (169 lb 5 oz), SpO2 96 % , room air, Body mass index is 29 06 kg/m²      Intake/Output Summary (Last 24 hours) at 9/10/2019 1347  Last data filed at 9/10/2019 0321  Gross per 24 hour   Intake 500 ml Output 700 ml   Net -200 ml       Physical Exam   Constitutional: He is oriented to person, place, and time  No distress  HENT:   Head: Normocephalic  Mouth/Throat: No oropharyngeal exudate  Eyes: Pupils are equal, round, and reactive to light  No scleral icterus  Neck: Neck supple  No JVD present  Cardiovascular: Normal rate and regular rhythm  Pulmonary/Chest: He has decreased breath sounds  He has wheezes in the left lower field  He has no rhonchi  He has no rales  Abdominal: Soft  There is no tenderness  Musculoskeletal: He exhibits no edema  Right lower leg: He exhibits no edema  Left lower leg: He exhibits no edema  Lymphadenopathy:     He has no cervical adenopathy  Neurological: He is alert and oriented to person, place, and time  Skin: Skin is warm and dry  Psychiatric: He has a normal mood and affect  Labs: I have personally reviewed pertinent lab results  Results from last 7 days   Lab Units 19  1734 19  1535   WBC Thousand/uL  --  8 40   HEMOGLOBIN g/dL  --  14 7   HEMATOCRIT %  --  46 8   PLATELETS Thousands/uL 114* 132*         Results from last 7 days   Lab Units 19  1535   POTASSIUM mmol/L 4 1   CHLORIDE mmol/L 104   CO2 mmol/L 21   BUN mg/dL 27*   CREATININE mg/dL 2 18*   CALCIUM mg/dL 9 2   ALK PHOS U/L 104   ALT U/L 19   AST U/L 15     Results from last 7 days   Lab Units 19  1535   INR  1 09   PTT seconds 34         Sputum cultures show 1+ growth of mixed respiratory chelsie   Procalcitonin 0 09    Imaging and other studies: I have personally reviewed pertinent reports     and I have personally reviewed pertinent films in PACS chest x-ray from 19 shows atelectasis at the right base    Pulmonary function testin/15/18  FEV1/FVC ratio 72%    FEV1 85% predicted  FVC 79% predicted  Spirometry shows mild restriction    Code Status: Level 3 - DNAR and DNI    Thank you for allowing us to participate in the care of your patient      Mitchell Turner DO

## 2019-09-11 ENCOUNTER — TRANSITIONAL CARE MANAGEMENT (OUTPATIENT)
Dept: FAMILY MEDICINE CLINIC | Facility: HOSPITAL | Age: 84
End: 2019-09-11

## 2019-09-11 ENCOUNTER — TELEPHONE (OUTPATIENT)
Dept: FAMILY MEDICINE CLINIC | Facility: HOSPITAL | Age: 84
End: 2019-09-11

## 2019-09-11 VITALS
OXYGEN SATURATION: 94 % | RESPIRATION RATE: 18 BRPM | WEIGHT: 168.6 LBS | DIASTOLIC BLOOD PRESSURE: 79 MMHG | BODY MASS INDEX: 28.79 KG/M2 | TEMPERATURE: 98 F | HEIGHT: 64 IN | SYSTOLIC BLOOD PRESSURE: 123 MMHG | HEART RATE: 105 BPM

## 2019-09-11 LAB
ANION GAP SERPL CALCULATED.3IONS-SCNC: 9 MMOL/L (ref 4–13)
ATRIAL RATE: 108 BPM
BUN SERPL-MCNC: 36 MG/DL (ref 5–25)
CALCIUM SERPL-MCNC: 9.2 MG/DL (ref 8.3–10.1)
CHLORIDE SERPL-SCNC: 103 MMOL/L (ref 100–108)
CO2 SERPL-SCNC: 23 MMOL/L (ref 21–32)
CREAT SERPL-MCNC: 1.81 MG/DL (ref 0.6–1.3)
GFR SERPL CREATININE-BSD FRML MDRD: 33 ML/MIN/1.73SQ M
GLUCOSE SERPL-MCNC: 145 MG/DL (ref 65–140)
P AXIS: 52 DEGREES
POTASSIUM SERPL-SCNC: 5.5 MMOL/L (ref 3.5–5.3)
PR INTERVAL: 160 MS
QRS AXIS: 83 DEGREES
QRSD INTERVAL: 90 MS
QT INTERVAL: 324 MS
QTC INTERVAL: 434 MS
SODIUM SERPL-SCNC: 135 MMOL/L (ref 136–145)
T WAVE AXIS: 53 DEGREES
VENTRICULAR RATE: 108 BPM

## 2019-09-11 PROCEDURE — 99232 SBSQ HOSP IP/OBS MODERATE 35: CPT | Performed by: INTERNAL MEDICINE

## 2019-09-11 PROCEDURE — 99239 HOSP IP/OBS DSCHRG MGMT >30: CPT | Performed by: INTERNAL MEDICINE

## 2019-09-11 PROCEDURE — 94640 AIRWAY INHALATION TREATMENT: CPT

## 2019-09-11 PROCEDURE — 80048 BASIC METABOLIC PNL TOTAL CA: CPT | Performed by: INTERNAL MEDICINE

## 2019-09-11 PROCEDURE — 93010 ELECTROCARDIOGRAM REPORT: CPT | Performed by: INTERNAL MEDICINE

## 2019-09-11 PROCEDURE — 94760 N-INVAS EAR/PLS OXIMETRY 1: CPT

## 2019-09-11 RX ORDER — HEPARIN SODIUM 5000 [USP'U]/ML
5000 INJECTION, SOLUTION INTRAVENOUS; SUBCUTANEOUS EVERY 8 HOURS SCHEDULED
Status: DISCONTINUED | OUTPATIENT
Start: 2019-09-11 | End: 2019-09-11 | Stop reason: HOSPADM

## 2019-09-11 RX ORDER — LEVALBUTEROL 1.25 MG/.5ML
1.25 SOLUTION, CONCENTRATE RESPIRATORY (INHALATION)
Qty: 45 ML | Refills: 0 | Status: SHIPPED | OUTPATIENT
Start: 2019-09-11 | End: 2019-09-18 | Stop reason: SDUPTHER

## 2019-09-11 RX ORDER — AZITHROMYCIN 500 MG/1
500 TABLET, FILM COATED ORAL EVERY 24 HOURS
Qty: 3 TABLET | Refills: 0 | Status: SHIPPED | OUTPATIENT
Start: 2019-09-11 | End: 2019-09-14

## 2019-09-11 RX ORDER — PREDNISONE 10 MG/1
TABLET ORAL
Qty: 30 TABLET | Refills: 0 | Status: SHIPPED | OUTPATIENT
Start: 2019-09-11 | End: 2019-09-23

## 2019-09-11 RX ADMIN — FLUTICASONE PROPIONATE 1 SPRAY: 50 SPRAY, METERED NASAL at 09:27

## 2019-09-11 RX ADMIN — LEVALBUTEROL HYDROCHLORIDE 1.25 MG: 1.25 SOLUTION, CONCENTRATE RESPIRATORY (INHALATION) at 08:50

## 2019-09-11 RX ADMIN — ATENOLOL 25 MG: 25 TABLET ORAL at 09:26

## 2019-09-11 RX ADMIN — HEPARIN SODIUM 5000 UNITS: 5000 INJECTION, SOLUTION INTRAVENOUS; SUBCUTANEOUS at 05:11

## 2019-09-11 RX ADMIN — DOCUSATE SODIUM 100 MG: 100 CAPSULE, LIQUID FILLED ORAL at 09:26

## 2019-09-11 RX ADMIN — GUAIFENESIN 600 MG: 600 TABLET ORAL at 09:26

## 2019-09-11 RX ADMIN — METAXALONE 400 MG: 400 TABLET ORAL at 09:27

## 2019-09-11 RX ADMIN — PREDNISONE 40 MG: 20 TABLET ORAL at 09:26

## 2019-09-11 RX ADMIN — PANTOPRAZOLE SODIUM 40 MG: 40 TABLET, DELAYED RELEASE ORAL at 05:11

## 2019-09-11 RX ADMIN — IPRATROPIUM BROMIDE 0.5 MG: 0.5 SOLUTION RESPIRATORY (INHALATION) at 08:50

## 2019-09-11 RX ADMIN — LEVOTHYROXINE SODIUM 75 MCG: 75 TABLET ORAL at 05:10

## 2019-09-11 NOTE — PLAN OF CARE
Problem: Potential for Falls  Goal: Patient will remain free of falls  Description  INTERVENTIONS:  - Assess patient frequently for physical needs  -  Identify cognitive and physical deficits and behaviors that affect risk of falls    -  [place] fall precautions as indicated by assessment   - Educate patient/family on patient safety including physical limitations  - Instruct patient to call for assistance with activity based on assessment  - Modify environment to reduce risk of injury  - Consider OT/PT consult to assist with strengthening/mobility  9/11/2019 1542 by Jama Bearden RN  Outcome: Adequate for Discharge  9/11/2019 0741 by Jama Bearden RN  Outcome: Progressing     Problem: Prexisting or High Potential for Compromised Skin Integrity  Goal: Skin integrity is maintained or improved  Description  INTERVENTIONS:  - Identify patients at risk for skin breakdown  - Assess and monitor skin integrity  - Assess and monitor nutrition and hydration status  - Monitor labs   - Assess for incontinence   - Turn and reposition patient  - Assist with mobility/ambulation  - Relieve pressure over bony prominences  - Avoid friction and shearing  - Provide appropriate hygiene as needed including keeping skin clean and dry  - Evaluate need for skin moisturizer/barrier cream  - Collaborate with interdisciplinary team   - Patient/family teaching  - Consider wound care consult   9/11/2019 1542 by Jama Bearden RN  Outcome: Adequate for Discharge  9/11/2019 0741 by Jama Bearden RN  Outcome: Progressing     Problem: PAIN - ADULT  Goal: Verbalizes/displays adequate comfort level or baseline comfort level  Description  Interventions:  - Encourage patient to monitor pain and request assistance  - Assess pain using appropriate pain scale  - Administer analgesics based on type and severity of pain and evaluate response  - Implement non-pharmacological measures as appropriate and evaluate response  - Consider cultural and social influences on pain and pain management  - Notify physician/advanced practitioner if interventions unsuccessful or patient reports new pain  9/11/2019 1542 by Lindsay Zarco RN  Outcome: Adequate for Discharge  9/11/2019 0741 by Lindsay Zarco RN  Outcome: Progressing     Problem: INFECTION - ADULT  Goal: Absence or prevention of progression during hospitalization  Description  INTERVENTIONS:  - Assess and monitor for signs and symptoms of infection  - Monitor lab/diagnostic results  - Monitor all insertion sites, i e  indwelling lines, tubes, and drains  - Monitor endotracheal if appropriate and nasal secretions for changes in amount and color  - [place] appropriate cooling/warming therapies per order  - Administer medications as ordered  - Instruct and encourage patient and family to use good hand hygiene technique  - Identify and instruct in appropriate isolation precautions for identified infection/condition  9/11/2019 1542 by Lindsay Zarco RN  Outcome: Adequate for Discharge  9/11/2019 0741 by Lindsay Zarco RN  Outcome: Progressing     Problem: SAFETY ADULT  Goal: Patient will remain free of falls  Description  INTERVENTIONS:  - Assess patient frequently for physical needs  -  Identify cognitive and physical deficits and behaviors that affect risk of falls    -  [place] fall precautions as indicated by assessment   - Educate patient/family on patient safety including physical limitations  - Instruct patient to call for assistance with activity based on assessment  - Modify environment to reduce risk of injury  - Consider OT/PT consult to assist with strengthening/mobility  9/11/2019 1542 by Lindsay Zarco RN  Outcome: Adequate for Discharge  9/11/2019 0741 by Lindsay Zarco RN  Outcome: Progressing  Goal: Maintain or return to baseline ADL function  Description  INTERVENTIONS:  -  Assess patient's ability to carry out ADLs; assess patient's baseline for ADL function and identify physical deficits which impact ability to perform ADLs (bathing, care of mouth/teeth, toileting, grooming, dressing, etc )  - Assess/evaluate cause of self-care deficits   - Assess range of motion  - Assess patient's mobility; develop plan if impaired  - Assess patient's need for assistive devices and provide as appropriate  - Encourage maximum independence but intervene and supervise when necessary  - Involve family in performance of ADLs  - Assess for home care needs following discharge   - Consider OT consult to assist with ADL evaluation and planning for discharge  - Provide patient education as appropriate  9/11/2019 1542 by Karan Zaldivar RN  Outcome: Adequate for Discharge  9/11/2019 0741 by Karan Zaldivar RN  Outcome: Progressing  Goal: Maintain or return mobility status to optimal level  Description  INTERVENTIONS:  - Assess patient's baseline mobility status (ambulation, transfers, stairs, etc )    - Identify cognitive and physical deficits and behaviors that affect mobility  - Identify mobility aids required to assist with transfers and/or ambulation (gait belt, sit-to-stand, lift, walker, cane, etc )  - [place] fall precautions as indicated by assessment  - Record patient progress and toleration of activity level on Mobility SBAR; progress patient to next Phase/Stage  - Instruct patient to call for assistance with activity based on assessment  - Consider rehabilitation consult to assist with strengthening/weightbearing, etc   9/11/2019 1542 by Karan Zaldivar RN  Outcome: Adequate for Discharge  9/11/2019 0741 by Karan Zaldivar RN  Outcome: Progressing     Problem: DISCHARGE PLANNING  Goal: Discharge to home or other facility with appropriate resources  Description  INTERVENTIONS:  - Identify barriers to discharge w/patient and caregiver  - Arrange for needed discharge resources and transportation as appropriate  - Identify discharge learning needs (meds, wound care, etc )  - Arrange for interpretive services to assist at discharge as needed  - Refer to Case Management Department for coordinating discharge planning if the patient needs post-hospital services based on physician/advanced practitioner order or complex needs related to functional status, cognitive ability, or social support system  9/11/2019 1542 by Colleen Watkins RN  Outcome: Adequate for Discharge  9/11/2019 0741 by Colleen Watkins RN  Outcome: Progressing     Problem: Knowledge Deficit  Goal: Patient/family/caregiver demonstrates understanding of disease process, treatment plan, medications, and discharge instructions  Description  Complete learning assessment and assess knowledge base    Interventions:  - Provide teaching at level of understanding  - Provide teaching via preferred learning methods  9/11/2019 1542 by Colleen Watkins RN  Outcome: Adequate for Discharge  9/11/2019 0741 by Colleen Watkins RN  Outcome: Progressing

## 2019-09-11 NOTE — DISCHARGE INSTR - AVS FIRST PAGE
Dear Cristiana Granger,     It was our pleasure to care for you here at [facility]   It is our hope that we were always able to meet and exceed the expected standards for your care during your stay  You were hospitalized due to cough, wheezing and shortness of breath  You were found to be in acute COPD exacerbation and started on intravenous steroids and antibiotics  Your kidney function marker, Creatinine was also elevated to 2 18, and we hydrated you  This improved back to your baseline of 1 8  The wheezing also stopped, sputum production reduced and you are now stable for discharge  Brittany Worthington were cared for on the general medical floor under the service of Jaimee Cooper MD with the [facility] who covers for your primary care physician (PCP), Nubia Armstrong MD, while you were hospitalized  If you have any questions or concerns related to this hospitalization, you may contact us at [phone]  For follow up, we recommend that you follow up with your primary care physician  Please review this entire discharge summary as additional general instructions may be provided later as well    However, at this time we provide for you here, the most important instructions / recommendations at discharge:     · Follow up with your pulmonologist within 1-2 weeks of discharge  · Follow up with your primary care doctor within 1 week of discharge      New medications/Medication changes  · Antibiotic Azithromycin for 3 more days  · Prednisone steroid taper, 40 mg daily for 3 days, then 30 mg daily for 3 days, then 20 mg daily for 3 days, then 10 mg daily for 3 days and stop  · Continue with Xopenex and Atrovent Inhaler started in the hospital  · Continue with home inhalers, Brovana, Pulmicort and albuterol as needed  · STOP DUONEB nebulizer  · STOP all over the counter ALEVE and all NSAIDs like advil or motrin, as it is not advisable to take it with history of kidney injury, history of hypertension or heart disease        Sincerely,     Marlon Delong MD

## 2019-09-11 NOTE — PLAN OF CARE
Problem: Potential for Falls  Goal: Patient will remain free of falls  Description  INTERVENTIONS:  - Assess patient frequently for physical needs  -  Identify cognitive and physical deficits and behaviors that affect risk of falls    -  [place] fall precautions as indicated by assessment   - Educate patient/family on patient safety including physical limitations  - Instruct patient to call for assistance with activity based on assessment  - Modify environment to reduce risk of injury  - Consider OT/PT consult to assist with strengthening/mobility  Outcome: Progressing     Problem: Prexisting or High Potential for Compromised Skin Integrity  Goal: Skin integrity is maintained or improved  Description  INTERVENTIONS:  - Identify patients at risk for skin breakdown  - Assess and monitor skin integrity  - Assess and monitor nutrition and hydration status  - Monitor labs   - Assess for incontinence   - Turn and reposition patient  - Assist with mobility/ambulation  - Relieve pressure over bony prominences  - Avoid friction and shearing  - Provide appropriate hygiene as needed including keeping skin clean and dry  - Evaluate need for skin moisturizer/barrier cream  - Collaborate with interdisciplinary team   - Patient/family teaching  - Consider wound care consult   Outcome: Progressing     Problem: PAIN - ADULT  Goal: Verbalizes/displays adequate comfort level or baseline comfort level  Description  Interventions:  - Encourage patient to monitor pain and request assistance  - Assess pain using appropriate pain scale  - Administer analgesics based on type and severity of pain and evaluate response  - Implement non-pharmacological measures as appropriate and evaluate response  - Consider cultural and social influences on pain and pain management  - Notify physician/advanced practitioner if interventions unsuccessful or patient reports new pain  Outcome: Progressing     Problem: INFECTION - ADULT  Goal: Absence or prevention of progression during hospitalization  Description  INTERVENTIONS:  - Assess and monitor for signs and symptoms of infection  - Monitor lab/diagnostic results  - Monitor all insertion sites, i e  indwelling lines, tubes, and drains  - Monitor endotracheal if appropriate and nasal secretions for changes in amount and color  - [place] appropriate cooling/warming therapies per order  - Administer medications as ordered  - Instruct and encourage patient and family to use good hand hygiene technique  - Identify and instruct in appropriate isolation precautions for identified infection/condition  Outcome: Progressing     Problem: SAFETY ADULT  Goal: Patient will remain free of falls  Description  INTERVENTIONS:  - Assess patient frequently for physical needs  -  Identify cognitive and physical deficits and behaviors that affect risk of falls    -  [place] fall precautions as indicated by assessment   - Educate patient/family on patient safety including physical limitations  - Instruct patient to call for assistance with activity based on assessment  - Modify environment to reduce risk of injury  - Consider OT/PT consult to assist with strengthening/mobility  Outcome: Progressing  Goal: Maintain or return to baseline ADL function  Description  INTERVENTIONS:  -  Assess patient's ability to carry out ADLs; assess patient's baseline for ADL function and identify physical deficits which impact ability to perform ADLs (bathing, care of mouth/teeth, toileting, grooming, dressing, etc )  - Assess/evaluate cause of self-care deficits   - Assess range of motion  - Assess patient's mobility; develop plan if impaired  - Assess patient's need for assistive devices and provide as appropriate  - Encourage maximum independence but intervene and supervise when necessary  - Involve family in performance of ADLs  - Assess for home care needs following discharge   - Consider OT consult to assist with ADL evaluation and planning for discharge  - Provide patient education as appropriate  Outcome: Progressing  Goal: Maintain or return mobility status to optimal level  Description  INTERVENTIONS:  - Assess patient's baseline mobility status (ambulation, transfers, stairs, etc )    - Identify cognitive and physical deficits and behaviors that affect mobility  - Identify mobility aids required to assist with transfers and/or ambulation (gait belt, sit-to-stand, lift, walker, cane, etc )  - [place] fall precautions as indicated by assessment  - Record patient progress and toleration of activity level on Mobility SBAR; progress patient to next Phase/Stage  - Instruct patient to call for assistance with activity based on assessment  - Consider rehabilitation consult to assist with strengthening/weightbearing, etc   Outcome: Progressing     Problem: DISCHARGE PLANNING  Goal: Discharge to home or other facility with appropriate resources  Description  INTERVENTIONS:  - Identify barriers to discharge w/patient and caregiver  - Arrange for needed discharge resources and transportation as appropriate  - Identify discharge learning needs (meds, wound care, etc )  - Arrange for interpretive services to assist at discharge as needed  - Refer to Case Management Department for coordinating discharge planning if the patient needs post-hospital services based on physician/advanced practitioner order or complex needs related to functional status, cognitive ability, or social support system  Outcome: Progressing     Problem: Knowledge Deficit  Goal: Patient/family/caregiver demonstrates understanding of disease process, treatment plan, medications, and discharge instructions  Description  Complete learning assessment and assess knowledge base    Interventions:  - Provide teaching at level of understanding  - Provide teaching via preferred learning methods  Outcome: Progressing

## 2019-09-11 NOTE — PROGRESS NOTES
Progress Note - Pulmonary   Hodan Riveraf 80 y o  male MRN: [id number]  Unit/Bed#: [address] [place] [address] [id number] Encounter: [id number]      Assessment/Plan:    COPD acute exacerbation - overall improved  He will be discharged home today with prednisone 40 mg daily, tapering by 10 mg every 3 days  He will continue all-nebulized regimen with budesonide and Brovana twice daily  He is also requesting prescription for Xopenex and Atrovent  He seems to tolerate Xopenex better, with albuterol causing some mild tachycardia  He will follow up with Dr Yao Cervantes on 10/7/19 as per discharge instructions  Okay for discharge home  Discussed with Internal Medicine  Subjective:   Patient feels better  Ready to go home  Less cough and shortness of breath  Objective:     Vitals: Blood pressure 123/79, pulse 105, temperature 98 °F (36 7 °C), temperature source Oral, resp  rate 18, height 5' 4" (1 626 m), weight 76 5 kg (168 lb 9 6 oz), SpO2 94 % , room air, Body mass index is 28 94 kg/m²  Intake/Output Summary (Last 24 hours) at 9/11/2019 0930  Last data filed at 9/11/2019 0515  Gross per 24 hour   Intake    Output 300 ml   Net -300 ml       Physical Exam:      General:  Awake, alert, no distress   HEENT: PERRL, EOMI, moist mucosa    Heart:  Regular rate and rhythm, no murmur   Lungs: Few rhonchi, no wheeze   Abdomen: Soft, nontender, normal bowel sounds   Extremities: No clubbing, cyanosis or edema    Labs: I have personally reviewed pertinent lab results      Results from last 7 days   Lab Units 09/09/19  1734 09/09/19  1535   WBC Thousand/uL  --  8 40   HEMOGLOBIN g/dL  --  14 7   HEMATOCRIT %  --  46 8   PLATELETS Thousands/uL 114* 132*         Results from last 7 days   Lab Units 09/11/19  0503 09/09/19  1535   POTASSIUM mmol/L 5 5* 4 1   CHLORIDE mmol/L 103 104   CO2 mmol/L 23 21   BUN mg/dL 36* 27*   CREATININE mg/dL 1 81* 2 18*   CALCIUM mg/dL 9 2 9 2   ALK PHOS U/L  --  104   ALT U/L  --  19   AST U/L  --  15 Results from last 7 days   Lab Units 09/09/19  1535   INR  1 09   PTT seconds 34

## 2019-09-11 NOTE — DISCHARGE SUMMARY
Discharge- Bruce Pham 6/30/1932, 80 y o  male MRN: [id number]    Unit/Bed#: [address] [place], [place] [id number] Encounter: [id number]    Primary Care Provider: Jessa Street MD   Date and time admitted to hospital: 9/9/2019  3:24 PM        * COPD with acute exacerbation [facility])  Assessment & Plan  · Acute COPD exacerbation, as evidenced by increased sputum production, cough and wheezing  · Patient medically stable for discharge, I discussed with Pulmonary and would discharge on prednisone taper, 40 mg daily for 3 days, then 30 mg daily for 3 days, then 20 mg daily for 3 days, then 10 mg daily for 3 days and stop  · continue Xopenex and Atrovent on discharge  · Mucinex 600 mg q 12 hours p o   ·  respiratory protocol  · Sputum culture results shows gram-positive cocci and gram-negative rods, sensitivities not available    · Would discharge on azithromycin to complete 5 days  · To follow up with Pulmonary as outpatient      Chronic diastolic heart failure ([facility] )  Assessment & Plan  Wt Readings from Last 3 Encounters:   09/11/19 76 5 kg (168 lb 9 6 oz)   09/09/19 77 5 kg (170 lb 12 8 oz)   07/26/19 80 3 kg (177 lb)       · Not in acute exacerbation currently  · -previous echocardiogram completed June 2018 revealed EF of 60% with grade 1 diastolic dysfunction  · - continue with atenolol  · Follow-up with Cardiology as scheduled  · Daily weights    Acquired hypothyroidism  Assessment & Plan  Continue with levothyroxine    Acute renal failure superimposed on stage 3 chronic kidney disease ([facility] )  Assessment & Plan  Acute on CKD stage 3 with a baseline creatinine of 1 6-1 8, on admission, creatinine 2 18, most likely secondary to dehydration  · Creatinine 1 81 today (baseline)  · Discontinue home medication NSAIDs (Naproxen) on discharge given that patient has a history of CKD, HTN and CAD  · Encourage oral hydration    Essential hypertension  Assessment & Plan  Blood pressure and under control with current regimen    CAD (coronary artery disease)  Assessment & Plan  History of CAD, currently on atenolol  · Patient unable to tolerate aspirin secondary to GI bleed in the past  ·  follow-ups regularly with Cardiology, Dr Carlos Hanson  · Continue with current medication          Discharging Physician / Practitioner: Sajan Garcia MD  PCP: Aman Beckford MD  Admission Date:   Admission Orders (From admission, onward)     Ordered        09/09/19 1628  Inpatient Admission (expected length of stay for this patient Order details is greater than two midnights)  Once                   Discharge Date: 09/11/19    Resolved Problems  Date Reviewed: 9/10/2019    None          Consultations During Hospital Stay:  · Pulmonary    Procedures Performed:   · None    Significant Findings / Test Results:   · Chest x-ray  FINDINGS:    Cardiomediastinal silhouette appears unremarkable  Patchy subsegmental atelectasis right base   No pneumothorax or pleural effusion  Osseous structures appear stable with chronic vertebral body compression deformities  Impression:       Patchy subsegmental atelectasis right base  ·     Incidental Findings:   · None     Test Results Pending at Discharge (will require follow up): · None     Outpatient Tests Requested:  · None    Complications:  None    Reason for Admission:  Shortness of breath and wheezing    Hospital Course:     Dixie Nuñez is a 80 y o  male patient with past medical history of COPD who originally presented to the hospital on 9/9/2019 due to shortness of breath and wheezing, patient was also tachycardic to the 110s and tachypneic, RR 26  Patient was subsequently started on intravenous Solu-Medrol and antibiotics as treatment for COPD exacerbation    Please see above list of diagnoses and related plan for additional information  Condition at Discharge: stable     Discharge Day Visit / Exam:     Subjective:  No overnight events    Patient feels fine this morning, not coughing as much with minimal sputum production    Vitals: Blood Pressure: 123/79 (09/11/19 0926)  Pulse: 105 (09/11/19 0926)  Temperature: 98 °F (36 7 °C) (09/11/19 0816)  Temp Source: Oral (09/11/19 0816)  Respirations: 18 (09/11/19 0816)  Height: 5' 4" (162 6 cm) (09/09/19 1738)  Weight - Scale: 76 5 kg (168 lb 9 6 oz) (09/11/19 0600)  SpO2: 94 % (09/11/19 0852)  Exam:   Physical Exam   Constitutional: He is oriented to person, place, and time  He appears well-developed  Neck: Neck supple  Cardiovascular: Normal rate, regular rhythm and intact distal pulses  Exam reveals no gallop and no friction rub  Murmur heard  Pulmonary/Chest: Effort normal and breath sounds normal  No stridor  No respiratory distress  He has no wheezes  He has no rales  He exhibits no tenderness  Abdominal: Soft  Bowel sounds are normal  He exhibits no distension and no mass  There is no tenderness  There is no guarding  Neurological: He is alert and oriented to person, place, and time  He displays normal reflexes  No cranial nerve deficit  Coordination normal    Skin: Skin is warm  Nursing note and vitals reviewed  Discussion with Family:  Patient's family updated    Discharge instructions/Information to patient and family:   See after visit summary for information provided to patient and family  Provisions for Follow-Up Care:  See after visit summary for information related to follow-up care and any pertinent home health orders  Disposition:     Home    For Discharges to   [address] SN[facility]:   · Not Applicable to this Patient - Not Applicable to this Patient    Planned Readmission:  No     Discharge Statement: This time was spent on the day of discharge  I had direct contact with the patient on the day of discharge   Greater than 50% of the total time was spent examining patient, answering all patient questions, arranging and discussing plan of care with patient as well as directly providing post-discharge instructions  Additional time then spent on discharge activities  Discharge Medications:  See after visit summary for reconciled discharge medications provided to patient and family        ** Please Note: This note has been constructed using a voice recognition system **

## 2019-09-11 NOTE — DISCHARGE INSTRUCTIONS
COPD (Chronic Obstructive Pulmonary Disease)   WHAT YOU NEED TO KNOW:   Chronic obstructive pulmonary disease (COPD) is a lung disease that makes it hard for you to breathe  It is usually a result of lung damage caused by years of irritation and inflammation in your lungs  DISCHARGE INSTRUCTIONS:   Call 911 if:   · You feel lightheaded, short of breath, and have chest pain  Seek care immediately if:   · You are confused, dizzy, or feel faint  · Your arm or leg feels warm, tender, and painful  It may look swollen and red  · You cough up blood  Contact your healthcare provider if:   · You have more shortness of breath than usual      · You need more medicine than usual to control your symptoms  · You are coughing or wheezing more than usual      · You are coughing up more mucus, or it is a different color or has a different odor  · You gain more than 3 pounds in a week  · You have a fever, a runny or stuffy nose, and a sore throat, or other cold or flu symptoms  · Your skin, lips, or nails start to turn blue  · You have swelling in your legs or ankles  · You are very tired or weak for more than a day  · You notice changes in your mood, or changes in your ability to think or concentrate  · You have questions or concerns about your condition or care  Medicines:   · Medicines  may be used to open your airways, decrease swelling and inflammation in your lungs, or treat an infection  You may need 2 or more medicines  A short-acting medicine relieves symptoms quickly  Long-acting medicines will control or prevent symptoms  Ask for more information about the medicines you are given and how to use them safely  · Take your medicine as directed  Contact your healthcare provider if you think your medicine is not helping or if you have side effects  Tell him or her if you are allergic to any medicine  Keep a list of the medicines, vitamins, and herbs you take   Include the amounts, and when and why you take them  Bring the list or the pill bottles to follow-up visits  Carry your medicine list with you in case of an emergency  Help make breathing easier:   · Use pursed-lip breathing any time you feel short of breath  Take a deep breath in through your nose  Slowly breathe out through your mouth with your lips pursed for twice as long as you inhaled  You can also practice this breathing pattern while you bend, lift, climb stairs, or exercise  It slows down your breathing and helps move more air in and out of your lungs  · Do not smoke, and avoid others who smoke  Nicotine and other substances can cause lung irritation or damage and make it harder for you to breathe  Do not use e-cigarettes or smokeless tobacco  They still contain nicotine  Ask your healthcare provider for information if you currently smoke and need help to quit  For support and more information:  ¨ [employer]  Phone: 4- 179 - 003-7802  Web Address: [employer]      · Be aware of and avoid anything that makes your symptoms worse  Stay out of high altitudes and places with high humidity  Stay inside, or cover your mouth and nose with a scarf when you are outside during cold weather  Stay inside on days when air pollution or pollen counts are high  Do not use aerosol sprays such as deodorant, bug spray, and hair spray  Manage COPD and help prevent exacerbations:  COPD is a serious condition that gets worse over time  A COPD exacerbation means your symptoms suddenly get worse  It is important to prevent exacerbations  An exacerbation can cause more lung damage  COPD cannot be cured, but you can take action to feel better and prevent COPD exacerbations:  · Protect yourself from germs  Germs can get into your lungs and cause an infection  An infection in your lungs can create more mucus and make it harder to breathe   An infection can also create swelling in your airways and prevent air from getting in  You can decrease your risk for infection by doing the following:     [facility] your hands often with soap and water  Carry germ-killing gel with you  You can use the gel to clean your hands when soap and water are not available  ¨ Do not touch your eyes, nose, or mouth unless you have washed your hands first      ¨ Always cover your mouth when you cough  Cough into a tissue or your shirtsleeve so you do not spread germs from your hands  ¨ Try to avoid people who have a cold or the flu  If you are sick, stay away from others as much as possible  · Drink more liquids  This will help to keep your air passages moist and help you cough up mucus  Ask how much liquid to drink each day and which liquids are best for you  · Exercise daily  Exercise for at least 20 minutes each day to help increase your energy and decrease shortness of breath  Walking or riding a bike are good ways to exercise  Talk to your healthcare provider about the best exercise plan for you  · Ask about vaccines  Your healthcare provider may recommend that you get regular flu and pneumonia vaccines  Pneumonia can become life-threatening for a person who has COPD  Ask about other vaccines you may need  Ask your healthcare provider about the flu and pneumonia vaccines  All adults should get the flu (influenza) vaccine every year as soon as it becomes available  The pneumonia vaccine is given to adults aged 72 or older to prevent pneumococcal disease, such as pneumonia  Adults aged 23 to 59 years who are at high risk for pneumococcal disease also should get the pneumococcal vaccine  It may need to be repeated 1 or 5 years later  Pulmonary rehabilitation:  Your healthcare provider may recommend a program to help you manage your symptoms and improve your quality of life  It may include nutritional counseling and exercise to strengthen your lungs     Make decisions about your choices for future treatment:  Ask for information about advanced medical directives and living chaudhari  These documents help you decide and write down your choices for treatment and end-of-life care  It is best to complete them when you feel well and can think clearly about your wishes  The information can then be kept for future use if you are in the hospital or become very ill  Follow up with your healthcare provider as directed: You may need more tests  Your healthcare provider may refer you to a pulmonary (lung) specialist  Write down your questions so you remember to ask them during your visits  © 2017 [facility] Information is for End User's use only and may not be sold, redistributed or otherwise used for commercial purposes  All illustrations and images included in CareNotes® are the copyrighted property of A D A M [employer] Inc  or [employer]  The above information is an  only  It is not intended as medical advice for individual conditions or treatments  Talk to your doctor, nurse or pharmacist before following any medical regimen to see if it is safe and effective for you

## 2019-09-11 NOTE — TELEPHONE ENCOUNTER
Please schedule pt with Dr Pia Patel 9/18 1:00 slot for TCM D/C 9/11 SLQ  Please call pt to confirm date, he asked for me to call back and leave on his voice mail b/c he could not see to write it on the calendar  I did call him back but just feel we should verify he did get it down on his calendar

## 2019-09-12 LAB
BACTERIA SPT RESP CULT: ABNORMAL
BACTERIA SPT RESP CULT: ABNORMAL
GRAM STN SPEC: ABNORMAL

## 2019-09-14 LAB
BACTERIA BLD CULT: NORMAL
BACTERIA BLD CULT: NORMAL

## 2019-09-18 ENCOUNTER — OFFICE VISIT (OUTPATIENT)
Dept: FAMILY MEDICINE CLINIC | Facility: HOSPITAL | Age: 84
End: 2019-09-18
Payer: MEDICARE

## 2019-09-18 VITALS
HEIGHT: 64 IN | OXYGEN SATURATION: 97 % | BODY MASS INDEX: 30.05 KG/M2 | TEMPERATURE: 97.3 F | DIASTOLIC BLOOD PRESSURE: 70 MMHG | HEART RATE: 65 BPM | SYSTOLIC BLOOD PRESSURE: 118 MMHG | WEIGHT: 176 LBS

## 2019-09-18 DIAGNOSIS — E03.9 ACQUIRED HYPOTHYROIDISM: ICD-10-CM

## 2019-09-18 DIAGNOSIS — Z23 ENCOUNTER FOR IMMUNIZATION: ICD-10-CM

## 2019-09-18 DIAGNOSIS — N18.30 STAGE 3 CHRONIC KIDNEY DISEASE (HCC): ICD-10-CM

## 2019-09-18 DIAGNOSIS — J41.8 MIXED SIMPLE AND MUCOPURULENT CHRONIC BRONCHITIS (HCC): Primary | ICD-10-CM

## 2019-09-18 DIAGNOSIS — D50.9 IRON DEFICIENCY ANEMIA, UNSPECIFIED IRON DEFICIENCY ANEMIA TYPE: ICD-10-CM

## 2019-09-18 DIAGNOSIS — E66.09 CLASS 1 OBESITY DUE TO EXCESS CALORIES WITH SERIOUS COMORBIDITY AND BODY MASS INDEX (BMI) OF 30.0 TO 30.9 IN ADULT: ICD-10-CM

## 2019-09-18 DIAGNOSIS — I25.10 CORONARY ARTERY DISEASE INVOLVING NATIVE CORONARY ARTERY OF NATIVE HEART WITHOUT ANGINA PECTORIS: ICD-10-CM

## 2019-09-18 DIAGNOSIS — J44.1 COPD WITH ACUTE EXACERBATION (HCC): ICD-10-CM

## 2019-09-18 PROCEDURE — 90662 IIV NO PRSV INCREASED AG IM: CPT | Performed by: FAMILY MEDICINE

## 2019-09-18 PROCEDURE — G0008 ADMIN INFLUENZA VIRUS VAC: HCPCS | Performed by: FAMILY MEDICINE

## 2019-09-18 PROCEDURE — 99495 TRANSJ CARE MGMT MOD F2F 14D: CPT | Performed by: FAMILY MEDICINE

## 2019-09-18 RX ORDER — LEVALBUTEROL INHALATION SOLUTION 1.25 MG/3ML
1.25 SOLUTION RESPIRATORY (INHALATION) 3 TIMES DAILY
Qty: 90 VIAL | Refills: 5 | Status: SHIPPED | OUTPATIENT
Start: 2019-09-18 | End: 2020-05-05

## 2019-09-18 RX ORDER — SODIUM CHLORIDE FOR INHALATION 0.9 %
3 VIAL, NEBULIZER (ML) INHALATION AS NEEDED
Qty: 60 ML | Refills: 1 | Status: SHIPPED | OUTPATIENT
Start: 2019-09-18 | End: 2019-11-16 | Stop reason: SDUPTHER

## 2019-09-18 NOTE — PROGRESS NOTES
Assessment/Plan:         Diagnoses and all orders for this visit:    Mixed simple and mucopurulent chronic bronchitis (HCC)  -     sodium chloride 0 9 % nebulizer solution; Take 3 mL by nebulization as needed for wheezing  -     levalbuterol (XOPENEX) 1 25 mg/3 mL nebulizer solution; Take 1 vial (1 25 mg total) by nebulization 3 (three) times a day    Acquired hypothyroidism    Coronary artery disease involving native coronary artery of native heart without angina pectoris    Iron deficiency anemia, unspecified iron deficiency anemia type  Comments:  Continue iron every other day    Class 1 obesity due to excess calories with serious comorbidity and body mass index (BMI) of 30 0 to 30 9 in adult    Stage 3 chronic kidney disease ([facility] )  Comments:  Creatinine back to baseline at discharge  COPD with acute exacerbation (HCC)          Subjective:      Patient ID: Lobo Mendes is a 80 y o  male  TCM for SLQ    Had exacerbation of COPD/chronic bronchitis  Got too jittery when doing nebulizer TID  Now at home does it BID back to back Levalbuterol and Ipratropium    Completing the Azithromycin, Prednisone is done    Daughter in law is having him get acupuncture for neck pain  When in flare, it really limits his activity and may exacerbate his breathing issues          The following portions of the patient's history were reviewed and updated as appropriate: allergies, current medications, past family history, past medical history, past social history, past surgical history and problem list   TCM Call (since 8/18/2019)     Date and time call was made  9/11/2019  5:58 PM    Hospital care reviewed  Records reviewed    Patient was hospitialized at  [facility]    Date of Admission  09/09/19    Date of discharge  09/11/19    Diagnosis  COPD    Disposition  Home    Were the patients medications reviewed and updated  No    Current Symptoms  None      TCM Call (since 8/18/2019)     Post hospital issues  None Should patient be enrolled in anticoag monitoring? No    Scheduled for follow up? Yes    Did you obtain your prescribed medications  Yes    Do you need help managing your prescriptions or medications  No    Is transportation to your appointment needed  No    I have advised the patient to call PCP with any new or worsening symptoms  David Chang MA          Review of Systems   Constitutional: Negative for unexpected weight change  HENT: Negative  Respiratory: Positive for shortness of breath  Negative for cough and wheezing  Cardiovascular: Negative  Gastrointestinal: Negative  Musculoskeletal: Positive for neck pain and neck stiffness  Psychiatric/Behavioral: The patient is nervous/anxious  All other systems reviewed and are negative  Objective:      /70   Pulse 65   Temp (!) 97 3 °F (36 3 °C)   Ht 5' 4" (1 626 m)   Wt 79 8 kg (176 lb)   SpO2 97% Comment: Room air  BMI 30 21 kg/m²          Physical Exam   Constitutional: He is oriented to person, place, and time  He appears well-developed and well-nourished  Neck: No thyromegaly present  Cardiovascular: Normal rate, regular rhythm, normal heart sounds and intact distal pulses  Pulmonary/Chest: Effort normal  He has decreased breath sounds  Musculoskeletal: He exhibits no edema  Neurological: He is oriented to person, place, and time  Psychiatric: He has a normal mood and affect  His behavior is normal    Nursing note and vitals reviewed

## 2019-09-27 DIAGNOSIS — M54.2 NECK PAIN, ACUTE: ICD-10-CM

## 2019-09-27 RX ORDER — METAXALONE 400 MG/1
TABLET ORAL
Qty: 30 TABLET | Refills: 2 | Status: SHIPPED | OUTPATIENT
Start: 2019-09-27 | End: 2019-11-02 | Stop reason: SDUPTHER

## 2019-09-28 ENCOUNTER — APPOINTMENT (EMERGENCY)
Dept: RADIOLOGY | Facility: HOSPITAL | Age: 84
End: 2019-09-28
Payer: MEDICARE

## 2019-09-28 ENCOUNTER — HOSPITAL ENCOUNTER (EMERGENCY)
Facility: HOSPITAL | Age: 84
Discharge: HOME | End: 2019-09-28
Attending: EMERGENCY MEDICINE | Admitting: EMERGENCY MEDICINE
Payer: MEDICARE

## 2019-09-28 ENCOUNTER — APPOINTMENT (EMERGENCY)
Dept: CT IMAGING | Facility: HOSPITAL | Age: 84
End: 2019-09-28
Payer: MEDICARE

## 2019-09-28 VITALS
OXYGEN SATURATION: 96 % | BODY MASS INDEX: 30.2 KG/M2 | DIASTOLIC BLOOD PRESSURE: 70 MMHG | RESPIRATION RATE: 18 BRPM | WEIGHT: 175.93 LBS | SYSTOLIC BLOOD PRESSURE: 132 MMHG | TEMPERATURE: 98.5 F | HEART RATE: 109 BPM

## 2019-09-28 DIAGNOSIS — R51.9 HEADACHE: Primary | ICD-10-CM

## 2019-09-28 DIAGNOSIS — M47.812 CERVICAL SPINE ARTHRITIS: ICD-10-CM

## 2019-09-28 DIAGNOSIS — R42 VERTIGO: ICD-10-CM

## 2019-09-28 LAB
ALBUMIN SERPL BCP-MCNC: 3 G/DL (ref 3.5–5)
ALP SERPL-CCNC: 84 U/L (ref 46–116)
ALT SERPL W P-5'-P-CCNC: 25 U/L (ref 12–78)
ANION GAP SERPL CALCULATED.3IONS-SCNC: 10 MMOL/L (ref 4–13)
AST SERPL W P-5'-P-CCNC: 19 U/L (ref 5–45)
BASOPHILS # BLD AUTO: 0.03 THOUSANDS/ΜL (ref 0–0.1)
BASOPHILS NFR BLD AUTO: 0 % (ref 0–1)
BILIRUB SERPL-MCNC: 0.8 MG/DL (ref 0.2–1)
BUN SERPL-MCNC: 26 MG/DL (ref 5–25)
CALCIUM SERPL-MCNC: 9.2 MG/DL (ref 8.3–10.1)
CHLORIDE SERPL-SCNC: 100 MMOL/L (ref 100–108)
CO2 SERPL-SCNC: 22 MMOL/L (ref 21–32)
CREAT SERPL-MCNC: 2.09 MG/DL (ref 0.6–1.3)
EOSINOPHIL # BLD AUTO: 0.11 THOUSAND/ΜL (ref 0–0.61)
EOSINOPHIL NFR BLD AUTO: 1 % (ref 0–6)
ERYTHROCYTE [DISTWIDTH] IN BLOOD BY AUTOMATED COUNT: 13.6 % (ref 11.6–15.1)
GFR SERPL CREATININE-BSD FRML MDRD: 28 ML/MIN/1.73SQ M
GLUCOSE SERPL-MCNC: 153 MG/DL (ref 65–140)
HCT VFR BLD AUTO: 45 % (ref 36.5–49.3)
HGB BLD-MCNC: 14.5 G/DL (ref 12–17)
IMM GRANULOCYTES # BLD AUTO: 0.03 THOUSAND/UL (ref 0–0.2)
IMM GRANULOCYTES NFR BLD AUTO: 0 % (ref 0–2)
LYMPHOCYTES # BLD AUTO: 1.77 THOUSANDS/ΜL (ref 0.6–4.47)
LYMPHOCYTES NFR BLD AUTO: 22 % (ref 14–44)
MCH RBC QN AUTO: 30.1 PG (ref 26.8–34.3)
MCHC RBC AUTO-ENTMCNC: 32.2 G/DL (ref 31.4–37.4)
MCV RBC AUTO: 93 FL (ref 82–98)
MONOCYTES # BLD AUTO: 1.08 THOUSAND/ΜL (ref 0.17–1.22)
MONOCYTES NFR BLD AUTO: 13 % (ref 4–12)
NEUTROPHILS # BLD AUTO: 5.13 THOUSANDS/ΜL (ref 1.85–7.62)
NEUTS SEG NFR BLD AUTO: 64 % (ref 43–75)
NRBC BLD AUTO-RTO: 0 /100 WBCS
PLATELET # BLD AUTO: 115 THOUSANDS/UL (ref 149–390)
PMV BLD AUTO: 9.6 FL (ref 8.9–12.7)
POTASSIUM SERPL-SCNC: 4.4 MMOL/L (ref 3.5–5.3)
PROT SERPL-MCNC: 7.7 G/DL (ref 6.4–8.2)
RBC # BLD AUTO: 4.82 MILLION/UL (ref 3.88–5.62)
SODIUM SERPL-SCNC: 132 MMOL/L (ref 136–145)
TROPONIN I SERPL-MCNC: <0.02 NG/ML
WBC # BLD AUTO: 8.15 THOUSAND/UL (ref 4.31–10.16)

## 2019-09-28 PROCEDURE — 80053 COMPREHEN METABOLIC PANEL: CPT | Performed by: EMERGENCY MEDICINE

## 2019-09-28 PROCEDURE — 99285 EMERGENCY DEPT VISIT HI MDM: CPT | Performed by: EMERGENCY MEDICINE

## 2019-09-28 PROCEDURE — 36415 COLL VENOUS BLD VENIPUNCTURE: CPT | Performed by: EMERGENCY MEDICINE

## 2019-09-28 PROCEDURE — 71046 X-RAY EXAM CHEST 2 VIEWS: CPT

## 2019-09-28 PROCEDURE — 72125 CT NECK SPINE W/O DYE: CPT

## 2019-09-28 PROCEDURE — 70450 CT HEAD/BRAIN W/O DYE: CPT

## 2019-09-28 PROCEDURE — 85025 COMPLETE CBC W/AUTO DIFF WBC: CPT | Performed by: EMERGENCY MEDICINE

## 2019-09-28 PROCEDURE — 84484 ASSAY OF TROPONIN QUANT: CPT | Performed by: EMERGENCY MEDICINE

## 2019-09-28 PROCEDURE — 93005 ELECTROCARDIOGRAM TRACING: CPT

## 2019-09-28 PROCEDURE — 99285 EMERGENCY DEPT VISIT HI MDM: CPT

## 2019-09-28 RX ORDER — ACETAMINOPHEN 325 MG/1
975 TABLET ORAL ONCE
Status: COMPLETED | OUTPATIENT
Start: 2019-09-28 | End: 2019-09-28

## 2019-09-28 RX ORDER — MECLIZINE HYDROCHLORIDE 25 MG/1
25 TABLET ORAL 3 TIMES DAILY PRN
Qty: 30 TABLET | Refills: 0 | Status: ON HOLD | OUTPATIENT
Start: 2019-09-28 | End: 2020-02-23 | Stop reason: SDUPTHER

## 2019-09-28 RX ADMIN — ACETAMINOPHEN 975 MG: 325 TABLET, FILM COATED ORAL at 20:38

## 2019-09-28 NOTE — ED CARE HANDOFF
Emergency Department Sign Out Note        Sign out and transfer of care from [facility]**  See Separate Emergency Department note  The patient, Stella Hernandez, was evaluated by the previous provider for headache and neck pain for several months muscle relaxants were causing dizziness  Workup Completed:  ** chronic renal insufficiency on labs  CT of head negative CT cervical spine still pending chest x-ray right trent diaphragm elevated no acute abnormality  *    ED Course / Workup Pending (followup):  * if CT cervical spine also negative will ambulate to determine is safe to go home  **      ambulated well                    ED Course as of Sep 29 0414   Sat Sep 28, 2019   [facility] [address] radiology to get c-spine and cxr read        Procedures  MDM  Number of Diagnoses or Management Options  Cervical spine arthritis: new and requires workup  Headache: new and requires workup  Vertigo: new and requires workup     Amount and/or Complexity of Data Reviewed  Clinical lab tests: reviewed  Tests in the radiology section of CPT®: reviewed  Obtain history from someone other than the patient: yes  Discuss the patient with other providers: yes    Patient Progress  Patient progress: improved      Disposition  Final diagnoses:   Headache   Vertigo   Cervical spine arthritis     Time reflects when diagnosis was documented in both MDM as applicable and the Disposition within this note     Time User Action Codes Description Comment    9/28/2019  9:12 PM Susana Bosworth Add [R51] Headache     9/28/2019  9:14 PM Susana Bosworth Add [R42] Vertigo     9/28/2019  9:14 PM Susana Bosworth Add [Z14 [id number]] Cervical spine arthritis       ED Disposition     ED Disposition Condition Date/Time Comment    Discharge Stable Sat Sep 28, 2019  9:12 PM Stella Hernandez discharge to home/self care              Follow-up Information     Follow up With Specialties Details Why Contact Info Additional Information    Camron Dc MD Family Medicine [place]  [address] [facility]  [address] [facility] Tee Ledezma  [facility]   [address]  [place] [address] [facility] [address] [phone]  121 [facility] [address], [address], [address], [place], [place], [address]        Discharge Medication List as of 9/28/2019  9:17 PM      START taking these medications    Details   meclizine (ANTIVERT) 25 mg tablet Take 1 tablet (25 mg total) by mouth 3 (three) times a day as needed for dizziness, Starting Sat 9/28/2019, Normal         CONTINUE these medications which have NOT CHANGED    Details   albuterol (PROVENTIL HFA,VENTOLIN HFA) 90 mcg/act inhaler Inhale 2 puffs every 6 (six) hours as needed for wheezing, Starting Tue 6/25/2019, Normal      arformoterol (BROVANA) 15 mcg/2 mL nebulizer solution Take 15 mcg by nebulization 2 (two) times a day, Historical Med      atenolol (TENORMIN) 25 mg tablet TAKE ONE TABLET BY MOUTH EVERY DAY, Normal      B Complex Vitamins (B COMPLEX PO) Take by mouth daily, Historical Med      budesonide (PULMICORT) 0 5 mg/2 mL nebulizer solution Take 0 5 mg by nebulization 2 (two) times a day Rinse mouth after use , Historical Med      Chlorphen-PE-Acetaminophen (CORICIDIN D COLD/FLU/SINUS PO) Take by mouth, Historical Med      Cholecalciferol (CVS VITAMIN D) 2000 units CAPS Take by mouth daily , Historical Med      Cyanocobalamin (B-12 PO) Take by mouth daily, Historical Med      Ferrous Sulfate (IRON) 325 (65 Fe) MG TABS Take by mouth every other day , Historical Med      fluticasone (FLONASE) 50 mcg/act nasal spray 1 spray into each nostril 2 (two) times a day, Historical Med      guaiFENesin (MUCINEX) 600 mg 12 hr tablet Take 1,200 mg by mouth every 12 (twelve) hours, Historical Med      ipratropium (ATROVENT) 0 02 % nebulizer solution Take 1 vial (0 5 mg total) by nebulization 3 (three) times a day, Starting Wed 9/11/2019, Until Fri 10/11/2019, Normal      levalbuterol (XOPENEX) 1 25 mg/3 mL nebulizer solution Take 1 vial (1 25 mg total) by nebulization 3 (three) times a day, Starting Wed 9/18/2019, Normal      levothyroxine 75 mcg tablet Take 75 mcg by mouth daily 37 5 mg Daily, Historical Med      LORazepam (ATIVAN) 0 5 mg tablet TAKE ONE TABLET BY MOUTH EVERY 8 HOURS AS NEEDED FOR ANXIETY, Normal      metaxalone (SKELAXIN) 400 MG tablet TAKE ONE TABLET BY MOUTH THREE TIMES A DAY, Normal      Misc Natural Products (CORTISOL PO) Take by mouth, Historical Med      Multiple Vitamins-Minerals (VISION FORMULA/LUTEIN PO) Take by mouth, Starting Sat 11/15/2014, Historical Med      nystatin-triamcinolone (MYCOLOG-II) cream APPLY SPARINGLY TO THE AFFECTED AREA(S) TWO TIMES A DAY, Normal      omeprazole (PriLOSEC) 20 mg delayed release capsule TAKE ONE CAPSULE BY MOUTH EVERY DAY, Normal      sodium chloride 0 9 % nebulizer solution Take 3 mL by nebulization as needed for wheezing, Starting Wed 9/18/2019, Normal      tamsulosin (FLOMAX) 0 4 mg Take 1 capsule (0 4 mg total) by mouth daily with dinner, Starting Wed 4/10/2019, Normal         STOP taking these medications       zolpidem (AMBIEN) 5 mg tablet Comments:   Reason for Stopping:                    ED Provider  Electronically Signed by     Charly Myles, DO  09/29/19 6193

## 2019-09-28 NOTE — ED PROVIDER NOTES
History  Chief Complaint   Patient presents with    Headache     To ED with family for c/o progressive weakness, headaches and neck pain for several weeks  Patient was unable to get out of chair on his own today   Weakness - Generalized     History from patient and his family  This 61-year-old man with history of hypothyroidism, coronary artery disease, iron deficiency anemia, chronic kidney disease stage 3, COPD, recurrent headaches and neck pain has been having dizziness when he ambulates over the last week  He has been feeling weak and shaky  He has had more frequent and more severe headaches than usual   Family notes that he shaky and has difficulty ambulating today  He has not been getting around much throughout the week  Prior to Admission Medications   Prescriptions Last Dose Informant Patient Reported? Taking?    B Complex Vitamins (B COMPLEX PO)  Self Yes No   Sig: Take by mouth daily   Chlorphen-PE-Acetaminophen (CORICIDIN D COLD/FLU/SINUS PO)  Self Yes No   Sig: Take by mouth   Cholecalciferol (CVS VITAMIN D) 2000 units CAPS  Self Yes No   Sig: Take by mouth daily    Cyanocobalamin (B-12 PO)  Self Yes No   Sig: Take by mouth daily   Ferrous Sulfate (IRON) 325 (65 Fe) MG TABS  Self Yes No   Sig: Take by mouth every other day    LORazepam (ATIVAN) 0 5 mg tablet   No No   Sig: TAKE ONE TABLET BY MOUTH EVERY 8 HOURS AS NEEDED FOR ANXIETY   Misc Natural Products (CORTISOL PO)  Self Yes No   Sig: Take by mouth   Multiple Vitamins-Minerals (VISION FORMULA/LUTEIN PO)  Self Yes No   Sig: Take by mouth   albuterol (PROVENTIL HFA,VENTOLIN HFA) 90 mcg/act inhaler  Self No No   Sig: Inhale 2 puffs every 6 (six) hours as needed for wheezing   arformoterol (BROVANA) 15 mcg/2 mL nebulizer solution  Self Yes No   Sig: Take 15 mcg by nebulization 2 (two) times a day   atenolol (TENORMIN) 25 mg tablet  Self No No   Sig: TAKE ONE TABLET BY MOUTH EVERY DAY   budesonide (PULMICORT) 0 5 mg/2 mL nebulizer solution  Self Yes No   Sig: Take 0 5 mg by nebulization 2 (two) times a day Rinse mouth after use     fluticasone (FLONASE) 50 mcg/act nasal spray  Self Yes No   Si spray into each nostril 2 (two) times a day   guaiFENesin (MUCINEX) 600 mg 12 hr tablet  Self Yes No   Sig: Take 1,200 mg by mouth every 12 (twelve) hours   ipratropium (ATROVENT) 0 02 % nebulizer solution   No No   Sig: Take 1 vial (0 5 mg total) by nebulization 3 (three) times a day   levalbuterol (XOPENEX) 1 25 mg/3 mL nebulizer solution   No No   Sig: Take 1 vial (1 25 mg total) by nebulization 3 (three) times a day   levothyroxine 75 mcg tablet  Self Yes No   Sig: Take 75 mcg by mouth daily 37 5 mg Daily   metaxalone (SKELAXIN) 400 MG tablet   No No   Sig: TAKE ONE TABLET BY MOUTH THREE TIMES A DAY   nystatin-triamcinolone (MYCOLOG-II) cream  Self No No   Sig: APPLY SPARINGLY TO THE AFFECTED AREA(S) TWO TIMES A DAY   omeprazole (PriLOSEC) 20 mg delayed release capsule  Self No No   Sig: TAKE ONE CAPSULE BY MOUTH EVERY DAY   sodium chloride 0 9 % nebulizer solution   No No   Sig: Take 3 mL by nebulization as needed for wheezing   tamsulosin (FLOMAX) 0 4 mg  Self No No   Sig: Take 1 capsule (0 4 mg total) by mouth daily with dinner   zolpidem (AMBIEN) 5 mg tablet  Self No No   Sig: Take 1 tablet (5 mg total) by mouth daily at bedtime as needed for sleep      Facility-Administered Medications: None       Past Medical History:   Diagnosis Date    Anxiety disorder due to general medical condition 2013    Compression fracture of thoracic vertebra (HCC)     last assessed 2012    COPD (chronic obstructive pulmonary disease) (HCC)     Disease of thyroid gland     Heart attack ([facility] )     History of methicillin resistant staphylococcus aureus (MRSA) 2019    negative nasal swab     Hollenhorst plaque, right eye     last assessed 2013    Hyperlipidemia     Hypertension     Iron deficiency anemia due to chronic blood loss last assessed 09/10/2012    Ischemic colitis ([facility] )     last assessed 2014    Osteoarthritis of both hands     last assessedn 2013    Peptic ulcer     last assessed 2013    Polymyalgia rheumatica ([facility] )     last assessesd 10/09/2012    Renal disorder     Upper GI hemorrhage     last assessed 2015    Varicose veins of lower extremity     last assessed 2013       Past Surgical History:   Procedure Laterality Date    CORONARY ARTERY BYPASS GRAFT      HEMORRHOID SURGERY      HERNIA REPAIR      RENAL CYST EXCISION      resolved 2010    THROMBOENDARTERECTOMY Right     carotid, last assessed 2013       Family History   Problem Relation Age of Onset    Hypertension Mother     Alcohol abuse Mother     Hypertension Father     Alcohol abuse Father     Alcohol abuse Son     Heart disease Family     Stroke Family         syndrome     I have reviewed and agree with the history as documented  Social History     Tobacco Use    Smoking status: Former Smoker     Packs/day: 2 00     Years: 50 00     Pack years: 100 00     Types: Cigarettes     Last attempt to quit:      Years since quittin 7    Smokeless tobacco: Never Used    Tobacco comment: Quit 40 yrs  ago   Substance Use Topics    Alcohol use: Yes     Alcohol/week: 1 0 standard drinks     Types: 1 Glasses of wine per week     Comment: social    Drug use: No        Review of Systems   Constitutional: Negative  HENT: Negative  Eyes: Positive for photophobia (Chronic)  Respiratory: Positive for cough and shortness of breath ( chronic)  Cardiovascular: Negative  Gastrointestinal: Negative  Endocrine: Negative  Genitourinary: Negative  Musculoskeletal: Positive for gait problem and neck pain ( chronic)  Skin: Negative  Allergic/Immunologic: Negative  Neurological: Positive for dizziness and headaches  Negative for seizures, syncope, facial asymmetry and numbness     Hematological: Negative  Psychiatric/Behavioral: Negative  All other systems reviewed and are negative  Physical Exam  Physical Exam   Constitutional: He is oriented to person, place, and time  He appears well-developed and well-nourished  No distress  HENT:   Head: Normocephalic and atraumatic  Right Ear: External ear normal    Left Ear: External ear normal    Mouth/Throat: Oropharynx is clear and moist    Eyes: Pupils are equal, round, and reactive to light  Conjunctivae and EOM are normal    Neck: Normal range of motion  Neck supple  No JVD present  Cardiovascular: Normal rate, regular rhythm, normal heart sounds and intact distal pulses  No murmur heard  Pulmonary/Chest: Effort normal    Mildly diminished breath sounds   Abdominal: Soft  Bowel sounds are normal  He exhibits no distension and no mass  There is no tenderness  There is no rebound and no guarding  Musculoskeletal: Normal range of motion  He exhibits no edema or tenderness  Lymphadenopathy:     He has no cervical adenopathy  Neurological: He is alert and oriented to person, place, and time  He has normal reflexes  He displays normal reflexes  No cranial nerve deficit or sensory deficit  He exhibits normal muscle tone  Coordination normal    Skin: Skin is warm and dry  Capillary refill takes less than 2 seconds  No rash noted  He is not diaphoretic  Psychiatric: He has a normal mood and affect  His behavior is normal    Nursing note and vitals reviewed        Vital Signs  ED Triage Vitals   Temperature Pulse Respirations Blood Pressure SpO2   09/28/19 1702 09/28/19 1700 09/28/19 1700 09/28/19 1700 09/28/19 1700   98 5 °F (36 9 °C) (!) 112 20 138/56 90 %      Temp Source Heart Rate Source Patient Position - Orthostatic VS BP Location FiO2 (%)   09/28/19 1702 09/28/19 1702 09/28/19 1702 09/28/19 1702 --   Tympanic Monitor Sitting Right arm       Pain Score       09/28/19 1702       Worst Possible Pain           Vitals:    09/28/19 1715 09/28/19 1815 09/28/19 1845 09/28/19 1915   BP: 122/77 126/69 135/70 132/70   Pulse: (!) 113 (!) 110 (!) 113 (!) 109   Patient Position - Orthostatic VS:             Visual Acuity  Visual Acuity      Most Recent Value   L Pupil Size (mm)  3   R Pupil Size (mm)  3          ED Medications  Medications   acetaminophen (TYLENOL) tablet 975 mg (975 mg Oral Given 9/28/19 2038)       Diagnostic Studies  Results Reviewed     Procedure Component Value Units Date/Time    Troponin I [[id number]]  (Normal) Collected:  09/28/19 1708    Lab Status:  Final result Specimen:  Blood from Arm, Left Updated:  09/28/19 1741     Troponin I <0 02 ng/mL     CBC and differential [[id number]]  (Abnormal) Collected:  09/28/19 1708    Lab Status:  Final result Specimen:  Blood from Arm, Left Updated:  09/28/19 1740     WBC 8 15 Thousand/uL      RBC 4 82 Million/uL      Hemoglobin 14 5 g/dL      Hematocrit 45 0 %      MCV 93 fL      MCH 30 1 pg      MCHC 32 2 g/dL      RDW 13 6 %      MPV 9 6 fL      Platelets 959 Thousands/uL      nRBC 0 /100 WBCs      Neutrophils Relative 64 %      Immat GRANS % 0 %      Lymphocytes Relative 22 %      Monocytes Relative 13 %      Eosinophils Relative 1 %      Basophils Relative 0 %      Neutrophils Absolute 5 13 Thousands/µL      Immature Grans Absolute 0 03 Thousand/uL      Lymphocytes Absolute 1 77 Thousands/µL      Monocytes Absolute 1 08 Thousand/µL      Eosinophils Absolute 0 11 Thousand/µL      Basophils Absolute 0 03 Thousands/µL     Narrative:        No Clots    Comprehensive metabolic panel [[id number]]  (Abnormal) Collected:  09/28/19 1708    Lab Status:  Final result Specimen:  Blood from Arm, Left Updated:  09/28/19 1738     Sodium 132 mmol/L      Potassium 4 4 mmol/L      Chloride 100 mmol/L      CO2 22 mmol/L      ANION GAP 10 mmol/L      BUN 26 mg/dL      Creatinine 2 09 mg/dL      Glucose 153 mg/dL      Calcium 9 2 mg/dL      AST 19 U/L      ALT 25 U/L      Alkaline Phosphatase 84 U/L      Total Protein 7 7 g/dL      Albumin 3 0 g/dL      Total Bilirubin 0 80 mg/dL      eGFR 28 ml/min/1 73sq m     Narrative:       [place] guidelines for Chronic Kidney Disease (CKD):     Stage 1 with normal or high GFR (GFR > 90 mL/min/1 73 square meters)    Stage 2 Mild CKD (GFR = 60-89 mL/min/1 73 square meters)    Stage 3A Moderate CKD (GFR = 45-59 mL/min/1 73 square meters)    Stage 3B Moderate CKD (GFR = 30-44 mL/min/1 73 square meters)    Stage 4 Severe CKD (GFR = 15-29 mL/min/1 73 square meters)    Stage 5 End Stage CKD (GFR <15 mL/min/1 73 square meters)  Note: GFR calculation is accurate only with a steady state creatinine                 XR chest 2 views   ED Interpretation by Rogers Heck DO (09/28 1920)   Elevated right hemidiaphragm  No acute abnl      Final Result by Brandy Polanco MD (09/28 2101)      No acute cardiopulmonary disease  Workstation performed: [id number]         CT head without contrast   Final Result by Brandy Polanco MD (09/28 1845)      No acute intracranial abnormality  Microangiopathic changes  Workstation performed: [id number]         CT cervical spine without contrast   Final Result by Brandy Polanco MD (09/28 2059)      No cervical spine fracture or traumatic malalignment  Workstation performed: [id number]                    Procedures  Procedures       ED Course       Remained stable during my sift  Signed out to Dr Mae Almanza at 7 pm, awaiting CT results and result of ambulation                          MDM    Disposition  Final diagnoses:   Headache   Vertigo   Cervical spine arthritis     Time reflects when diagnosis was documented in both MDM as applicable and the Disposition within this note     Time User Action Codes Description Comment    9/28/2019  9:12 PM Richmond Norton Add [R51] Headache     9/28/2019  9:14 PM Richmond Norton Add [R42] Vertigo     9/28/2019  9:14 PM Richmond Norton Add [[id number]] Cervical spine arthritis       ED Disposition     ED Disposition Condition Date/Time Comment    Discharge Stable Sat Sep 28, 2019  9:12  [address] discharge to home/self care              Follow-up Information     Follow up With Specialties Details Why Contact Info Additional Information    Teo Bonds MD [facility]   [facility]  [address]  [address] [facility],[address]       [facility]   [address] [facility] [address] [phone]  121 [facility] [address], [address], [address], [place], [place], [address]          Discharge Medication List as of 9/28/2019  9:17 PM      START taking these medications    Details   meclizine (ANTIVERT) 25 mg tablet Take 1 tablet (25 mg total) by mouth 3 (three) times a day as needed for dizziness, Starting Sat 9/28/2019, Normal         CONTINUE these medications which have NOT CHANGED    Details   albuterol (PROVENTIL HFA,VENTOLIN HFA) 90 mcg/act inhaler Inhale 2 puffs every 6 (six) hours as needed for wheezing, Starting Tue 6/25/2019, Normal      arformoterol (BROVANA) 15 mcg/2 mL nebulizer solution Take 15 mcg by nebulization 2 (two) times a day, Historical Med      atenolol (TENORMIN) 25 mg tablet TAKE ONE TABLET BY MOUTH EVERY DAY, Normal      B Complex Vitamins (B COMPLEX PO) Take by mouth daily, Historical Med      budesonide (PULMICORT) 0 5 mg/2 mL nebulizer solution Take 0 5 mg by nebulization 2 (two) times a day Rinse mouth after use , Historical Med      Chlorphen-PE-Acetaminophen (CORICIDIN D COLD/FLU/SINUS PO) Take by mouth, Historical Med      Cholecalciferol (CVS VITAMIN D) 2000 units CAPS Take by mouth daily , Historical Med      Cyanocobalamin (B-12 PO) Take by mouth daily, Historical Med      Ferrous Sulfate (IRON) 325 (65 Fe) MG TABS Take by mouth every other day , Historical Med      fluticasone (FLONASE) 50 mcg/act nasal spray 1 spray into each nostril 2 (two) times a day, Historical Med      guaiFENesin (MUCINEX) 600 mg 12 hr tablet Take 1,200 mg by mouth every 12 (twelve) hours, Historical Med      ipratropium (ATROVENT) 0 02 % nebulizer solution Take 1 vial (0 5 mg total) by nebulization 3 (three) times a day, Starting Wed 9/11/2019, Until Fri 10/11/2019, Normal      levalbuterol (XOPENEX) 1 25 mg/3 mL nebulizer solution Take 1 vial (1 25 mg total) by nebulization 3 (three) times a day, Starting Wed 9/18/2019, Normal      levothyroxine 75 mcg tablet Take 75 mcg by mouth daily 37 5 mg Daily, Historical Med      LORazepam (ATIVAN) 0 5 mg tablet TAKE ONE TABLET BY MOUTH EVERY 8 HOURS AS NEEDED FOR ANXIETY, Normal      metaxalone (SKELAXIN) 400 MG tablet TAKE ONE TABLET BY MOUTH THREE TIMES A DAY, Normal      Misc Natural Products (CORTISOL PO) Take by mouth, Historical Med      Multiple Vitamins-Minerals (VISION FORMULA/LUTEIN PO) Take by mouth, Starting Sat 11/15/2014, Historical Med      nystatin-triamcinolone (MYCOLOG-II) cream APPLY SPARINGLY TO THE AFFECTED AREA(S) TWO TIMES A DAY, Normal      omeprazole (PriLOSEC) 20 mg delayed release capsule TAKE ONE CAPSULE BY MOUTH EVERY DAY, Normal      sodium chloride 0 9 % nebulizer solution Take 3 mL by nebulization as needed for wheezing, Starting Wed 9/18/2019, Normal      tamsulosin (FLOMAX) 0 4 mg Take 1 capsule (0 4 mg total) by mouth daily with dinner, Starting Wed 4/10/2019, Normal         STOP taking these medications       zolpidem (AMBIEN) 5 mg tablet Comments:   Reason for Stopping:                 ED Provider  Electronically Signed by           Estee Chavira DO  09/29/19 7082

## 2019-09-29 NOTE — ED NOTES
Patient was able to ambulate with standby assist and wheelchair follow  Patient maintained proper O2 saturation above 94% for walk and was able to shuffle with minimum support        Suly Azar RN  09/28/19 2051

## 2019-09-29 NOTE — ED NOTES
Returned from [facility]  Made comfortable  Family at bedside       April East RN  09/28/19 2027

## 2019-09-29 NOTE — ED NOTES
Patient discharged from ED following having instructions reviewed  Patient and family verbalized understanding of follow up care        Sara Hilario RN  09/28/19 2042

## 2019-10-02 LAB
ATRIAL RATE: 116 BPM
P AXIS: 73 DEGREES
PR INTERVAL: 154 MS
QRS AXIS: 82 DEGREES
QRSD INTERVAL: 84 MS
QT INTERVAL: 322 MS
QTC INTERVAL: 447 MS
T WAVE AXIS: 62 DEGREES
VENTRICULAR RATE: 116 BPM

## 2019-10-02 PROCEDURE — 93010 ELECTROCARDIOGRAM REPORT: CPT | Performed by: INTERNAL MEDICINE

## 2019-10-03 ENCOUNTER — VBI (OUTPATIENT)
Dept: FAMILY MEDICINE CLINIC | Facility: HOSPITAL | Age: 84
End: 2019-10-03

## 2019-10-03 DIAGNOSIS — Z71.89 COMPLEX CARE COORDINATION: Primary | ICD-10-CM

## 2019-10-03 NOTE — TELEPHONE ENCOUNTER
[facility]    ED Visit Information     Ed visit date: 9/28/2019  Diagnosis Description: Headache; Vertigo; Cervical spine arthritis  In Network? Yes Shantell Jeronimo  Discharge status: Home  Discharged with meds ? Yes  Number of ED visits to date: 3 ( 2 ED to hospital admissions)  ED Severity:n/a     Outreach Information    Outreach successful: Yes 1  Date letter mailed:n/a  Date Finalized:9/28/2019    Care Coordination    Follow up appointment with pcp: no Declined  Transportation issues ? Yes (depends on friends and family members)    [employer] Outreach    Outreach type:  7 Day Outreach  Emergent necessity warranted by diagnosis:  No  [facility]:  Yes  Transportation:  Friend/Family Transport  Feels able to call PCP for urgent problems ?:  Yes  Understands what emergencies can be handled by PCP ?:  No  Ever any problems getting appointment with PCP for minor emergency/urgency problems?:  No  Practice Contacted Patient ?:  No  Pt had ED follow up with pcp/staff ?:  No    Seen for follow-up out of network ?:  No  Reason Patient went to ED instead of Urgent Care or PCP?:  Perceived Severity of Illness  Urgent care Education?:  No  10/03/2019 09:52 AM Phone (Amado Crocker) Anna Tran (Self) [phone] (H)   Call Complete  Personal communication with patient regarding recent ED visit on 9/28 for Headache; Vertigo; Cervical spine arthritis  Patient was discharged without medication (meclizine) and advised to follow up with PCP  Patient stated that he is feeling better and declined to schedule a follow up appt at this time  He stated that he will schedule when his daughter in law returns from vacation  Due to medical history I will forward medical information to [facility]   Patient also stated that he occasional has transportation issues but most of time his friends or family members will assist  Patient stated that his family is aware of PCP after hours on-call service, nearest [facility] and what conditions may be treated there due to use in the past

## 2019-10-04 ENCOUNTER — PATIENT OUTREACH (OUTPATIENT)
Dept: FAMILY MEDICINE CLINIC | Facility: HOSPITAL | Age: 84
End: 2019-10-04

## 2019-10-07 ENCOUNTER — OFFICE VISIT (OUTPATIENT)
Dept: PULMONOLOGY | Facility: HOSPITAL | Age: 84
End: 2019-10-07
Payer: MEDICARE

## 2019-10-07 VITALS
DIASTOLIC BLOOD PRESSURE: 78 MMHG | SYSTOLIC BLOOD PRESSURE: 122 MMHG | WEIGHT: 170 LBS | BODY MASS INDEX: 29.02 KG/M2 | OXYGEN SATURATION: 96 % | TEMPERATURE: 97.6 F | HEART RATE: 70 BPM | HEIGHT: 64 IN

## 2019-10-07 DIAGNOSIS — I21.19 INFERIOR MI (HCC): ICD-10-CM

## 2019-10-07 DIAGNOSIS — R06.02 SHORTNESS OF BREATH: ICD-10-CM

## 2019-10-07 DIAGNOSIS — N18.30 STAGE 3 CHRONIC KIDNEY DISEASE (HCC): ICD-10-CM

## 2019-10-07 DIAGNOSIS — R06.83 SNORING: ICD-10-CM

## 2019-10-07 DIAGNOSIS — G47.19 EXCESSIVE DAYTIME SLEEPINESS: ICD-10-CM

## 2019-10-07 DIAGNOSIS — J44.1 COPD WITH ACUTE EXACERBATION (HCC): Primary | ICD-10-CM

## 2019-10-07 PROCEDURE — 99213 OFFICE O/P EST LOW 20 MIN: CPT | Performed by: INTERNAL MEDICINE

## 2019-10-07 PROCEDURE — 94618 PULMONARY STRESS TESTING: CPT | Performed by: INTERNAL MEDICINE

## 2019-10-07 NOTE — LETTER
October 8, 2019     Janelle Arias, [address]  [address] [facility]  [address] [address]    Patient: Hernán Garcia   YOB: 1932   Date of Visit: 10/7/2019       Dear Dr Mehdi De La Cruz: Thank you for referring Michelle Rendon to me for evaluation  Below are my notes for this consultation  If you have questions, please do not hesitate to call me  I look forward to following your patient along with you  Sincerely,        Nathaniel Xavier DO        CC: No Recipients  Nathaniel Xavier DO  10/8/2019 10:53 PM  Incomplete  Progress note - Pulmonary Medicine   Hernán Garcia 80 y o  male MRN: [id number]       Impression & Plan:   81 y/o M with PMHx of HTN, HLD, CAD s/p MI, ischemic colitis, anxiety and COPD with chronic bronchitis who comes in for follow up of his COPD after recent hospitalization  1   COPD - emphysema noted on CT but previous nery demonstrated no obstruction         -  Due to poor inhaler technique, he was placed on all nebulized regimen  However, he has not been compliant with it  -  I went over his medications and he is not sure of the ones he is taking  He states that he has two that he is supposed to use twice a day but only uses once  -  I recommended that he at least try to use brovana and budesonide twice daily  -    Continue duonebs every 6 hrs PRN  -  Repeat PFTs to assess for severity  2    Abnormal CT - faint ground glass and nodular opacities - stable for 2 years  No known exposures       -   If clinical status worsens, will check CT chest        3  Chronic cough/post nasal gtt       -  I recommend nasal steroids to help with this  He will try         4  Snoring/Excessive daytime sleepiness and pulmonary hypertension  He is at risk for obstructive sleep apnea based on [facility] BANG survey  -   He never obtained previous sleep study, I will reorder it today          -  I  reminded him of the risk of leaving sleep apnea untreated including hypertension, heart failure, arrhythmia, MI and stroke  -  The patient is agreeable to undergo testing and treatment of obstructive sleep apnea  He understands that pitfalls she may encounter along the way and is willing to attempt CPAP treatment      ______________________________________________________________________    HPI:    Pérez Meza presents today for follow-up after recent hospitalization  He states that he has been doing well and denies shortness of breath, cough or wheezing  However, his grandaughter was with him and states that he is short of breath with effort  He does hade a chronic cough which is intermittently productive  She also mentioned that he is often tire through the day  Review of Systems:  Review of Systems   HENT: Positive for congestion and postnasal drip  Negative for facial swelling, rhinorrhea and sore throat  Respiratory: Positive for cough, chest tightness, shortness of breath and wheezing  Cardiovascular: Negative  Gastrointestinal: Negative  Endocrine: Negative  Musculoskeletal: Negative  Skin: Negative  Hematological: Negative  Psychiatric/Behavioral: Negative  Social history updates:  Social History     Tobacco Use   Smoking Status Former Smoker    Packs/day: 2 00    Years: 50 00    Pack years: 100 00    Types: Cigarettes    Start date:     Last attempt to quit:     Years since quittin 7   Smokeless Tobacco Never Used   Tobacco Comment    Quit 40 yrs  ago     Social History     Socioeconomic History    Marital status:       Spouse name: Not on file    Number of children: Not on file    Years of education: Not on file    Highest education level: Not on file   Occupational History    Not on file   Social Needs    Financial resource strain: Not on file    Food insecurity:     Worry: Not on file     Inability: Not on file    Transportation needs: Medical: Not on file     Non-medical: Not on file   Tobacco Use    Smoking status: Former Smoker     Packs/day: 2 00     Years: 50 00     Pack years: 100 00     Types: Cigarettes     Start date: 65     Last attempt to quit:      Years since quittin 7    Smokeless tobacco: Never Used    Tobacco comment: Quit 40 yrs   ago   Substance and Sexual Activity    Alcohol use: Not Currently     Alcohol/week: 1 0 standard drinks     Types: 1 Glasses of wine per week     Comment: social    Drug use: No    Sexual activity: Not on file   Lifestyle    Physical activity:     Days per week: Not on file     Minutes per session: Not on file    Stress: Not on file   Relationships    Social connections:     Talks on phone: Not on file     Gets together: Not on file     Attends [employer] service: Not on file     Active member of club or organization: Not on file     Attends meetings of clubs or organizations: Not on file     Relationship status: Not on file    Intimate partner violence:     Fear of current or ex partner: Not on file     Emotionally abused: Not on file     Physically abused: Not on file     Forced sexual activity: Not on file   Other Topics Concern    Not on file   Social History Narrative     per [employer]    Always uses seat belt       PhysicalExamination:  Vitals:   /78 (BP Location: Left arm, Patient Position: Sitting, Cuff Size: Standard)   Pulse 70   Temp 97 6 °F (36 4 °C) (Tympanic)   Ht 5' 4" (1 626 m)   Wt 77 1 kg (170 lb)   SpO2 96%   BMI 29 18 kg/m²    General: Pleasant, Awake alert and oriented x 3, conversant without conversational dyspnea, NAD, normal affect  HEENT:  PERRL, Sclera noninjected, nonicteric OU, Nares patent,  no craniofacial abnormalities, Mucous membranes, moist, no oral lesions, normal dentition, Mallampati class 4  NECK: Trachea midline, no accessory muscle use, no stridor, no cervical or supraclavicular adenopathy, JVP not elevated  CARDIAC: Reg, single s1/S2, no m/r/g  PULM: CTA bilaterally no wheezing, rhonchi or rales  ABD: Normoactive bowel sounds, soft nontender, nondistended, no rebound, no rigidity, no guarding  EXT: No cyanosis, no clubbing, no edema, normal capillary refill  NEURO: no focal neurologic deficits, AAOx3, moving all extremities appropriately      Diagnostic Data:  Labs: I personally reviewed the most recent laboratory data pertinent to today's visit  I have personally reviewed pertinent lab results  Lab Results   Component Value Date    WBC 8 15 09/28/2019    HGB 14 5 09/28/2019    HCT 45 0 09/28/2019    MCV 93 09/28/2019     (L) 09/28/2019     Lab Results   Component Value Date    GLUCOSE 92 08/28/2015    CALCIUM 9 2 09/28/2019     08/28/2015    K 4 4 09/28/2019    CO2 22 09/28/2019     09/28/2019    BUN 26 (H) 09/28/2019    CREATININE 2 09 (H) 09/28/2019     No results found for: IGE  Lab Results   Component Value Date    ALT 25 09/28/2019    AST 19 09/28/2019    ALKPHOS 84 09/28/2019    BILITOT 0 45 08/28/2015          Imaging and other studies: I have personally reviewed pertinent reports      V/Q scan - 6/4/18  low probability for PE     US lower extremities 6/4/18 - negative for DVT     6/1/18 - IMPRESSION:     No acute noncontrast CT abnormality in the chest to account for the patient's shortness of breath      Chronic findings which are unchanged from prior examinations include emphysematous changes as well as prominence of central pulmonary arterial tree suggesting some element of pulmonary artery hypertension   Coronary artery calcifications and cardiomegaly   are noted      Unchanged groundglass right mid chest pulmonary nodules are reidentified   Additional follow-up chest CT in 2 years is recommended for these groundglass nodules without associated solid tissue, similar at least as far back as 2015      Renal cysts, splenomegaly, and compression deformities, similar from prior examination      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, currently measuring 15 mm in greatest dimension increased from 13 mm in June 2017   This demonstrated vascular enhancement on abdomen CT of April 23, 2010 when this   measured only 9 mm      Pulmonary function testing:   Siva Paulinoo - FVC - 79%               FEV1 - 85%               FEV1/FVC - 103%     6 minute walk today  Starting saturation - 92   Starting HR - 72  Lowest saturation - 92 improved to 97%    Highest HR - 90  Ambulated - 210 m without the need for oxygen  EKG, Pathology, and Other Studies: I have personally reviewed pertinent reports  SUMMARY     LEFT VENTRICLE:  Systolic function was normal  Ejection fraction was estimated to be 60 %  Although no diagnostic regional wall motion abnormality was identified, this possibility cannot be completely excluded on the basis of this study  Wall thickness was at the upper limits of normal   Doppler parameters were consistent with abnormal left ventricular relaxation (grade 1 diastolic dysfunction)      LEFT ATRIUM:  The atrium was mildly dilated      MITRAL VALVE:  There was mild annular calcification  There was mild regurgitation      AORTIC VALVE:  There was very mild stenosis  There was trace regurgitation      TRICUSPID VALVE:  There was mild regurgitation  Pulmonary artery systolic pressure was mildly increased  Estimated peak PA pressure was 40 mmHg      AORTA:  There was mild dilatation of the ascending aorta    Payton Bloch, DO Payton Bloch, DO  10/8/2019 10:52 PM  Sign at close encounter  Progress note - Pulmonary Medicine   Stefanie Worthy 80 y o  male MRN: [id number]       Impression & Plan:   79 y/o M with PMHx of HTN, HLD, CAD s/p MI, ischemic colitis, anxiety and COPD with chronic bronchitis who comes in for follow up of his COPD after recent hospitalization      1   COPD - emphysema noted on CT but previous nery demonstrated no obstruction         -  Due to poor inhaler technique, he was placed on all nebulized regimen  However, he has not been compliant with it  -  I went over his medications and he is not sure of the ones he is taking  He states that he has two that he is supposed to use twice a day but only uses once  -  I recommended that he at least try to use brovana and budesonide twice daily  -    Continue duonebs every 6 hrs PRN  2    Abnormal CT - faint ground glass and nodular opacities - stable for 2 years  No known exposures       -   If clinical status worsens, will check CT chest        3  Chronic cough/post nasal gtt       -  I recommend nasal steroids to help with this  He will try         4  Snoring/Excessive daytime sleepiness and pulmonary hypertension  He is at risk for obstructive sleep apnea based on [facility] BANG survey  -   He never obtained previous sleep study, I will reorder it today  -  I  reminded him of the risk of leaving sleep apnea untreated including hypertension, heart failure, arrhythmia, MI and stroke  -  The patient is agreeable to undergo testing and treatment of obstructive sleep apnea  He understands that pitfalls she may encounter along the way and is willing to attempt CPAP treatment      ______________________________________________________________________    HPI:    Crystal Burdick presents today for follow-up after recent hospitalization  He states that he has been doing well and denies shortness of breath, cough or wheezing  However, his grandaughter was with him and states that he is short of breath with effort  He does hade a chronic cough which is intermittently productive  She also mentioned that he is often tire through the day  Review of Systems:  Review of Systems   HENT: Positive for congestion and postnasal drip  Negative for facial swelling, rhinorrhea and sore throat      Respiratory: Positive for cough, chest tightness, shortness of breath and wheezing  Cardiovascular: Negative  Gastrointestinal: Negative  Endocrine: Negative  Musculoskeletal: Negative  Skin: Negative  Hematological: Negative  Psychiatric/Behavioral: Negative  Social history updates:  Social History     Tobacco Use   Smoking Status Former Smoker    Packs/day: 2 00    Years: 50 00    Pack years: 100 00    Types: Cigarettes    Start date:     Last attempt to quit:     Years since quittin 7   Smokeless Tobacco Never Used   Tobacco Comment    Quit 40 yrs  ago     Social History     Socioeconomic History    Marital status:      Spouse name: Not on file    Number of children: Not on file    Years of education: Not on file    Highest education level: Not on file   Occupational History    Not on file   Social Needs    Financial resource strain: Not on file    Food insecurity:     Worry: Not on file     Inability: Not on file    Transportation needs:     Medical: Not on file     Non-medical: Not on file   Tobacco Use    Smoking status: Former Smoker     Packs/day: 2 00     Years: 50 00     Pack years: 100 00     Types: Cigarettes     Start date:      Last attempt to quit:      Years since quittin 7    Smokeless tobacco: Never Used    Tobacco comment: Quit 40 yrs   ago   Substance and Sexual Activity    Alcohol use: Not Currently     Alcohol/week: 1 0 standard drinks     Types: 1 Glasses of wine per week     Comment: social    Drug use: No    Sexual activity: Not on file   Lifestyle    Physical activity:     Days per week: Not on file     Minutes per session: Not on file    Stress: Not on file   Relationships    Social connections:     Talks on phone: Not on file     Gets together: Not on file     Attends [employer] service: Not on file     Active member of club or organization: Not on file     Attends meetings of clubs or organizations: Not on file     Relationship status: Not on file    Intimate partner violence:     Fear of current or ex partner: Not on file     Emotionally abused: Not on file     Physically abused: Not on file     Forced sexual activity: Not on file   Other Topics Concern    Not on file   Social History Narrative     per [employer]    Always uses seat belt       PhysicalExamination:  Vitals:   /78 (BP Location: Left arm, Patient Position: Sitting, Cuff Size: Standard)   Pulse 70   Temp 97 6 °F (36 4 °C) (Tympanic)   Ht 5' 4" (1 626 m)   Wt 77 1 kg (170 lb)   SpO2 96%   BMI 29 18 kg/m²    General: Pleasant, Awake alert and oriented x 3, conversant without conversational dyspnea, NAD, normal affect  HEENT:  PERRL, Sclera noninjected, nonicteric OU, Nares patent,  no craniofacial abnormalities, Mucous membranes, moist, no oral lesions, normal dentition, Mallampati class 4  NECK: Trachea midline, no accessory muscle use, no stridor, no cervical or supraclavicular adenopathy, JVP not elevated  CARDIAC: Reg, single s1/S2, no m/r/g  PULM: CTA bilaterally no wheezing, rhonchi or rales  ABD: Normoactive bowel sounds, soft nontender, nondistended, no rebound, no rigidity, no guarding  EXT: No cyanosis, no clubbing, no edema, normal capillary refill  NEURO: no focal neurologic deficits, AAOx3, moving all extremities appropriately      Diagnostic Data:  Labs: I personally reviewed the most recent laboratory data pertinent to today's visit  I have personally reviewed pertinent lab results    Lab Results   Component Value Date    WBC 8 15 09/28/2019    HGB 14 5 09/28/2019    HCT 45 0 09/28/2019    MCV 93 09/28/2019     (L) 09/28/2019     Lab Results   Component Value Date    GLUCOSE 92 08/28/2015    CALCIUM 9 2 09/28/2019     08/28/2015    K 4 4 09/28/2019    CO2 22 09/28/2019     09/28/2019    BUN 26 (H) 09/28/2019    CREATININE 2 09 (H) 09/28/2019     No results found for: IGE  Lab Results   Component Value Date    ALT 25 09/28/2019    AST 19 09/28/2019    ALKPHOS 84 09/28/2019    BILITOT 0 45 08/28/2015          Imaging and other studies: I have personally reviewed pertinent reports  V/Q scan - 6/4/18  low probability for PE     US lower extremities 6/4/18 - negative for DVT     6/1/18 - IMPRESSION:     No acute noncontrast CT abnormality in the chest to account for the patient's shortness of breath      Chronic findings which are unchanged from prior examinations include emphysematous changes as well as prominence of central pulmonary arterial tree suggesting some element of pulmonary artery hypertension   Coronary artery calcifications and cardiomegaly   are noted      Unchanged groundglass right mid chest pulmonary nodules are reidentified   Additional follow-up chest CT in 2 years is recommended for these groundglass nodules without associated solid tissue, similar at least as far back as 2015      Renal cysts, splenomegaly, and compression deformities, similar from prior examination      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, currently measuring 15 mm in greatest dimension increased from 13 mm in June 2017   This demonstrated vascular enhancement on abdomen CT of April 23, 2010 when this   measured only 9 mm      Pulmonary function testing:   Jey Ornelas - FVC - 79%               FEV1 - 85%               FEV1/FVC - 103%     6 minute walk today  Starting saturation - 92   Starting HR - 72  Lowest saturation - 92 improved to 97%    Highest HR - 90  Ambulated - 210 m without the need for oxygen  EKG, Pathology, and Other Studies: I have personally reviewed pertinent reports  SUMMARY     LEFT VENTRICLE:  Systolic function was normal  Ejection fraction was estimated to be 60 %  Although no diagnostic regional wall motion abnormality was identified, this possibility cannot be completely excluded on the basis of this study    Wall thickness was at the upper limits of normal   Doppler parameters were consistent with abnormal left ventricular relaxation (grade 1 diastolic dysfunction)      LEFT ATRIUM:  The atrium was mildly dilated      MITRAL VALVE:  There was mild annular calcification  There was mild regurgitation      AORTIC VALVE:  There was very mild stenosis  There was trace regurgitation      TRICUSPID VALVE:  There was mild regurgitation  Pulmonary artery systolic pressure was mildly increased    Estimated peak PA pressure was 40 mmHg      AORTA:  There was mild dilatation of the ascending aorta    Sol Luna DO

## 2019-10-09 NOTE — PROGRESS NOTES
Progress note - Pulmonary Medicine   Ankit Rubio 80 y o  male MRN: [id number]       Impression & Plan:   79 y/o M with PMHx of HTN, HLD, CAD s/p MI, ischemic colitis, anxiety and COPD with chronic bronchitis who comes in for follow up of his COPD after recent hospitalization  1   COPD - emphysema noted on CT but previous nery demonstrated no obstruction         - Due to poor inhaler technique, he was placed on all nebulized regimen  However, he has not been compliant with it  -  I went over his medications and he is not sure of the ones he is taking  He states that he has two that he is supposed to use twice a day but only uses once  -  I recommended that he at least try to use brovana and budesonide twice daily  -  Continue duonebs every 6 hrs PRN  -  Repeat PFTs to assess for severity  2    Abnormal CT - faint ground glass and nodular opacities - stable for 2 years  No known exposures       -  If clinical status worsens, will check CT chest        3  Chronic cough/post nasal gtt       - I recommend nasal steroids to help with this  He will try         4  Snoring/Excessive daytime sleepiness and pulmonary hypertension  He is at risk for obstructive sleep apnea based on [facility] survey  -  He never obtained previous sleep study, I will reorder it today  -  I reminded him of the risk of leaving sleep apnea untreated including hypertension, heart failure, arrhythmia, MI and stroke  -  The patient is agreeable to undergo testing and treatment of obstructive sleep apnea  He understands that pitfalls she may encounter along the way and is willing to attempt CPAP treatment      ______________________________________________________________________    HPI:    Ankit Rubio presents today for follow-up after recent hospitalization  He states that he has been doing well and denies shortness of breath, cough or wheezing    However, his radha was with him and states that he is short of breath with effort  He does hade a chronic cough which is intermittently productive  She also mentioned that he is often tire through the day  Review of Systems:  Review of Systems   HENT: Positive for congestion and postnasal drip  Negative for facial swelling, rhinorrhea and sore throat  Respiratory: Positive for cough, chest tightness, shortness of breath and wheezing  Cardiovascular: Negative  Gastrointestinal: Negative  Endocrine: Negative  Musculoskeletal: Negative  Skin: Negative  Hematological: Negative  Psychiatric/Behavioral: Negative  Social history updates:  Social History     Tobacco Use   Smoking Status Former Smoker    Packs/day: 2 00    Years: 50 00    Pack years: 100 00    Types: Cigarettes    Start date:     Last attempt to quit:     Years since quittin 7   Smokeless Tobacco Never Used   Tobacco Comment    Quit 40 yrs  ago     Social History     Socioeconomic History    Marital status:      Spouse name: Not on file    Number of children: Not on file    Years of education: Not on file    Highest education level: Not on file   Occupational History    Not on file   Social Needs    Financial resource strain: Not on file    Food insecurity:     Worry: Not on file     Inability: Not on file    Transportation needs:     Medical: Not on file     Non-medical: Not on file   Tobacco Use    Smoking status: Former Smoker     Packs/day: 2 00     Years: 50 00     Pack years: 100 00     Types: Cigarettes     Start date:      Last attempt to quit:      Years since quittin 7    Smokeless tobacco: Never Used    Tobacco comment: Quit 40 yrs   ago   Substance and Sexual Activity    Alcohol use: Not Currently     Alcohol/week: 1 0 standard drinks     Types: 1 Glasses of wine per week     Comment: social    Drug use: No    Sexual activity: Not on file   Lifestyle    Physical activity:     Days per week: Not on file     Minutes per session: Not on file    Stress: Not on file   Relationships    Social connections:     Talks on phone: Not on file     Gets together: Not on file     Attends Yazdanism service: Not on file     Active member of club or organization: Not on file     Attends meetings of clubs or organizations: Not on file     Relationship status: Not on file    Intimate partner violence:     Fear of current or ex partner: Not on file     Emotionally abused: Not on file     Physically abused: Not on file     Forced sexual activity: Not on file   Other Topics Concern    Not on file   Social History Narrative     per [employer]    Always uses seat belt       PhysicalExamination:  Vitals:   /78 (BP Location: Left arm, Patient Position: Sitting, Cuff Size: Standard)   Pulse 70   Temp 97 6 °F (36 4 °C) (Tympanic)   Ht 5' 4" (1 626 m)   Wt 77 1 kg (170 lb)   SpO2 96%   BMI 29 18 kg/m²   General: Pleasant, Awake alert and oriented x 3, conversant without conversational dyspnea, NAD, normal affect  HEENT:  PERRL, Sclera noninjected, nonicteric OU, Nares patent,  no craniofacial abnormalities, Mucous membranes, moist, no oral lesions, normal dentition, Mallampati class 4  NECK: Trachea midline, no accessory muscle use, no stridor, no cervical or supraclavicular adenopathy, JVP not elevated  CARDIAC: Reg, single s1/S2, no m/r/g  PULM: CTA bilaterally no wheezing, rhonchi or rales  ABD: Normoactive bowel sounds, soft nontender, nondistended, no rebound, no rigidity, no guarding  EXT: No cyanosis, no clubbing, no edema, normal capillary refill  NEURO: no focal neurologic deficits, AAOx3, moving all extremities appropriately      Diagnostic Data:  Labs: I personally reviewed the most recent laboratory data pertinent to today's visit  I have personally reviewed pertinent lab results    Lab Results   Component Value Date    WBC 8 15 09/28/2019    HGB 14 5 09/28/2019    HCT 45 0 09/28/2019    MCV 93 09/28/2019     (L) 09/28/2019     Lab Results   Component Value Date    GLUCOSE 92 08/28/2015    CALCIUM 9 2 09/28/2019     08/28/2015    K 4 4 09/28/2019    CO2 22 09/28/2019     09/28/2019    BUN 26 (H) 09/28/2019    CREATININE 2 09 (H) 09/28/2019     No results found for: IGE  Lab Results   Component Value Date    ALT 25 09/28/2019    AST 19 09/28/2019    ALKPHOS 84 09/28/2019    BILITOT 0 45 08/28/2015          Imaging and other studies: I have personally reviewed pertinent reports  V/Q scan - 6/4/18  low probability for PE     US lower extremities 6/4/18 - negative for DVT     6/1/18 - IMPRESSION:     No acute noncontrast CT abnormality in the chest to account for the patient's shortness of breath      Chronic findings which are unchanged from prior examinations include emphysematous changes as well as prominence of central pulmonary arterial tree suggesting some element of pulmonary artery hypertension   Coronary artery calcifications and cardiomegaly   are noted      Unchanged groundglass right mid chest pulmonary nodules are reidentified   Additional follow-up chest CT in 2 years is recommended for these groundglass nodules without associated solid tissue, similar at least as far back as 2015      Renal cysts, splenomegaly, and compression deformities, similar from prior examination      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, currently measuring 15 mm in greatest dimension increased from 13 mm in June 2017   This demonstrated vascular enhancement on abdomen CT of April 23, 2010 when this   measured only 9 mm      Pulmonary function testing:   Tanfdee Dove - FVC - 79%               FEV1 - 85%               FEV1/FVC - 103%     6 minute walk today  Starting saturation - 92   Starting HR - 72  Lowest saturation - 92 improved to 97%    Highest HR - 90  Ambulated - 210 m without the need for oxygen        EKG, Pathology, and Other Studies: I have personally reviewed pertinent reports  SUMMARY     LEFT VENTRICLE:  Systolic function was normal  Ejection fraction was estimated to be 60 %  Although no diagnostic regional wall motion abnormality was identified, this possibility cannot be completely excluded on the basis of this study  Wall thickness was at the upper limits of normal   Doppler parameters were consistent with abnormal left ventricular relaxation (grade 1 diastolic dysfunction)      LEFT ATRIUM:  The atrium was mildly dilated      MITRAL VALVE:  There was mild annular calcification  There was mild regurgitation      AORTIC VALVE:  There was very mild stenosis  There was trace regurgitation      TRICUSPID VALVE:  There was mild regurgitation  Pulmonary artery systolic pressure was mildly increased    Estimated peak PA pressure was 40 mmHg      AORTA:  There was mild dilatation of the ascending aorta    Judge Diana DO

## 2019-10-16 ENCOUNTER — OFFICE VISIT (OUTPATIENT)
Dept: FAMILY MEDICINE CLINIC | Facility: HOSPITAL | Age: 84
End: 2019-10-16
Payer: MEDICARE

## 2019-10-16 VITALS
HEIGHT: 64 IN | BODY MASS INDEX: 29.43 KG/M2 | TEMPERATURE: 97.3 F | SYSTOLIC BLOOD PRESSURE: 124 MMHG | OXYGEN SATURATION: 96 % | HEART RATE: 79 BPM | DIASTOLIC BLOOD PRESSURE: 60 MMHG | WEIGHT: 172.4 LBS

## 2019-10-16 DIAGNOSIS — M47.812 CERVICAL SPINE ARTHRITIS: ICD-10-CM

## 2019-10-16 DIAGNOSIS — N18.30 STAGE 3 CHRONIC KIDNEY DISEASE (HCC): ICD-10-CM

## 2019-10-16 DIAGNOSIS — I10 ESSENTIAL HYPERTENSION: ICD-10-CM

## 2019-10-16 DIAGNOSIS — Q25.72: ICD-10-CM

## 2019-10-16 DIAGNOSIS — R06.00 DYSPNEA ON EXERTION: Primary | ICD-10-CM

## 2019-10-16 DIAGNOSIS — E03.9 ACQUIRED HYPOTHYROIDISM: ICD-10-CM

## 2019-10-16 DIAGNOSIS — I25.10 CORONARY ARTERY DISEASE INVOLVING NATIVE CORONARY ARTERY OF NATIVE HEART WITHOUT ANGINA PECTORIS: ICD-10-CM

## 2019-10-16 DIAGNOSIS — J41.8 MIXED SIMPLE AND MUCOPURULENT CHRONIC BRONCHITIS (HCC): ICD-10-CM

## 2019-10-16 PROCEDURE — 99214 OFFICE O/P EST MOD 30 MIN: CPT | Performed by: FAMILY MEDICINE

## 2019-10-16 NOTE — PROGRESS NOTES
Assessment/Plan:         Diagnoses and all orders for this visit:    Dyspnea on exertion  Comments:  Unclear if this is more pulmonary or more cardiac  Will query Dr Alec Baltazar if Yamile Arvizu would be eligible for stress echo to elucidate  Mixed simple and mucopurulent chronic bronchitis (HCC)    Acquired hypothyroidism  Comments:  Clinically euthyroid    Essential hypertension  Comments:  Excellent BP control    Coronary artery disease involving native coronary artery of native heart without angina pectoris    Cervical spine arthritis    Stage 3 chronic kidney disease (HCC)    Aneurysm, pulmonary, arteriovenous          Subjective:      Patient ID: Tressa Fine is a 80 y o  male  Follow up multiple issues  Seen in ER for acute severe dizziness, sent with Meclizine but didn't actually take any  Seen by Dr Mary Diaz, advised another sleep study  Yamile Arvizu isnt anxious to investigate the sleep apnea issue  Family is concerned that his very interfering dyspnea is more cardiac or more pulmonary in etiology  Yamile Arvizu hasnt followed through with recommendations from Dr Mary Diaz as far as consistency of nebulizer and inhaler use  He says he gets to jittery with nebs and the other inhalers burn his throat  He did have a 6 minute walk at recent visit and actually held a good O2 level  Also advised another evaluation with Dr Alec Baltazar        The following portions of the patient's history were reviewed and updated as appropriate: allergies, current medications, past family history, past medical history, past social history, past surgical history and problem list     Review of Systems   Constitutional: Negative for unexpected weight change  HENT: Positive for congestion  Respiratory: Positive for shortness of breath and wheezing  Negative for cough  Cardiovascular: Negative  Gastrointestinal: Negative  Musculoskeletal: Positive for arthralgias and back pain  Hematological: Negative  Psychiatric/Behavioral: Positive for decreased concentration  The patient is nervous/anxious  All other systems reviewed and are negative  Objective:      /60 (Patient Position: Sitting, Cuff Size: Standard)   Pulse 79   Temp (!) 97 3 °F (36 3 °C) (Tympanic)   Ht 5' 4" (1 626 m)   Wt 78 2 kg (172 lb 6 4 oz)   SpO2 96%   BMI 29 59 kg/m²          Physical Exam   Constitutional: He is oriented to person, place, and time  He appears well-developed and well-nourished  Neck: No thyromegaly present  Cardiovascular: Normal rate, regular rhythm and normal heart sounds  Pulmonary/Chest: Effort normal  No respiratory distress  He has wheezes  Neurological: He is alert and oriented to person, place, and time  Nursing note and vitals reviewed

## 2019-10-18 ENCOUNTER — TELEPHONE (OUTPATIENT)
Dept: PULMONOLOGY | Facility: CLINIC | Age: 84
End: 2019-10-18

## 2019-10-18 NOTE — TELEPHONE ENCOUNTER
Patients daughter Quinn Caruso calling saying Dr Carlos Enrique Nunez would like to see hospitals medication list since it was not correct at last office visit  Quinn Caruso has an updated list that she is going to fax over to [facility]  Once it is received, can you have Dr Carlos Enrique Nunez look it over as he mentioned he wanted to get rid of certain meds   Thank you

## 2019-10-20 PROBLEM — Q25.72: Status: ACTIVE | Noted: 2019-10-20

## 2019-10-20 PROBLEM — R06.00 DYSPNEA ON EXERTION: Status: ACTIVE | Noted: 2018-06-04

## 2019-10-20 PROBLEM — R06.09 DYSPNEA ON EXERTION: Status: ACTIVE | Noted: 2018-06-04

## 2019-11-02 DIAGNOSIS — M54.2 NECK PAIN, ACUTE: ICD-10-CM

## 2019-11-03 RX ORDER — METAXALONE 400 MG/1
TABLET ORAL
Qty: 30 TABLET | Refills: 2 | Status: SHIPPED | OUTPATIENT
Start: 2019-11-03 | End: 2020-01-10

## 2019-11-04 ENCOUNTER — HOSPITAL ENCOUNTER (OUTPATIENT)
Dept: PULMONOLOGY | Facility: HOSPITAL | Age: 84
Discharge: HOME | End: 2019-11-04
Attending: INTERNAL MEDICINE
Payer: MEDICARE

## 2019-11-04 ENCOUNTER — HOSPITAL ENCOUNTER (EMERGENCY)
Facility: HOSPITAL | Age: 84
Discharge: HOME | End: 2019-11-04
Attending: EMERGENCY MEDICINE
Payer: MEDICARE

## 2019-11-04 ENCOUNTER — APPOINTMENT (EMERGENCY)
Dept: CT IMAGING | Facility: HOSPITAL | Age: 84
End: 2019-11-04
Payer: MEDICARE

## 2019-11-04 ENCOUNTER — APPOINTMENT (EMERGENCY)
Dept: RADIOLOGY | Facility: HOSPITAL | Age: 84
End: 2019-11-04
Payer: MEDICARE

## 2019-11-04 VITALS
HEART RATE: 77 BPM | OXYGEN SATURATION: 96 % | DIASTOLIC BLOOD PRESSURE: 67 MMHG | RESPIRATION RATE: 20 BRPM | TEMPERATURE: 96.9 F | WEIGHT: 172.4 LBS | HEIGHT: 64 IN | SYSTOLIC BLOOD PRESSURE: 141 MMHG | BODY MASS INDEX: 29.43 KG/M2

## 2019-11-04 DIAGNOSIS — I21.19 INFERIOR MI (HCC): ICD-10-CM

## 2019-11-04 DIAGNOSIS — R06.02 SHORTNESS OF BREATH: ICD-10-CM

## 2019-11-04 DIAGNOSIS — N18.30 STAGE 3 CHRONIC KIDNEY DISEASE (HCC): ICD-10-CM

## 2019-11-04 DIAGNOSIS — S76.219A GROIN STRAIN: Primary | ICD-10-CM

## 2019-11-04 PROCEDURE — 94729 DIFFUSING CAPACITY: CPT | Performed by: INTERNAL MEDICINE

## 2019-11-04 PROCEDURE — 94060 EVALUATION OF WHEEZING: CPT

## 2019-11-04 PROCEDURE — 99284 EMERGENCY DEPT VISIT MOD MDM: CPT

## 2019-11-04 PROCEDURE — 94726 PLETHYSMOGRAPHY LUNG VOLUMES: CPT

## 2019-11-04 PROCEDURE — 94760 N-INVAS EAR/PLS OXIMETRY 1: CPT

## 2019-11-04 PROCEDURE — 94729 DIFFUSING CAPACITY: CPT

## 2019-11-04 PROCEDURE — 94726 PLETHYSMOGRAPHY LUNG VOLUMES: CPT | Performed by: INTERNAL MEDICINE

## 2019-11-04 PROCEDURE — 73502 X-RAY EXAM HIP UNI 2-3 VIEWS: CPT

## 2019-11-04 PROCEDURE — 99284 EMERGENCY DEPT VISIT MOD MDM: CPT | Performed by: EMERGENCY MEDICINE

## 2019-11-04 PROCEDURE — 72192 CT PELVIS W/O DYE: CPT

## 2019-11-04 PROCEDURE — 94060 EVALUATION OF WHEEZING: CPT | Performed by: INTERNAL MEDICINE

## 2019-11-04 RX ORDER — ALBUTEROL SULFATE 2.5 MG/3ML
2.5 SOLUTION RESPIRATORY (INHALATION) EVERY 6 HOURS PRN
Status: DISCONTINUED | OUTPATIENT
Start: 2019-11-04 | End: 2019-11-08 | Stop reason: HOSPADM

## 2019-11-04 RX ORDER — ACETAMINOPHEN 325 MG/1
975 TABLET ORAL ONCE
Status: COMPLETED | OUTPATIENT
Start: 2019-11-04 | End: 2019-11-04

## 2019-11-04 RX ADMIN — ACETAMINOPHEN 975 MG: 325 TABLET, FILM COATED ORAL at 17:08

## 2019-11-04 RX ADMIN — ALBUTEROL SULFATE 2.5 MG: 2.5 SOLUTION RESPIRATORY (INHALATION) at 11:25

## 2019-11-04 NOTE — DISCHARGE INSTRUCTIONS
Take 650mg tylenol or 2 tablets every 8 hours for pain  Use the cream we prescribed twice a day  Rest when you can

## 2019-11-04 NOTE — ED PROVIDER NOTES
History  Chief Complaint   Patient presents with    Hip Pain     patient presents to the ED with c/o left hip/groin pain  patients he fell onto his stomach last wednesday, but states the hip pain has just started wo days ago  54-year-old male presents for left hip and groin pain  Patient was bowling two days ago fell onto his left hip  Has been ambulating since then but worsening pain in his groin  Denies so she had testicular pain dysuria others modifying factors  Worse with movement  No abdominal pain no back pain  Denies numbness or tingling no medication taken prior to arrival   He has no previous surgeries to this area  Denies striking his head is not on blood thinners    On exam the patient has tenderness when active ranging of his left hip in the his groin and inguinal region  There is no hernia appreciated  He has a soft abdomen otherwise no evidence of any trauma  Ambulating with minimal difficulty  Assessment plan:  Left hip pain left groin pain x-ray rule out pubic rami fracture hip fracture  If normal will moved to CT to evaluate for occult fracture  Prior to Admission Medications   Prescriptions Last Dose Informant Patient Reported? Taking?    B Complex Vitamins (B COMPLEX PO)  Self Yes No   Sig: Take by mouth daily   Chlorphen-PE-Acetaminophen (CORICIDIN D COLD/FLU/SINUS PO)  Self Yes No   Sig: Take by mouth   Cholecalciferol ([employer] VITAMIN D) 2000 units CAPS  Self Yes No   Sig: Take by mouth daily    Cyanocobalamin (B-12 PO)  Self Yes No   Sig: Take by mouth daily   Ferrous Sulfate (IRON) 325 (65 Fe) MG TABS  Self Yes No   Sig: Take by mouth every other day    LORazepam (ATIVAN) 0 5 mg tablet  Self No No   Sig: TAKE ONE TABLET BY MOUTH EVERY 8 HOURS AS NEEDED FOR ANXIETY   Misc Natural Products (CORTISOL PO)  Self Yes No   Sig: Take by mouth   Multiple Vitamins-Minerals (VISION FORMULA/LUTEIN PO)  Self Yes No   Sig: Take by mouth   albuterol (PROVENTIL HFA,VENTOLIN HFA) 90 mcg/act inhaler  Self No No   Sig: Inhale 2 puffs every 6 (six) hours as needed for wheezing   arformoterol (BROVANA) 15 mcg/2 mL nebulizer solution  Self Yes No   Sig: Take 15 mcg by nebulization 2 (two) times a day   atenolol (TENORMIN) 25 mg tablet  Self No No   Sig: TAKE ONE TABLET BY MOUTH EVERY DAY   budesonide (PULMICORT) 0 5 mg/2 mL nebulizer solution  Self Yes No   Sig: Take 0 5 mg by nebulization 2 (two) times a day Rinse mouth after use     fluticasone (FLONASE) 50 mcg/act nasal spray  Self Yes No   Si spray into each nostril 2 (two) times a day   guaiFENesin (MUCINEX) 600 mg 12 hr tablet  Self Yes No   Sig: Take 1,200 mg by mouth every 12 (twelve) hours   ipratropium (ATROVENT) 0 02 % nebulizer solution  Self No No   Sig: Take 1 vial (0 5 mg total) by nebulization 3 (three) times a day   levalbuterol (XOPENEX) 1 25 mg/3 mL nebulizer solution  Self No No   Sig: Take 1 vial (1 25 mg total) by nebulization 3 (three) times a day   levothyroxine 75 mcg tablet  Self Yes No   Sig: Take 75 mcg by mouth daily 37 5 mg Daily   meclizine (ANTIVERT) 25 mg tablet  Self No No   Sig: Take 1 tablet (25 mg total) by mouth 3 (three) times a day as needed for dizziness   metaxalone (SKELAXIN) 400 MG tablet   No No   Sig: TAKE ONE TABLET BY MOUTH THREE TIMES A DAY   nystatin-triamcinolone (MYCOLOG-II) cream  Self No No   Sig: APPLY SPARINGLY TO THE AFFECTED AREA(S) TWO TIMES A DAY   omeprazole (PriLOSEC) 20 mg delayed release capsule  Self No No   Sig: TAKE ONE CAPSULE BY MOUTH EVERY DAY   sodium chloride 0 9 % nebulizer solution  Self No No   Sig: Take 3 mL by nebulization as needed for wheezing   tamsulosin (FLOMAX) 0 4 mg  Self No No   Sig: Take 1 capsule (0 4 mg total) by mouth daily with dinner      Facility-Administered Medications: None       Past Medical History:   Diagnosis Date    Anxiety disorder due to general medical condition 2013    Compression fracture of thoracic vertebra (HCC)     last assessed 2012    COPD (chronic obstructive pulmonary disease) (HCC)     Disease of thyroid gland     Heart attack ([facility] )     History of methicillin resistant staphylococcus aureus (MRSA) 2019    negative nasal swab     Hollenhorst plaque, right eye     last assessed 2013    Hyperlipidemia     Hypertension     Iron deficiency anemia due to chronic blood loss     last assessed 09/10/2012    Ischemic colitis ([facility] )     last assessed 2014    Osteoarthritis of both hands     last assessedn 2013    Peptic ulcer     last assessed 2013    Polymyalgia rheumatica ([facility] )     last assessesd 10/09/2012    Renal disorder     Upper GI hemorrhage     last assessed 2015    Varicose veins of lower extremity     last assessed 2013       Past Surgical History:   Procedure Laterality Date    CORONARY ARTERY BYPASS GRAFT      HEMORRHOID SURGERY      HERNIA REPAIR      RENAL CYST EXCISION      resolved 2010    THROMBOENDARTERECTOMY Right     carotid, last assessed 2013       Family History   Problem Relation Age of Onset    Hypertension Mother     Alcohol abuse Mother     Hypertension Father     Alcohol abuse Father     Alcohol abuse Son     Heart disease Family     Stroke Family         syndrome     I have reviewed and agree with the history as documented  Social History     Tobacco Use    Smoking status: Former Smoker     Packs/day: 2 00     Years: 50 00     Pack years: 100 00     Types: Cigarettes     Start date:      Last attempt to quit:      Years since quittin 8    Smokeless tobacco: Never Used    Tobacco comment: Quit 40 yrs  ago   Substance Use Topics    Alcohol use: Not Currently     Alcohol/week: 1 0 standard drinks     Types: 1 Glasses of wine per week     Comment: social    Drug use: No        Review of Systems   Constitutional: Negative for chills, fatigue and fever  Eyes: Negative for photophobia and visual disturbance  Respiratory: Negative for cough and shortness of breath  Cardiovascular: Negative for chest pain, palpitations and leg swelling  Gastrointestinal: Negative for diarrhea, nausea and vomiting  Endocrine: Negative for polydipsia and polyuria  Genitourinary: Negative for decreased urine volume, difficulty urinating, dysuria and frequency  Musculoskeletal: Negative for back pain, neck pain and neck stiffness  Skin: Negative for color change and rash  Allergic/Immunologic: Negative for environmental allergies and immunocompromised state  Neurological: Negative for dizziness and headaches  Hematological: Negative for adenopathy  Does not bruise/bleed easily  Psychiatric/Behavioral: Negative for dysphoric mood  The patient is not nervous/anxious  Physical Exam  Physical Exam   Constitutional: He is oriented to person, place, and time  He appears well-developed  HENT:   Head: Normocephalic and atraumatic  Right Ear: External ear normal    Left Ear: External ear normal    Mouth/Throat: Oropharynx is clear and moist    Eyes: Pupils are equal, round, and reactive to light  Conjunctivae and EOM are normal    Neck: Normal range of motion  Neck supple  No JVD present  No thyromegaly present  Cardiovascular: Normal rate, regular rhythm and normal heart sounds  Exam reveals no gallop and no friction rub  No murmur heard  Pulmonary/Chest: Effort normal and breath sounds normal  No respiratory distress  He has no wheezes  He has no rales  Abdominal: Soft  Bowel sounds are normal  He exhibits no distension  There is no rebound and no guarding  Musculoskeletal: Normal range of motion  He exhibits no edema  Lymphadenopathy:     He has no cervical adenopathy  Neurological: He is alert and oriented to person, place, and time  No cranial nerve deficit  Skin: Skin is warm  Psychiatric: He has a normal mood and affect  His behavior is normal    Nursing note and vitals reviewed        Vital Signs  ED Triage Vitals [11/04/19 1617]   Temperature Pulse Respirations Blood Pressure SpO2   (!) 96 9 °F (36 1 °C) 81 20 133/80 97 %      Temp Source Heart Rate Source Patient Position - Orthostatic VS BP Location FiO2 (%)   Tympanic Monitor Sitting Right arm --      Pain Score       No Pain           Vitals:    11/04/19 1617 11/04/19 1630 11/04/19 1645 11/04/19 1856   BP: 133/80 126/66 141/67 141/67   Pulse: 81 79 78 77   Patient Position - Orthostatic VS: Sitting   Sitting         Visual Acuity      ED Medications  Medications   acetaminophen (TYLENOL) tablet 975 mg (975 mg Oral Given 11/4/19 1708)       Diagnostic Studies  Results Reviewed     None                 CT pelvis wo contrast   Final Result by Radha Martinez MD (11/04 1825)      No acute findings of the pelvis or findings to explain acute left groin/hip pain         Workstation performed: [id number]         XR hip/pelv 2-3 vws left if performed   ED Interpretation by Lali Mckeon DO (11/04 1648)   No acute fracture      Final Result by Mary Medeiros MD (11/05 7533)      No acute osseous abnormality  Degenerative changes as described  Workstation performed: [id number]                    Procedures  Procedures       ED Course                               MDM  Number of Diagnoses or Management Options  Groin strain: new and requires workup  Diagnosis management comments: There is no evidence of any injuries on x-ray or CT    Tylenol for pain discharge voltaren and sent to the pharmacy       Amount and/or Complexity of Data Reviewed  Tests in the radiology section of CPT®: reviewed and ordered  Independent visualization of images, tracings, or specimens: yes    Patient Progress  Patient progress: stable      Disposition  Final diagnoses:   Groin strain     Time reflects when diagnosis was documented in both MDM as applicable and the Disposition within this note     Time User Action Codes Description Comment    11/4/2019  6:41 PM Ophir, Nichole Childers Add [[id number]] Groin strain       ED Disposition     ED Disposition Condition Date/Time Comment    Discharge Stable Mon Nov 4, 2019  6:41  [address] discharge to home/self care              Follow-up Information     Follow up With Specialties Details Why Contact Info    Milady Mercedes MD Family Medicine Schedule an appointment as soon as possible for a visit  As needed [facility]  [address]  [address] [facility] [address] [phone]            Discharge Medication List as of 11/4/2019  6:58 PM      START taking these medications    Details   diclofenac sodium (VOLTAREN) 1 % Apply 2 g topically 4 (four) times a day for 7 days, Starting Mon 11/4/2019, Until Mon 11/11/2019, Normal         CONTINUE these medications which have NOT CHANGED    Details   albuterol (PROVENTIL HFA,VENTOLIN HFA) 90 mcg/act inhaler Inhale 2 puffs every 6 (six) hours as needed for wheezing, Starting Tue 6/25/2019, Normal      arformoterol (BROVANA) 15 mcg/2 mL nebulizer solution Take 15 mcg by nebulization 2 (two) times a day, Historical Med      atenolol (TENORMIN) 25 mg tablet TAKE ONE TABLET BY MOUTH EVERY DAY, Normal      B Complex Vitamins (B COMPLEX PO) Take by mouth daily, Historical Med      budesonide (PULMICORT) 0 5 mg/2 mL nebulizer solution Take 0 5 mg by nebulization 2 (two) times a day Rinse mouth after use , Historical Med      Chlorphen-PE-Acetaminophen (CORICIDIN D COLD/FLU/SINUS PO) Take by mouth, Historical Med      Cholecalciferol ([employer]S VITAMIN D) 2000 units CAPS Take by mouth daily , Historical Med      Cyanocobalamin (B-12 PO) Take by mouth daily, Historical Med      Ferrous Sulfate (IRON) 325 (65 Fe) MG TABS Take by mouth every other day , Historical Med      fluticasone (FLONASE) 50 mcg/act nasal spray 1 spray into each nostril 2 (two) times a day, Historical Med      guaiFENesin (MUCINEX) 600 mg 12 hr tablet Take 1,200 mg by mouth every 12 (twelve) hours, Historical Med      ipratropium (ATROVENT) 0 02 % nebulizer solution Take 1 vial (0 5 mg total) by nebulization 3 (three) times a day, Starting Wed 9/11/2019, Until Fri 10/11/2019, Normal      levalbuterol (XOPENEX) 1 25 mg/3 mL nebulizer solution Take 1 vial (1 25 mg total) by nebulization 3 (three) times a day, Starting Wed 9/18/2019, Normal      levothyroxine 75 mcg tablet Take 75 mcg by mouth daily 37 5 mg Daily, Historical Med      LORazepam (ATIVAN) 0 5 mg tablet TAKE ONE TABLET BY MOUTH EVERY 8 HOURS AS NEEDED FOR ANXIETY, Normal      meclizine (ANTIVERT) 25 mg tablet Take 1 tablet (25 mg total) by mouth 3 (three) times a day as needed for dizziness, Starting Sat 9/28/2019, Normal      metaxalone (SKELAXIN) 400 MG tablet TAKE ONE TABLET BY MOUTH THREE TIMES A DAY, Normal      Misc Natural Products (CORTISOL PO) Take by mouth, Historical Med      Multiple Vitamins-Minerals (VISION FORMULA/LUTEIN PO) Take by mouth, Starting Sat 11/15/2014, Historical Med      nystatin-triamcinolone (MYCOLOG-II) cream APPLY SPARINGLY TO THE AFFECTED AREA(S) TWO TIMES A DAY, Normal      omeprazole (PriLOSEC) 20 mg delayed release capsule TAKE ONE CAPSULE BY MOUTH EVERY DAY, Normal      sodium chloride 0 9 % nebulizer solution Take 3 mL by nebulization as needed for wheezing, Starting Wed 9/18/2019, Normal      tamsulosin (FLOMAX) 0 4 mg Take 1 capsule (0 4 mg total) by mouth daily with dinner, Starting Wed 4/10/2019, Normal           No discharge procedures on file      ED Provider  Electronically Signed by           Cassie Alvarez DO  11/06/19 4333

## 2019-11-08 ENCOUNTER — VBI (OUTPATIENT)
Dept: ADMINISTRATIVE | Facility: OTHER | Age: 84
End: 2019-11-08

## 2019-11-12 DIAGNOSIS — F41.9 ANXIETY: ICD-10-CM

## 2019-11-12 RX ORDER — LORAZEPAM 0.5 MG/1
TABLET ORAL
Qty: 90 TABLET | Refills: 3 | Status: SHIPPED | OUTPATIENT
Start: 2019-11-12 | End: 2020-02-23 | Stop reason: HOSPADM

## 2019-11-12 NOTE — TELEPHONE ENCOUNTER
Patients daughter in law Blair Cluster calling and would like to know if Dr Alaine Denver has received medication list and would like to know if there needs to be any changes per Dr Alaine Denver  Please advise   [phone]

## 2019-11-13 NOTE — TELEPHONE ENCOUNTER
Good afternoon, on 9/18/19 I uploaded the Pt's medication's list onto his chart and notified Dr Ching Garcia

## 2019-11-16 DIAGNOSIS — J41.8 MIXED SIMPLE AND MUCOPURULENT CHRONIC BRONCHITIS (HCC): ICD-10-CM

## 2019-11-16 RX ORDER — SODIUM CHLORIDE FOR INHALATION 0.9 %
VIAL, NEBULIZER (ML) INHALATION
Qty: 90 ML | Refills: 1 | Status: SHIPPED | OUTPATIENT
Start: 2019-11-16

## 2019-11-23 DIAGNOSIS — E03.9 ACQUIRED HYPOTHYROIDISM: ICD-10-CM

## 2019-11-24 RX ORDER — LEVOTHYROXINE SODIUM 0.07 MG/1
TABLET ORAL
Qty: 30 TABLET | Refills: 5 | Status: SHIPPED | OUTPATIENT
Start: 2019-11-24 | End: 2020-11-01

## 2019-11-30 DIAGNOSIS — R10.819 SUPRAPUBIC TENDERNESS: ICD-10-CM

## 2019-11-30 RX ORDER — TAMSULOSIN HYDROCHLORIDE 0.4 MG/1
CAPSULE ORAL
Qty: 30 CAPSULE | Refills: 5 | Status: SHIPPED | OUTPATIENT
Start: 2019-11-30 | End: 2020-07-09

## 2019-12-05 ENCOUNTER — HOSPITAL ENCOUNTER (OUTPATIENT)
Dept: SLEEP CENTER | Facility: HOSPITAL | Age: 84
Discharge: HOME | End: 2019-12-05
Attending: INTERNAL MEDICINE
Payer: MEDICARE

## 2019-12-05 DIAGNOSIS — R06.83 SNORING: ICD-10-CM

## 2019-12-05 DIAGNOSIS — G47.19 EXCESSIVE DAYTIME SLEEPINESS: ICD-10-CM

## 2019-12-05 PROCEDURE — G0399 HOME SLEEP TEST/TYPE 3 PORTA: HCPCS | Performed by: INTERNAL MEDICINE

## 2019-12-05 PROCEDURE — G0399 HOME SLEEP TEST/TYPE 3 PORTA: HCPCS

## 2019-12-09 ENCOUNTER — TRANSCRIBE ORDERS (OUTPATIENT)
Dept: SLEEP CENTER | Facility: HOSPITAL | Age: 84
End: 2019-12-09

## 2019-12-09 NOTE — PROGRESS NOTES
Home Sleep Study Documentation    Pre-Sleep Home Study:    Set-up and instructions performed by: JAMAL REAL      Technician performed demonstration for Patient: yes    Return demonstration performed by Patient: yes    Written instructions provided to Patient: yes    Patient signed consent form: yes        Post-Sleep Home Study:    Additional comments by Patient: none    Home Sleep Study Failed:no:    Failure reason: N/A    Reported or Detected: N/A    Scored by:  Kin Dennis CRT, RPSGT

## 2019-12-16 DIAGNOSIS — J44.1 COPD WITH ACUTE EXACERBATION (HCC): ICD-10-CM

## 2019-12-16 DIAGNOSIS — G47.33 OSA (OBSTRUCTIVE SLEEP APNEA): Primary | ICD-10-CM

## 2019-12-21 DIAGNOSIS — K21.00 GASTROESOPHAGEAL REFLUX DISEASE WITH ESOPHAGITIS: ICD-10-CM

## 2019-12-22 RX ORDER — OMEPRAZOLE 20 MG/1
CAPSULE, DELAYED RELEASE ORAL
Qty: 30 CAPSULE | Refills: 5 | Status: SHIPPED | OUTPATIENT
Start: 2019-12-22 | End: 2020-06-27

## 2020-01-08 DIAGNOSIS — F51.01 PRIMARY INSOMNIA: ICD-10-CM

## 2020-01-09 RX ORDER — ZOLPIDEM TARTRATE 5 MG/1
TABLET ORAL
Qty: 30 TABLET | Refills: 3 | Status: SHIPPED | OUTPATIENT
Start: 2020-01-09 | End: 2020-06-09

## 2020-01-10 DIAGNOSIS — M54.2 NECK PAIN, ACUTE: ICD-10-CM

## 2020-01-10 RX ORDER — METAXALONE 400 MG/1
TABLET ORAL
Qty: 30 TABLET | Refills: 2 | Status: SHIPPED | OUTPATIENT
Start: 2020-01-10 | End: 2020-02-21

## 2020-01-13 ENCOUNTER — TELEPHONE (OUTPATIENT)
Dept: PULMONOLOGY | Facility: CLINIC | Age: 85
End: 2020-01-13

## 2020-01-13 NOTE — TELEPHONE ENCOUNTER
I called patient and went over the results of his sleep study with him  He is agreeable to move forward with CPAP titration study but he does want me to speak with his daughter in law once she returns from a business trip and explain the results further to her as she is his point person  In the interm, I have scheduled the CPAP titration for the first available at the [facility]

## 2020-01-15 ENCOUNTER — OFFICE VISIT (OUTPATIENT)
Dept: URGENT CARE | Facility: CLINIC | Age: 85
End: 2020-01-15
Payer: MEDICARE

## 2020-01-15 VITALS
SYSTOLIC BLOOD PRESSURE: 122 MMHG | BODY MASS INDEX: 30.05 KG/M2 | RESPIRATION RATE: 22 BRPM | DIASTOLIC BLOOD PRESSURE: 74 MMHG | TEMPERATURE: 99.1 F | HEIGHT: 64 IN | WEIGHT: 176 LBS | OXYGEN SATURATION: 97 % | HEART RATE: 107 BPM

## 2020-01-15 DIAGNOSIS — J44.1 ACUTE EXACERBATION OF CHRONIC OBSTRUCTIVE PULMONARY DISEASE (COPD) (HCC): Primary | ICD-10-CM

## 2020-01-15 PROCEDURE — G0463 HOSPITAL OUTPT CLINIC VISIT: HCPCS | Performed by: PHYSICIAN ASSISTANT

## 2020-01-15 PROCEDURE — 99213 OFFICE O/P EST LOW 20 MIN: CPT | Performed by: PHYSICIAN ASSISTANT

## 2020-01-15 RX ORDER — AZITHROMYCIN 250 MG/1
TABLET, FILM COATED ORAL
Qty: 6 TABLET | Refills: 0 | Status: SHIPPED | OUTPATIENT
Start: 2020-01-15 | End: 2020-01-19

## 2020-01-15 RX ORDER — PREDNISONE 10 MG/1
TABLET ORAL
Qty: 15 TABLET | Refills: 0 | Status: SHIPPED | OUTPATIENT
Start: 2020-01-15 | End: 2020-01-18 | Stop reason: SDUPTHER

## 2020-01-15 NOTE — PROGRESS NOTES
[facility]        NAME: Keshia Sow is a 80 y o  male  : 1932    MRN: [id number]  DATE: January 15, 2020  TIME: 9:17 AM    Assessment and Plan   Acute exacerbation of chronic obstructive pulmonary disease (COPD) ([facility] ) [J44 1]  1  Acute exacerbation of chronic obstructive pulmonary disease (COPD) ([facility])  azithromycin (ZITHROMAX) 250 mg tablet    predniSONE 10 mg tablet         Patient Instructions     Take antibiotic and prednisone as directed  Recommend taking w/ food as well as taking probiotics for side effects  Continue with OTC Mucinex and nebulizer/inhaler treatments as directed  Encourage follow up with PCP in 3-5 days  Proceed to ER if symptoms worsen  Chief Complaint     Chief Complaint   Patient presents with    Cough     Pt states he has had a cough with runny nose and congestion x 3 days  Audible wheezes heard  Pulse ox 97%  States he has been using his inhaler and nebulizer  History of Present Illness       Patient w/ PMH significant for COPD presents with complaint of cough and increased SOB x 3 days  Pt reports that he is always short of breath but reports that it has acutely worsened  He reports that he has been doing 3 nebulizer treatments a day in addition to his inhaler but denies getting any relief following the treatments  Pt reports increased sputum production, describing the cough as productive of clear sputum  Pt reports associated rhinorrhea, sinus congestion, and sore throat  He denies fever, chills, night sweats, chest pain, and ear pain  Pt reports last taking antibiotics in 2019  Review of Systems   Review of Systems   Constitutional: Negative for chills, fatigue and fever  HENT: Positive for congestion, rhinorrhea, sinus pressure and sore throat  Negative for ear pain and postnasal drip  Respiratory: Positive for cough and shortness of breath  Negative for chest tightness  Cardiovascular: Negative for chest pain  Gastrointestinal: Negative for abdominal pain, blood in stool, diarrhea, nausea and vomiting  Musculoskeletal: Negative for myalgias, neck pain and neck stiffness  Skin: Negative for color change, rash and wound  Neurological: Negative for dizziness, weakness, light-headedness, numbness and headaches  All other systems reviewed and are negative          Current Medications       Current Outpatient Medications:     albuterol (PROVENTIL HFA,VENTOLIN HFA) 90 mcg/act inhaler, Inhale 2 puffs every 6 (six) hours as needed for wheezing, Disp: 1 Inhaler, Rfl: 3    arformoterol (BROVANA) 15 mcg/2 mL nebulizer solution, Take 15 mcg by nebulization 2 (two) times a day, Disp: , Rfl:     atenolol (TENORMIN) 25 mg tablet, TAKE ONE TABLET BY MOUTH EVERY DAY, Disp: 90 tablet, Rfl: 1    B Complex Vitamins (B COMPLEX PO), Take by mouth daily, Disp: , Rfl:     budesonide (PULMICORT) 0 5 mg/2 mL nebulizer solution, Take 0 5 mg by nebulization 2 (two) times a day Rinse mouth after use , Disp: , Rfl:     Chlorphen-PE-Acetaminophen (CORICIDIN D COLD/FLU/SINUS PO), Take by mouth, Disp: , Rfl:     Cholecalciferol ([employer] VITAMIN D) 2000 units CAPS, Take by mouth daily , Disp: , Rfl:     Cyanocobalamin (B-12 PO), Take by mouth daily, Disp: , Rfl:     Ferrous Sulfate (IRON) 325 (65 Fe) MG TABS, Take by mouth every other day , Disp: , Rfl:     fluticasone (FLONASE) 50 mcg/act nasal spray, 1 spray into each nostril 2 (two) times a day, Disp: , Rfl:     guaiFENesin (MUCINEX) 600 mg 12 hr tablet, Take 1,200 mg by mouth every 12 (twelve) hours, Disp: , Rfl:     levalbuterol (XOPENEX) 1 25 mg/3 mL nebulizer solution, Take 1 vial (1 25 mg total) by nebulization 3 (three) times a day, Disp: 90 vial, Rfl: 5    levothyroxine 75 mcg tablet, TAKE ONE TABLET BY MOUTH EVERY DAY, Disp: 30 tablet, Rfl: 5    LORazepam (ATIVAN) 0 5 mg tablet, TAKE ONE TABLET BY MOUTH EVERY 8 HOURS AS NEEDED FOR ANXIETY, Disp: 90 tablet, Rfl: 3   metaxalone (SKELAXIN) 400 MG tablet, TAKE ONE TABLET BY MOUTH THREE TIMES A DAY, Disp: 30 tablet, Rfl: 2    Multiple Vitamins-Minerals (VISION FORMULA/LUTEIN PO), Take by mouth, Disp: , Rfl:     nystatin-triamcinolone (MYCOLOG-II) cream, APPLY SPARINGLY TO THE AFFECTED AREA(S) TWO TIMES A DAY, Disp: 60 g, Rfl: 2    omeprazole (PriLOSEC) 20 mg delayed release capsule, TAKE ONE CAPSULE BY MOUTH EVERY DAY, Disp: 30 capsule, Rfl: 5    tamsulosin (FLOMAX) 0 4 mg, TAKE ONE CAPSULE BY MOUTH EVERY DAY WITH DINNER, Disp: 30 capsule, Rfl: 5    zolpidem (AMBIEN) 5 mg tablet, TAKE ONE TABLET BY MOUTH AT BEDTIME AS NEEDED FOR SLEEP, Disp: 30 tablet, Rfl: 3    azithromycin (ZITHROMAX) 250 mg tablet, Take 2 tabs by mouth today, then take 1 tab by mouth x 4 days, Disp: 6 tablet, Rfl: 0    diclofenac sodium (VOLTAREN) 1 %, Apply 2 g topically 4 (four) times a day for 7 days, Disp: 100 g, Rfl: 0    ipratropium (ATROVENT) 0 02 % nebulizer solution, Take 1 vial (0 5 mg total) by nebulization 3 (three) times a day, Disp: 225 mL, Rfl: 0    LORazepam (ATIVAN) 0 5 mg tablet, TAKE ONE TABLET BY MOUTH EVERY 8 HOURS AS NEEDED FOR ANXIETY, Disp: 90 tablet, Rfl: 3    meclizine (ANTIVERT) 25 mg tablet, Take 1 tablet (25 mg total) by mouth 3 (three) times a day as needed for dizziness (Patient not taking: Reported on 1/15/2020), Disp: 30 tablet, Rfl: 0    Misc Natural Products (CORTISOL PO), Take by mouth, Disp: , Rfl:     predniSONE 10 mg tablet, Take 5 tabs on day 1, 4 tabs on day 2, 3 tabs on day 3, 2 tabs on day 4, and 1 tab on day 5, Disp: 15 tablet, Rfl: 0    sodium chloride 0 9 % nebulizer solution, TAKE 3 MILLILITERS BY NEBULIZATION ROUTE AS NEEDED FOR WHEEZING, Disp: 90 mL, Rfl: 1    Current Allergies     Allergies as of 01/15/2020 - Reviewed 01/15/2020   Allergen Reaction Noted    Pravastatin Anaphylaxis, Photosensitivity, and Headache 05/18/2011    Acetaminophen-codeine GI Intolerance 05/14/2016    Atorvastatin 05/18/2011    Codeine Nausea Only 06/17/2015    Colestipol GI Intolerance 05/18/2011    Contrast  [iodinated diagnostic agents]  03/27/2013    Fenofibrate GI Intolerance 05/18/2011    Hydrocodone Vomiting 05/07/2012    Niacin  04/22/2012    Niaspan  [niacin er]  05/14/2016    Pitavastatin Myalgia 06/12/2017    Pneumococcal polysaccharide vaccine  05/07/2012    Pravastatin sodium  05/14/2016    Simvastatin  05/14/2016    Statins Myalgia 02/05/2014    Vicoprofen  [hydrocodone-ibuprofen] GI Intolerance 05/14/2016    Cyclophosphamide Rash 05/14/2016    Ioversol Hives 05/14/2016            The following portions of the patient's history were reviewed and updated as appropriate: allergies, current medications, past family history, past medical history, past social history, past surgical history and problem list      Past Medical History:   Diagnosis Date    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    Compression fracture of thoracic vertebra ([facility] )     last assessed 05/07/2012    COPD (chronic obstructive pulmonary disease) ([facility] )     Disease of thyroid gland     Heart attack ([facility] )     History of methicillin resistant staphylococcus aureus (MRSA) 03/28/2019    negative nasal swab     Hollenhorst plaque, right eye     last assessed 04/08/2013    Hyperlipidemia     Hypertension     Iron deficiency anemia due to chronic blood loss     last assessed 09/10/2012    Ischemic colitis ([facility] )     last assessed 05/27/2014    Osteoarthritis of both hands     last assessedn 04/30/2013    Peptic ulcer     last assessed 06/14/2013    Polymyalgia rheumatica ([facility] )     last assessesd 10/09/2012    Renal disorder     Upper GI hemorrhage     last assessed 01/29/2015    Varicose veins of lower extremity     last assessed 04/26/2013       Past Surgical History:   Procedure Laterality Date    CORONARY ARTERY BYPASS GRAFT      HEMORRHOID SURGERY      HERNIA REPAIR      RENAL CYST EXCISION resolved 05/2010    THROMBOENDARTERECTOMY Right     carotid, last assessed 06/18/2013       Family History   Problem Relation Age of Onset    Hypertension Mother     Alcohol abuse Mother     Hypertension Father     Alcohol abuse Father     Alcohol abuse Son     Heart disease Family     Stroke Family         syndrome         Medications have been verified  Objective   /74   Pulse (!) 107   Temp 99 1 °F (37 3 °C)   Resp 22   Ht 5' 4" (1 626 m)   Wt 79 8 kg (176 lb)   SpO2 97%   BMI 30 21 kg/m²        Physical Exam     Physical Exam   Constitutional: He is oriented to person, place, and time  He appears well-developed and well-nourished  No distress  HENT:   Head: Normocephalic and atraumatic  Right Ear: External ear normal    Left Ear: External ear normal    Nose: Nose normal    Mouth/Throat: Oropharynx is clear and moist  No oropharyngeal exudate  Eyes: Pupils are equal, round, and reactive to light  Conjunctivae and EOM are normal  Right eye exhibits no discharge  Left eye exhibits no discharge  Neck: Normal range of motion  Neck supple  Cardiovascular: Normal rate, regular rhythm and normal heart sounds  Pulmonary/Chest: Effort normal  No stridor  No respiratory distress  He has wheezes  He has no rales  Diffuse expiratory wheezing at bilateral lung bases   Lymphadenopathy:     He has no cervical adenopathy  Neurological: He is alert and oriented to person, place, and time  No cranial nerve deficit or sensory deficit  Skin: Skin is warm and dry  Capillary refill takes less than 2 seconds  No rash noted  He is not diaphoretic  Psychiatric: He has a normal mood and affect  His behavior is normal  Thought content normal    Nursing note and vitals reviewed

## 2020-01-18 ENCOUNTER — OFFICE VISIT (OUTPATIENT)
Dept: FAMILY MEDICINE CLINIC | Facility: HOSPITAL | Age: 85
End: 2020-01-18
Payer: MEDICARE

## 2020-01-18 VITALS
WEIGHT: 176 LBS | SYSTOLIC BLOOD PRESSURE: 90 MMHG | HEART RATE: 84 BPM | TEMPERATURE: 97.7 F | HEIGHT: 64 IN | BODY MASS INDEX: 30.05 KG/M2 | DIASTOLIC BLOOD PRESSURE: 62 MMHG

## 2020-01-18 DIAGNOSIS — G47.33 OSA (OBSTRUCTIVE SLEEP APNEA): ICD-10-CM

## 2020-01-18 DIAGNOSIS — N18.30 STAGE 3 CHRONIC KIDNEY DISEASE (HCC): ICD-10-CM

## 2020-01-18 DIAGNOSIS — J44.1 COPD WITH ACUTE EXACERBATION (HCC): Primary | ICD-10-CM

## 2020-01-18 DIAGNOSIS — J44.1 ACUTE EXACERBATION OF CHRONIC OBSTRUCTIVE PULMONARY DISEASE (COPD) (HCC): ICD-10-CM

## 2020-01-18 DIAGNOSIS — I25.10 CORONARY ARTERY DISEASE INVOLVING NATIVE CORONARY ARTERY OF NATIVE HEART WITHOUT ANGINA PECTORIS: ICD-10-CM

## 2020-01-18 DIAGNOSIS — E03.9 ACQUIRED HYPOTHYROIDISM: ICD-10-CM

## 2020-01-18 PROCEDURE — 99214 OFFICE O/P EST MOD 30 MIN: CPT | Performed by: FAMILY MEDICINE

## 2020-01-18 RX ORDER — PREDNISONE 10 MG/1
TABLET ORAL
Qty: 20 TABLET | Refills: 0 | Status: SHIPPED | OUTPATIENT
Start: 2020-01-18 | End: 2020-02-23 | Stop reason: HOSPADM

## 2020-01-18 NOTE — PROGRESS NOTES
Assessment/Plan:         Diagnoses and all orders for this visit:    COPD with acute exacerbation ([facility] )  Comments:  Improving clinical condition closer to COPD baseline  Continue a longer steroid taper    LIVIER (obstructive sleep apnea)  Comments:  Agree with need for sleep study in lab through Dr Romel Grover    Acquired hypothyroidism  Comments:  Clinically euthyroid    Coronary artery disease involving native coronary artery of native heart without angina pectoris  Comments:  CAD asymptomatic    Stage 3 chronic kidney disease (HCC)  Comments:  Stable CKD    Acute exacerbation of chronic obstructive pulmonary disease (COPD) ([facility])  -     predniSONE 10 mg tablet; Take 2 tablets once a day for 7 days and then one daily until done          Subjective:      Patient ID: Miko Tompkins is a 80 y o  male  F/U from Urgent Care visit  Seen for several days of increasing SOB which was unresponsive to his usual nebulizer regimen  Started on Azithromycin and Prednisone  Feels that he has had some improvement since visit  Had Home Study which had significant desaturations and apneas    Had recent onset of COPD exacerbation prior to visit to [facility]  Has whitish sputum     Back on Flomax for nocturia  He had stopped because he didn't think it was doing much benefit    Doing nebs TID but he does alternate which med is twice or once a day  The following portions of the patient's history were reviewed and updated as appropriate: allergies, current medications, past family history, past medical history, past social history, past surgical history and problem list     Review of Systems   Constitutional: Negative for unexpected weight change  HENT: Positive for congestion and rhinorrhea  Respiratory: Positive for cough, chest tightness, shortness of breath and wheezing  Cardiovascular: Negative  Neurological: Negative  Hematological: Negative  Psychiatric/Behavioral: Positive for sleep disturbance  All other systems reviewed and are negative  Objective:      BP 90/62   Pulse 84   Temp 97 7 °F (36 5 °C)   Ht 5' 4" (1 626 m)   Wt 79 8 kg (176 lb)   BMI 30 21 kg/m²          Physical Exam   Constitutional: He is oriented to person, place, and time  He appears well-developed and well-nourished  Neck: No thyromegaly present  Cardiovascular: Normal rate, regular rhythm, normal heart sounds and intact distal pulses  Pulmonary/Chest: Effort normal  He has decreased breath sounds  He has wheezes  He has no rales  Musculoskeletal: He exhibits no edema  Neurological: He is alert and oriented to person, place, and time  Psychiatric: He has a normal mood and affect  His behavior is normal    Nursing note and vitals reviewed

## 2020-01-22 DIAGNOSIS — J44.1 COPD WITH ACUTE EXACERBATION (HCC): ICD-10-CM

## 2020-01-25 DIAGNOSIS — I10 ESSENTIAL HYPERTENSION: ICD-10-CM

## 2020-01-25 RX ORDER — ATENOLOL 25 MG/1
TABLET ORAL
Qty: 90 TABLET | Refills: 1 | Status: SHIPPED | OUTPATIENT
Start: 2020-01-25 | End: 2020-07-02

## 2020-02-07 ENCOUNTER — TELEPHONE (OUTPATIENT)
Dept: CARDIOLOGY CLINIC | Facility: CLINIC | Age: 85
End: 2020-02-07

## 2020-02-07 NOTE — TELEPHONE ENCOUNTER
Juanita Hardwick - caregiver  Pt stated an episode of chest pain while out with friends recently  Pt didn't seek evaluation at ER  Caregiver also mentioned some concerns with pt's memory  Caregiver requesting appt  Due in July - given appt for next available, 3/6/20  Requested caregiver advise pt to seek urgent/emergent eval if he has a return of CP sx  Also suggest pt should see PCP sooner than March re: memory issue etc     Caregiver verbalized understanding

## 2020-02-16 DIAGNOSIS — J44.1 COPD WITH ACUTE EXACERBATION (HCC): ICD-10-CM

## 2020-02-21 ENCOUNTER — OFFICE VISIT (OUTPATIENT)
Dept: URGENT CARE | Facility: CLINIC | Age: 85
End: 2020-02-21
Payer: MEDICARE

## 2020-02-21 ENCOUNTER — APPOINTMENT (EMERGENCY)
Dept: RADIOLOGY | Facility: HOSPITAL | Age: 85
DRG: 149 | End: 2020-02-21
Payer: MEDICARE

## 2020-02-21 ENCOUNTER — HOSPITAL ENCOUNTER (INPATIENT)
Facility: HOSPITAL | Age: 85
LOS: 1 days | Discharge: HOME | DRG: 149 | End: 2020-02-23
Attending: EMERGENCY MEDICINE | Admitting: INTERNAL MEDICINE
Payer: MEDICARE

## 2020-02-21 VITALS — OXYGEN SATURATION: 95 % | SYSTOLIC BLOOD PRESSURE: 142 MMHG | HEART RATE: 73 BPM | DIASTOLIC BLOOD PRESSURE: 73 MMHG

## 2020-02-21 DIAGNOSIS — R07.9 CHEST PAIN: Primary | ICD-10-CM

## 2020-02-21 DIAGNOSIS — R06.02 SHORTNESS OF BREATH: ICD-10-CM

## 2020-02-21 DIAGNOSIS — R42 VERTIGO: ICD-10-CM

## 2020-02-21 DIAGNOSIS — M54.2 NECK PAIN, ACUTE: ICD-10-CM

## 2020-02-21 DIAGNOSIS — R07.9 CHEST PAIN, UNSPECIFIED TYPE: Primary | ICD-10-CM

## 2020-02-21 DIAGNOSIS — R53.83 FATIGUE: ICD-10-CM

## 2020-02-21 DIAGNOSIS — F06.4 ANXIETY DISORDER DUE TO GENERAL MEDICAL CONDITION: ICD-10-CM

## 2020-02-21 DIAGNOSIS — G47.19 EXCESSIVE DAYTIME SLEEPINESS: ICD-10-CM

## 2020-02-21 LAB
ALBUMIN SERPL BCP-MCNC: 3.6 G/DL (ref 3.5–5)
ALP SERPL-CCNC: 100 U/L (ref 46–116)
ALT SERPL W P-5'-P-CCNC: 23 U/L (ref 12–78)
ANION GAP SERPL CALCULATED.3IONS-SCNC: 6 MMOL/L (ref 4–13)
AST SERPL W P-5'-P-CCNC: 24 U/L (ref 5–45)
BASOPHILS # BLD AUTO: 0.05 THOUSANDS/ΜL (ref 0–0.1)
BASOPHILS NFR BLD AUTO: 1 % (ref 0–1)
BILIRUB SERPL-MCNC: 0.32 MG/DL (ref 0.2–1)
BUN SERPL-MCNC: 20 MG/DL (ref 5–25)
CALCIUM SERPL-MCNC: 9.2 MG/DL (ref 8.3–10.1)
CHLORIDE SERPL-SCNC: 107 MMOL/L (ref 100–108)
CO2 SERPL-SCNC: 22 MMOL/L (ref 21–32)
CREAT SERPL-MCNC: 1.77 MG/DL (ref 0.6–1.3)
EOSINOPHIL # BLD AUTO: 0.09 THOUSAND/ΜL (ref 0–0.61)
EOSINOPHIL NFR BLD AUTO: 1 % (ref 0–6)
ERYTHROCYTE [DISTWIDTH] IN BLOOD BY AUTOMATED COUNT: 13.1 % (ref 11.6–15.1)
GFR SERPL CREATININE-BSD FRML MDRD: 34 ML/MIN/1.73SQ M
GLUCOSE SERPL-MCNC: 91 MG/DL (ref 65–140)
HCT VFR BLD AUTO: 45 % (ref 36.5–49.3)
HGB BLD-MCNC: 14.7 G/DL (ref 12–17)
IMM GRANULOCYTES # BLD AUTO: 0.03 THOUSAND/UL (ref 0–0.2)
IMM GRANULOCYTES NFR BLD AUTO: 1 % (ref 0–2)
LYMPHOCYTES # BLD AUTO: 1.99 THOUSANDS/ΜL (ref 0.6–4.47)
LYMPHOCYTES NFR BLD AUTO: 31 % (ref 14–44)
MCH RBC QN AUTO: 30.6 PG (ref 26.8–34.3)
MCHC RBC AUTO-ENTMCNC: 32.7 G/DL (ref 31.4–37.4)
MCV RBC AUTO: 94 FL (ref 82–98)
MONOCYTES # BLD AUTO: 0.82 THOUSAND/ΜL (ref 0.17–1.22)
MONOCYTES NFR BLD AUTO: 13 % (ref 4–12)
NEUTROPHILS # BLD AUTO: 3.4 THOUSANDS/ΜL (ref 1.85–7.62)
NEUTS SEG NFR BLD AUTO: 53 % (ref 43–75)
NRBC BLD AUTO-RTO: 0 /100 WBCS
NT-PROBNP SERPL-MCNC: 242 PG/ML
PLATELET # BLD AUTO: 144 THOUSANDS/UL (ref 149–390)
PLATELET # BLD AUTO: 162 THOUSANDS/UL (ref 149–390)
PMV BLD AUTO: 9 FL (ref 8.9–12.7)
PMV BLD AUTO: 9.4 FL (ref 8.9–12.7)
POTASSIUM SERPL-SCNC: 4.5 MMOL/L (ref 3.5–5.3)
PROT SERPL-MCNC: 7.9 G/DL (ref 6.4–8.2)
RBC # BLD AUTO: 4.81 MILLION/UL (ref 3.88–5.62)
SODIUM SERPL-SCNC: 135 MMOL/L (ref 136–145)
TROPONIN I SERPL-MCNC: <0.02 NG/ML
TROPONIN I SERPL-MCNC: <0.02 NG/ML
WBC # BLD AUTO: 6.38 THOUSAND/UL (ref 4.31–10.16)

## 2020-02-21 PROCEDURE — 94760 N-INVAS EAR/PLS OXIMETRY 1: CPT

## 2020-02-21 PROCEDURE — G0463 HOSPITAL OUTPT CLINIC VISIT: HCPCS | Performed by: PHYSICIAN ASSISTANT

## 2020-02-21 PROCEDURE — 93005 ELECTROCARDIOGRAM TRACING: CPT

## 2020-02-21 PROCEDURE — 99213 OFFICE O/P EST LOW 20 MIN: CPT | Performed by: PHYSICIAN ASSISTANT

## 2020-02-21 PROCEDURE — 99285 EMERGENCY DEPT VISIT HI MDM: CPT

## 2020-02-21 PROCEDURE — 83880 ASSAY OF NATRIURETIC PEPTIDE: CPT | Performed by: EMERGENCY MEDICINE

## 2020-02-21 PROCEDURE — 84484 ASSAY OF TROPONIN QUANT: CPT | Performed by: FAMILY MEDICINE

## 2020-02-21 PROCEDURE — 36415 COLL VENOUS BLD VENIPUNCTURE: CPT | Performed by: EMERGENCY MEDICINE

## 2020-02-21 PROCEDURE — 80053 COMPREHEN METABOLIC PANEL: CPT | Performed by: EMERGENCY MEDICINE

## 2020-02-21 PROCEDURE — 99285 EMERGENCY DEPT VISIT HI MDM: CPT | Performed by: EMERGENCY MEDICINE

## 2020-02-21 PROCEDURE — 85049 AUTOMATED PLATELET COUNT: CPT | Performed by: FAMILY MEDICINE

## 2020-02-21 PROCEDURE — 84484 ASSAY OF TROPONIN QUANT: CPT | Performed by: EMERGENCY MEDICINE

## 2020-02-21 PROCEDURE — 70450 CT HEAD/BRAIN W/O DYE: CPT

## 2020-02-21 PROCEDURE — 94640 AIRWAY INHALATION TREATMENT: CPT

## 2020-02-21 PROCEDURE — 71046 X-RAY EXAM CHEST 2 VIEWS: CPT

## 2020-02-21 PROCEDURE — 85025 COMPLETE CBC W/AUTO DIFF WBC: CPT | Performed by: EMERGENCY MEDICINE

## 2020-02-21 PROCEDURE — 99223 1ST HOSP IP/OBS HIGH 75: CPT | Performed by: INTERNAL MEDICINE

## 2020-02-21 RX ORDER — ATENOLOL 25 MG/1
25 TABLET ORAL DAILY
Status: DISCONTINUED | OUTPATIENT
Start: 2020-02-22 | End: 2020-02-23 | Stop reason: HOSPADM

## 2020-02-21 RX ORDER — LEVOTHYROXINE SODIUM 0.07 MG/1
75 TABLET ORAL
Status: DISCONTINUED | OUTPATIENT
Start: 2020-02-22 | End: 2020-02-23 | Stop reason: HOSPADM

## 2020-02-21 RX ORDER — METAXALONE 400 MG/1
TABLET ORAL
Qty: 30 TABLET | Refills: 2 | Status: SHIPPED | OUTPATIENT
Start: 2020-02-21 | End: 2020-03-06 | Stop reason: ALTCHOICE

## 2020-02-21 RX ORDER — LORAZEPAM 0.5 MG/1
0.5 TABLET ORAL EVERY 8 HOURS PRN
Status: DISCONTINUED | OUTPATIENT
Start: 2020-02-21 | End: 2020-02-22

## 2020-02-21 RX ORDER — LEVALBUTEROL 1.25 MG/.5ML
1.25 SOLUTION, CONCENTRATE RESPIRATORY (INHALATION)
Status: DISCONTINUED | OUTPATIENT
Start: 2020-02-22 | End: 2020-02-23 | Stop reason: HOSPADM

## 2020-02-21 RX ORDER — ALBUTEROL SULFATE 2.5 MG/3ML
2.5 SOLUTION RESPIRATORY (INHALATION) EVERY 4 HOURS PRN
Status: DISCONTINUED | OUTPATIENT
Start: 2020-02-21 | End: 2020-02-22

## 2020-02-21 RX ORDER — HEPARIN SODIUM 5000 [USP'U]/ML
5000 INJECTION, SOLUTION INTRAVENOUS; SUBCUTANEOUS EVERY 8 HOURS SCHEDULED
Status: DISCONTINUED | OUTPATIENT
Start: 2020-02-21 | End: 2020-02-23 | Stop reason: HOSPADM

## 2020-02-21 RX ORDER — ZOLPIDEM TARTRATE 5 MG/1
5 TABLET ORAL
Status: DISCONTINUED | OUTPATIENT
Start: 2020-02-21 | End: 2020-02-23 | Stop reason: HOSPADM

## 2020-02-21 RX ORDER — METAXALONE 800 MG/1
400 TABLET ORAL 3 TIMES DAILY
Status: DISCONTINUED | OUTPATIENT
Start: 2020-02-21 | End: 2020-02-23 | Stop reason: HOSPADM

## 2020-02-21 RX ORDER — NITROGLYCERIN 0.4 MG/1
0.4 TABLET SUBLINGUAL
Status: DISCONTINUED | OUTPATIENT
Start: 2020-02-21 | End: 2020-02-23 | Stop reason: HOSPADM

## 2020-02-21 RX ORDER — LORAZEPAM 0.5 MG/1
0.5 TABLET ORAL 2 TIMES DAILY PRN
Status: DISCONTINUED | OUTPATIENT
Start: 2020-02-21 | End: 2020-02-21

## 2020-02-21 RX ORDER — ALBUTEROL SULFATE 90 UG/1
2 AEROSOL, METERED RESPIRATORY (INHALATION) EVERY 6 HOURS PRN
Status: DISCONTINUED | OUTPATIENT
Start: 2020-02-21 | End: 2020-02-23 | Stop reason: HOSPADM

## 2020-02-21 RX ORDER — ACETAMINOPHEN 325 MG/1
650 TABLET ORAL EVERY 6 HOURS PRN
Status: DISCONTINUED | OUTPATIENT
Start: 2020-02-21 | End: 2020-02-22

## 2020-02-21 RX ORDER — GUAIFENESIN 600 MG
1200 TABLET, EXTENDED RELEASE 12 HR ORAL EVERY 12 HOURS SCHEDULED
Status: DISCONTINUED | OUTPATIENT
Start: 2020-02-21 | End: 2020-02-23 | Stop reason: HOSPADM

## 2020-02-21 RX ORDER — LEVALBUTEROL 1.25 MG/.5ML
1.25 SOLUTION, CONCENTRATE RESPIRATORY (INHALATION) EVERY 6 HOURS PRN
Status: DISCONTINUED | OUTPATIENT
Start: 2020-02-21 | End: 2020-02-21

## 2020-02-21 RX ORDER — FERROUS SULFATE 325(65) MG
325 TABLET ORAL EVERY OTHER DAY
Status: DISCONTINUED | OUTPATIENT
Start: 2020-02-22 | End: 2020-02-23 | Stop reason: HOSPADM

## 2020-02-21 RX ORDER — FLUTICASONE PROPIONATE 50 MCG
1 SPRAY, SUSPENSION (ML) NASAL 2 TIMES DAILY
Status: DISCONTINUED | OUTPATIENT
Start: 2020-02-21 | End: 2020-02-23 | Stop reason: HOSPADM

## 2020-02-21 RX ORDER — TAMSULOSIN HYDROCHLORIDE 0.4 MG/1
0.4 CAPSULE ORAL
Status: DISCONTINUED | OUTPATIENT
Start: 2020-02-22 | End: 2020-02-23 | Stop reason: HOSPADM

## 2020-02-21 RX ADMIN — GUAIFENESIN 1200 MG: 600 TABLET, EXTENDED RELEASE ORAL at 22:02

## 2020-02-21 RX ADMIN — METAXALONE 400 MG: 800 TABLET ORAL at 22:04

## 2020-02-21 RX ADMIN — ZOLPIDEM TARTRATE 5 MG: 5 TABLET, COATED ORAL at 22:03

## 2020-02-21 RX ADMIN — HEPARIN SODIUM 5000 UNITS: 5000 INJECTION INTRAVENOUS; SUBCUTANEOUS at 22:04

## 2020-02-21 RX ADMIN — LEVALBUTEROL HYDROCHLORIDE 1.25 MG: 1.25 SOLUTION, CONCENTRATE RESPIRATORY (INHALATION) at 21:21

## 2020-02-21 RX ADMIN — IPRATROPIUM BROMIDE 0.5 MG: 0.5 SOLUTION RESPIRATORY (INHALATION) at 21:21

## 2020-02-21 RX ADMIN — FLUTICASONE PROPIONATE 1 SPRAY: 50 SPRAY, METERED NASAL at 22:02

## 2020-02-21 NOTE — PROGRESS NOTES
Assessment/Plan    Chest pain, unspecified type [R07 9]  1  Chest pain, unspecified type  Transfer to other facility   2  Shortness of breath  Transfer to other facility         Subjective:     Patient ID: Mekhi Alfaro is a 80 y o  male  Reason For Visit / Chief Complaint  No chief complaint on file  79 yo male presents to the clinic with sternal chest pain, dizziness, SOB, numbness and tingling down his left arm and pain to left sided neck  Pt states that his pain started several days ago and he was at urgent care yesterday but was not seen because he started feeling better at the time  Pt states that the chest pain is worse today and is rubbing his sternal chest  Pt states that he has had episodes of dizziness without loss of consciousness but states that he has stumbled against the wall a few times  Pt has hx of A-fib, MI x 2 (per pt) and COPD  Pt is not currently on any blood thinners    Blood pressure in exam room 142/73, pulse 73 BPM       Past Medical History:   Diagnosis Date    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    Compression fracture of thoracic vertebra (HCC)     last assessed 05/07/2012    COPD [facility]chronic obstructive pulmonary disease) ([facility] )     Disease of thyroid gland     Heart attack ([facility] )     History of methicillin resistant staphylococcus aureus (MRSA) 03/28/2019    negative nasal swab     Hollenhorst plaque, right eye     last assessed 04/08/2013    Hyperlipidemia     Hypertension     Iron deficiency anemia due to chronic blood loss     last assessed 09/10/2012    Ischemic colitis ([facility] )     last assessed 05/27/2014    Osteoarthritis of both hands     last assessedn 04/30/2013    Peptic ulcer     last assessed 06/14/2013    Polymyalgia rheumatica ([facility] )     last assessesd 10/09/2012    Renal disorder     Upper GI hemorrhage     last assessed 01/29/2015    Varicose veins of lower extremity     last assessed 04/26/2013       Past Surgical History:   Procedure Laterality Date    CORONARY ARTERY BYPASS GRAFT      HEMORRHOID SURGERY      HERNIA REPAIR      RENAL CYST EXCISION      resolved 05/2010    THROMBOENDARTERECTOMY Right     carotid, last assessed 06/18/2013       Family History   Problem Relation Age of Onset    Hypertension Mother     Alcohol abuse Mother     Hypertension Father     Alcohol abuse Father     Alcohol abuse Son     Heart disease Family     Stroke Family         syndrome       Review of Systems   Respiratory: Positive for shortness of breath  Cardiovascular: Positive for chest pain  Neurological: Positive for dizziness, light-headedness and numbness  Objective:    /73   Pulse 73   SpO2 95%     Physical Exam   Constitutional: He is oriented to person, place, and time  Vital signs are normal  He appears well-developed and well-nourished  He appears ill  Pt refused aspirin in urgent care due to hx of ulcers and GI bleed  HENT:   Head: Normocephalic and atraumatic  Eyes: Conjunctivae are normal    Neck: Normal range of motion  Cardiovascular: Normal rate and intact distal pulses  An irregularly irregular rhythm present  Pt is intermittently rubbing inferior sternal chest or left arm   Pulmonary/Chest: Effort normal and breath sounds normal    Lung sounds clear, O2 sat 95% pt taking deep breaths due to shortness of breath, improvement in SOB after 2 liters O2 and O2 sat 97%  Musculoskeletal: Normal range of motion  Neurological: He is alert and oriented to person, place, and time  Skin: Skin is warm and dry  He is not diaphoretic  There is pallor  Nursing note and vitals reviewed

## 2020-02-21 NOTE — ED PROVIDER NOTES
History  Chief Complaint   Patient presents with    Chest Pain     pt presents to ED via EMS with c/o chest pain earlier this a m  pt went to urgent care with midsternal chest pain radiating to left arm accompanied by nausea and sob  pt has hx of 2 heart attacks  pt completely asymptomatic at this time     Ankit Rubio is an 80y o  year old male with PMHx significant for CAD c hx of MI x2 and COPD, who presents to the ED today with chest pain  Patient says that at 11:00 a m , at rest, he started having intermittent central, nonradiating chest discomfort  It came and went several times, only lasting for a few minutes at a time  He had some mild associated shortness of breath and left arm discomfort  Patient also endorses 1-2 months of vertigo when he lays down without associated headache, vision changes, hearing changes/tinnitus, weakness, numbness, or tingling  He says he feels more weak than usual but denies associated melena, hematochezia, hematuria, or symptoms of bleeding anywhere else  He does not take blood thinners  He does have a history of peptic ulcer disease with GI bleed  The patient denies fevers, chills, headaches, syncope, nausea, vomiting, new or worsening cough, abdominal pain, changes in usual bowel movements, changes with urination, back pain, numbness or tingling, pain anywhere else in body  The patient has no recent travel history, new or changing medications          History provided by:  Patient and medical records   used: No    Chest Pain   Pain location:  Substernal area  Pain radiates to:  Does not radiate  Pain radiates to the back: no    Pain severity:  Moderate  Onset quality:  Sudden  Timing:  Intermittent  Progression:  Resolved  Chronicity:  New  Context: at rest    Relieved by:  Nothing  Worsened by:  Nothing tried  Ineffective treatments:  None tried  Associated symptoms: fatigue    Associated symptoms: no abdominal pain, no back pain, no cough, no dizziness, no fever, no headache, no lower extremity edema, no nausea, no numbness, no palpitations, no shortness of breath and not vomiting        Prior to Admission Medications   Prescriptions Last Dose Informant Patient Reported? Taking? B Complex Vitamins (B COMPLEX PO)  Self Yes No   Sig: Take by mouth daily   Chlorphen-PE-Acetaminophen (CORICIDIN D COLD/FLU/SINUS PO)  Self Yes No   Sig: Take by mouth   Cholecalciferol (CVS VITAMIN D) 2000 units CAPS  Self Yes No   Sig: Take by mouth daily    Cyanocobalamin (B-12 PO)  Self Yes No   Sig: Take by mouth daily   Ferrous Sulfate (IRON) 325 (65 Fe) MG TABS  Self Yes No   Sig: Take by mouth every other day    LORazepam (ATIVAN) 0 5 mg tablet  Self No No   Sig: TAKE ONE TABLET BY MOUTH EVERY 8 HOURS AS NEEDED FOR ANXIETY   LORazepam (ATIVAN) 0 5 mg tablet   No No   Sig: TAKE ONE TABLET BY MOUTH EVERY 8 HOURS AS NEEDED FOR ANXIETY   Misc Natural Products (CORTISOL PO)  Self Yes No   Sig: Take by mouth   Multiple Vitamins-Minerals (VISION FORMULA/LUTEIN PO)  Self Yes No   Sig: Take by mouth   albuterol (PROVENTIL HFA,VENTOLIN HFA) 90 mcg/act inhaler  Self No No   Sig: Inhale 2 puffs every 6 (six) hours as needed for wheezing   arformoterol (BROVANA) 15 mcg/2 mL nebulizer solution  Self Yes No   Sig: Take 15 mcg by nebulization 2 (two) times a day   atenolol (TENORMIN) 25 mg tablet   No No   Sig: TAKE ONE TABLET BY MOUTH EVERY DAY   budesonide (PULMICORT) 0 5 mg/2 mL nebulizer solution  Self Yes No   Sig: Take 0 5 mg by nebulization 2 (two) times a day Rinse mouth after use     diclofenac sodium (VOLTAREN) 1 %   No No   Sig: Apply 2 g topically 4 (four) times a day for 7 days   fluticasone (FLONASE) 50 mcg/act nasal spray  Self Yes No   Si spray into each nostril 2 (two) times a day   guaiFENesin (MUCINEX) 600 mg 12 hr tablet  Self Yes No   Sig: Take 1,200 mg by mouth every 12 (twelve) hours   ipratropium (ATROVENT) 0 02 % nebulizer solution   No No   Sig: TAKE 1 VIAL BY NEBULZATION THREE TIMES A DAY   levalbuterol (XOPENEX) 1 25 mg/3 mL nebulizer solution  Self No No   Sig: Take 1 vial (1 25 mg total) by nebulization 3 (three) times a day   levothyroxine 75 mcg tablet   No No   Sig: TAKE ONE TABLET BY MOUTH EVERY DAY   meclizine (ANTIVERT) 25 mg tablet  Self No No   Sig: Take 1 tablet (25 mg total) by mouth 3 (three) times a day as needed for dizziness   metaxalone (SKELAXIN) 400 MG tablet   No No   Sig: TAKE ONE TABLET BY MOUTH THREE TIMES A DAY   nystatin-triamcinolone (MYCOLOG-II) cream  Self No No   Sig: APPLY SPARINGLY TO THE AFFECTED AREA(S) TWO TIMES A DAY   omeprazole (PriLOSEC) 20 mg delayed release capsule   No No   Sig: TAKE ONE CAPSULE BY MOUTH EVERY DAY   predniSONE 10 mg tablet   No No   Sig: Take 2 tablets once a day for 7 days and then one daily until done   sodium chloride 0 9 % nebulizer solution   No No   Sig: TAKE 3 MILLILITERS BY NEBULIZATION ROUTE AS NEEDED FOR WHEEZING   tamsulosin (FLOMAX) 0 4 mg   No No   Sig: TAKE ONE CAPSULE BY MOUTH EVERY DAY WITH DINNER   zolpidem (AMBIEN) 5 mg tablet   No No   Sig: TAKE ONE TABLET BY MOUTH AT BEDTIME AS NEEDED FOR SLEEP      Facility-Administered Medications: None       Past Medical History:   Diagnosis Date    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    Compression fracture of thoracic vertebra (HCC)     last assessed 05/07/2012    COPD (chronic obstructive pulmonary disease) (HCC)     Disease of thyroid gland     [facility] attack ([facility] [address] )     History of methicillin resistant staphylococcus aureus (MRSA) 03/28/2019    negative nasal swab     Hollenhorst plaque, right eye     last assessed 04/08/2013    Hyperlipidemia     Hypertension     Iron deficiency anemia due to chronic blood loss     last assessed 09/10/2012    Ischemic colitis ([facility] [address] )     last assessed 05/27/2014    Osteoarthritis of both hands     last assessedn 04/30/2013    Peptic ulcer     last assessed 06/14/2013    Polymyalgia rheumatica ([facility] [address] )     last assessesd 10/09/2012    Renal disorder     Upper GI hemorrhage     last assessed 2015    Varicose veins of lower extremity     last assessed 2013       Past Surgical History:   Procedure Laterality Date    CORONARY ARTERY BYPASS GRAFT      HEMORRHOID SURGERY      HERNIA REPAIR      RENAL CYST EXCISION      resolved 2010    THROMBOENDARTERECTOMY Right     carotid, last assessed 2013       Family History   Problem Relation Age of Onset    Hypertension Mother     Alcohol abuse Mother     Hypertension Father     Alcohol abuse Father     Alcohol abuse Son     Heart disease Family     Stroke Family         syndrome     I have reviewed and agree with the history as documented  Social History     Tobacco Use    Smoking status: Former Smoker     Packs/day: 2 00     Years: 50 00     Pack years: 100 00     Types: Cigarettes     Start date:      Last attempt to quit:      Years since quittin 1    Smokeless tobacco: Never Used    Tobacco comment: Quit 40 yrs  ago   Substance Use Topics    Alcohol use: Not Currently     Alcohol/week: 1 0 standard drinks     Types: 1 Glasses of wine per week     Comment: social    Drug use: No        Review of Systems   Constitutional: Positive for fatigue  Negative for chills and fever  HENT: Negative for rhinorrhea and sore throat  Eyes: Negative for visual disturbance  Respiratory: Negative for cough and shortness of breath  Cardiovascular: Positive for chest pain  Negative for palpitations and leg swelling  Gastrointestinal: Negative for abdominal pain, blood in stool, diarrhea, nausea and vomiting  Genitourinary: Negative for decreased urine volume, difficulty urinating, dysuria and flank pain  Musculoskeletal: Negative for back pain and neck pain  Arm pain   Neurological: Negative for dizziness, syncope, light-headedness, numbness and headaches          Vertigo   All other systems reviewed and are negative  Physical Exam  ED Triage Vitals   Temperature Pulse Respirations Blood Pressure SpO2   02/21/20 1715 02/21/20 1717 02/21/20 1715 02/21/20 1717 02/21/20 1717   (!) 97 3 °F (36 3 °C) 77 18 140/77 95 %      Temp Source Heart Rate Source Patient Position - Orthostatic VS BP Location FiO2 (%)   02/21/20 1715 02/21/20 1715 02/21/20 1815 02/21/20 1815 --   Oral Monitor Lying Right arm       Pain Score       02/21/20 1715       No Pain             Orthostatic Vital Signs  Vitals:    02/21/20 1815 02/21/20 1915 02/21/20 2030 02/21/20 2225   BP: 146/78  129/75 107/58   Pulse: 79 78 69 85   Patient Position - Orthostatic VS: Lying          Physical Exam   Constitutional: He appears well-developed and well-nourished  No distress  HENT:   Head: Normocephalic and atraumatic  Mouth/Throat: Oropharynx is clear and moist    Eyes: Pupils are equal, round, and reactive to light  Conjunctivae and EOM are normal  No scleral icterus  Neck: Neck supple  No JVD present  No tracheal deviation present  Cardiovascular: Normal rate and regular rhythm  Pulses:       Radial pulses are 2+ on the right side, and 2+ on the left side  Posterior tibial pulses are 2+ on the right side, and 2+ on the left side  Pulmonary/Chest: Effort normal  No respiratory distress  He has wheezes (Bilateral, diffuse)  He has rhonchi  Abdominal: Soft  There is no tenderness  There is no guarding  Musculoskeletal: He exhibits no edema or deformity  Right lower leg: He exhibits no tenderness and no edema  Left lower leg: He exhibits no tenderness and no edema  Neurological: He is alert  Strength 5/5 and sensation intact in all extremities  Cranial nerves 2-12 grossly intact  Finger-to-nose testing normal   Skin: Skin is warm and dry  No rash noted  He is not diaphoretic  Psychiatric: He has a normal mood and affect  His behavior is normal    Nursing note and vitals reviewed        ED Medications  Medications   albuterol (PROVENTIL HFA,VENTOLIN HFA) inhaler 2 puff (has no administration in time range)   atenolol (TENORMIN) tablet 25 mg (has no administration in time range)   ferrous sulfate tablet 325 mg (has no administration in time range)   fluticasone (FLONASE) 50 mcg/act nasal spray 1 spray (1 spray Nasal Given 2/21/20 2202)   guaiFENesin (MUCINEX) 12 hr tablet 1,200 mg (1,200 mg Oral Given 2/21/20 2202)   levothyroxine tablet 75 mcg (has no administration in time range)   metaxalone (SKELAXIN) tablet 400 mg (400 mg Oral Given 2/21/20 2204)   nystatin-triamcinolone (MYCOLOG-II) cream ( Topical Refused 2/21/20 2205)   tamsulosin (FLOMAX) capsule 0 4 mg (has no administration in time range)   zolpidem (AMBIEN) tablet 5 mg (5 mg Oral Given 2/21/20 2203)   acetaminophen (TYLENOL) tablet 650 mg (has no administration in time range)   heparin (porcine) subcutaneous injection 5,000 Units (5,000 Units Subcutaneous Given 2/21/20 2204)   nitroglycerin (NITROSTAT) SL tablet 0 4 mg (has no administration in time range)   LORazepam (ATIVAN) tablet 0 5 mg (has no administration in time range)   ipratropium (ATROVENT) 0 02 % inhalation solution 0 5 mg (has no administration in time range)   levalbuterol (XOPENEX) inhalation solution 1 25 mg (has no administration in time range)   albuterol inhalation solution 2 5 mg (has no administration in time range)       Diagnostic Studies  Results Reviewed     Procedure Component Value Units Date/Time    NT-BNP PRO [[id number]]  (Normal) Collected:  02/21/20 1731    Lab Status:  Final result Specimen:  Blood from Hand, Left Updated:  02/21/20 1759     NT-proBNP 242 pg/mL     Comprehensive metabolic panel [[id number]]  (Abnormal) Collected:  02/21/20 1731    Lab Status:  Final result Specimen:  Blood from Hand, Left Updated:  02/21/20 1758     Sodium 135 mmol/L      Potassium 4 5 mmol/L      Chloride 107 mmol/L      CO2 22 mmol/L      ANION GAP 6 mmol/L      BUN 20 mg/dL      Creatinine 1 77 mg/dL      Glucose 91 mg/dL      Calcium 9 2 mg/dL      AST 24 U/L      ALT 23 U/L      Alkaline Phosphatase 100 U/L      Total Protein 7 9 g/dL      Albumin 3 6 g/dL      Total Bilirubin 0 32 mg/dL      eGFR 34 ml/min/1 73sq m     Narrative:       [place] guidelines for Chronic Kidney Disease (CKD):     Stage 1 with normal or high GFR (GFR > 90 mL/min/1 73 square meters)    Stage 2 Mild CKD (GFR = 60-89 mL/min/1 73 square meters)    Stage 3A Moderate CKD (GFR = 45-59 mL/min/1 73 square meters)    Stage 3B Moderate CKD (GFR = 30-44 mL/min/1 73 square meters)    Stage 4 Severe CKD (GFR = 15-29 mL/min/1 73 square meters)    Stage 5 End Stage CKD (GFR <15 mL/min/1 73 square meters)  Note: GFR calculation is accurate only with a steady state creatinine    Troponin I [[id number]]  (Normal) Collected:  02/21/20 1731    Lab Status:  Final result Specimen:  Blood from Hand, Left Updated:  02/21/20 1758     Troponin I <0 02 ng/mL     CBC and differential [[id number]]  (Abnormal) Collected:  02/21/20 1731    Lab Status:  Final result Specimen:  Blood from Hand, Left Updated:  02/21/20 1742     WBC 6 38 Thousand/uL      RBC 4 81 Million/uL      Hemoglobin 14 7 g/dL      Hematocrit 45 0 %      MCV 94 fL      MCH 30 6 pg      MCHC 32 7 g/dL      RDW 13 1 %      MPV 9 4 fL      Platelets 363 Thousands/uL      nRBC 0 /100 WBCs      Neutrophils Relative 53 %      Immat GRANS % 1 %      Lymphocytes Relative 31 %      Monocytes Relative 13 %      Eosinophils Relative 1 %      Basophils Relative 1 %      Neutrophils Absolute 3 40 Thousands/µL      Immature Grans Absolute 0 03 Thousand/uL      Lymphocytes Absolute 1 99 Thousands/µL      Monocytes Absolute 0 82 Thousand/µL      Eosinophils Absolute 0 09 Thousand/µL      Basophils Absolute 0 05 Thousands/µL                  CT head without contrast   Final Result by Derik Ocasio MD (02/21 1825)      No acute intracranial abnormality  Workstation performed: [id number]         XR chest 2 views    (Results Pending)         Procedures  Procedures      ED Course  ED Course as of Feb 21 2251   Fri Feb 21, 2020   1831 Procedure Note: EKG  Date/Time: 02/21/20 6:31 PM   Interpreted by: Anil Matthew DO  Indications / Diagnosis: CP  ECG reviewed by me, the ED Provider: yes   The EKG demonstrates:  Rhythm: atrial fibrillation, rate 68  Intervals: normal intervals  Axis: normal axis  QRS/Blocks: normal QRS  ST Changes: No acute ST Changes, no STD/PILAR  HEART Risk Score      Most Recent Value   Heart Score Risk Calculator   History  1 Filed at: 02/21/2020 1809   ECG  0 Filed at: 02/21/2020 1809   Age  2 Filed at: 02/21/2020 1809   Risk Factors  2 Filed at: 02/21/2020 1809   Troponin  0 Filed at: 02/21/2020 1809   HEART Score  5 Filed at: 02/21/2020 1809                            MDM  Number of Diagnoses or Management Options  Diagnosis management comments: Due to chronic, EKG, and basic laboratory workup  Patient not currently having chest pain  Patient is admitting to fatigue and difficulty getting around his house secondary to feeling unsteady on his feet  Will also do CT head to determine if the patient had a old CVA that is causing his symptoms  He has been having the vertigo for many months and is certainly not in a window for any tPA if this is a CVA  Patient likely needs to be admitted secondary to ambulatory dysfunction even of workup is normal   Patient is agreeable to this plan         Amount and/or Complexity of Data Reviewed  Clinical lab tests: ordered and reviewed  Tests in the radiology section of CPT®: ordered and reviewed  Tests in the medicine section of CPT®: ordered and reviewed  Review and summarize past medical records: yes  Discuss the patient with other providers: yes    Risk of Complications, Morbidity, and/or Mortality  Presenting problems: moderate  Diagnostic procedures: low  Management options: low    Patient Progress  Patient progress: stable        Disposition  Final diagnoses:   Chest pain   Fatigue     Time reflects when diagnosis was documented in both MDM as applicable and the Disposition within this note     Time User Action Codes Description Comment    2/21/2020  6:49 PM Romain Malni Add [R07 9] Chest pain     2/21/2020  6:49 PM Romain Malin Add [R53 83] Fatigue     2/21/2020  8:16 PM Alin Bartlett [F06 4] Anxiety disorder due to general medical condition     2/21/2020  8:16 PM Jono Lawrence Add [G47 19] Excessive daytime sleepiness       ED Disposition     ED Disposition Condition Date/Time Comment    Admit Stable Fri Feb 21, 2020  6:49 PM Case was discussed with MARLYN and the patient's admission status was agreed to be Admission Status: observation status to the service of IM  Follow-up Information    None         Current Discharge Medication List      CONTINUE these medications which have NOT CHANGED    Details   albuterol (PROVENTIL HFA,VENTOLIN HFA) 90 mcg/act inhaler Inhale 2 puffs every 6 (six) hours as needed for wheezing  Qty: 1 Inhaler, Refills: 3    Comments: Substitution to a formulary equivalent within the same pharmaceutical class is authorized  Associated Diagnoses: Chronic bronchitis, unspecified chronic bronchitis type (HCC)      arformoterol (BROVANA) 15 mcg/2 mL nebulizer solution Take 15 mcg by nebulization 2 (two) times a day      atenolol (TENORMIN) 25 mg tablet TAKE ONE TABLET BY MOUTH EVERY DAY  Qty: 90 tablet, Refills: 1    Associated Diagnoses: Essential hypertension      B Complex Vitamins (B COMPLEX PO) Take by mouth daily      budesonide (PULMICORT) 0 5 mg/2 mL nebulizer solution Take 0 5 mg by nebulization 2 (two) times a day Rinse mouth after use        Chlorphen-PE-Acetaminophen (CORICIDIN D COLD/FLU/SINUS PO) Take by mouth      Cholecalciferol ([employer]S VITAMIN D) 2000 units CAPS Take by mouth daily       Cyanocobalamin (B-12 PO) Take by mouth daily      diclofenac sodium (VOLTAREN) 1 % Apply 2 g topically 4 (four) times a day for 7 days  Qty: 100 g, Refills: 0    Associated Diagnoses: Groin strain      Ferrous Sulfate (IRON) 325 (65 Fe) MG TABS Take by mouth every other day       fluticasone (FLONASE) 50 mcg/act nasal spray 1 spray into each nostril 2 (two) times a day      guaiFENesin (MUCINEX) 600 mg 12 hr tablet Take 1,200 mg by mouth every 12 (twelve) hours      ipratropium (ATROVENT) 0 02 % nebulizer solution TAKE 1 VIAL BY NEBULZATION THREE TIMES A DAY  Qty: 225 mL, Refills: 0    Associated Diagnoses: COPD with acute exacerbation (HCC)      levalbuterol (XOPENEX) 1 25 mg/3 mL nebulizer solution Take 1 vial (1 25 mg total) by nebulization 3 (three) times a day  Qty: 90 vial, Refills: 5    Comments: Fill when concentrated solution is completed  Associated Diagnoses: Mixed simple and mucopurulent chronic bronchitis (HCC)      levothyroxine 75 mcg tablet TAKE ONE TABLET BY MOUTH EVERY DAY  Qty: 30 tablet, Refills: 5    Associated Diagnoses: Acquired hypothyroidism      !! LORazepam (ATIVAN) 0 5 mg tablet TAKE ONE TABLET BY MOUTH EVERY 8 HOURS AS NEEDED FOR ANXIETY  Qty: 90 tablet, Refills: 3    Associated Diagnoses: Anxiety      !!  LORazepam (ATIVAN) 0 5 mg tablet TAKE ONE TABLET BY MOUTH EVERY 8 HOURS AS NEEDED FOR ANXIETY  Qty: 90 tablet, Refills: 3    Associated Diagnoses: Anxiety      meclizine (ANTIVERT) 25 mg tablet Take 1 tablet (25 mg total) by mouth 3 (three) times a day as needed for dizziness  Qty: 30 tablet, Refills: 0    Associated Diagnoses: Vertigo      metaxalone (SKELAXIN) 400 MG tablet TAKE ONE TABLET BY MOUTH THREE TIMES A DAY  Qty: 30 tablet, Refills: 2    Associated Diagnoses: Neck pain, acute      Misc Natural Products (CORTISOL PO) Take by mouth      Multiple Vitamins-Minerals (VISION FORMULA/LUTEIN PO) Take by mouth      nystatin-triamcinolone (MYCOLOG-II) cream APPLY SPARINGLY TO THE AFFECTED AREA(S) TWO TIMES A DAY  Qty: 60 g, Refills: 2    Associated Diagnoses: Moniliasis      omeprazole (PriLOSEC) 20 mg delayed release capsule TAKE ONE CAPSULE BY MOUTH EVERY DAY  Qty: 30 capsule, Refills: 5    Associated Diagnoses: Gastroesophageal reflux disease with esophagitis      predniSONE 10 mg tablet Take 2 tablets once a day for 7 days and then one daily until done  Qty: 20 tablet, Refills: 0    Associated Diagnoses: Acute exacerbation of chronic obstructive pulmonary disease (COPD) ([facility])      sodium chloride 0 9 % nebulizer solution TAKE 3 MILLILITERS BY NEBULIZATION ROUTE AS NEEDED FOR WHEEZING  Qty: 90 mL, Refills: 1    Associated Diagnoses: Mixed simple and mucopurulent chronic bronchitis ([facility])      tamsulosin (FLOMAX) 0 4 mg TAKE ONE CAPSULE BY MOUTH EVERY DAY WITH DINNER  Qty: 30 capsule, Refills: 5    Associated Diagnoses: Suprapubic tenderness      zolpidem (AMBIEN) 5 mg tablet TAKE ONE TABLET BY MOUTH AT BEDTIME AS NEEDED FOR SLEEP  Qty: 30 tablet, Refills: 3    Associated Diagnoses: Primary insomnia       !! - Potential duplicate medications found  Please discuss with provider  No discharge procedures on file  ED Provider  Attending physically available and evaluated Keshia Sow I managed the patient along with the ED Attending      Electronically Signed by         Blanca Levin DO  02/21/20 7940

## 2020-02-21 NOTE — PATIENT INSTRUCTIONS
Transferred via ambulance to [facility] for further evaluation

## 2020-02-22 PROBLEM — R42 DIZZINESS: Status: ACTIVE | Noted: 2020-02-22

## 2020-02-22 LAB
ANION GAP SERPL CALCULATED.3IONS-SCNC: 7 MMOL/L (ref 4–13)
ATRIAL RATE: 202 BPM
ATRIAL RATE: 84 BPM
BUN SERPL-MCNC: 22 MG/DL (ref 5–25)
CALCIUM SERPL-MCNC: 9.1 MG/DL (ref 8.3–10.1)
CHLORIDE SERPL-SCNC: 111 MMOL/L (ref 100–108)
CO2 SERPL-SCNC: 22 MMOL/L (ref 21–32)
CREAT SERPL-MCNC: 1.62 MG/DL (ref 0.6–1.3)
ERYTHROCYTE [DISTWIDTH] IN BLOOD BY AUTOMATED COUNT: 13.1 % (ref 11.6–15.1)
GFR SERPL CREATININE-BSD FRML MDRD: 38 ML/MIN/1.73SQ M
GLUCOSE SERPL-MCNC: 91 MG/DL (ref 65–140)
HCT VFR BLD AUTO: 44.7 % (ref 36.5–49.3)
HGB BLD-MCNC: 14.2 G/DL (ref 12–17)
MAGNESIUM SERPL-MCNC: 2.1 MG/DL (ref 1.6–2.6)
MCH RBC QN AUTO: 29.5 PG (ref 26.8–34.3)
MCHC RBC AUTO-ENTMCNC: 31.8 G/DL (ref 31.4–37.4)
MCV RBC AUTO: 93 FL (ref 82–98)
P AXIS: 58 DEGREES
PHOSPHATE SERPL-MCNC: 2.7 MG/DL (ref 2.3–4.1)
PLATELET # BLD AUTO: 151 THOUSANDS/UL (ref 149–390)
PMV BLD AUTO: 9.4 FL (ref 8.9–12.7)
POTASSIUM SERPL-SCNC: 4.7 MMOL/L (ref 3.5–5.3)
PR INTERVAL: 168 MS
QRS AXIS: 19 DEGREES
QRS AXIS: 49 DEGREES
QRSD INTERVAL: 82 MS
QRSD INTERVAL: 92 MS
QT INTERVAL: 378 MS
QT INTERVAL: 384 MS
QTC INTERVAL: 401 MS
QTC INTERVAL: 453 MS
RBC # BLD AUTO: 4.81 MILLION/UL (ref 3.88–5.62)
SODIUM SERPL-SCNC: 140 MMOL/L (ref 136–145)
T WAVE AXIS: 18 DEGREES
T WAVE AXIS: 33 DEGREES
TROPONIN I SERPL-MCNC: <0.02 NG/ML
VENTRICULAR RATE: 68 BPM
VENTRICULAR RATE: 84 BPM
WBC # BLD AUTO: 5.42 THOUSAND/UL (ref 4.31–10.16)

## 2020-02-22 PROCEDURE — 99233 SBSQ HOSP IP/OBS HIGH 50: CPT | Performed by: PHYSICIAN ASSISTANT

## 2020-02-22 PROCEDURE — 85027 COMPLETE CBC AUTOMATED: CPT | Performed by: FAMILY MEDICINE

## 2020-02-22 PROCEDURE — 93010 ELECTROCARDIOGRAM REPORT: CPT | Performed by: INTERNAL MEDICINE

## 2020-02-22 PROCEDURE — 83735 ASSAY OF MAGNESIUM: CPT | Performed by: FAMILY MEDICINE

## 2020-02-22 PROCEDURE — 94640 AIRWAY INHALATION TREATMENT: CPT

## 2020-02-22 PROCEDURE — 97129 THER IVNTJ 1ST 15 MIN: CPT

## 2020-02-22 PROCEDURE — 94760 N-INVAS EAR/PLS OXIMETRY 1: CPT

## 2020-02-22 PROCEDURE — 84484 ASSAY OF TROPONIN QUANT: CPT | Performed by: FAMILY MEDICINE

## 2020-02-22 PROCEDURE — 97167 OT EVAL HIGH COMPLEX 60 MIN: CPT

## 2020-02-22 PROCEDURE — 84100 ASSAY OF PHOSPHORUS: CPT | Performed by: FAMILY MEDICINE

## 2020-02-22 PROCEDURE — 97163 PT EVAL HIGH COMPLEX 45 MIN: CPT

## 2020-02-22 PROCEDURE — 80048 BASIC METABOLIC PNL TOTAL CA: CPT | Performed by: FAMILY MEDICINE

## 2020-02-22 RX ORDER — LORAZEPAM 0.5 MG/1
0.5 TABLET ORAL 3 TIMES DAILY
Status: DISCONTINUED | OUTPATIENT
Start: 2020-02-22 | End: 2020-02-22

## 2020-02-22 RX ORDER — ACETAMINOPHEN 325 MG/1
650 TABLET ORAL EVERY 6 HOURS PRN
Status: DISCONTINUED | OUTPATIENT
Start: 2020-02-22 | End: 2020-02-23 | Stop reason: HOSPADM

## 2020-02-22 RX ORDER — LORAZEPAM 0.5 MG/1
0.5 TABLET ORAL 3 TIMES DAILY
Status: DISCONTINUED | OUTPATIENT
Start: 2020-02-22 | End: 2020-02-23 | Stop reason: HOSPADM

## 2020-02-22 RX ORDER — MECLIZINE HYDROCHLORIDE 25 MG/1
25 TABLET ORAL EVERY 8 HOURS PRN
Status: DISCONTINUED | OUTPATIENT
Start: 2020-02-22 | End: 2020-02-22

## 2020-02-22 RX ORDER — MECLIZINE HYDROCHLORIDE 25 MG/1
50 TABLET ORAL EVERY 8 HOURS PRN
Status: DISCONTINUED | OUTPATIENT
Start: 2020-02-22 | End: 2020-02-23 | Stop reason: HOSPADM

## 2020-02-22 RX ADMIN — METAXALONE 400 MG: 800 TABLET ORAL at 09:20

## 2020-02-22 RX ADMIN — LEVALBUTEROL HYDROCHLORIDE 1.25 MG: 1.25 SOLUTION, CONCENTRATE RESPIRATORY (INHALATION) at 07:11

## 2020-02-22 RX ADMIN — LORAZEPAM 0.5 MG: 0.5 TABLET ORAL at 17:23

## 2020-02-22 RX ADMIN — FERROUS SULFATE TAB 325 MG (65 MG ELEMENTAL FE) 325 MG: 325 (65 FE) TAB at 09:20

## 2020-02-22 RX ADMIN — MECLIZINE HYDROCHLORIDE 25 MG: 25 TABLET ORAL at 11:57

## 2020-02-22 RX ADMIN — GUAIFENESIN 1200 MG: 600 TABLET, EXTENDED RELEASE ORAL at 20:29

## 2020-02-22 RX ADMIN — NYSTATIN AND TRIAMCINOLONE ACETONIDE: 100000; 1 CREAM TOPICAL at 17:26

## 2020-02-22 RX ADMIN — FLUTICASONE PROPIONATE 1 SPRAY: 50 SPRAY, METERED NASAL at 17:23

## 2020-02-22 RX ADMIN — IPRATROPIUM BROMIDE 0.5 MG: 0.5 SOLUTION RESPIRATORY (INHALATION) at 07:11

## 2020-02-22 RX ADMIN — HEPARIN SODIUM 5000 UNITS: 5000 INJECTION INTRAVENOUS; SUBCUTANEOUS at 21:12

## 2020-02-22 RX ADMIN — TAMSULOSIN HYDROCHLORIDE 0.4 MG: 0.4 CAPSULE ORAL at 16:23

## 2020-02-22 RX ADMIN — NYSTATIN AND TRIAMCINOLONE ACETONIDE: 100000; 1 CREAM TOPICAL at 09:20

## 2020-02-22 RX ADMIN — LORAZEPAM 0.5 MG: 0.5 TABLET ORAL at 11:28

## 2020-02-22 RX ADMIN — LEVALBUTEROL HYDROCHLORIDE 1.25 MG: 1.25 SOLUTION, CONCENTRATE RESPIRATORY (INHALATION) at 13:20

## 2020-02-22 RX ADMIN — ATENOLOL 25 MG: 25 TABLET ORAL at 09:20

## 2020-02-22 RX ADMIN — IPRATROPIUM BROMIDE 0.5 MG: 0.5 SOLUTION RESPIRATORY (INHALATION) at 13:20

## 2020-02-22 RX ADMIN — ACETAMINOPHEN 650 MG: 325 TABLET ORAL at 20:29

## 2020-02-22 RX ADMIN — ZOLPIDEM TARTRATE 5 MG: 5 TABLET, COATED ORAL at 18:37

## 2020-02-22 RX ADMIN — FLUTICASONE PROPIONATE 1 SPRAY: 50 SPRAY, METERED NASAL at 09:19

## 2020-02-22 RX ADMIN — METAXALONE 400 MG: 800 TABLET ORAL at 16:22

## 2020-02-22 RX ADMIN — METAXALONE 400 MG: 800 TABLET ORAL at 21:12

## 2020-02-22 RX ADMIN — IPRATROPIUM BROMIDE 0.5 MG: 0.5 SOLUTION RESPIRATORY (INHALATION) at 20:02

## 2020-02-22 RX ADMIN — GUAIFENESIN 1200 MG: 600 TABLET, EXTENDED RELEASE ORAL at 09:20

## 2020-02-22 RX ADMIN — LEVOTHYROXINE SODIUM 75 MCG: 75 TABLET ORAL at 06:20

## 2020-02-22 RX ADMIN — HEPARIN SODIUM 5000 UNITS: 5000 INJECTION INTRAVENOUS; SUBCUTANEOUS at 13:39

## 2020-02-22 RX ADMIN — LEVALBUTEROL HYDROCHLORIDE 1.25 MG: 1.25 SOLUTION, CONCENTRATE RESPIRATORY (INHALATION) at 20:02

## 2020-02-22 RX ADMIN — HEPARIN SODIUM 5000 UNITS: 5000 INJECTION INTRAVENOUS; SUBCUTANEOUS at 06:20

## 2020-02-22 NOTE — ASSESSMENT & PLAN NOTE
Lab Results   Component Value Date    [id number] 2 782 01/03/2019     Continue PTA levothyroxine 75 micro g p o  daily

## 2020-02-22 NOTE — PLAN OF CARE
Problem: Potential for Falls  Goal: Patient will remain free of falls  Description  INTERVENTIONS:  - Assess patient frequently for physical needs  -  Identify cognitive and physical deficits and behaviors that affect risk of falls    -  [place] fall precautions as indicated by assessment   - Educate patient/family on patient safety including physical limitations  - Instruct patient to call for assistance with activity based on assessment  - Modify environment to reduce risk of injury  - Consider OT/PT consult to assist with strengthening/mobility  Outcome: Progressing     Problem: PAIN - ADULT  Goal: Verbalizes/displays adequate comfort level or baseline comfort level  Description  Interventions:  - Encourage patient to monitor pain and request assistance  - Assess pain using appropriate pain scale  - Administer analgesics based on type and severity of pain and evaluate response  - Implement non-pharmacological measures as appropriate and evaluate response  - Consider cultural and social influences on pain and pain management  - Notify physician/advanced practitioner if interventions unsuccessful or patient reports new pain  Outcome: Progressing     Problem: INFECTION - ADULT  Goal: Absence or prevention of progression during hospitalization  Description  INTERVENTIONS:  - Assess and monitor for signs and symptoms of infection  - Monitor lab/diagnostic results  - Monitor all insertion sites, i e  indwelling lines, tubes, and drains  - Monitor endotracheal if appropriate and nasal secretions for changes in amount and color  - [place] appropriate cooling/warming therapies per order  - Administer medications as ordered  - Instruct and encourage patient and family to use good hand hygiene technique  - Identify and instruct in appropriate isolation precautions for identified infection/condition  Outcome: Progressing  Goal: Absence of fever/infection during neutropenic period  Description  INTERVENTIONS:  - Monitor WBC    Outcome: Progressing     Problem: SAFETY ADULT  Goal: Maintain or return to baseline ADL function  Description  INTERVENTIONS:  -  Assess patient's ability to carry out ADLs; assess patient's baseline for ADL function and identify physical deficits which impact ability to perform ADLs (bathing, care of mouth/teeth, toileting, grooming, dressing, etc )  - Assess/evaluate cause of self-care deficits   - Assess range of motion  - Assess patient's mobility; develop plan if impaired  - Assess patient's need for assistive devices and provide as appropriate  - Encourage maximum independence but intervene and supervise when necessary  - Involve family in performance of ADLs  - Assess for home care needs following discharge   - Consider OT consult to assist with ADL evaluation and planning for discharge  - Provide patient education as appropriate  Outcome: Progressing  Goal: Maintain or return mobility status to optimal level  Description  INTERVENTIONS:  - Assess patient's baseline mobility status (ambulation, transfers, stairs, etc )    - Identify cognitive and physical deficits and behaviors that affect mobility  - Identify mobility aids required to assist with transfers and/or ambulation (gait belt, sit-to-stand, lift, walker, cane, etc )  - [place] fall precautions as indicated by assessment  - Record patient progress and toleration of activity level on Mobility SBAR; progress patient to next Phase/Stage  - Instruct patient to call for assistance with activity based on assessment  - Consider rehabilitation consult to assist with strengthening/weightbearing, etc   Outcome: Progressing     Problem: DISCHARGE PLANNING  Goal: Discharge to home or other facility with appropriate resources  Description  INTERVENTIONS:  - Identify barriers to discharge w/patient and caregiver  - Arrange for needed discharge resources and transportation as appropriate  - Identify discharge learning needs (meds, wound care, etc )  - Arrange for interpretive services to assist at discharge as needed  - Refer to Case Management Department for coordinating discharge planning if the patient needs post-hospital services based on physician/advanced practitioner order or complex needs related to functional status, cognitive ability, or social support system  Outcome: Progressing     Problem: Knowledge Deficit  Goal: Patient/family/caregiver demonstrates understanding of disease process, treatment plan, medications, and discharge instructions  Description  Complete learning assessment and assess knowledge base    Interventions:  - Provide teaching at level of understanding  - Provide teaching via preferred learning methods  Outcome: Progressing

## 2020-02-22 NOTE — PLAN OF CARE
Patient:   SHELLY FARRAR            MRN: [id number]            FIN: [id number]              Age:   64 years     Sex:  FEMALE     :  [birth year]   Associated Diagnoses:   None   Author:   HUMERA-DO, SUBUHI F     Chief Complaint   19 00:06           c/o nausea, vomiiting, abdominal pain       History of Present Illness             The patient presents with Patient is a 64-year-old female with past medical history of hypertension, who presented to the ED with complaints of abdominal pain.  Patient has had history of small bowel obstructions in the past which were all conservatively managed, last episode of small bowel obstruction 3-4 years ago according to the patient.  Patient states that for about 24 hours prior to presentation patient had some abdominal pain  and cramping, yesterday afternoon patient noted some nausea and had vomitings prompting her to come to the ED.  Patient states last BM was yesterday morning.  Patient states that her abdominal cramping is improving.  Denies any nausea at time of examination.  Describes the cramping as mostly in the upper quadrants bilaterally.  Patient denies any fevers, chills, chest pain, shortness of breath or any other complaints.  Patient evaluated by  general surgery and kept n.p.o. awaiting return of bowel function.  .       Review of Systems   Constitutional:  Negative except as documented in history of present illness.   Eye:  Negative except as documented in history of present illness.   Ear/Nose/Mouth/Throat:  Negative except as documented in history of present illness.   Cardiovascular:  Negative except as documented in history of present illness.   Respiratory:  Negative except as documented in history of present illness.   Gastrointestinal:  Negative except as documented in history of present illness.   Genitourinary:  Negative except as documented in history of present illness.   Musculoskeletal:  Negative except as documented in history of present  Problem: OCCUPATIONAL THERAPY ADULT  Goal: Performs self-care activities at highest level of function for planned discharge setting  See evaluation for individualized goals  Description  Treatment Interventions: ADL retraining, Functional transfer training, Endurance training, Cognitive reorientation, Patient/family training, Equipment evaluation/education, Compensatory technique education, Continued evaluation, Energy conservation, Activityengagement          See flowsheet documentation for full assessment, interventions and recommendations  Note:   Limitation: Decreased ADL status, Decreased endurance, Decreased cognition, Decreased high-level ADLs  Prognosis: Fair  Assessment: Pt is a 80 y o  male admitted to [facility] on 2/21/2020 w/ chest pain  Comorbidities include a h/o CAD, anxiety, COPD, stage 3 CKD, LIVIER, hypothyroidism, osteoporosis, macular degeneration, thrombocytopenia and insomnia  Pt with active OT orders and ambulate  orders  Pt resides in a 1 story home with 1 PILAR  Pt lives with his grandson who works  Pt reports that he has a caretaker that comes to his home for 3-5 hours a day, unclear how often  Pt reports being I w/  ADLS, required assistance for IADLS such as cooking and medications, (-) drove, & required no use of DME PTA  Currently pt is S for functional transfers, min A for functional mobility and min A for ADLS overall  Pt is limited at this time 2*: endurance, activity tolerance, functional mobility, forward functional reach, unsupportive home environment, decreased I w/ ADLS/IADLS, cognitive impairments, decreased safety awareness and decreased insight into deficits  The following Occupational Performance Areas to address include: grooming, bathing/shower, toilet hygiene, dressing, functional mobility and clothing management  Based on the aforementioned OT evaluation, functional performance deficits, and assessments, pt has been identified as a high complexity evaluation   From OT illness.   Integumentary:  Negative except as documented in history of present illness.   Hematology/Lymphatics:  Negative except as documented in history of present illness.   Neurologic:  Negative except as documented in history of present illness.   Endocrine:  Negative except as documented in history of present illness.   Allergy/Immunologic:  Negative except as documented in history of present illness.   Psychiatric:  Negative except as documented in history of present illness.      Histories   Past Med History: Past Medical History   Hypertension    Family History:    Entire family history is negative.   Procedure History:    Appendectomy (115117505).  Cholecystectomy (94716983).  Tonsillectomy (679530484).   Social History       Alcohol  Details: Alcohol Abuse in Household: No.  Exercise  Details: Exercise: Occasionally.  Times Per Week: 1-2 times/week.  Type: Walking.  Substance Abuse  Details: Substance Abuse in Household: No.  Tobacco  Details: Smoker in [place]: No.  Smoked/Smokeless Tobacco Last 30 Days: No.  Smoking Tobacco Use: Never smoker.  Smokeless Tobacco Use Never.  Details: Smoked/Smokeless Tobacco Last 30 Days: No.  Smoking Tobacco Use: Never smoker.  Smokeless Tobacco Use Never.  Cultural/Oriental orthodox Practices  Details: Oriental orthodox or Cultural Practices While in Hospital: No.  .       Health Status   Allergies: Allergies (ST)   Allergies (3) Active Reaction  Dairy None Documented  No Known Medication Allergies None Documented  Poultry None Documented    Current medications:    Medications (13) Active  Scheduled: (6)  *Potassium Protocol Communication  1 each, N/A, Daily  *Potassium Protocol Communication  1 each, N/A, Daily  ciprofloxacin-dextrose 5%  400 mg 200 mL, IVPB, Q12H  Enoxaparin 40 mg/0.4 mL syringe  40 mg 0.4 mL, Subcutaneous, Daily  Famotidine 20 mg/2 mL inj  20 mg 2 mL, Slow IV Push, Q12H  metroNIDAZOLE-NaCl 0.9%  500 mg 100 mL, IVPB, Q8H  Continuous: (1)  Dextrose 5%-0.45% NaCl-KCl  standpoint, anticipate d/c home with family support vs rehab pending progress and available support  Pt to continue to benefit from acute immediate OT services to address the following goals 3-5x/week to  w/in 7-10 days:        OT Discharge Recommendation: Home with family support(vs rehab pending progress and available support )  OT - OK to Discharge: No 20 mEq 1,000 mL  1,000 mL, IV, 125 mL/hr  PRN: (6)  Acetaminophen 325 mg tab  650 mg 2 tab, Oral, Q6H  HYDROmorphone PF 1 mg/1 mL inj SDV  0.5 mg 0.5 mL, IV Push, Q4H (variable)  MetoCLOPramide 10 mg/2 mL inj SDV  10 mg 2 mL, Slow IV Push, Q6H  Milk of magnesia 8% 30 mL oral susp UD  30 mL, Oral, QID  Ondansetron 4 mg/2 mL inj SDV  4 mg 2 mL, Slow IV Push, Q6H  Senna-docusate sodium 8.6-50 mg tab  1 tab, Oral, Q Bedtime      Physical Examination   VS/Measurements       Vitals between:   26-FEB-2019 11:59:15   TO   27-FEB-2019 11:59:15                   LAST RESULT MINIMUM MAXIMUM  Temperature 37 36.4 37.4  Heart Rate 78 78 108  Respiratory Rate 16 16 18  NISBP           123 108 157  NIDBP           81 68 85  NIMBP           100 100 107  SpO2                    93 92 99    General:  Alert and oriented, No acute distress.    Eye:  Normal conjunctiva.    HENT:  Normocephalic.    Neck:  Supple, Non-tender.    Respiratory:  Lungs are clear to auscultation, Respirations are non-labored, Breath sounds are equal, Symmetrical chest wall expansion.   Cardiovascular:  Normal rate, Regular rhythm.    Gastrointestinal:  Soft, Non-distended, Normal bowel sounds, Minimal tenderness on palpation in the right upper quadrant..   Musculoskeletal:  Normal gait.    Integumentary:  Warm.    Neurologic:  Alert, Oriented, No focal deficits.    Cognition and Speech:  Oriented, Speech clear and coherent.    Psychiatric:  Cooperative, Appropriate mood & affect.      Review / Management   Laboratory results:       Labs between:  26-FEB-2019 11:59 to 27-FEB-2019 11:59    CBC:                 WBC  HgB  Hct  Plt  MCV  RDW   27-FEB-2019 8.1  15.1  (H) 47.4  276  90.1  13.3   26-FEB-2019 (H) 13.8  (H) 17.8  (H) 54.6  346  87.9  13.2     DIFF:                 Seg  Neutroph//ABS  Lymph//ABS  Mono//ABS  EOS/ABS  27-FEB-2019 NOT APPLICABLE  81 // 6.6 11 // (L) 0.9  7 // 0.5 1 // (L) 0.0  26-FEB-2019 NOT APPLICABLE  86 // (H) 11.7  8 // 1.2 6 // 0.9 0  // (L) 0.0     BMP:                 Na  Cl  BUN  Glu   27-FEB-2019 144  (H) 110  14  (H) 126                              K  CO2  Cr  Ca                              3.8  23  0.68  8.6   BMP:                 Na  Cl  BUN  Glu   26-FEB-2019 141  106  16  (H) 148                              K  CO2  Cr  Ca                              4.8  22  0.77  9.6     CMP:                 AST  ALT  AlkPhos  Bili  Albumin   27-FEB-2019 21  31  76  0.5  (L) 3.1   26-FEB-2019 16  31  100  0.7  4.0     Other Chem:             Mg  Phos  Triglycerides  GGTP  DirectBili                           1.9                          .    Radiology results     Result title:  CT ABDOMEN AND PELVIS W CON  Result status:  Final  Verified by:  MOOKIE DOWNS on 02/26/2019 2:13  IMPRESSION:1.  Small bowel obstruction with the distal transition point in the right lower quadrant anteriorly within the ileum, where small bowel is inseparable from the anterior abdominal wall and may be adhesed.  There also appears to be a transition point within the mid jejunum in the left mid abdomen, distal to which the bowel is progressively more dilated with progressive dilution of enteric contrast.  There is fluid within the mesentery  and areas of bowel wall thickening and vasculature engorgement, which can be seen with early ischemia.2.  Please see above for additional observations.Dr. Owusu was notified of the findings over the telephone by Dr. Bahena on 02/26/2019 at 21:56.                  Lines and Tubes:    LINES  Peripheral Intravenous Antecubital Right   Gauge: 18   Charted: 02/27/19 08:00  Inserted: 02/26/19   Days Since Insertion: 1 days  Indication of Use: Hydration, IV Meds   .      Impression and Plan   Dx and Plan:  Diagnosis     Assessment/Plan:   Patient is a 64-year-old female who presented with nausea vomiting and abdominal pain    1.  Patient  General surgery on consult  CT abdomen pelvis reviewed, possible changes noted for early ischemia  per report  Patient on IV antibiotics  N.p.o. at present  Awaiting bowel function  Pain control  Continue close monitoring    2.  Hypertension  We will hold lisinopril at present, given blood pressure is low normal    DVT prophylaxis: Lovenox  GI prophylaxis: Pepcid  Certify inpatient admission for above  adod: Possibly in ~2 days (Friday )pending clinical improvement, resolvent of bowel function      .     .    Education and Follow-up:       Discharge Planning: Patient Education, Follow-up.

## 2020-02-22 NOTE — ASSESSMENT & PLAN NOTE
Creatinine 1 62 today, which is near baseline  Patient does not meet GRETCHEN criteria  Continue to monitor and avoid nephrotoxic agents

## 2020-02-22 NOTE — OCCUPATIONAL THERAPY NOTE
Occupational Therapy Evaluation & Tx     Patient Name: Laly FOOTE Date: 2/22/2020  Problem List  Principal Problem:    Chest pain  Active Problems:    Essential hypertension    Anxiety disorder due to general medical condition    Benign prostatic hyperplasia with lower urinary tract symptoms    GERD (gastroesophageal reflux disease)    Mixed hyperlipidemia    Acquired hypothyroidism    COPD (chronic obstructive pulmonary disease) (HCC)    Chronic diastolic heart failure (HCC)    Atrial fibrillation (HCC)    Stage 3 chronic kidney disease ([facility] )    Dizziness    Past Medical History  Past Medical History:   Diagnosis Date    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    Compression fracture of thoracic vertebra ([facility] )     last assessed 05/07/2012    COPD (chronic obstructive pulmonary disease) ([facility] )     Disease of thyroid gland     [facility]     History of methicillin resistant staphylococcus aureus (MRSA) 03/28/2019    negative nasal swab     Hollenhorst plaque, right eye     last assessed 04/08/2013    Hyperlipidemia     Hypertension     Iron deficiency anemia due to chronic blood loss     last assessed 09/10/2012    Ischemic colitis ([facility] )     last assessed 05/27/2014    Osteoarthritis of both hands     last assessedn 04/30/2013    Peptic ulcer     last assessed 06/14/2013    Polymyalgia rheumatica ([facility] )     last assessesd 10/09/2012    Renal disorder     Upper GI hemorrhage     last assessed 01/29/2015    Varicose veins of lower extremity     last assessed 04/26/2013     Past Surgical History  Past Surgical History:   Procedure Laterality Date    CORONARY ARTERY BYPASS GRAFT      HEMORRHOID SURGERY      HERNIA REPAIR      RENAL CYST EXCISION      resolved 05/2010    THROMBOENDARTERECTOMY Right     carotid, last assessed 06/18/2013 02/22/20 0483   Note Type   Note type Eval/Treat   Restrictions/Precautions   Weight Bearing Precautions Per Order No   Braces or Orthoses   (denies)   Other Precautions Chair Alarm;Cognitive; Fall Risk   Pain Assessment   Pain Assessment FLACC   Pain Location Neck   Pain Rating: FLACC (Rest) - Face 0   Pain Rating: FLACC (Rest) - Legs 0   Pain Rating: FLACC (Rest) - Activity 0   Pain Rating: FLACC (Rest) - Cry 0   Pain Rating: FLACC (Rest) - Consolability 0   Score: FLACC (Rest) 0   Pain Rating: FLACC (Activity) - Face 1   Pain Rating: FLACC (Activity) - Legs 0   Pain Rating: FLACC (Activity) - Activity 0   Pain Rating: FLACC (Activity) - Cry 0   Pain Rating: FLACC (Activity) - Consolability 1   Score: FLACC (Activity) 2   Home Living   Type of Home House   Home Layout One level;Stairs to enter with rails; Performs ADLs on one level   Bathroom Shower/Tub Walk-in shower   Bathroom Toilet Standard   Bathroom Equipment   (denies)   [address]   (denies)   Additional Comments Pt is an inconsistent historian, but reports living in a 1 story home with 3 PILAR  Prior Function   Level of [place] Independent with ADLs and functional mobility; Needs assistance with IADLs   Lives With Other (Comment)  (grandson)   Receives Help From Family;Personal care attendant   ADL Assistance Independent   IADLs Needs assistance   Falls in the last 6 months 0   Vocational Retired   Lifestyle   Autonomy Pt reports that he is I with ADLS, receives assistance for IADLS such as cooking and medications, and is I with mobility without device PTA  (-)     Reciprocal Relationships Pt lives with his son who works during the day  Pt reports that he has a caregiver who comes for 3-5 hours a day, unclear how many days a week      Service to Others Retired   [address] Enjoys listening to books    Psychosocial   Psychosocial (WDL) [address] "I don't want the walker"    ADL   Where Assessed Chair   Eating Assistance 5  Supervision/Setup   Grooming Assistance 5 Supervision/Setup   UB Bathing Assistance 4  Minimal Assistance   LB Bathing Assistance 4  Minimal Assistance   [address] 4  Minimal Assistance   LB Dressing Assistance 4  Minimal Assistance   Toileting Assistance  4  Minimal Assistance   Bed Mobility   Additional Comments Unable to assess, pt seated OOB in chair upon OT arrival  After OT session pt seated in chair with alarm on and all needs within reach  Transfers   Sit to Stand 5  Supervision   Additional items Assist x 1   Stand to Sit 5  Supervision   Additional items Assist x 1   Functional Mobility   Functional Mobility 4  Minimal assistance   Additional Comments Pt demonstrated very short mobility before becoming dizzy and requesting to sit down  Ambulation was attempted 2x, with both times pt becoming dizzy  Additional items Rolling walker   Balance   Static Sitting Fair +   Dynamic Sitting Fair   Static Standing Fair   Dynamic Standing Poor +   Ambulatory Poor +   Activity Tolerance   Activity Tolerance Treatment limited secondary to medical complications (Comment)  (dizzy)   Nurse Made Aware RN confirmed okay to see pt and updated after    Vision-Basic Assessment   Visual History Macular degeneration   Patient Visual Report   (Pt reports very poor vision, with loss of central vision  )   Cognition   Overall Cognitive Status Impaired   Arousal/Participation Alert; Cooperative   Attention Within functional limits   Orientation Level Oriented to person;Oriented to place; Disoriented to time  (Pt reporting it is April 1920)   Memory Decreased recall of precautions;Decreased recall of recent events   Following Commands Follows one step commands with increased time or repetition   Comments Pt is very pleasant and cooperative to work with therapy  Pt presents with decreased insight into deficits and requires VC for safety throughout  Cognition Assessment Tools MOCA   Score 15   Assessment   Limitation Decreased ADL status; Decreased endurance;Decreased cognition;Decreased high-level ADLs   Prognosis Fair   Assessment Pt is a 80 y o  male admitted to [facility] on 2020 w/ chest pain  Comorbidities include a h/o CAD, anxiety, COPD, stage 3 CKD, LIVIER, hypothyroidism, osteoporosis, macular degeneration, thrombocytopenia and insomnia  Pt with active OT orders and ambulate  orders  Pt resides in a 1 story home with 1 PILAR  Pt lives with his grandson who works  Pt reports that he has a caretaker that comes to his home for 3-5 hours a day, unclear how often  Pt reports being I w/  ADLS, required assistance for IADLS such as cooking and medications, (-) drove, & required no use of DME PTA  Currently pt is S for functional transfers, min A for functional mobility and min A for ADLS overall  Pt is limited at this time 2*: endurance, activity tolerance, functional mobility, forward functional reach, unsupportive home environment, decreased I w/ ADLS/IADLS, cognitive impairments, decreased safety awareness and decreased insight into deficits  The following Occupational Performance Areas to address include: grooming, bathing/shower, toilet hygiene, dressing, functional mobility and clothing management  Based on the aforementioned OT evaluation, functional performance deficits, and assessments, pt has been identified as a high complexity evaluation  From OT standpoint, anticipate d/c home with family support vs rehab pending progress and available support  Pt to continue to benefit from acute immediate OT services to address the following goals 3-5x/week to  w/in 7-10 days: Enedina Egan Goals   Patient Goals To return home    LTG Time Frame 7-10   Long Term Goal #1 See goals below    Plan   Treatment Interventions ADL retraining;Functional transfer training; Endurance training;Cognitive reorientation;Patient/family training;Equipment evaluation/education; Compensatory technique education;Continued evaluation; Energy conservation; Activityengagement   Goal Expiration Date 03/03/20   OT Treatment Day 1   OT Frequency 3-5x/wk   Additional Treatment Session   Start Time 0901   End Time 0916   Treatment Assessment Pt was seen this date for MOCA, please see full assessment below  Pt unable to complete ACLS 2* low vision  Additional Treatment Day 1   Recommendation   OT Discharge Recommendation Home with family support  (vs rehab pending progress and available support )   OT - OK to Discharge No   Modified [place] Scale   Modified Dharmesh Scale 4              MOCA Assessment     PT SEEN FOR ANTOINE COGNITIVE ASSESSMENT  PT HAS VERY LOW VISION AND A BLIND MOCA WAS USED THIS SESSION  PT SCORED 11/22 ON THE BLIND MOCA, WHICH CONVERTS TO 15/33  THIS INDICATES MODERATE COGNITIVE IMPAIRMENT FOR AGE/EDUCATION  SCORES ARE AS FOLLOWS:    ATTENTION: 4/6  Pt was able to repeat sequence of numbers forwards and backwards  Pt able to attend to sequence of letters  Pt was able to correctly subtract 7 from 100 1/5 times  LANGUAGE: 0/3  Pt was not able to repeat sentences back to therapist  Pt was able to produce 2 words starting with the letter F in 1 minute  ABSTRACTION: 2/2  Pt was able to identify the similarity of two items x2 trials  DELAYED RECALL: 0/5  Pt recalled 0/5 words without cues  Pt recalled 3/5 words with category cue  Pt recalled 1/5 words with multiple choice options  ORIENTATION: 4/6  Pt is oriented to date, month, year, day, place and city  Pt received 1 extra point for level of education obtained               GOALS    GOALS    1) Pt will increase activity tolerance to G for 30 min txment sessions    2) Pt will complete UB/LB dressing/self care w/ mod I using adaptive device and DME as needed    3) Pt will complete bathing w/ Mod I w/ use of AE and DME as needed    4) Pt will complete toileting w/ mod I w/ G hygiene/thoroughness using DME as needed    5) Pt will improve functional transfers to Mod I on/off all surfaces using DME as needed w/ G balance/safety 6) Pt will improve functional mobility during ADL/IADL/leisure tasks to Mod I using DME as needed w/ G balance/safety     7) Pt will demonstrate G carryover of pt/caregiver education and training as appropriate      8) Pt will engage in ongoing cognitive assessment w/ G participation to assist w/ safe d/c planning/recommendations    Efrem Sports, MOT, OTR/L

## 2020-02-22 NOTE — H&P
H&P  Tressa Fien 80 y o  male MRN: [id number]  [facility] En[facility]: [id number]    Date of Admission: 2/21/2020 1710  Chief Complaint:   Chief Complaint   Patient presents with    Chest Pain     pt presents to ED via EMS with c/o chest pain earlier this a m  pt went to urgent care with midsternal chest pain radiating to left arm accompanied by nausea and sob  pt has hx of 2 heart attacks  pt completely asymptomatic at this time       Principal Problem:    Chest pain  Active Problems:    Essential hypertension    CAD (coronary artery disease)    Anxiety disorder due to general medical condition    Benign prostatic hyperplasia with lower urinary tract symptoms    GERD (gastroesophageal reflux disease)    Mixed hyperlipidemia    Acquired hypothyroidism    Iron deficiency anemia    COPD with acute exacerbation (HCC)    Chronic diastolic heart failure (HCC)    Atrial fibrillation (HCC)    Urinary obstruction    Stage 3 chronic kidney disease (HCC)    LIVIER (obstructive sleep apnea)      Assessment & Plan:  Tressa Fine is a 80 y o  male with PMH significant for COPD, MI x2, HTN, HLD, Anemia, Ischemic Colitis who is being admitted for chest pain r/o ACS  * Chest pain  Assessment & Plan  Resolved  Initial troponin negative, no ischemic changes on ECG  CXR shows some congestion and possible right sided infiltrates, official read pending  NT-ProBNP 242 therefore congestion is likely at baseline    Of note patient has grade 1 diastolic dysfunction, last echo June 2018 EF 60%  **of note patient has refused Aspirin due to h/o stomach ulcers and GIB, will discuss enteric coated Aspirin with patient and family  Will admit for r/o ACS given cardiac history  Trend trops  Procalcitonin  Continuous telemetry monitoring  A m  labs and EKG  Continue to monitor    Essential hypertension  Assessment & Plan  BP Readings from Last 3 Encounters:   02/21/20 146/78   02/21/20 142/73   01/18/20 90/62     Well controlled  Continue PTA Atenolol 25 mg PO daily    Stage 3 chronic kidney disease (HCC)  Assessment & Plan  Baseline creatinine approximately 1 6, creatinine on presentation 1 77 an EGFR 34  Patient does not meet GRETCHEN criteria  Continue to monitor and avoid nephrotoxic agents  A m  BMP    Atrial fibrillation (HCC)  Assessment & Plan  HR:  [73-79] 78  Resp:  [18-19] 19  BP: (140-146)/(73-78) 146/78    Rate controlled with PTA atenolol 25 mg p o  daily, no anticoagulation  Continue PT atenolol  Telemetry monitoring      Chronic diastolic heart failure (HCC)  Assessment & Plan  Wt Readings from Last 3 Encounters:   02/21/20 81 4 kg (179 lb 7 3 oz)   01/18/20 79 8 kg (176 lb)   01/15/20 79 8 kg (176 lb)     Patient likely has some vascular congestion is seen on chest x-ray present at baseline  NT-ProBNP 242  No acute exacerbation, no rales or lower extremity edema noted on exam     Last echo June 2018 EF 60%        COPD with acute exacerbation ([address] )  Assessment & Plan  No acute exacerbation, however does have mild diffuse x-ray wheezing which per patient is present baseline  Continue PTA inhalers and nebulizers  Respiratory protocol, given patient is on inhaler as well as multiple scheduled nebulizers daily at home  Will continue to monitor    Acquired hypothyroidism  Assessment & Plan  Lab Results   Component Value Date    [id number] 2 782 01/03/2019     Continue PTA levothyroxine 75 micro g p o  daily    Mixed hyperlipidemia  Assessment & Plan  Per family patient has allergies to statin therapy  Last FLP was in 2019    GERD (gastroesophageal reflux disease)  Assessment & Plan  Well controlled  Continue PTA Prilosec 20 mg p o  daily    Benign prostatic hyperplasia with lower urinary tract symptoms  Assessment & Plan  Continue PT Flomax    Anxiety disorder due to general medical condition  Assessment & Plan  Per family patient is taking Ativan 0 5 mg p o  q 8 hours scheduled at home as well as Ambien 5 mg p o  q h s  Will continue weaning off Ativan, and consider changing Ambien to mirtazapine given insomnia secondary to depression anxiety and poor appetite  Of note patient also has a diagnosis of excessive daytime sleepiness and per family they've tried to de-escalate his home regimen but patient has not been amenable  Inpatient consult to Gerontology placed, appreciate their input  Diet: Cardiac diet       Diet Orders   (From admission, onward)             Start     Ordered    02/21/20 2021  Diet Cardiovascular; Cardiac  Diet effective now     Question Answer Comment   Diet Type Cardiovascular    Cardiac Cardiac    RD to adjust diet per protocol? Yes        02/21/20 2020              DVT Prophylaxis: SCDs  Code Status: DNAR-DNI  Dispo: Admit  Patient will require at least one midnight stay  History of Present Illness:  Jasmin Vazquez is a 80 y o  male who presents with PMH significant for COPD, MI x2, HTN, HLD, Anemia, Ischemic Colitis who presents with a one day history of substernal pressure like chest pain radiating to left arm with associated nausea  Per patient and family chest pain is a recurrent issue for patient who complains of chest pain nearly daily  He also has COPD with daily nebs and chronic cough and GERD that could be other likely causes  He has a long history of smoking but has quit a long time ago  Per accompanying family patient intermittently uses a cane for ambulation at baseline but is able to ambulate without a cane for short distances  Patient is on multiple nebs and inhalers at home  Denies home oxygen  Patient denies fevers, chills, headaches, lightheadedness, palpitations, calf pain or lower extremity edema, neck pain/stiffness, numbness, tingling, weakness, abdominal pain, diarrhea, constipation, hematochezia or melanotic stools    Patient is a poor historian, and per accompanying son and his wife, patient lives with his grandson and it's not clear whether he's adherent with all his medications daily  Patient states his chest pain has resolved  He's asymptomatic on admission  ED Management:   Medications   albuterol (PROVENTIL HFA,VENTOLIN HFA) inhaler 2 puff (has no administration in time range)   atenolol (TENORMIN) tablet 25 mg (has no administration in time range)   ferrous sulfate tablet 325 mg (has no administration in time range)   fluticasone (FLONASE) 50 mcg/act nasal spray 1 spray (has no administration in time range)   guaiFENesin (MUCINEX) 12 hr tablet 1,200 mg (has no administration in time range)   ipratropium (ATROVENT) 0 02 % inhalation solution 0 5 mg (has no administration in time range)   levalbuterol (XOPENEX) inhalation solution 1 25 mg (has no administration in time range)   levothyroxine tablet 75 mcg (has no administration in time range)   metaxalone (SKELAXIN) tablet 400 mg (has no administration in time range)   nystatin-triamcinolone (MYCOLOG-II) cream (has no administration in time range)   tamsulosin (FLOMAX) capsule 0 4 mg (has no administration in time range)   zolpidem (AMBIEN) tablet 5 mg (has no administration in time range)   acetaminophen (TYLENOL) tablet 650 mg (has no administration in time range)   heparin (porcine) subcutaneous injection 5,000 Units (has no administration in time range)   nitroglycerin (NITROSTAT) SL tablet 0 4 mg (has no administration in time range)   LORazepam (ATIVAN) tablet 0 5 mg (has no administration in time range)       Historical Info: Allergies   Allergen Reactions    Pravastatin Anaphylaxis, Photosensitivity and Headache     Reaction Date: [id number];    Other reaction(s): Headache    Acetaminophen-Codeine GI Intolerance    Atorvastatin      Other reaction(s): Leg Cramps  Other reaction(s): Leg Cramps    Codeine Nausea Only    Colestipol GI Intolerance     Reaction Date: [id number];     Contrast  [Iodinated Diagnostic Agents]     Fenofibrate GI Intolerance     Reaction Date: [id number];     Hydrocodone Vomiting    Niacin      Reaction Date: ;     Niaspan  [Niacin Er]     Pitavastatin Myalgia    Pneumococcal Polysaccharide Vaccine     Pravastatin Sodium     Simvastatin     Statins Myalgia    Vicoprofen  [Hydrocodone-Ibuprofen] GI Intolerance    Cyclophosphamide Rash    Ioversol Hives     Prior to Admission Medications   Prescriptions Last Dose Informant Patient Reported? Taking? B Complex Vitamins (B COMPLEX PO)  Self Yes No   Sig: Take by mouth daily   Chlorphen-PE-Acetaminophen (CORICIDIN D COLD/FLU/SINUS PO)  Self Yes No   Sig: Take by mouth   Cholecalciferol (CVS VITAMIN D) 2000 units CAPS  Self Yes No   Sig: Take by mouth daily    Cyanocobalamin (B-12 PO)  Self Yes No   Sig: Take by mouth daily   Ferrous Sulfate (IRON) 325 (65 Fe) MG TABS  Self Yes No   Sig: Take by mouth every other day    LORazepam (ATIVAN) 0 5 mg tablet  Self No No   Sig: TAKE ONE TABLET BY MOUTH EVERY 8 HOURS AS NEEDED FOR ANXIETY   LORazepam (ATIVAN) 0 5 mg tablet   No No   Sig: TAKE ONE TABLET BY MOUTH EVERY 8 HOURS AS NEEDED FOR ANXIETY   Misc Natural Products (CORTISOL PO)  Self Yes No   Sig: Take by mouth   Multiple Vitamins-Minerals (VISION FORMULA/LUTEIN PO)  Self Yes No   Sig: Take by mouth   albuterol (PROVENTIL HFA,VENTOLIN HFA) 90 mcg/act inhaler  Self No No   Sig: Inhale 2 puffs every 6 (six) hours as needed for wheezing   arformoterol (BROVANA) 15 mcg/2 mL nebulizer solution  Self Yes No   Sig: Take 15 mcg by nebulization 2 (two) times a day   atenolol (TENORMIN) 25 mg tablet   No No   Sig: TAKE ONE TABLET BY MOUTH EVERY DAY   budesonide (PULMICORT) 0 5 mg/2 mL nebulizer solution  Self Yes No   Sig: Take 0 5 mg by nebulization 2 (two) times a day Rinse mouth after use     diclofenac sodium (VOLTAREN) 1 %   No No   Sig: Apply 2 g topically 4 (four) times a day for 7 days   fluticasone (FLONASE) 50 mcg/act nasal spray  Self Yes No   Si spray into each nostril 2 (two) times a day   guaiFENesin (MUCINEX) 600 mg 12 hr tablet  Self Yes No   Sig: Take 1,200 mg by mouth every 12 (twelve) hours   ipratropium (ATROVENT) 0 02 % nebulizer solution   No No   Sig: TAKE 1 VIAL BY NEBULZATION THREE TIMES A DAY   levalbuterol (XOPENEX) 1 25 mg/3 mL nebulizer solution  Self No No   Sig: Take 1 vial (1 25 mg total) by nebulization 3 (three) times a day   levothyroxine 75 mcg tablet   No No   Sig: TAKE ONE TABLET BY MOUTH EVERY DAY   meclizine (ANTIVERT) 25 mg tablet  Self No No   Sig: Take 1 tablet (25 mg total) by mouth 3 (three) times a day as needed for dizziness   metaxalone (SKELAXIN) 400 MG tablet   No No   Sig: TAKE ONE TABLET BY MOUTH THREE TIMES A DAY   nystatin-triamcinolone (MYCOLOG-II) cream  Self No No   Sig: APPLY SPARINGLY TO THE AFFECTED AREA(S) TWO TIMES A DAY   omeprazole (PriLOSEC) 20 mg delayed release capsule   No No   Sig: TAKE ONE CAPSULE BY MOUTH EVERY DAY   predniSONE 10 mg tablet   No No   Sig: Take 2 tablets once a day for 7 days and then one daily until done   sodium chloride 0 9 % nebulizer solution   No No   Sig: TAKE 3 MILLILITERS BY NEBULIZATION ROUTE AS NEEDED FOR WHEEZING   tamsulosin (FLOMAX) 0 4 mg   No No   Sig: TAKE ONE CAPSULE BY MOUTH EVERY DAY WITH DINNER   zolpidem (AMBIEN) 5 mg tablet   No No   Sig: TAKE ONE TABLET BY MOUTH AT BEDTIME AS NEEDED FOR SLEEP      Facility-Administered Medications: None     Past Medical History:   Diagnosis Date    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    Compression fracture of thoracic vertebra (HCC)     last assessed 05/07/2012    COPD (chronic obstructive pulmonary disease) (HCC)     Disease of thyroid gland     Heart attack ([facility] )     History of methicillin resistant staphylococcus aureus (MRSA) 03/28/2019    negative nasal swab     Hollenhorst plaque, right eye     last assessed 04/08/2013    Hyperlipidemia     Hypertension     Iron deficiency anemia due to chronic blood loss     last assessed 09/10/2012    Ischemic colitis ([facility] )     last assessed 2014    Osteoarthritis of both hands     last assessedn 2013    Peptic ulcer     last assessed 2013    Polymyalgia rheumatica ([facility] )     last assessesd 10/09/2012    Renal disorder     Upper GI hemorrhage     last assessed 2015    Varicose veins of lower extremity     last assessed 2013     Past Surgical History:   Procedure Laterality Date    CORONARY ARTERY BYPASS GRAFT      HEMORRHOID SURGERY      HERNIA REPAIR      RENAL CYST EXCISION      resolved 2010    THROMBOENDARTERECTOMY Right     carotid, last assessed 2013     Social History     Substance and Sexual Activity   Alcohol Use Not Currently    Alcohol/week: 1 0 standard drinks    Types: 1 Glasses of wine per week    Comment: social     Social History     Substance and Sexual Activity   Drug Use No     Social History     Tobacco Use   Smoking Status Former Smoker    Packs/day: 2 00    Years: 50 00    Pack years: 100 00    Types: Cigarettes    Start date:     Last attempt to quit:     Years since quittin 1   Smokeless Tobacco Never Used   Tobacco Comment    Quit 40 yrs  ago     Family History   Problem Relation Age of Onset    Hypertension Mother     Alcohol abuse Mother     Hypertension Father     Alcohol abuse Father     Alcohol abuse Son     Heart disease Family     Stroke Family         syndrome       Review of Systems:  As per HPI  All other systems reviewed and negative for acute abnormalities  Physical Exam:  ED Vitals: Body mass index is 30 8 kg/m²    Vitals:    20 1717 20 1815 20   BP: 140/77 146/78  129/75   TempSrc:       Pulse: 77 79 78 69   Resp: 18 18 19    Patient Position - Orthostatic VS:  Lying     Temp:    97 5 °F (36 4 °C)     Current Vitals:  Temp:  [97 3 °F (36 3 °C)-97 5 °F (36 4 °C)] 97 5 °F (36 4 °C)  HR:  [69-79] 69  Resp:  [18-19] 19  BP: (129-146)/(73-78) 129/75  SpO2:  [95 %-98 %] 96 %  Temp (24hrs), Av 4 °F (36 3 °C), Min:97 3 °F (36 3 °C), Max:97 5 °F (36 4 °C)  Current: Temperature: 97 5 °F (36 4 °C)  Invasive Devices     Peripheral Intravenous Line            Peripheral IV 20 Left Wrist less than 1 day              Physical Exam   Constitutional: He is oriented to person, place, and time  No distress  Resting comfortably in bed, cooperating with history and physical   HENT:   Head: Normocephalic and atraumatic  Right Ear: External ear normal    Left Ear: External ear normal    Nose: Nose normal    MMM   Eyes: Pupils are equal, round, and reactive to light  Conjunctivae and EOM are normal  Right eye exhibits no discharge  Left eye exhibits no discharge  Neck: Normal range of motion  Neck supple  No JVD present  Cardiovascular: Normal rate, regular rhythm and normal heart sounds  Exam reveals no gallop and no friction rub  No murmur heard  Distant heart sounds   Pulmonary/Chest: Effort normal  No stridor  No respiratory distress  He has no rales  Mild diffuse end exp wheezes   Abdominal: Soft  Bowel sounds are normal  He exhibits no distension  There is no tenderness  There is no guarding  Musculoskeletal: Normal range of motion  He exhibits no tenderness  Neurological: He is alert and oriented to person, place, and time  He exhibits normal muscle tone  Coordination normal    Skin: Skin is warm and dry  Capillary refill takes 2 to 3 seconds  No rash noted  He is not diaphoretic  No pallor  Psychiatric: He has a normal mood and affect   Judgment and thought content normal      Lab Results:   Results from last 7 days   Lab Units 20  1731   WBC Thousand/uL 6 38   HEMOGLOBIN g/dL 14 7   HEMATOCRIT % 45 0   PLATELETS Thousands/uL 162   NEUTROS PCT % 53   NEUTROS ABS Thousands/µL 3 40   LYMPHS PCT % 31   MONOS PCT % 13*     Results from last 7 days   Lab Units 20  1731   POTASSIUM mmol/L 4 5   CHLORIDE mmol/L 107   CO2 mmol/L 22   BUN mg/dL 20   CREATININE mg/dL 1 77*   EGFR ml/min/1 73sq m 34   CALCIUM mg/dL 9 2   ALK PHOS U/L 100   ALT U/L 23   AST U/L 24       Results from last 7 days   Lab Units 20  1731   TROPONIN I ng/mL <0 02     Results from last 7 days   Lab Units 20  1731   NT-PRO BNP pg/mL 242        Imagining Results:  Ct Head Without Contrast    Result Date: 2020  No acute intracranial abnormality  Workstation performed: [id number]     Recent Results (from the past 96 [id number] hour(s))   ECG 12 lead   Result Value    Ventricular Rate 116    Atrial Rate 116    NJ Interval 154    QRSD Interval 84    QT Interval 322    QTC Interval 447    P Axis 73    QRS Axis 82    T Wave Axis 62    Narrative    Sinus tachycardia with Premature atrial complexes  Otherwise normal ECG  When compared with ECG of 10-SEP-2019 10:45,  Premature atrial complexes are now Present    Confirmed by Yoni Valdez [id number]) on 10/2/2019 1:10:45 PM   POCT ECG    Impression    NSR  Normal ECG   Echo complete with contrast if indicated    Narrative    [address] [place] [address], [address] [facility]  [phone]    Transthoracic Echocardiogram  2D, M-mode, Doppler, and Color Doppler    Study date:  2018    Patient: Clint Núñez  MR number: [id number]  Account number: [de-identified]  : 1932  Age: 80 years  Gender: Male  Status: Inpatient  Location: Echo lab  Height: 66 in  Weight: 165 7 lb  BP: 118/ 60 mmHg    Indications: Chest pain  Diagnoses: R07 9 - Chest pain, unspecified    Sonographer:  SESAR Pineda RDCS RVT  Primary Physician:  Misti Ragsdale MD  Referring Physician:  Sophy Colin MD  [facility]  [facility]  Interpreting Physician:  Bella Ro MD    SUMMARY    LEFT VENTRICLE:  Systolic function was normal  Ejection fraction was estimated to be 60 %    Although no diagnostic regional wall motion abnormality was identified, this possibility cannot be completely excluded on the basis of this study  Wall thickness was at the upper limits of normal   Doppler parameters were consistent with abnormal left ventricular relaxation (grade 1 diastolic dysfunction)  LEFT ATRIUM:  The atrium was mildly dilated  MITRAL VALVE:  There was mild annular calcification  There was mild regurgitation  AORTIC VALVE:  There was very mild stenosis  There was trace regurgitation  TRICUSPID VALVE:  There was mild regurgitation  Pulmonary artery systolic pressure was mildly increased  Estimated peak PA pressure was 40 mmHg  AORTA:  There was mild dilatation of the ascending aorta  HISTORY: PRIOR HISTORY: Previous (remote) myocardial infarction  Risk factors: hypertension  PROCEDURE: The procedure was performed in the echo lab  This was a routine study  The transthoracic approach was used  The study included complete 2D imaging, M-mode, complete spectral Doppler, and color Doppler  Images were obtained from  the parasternal, apical, subcostal, and suprasternal notch acoustic windows  Image quality was adequate  LEFT VENTRICLE: Size was normal  Systolic function was normal  Ejection fraction was estimated to be 60 %  Although no diagnostic regional wall motion abnormality was identified, this possibility cannot be completely excluded on the basis  of this study  Wall thickness was at the upper limits of normal  DOPPLER: Doppler parameters were consistent with abnormal left ventricular relaxation (grade 1 diastolic dysfunction)  RIGHT VENTRICLE: The size was normal  Systolic function was normal  Wall thickness was normal     LEFT ATRIUM: The atrium was mildly dilated  RIGHT ATRIUM: Size was normal     MITRAL VALVE: There was mild annular calcification  Valve structure was normal  There was normal leaflet separation  DOPPLER: The transmitral velocity was within the normal range  There was no evidence for stenosis  There was mild  regurgitation  AORTIC VALVE: The valve was trileaflet   Leaflets exhibited normal thickness, mild calcification, and mildly reduced cuspal separation  DOPPLER: Transaortic velocity was minimally increased  There was very mild stenosis  There was trace  regurgitation  TRICUSPID VALVE: The valve structure was normal  There was normal leaflet separation  DOPPLER: The transtricuspid velocity was within the normal range  There was no evidence for stenosis  There was mild regurgitation  Pulmonary artery  systolic pressure was mildly increased  Estimated peak PA pressure was 40 mmHg  PULMONIC VALVE: Not well visualized  DOPPLER: The transpulmonic velocity was within the normal range  There was no significant regurgitation  PERICARDIUM: There was no pericardial effusion  The pericardium was normal in appearance  AORTA: The root exhibited normal size  There was mild dilatation of the ascending aorta  SYSTEMIC VEINS: IVC: The inferior vena cava was normal in size  PULMONARY VEINS: DOPPLER: Doppler signals were not recordable in the pulmonary vein(s)      SYSTEM MEASUREMENT TABLES    2D  %FS: 33 37 %  AV Diam: 3 54 cm  EDV(Teich): 96 76 ml  EF(Teich): 62 12 %  ESV(Teich): 36 65 ml  IVSd: 1 13 cm  LA Area: 15 52 cm2  LA Diam: 4 44 cm  LVEDV MOD A4C: 49 4 ml  LVEF MOD A4C: 59 83 %  LVESV MOD A4C: 19 84 ml  LVIDd: 4 59 cm  LVIDs: 3 06 cm  LVLd A4C: 6 69 cm  LVLs A4C: 5 21 cm  LVPWd: 1 11 cm  RA Area: 13 05 cm2  RVIDd: 2 78 cm  SV MOD A4C: 29 56 ml  SV(Teich): 60 1 ml    CW  AR Dec Hanson: 3 99 m/s2  AR Dec Time: 1127 09 ms  AR PHT: 326 86 ms  AR Vmax: 4 5 m/s  AR maxP 03 mmHg  TR Vmax: 3 03 m/s  TR maxP 67 mmHg    MM  TAPSE: 1 84 cm    PW  MV A Gutierrez: 0 99 m/s  MV Dec Hanson: 3 85 m/s2  MV DecT: 192 73 ms  MV E Gutierrez: 0 74 m/s  MV E/A Ratio: 0 75  MV PHT: 55 89 ms  MVA By PHT: 3 94 cm2    [employer] Accredited Echocardiography [facility]    Prepared and electronically signed by    Flaca Burnett MD  Signed 2018 12:45:32          This case was discussed with SLIM attending on service  Bella Radford MD   [facility] PGY3    ** Please Note: Portions of this note have been constructed using voice recognition software  Occasional wrong word or "sound a like" substitutions may have occurred due to the inherent limitations of voice recognition software  Please read the chart carefully and recognize, using context, where substitutions have occurred  **

## 2020-02-22 NOTE — ASSESSMENT & PLAN NOTE
BP Readings from Last 3 Encounters:   02/21/20 146/78   02/21/20 142/73   01/18/20 90/62     Well controlled    Continue PTA Atenolol 25 mg PO daily

## 2020-02-22 NOTE — ASSESSMENT & PLAN NOTE
No acute exacerbation  Continue PTA inhalers and nebulizers    Respiratory protocol, given patient is on inhaler as well as multiple scheduled nebulizers daily at home  Will continue to monitor

## 2020-02-22 NOTE — ASSESSMENT & PLAN NOTE
Wt Readings from Last 3 Encounters:   02/21/20 81 4 kg (179 lb 7 3 oz)   01/18/20 79 8 kg (176 lb)   01/15/20 79 8 kg (176 lb)     Without acute exacerbation   Patient likely has some vascular congestion present at baseline  NT-ProBNP 242  Last echo June 2018 EF 60%

## 2020-02-22 NOTE — ASSESSMENT & PLAN NOTE
Rate controlled with PTA atenolol 25 mg p o   Daily  Not on anticoagulation due to history of GI bleed  Continue atenolol  Telemetry review unremarkable, will discontinue

## 2020-02-22 NOTE — RESPIRATORY THERAPY NOTE
RT Protocol Note  Hermenia Alpers 80 y o  male MRN: [id number]  [facility] Encounter: [id number]    Assessment    Principal Problem:    Chest pain  Active Problems:    CAD (coronary artery disease)    Essential hypertension    Anxiety disorder due to general medical condition    Benign prostatic hyperplasia with lower urinary tract symptoms    GERD (gastroesophageal reflux disease)    Mixed hyperlipidemia    Acquired hypothyroidism    Iron deficiency anemia    COPD with acute exacerbation (HCC)    Chronic diastolic heart failure (HCC)    Atrial fibrillation (HCC)    Urinary obstruction    Stage 3 chronic kidney disease (HCC)    LIVIER (obstructive sleep apnea)      Home Pulmonary Medications: xopenex and atrovent  Pt unsure about other respiratory meds  Past Medical History:   Diagnosis Date    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    Compression fracture of thoracic vertebra (HCC)     last assessed 05/07/2012    COPD [facility]ch[facility] obstructive [facility] disease) [facility] [address]     Disease of thyroid gland     Heart attack ([facility] [address] [facility]     History of methicillin resistant staphylococcus aureus (MRSA) 03/28/2019    negative nasal swab     Hollenhorst plaque, right eye     last assessed 04/08/2013    Hyperlipidemia     Hypertension     Iron deficiency anemia due to chronic blood loss     last assessed 09/10/2012    Ischemic colitis ([facility] [address] )     last assessed 05/27/2014    Osteoarthritis of both hands     last assessedn 04/30/2013    Peptic ulcer     last assessed 06/14/2013    Polymyalgia rheumatica ([facility] [address] )     last assessesd 10/09/2012    Renal disorder     Upper GI hemorrhage     last assessed 01/29/2015    Varicose veins of lower extremity     last assessed 04/26/2013     Social History     Socioeconomic History    Marital status:       Spouse name: None    Number of children: None    Years of education: None    Highest education level: None   Occupational History    None Social Needs    Financial resource strain: None    Food insecurity:     Worry: None     Inability: None    Transportation needs:     Medical: None     Non-medical: None   Tobacco Use    Smoking status: Former Smoker     Packs/day: 2 00     Years: 50 00     Pack years: 100 00     Types: Cigarettes     Start date:      Last attempt to quit:      Years since quittin 1    Smokeless tobacco: Never Used    Tobacco comment: Quit 40 yrs  ago   Substance and Sexual Activity    Alcohol use: Not Currently     Alcohol/week: 1 0 standard drinks     Types: 1 Glasses of wine per week     Comment: social    Drug use: No    Sexual activity: None   Lifestyle    Physical activity:     Days per week: None     Minutes per session: None    Stress: None   Relationships    Social connections:     Talks on phone: None     Gets together: None     Attends Confucianism service: None     Active member of club or organization: None     Attends meetings of clubs or organizations: None     Relationship status: None    Intimate partner violence:     Fear of current or ex partner: None     Emotionally abused: None     Physically abused: None     Forced sexual activity: None   Other Topics Concern    None   Social History Narrative     per [employer]    Always uses seat belt       Subjective    Subjective Data: Pt is currently in no respiratory distress  Pt states his breathing is fine  No SoB or increased WoB noted at thiis time  Will cont  home treatment plan of Tid UDN treatment  Treatment given at this time  Objective    Physical Exam:   Assessment Type: During-treatment  General Appearance: Alert, Awake  Respiratory Pattern: Normal  Chest Assessment: Chest expansion symmetrical  Bilateral Breath Sounds: Clear  O2 Device: nc    Vitals:  Blood pressure 129/75, pulse 69, temperature 97 5 °F (36 4 °C), resp  rate 19, height 5' 4" (1 626 m), weight 81 4 kg (179 lb 7 3 oz), SpO2 96 %            Imaging and other studies: I have personally reviewed pertinent reports  O2 Device: nc     Plan  Continue home treatment of xopenex and atrovent UDN Tid  Respiratory Plan: Home Bronchodilator Patient pathway        Resp Comments: Pt was admitted with chest oain  Past medical history is Copd, Pt states no sleep apnea  Pt takes UDN treatments Tid at home of xopenex and atrovent  Pt is unsure of other respiratory meds  Pt has positive smoking history

## 2020-02-22 NOTE — ASSESSMENT & PLAN NOTE
Resolved, currently chest pain free  Initial troponin negative, no ischemic changes on ECG  CXR: Stable chest with no acute cardiopulmonary disease  NT-ProBNP 242 therefore congestion is likely at baseline   Patient has grade 1 diastolic dysfunction, last echo June 2018 EF 60%  Patient has refused Aspirin due to h/o stomach ulcers and GIB  Will admit for r/o ACS given cardiac history  Troponin negative x3  Telemetry review showing paroxysmal afib   Obtain echocardiogram

## 2020-02-22 NOTE — PROGRESS NOTES
Progress Note - Dayana Soria 6/30/1932, 80 y o  male MRN: [id number]  [facility] Encounter: [id number]  Primary Care Provider: Chante Shore MD   Date and time admitted to hospital: 2/21/2020  5:10 PM    * Chest pain  Assessment & Plan  Resolved, currently chest pain free  Initial troponin negative, no ischemic changes on ECG  CXR: Stable chest with no acute cardiopulmonary disease  NT-ProBNP 242 therefore congestion is likely at baseline  Patient has grade 1 diastolic dysfunction, last echo June 2018 EF 60%  Patient has refused Aspirin due to h/o stomach ulcers and GIB  Will admit for r/o ACS given cardiac history  Troponin negative x3  Telemetry review showing paroxysmal afib   Obtain echocardiogram     Dizziness  Assessment & Plan  Patient reports sensation of room spinning when he lays his head down on pillow, however he also reports intermittent lightheadedness when sitting and standing  Orthostatics negative   Trial meclazine  Previous echo in 2018 showed mild aortic stenosis, will repeat to evaluate for progression   PT/OT consults    Chronic diastolic heart failure ([address] )  Assessment & Plan  Wt Readings from Last 3 Encounters:   02/21/20 81 4 kg (179 lb 7 3 oz)   01/18/20 79 8 kg (176 lb)   01/15/20 79 8 kg (176 lb)     Without acute exacerbation   Patient likely has some vascular congestion present at baseline  NT-ProBNP 242  Last echo June 2018 EF 60%    Anxiety disorder due to general medical condition  Assessment & Plan  · Per family patient is taking Ativan 0 5 mg p o  q 8 hours scheduled at home as well as Ambien 5 mg p o  q h s   · Of note patient also has a diagnosis of excessive daytime sleepiness and per family they've tried to de-escalate his home regimen but patient has not been amenable    · Per patients son, he will be having formal sleep study in March to assess for likely diagnosis of LIVIER     Atrial fibrillation [facility])  Assessment & Plan  Rate controlled with PTA atenolol 25 mg p o  Daily  Not on anticoagulation due to history of GI bleed  Continue atenolol  Telemetry review unremarkable, will discontinue      Stage 3 chronic kidney disease (HCC)  Assessment & Plan  Creatinine 1 62 today, which is near baseline  Patient does not meet GRETCHEN criteria  Continue to monitor and avoid nephrotoxic agents    COPD (chronic obstructive pulmonary disease) ([facility])  Assessment & Plan  No acute exacerbation  Continue PTA inhalers and nebulizers  Respiratory protocol, given patient is on inhaler as well as multiple scheduled nebulizers daily at home  Will continue to monitor    Acquired hypothyroidism  Assessment & Plan  Lab Results   Component Value Date    [id number] 2 782 01/03/2019     Continue PTA levothyroxine 75 micro g p o  daily    Mixed hyperlipidemia  Assessment & Plan  Per family patient has allergies to statin therapy  Last FLP was in 2019    GERD (gastroesophageal reflux disease)  Assessment & Plan  Continue Prilosec 20 mg daily    Benign prostatic hyperplasia with lower urinary tract symptoms  Assessment & Plan  Continue PT Flomax    Essential hypertension  Assessment & Plan  BP Readings from Last 3 Encounters:   02/21/20 146/78   02/21/20 142/73   01/18/20 90/62     Well controlled  Continue PTA Atenolol 25 mg PO daily    VTE Pharmacologic Prophylaxis:   Pharmacologic: Heparin  Mechanical VTE Prophylaxis in Place: Yes    Patient Centered Rounds: I have performed bedside rounds with nursing staff today  Discussions with Specialists or Other Care Team Provider: nursing     Education and Discussions with Family / Patient: patient and son Pool Rodarte via telephone     Time Spent for Care: 30 minutes  More than 50% of total time spent on counseling and coordination of care as described above      Current Length of Stay: 0 day(s)    Current Patient Status: Observation   Certification Statement: The patient will continue to require additional inpatient hospital stay due to monitor symptoms, PT/OT and geriatric evaluations pending    Discharge Plan: possibly next 24-48 hours, pending PT/OT recommendations    Code Status: Level 1 - Full Code    Subjective:   Patient complains of being jumpy because he did not received his Ativan  It was explained to him that this is an as-needed medication that he must request   Patient states he feels the room is spinning when he lays his head down on the pillow  He also reports intermittent lightheadedness while sitting and standing  Chest pain has not recurred since admission  Objective:     Vitals:   Temp (24hrs), Av 4 °F (36 3 °C), Min:97 3 °F (36 3 °C), Max:97 6 °F (36 4 °C)    Temp:  [97 3 °F (36 3 °C)-97 6 °F (36 4 °C)] 97 6 °F (36 4 °C)  HR:  [] 74  Resp:  [18-19] 18  BP: (107-146)/(58-79) 128/75  SpO2:  [91 %-98 %] 97 %  Body mass index is 29 48 kg/m²  Input and Output Summary (last 24 hours): Intake/Output Summary (Last 24 hours) at 2020 1632  Last data filed at 2020 1336  Gross per 24 hour   Intake --   Output 1500 ml   Net -1500 ml       Physical Exam:     Physical Exam   Constitutional: He is oriented to person, place, and time  He appears well-developed and well-nourished  No distress  Cardiovascular: Normal rate, regular rhythm, normal heart sounds and intact distal pulses  No murmur heard  Pulmonary/Chest: Effort normal and breath sounds normal  No respiratory distress  He has no wheezes  He has no rales  Abdominal: Soft  Bowel sounds are normal  He exhibits no distension  There is no tenderness  Musculoskeletal: He exhibits no edema  Neurological: He is alert and oriented to person, place, and time  Skin: Skin is warm and dry  He is not diaphoretic  No erythema       Additional Data:     Labs:    Results from last 7 days   Lab Units 20  0558  20  1731   WBC Thousand/uL 5 42  --  6 38   HEMOGLOBIN g/dL 14 2  --  14 7   HEMATOCRIT % 44 7  --  45 0   PLATELETS Thousands/uL 151   < > 162   NEUTROS PCT %  --   --  53   LYMPHS PCT %  --   --  31   MONOS PCT %  --   --  13*   EOS PCT %  --   --  1    < > = values in this interval not displayed  Results from last 7 days   Lab Units 02/22/20  0558 02/21/20  1731   SODIUM mmol/L 140 135*   POTASSIUM mmol/L 4 7 4 5   CHLORIDE mmol/L 111* 107   CO2 mmol/L 22 22   BUN mg/dL 22 20   CREATININE mg/dL 1 62* 1 77*   ANION GAP mmol/L 7 6   CALCIUM mg/dL 9 1 9 2   ALBUMIN g/dL  --  3 6   TOTAL BILIRUBIN mg/dL  --  0 32   ALK PHOS U/L  --  100   ALT U/L  --  23   AST U/L  --  24   GLUCOSE RANDOM mg/dL 91 91                           * I Have Reviewed All Lab Data Listed Above  * Additional Pertinent Lab Tests Reviewed:  All Labs Within Last 24 Hours Reviewed    Imaging:    Imaging Reports Reviewed Today Include: CXR, CTH  Imaging Personally Reviewed by Myself Includes:  none    Recent Cultures (last 7 days):           Last 24 Hours Medication List:     Current Facility-Administered Medications:  acetaminophen 650 mg Oral Q6H PRN Liban Suarez MD   albuterol 2 puff Inhalation Q6H PRN Liban Suarez MD   atenolol 25 mg Oral Daily Liban Suarez MD   ferrous sulfate 325 mg Oral Every Other Lillette Curling, MD   fluticasone 1 spray Nasal BID Liban Suarez MD   guaiFENesin 1,200 mg Oral Q12H Claudio Villarreal MD   heparin (porcine) 5,000 Units Subcutaneous Q8H [address] Jewel Gottlieb, MD   ipratropium 0 5 mg Nebulization TID Jewel Gottlieb MD   levalbuterol 1 25 mg Nebulization TID Jewel Gottlieb MD   levothyroxine 75 mcg Oral Early Morning Liban Suarez MD   LORazepam 0 5 mg Oral TID Kenia Loomis PA-C   meclizine 25 mg Oral Q8H PRN Kenia Loomis PA-C   metaxalone 400 mg Oral TID Liban Suarez MD   nitroglycerin 0 4 mg Sublingual Q5 Min PRN Liban Suarez MD   nystatin-triamcinolone  Topical BID Liban Suarez MD   tamsulosin 0 4 mg Oral Daily With Laxmi Contreras MD   zolpidem 5 mg Oral HS PRN Liban Suarez MD        Today, Patient Was Seen By: Helen Tadeo PA-C    ** Please Note: Dictation voice to text software may have been used in the creation of this document   **

## 2020-02-22 NOTE — ASSESSMENT & PLAN NOTE
Patient reports sensation of room spinning when he lays his head down on pillow, however he also reports intermittent lightheadedness when sitting and standing  Orthostatics negative   Trial meclazine  Previous echo in 2018 showed mild aortic stenosis, will repeat to evaluate for progression   PT/OT consults

## 2020-02-22 NOTE — RESTORATIVE TECHNICIAN NOTE
Restorative Specialist Mobility Note       Activity: Ambulate in wilkins, Ambulate in room, Bathroom privileges, Chair, Dangle, Stand at bedside(Educated/encouraged pt to ambulate with assistance 3-4 x's/day  Chair alarm on   Pt callbell, phone/tray within reach )     Assistive Device: Front wheel walker       Isabella KABA, Restorative Technician, [employer] [facility]

## 2020-02-22 NOTE — UTILIZATION REVIEW
Initial Clinical Review    OBS 2/21 @ 1849 UPGRADED TO INPATIENT 2/22 @ 1640 FOR CONTINUED TX >2 MN    02/22/20 1640  Inpatient Admission Once     Transfer Service: General Medicine       Question Answer Comment   Admitting Physician [id number], [id number]    Level of Care Med Surg    Estimated length of stay More than 2 Midnights    Certification I certify that inpatient services are medically necessary for this patient for a duration of greater than two midnights  See H&P and MD Progress Notes for additional information about the patient's course of treatment  02/22/20 1640     ED Arrival Information     Expected Arrival Acuity Means of Arrival Escorted By Service Admission Type    2/21/2020 2/21/2020 17:10 Urgent Ambulance [facility] Minh Hanks) General Medicine Urgent    Arrival Complaint    Chest pain, unspecified type        Chief Complaint   Patient presents with    Chest Pain     pt presents to ED via EMS with c/o chest pain earlier this a m  pt went to urgent care with midsternal chest pain radiating to left arm accompanied by nausea and sob  pt has hx of 2 heart attacks  pt completely asymptomatic at this time     Assessment/Plan: 80 y o  male who presents with PMH significant for COPD, MI x2, HTN, HLD, Anemia, Ischemic Colitis who presents with a one day history of substernal pressure like chest pain radiating to left arm with associated nausea  Per patient and family chest pain is a recurrent issue for patient who complains of chest pain nearly daily  He also has COPD with daily nebs and chronic cough and GERD that could be other likely causes  He has a long history of smoking but has quit a long time ago  Per accompanying family patient intermittently uses a cane for ambulation at baseline but is able to ambulate without a cane for short distances  Patient is on multiple nebs and inhalers at home  Denies home oxygen    Patient denies fevers, chills, headaches, lightheadedness, palpitations, calf pain or lower extremity edema, neck pain/stiffness, numbness, tingling, weakness, abdominal pain, diarrhea, constipation, hematochezia or melanotic stools  Patient is a poor historian, and per accompanying son and his wife, patient lives with his grandson and it's not clear whether he's adherent with all his medications daily  Patient states his chest pain has resolved  He's asymptomatic on admission  1  Chest pain, now resolved, low suspicion for ACS  Initial troponin negative, EKG without acute ST-T changes  Patient does have a history of CAD, mi  He is off aspirin due to history of GI bleed  Allergic to statin  Will trend 2 more troponins and EKGs  BNP in 200s  2  Atrial fibrillation, rate controlled with atenolol, no anticoagulation due to GI bleed  3  CKD stage 3, creatinine at baseline  4  Chronic diastolic heart failure, not in acute exacerbation    Last echo in June 2018 showed ejection fraction 05%, grade 1 diastolic dysfunction  5  COPD, that in acute exacerbation  Continue breathing treatment  6  Vertigo, for at least 2 months  BPPV versus orthostatic hypotension  It happens mostly when he put his head on the pillow he has vertigo for a few seconds and then it resolved on its own  The other times it happens when he stands up any usually resolves on its own after a while  CT of the head showed mild microangiopathic changes  No signs of acute infarction intracranial mass, mass effect or midline shift  Normal ventricles for the patient's age  No old strokes  Dispo: Admit  Patient will require at least one midnight stay        ED Triage Vitals   Temperature Pulse Respirations Blood Pressure SpO2   02/21/20 1715 02/21/20 1717 02/21/20 1715 02/21/20 1717 02/21/20 1717   (!) 97 3 °F (36 3 °C) 77 18 140/77 95 %      Temp Source Heart Rate Source Patient Position - Orthostatic VS BP Location FiO2 (%)   02/21/20 1715 02/21/20 1715 02/21/20 1815 02/21/20 1815 --   Oral Monitor Lying Right arm       Pain Score       02/21/20 1715       No Pain        Wt Readings from Last 1 Encounters:   02/22/20 77 9 kg (171 lb 11 8 oz)     Additional Vital Signs:   Date/Time  Temp  Pulse  Resp  BP  MAP (mmHg)  SpO2  O2 Device  Patient Position - Orthostatic VS   02/22/20 08:25:18  97 5 °F (36 4 °C)  105  18  124/79  94  96 %  --  --   02/22/20 0714  --  --  --  --  --  98 %  Nasal cannula  --   02/21/20 22:25:16  97 3 °F (36 3 °C)Abnormal   85  19  107/58  74  91 %  --  --   02/21/20 2121  --  --  --  --  --  96 %  Nasal cannula  --   02/21/20 2100  --  --  --  --  --  --  Nasal cannula  --   02/21/20 20:30:40  97 5 °F (36 4 °C)  69  --  129/75  93  96 %  --  --   02/21/20 1915  --  78  19  --  --  95 %  None (Room air)  --   02/21/20 1815  --  79  18  146/78  --  98 %  None (Room air)  Lying   02/21/20 1724  --  --  --  --  --  --  Nasal cannula  --   02/21/20 1717  --  77  18  140/77  --  95 %  None (Room air)  --   02/21/20 1715  97 3 °F (36 3 °C)Abnormal   --  18  --  --  --  --  --       Pertinent Labs/Diagnostic Test Results:   EKG -- Atrial fibrillation  Possible Anterior infarct , age undetermined  Abnormal ECG    CT head 2/21 -- no acute abnl  CXR 2/22 --  No acute abnl    Results from last 7 days   Lab Units 02/22/20  0558 02/21/20  2148 02/21/20  1731   WBC Thousand/uL 5 42  --  6 38   HEMOGLOBIN g/dL 14 2  --  14 7   HEMATOCRIT % 44 7  --  45 0   PLATELETS Thousands/uL 151 144* 162   NEUTROS ABS Thousands/µL  --   --  3 40     Results from last 7 days   Lab Units 02/22/20  0558 02/21/20  1731   SODIUM mmol/L 140 135*   POTASSIUM mmol/L 4 7 4 5   CHLORIDE mmol/L 111* 107   CO2 mmol/L 22 22   ANION GAP mmol/L 7 6   BUN mg/dL 22 20   CREATININE mg/dL 1 62* 1 77*   EGFR ml/min/1 73sq m 38 34   CALCIUM mg/dL 9 1 9 2   MAGNESIUM mg/dL 2 1  --    PHOSPHORUS mg/dL 2 7  --      Results from last 7 days   Lab Units 02/21/20  1731   AST U/L 24   ALT U/L 23   ALK PHOS U/L 100   TOTAL PROTEIN g/dL 7 9   ALBUMIN g/dL 3  6   TOTAL BILIRUBIN mg/dL 0 32     Results from last 7 days   Lab Units 02/22/20  0558 02/21/20  1731   GLUCOSE RANDOM mg/dL 91 91     Results from last 7 days   Lab Units 02/22/20  0000 02/21/20  2148 02/21/20  1731   TROPONIN I ng/mL <0 02 <0 02 <0 02     Results from last 7 days   Lab Units 02/21/20  1731   NT-PRO BNP pg/mL 242     ED Treatment:   Medication Administration from 02/21/2020 1620 to 02/21/2020 2003     None        Past Medical History:   Diagnosis Date    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    Compression fracture of thoracic vertebra (HCC)     last assessed 05/07/2012    COPD [facility] disease) [facility] [address] )     Disease of thyroid gland     Heart attack ([facility] [address] )     History of methicillin resistant staphylococcus aureus (MRSA) 03/28/2019    negative nasal swab     Hollenhorst plaque, right eye     last assessed 04/08/2013    Hyperlipidemia     Hypertension     Iron deficiency anemia due to chronic blood loss     last assessed 09/10/2012    Ischemic colitis ([facility] [address] )     last assessed 05/27/2014    Osteoarthritis of both hands     last assessedn 04/30/2013    Peptic ulcer     last assessed 06/14/2013    Polymyalgia rheumatica ([facility] [address] )     last assessesd 10/09/2012    Renal disorder     Upper GI hemorrhage     last assessed 01/29/2015    Varicose veins of lower extremity     last assessed 04/26/2013     Present on Admission:   CAD (coronary artery disease)   Essential hypertension   Anxiety disorder due to general medical condition   GERD (gastroesophageal reflux disease)   Mixed hyperlipidemia   Acquired hypothyroidism   Iron deficiency anemia   COPD with acute exacerbation (HCC)   Atrial fibrillation (HCC)   Stage 3 chronic kidney disease ([facility] [address] )   LIVIER (obstructive sleep apnea)   Urinary obstruction   Benign prostatic hyperplasia with lower urinary tract symptoms   Chronic diastolic heart failure (HCC)      Admitting Diagnosis: Chest pain [R07 9]  Fatigue [R53 83]  Age/Sex: 80 y o  male  Admission Orders:  Scheduled Medications:  Medications:  atenolol 25 mg Oral Daily   ferrous sulfate 325 mg Oral Every Other Day   fluticasone 1 spray Nasal BID   guaiFENesin 1,200 mg Oral Q12H ROSA   heparin (porcine) 5,000 Units Subcutaneous Q8H [address]   ipratropium 0 5 mg Nebulization TID   levalbuterol 1 25 mg Nebulization TID   levothyroxine 75 mcg Oral Early Morning   metaxalone 400 mg Oral TID   nystatin-triamcinolone  Topical BID   tamsulosin 0 4 mg Oral Daily With Dinner     Continuous IV Infusions:     PRN Meds:  acetaminophen 650 mg Oral Q6H PRN   albuterol 2 puff Inhalation Q6H PRN   albuterol 2 5 mg Nebulization Q4H PRN   LORazepam 0 5 mg Oral Q8H PRN   nitroglycerin 0 4 mg Sublingual Q5 Min PRN   zolpidem 5 mg Oral HS PRN       IP CONSULT TO GERONTOLOGY  IP CONSULT TO CASE MANAGEMENT    Network Utilization Review Department  [email or web address]  ATTENTION: Please call with any questions or concerns to [phone] and carefully listen to the prompts so that you are directed to the right person  All voicemails are confidential   Jessica Muss all requests for admission clinical reviews, approved or denied determinations and any other requests to dedicated fax number below belonging to the campus where the patient is receiving treatment   List of dedicated fax numbers for the Facilities:  FACILITY NAME UR FAX NUMBER   ADMISSION DENIALS (Administrative/Medical Necessity) [phone]   [address]  (Maternity/NICU/Pediatrics) [phone]   Tashi Hermelinda [phone]   Landen [place] [phone]   Chey Carton [phone]   [address] [facility]  [place] [address] [facility] [phone]   [facility] [address] [facility] [facility] [phone]     [address] [facility] [phone]

## 2020-02-22 NOTE — PLAN OF CARE
Problem: PHYSICAL THERAPY ADULT  Goal: Performs mobility at highest level of function for planned discharge setting  See evaluation for individualized goals  Description  Treatment/Interventions: Functional transfer training, LE strengthening/ROM, Elevations, Therapeutic exercise, Endurance training, Bed mobility, Gait training  Equipment Recommended: Bunny Schaefer       See flowsheet documentation for full assessment, interventions and recommendations  Note:   Prognosis: Good  Problem List: Impaired balance, Decreased mobility, Decreased safety awareness, Impaired hearing(dizziness, suspect BPPV in addition to vertigo)  Assessment: Patient seen today for a high complexity physical therapy evaluation  Patient is a 80year old male with pmh including HTN, COPD, Stage 3 CKD and admitted to [address] [place] [address] on 2/22/20 for chest pain and dizziness  Patient lives with his son in a 1 story home with 1 PILAR  PTA pt  Was independent with ADL's and functional mobility with RW/SPC prn but reports "not using it" especially inside, required Assist with IADLs  Upon arrival, patient complained of no pain and was agreeable to therapy  Strength testing found the following findings: RLE: 4/5, LLE: 4/5 and pt  Standing dynamic balance is fair-  Patient transferred from sit to stand w/ supervised A x1 and RW in front and ambulated 100' x2 w/ RW supervised A x1 with short stride, decreased foot clearance  Pt  Reported no fatigue with session, mild dizziness and mild SOB s/p stairs  Pt  Navigated 7 steps with B/L handrail and alternating pattern on ascent/descent with supervised A x1  Overall, patient likely at baseline level at this time but does present with mild strength deficits, balance deficits, and dizziness with position change/activity  Patient will benefit from skilled physical therapy to maximize functional potential during admission  Recommend D/C home with family support when medically stable at this time   Recommend Home PT vs Outpatient PT for vertigo  History: co - morbidities, fall risk, use of assistive device, assist for Iadl's, cognition  Exam: impairments in locomotion, musculoskeletal, balance, pulmonary, cognition  Clinical: unstable/unpredictable  Complexity:high  Barriers to Discharge: None(if prior caregiver support available)     Recommendation: Home with family support(Home PT prn)     PT - OK to Discharge: Yes    See flowsheet documentation for full assessment

## 2020-02-22 NOTE — ASSESSMENT & PLAN NOTE
· Per family patient is taking Ativan 0 5 mg p o  q 8 hours scheduled at home as well as Ambien 5 mg p o  q h s   · Of note patient also has a diagnosis of excessive daytime sleepiness and per family they've tried to de-escalate his home regimen but patient has not been amenable    · Per patients son, he will be having formal sleep study in March to assess for likely diagnosis of LIVIER

## 2020-02-22 NOTE — PLAN OF CARE
Problem: Potential for Falls  Goal: Patient will remain free of falls  Description  INTERVENTIONS:  - Assess patient frequently for physical needs  -  Identify cognitive and physical deficits and behaviors that affect risk of falls    -  [place] fall precautions as indicated by assessment   - Educate patient/family on patient safety including physical limitations  - Instruct patient to call for assistance with activity based on assessment  - Modify environment to reduce risk of injury  - Consider OT/PT consult to assist with strengthening/mobility  Outcome: Progressing     Problem: PAIN - ADULT  Goal: Verbalizes/displays adequate comfort level or baseline comfort level  Description  Interventions:  - Encourage patient to monitor pain and request assistance  - Assess pain using appropriate pain scale  - Administer analgesics based on type and severity of pain and evaluate response  - Implement non-pharmacological measures as appropriate and evaluate response  - Consider cultural and social influences on pain and pain management  - Notify physician/advanced practitioner if interventions unsuccessful or patient reports new pain  Outcome: Progressing     Problem: INFECTION - ADULT  Goal: Absence or prevention of progression during hospitalization  Description  INTERVENTIONS:  - Assess and monitor for signs and symptoms of infection  - Monitor lab/diagnostic results  - Monitor all insertion sites, i e  indwelling lines, tubes, and drains  - Monitor endotracheal if appropriate and nasal secretions for changes in amount and color  - [place] appropriate cooling/warming therapies per order  - Administer medications as ordered  - Instruct and encourage patient and family to use good hand hygiene technique  - Identify and instruct in appropriate isolation precautions for identified infection/condition  Outcome: Progressing  Goal: Absence of fever/infection during neutropenic period  Description  INTERVENTIONS:  - Monitor WBC    Outcome: Progressing     Problem: SAFETY ADULT  Goal: Maintain or return to baseline ADL function  Description  INTERVENTIONS:  -  Assess patient's ability to carry out ADLs; assess patient's baseline for ADL function and identify physical deficits which impact ability to perform ADLs (bathing, care of mouth/teeth, toileting, grooming, dressing, etc )  - Assess/evaluate cause of self-care deficits   - Assess range of motion  - Assess patient's mobility; develop plan if impaired  - Assess patient's need for assistive devices and provide as appropriate  - Encourage maximum independence but intervene and supervise when necessary  - Involve family in performance of ADLs  - Assess for home care needs following discharge   - Consider OT consult to assist with ADL evaluation and planning for discharge  - Provide patient education as appropriate  Outcome: Progressing  Goal: Maintain or return mobility status to optimal level  Description  INTERVENTIONS:  - Assess patient's baseline mobility status (ambulation, transfers, stairs, etc )    - Identify cognitive and physical deficits and behaviors that affect mobility  - Identify mobility aids required to assist with transfers and/or ambulation (gait belt, sit-to-stand, lift, walker, cane, etc )  - [place] fall precautions as indicated by assessment  - Record patient progress and toleration of activity level on Mobility SBAR; progress patient to next Phase/Stage  - Instruct patient to call for assistance with activity based on assessment  - Consider rehabilitation consult to assist with strengthening/weightbearing, etc   Outcome: Progressing     Problem: Knowledge Deficit  Goal: Patient/family/caregiver demonstrates understanding of disease process, treatment plan, medications, and discharge instructions  Description  Complete learning assessment and assess knowledge base    Interventions:  - Provide teaching at level of understanding  - Provide teaching via preferred learning methods  Outcome: Progressing     Problem: DISCHARGE PLANNING  Goal: Discharge to home or other facility with appropriate resources  Description  INTERVENTIONS:  - Identify barriers to discharge w/patient and caregiver  - Arrange for needed discharge resources and transportation as appropriate  - Identify discharge learning needs (meds, wound care, etc )  - Arrange for interpretive services to assist at discharge as needed  - Refer to Case Management Department for coordinating discharge planning if the patient needs post-hospital services based on physician/advanced practitioner order or complex needs related to functional status, cognitive ability, or social support system  Outcome: Progressing

## 2020-02-22 NOTE — PHYSICAL THERAPY NOTE
Physical Therapy Initial Evaluation   Physical Therapy Evaluation     Patient's Name: Sandra Sykes    Admitting Diagnosis  Chest pain [R07 9]  Fatigue [R53 83]    Problem List  Patient Active Problem List   Diagnosis    Hydronephrosis of right kidney    CAD (coronary artery disease)    Carotid stenosis    Essential hypertension    Anxiety disorder due to general medical condition    Benign prostatic hyperplasia with lower urinary tract symptoms    GERD (gastroesophageal reflux disease)    Mixed simple and mucopurulent chronic bronchitis (HCC)    Headache    Mixed hyperlipidemia    Acquired hypothyroidism    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    Insomnia    Iron deficiency anemia    Macular degeneration    Osteoporosis    Other atopic dermatitis    Shortness of breath    Medicare annual wellness visit, subsequent    Eczema    Seborrheic keratoses, inflamed    COPD (chronic obstructive pulmonary disease) ([facility] )    Bronchitis    Severe sepsis ([facility]    Suprapubic tenderness    Loose stools    Thrombocytopenia ([facility]    Class 1 obesity due to excess calories with serious comorbidity and body mass index (BMI) of 30 0 to 30 9 in adult    Chronic diastolic heart failure (HCC)    Atrial fibrillation ([facility] )    Community acquired pneumonia    Urinary obstruction    Cervical spine arthritis    Stage 3 chronic kidney disease ([facility])    Aneurysm, pulmonary, arteriovenous    LIVIER (obstructive sleep apnea)    Snoring    Excessive daytime sleepiness    Chest pain    Dizziness       Past Medical History  Past Medical History:   Diagnosis Date    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    Compression fracture of thoracic vertebra ([facility])     last assessed 05/07/2012    COPD (chronic obstructive pulmonary disease) ([facility])     Disease of thyroid gland     [facility]     History of methicillin resistant staphylococcus aureus (MRSA) 03/28/2019    negative nasal swab     [facility], right eye     last assessed 04/08/2013    Hyperlipidemia     Hypertension     Iron deficiency anemia due to chronic blood loss     last assessed 09/10/2012    Ischemic colitis ([facility] )     last assessed 05/27/2014    Osteoarthritis of both hands     last assessedn 04/30/2013    Peptic ulcer     last assessed 06/14/2013    [facility] rheumatica [facility] )     last assessesd 10/09/2012    Renal disorder     Upper GI hemorrhage     last assessed 01/29/2015    Varicose veins of lower extremity     last assessed 04/26/2013       Past Surgical History  Past Surgical History:   Procedure Laterality Date    CORONARY ARTERY BYPASS GRAFT      HEMORRHOID SURGERY      HERNIA REPAIR      RENAL CYST EXCISION      resolved 05/2010    THROMBOENDARTERECTOMY Right     carotid, last assessed 06/18/2013 02/22/20 0315   Note Type   Note type Eval only   Pain Assessment   Pain Assessment No/denies pain   Pain Score No Pain   Home Living   Type of [address] [place] [address] One level;Stairs to enter with rails   Bathroom Shower/Tub Walk-in shower   Bathroom Toilet Standard   Bathroom Accessibility Accessible   Additional Comments pt reports care giver approximately 5 hours almost every day   Prior Function   Level of [place] Independent with ADLs and functional mobility   Lives With   (grandson)   Receives Help From Family;Personal care attendant   ADL Assistance Independent   IADLs Needs assistance   Falls in the last 6 months 0   Vocational Retired   Restrictions/Precautions   [place] Precautions Per Order No   Other Precautions Chair Alarm; Bed Alarm;Cognitive; Fall Risk;Hard of hearing  (dizziness)   General   Family/Caregiver Present No   Cognition   Overall Cognitive Status Impaired   Arousal/Participation Responsive   Attention Within functional limits   Orientation Level Oriented to person;Oriented to place;Oriented to situation   Following Commands Follows one step commands without difficulty   RLE Assessment   RLE Assessment X   Strength RLE   RLE Overall Strength 4/5   LLE Assessment   LLE Assessment X   Strength LLE   LLE Overall Strength 4/5   Bed Mobility   Additional Comments pt seated OOB in chair   Transfers   Sit to Stand 5  Supervision   Additional items Assist x 1; Increased time required   Stand to Sit 5  Supervision   Additional items Assist x 1; Increased time required   Stand pivot 5  Supervision   Additional items Assist x 1   Ambulation/Elevation   Gait pattern Short stride;Decreased foot clearance  (forward flexion, Head forward)   Gait Assistance 5  Supervision   Additional items Assist x 1   Assistive Device Rolling walker   Distance 100'x2   Stair Management Assistance 5  Supervision   Additional items Assist x 1   Stair Management Technique Alternating pattern; Two rails   Number of Stairs 7   Balance   Static Sitting Fair +   Dynamic Sitting Fair   Static Standing Fair   Dynamic Standing Fair -   Ambulatory Fair -   Endurance Deficit   Endurance Deficit Yes   Endurance Deficit Description  mild sob s/p stairs   Activity Tolerance   Activity Tolerance Patient tolerated treatment well   Nurse Made Aware RN ok to see   Assessment   Prognosis Good   Problem List Impaired balance;Decreased mobility; Decreased safety awareness; Impaired hearing  (dizziness, suspect BPPV in addition to vertigo)   Assessment Patient seen today for a high complexity physical therapy evaluation  Patient is a 80year old male with pmh including HTN, COPD, Stage 3 CKD and admitted to [facility]) on 2/22/20 for chest pain and dizziness  Patient lives with his son in a 1 story home with 1 PILAR  PTA pt  Was independent with ADL's and functional mobility with RW/SPC prn but reports "not using it" especially inside, required Assist with IADLs  Upon arrival, patient complained of no pain and was agreeable to therapy  Strength testing found the following findings: RLE: 4/5, LLE: 4/5 and pt  Standing dynamic balance is fair-     Patient transferred from sit to stand w/ supervised A x1 and RW in front and ambulated 100' x2 w/ RW supervised A x1 with short stride, decreased foot clearance  Pt  Reported no fatigue with session, mild dizziness and mild SOB s/p stairs  Pt  Navigated 7 steps with B/L handrail and alternating pattern on ascent/descent with supervised A x1  Overall, patient likely at baseline level at this time but does present with mild strength deficits, balance deficits, and dizziness with position change/activity  Patient will benefit from skilled physical therapy to maximize functional potential during admission  Recommend D/C home with family support when medically stable at this time  Recommend Home PT vs Outpatient PT for vertigo  History: co - morbidities, fall risk, use of assistive device, assist for Iadl's, cognition  Exam: impairments in locomotion, musculoskeletal, balance, pulmonary, cognition  Clinical: unstable/unpredictable  Complexity:high  Barriers to Discharge None  (if prior caregiver support available)   Goals   Patient Goals to go home   STG Expiration Date 03/07/20   Short Term Goal #1 1  Pt will complete bed mobility with Mod I to increase functional mobility  2  Pt will complete sit to stand transfers with Mod I to increase functional mobility  3  Pt will ambulate 200 ft with RW with Mod I without significant balance concerns  4  Pt will increase B/L LE strength by 1/2 grade to facilitate improved functional mobility with decreased risk of falls  5  Pt will increase standing balance to fair in order to decrease risk of falls  6  Pt  Will navigate 7 steps mod I     PT Treatment Day 0   Plan   Treatment/Interventions Functional transfer training;LE strengthening/ROM; Elevations; Therapeutic exercise; Endurance training;Bed mobility;Gait training   PT Frequency   (3-5x/week)   Recommendation   Recommendation Home with family support  (Home PT prn)   Equipment Recommended Walker   PT - OK to Discharge Yes Additional Comments when medically stable   Barthel Index   Feeding 10   Bathing 0   Grooming Score 5   Dressing Score 10   Bladder Score 10   Bowels Score 10   Toilet Use Score 5   Transfers (Bed/Chair) Score 10   Mobility (Level Surface) Score 10   Stairs Score 5   Barthel Index Score 75           Kurtis Rising, PT

## 2020-02-23 ENCOUNTER — APPOINTMENT (INPATIENT)
Dept: NON INVASIVE DIAGNOSTICS | Facility: HOSPITAL | Age: 85
DRG: 149 | End: 2020-02-23
Payer: MEDICARE

## 2020-02-23 VITALS
RESPIRATION RATE: 18 BRPM | HEIGHT: 64 IN | BODY MASS INDEX: 29.21 KG/M2 | TEMPERATURE: 97.7 F | HEART RATE: 88 BPM | WEIGHT: 171.08 LBS | SYSTOLIC BLOOD PRESSURE: 113 MMHG | DIASTOLIC BLOOD PRESSURE: 71 MMHG | OXYGEN SATURATION: 95 %

## 2020-02-23 LAB
ANION GAP SERPL CALCULATED.3IONS-SCNC: 6 MMOL/L (ref 4–13)
BUN SERPL-MCNC: 25 MG/DL (ref 5–25)
CALCIUM SERPL-MCNC: 9.4 MG/DL (ref 8.3–10.1)
CHLORIDE SERPL-SCNC: 103 MMOL/L (ref 100–108)
CO2 SERPL-SCNC: 24 MMOL/L (ref 21–32)
CREAT SERPL-MCNC: 1.89 MG/DL (ref 0.6–1.3)
GFR SERPL CREATININE-BSD FRML MDRD: 31 ML/MIN/1.73SQ M
GLUCOSE SERPL-MCNC: 120 MG/DL (ref 65–140)
POTASSIUM SERPL-SCNC: 4.4 MMOL/L (ref 3.5–5.3)
SODIUM SERPL-SCNC: 133 MMOL/L (ref 136–145)

## 2020-02-23 PROCEDURE — 99239 HOSP IP/OBS DSCHRG MGMT >30: CPT | Performed by: PHYSICIAN ASSISTANT

## 2020-02-23 PROCEDURE — 94760 N-INVAS EAR/PLS OXIMETRY 1: CPT

## 2020-02-23 PROCEDURE — 80048 BASIC METABOLIC PNL TOTAL CA: CPT | Performed by: PHYSICIAN ASSISTANT

## 2020-02-23 PROCEDURE — 93306 TTE W/DOPPLER COMPLETE: CPT

## 2020-02-23 PROCEDURE — 94640 AIRWAY INHALATION TREATMENT: CPT

## 2020-02-23 PROCEDURE — 93306 TTE W/DOPPLER COMPLETE: CPT | Performed by: INTERNAL MEDICINE

## 2020-02-23 RX ORDER — MECLIZINE HYDROCHLORIDE 25 MG/1
25 TABLET ORAL 3 TIMES DAILY PRN
Qty: 30 TABLET | Refills: 0 | Status: SHIPPED | OUTPATIENT
Start: 2020-02-23 | End: 2020-03-06 | Stop reason: ALTCHOICE

## 2020-02-23 RX ADMIN — LORAZEPAM 0.5 MG: 0.5 TABLET ORAL at 06:20

## 2020-02-23 RX ADMIN — GUAIFENESIN 1200 MG: 600 TABLET, EXTENDED RELEASE ORAL at 08:31

## 2020-02-23 RX ADMIN — LORAZEPAM 0.5 MG: 0.5 TABLET ORAL at 13:30

## 2020-02-23 RX ADMIN — METAXALONE 400 MG: 800 TABLET ORAL at 15:12

## 2020-02-23 RX ADMIN — LEVOTHYROXINE SODIUM 75 MCG: 75 TABLET ORAL at 06:20

## 2020-02-23 RX ADMIN — FLUTICASONE PROPIONATE 1 SPRAY: 50 SPRAY, METERED NASAL at 08:31

## 2020-02-23 RX ADMIN — HEPARIN SODIUM 5000 UNITS: 5000 INJECTION INTRAVENOUS; SUBCUTANEOUS at 13:30

## 2020-02-23 RX ADMIN — HEPARIN SODIUM 5000 UNITS: 5000 INJECTION INTRAVENOUS; SUBCUTANEOUS at 06:20

## 2020-02-23 RX ADMIN — LORAZEPAM 0.5 MG: 0.5 TABLET ORAL at 16:07

## 2020-02-23 RX ADMIN — LEVALBUTEROL HYDROCHLORIDE 1.25 MG: 1.25 SOLUTION, CONCENTRATE RESPIRATORY (INHALATION) at 07:17

## 2020-02-23 RX ADMIN — LEVALBUTEROL HYDROCHLORIDE 1.25 MG: 1.25 SOLUTION, CONCENTRATE RESPIRATORY (INHALATION) at 13:08

## 2020-02-23 RX ADMIN — ATENOLOL 25 MG: 25 TABLET ORAL at 08:31

## 2020-02-23 RX ADMIN — METAXALONE 400 MG: 800 TABLET ORAL at 08:31

## 2020-02-23 RX ADMIN — IPRATROPIUM BROMIDE 0.5 MG: 0.5 SOLUTION RESPIRATORY (INHALATION) at 07:17

## 2020-02-23 RX ADMIN — NYSTATIN AND TRIAMCINOLONE ACETONIDE: 100000; 1 CREAM TOPICAL at 08:31

## 2020-02-23 RX ADMIN — IPRATROPIUM BROMIDE 0.5 MG: 0.5 SOLUTION RESPIRATORY (INHALATION) at 13:08

## 2020-02-23 RX ADMIN — TAMSULOSIN HYDROCHLORIDE 0.4 MG: 0.4 CAPSULE ORAL at 15:12

## 2020-02-23 NOTE — ASSESSMENT & PLAN NOTE
Resolved, currently chest pain free  Initial troponin negative, no ischemic changes on ECG  CXR: Stable chest with no acute cardiopulmonary disease  NT-ProBNP 242 therefore congestion is likely at baseline  Patient has grade 1 diastolic dysfunction, last echo June 2018 EF 60%  Patient has refused Aspirin due to h/o stomach ulcers and GIB  Will admit for r/o ACS given cardiac history  Troponin negative x3  Telemetry review showing paroxysmal afib   Echocardiogram 84/18/1775:  Systolic function was normal  Ejection fraction was estimated to be 60 %  Although no diagnostic regional wall motion abnormality was identified, this possibility cannot be completely excluded on the basis of this study  Wall thickness was mildly increased  The changes were consistent with concentric remodeling (increased wall thickness with normal wall mass)  Doppler parameters were consistent with abnormal left ventricular relaxation (grade 1 diastolic dysfunction)

## 2020-02-23 NOTE — ASSESSMENT & PLAN NOTE
Creatinine 1 89 today, which is near baseline  Patient does not meet GRETCHEN criteria  Continue to monitor and avoid nephrotoxic agents

## 2020-02-23 NOTE — ASSESSMENT & PLAN NOTE
BP Readings from Last 3 Encounters:   02/23/20 113/71   02/21/20 142/73   01/18/20 90/62     Well controlled    Continue PTA Atenolol 25 mg daily

## 2020-02-23 NOTE — ASSESSMENT & PLAN NOTE
Rate controlled with PTA atenolol 25 mg daily  Not on anticoagulation due to history of GI bleed  Continue atenolol  Telemetry review unremarkable  Routine follow-up with outpatient cardiologist

## 2020-02-23 NOTE — ASSESSMENT & PLAN NOTE
Wt Readings from Last 3 Encounters:   02/23/20 77 6 kg (171 lb 1 2 oz)   01/18/20 79 8 kg (176 lb)   01/15/20 79 8 kg (176 lb)     Without acute exacerbation   Patient likely has some vascular congestion present at baseline  NT-ProBNP 242  Last echo June 2018 EF 60%  Repeat echo stable with results outlined above

## 2020-02-23 NOTE — ASSESSMENT & PLAN NOTE
Lab Results   Component Value Date    [id number] 2 782 01/03/2019     Continue PTA levothyroxine 75 mcg daily

## 2020-02-23 NOTE — DISCHARGE SUMMARY
Discharge- Rainell Early 6/30/1932, 80 y o  male MRN: [id number]  [facility] Encounter: [id number]  Primary Care Provider: Janelle Arias MD   Date and time admitted to hospital: 2/21/2020  5:10 PM    * Chest pain  Assessment & Plan  Resolved, currently chest pain free  Initial troponin negative, no ischemic changes on ECG  CXR: Stable chest with no acute cardiopulmonary disease  NT-ProBNP 242 therefore congestion is likely at baseline  Patient has grade 1 diastolic dysfunction, last echo June 2018 EF 60%  Patient has refused Aspirin due to h/o stomach ulcers and GIB  Will admit for r/o ACS given cardiac history  Troponin negative x3  Telemetry review showing paroxysmal afib   Echocardiogram 32/83/5853:  Systolic function was normal  Ejection fraction was estimated to be 60 %  Although no diagnostic regional wall motion abnormality was identified, this possibility cannot be completely excluded on the basis of this study  Wall thickness was mildly increased  The changes were consistent with concentric remodeling (increased wall thickness with normal wall mass)  Doppler parameters were consistent with abnormal left ventricular relaxation (grade 1 diastolic dysfunction)      Dizziness  Assessment & Plan  Patient reports sensation of room spinning when he lays his head down on pillow, however he also reports intermittent lightheadedness when sitting and standing  Orthostatics negative   Likely secondary to BPPV  Patient reported improvement with meclizine  Previous echo in 2018 showed mild aortic stenosis  Repeat echo stable with results outlined above  PT/OT consulted, appreciate recommendations  Ambulatory referral for vestibular therapy    Chronic diastolic heart failure ([address] )  Assessment & Plan  Wt Readings from Last 3 Encounters:   02/23/20 77 6 kg (171 lb 1 2 oz)   01/18/20 79 8 kg (176 lb)   01/15/20 79 8 kg (176 lb)     Without acute exacerbation   Patient likely has some vascular congestion present at baseline  NT-ProBNP 242  Last echo June 2018 EF 60%  Repeat echo stable with results outlined above    Anxiety disorder due to general medical condition  Assessment & Plan  · Per family patient is taking Ativan 0 5 mg p o  q 8 hours scheduled at home as well as Ambien 5 mg p o  q h s   · Of note patient also has a diagnosis of excessive daytime sleepiness and per family they've tried to de-escalate his home regimen but patient has not been amenable  · Patient and family reluctant to adjust these medications, will defer to PCP  · Per patients son, he will be having formal sleep study in March to assess for likely diagnosis of LIVIER     Atrial fibrillation ([place] [address] )  Assessment & Plan  Rate controlled with PTA atenolol 25 mg daily  Not on anticoagulation due to history of GI bleed  Continue atenolol  Telemetry review unremarkable  Routine follow-up with outpatient cardiologist      Stage 3 chronic kidney disease ([place] [address] )  Assessment & Plan  Creatinine 1 89 today, which is near baseline  Patient does not meet GRETCHEN criteria  Continue to monitor and avoid nephrotoxic agents      COPD (chronic obstructive pulmonary disease) ([place] [address] )  Assessment & Plan  No acute exacerbation  Continue PTA inhalers and nebulizers    Respiratory protocol, given patient is on inhaler as well as multiple scheduled nebulizers daily at home  Will continue to monitor    Acquired hypothyroidism  Assessment & Plan  Lab Results   Component Value Date    RMU1UDPRGNAL 2 782 01/03/2019     Continue PTA levothyroxine 75 mcg daily    Mixed hyperlipidemia  Assessment & Plan  Per family patient has allergies to statin therapy  Last FLP was in 2019    GERD (gastroesophageal reflux disease)  Assessment & Plan  Continue Prilosec 20 mg daily    Benign prostatic hyperplasia with lower urinary tract symptoms  Assessment & Plan  Continue Flomax    Essential hypertension  Assessment & Plan  BP Readings from Last 3 Encounters:   02/23/20 113/71   02/21/20 142/73   01/18/20 90/62     Well controlled  Continue PTA Atenolol 25 mg daily      Discharging Physician / Practitioner: Shyanne Thurman PA-C  PCP: Mia Avalos MD  Admission Date:   Admission Orders (From admission, onward)     Ordered        02/22/20 1640  Inpatient Admission  Once         02/21/20 1849  Place in Observation  Once                   Discharge Date: 02/23/20    Resolved Problems  Date Reviewed: 2/23/2020    None          Consultations During Hospital Stay:  · none    Procedures Performed:   · None     Significant Findings / Test Results:   · Chest x-ray 02/22/2020:  Stable chest with no acute cardiopulmonary disease  · CT head 02/21/2020:  No acute intracranial abnormality  Echocardiogram 55/93/4867:  Systolic function was normal  Ejection fraction was estimated to be 60 %  Although no diagnostic regional wall motion abnormality was identified, this possibility cannot be completely excluded on the basis of this study  Wall thickness was mildly increased  The changes were consistent with concentric remodeling (increased wall thickness with normal wall mass)  Doppler parameters were consistent with abnormal left ventricular relaxation (grade 1 diastolic dysfunction)  Incidental Findings:   · None     Test Results Pending at Discharge (will require follow up): · None     Outpatient Tests Requested:  · None    Complications:  None    Reason for Admission:  Chest pain    Hospital Course:     Jasmine Potts is a 80 y o  male patient who originally presented to the hospital on 2/21/2020 due to chest pain and ongoing dizziness/lightheadedness  The suspicion for ACS given negative troponin trend and EKG without acute ST/T-wave changes  Chest pain resolved and did not recur throughout admission  Patient had complaints of room spinning when he lays his head down on the pillow  He also had complaints of lightheadedness with sitting and standing    His dizziness improved with meclizine  This is likely related to BPPV  Repeat echocardiogram was obtained to assess for progression of aortic stenosis, which was stable  The results of which are outlined above  Patient was evaluated by PT/OT who recommended discharge home with family support  Patient was discharged with prescription for meclizine every 8 hours as needed for dizziness  He was advised to follow-up with his PCP in 1-2 weeks for further management  Please see above list of diagnoses and related plan for additional information  Condition at Discharge: fair     Discharge Day Visit / Exam:     Subjective:  Patient offers no complaints  States he did not feel dizzy or lightheaded today  Walk with physical therapy and did well  Denies chest pain, shortness of breath, abdominal pain, n/v/d  Vitals: Blood Pressure: 113/71 (02/23/20 0747)  Pulse: 88 (02/23/20 0747)  Temperature: 97 7 °F (36 5 °C) (02/23/20 0747)  Temp Source: Oral (02/23/20 0420)  Respirations: 18 (02/23/20 0747)  Height: 5' 4" (162 6 cm) (02/21/20 1717)  Weight - Scale: 77 6 kg (171 lb 1 2 oz) (02/23/20 0536)  SpO2: 95 % (02/23/20 1310)     Exam:   Physical Exam   Constitutional: He is oriented to person, place, and time  He appears well-developed and well-nourished  No distress  Cardiovascular: Normal rate, normal heart sounds and intact distal pulses  An irregularly irregular rhythm present  No murmur heard  Pulmonary/Chest: Effort normal and breath sounds normal  No respiratory distress  He has no wheezes  He has no rales  Abdominal: Soft  Bowel sounds are normal  He exhibits no distension  There is no tenderness  Musculoskeletal: He exhibits no edema  Neurological: He is alert and oriented to person, place, and time  Skin: Skin is warm and dry  He is not diaphoretic  No erythema       Discussion with Family: discussed with son, Maliha Smart, via telephone     Discharge instructions/Information to patient and family:   See after visit summary for information provided to patient and family  Provisions for Follow-Up Care:  See after visit summary for information related to follow-up care and any pertinent home health orders  Disposition:     Home    For Discharges to [address] [facility]:   · Not Applicable to this Patient - Not Applicable to this Patient    Planned Readmission: no     Discharge Statement:  I spent 60 minutes discharging the patient  This time was spent on the day of discharge  I had direct contact with the patient on the day of discharge  Greater than 50% of the total time was spent examining patient, answering all patient questions, arranging and discussing plan of care with patient as well as directly providing post-discharge instructions  Additional time then spent on discharge activities  Discharge Medications:  See after visit summary for reconciled discharge medications provided to patient and family        ** Please Note: This note has been constructed using a voice recognition system **

## 2020-02-23 NOTE — RESTORATIVE TECHNICIAN NOTE
Restorative Specialist Mobility Note       Activity: Ambulate in room, Bathroom privileges, Chair, Dangle, Stand at bedside, Ambulate in wilkins(Educated/encouraged pt to ambulate with assistance 3-4 x's/day  Bed alarm on   Pt callbell, phone/tray within reach )     Assistive Device: Front wheel walker       [employer] BS, Restorative Technician, [employer]

## 2020-02-23 NOTE — SOCIAL WORK
CM met with patient at bedside to review CM role an discuss d/c plan  Pt resides with his grandson Jeanne Sutton in a 1-story home with 3 PILAR  Pt has private caregivers in the home 3-5 hours a day M-F  Family assists on the weekends  Caregivers or family provide transport  Pt's son Aure Peralta ([phone]) is primary contact and POA  Pt reports that he is independent with self-care and uses no DME on regular basis  Has RW and cane if needed  No history of HHC or STR   No history of MH or D/A treatment reported  Patient has prescription coverage and uses [facility] in [place]  CM reviewed d/c planning process including the following: identifying help at home, patient preference for d/c planning needs, Discharge Lounge, [facility] Meds to Bed program, availability of treatment team to discuss questions or concerns patient and/or family may have regarding understanding medications and recognizing signs and symptoms once discharged  CM also encouraged patient to follow up with all recommended appointments after discharge  Patient advised of importance for patient and family to participate in managing patients medical well being

## 2020-02-23 NOTE — PLAN OF CARE
Problem: Potential for Falls  Goal: Patient will remain free of falls  Description  INTERVENTIONS:  - Assess patient frequently for physical needs  -  Identify cognitive and physical deficits and behaviors that affect risk of falls    -  [place] fall precautions as indicated by assessment   - Educate patient/family on patient safety including physical limitations  - Instruct patient to call for assistance with activity based on assessment  - Modify environment to reduce risk of injury  - Consider OT/PT consult to assist with strengthening/mobility  Outcome: Progressing     Problem: PAIN - ADULT  Goal: Verbalizes/displays adequate comfort level or baseline comfort level  Description  Interventions:  - Encourage patient to monitor pain and request assistance  - Assess pain using appropriate pain scale  - Administer analgesics based on type and severity of pain and evaluate response  - Implement non-pharmacological measures as appropriate and evaluate response  - Consider cultural and social influences on pain and pain management  - Notify physician/advanced practitioner if interventions unsuccessful or patient reports new pain  Outcome: Progressing     Problem: INFECTION - ADULT  Goal: Absence or prevention of progression during hospitalization  Description  INTERVENTIONS:  - Assess and monitor for signs and symptoms of infection  - Monitor lab/diagnostic results  - Monitor all insertion sites, i e  indwelling lines, tubes, and drains  - Monitor endotracheal if appropriate and nasal secretions for changes in amount and color  - [place] appropriate cooling/warming therapies per order  - Administer medications as ordered  - Instruct and encourage patient and family to use good hand hygiene technique  - Identify and instruct in appropriate isolation precautions for identified infection/condition  Outcome: Progressing  Goal: Absence of fever/infection during neutropenic period  Description  INTERVENTIONS:  - Monitor WBC    Outcome: Progressing     Problem: SAFETY ADULT  Goal: Maintain or return to baseline ADL function  Description  INTERVENTIONS:  -  Assess patient's ability to carry out ADLs; assess patient's baseline for ADL function and identify physical deficits which impact ability to perform ADLs (bathing, care of mouth/teeth, toileting, grooming, dressing, etc )  - Assess/evaluate cause of self-care deficits   - Assess range of motion  - Assess patient's mobility; develop plan if impaired  - Assess patient's need for assistive devices and provide as appropriate  - Encourage maximum independence but intervene and supervise when necessary  - Involve family in performance of ADLs  - Assess for home care needs following discharge   - Consider OT consult to assist with ADL evaluation and planning for discharge  - Provide patient education as appropriate  Outcome: Progressing  Goal: Maintain or return mobility status to optimal level  Description  INTERVENTIONS:  - Assess patient's baseline mobility status (ambulation, transfers, stairs, etc )    - Identify cognitive and physical deficits and behaviors that affect mobility  - Identify mobility aids required to assist with transfers and/or ambulation (gait belt, sit-to-stand, lift, walker, cane, etc )  - [place] fall precautions as indicated by assessment  - Record patient progress and toleration of activity level on Mobility SBAR; progress patient to next Phase/Stage  - Instruct patient to call for assistance with activity based on assessment  - Consider rehabilitation consult to assist with strengthening/weightbearing, etc   Outcome: Progressing

## 2020-02-23 NOTE — ASSESSMENT & PLAN NOTE
· Per family patient is taking Ativan 0 5 mg p o  q 8 hours scheduled at home as well as Ambien 5 mg p o  q h s   · Of note patient also has a diagnosis of excessive daytime sleepiness and per family they've tried to de-escalate his home regimen but patient has not been amenable    · Patient and family reluctant to adjust these medications, will defer to PCP  · Per patients son, he will be having formal sleep study in March to assess for likely diagnosis of LIVIER

## 2020-02-23 NOTE — ASSESSMENT & PLAN NOTE
Patient reports sensation of room spinning when he lays his head down on pillow, however he also reports intermittent lightheadedness when sitting and standing  Orthostatics negative   Likely secondary to BPPV  Patient reported improvement with meclizine  Previous echo in 2018 showed mild aortic stenosis  Repeat echo stable with results outlined above  PT/OT consulted, appreciate recommendations  Ambulatory referral for vestibular therapy

## 2020-02-24 ENCOUNTER — TRANSITIONAL CARE MANAGEMENT (OUTPATIENT)
Dept: FAMILY MEDICINE CLINIC | Facility: HOSPITAL | Age: 85
End: 2020-02-24

## 2020-02-24 DIAGNOSIS — Z71.89 COMPLEX CARE COORDINATION: Primary | ICD-10-CM

## 2020-02-25 ENCOUNTER — TELEPHONE (OUTPATIENT)
Dept: FAMILY MEDICINE CLINIC | Facility: HOSPITAL | Age: 85
End: 2020-02-25

## 2020-02-25 ENCOUNTER — PATIENT OUTREACH (OUTPATIENT)
Dept: FAMILY MEDICINE CLINIC | Facility: HOSPITAL | Age: 85
End: 2020-02-25

## 2020-02-25 DIAGNOSIS — J43.9 PULMONARY EMPHYSEMA, UNSPECIFIED EMPHYSEMA TYPE (HCC): Primary | ICD-10-CM

## 2020-02-25 NOTE — PHYSICIAN ADVISOR
Current patient class: Inpatient  The patient is currently on Hospital Day: 3 at [address] [place] [address]      The patient was admitted to the hospital at 1640 on 2/22/20 for the following diagnosis:  Chest pain [R07 9]  Fatigue [R53 83]       There is documentation in the medical record of an expected length of stay of at least 2 midnights  The patient is therefore expected to satisfy the 2 midnight benchmark and given the 2 midnight presumption is appropriate for INPATIENT ADMISSION  Given this expectation of a satisfying stay, CMS instructs us that the patient is most often appropriate for inpatient admission under part A provided medical necessity is documented in the chart  After review of the relevant documentation, labs, vital signs and test results, the patient is appropriate for INPATIENT ADMISSION  Admission to the hospital as an inpatient is a complex decision making process which requires the practitioner to consider the patients presenting complaint, history and physical examination and all relevant testing  With this in mind, in this case, the patient was deemed appropriate for INPATIENT ADMISSION  After review of the documentation and testing available at the time of the admission I concur with this clinical determination of medical necessity  Rationale is as follows: The patient is a 80 yrs old Male who presented to the ED at 2/21/2020  5:10 PM with a chief complaint of Chest Pain (pt presents to ED via EMS with c/o chest pain earlier this a m  pt went to urgent care with midsternal chest pain radiating to left arm accompanied by nausea and sob  pt has hx of 2 heart attacks  pt completely asymptomatic at this time)  Patient had normal cardiac enzymes however on telemetry the patient was showing paroxysmal atrial fibrillation and was still kept on telemetry    The patient also complained of dizziness with negative orthostatics and was still awaiting physical and occupational therapy evaluations  Given the need for the above and the need for further telemetry and also needing an echocardiogram secondary to paroxysmal atrial fibrillation as well as the need for physical therapy and a elderly male patient who came in with dizziness and chest pain the patient did cross the 2 midnight benchmark and is inpatient status appropriate      The patients vitals on arrival were ED Triage Vitals   Temperature Pulse Respirations Blood Pressure SpO2   02/21/20 1715 02/21/20 1717 02/21/20 1715 02/21/20 1717 02/21/20 1717   (!) 97 3 °F (36 3 °C) 77 18 140/77 95 %      Temp Source Heart Rate Source Patient Position - Orthostatic VS BP Location FiO2 (%)   02/21/20 1715 02/21/20 1715 02/21/20 1815 02/21/20 1815 --   Oral Monitor Lying Right arm       Pain Score       02/21/20 1715       No Pain           Past Medical History:   Diagnosis Date    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    Compression fracture of thoracic vertebra (HCC)     last assessed 05/07/2012    COPD (chronic obstructive pulmonary disease) [facility]     Disease of thyroid gland     Heart attack ([facility] )     History of methicillin resistant staphylococcus aureus (MRSA) 03/28/2019    negative nasal swab     Hollenhorst plaque, right eye     last assessed 04/08/2013    Hyperlipidemia     Hypertension     Iron deficiency anemia due to chronic blood loss     last assessed 09/10/2012    Ischemic colitis ([facility] )     last assessed 05/27/2014    Osteoarthritis of both hands     last assessedn 04/30/2013    Peptic ulcer     last assessed 06/14/2013    Polymyalgia rheumatica ([facility] )     last assessesd 10/09/2012    Renal disorder     Upper GI hemorrhage     last assessed 01/29/2015    Varicose veins of lower extremity     last assessed 04/26/2013     Past Surgical History:   Procedure Laterality Date    CORONARY ARTERY BYPASS GRAFT      HEMORRHOID SURGERY      HERNIA REPAIR      RENAL CYST EXCISION resolved 05/2010    THROMBOENDARTERECTOMY Right     carotid, last assessed 06/18/2013           Consults have been placed to:   IP CONSULT TO CASE MANAGEMENT    Vitals:    02/23/20 0536 02/23/20 0719 02/23/20 0747 02/23/20 1310   BP:   113/71    BP Location:       Pulse:   88    Resp:   18    Temp:   97 7 °F (36 5 °C)    TempSrc:       SpO2:  93% 93% 95%   Weight: 77 6 kg (171 lb 1 2 oz)      Height:           Most recent labs:    Recent Labs     02/23/20  0826   K 4 4   CALCIUM 9 4   BUN 25   CREATININE 1 89*       Scheduled Meds:  Continuous Infusions:  No current facility-administered medications for this encounter  PRN Meds:      Surgical procedures (if appropriate):

## 2020-02-25 NOTE — PROGRESS NOTES
Outpatient Care Management Note:    Care manager called Pérez Peace  The phone was busy  SHAKILA then called his cell phone  He answered and requested I call back in 10-15 minutes  Care manager called Pérez Peace back  He states that he is still experiencing symptoms of dizziness and shortness of breath  He gets dizzy when he lays down and when getting up  He has to sit for a period of time before getting up or he feels unsteady  He states he has a walker, but he is not using it unless he feels he needs it  He denies any falls  I reminded him to change positions slowly and encouraged him to use his walker at all times for safety  He is unsure when his follow up appointments are and requested I call his daughter in law, Quentin Lozano  CM has his son, Jennifer,  contact information  Pérez Peace requested I call Eileen Levine or Quentin Lozano  Pérez Peace states he gets short of breath with walking 40-50 feet, but feels this is his norm  If he bends over to put his shoes on , he gets short of breath and feels unsteady  He does not check weights at home, and I discouraged him from doing this currently since he is not steady  He denies any swelling of his ankles, feet, and denies any chest pain symptoms  He states he is taking all of his medications  He has a caregiver 3-4 hours per day who checks to be sure his meds are taken  Pérez Peace lives with his grand son who is 32years old and assists him as needed  Pérez Peace was very hard of hearing and had a difficult time hearing on the phone  Voice mail message left for Heriberto Anglin son  I left my contact information and requested a call back  CM called and spoke with Mani Keller daughter in law  She has not heard from Vestibular Therapy about scheduling  CM will follow up to find out where it is done in the [facility] and be sure they contact her to schedule  Quentin Lozano may have [facility] take him to the therapy sessions  Quentin Lozano notes that Pérez Peace has declined physically since having pneumonia about 1 year ago  He also has COPD which affects his breathing  He is using his nebulizer treatments regularly  Jarvis Buenrostro is asking if they can get a prescription for an incentive spirometer to continue to help his breathing effort  She is concerned that Cristobal's short term memory is declining  She will discuss this with Dr Bianca Holley at his appointment on 3/14  Jarvis Buenrostro fills med boxes for [employer]  He manages his nebulizer and anxiety medication  Jarvis Buenrostro feels that when he gets dizziness episodes it makes him anxious which makes the dizziness worse  Lastly, she discussed that Claudia Escudero has a living will  She will bring copies with her to his office appointment so they can be uploaded into his chart  I gave Jarvis Buenrostro my contact information  She will call with any questions or concerns  Jarvis Buenrostro feels that it would be best for CM to contact her, rather than Claudia Escudero, since he is very hard of hearing which causes some confusion on the phone

## 2020-02-26 ENCOUNTER — PATIENT OUTREACH (OUTPATIENT)
Dept: FAMILY MEDICINE CLINIC | Facility: HOSPITAL | Age: 85
End: 2020-02-26

## 2020-02-26 NOTE — PROGRESS NOTES
Outpatient Care Management Note:    Care manager called [facility]'s Physical Therapy  They do vestibular therapy at the [facility] location  I spoke with Ken Dennis  She could not find an order for Karissa Han  After further checking, the order was placed on 2/23/20 as basic vestibular evaluation  CM gave Ken Dennis the contact information for Bala Garcia and requested she contact her to schedule  CM called Bala Garcia and updated her on the vestibular therapy  I gave her the address and phone number for the therapy location  She will call them if she does not hear from them by the end of the day  We discussed that Cristobal's aid, Haider Linder, called the office yesterday and requested the incentive spirometer  The order was faxed to [facility]  Bala Garcia agreed to [facility] calling back in 2 weeks to follow up  She will complete initial assessment at that time  She has my contact information and will call with any questions or concerns

## 2020-02-27 ENCOUNTER — EVALUATION (OUTPATIENT)
Dept: PHYSICAL THERAPY | Facility: CLINIC | Age: 85
End: 2020-02-27
Payer: MEDICARE

## 2020-02-27 DIAGNOSIS — R42 VERTIGO: Primary | ICD-10-CM

## 2020-02-27 PROCEDURE — 97162 PT EVAL MOD COMPLEX 30 MIN: CPT | Performed by: PHYSICAL THERAPIST

## 2020-02-27 NOTE — PROGRESS NOTES
PT Evaluation     Today's date: 2020  Patient name: Ned Staley  : 1932  MRN: [id number]  Referring provider: Tiffanie Das  Dx:   Encounter Diagnosis     ICD-10-CM    1  Vertigo R42                   Assessment  Assessment details: Pt is a 80y o  year old male coming to outpatient PT with a diagnosis of vertigo  Pt presents with (+) L Uzair Hallpike testing and overall decreased functional mobility  Oculomotor testing was not able to be completed 2* pt visual problems  Pt would benefit from skilled PT services in order to address these deficits and reach maximum level of function  Thank you kindly for the referral!  Pt and caregiver were educated regarding diagnosis of BPPV  HEP was issued  Pt should stop taking Meclizine medication on a regular basis, and only take as needed ( if he has acute vertigo sx)  Impairments: lacks appropriate home exercise program  Other impairment: dizziness  Understanding of Dx/Px/POC: good   Prognosis: good    Goals  STG's ( 3-4 weeks)  1  Pt will be independent in HEP  2  Pt will have no nystagmus with L Uzair Hallpike testing  LTG's ( 6- 8 weeks)  1  Improve FOTO score by 8-10 points  2  Pt will have less dizziness when transferring sit to stand  3  Pt will have no dizziness when transferring supine to sit and sit to supine    Plan  Patient would benefit from: PT eval and skilled occupational therapy  Planned therapy interventions: manual therapy, neuromuscular re-education and home exercise program  Frequency: 1-2 times per week  Duration in weeks: 4  Plan of Care beginning date: 2020  Plan of Care expiration date: 3/26/2020  Treatment plan discussed with: patient and caregiver        Subjective Evaluation    History of Present Illness  Mechanism of injury: Pt reports his dizziness started 8 months to 1 year ago 2* unknown etiology  Pt has noticed his dizziness worsening over the past 2 months  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  Pt has increased dizziness when he lays down to get into bed, sitting up when getting out of bed, looking up to the ceiling, bending forward to put on shoes and socks, and standing up after sitting  Pt feels increased dizziness for several seconds after moving  Pt reports photophoria, but denies phonophoria  Work: retired  Worked in [employer], black top pants, and welding  Gait: walks with RW, slow speed  Hobbies: very limited due to vision problems  Pain  At best pain rating: 3  At worst pain ratin  Location: neck    Social Support  Steps to enter house: yes  4  Stairs in house: no   Lives in: [facility]  Lives with: adult children    Employment status: not working  Exercise comments: used to exercise at [employer]  Treatments  No previous or current treatments  Patient Goals  Patient goal: to be able to stand up and dance at upcoming weddings; to have less dizziness        Objective     Neurological Testing     Sensation   Cervical/Thoracic   Left   Intact: light touch    Right   Intact: light touch    Reflexes   Left   Biceps (C5/C6): normal (2+)  Brachioradialis (C6): normal (2+)  Triceps (C7): normal (2+)    Right   Biceps (C5/C6): normal (2+)  Brachioradialis (C6): normal (2+)  Triceps (C7): normal (2+)    Active Range of Motion     Additional Active Range of Motion Details  Vestibular Examination:    Cervical AROM: WFL's with stiffness at end range   Increased dizziness with cervical extension, R SB, L SB    Spontaneous nystagmus room light: negative  Gaze holding nystagmus room light: negative  Skew deviation room light: negative  Smooth pursuits:  vertical: NT    Horizontal: 30 seconds- pt unable to see 2* vision problems  Horizontal head thrust test: NT  VOR cancellation: NT  Near point convergence:   4 cm  ( normal 4- 6 cm)  Oculomotor mobility: good   Sharp Danilo test: negative  Vertebral Artery Test: NT  Karly Hallpike test: (-) R; (+) counter clockwise nystagmus L   Horizontal Roll test:

## 2020-02-28 DIAGNOSIS — B37.9 MONILIASIS: ICD-10-CM

## 2020-03-02 ENCOUNTER — PATIENT OUTREACH (OUTPATIENT)
Dept: FAMILY MEDICINE CLINIC | Facility: HOSPITAL | Age: 85
End: 2020-03-02

## 2020-03-02 NOTE — PROGRESS NOTES
Outpatient Care Management Note:    Voice mail message received from Dex gonzalez requesting a call back  Care manager called Dex gonzalez back  She wanted to email me Cristobal's documents to be uploaded into his medical chart  CM explained that I can not receive the documents via email due to [place]  She can bring them with him on 3/14 at his upcoming PCP appointment  CM asked if she had time to complete initial assessment    She requested CM call back tomorrow around 9am

## 2020-03-03 ENCOUNTER — OFFICE VISIT (OUTPATIENT)
Dept: PHYSICAL THERAPY | Facility: CLINIC | Age: 85
End: 2020-03-03
Payer: MEDICARE

## 2020-03-03 ENCOUNTER — PATIENT OUTREACH (OUTPATIENT)
Dept: FAMILY MEDICINE CLINIC | Facility: HOSPITAL | Age: 85
End: 2020-03-03

## 2020-03-03 DIAGNOSIS — R42 VERTIGO: Primary | ICD-10-CM

## 2020-03-03 PROCEDURE — 97140 MANUAL THERAPY 1/> REGIONS: CPT | Performed by: PHYSICAL THERAPIST

## 2020-03-03 NOTE — PROGRESS NOTES
Daily Note     Today's date: 3/3/2020  Patient name: Ned Staley  : 1932  MRN: [id number]  Referring provider: Tiffanie Das  Dx:   Encounter Diagnosis     ICD-10-CM    1  Vertigo R42                   Subjective: Pt reports he felt good for one day, and then he had increased dizziness  Pt came with a , but not a care giver to instruct in CRM technique  Objective: See treatment diary below      Assessment: Tolerated treatment well  Patient had no nystagmus with L CRM today  Pt reports he continues to have dizziness with movement transitions      Plan: Continue per plan of care        Dx: vertigo  EPOC: 3/26/20  CO-MORBIDITIES: cardiac  PERSONAL FACTORS: Mahnaz Bender is caregiver;   Precautions: fall risk, poor vision      Manual  2/27 3/3           L CRM x1 th x2 th                                                                   Exercise Diary                                                                                                                                                                                                                                                                                      Modalities

## 2020-03-05 ENCOUNTER — APPOINTMENT (OUTPATIENT)
Dept: PHYSICAL THERAPY | Facility: CLINIC | Age: 85
End: 2020-03-05
Payer: MEDICARE

## 2020-03-06 ENCOUNTER — OFFICE VISIT (OUTPATIENT)
Dept: CARDIOLOGY CLINIC | Facility: CLINIC | Age: 85
End: 2020-03-06
Payer: MEDICARE

## 2020-03-06 VITALS
WEIGHT: 172 LBS | BODY MASS INDEX: 29.37 KG/M2 | DIASTOLIC BLOOD PRESSURE: 66 MMHG | HEART RATE: 72 BPM | HEIGHT: 64 IN | SYSTOLIC BLOOD PRESSURE: 118 MMHG

## 2020-03-06 DIAGNOSIS — I25.10 CORONARY ARTERY DISEASE INVOLVING NATIVE CORONARY ARTERY OF NATIVE HEART WITHOUT ANGINA PECTORIS: Primary | ICD-10-CM

## 2020-03-06 PROCEDURE — 3078F DIAST BP <80 MM HG: CPT | Performed by: INTERNAL MEDICINE

## 2020-03-06 PROCEDURE — 3074F SYST BP LT 130 MM HG: CPT | Performed by: INTERNAL MEDICINE

## 2020-03-06 PROCEDURE — 99214 OFFICE O/P EST MOD 30 MIN: CPT | Performed by: INTERNAL MEDICINE

## 2020-03-06 PROCEDURE — 3008F BODY MASS INDEX DOCD: CPT | Performed by: INTERNAL MEDICINE

## 2020-03-06 PROCEDURE — 1160F RVW MEDS BY RX/DR IN RCRD: CPT | Performed by: INTERNAL MEDICINE

## 2020-03-06 PROCEDURE — 1111F DSCHRG MED/CURRENT MED MERGE: CPT | Performed by: INTERNAL MEDICINE

## 2020-03-06 PROCEDURE — 1036F TOBACCO NON-USER: CPT | Performed by: INTERNAL MEDICINE

## 2020-03-06 PROCEDURE — 4040F PNEUMOC VAC/ADMIN/RCVD: CPT | Performed by: INTERNAL MEDICINE

## 2020-03-06 NOTE — PROGRESS NOTES
Cardiology Follow Up    Jasmine Potts  6/30/1932  [id number]  [address] [facility]  [address] [place] [id number] [phone]    No diagnosis found  Interval History: Followup for chest pain and dizziness    Chest pain occurred acutely and he went to ER  Workup unremarkable  No recurrence  Dizziness is vertigo and he did PT  The progress notes in the hospital list PAF  One EKG is read incorrectly has atrial fibrillation and is sinus rhythm  On one tele strip is in the media tab and is normal sinus rhythm           Problem List     Hydronephrosis of right kidney    CAD (coronary artery disease)    Carotid stenosis    Essential hypertension    Anxiety disorder due to general medical condition    Benign prostatic hyperplasia with lower urinary tract symptoms    GERD (gastroesophageal reflux disease)    Mixed simple and mucopurulent chronic bronchitis (HCC)    Headache    Mixed hyperlipidemia    Acquired hypothyroidism    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[address] )    Insomnia    Iron deficiency anemia    Overview Signed 3/15/2018 10:04 AM by Mia Avalos MD     Transitioned From: Anemia due to GI blood loss         Macular degeneration    Osteoporosis    Other atopic dermatitis    Shortness of breath    Medicare annual wellness visit, subsequent    Eczema    Seborrheic keratoses, inflamed    COPD (chronic obstructive pulmonary disease) (HCC)    Bronchitis    Severe sepsis (HCC)    Suprapubic tenderness    Loose stools    Thrombocytopenia (HCC)    Overview Deleted 3/31/2019 11:38 AM by Vivian Patel MD            Class 1 obesity due to excess calories with serious comorbidity and body mass index (BMI) of 30 0 to 30 9 in adult    Chronic diastolic heart failure (HCC)    Wt Readings from Last 3 Encounters:   03/06/20 78 kg (172 lb)   02/23/20 77 6 kg (171 lb 1 2 oz)   01/18/20 79 8 kg (176 lb)                 Atrial fibrillation [facility])    Community acquired pneumonia    Urinary obstruction    Cervical spine arthritis    Stage 3 chronic kidney disease (HCC)    Aneurysm, pulmonary, arteriovenous    LIVIER (obstructive sleep apnea)    Snoring    Excessive daytime sleepiness    Chest pain    Dizziness        Past Medical History:   Diagnosis Date    Anxiety disorder due to general medical condition 2013    Compression fracture [facility] thoracic vertebra ([facility])     last assessed 2012    COPD (chronic obstructive pulmonary disease) ([facility] )     Disease of thyroid gland     Heart attack ([facility] )     History of methicillin resistant staphylococcus aureus (MRSA) 2019    negative nasal swab     Hollenhorst plaque, right eye     last assessed 2013    Hyperlipidemia     Hypertension     Iron deficiency anemia due to chronic blood loss     last assessed 09/10/2012    Ischemic colitis ([facility] )     last assessed 2014    Osteoarthritis of both hands     last assessedn 2013    Peptic ulcer     last assessed 2013    Polymyalgia rheumatica ([facility] )     last assessesd 10/09/2012    Renal disorder     Upper GI hemorrhage     last assessed 2015    Varicose veins of lower extremity     last assessed 2013     Social History     Socioeconomic History    Marital status:       Spouse name: Not on file    Number of children: Not on file    Years of education: Not on file    Highest education level: Not on file   Occupational History    Not on file   Social Needs    Financial resource strain: Not hard at all    Food insecurity:     Worry: Not on file     Inability: Not on file    Transportation needs:     Medical: No     Non-medical: No   Tobacco Use    Smoking status: Former Smoker     Packs/day: 2 00     Years: 50 00     Pack years: 100 00     Types: Cigarettes     Start date:      Last attempt to quit:      Years since quittin 1    Smokeless tobacco: Never Used    Tobacco comment: Quit 40 yrs  ago   Substance and Sexual Activity    Alcohol use: Not Currently     Alcohol/week: 1 0 standard drinks     Types: 1 Glasses of wine per week     Comment: social    Drug use: No    Sexual activity: Not on file   Lifestyle    Physical activity:     Days per week: Not on file     Minutes per session: Not on file    Stress: Not on file   Relationships    Social connections:     Talks on phone: Not on file     Gets together: Not on file     Attends Judaism service: Not on file     Active member of club or organization: Not on file     Attends meetings of clubs or organizations: Not on file     Relationship status: Not on file    Intimate partner violence:     Fear of current or ex partner: Not on file     Emotionally abused: Not on file     Physically abused: Not on file     Forced sexual activity: Not on file   Other Topics Concern    Not on file   Social History Narrative     per [employer]    Always uses seat belt      Family History   Problem Relation Age of Onset    Hypertension Mother     Alcohol abuse Mother     Hypertension Father     Alcohol abuse Father     Alcohol abuse Son     Heart disease Family     Stroke Family         syndrome     Past Surgical History:   Procedure Laterality Date    CORONARY ARTERY BYPASS GRAFT      HEMORRHOID SURGERY      HERNIA REPAIR      RENAL CYST EXCISION      resolved 05/2010    THROMBOENDARTERECTOMY Right     carotid, last assessed 06/18/2013       Current Outpatient Medications:     albuterol (PROVENTIL HFA,VENTOLIN HFA) 90 mcg/act inhaler, Inhale 2 puffs every 6 (six) hours as needed for wheezing, Disp: 1 Inhaler, Rfl: 3    arformoterol (BROVANA) 15 mcg/2 mL nebulizer solution, Take 15 mcg by nebulization 2 (two) times a day, Disp: , Rfl:     atenolol (TENORMIN) 25 mg tablet, TAKE ONE TABLET BY MOUTH EVERY DAY, Disp: 90 tablet, Rfl: 1    B Complex Vitamins (B COMPLEX PO), Take by mouth daily, Disp: , Rfl:     budesonide (PULMICORT) 0 5 mg/2 mL nebulizer solution, Take 0 5 mg by nebulization 2 (two) times a day Rinse mouth after use , Disp: , Rfl:     Cholecalciferol ([employer] VITAMIN D) 2000 units CAPS, Take by mouth daily , Disp: , Rfl:     Cyanocobalamin (B-12 PO), Take by mouth daily, Disp: , Rfl:     Ferrous Sulfate (IRON) 325 (65 Fe) MG TABS, Take by mouth every other day , Disp: , Rfl:     fluticasone (FLONASE) 50 mcg/act nasal spray, 1 spray into each nostril 2 (two) times a day, Disp: , Rfl:     guaiFENesin (MUCINEX) 600 mg 12 hr tablet, Take 1,200 mg by mouth every 12 (twelve) hours, Disp: , Rfl:     ipratropium (ATROVENT) 0 02 % nebulizer solution, TAKE 1 VIAL BY NEBULZATION THREE TIMES A DAY, Disp: 225 mL, Rfl: 0    levalbuterol (XOPENEX) 1 25 mg/3 mL nebulizer solution, Take 1 vial (1 25 mg total) by nebulization 3 (three) times a day, Disp: 90 vial, Rfl: 5    levothyroxine 75 mcg tablet, TAKE ONE TABLET BY MOUTH EVERY DAY, Disp: 30 tablet, Rfl: 5    LORazepam (ATIVAN) 0 5 mg tablet, TAKE ONE TABLET BY MOUTH EVERY 8 HOURS AS NEEDED FOR ANXIETY, Disp: 90 tablet, Rfl: 3    Misc Natural Products (CORTISOL PO), Take by mouth, Disp: , Rfl:     Multiple Vitamins-Minerals (VISION FORMULA/LUTEIN PO), Take by mouth, Disp: , Rfl:     nystatin-triamcinolone (MYCOLOG-II) cream, APPLY SPARINGLY TO THE AFFECTED AREA(S) TWO TIMES A DAY, Disp: 60 g, Rfl: 2    omeprazole (PriLOSEC) 20 mg delayed release capsule, TAKE ONE CAPSULE BY MOUTH EVERY DAY, Disp: 30 capsule, Rfl: 5    Respiratory Therapy Supplies (SPIROMETER) KIT, by Does not apply route 4 (four) times a day, Disp: 1 kit, Rfl: 0    sodium chloride 0 9 % nebulizer solution, TAKE 3 MILLILITERS BY NEBULIZATION ROUTE AS NEEDED FOR WHEEZING, Disp: 90 mL, Rfl: 1    tamsulosin (FLOMAX) 0 4 mg, TAKE ONE CAPSULE BY MOUTH EVERY DAY WITH DINNER, Disp: 30 capsule, Rfl: 5    zolpidem (AMBIEN) 5 mg tablet, TAKE ONE TABLET BY MOUTH AT BEDTIME AS NEEDED FOR SLEEP, Disp: 30 tablet, Rfl: 3  Allergies Allergen Reactions    Pravastatin Anaphylaxis, Photosensitivity and Headache     Reaction Date: [id number];    Other reaction(s): Headache    Acetaminophen-Codeine GI Intolerance    Atorvastatin      Other reaction(s): Leg Cramps  Other reaction(s): Leg Cramps    Codeine Nausea Only    Colestipol GI Intolerance     Reaction Date: [id number];     Contrast  [Iodinated Diagnostic Agents]     Fenofibrate GI Intolerance     Reaction Date: [id number];     Hydrocodone Vomiting    Niacin      Reaction Date: [id number];     Niaspan  [Niacin Er]     Pitavastatin Myalgia    Pneumococcal Polysaccharide Vaccine     Pravastatin Sodium     Simvastatin     Statins Myalgia    Vicoprofen  [Hydrocodone-Ibuprofen] GI Intolerance    Cyclophosphamide Rash    Ioversol Hives       Labs:     Chemistry        Component Value Date/Time     08/28/2015 1730    K 4 4 02/23/2020 0826    K 4 5 08/28/2015 1730     02/23/2020 0826     08/28/2015 1730    CO2 24 02/23/2020 0826    CO2 19 (L) 06/04/2018 1139    BUN 25 02/23/2020 0826    BUN 28 (H) 08/28/2015 1730    CREATININE 1 89 (H) 02/23/2020 0826    CREATININE 1 79 (H) 08/28/2015 1730        Component Value Date/Time    CALCIUM 9 4 02/23/2020 0826    CALCIUM 9 7 08/28/2015 1730    ALKPHOS 100 02/21/2020 1731    ALKPHOS 73 08/28/2015 1730    AST 24 02/21/2020 1731    AST 18 08/28/2015 1730    ALT 23 02/21/2020 1731    ALT 27 08/28/2015 1730    BILITOT 0 45 08/28/2015 1730            Lab Results   Component Value Date    CHOL 191 06/10/2015    CHOL 155 08/05/2014    CHOL 133 01/16/2014     Lab Results   Component Value Date    HDL 29 (L) 01/03/2019    HDL 28 (L) 06/25/2018    HDL 29 (L) 06/02/2018     Lab Results   Component Value Date    LDLCALC 147 (H) 01/03/2019    LDLCALC 129 (H) 06/25/2018    LDLCALC 111 (H) 06/02/2018     Lab Results   Component Value Date    TRIG 160 (H) 01/03/2019    TRIG 180 (H) 06/25/2018    TRIG 123 06/02/2018     No results found for: CHOLHDL    Imaging: Xr Chest 2 Views    Result Date: 2/22/2020  Narrative: CHEST INDICATION:  Chest pain accompanied by nausea and shortness of breath  Physical exam positive for wheezes and rhonchi  COMPARISON:  9/28/2019, CT chest 6/1/2018 EXAM PERFORMED/VIEWS:  XR CHEST PA & LATERAL  The frontal view was performed utilizing dual energy radiographic technique  Images: 5 FINDINGS: Cardiomediastinal silhouette appears stable  Uncoiled thoracic aorta  The lungs are clear of infiltrates  Linear scarring right middle lobe  Eventration anterior right diaphragm  No pneumothorax or pleural effusion  Compression fracture deformities of mid to lower thoracic vertebrae are again noted  Surgical clips right neck  Impression: Stable chest with no acute cardiopulmonary disease  Workstation performed: [id number]     Ct Head Without Contrast    Result Date: 2/21/2020  Narrative: CT BRAIN - WITHOUT CONTRAST INDICATION:   Dizziness, non-specific  COMPARISON:  CT brain 9/28/2019 TECHNIQUE:  CT examination of the brain was performed  In addition to axial images, coronal 2D reformatted images were created and submitted for interpretation  Radiation dose length product (DLP) for this visit:  843 84 mGy-cm   This examination, like all CT scans performed in the [facility], was performed utilizing techniques to minimize radiation dose exposure, including the use of iterative  reconstruction and automated exposure control  IMAGE QUALITY:  Diagnostic  FINDINGS: PARENCHYMA: Decreased attenuation is noted in periventricular and subcortical white matter demonstrating an appearance that is statistically most likely to represent mild microangiopathic change; this appearance is similar when compared to most recent prior examination  No CT signs of acute infarction  No intracranial mass, mass effect or midline shift  No acute parenchymal hemorrhage  VENTRICLES AND EXTRA-AXIAL SPACES:  Normal for the patient's age  VISUALIZED ORBITS AND PARANASAL SINUSES:  Unremarkable  CALVARIUM AND EXTRACRANIAL SOFT TISSUES:  Normal      Impression: No acute intracranial abnormality  Workstation performed: [id number]         Review of Systems   Constitution: Negative  HENT: Negative  Eyes: Negative  Cardiovascular: Negative  Respiratory: Negative  Endocrine: Negative  Hematologic/Lymphatic: Negative  Skin: Negative  Musculoskeletal: Negative  Gastrointestinal: Negative  Genitourinary: Negative  Neurological: Negative  Psychiatric/Behavioral: Negative  Allergic/Immunologic: Negative  Vitals:    03/06/20 0923   BP: 118/66   Pulse: 72           Physical Exam   Constitutional: He is oriented to person, place, and time  No distress  HENT:   Mouth/Throat: No oropharyngeal exudate  Eyes: No scleral icterus  Neck: No JVD present  Cardiovascular: Normal rate and regular rhythm  Pulmonary/Chest: Effort normal  He has wheezes  Abdominal: Soft  Bowel sounds are normal  He exhibits no distension  There is no tenderness  There is no guarding  Musculoskeletal: He exhibits no edema  Neurological: He is alert and oriented to person, place, and time  Skin: Skin is warm and dry  He is not diaphoretic  Psychiatric: He has a normal mood and affect  His behavior is normal        Discussion/Summary:    1  Coronary Artery Disease/History of Inferior MI: He has no angina  He is off aspirin due to GI intolerance/bleeding    Paolo Harm has not had recurrent CP since his last hospitalization  I couldn't fine any definitive strips of atrial fibrillation       2  Dyslipidemia: has had myalgia on statin therapy         3  Carotid Stenosis s/p right CEA        The patient was counseled regarding diagnostic results, instructions for management, risk factor reductions, impressions  total time of encounter was 25 minutes and 15 minutes was spent counseling

## 2020-03-09 ENCOUNTER — TELEPHONE (OUTPATIENT)
Dept: SLEEP CENTER | Facility: CLINIC | Age: 85
End: 2020-03-09

## 2020-03-10 ENCOUNTER — OFFICE VISIT (OUTPATIENT)
Dept: PHYSICAL THERAPY | Facility: CLINIC | Age: 85
End: 2020-03-10
Payer: MEDICARE

## 2020-03-10 ENCOUNTER — PATIENT OUTREACH (OUTPATIENT)
Dept: FAMILY MEDICINE CLINIC | Facility: HOSPITAL | Age: 85
End: 2020-03-10

## 2020-03-10 ENCOUNTER — HOSPITAL ENCOUNTER (OUTPATIENT)
Dept: SLEEP CENTER | Facility: HOSPITAL | Age: 85
Discharge: HOME | End: 2020-03-10
Attending: INTERNAL MEDICINE
Payer: MEDICARE

## 2020-03-10 DIAGNOSIS — J44.1 COPD WITH ACUTE EXACERBATION (HCC): ICD-10-CM

## 2020-03-10 DIAGNOSIS — G47.33 OSA (OBSTRUCTIVE SLEEP APNEA): ICD-10-CM

## 2020-03-10 DIAGNOSIS — R42 VERTIGO: Primary | ICD-10-CM

## 2020-03-10 PROCEDURE — 95811 POLYSOM 6/>YRS CPAP 4/> PARM: CPT | Performed by: INTERNAL MEDICINE

## 2020-03-10 PROCEDURE — 97110 THERAPEUTIC EXERCISES: CPT | Performed by: PHYSICAL THERAPIST

## 2020-03-10 PROCEDURE — 95811 POLYSOM 6/>YRS CPAP 4/> PARM: CPT

## 2020-03-10 NOTE — PROGRESS NOTES
Daily Note     Today's date: 3/10/2020  Patient name: Ankit Rubio  : 1932  MRN: [id number]  Referring provider: Adore Rucker  Dx:   Encounter Diagnosis     ICD-10-CM    1  Vertigo R42                   Subjective: Pt reports he no longer has dizziness with any functional activities  Pt does not have dizziness with sit to stand transfers, supine to sit tranfers, looking up to the ceiling and bending forward to put on his shoes  Objective: See treatment diary below      Assessment: Tolerated treatment well  Patient is fully functional with all activities regarding dizziness        Plan: D/c pt from skilled PT     Dx: vertigo  EPOC: 3/26/20  CO-MORBIDITIES: cardiac  PERSONAL FACTORS: Tammi Guillaume is caregiver;   Precautions: fall risk, poor vision      Manual   3/3           L CRM x1 th x2 th                                                                   Exercise Diary                                                                                                                                                                                                                                                                                      Modalities

## 2020-03-10 NOTE — PROGRESS NOTES
Outpatient Care Management Note:    Call received from [employer] stating that Laly Eaton has the opportunity to be involved in a research study related to his Macular Degeneration  They will need 5 years of his medical records  Laly Eaton has an appointment with Dr Kermit Hedrick this Saturday  They could sign a medical release at that time and  records  SHAKILA explained that I am not familiar with the process needed to obtain records  I will speak with Brenda Euceda, , and have her call carrillo Villafana on above  She will call Debbrah Holstein and discuss it with her  A medical release does need to be signed and depending on the number of records it could take 30 days to obtain

## 2020-03-11 NOTE — PROGRESS NOTES
Sleep Study Documentation    Pre-Sleep Study       Sleep testing procedure explained to patient:YES    Patient napped prior to study:YES- more than 30 minutes  Napped after 2PM: yes    Caffeine:Dayshift worker after 12PM   Caffeine use:YES- ice tea  12 ounces    Alcohol:Dayshift workers after 5PM: Alcohol use:NO    Typical day for patient:YES       Study Documentation    Sleep Study Indications: G47 33; J44 1    Sleep Study: Treatment   Optimal PAP  Pressure:20/16  Leak:Medium  Snore:Eliminated  REM Obtained:yes  Supplemental O2: no    Minimum SaO2 67  Baseline SaO2 96  PAP mask tried (list all) Resmed Airfit F10,F20 and N20  PAP mask choice (final) Resmed Airfit F20  PAP mask type:full face  PAP pressure at which snoring was eliminated 6  Minimum SaO2 at final PAP pressure 90  Mode of Therapy:CPAP  ETCO2:No  CPAP changed to BiPAP:Yes  If yes why Per physician request     Mode of Therapy:BiPAP    EKG abnormalities: yes:  EPOCH example and comments:     EEG abnormalities: no    Sleep Study Recorded < 2 hours: N/A    Sleep Study Recorded > 2 hours but incomplete study: N/A    Sleep Study Recorded 6 hours but no sleep obtained: NO    Patient classification: retired       Post-Sleep Study    Medication used at bedtime or during sleep study:NO    Patient reports time it took to fall asleep:greater than 60 minutes    Patient reports waking up during study:Denied    Patient reports sleeping 2 to 4 hours without dreaming  Patient reports sleep during study:typical    Patient rated sleepiness: Not sleepy or tired    PAP treatment:yes: Post PAP treatment patient reports feeling unchanged and would wear PAP mask at home

## 2020-03-11 NOTE — ED ATTENDING ATTESTATION
2/21/2020  I, Tona Cardona MD, saw and evaluated the patient  I have discussed the patient with the resident/non-physician practitioner and agree with the resident's/non-physician practitioner's findings, Plan of Care, and MDM as documented in the resident's/non-physician practitioner's note, except where noted  All available labs and Radiology studies were reviewed  I was present for key portions of any procedure(s) performed by the resident/non-physician practitioner and I was immediately available to provide assistance  At this point I agree with the current assessment done in the Emergency Department  I have conducted an independent evaluation of this patient a history and physical is as follows:    ED Course     Patient presents for evaluation due to 1 day of intermittent chest pain  Patient states that it was associated with shortness of breath and left arm pain  Patient went to urgent care center and was sent to the emergency department  Patient also reports having some vertigo and headaches No additional complaints  Exam: AAOx3, NAD, RRR, wheezing, S/NT/ND, no motor/sensory deficits  A/P:  Chest pain     Patient has a heart score of 5  Will check cardiac labs, EKG, and chest x-ray  Will plan to admit  Will also CT head given headaches and vertigo      Critical Care Time  Procedures

## 2020-03-14 ENCOUNTER — OFFICE VISIT (OUTPATIENT)
Dept: FAMILY MEDICINE CLINIC | Facility: HOSPITAL | Age: 85
End: 2020-03-14
Payer: MEDICARE

## 2020-03-14 VITALS
WEIGHT: 174 LBS | BODY MASS INDEX: 29.71 KG/M2 | HEIGHT: 64 IN | DIASTOLIC BLOOD PRESSURE: 88 MMHG | TEMPERATURE: 97.8 F | OXYGEN SATURATION: 97 % | SYSTOLIC BLOOD PRESSURE: 136 MMHG | HEART RATE: 98 BPM

## 2020-03-14 DIAGNOSIS — E03.9 ACQUIRED HYPOTHYROIDISM: ICD-10-CM

## 2020-03-14 DIAGNOSIS — H61.22 IMPACTED CERUMEN OF LEFT EAR: ICD-10-CM

## 2020-03-14 DIAGNOSIS — N18.30 STAGE 3 CHRONIC KIDNEY DISEASE (HCC): ICD-10-CM

## 2020-03-14 DIAGNOSIS — J41.8 MIXED SIMPLE AND MUCOPURULENT CHRONIC BRONCHITIS (HCC): Primary | ICD-10-CM

## 2020-03-14 DIAGNOSIS — F06.4 ANXIETY DISORDER DUE TO GENERAL MEDICAL CONDITION: ICD-10-CM

## 2020-03-14 DIAGNOSIS — I10 ESSENTIAL HYPERTENSION: ICD-10-CM

## 2020-03-14 DIAGNOSIS — R42 DIZZINESS: ICD-10-CM

## 2020-03-14 DIAGNOSIS — G47.33 OSA (OBSTRUCTIVE SLEEP APNEA): ICD-10-CM

## 2020-03-14 PROCEDURE — 99215 OFFICE O/P EST HI 40 MIN: CPT | Performed by: FAMILY MEDICINE

## 2020-03-14 PROCEDURE — 1036F TOBACCO NON-USER: CPT | Performed by: FAMILY MEDICINE

## 2020-03-14 PROCEDURE — 1160F RVW MEDS BY RX/DR IN RCRD: CPT | Performed by: FAMILY MEDICINE

## 2020-03-14 PROCEDURE — 3079F DIAST BP 80-89 MM HG: CPT | Performed by: FAMILY MEDICINE

## 2020-03-14 PROCEDURE — 3075F SYST BP GE 130 - 139MM HG: CPT | Performed by: FAMILY MEDICINE

## 2020-03-14 PROCEDURE — 1111F DSCHRG MED/CURRENT MED MERGE: CPT | Performed by: FAMILY MEDICINE

## 2020-03-14 PROCEDURE — 4040F PNEUMOC VAC/ADMIN/RCVD: CPT | Performed by: FAMILY MEDICINE

## 2020-03-14 PROCEDURE — 3008F BODY MASS INDEX DOCD: CPT | Performed by: FAMILY MEDICINE

## 2020-03-14 NOTE — PROGRESS NOTES
Assessment/Plan:         Diagnoses and all orders for this visit:    Mixed simple and mucopurulent chronic bronchitis (HCC)  Comments:  Urged continuation of nebulizer use schedule and review with Dr John Gutierrez    LIVIER (obstructive sleep apnea)  Comments:  F/U with Dr John Gutierrez    Dizziness  Comments:  Vastly improved with PT treatment  Acquired hypothyroidism  Comments:  Clinically euthyroid    Essential hypertension  Comments:  Very good BP control    Stage 3 chronic kidney disease (HCC)  Comments:  Stable renal function    Anxiety disorder due to general medical condition  Comments:  OK to use Lorazepam PRN to decrease spiraling anxiety    Impacted cerumen of left ear  Comments:  Advised use of softening drops before next visit to enable flush          Subjective:      Patient ID: Ned Staley is a 80 y o  male  Follow up for multiple issues  In ER end of last month for chest pain which was not persistent  Followed up with Dr Dionna Toney and by his determination there was no change in cardiac status and did not exhibit a fib    Has ongoing wheezing and cough on daily basis  He avoids going to certain places because of his cough  I urged him to keep going with nebulizer treatments  Suggested option of using Delsym if needed before going out    Had vestibular therapy on two occasions with complete benefit   [employer] and has been able to walk 2 miles  I did suggest avoidance of gym during [place] timeframe  Had sleep study using BIPAP with good results, will discuss with Dr John Gutierrez    Wanted ear flush today but didn't use softening drops      The following portions of the patient's history were reviewed and updated as appropriate: allergies, current medications, past family history, past medical history, past social history, past surgical history and problem list     Review of Systems   Constitutional: Negative for unexpected weight change  HENT: Positive for hearing loss  Negative for congestion, sinus pain, sore throat and trouble swallowing  Cardiovascular: Negative  Genitourinary: Negative  Musculoskeletal: Positive for arthralgias  Neurological: Positive for dizziness  Hematological: Negative  Psychiatric/Behavioral: The patient is nervous/anxious  All other systems reviewed and are negative  Objective:      /88 (BP Location: Left arm, Patient Position: Sitting, Cuff Size: Standard)   Pulse 98   Temp 97 8 °F (36 6 °C) (Tympanic)   Ht 5' 4" (1 626 m)   Wt 78 9 kg (174 lb)   SpO2 97%   BMI 29 87 kg/m²          Physical Exam   Constitutional: He is oriented to person, place, and time  He appears well-developed and well-nourished  HENT:   Head: Normocephalic and atraumatic  Right Ear: Tympanic membrane normal  Decreased hearing is noted  Left Ear: Decreased hearing is noted  AS canal with hard wax deep in canal   Cardiovascular: Normal rate, regular rhythm, normal heart sounds and intact distal pulses  Pulmonary/Chest: No respiratory distress  He has decreased breath sounds  Musculoskeletal: Normal range of motion  Neurological: He is oriented to person, place, and time  Psychiatric: He has a normal mood and affect  His behavior is normal  Judgment and thought content normal    Nursing note and vitals reviewed  BMI Counseling: Body mass index is 29 87 kg/m²  The BMI is above normal  Nutrition recommendations include reducing portion sizes, decreasing overall calorie intake and decreasing soda and/or juice intake  Exercise recommendations include strength training exercises

## 2020-03-15 PROBLEM — H61.22 IMPACTED CERUMEN OF LEFT EAR: Status: ACTIVE | Noted: 2020-03-15

## 2020-03-17 DIAGNOSIS — G47.33 OSA (OBSTRUCTIVE SLEEP APNEA): Primary | ICD-10-CM

## 2020-03-23 DIAGNOSIS — J44.1 COPD WITH ACUTE EXACERBATION (HCC): ICD-10-CM

## 2020-03-24 ENCOUNTER — TELEPHONE (OUTPATIENT)
Dept: PULMONOLOGY | Facility: CLINIC | Age: 85
End: 2020-03-24

## 2020-03-24 NOTE — TELEPHONE ENCOUNTER
Per patient's request I called his daughter in law Dakota Coughlin and explained the results of pt's sleep study to him  I explained that the recommendation is to move forward with Auto BiPAP  Dakota Coughlin states that she has to talk to pt and see if he is willing to move forward  I have provided them with my phone number and she will call me back to let me know how they would like to proceed  Patient's daughter in law called back and patient would like to move forward with BiPAP therapy  I have faxed the script to [address]  at [phone] and [phone]  Dakota Coughlin will call me back if she does not hear from [address]  within 2 weeks

## 2020-03-27 ENCOUNTER — PATIENT OUTREACH (OUTPATIENT)
Dept: FAMILY MEDICINE CLINIC | Facility: HOSPITAL | Age: 85
End: 2020-03-27

## 2020-03-27 NOTE — PROGRESS NOTES
Outpatient Care Management Note:    Care manager called and spoke with Cristobal's daughter in law, Gali Jorgito  She states that Dulce Ferris has been doing well  He finished vestibular therapy and is no longer experiencing vertigo  We reviewed symptoms of COPD and CHF and when to call PCP  I recommended they call Dr Shahid Go at the start of any respiratory symptoms in order to prevent an exacerbation  Cristobal's aid is continuing to see him 3 times per week  Gali Bull has my contact information and CM department phone number  She is aware that I may be reaching out less frequently due to the corona virus  She will call with any questions or concerns

## 2020-04-08 ENCOUNTER — TELEPHONE (OUTPATIENT)
Dept: FAMILY MEDICINE CLINIC | Facility: HOSPITAL | Age: 85
End: 2020-04-08

## 2020-04-08 ENCOUNTER — OFFICE VISIT (OUTPATIENT)
Dept: URGENT CARE | Facility: CLINIC | Age: 85
End: 2020-04-08
Payer: MEDICARE

## 2020-04-08 VITALS
WEIGHT: 173 LBS | BODY MASS INDEX: 29.53 KG/M2 | SYSTOLIC BLOOD PRESSURE: 132 MMHG | DIASTOLIC BLOOD PRESSURE: 76 MMHG | TEMPERATURE: 96.9 F | HEART RATE: 83 BPM | OXYGEN SATURATION: 97 % | RESPIRATION RATE: 16 BRPM | HEIGHT: 64 IN

## 2020-04-08 DIAGNOSIS — J44.1 COPD WITH ACUTE EXACERBATION (HCC): ICD-10-CM

## 2020-04-08 DIAGNOSIS — R22.0 FACIAL SWELLING: Primary | ICD-10-CM

## 2020-04-08 DIAGNOSIS — K04.7 DENTAL INFECTION: ICD-10-CM

## 2020-04-08 PROCEDURE — 99213 OFFICE O/P EST LOW 20 MIN: CPT | Performed by: PHYSICIAN ASSISTANT

## 2020-04-08 PROCEDURE — G0463 HOSPITAL OUTPT CLINIC VISIT: HCPCS | Performed by: PHYSICIAN ASSISTANT

## 2020-04-08 RX ORDER — PREDNISONE 50 MG/1
50 TABLET ORAL DAILY
Qty: 5 TABLET | Refills: 0 | Status: SHIPPED | OUTPATIENT
Start: 2020-04-08 | End: 2020-04-13

## 2020-04-08 RX ORDER — AMOXICILLIN AND CLAVULANATE POTASSIUM 875; 125 MG/1; MG/1
1 TABLET, FILM COATED ORAL EVERY 12 HOURS SCHEDULED
Qty: 20 TABLET | Refills: 0 | Status: SHIPPED | OUTPATIENT
Start: 2020-04-08 | End: 2020-04-18

## 2020-04-10 ENCOUNTER — TELEMEDICINE (OUTPATIENT)
Dept: FAMILY MEDICINE CLINIC | Facility: HOSPITAL | Age: 85
End: 2020-04-10
Payer: MEDICARE

## 2020-04-10 ENCOUNTER — APPOINTMENT (OUTPATIENT)
Dept: LAB | Facility: HOSPITAL | Age: 85
End: 2020-04-10
Payer: MEDICARE

## 2020-04-10 VITALS
SYSTOLIC BLOOD PRESSURE: 115 MMHG | DIASTOLIC BLOOD PRESSURE: 72 MMHG | TEMPERATURE: 96.1 F | BODY MASS INDEX: 30.05 KG/M2 | WEIGHT: 176 LBS | HEART RATE: 100 BPM | HEIGHT: 64 IN

## 2020-04-10 DIAGNOSIS — R68.84 JAW PAIN: ICD-10-CM

## 2020-04-10 DIAGNOSIS — R22.0 LEFT FACIAL SWELLING: ICD-10-CM

## 2020-04-10 LAB
BASOPHILS # BLD AUTO: 0.01 THOUSANDS/ΜL (ref 0–0.1)
BASOPHILS NFR BLD AUTO: 0 % (ref 0–1)
CRP SERPL QL: 3.2 MG/L
EOSINOPHIL # BLD AUTO: 0 THOUSAND/ΜL (ref 0–0.61)
EOSINOPHIL NFR BLD AUTO: 0 % (ref 0–6)
ERYTHROCYTE [DISTWIDTH] IN BLOOD BY AUTOMATED COUNT: 13.7 % (ref 11.6–15.1)
HCT VFR BLD AUTO: 45 % (ref 36.5–49.3)
HGB BLD-MCNC: 14.2 G/DL (ref 12–17)
IMM GRANULOCYTES # BLD AUTO: 0.07 THOUSAND/UL (ref 0–0.2)
IMM GRANULOCYTES NFR BLD AUTO: 1 % (ref 0–2)
LYMPHOCYTES # BLD AUTO: 0.89 THOUSANDS/ΜL (ref 0.6–4.47)
LYMPHOCYTES NFR BLD AUTO: 8 % (ref 14–44)
MCH RBC QN AUTO: 30.2 PG (ref 26.8–34.3)
MCHC RBC AUTO-ENTMCNC: 31.6 G/DL (ref 31.4–37.4)
MCV RBC AUTO: 96 FL (ref 82–98)
MONOCYTES # BLD AUTO: 0.54 THOUSAND/ΜL (ref 0.17–1.22)
MONOCYTES NFR BLD AUTO: 5 % (ref 4–12)
NEUTROPHILS # BLD AUTO: 9.3 THOUSANDS/ΜL (ref 1.85–7.62)
NEUTS SEG NFR BLD AUTO: 86 % (ref 43–75)
NRBC BLD AUTO-RTO: 0 /100 WBCS
PLATELET # BLD AUTO: 151 THOUSANDS/UL (ref 149–390)
PMV BLD AUTO: 9.9 FL (ref 8.9–12.7)
RBC # BLD AUTO: 4.7 MILLION/UL (ref 3.88–5.62)
WBC # BLD AUTO: 10.81 THOUSAND/UL (ref 4.31–10.16)

## 2020-04-10 PROCEDURE — 99214 OFFICE O/P EST MOD 30 MIN: CPT | Performed by: INTERNAL MEDICINE

## 2020-04-10 PROCEDURE — 36415 COLL VENOUS BLD VENIPUNCTURE: CPT | Performed by: INTERNAL MEDICINE

## 2020-04-10 PROCEDURE — 85025 COMPLETE CBC W/AUTO DIFF WBC: CPT | Performed by: INTERNAL MEDICINE

## 2020-04-10 PROCEDURE — 86140 C-REACTIVE PROTEIN: CPT | Performed by: INTERNAL MEDICINE

## 2020-04-13 ENCOUNTER — TELEPHONE (OUTPATIENT)
Dept: FAMILY MEDICINE CLINIC | Facility: HOSPITAL | Age: 85
End: 2020-04-13

## 2020-04-14 ENCOUNTER — OFFICE VISIT (OUTPATIENT)
Dept: FAMILY MEDICINE CLINIC | Facility: HOSPITAL | Age: 85
End: 2020-04-14
Payer: MEDICARE

## 2020-04-14 ENCOUNTER — HOSPITAL ENCOUNTER (EMERGENCY)
Facility: HOSPITAL | Age: 85
Discharge: HOME | End: 2020-04-14
Attending: EMERGENCY MEDICINE | Admitting: EMERGENCY MEDICINE
Payer: MEDICARE

## 2020-04-14 ENCOUNTER — APPOINTMENT (EMERGENCY)
Dept: RADIOLOGY | Facility: HOSPITAL | Age: 85
End: 2020-04-14
Payer: MEDICARE

## 2020-04-14 VITALS
WEIGHT: 174.2 LBS | HEIGHT: 64 IN | DIASTOLIC BLOOD PRESSURE: 74 MMHG | TEMPERATURE: 97.4 F | SYSTOLIC BLOOD PRESSURE: 126 MMHG | BODY MASS INDEX: 29.74 KG/M2 | HEART RATE: 82 BPM

## 2020-04-14 VITALS
HEIGHT: 64 IN | OXYGEN SATURATION: 96 % | BODY MASS INDEX: 30.05 KG/M2 | HEART RATE: 67 BPM | DIASTOLIC BLOOD PRESSURE: 74 MMHG | WEIGHT: 176 LBS | TEMPERATURE: 97.8 F | RESPIRATION RATE: 18 BRPM | SYSTOLIC BLOOD PRESSURE: 167 MMHG

## 2020-04-14 DIAGNOSIS — R42 LIGHTHEADEDNESS: Primary | ICD-10-CM

## 2020-04-14 DIAGNOSIS — M26.659 TMJ ARTHROPATHY: Primary | ICD-10-CM

## 2020-04-14 DIAGNOSIS — J41.8 MIXED SIMPLE AND MUCOPURULENT CHRONIC BRONCHITIS (HCC): ICD-10-CM

## 2020-04-14 DIAGNOSIS — R05.3 CHRONIC COUGH: ICD-10-CM

## 2020-04-14 DIAGNOSIS — H35.30 MACULAR DEGENERATION OF BOTH EYES, UNSPECIFIED TYPE: ICD-10-CM

## 2020-04-14 DIAGNOSIS — R06.02 SOB (SHORTNESS OF BREATH): ICD-10-CM

## 2020-04-14 DIAGNOSIS — I48.19 PERSISTENT ATRIAL FIBRILLATION (HCC): ICD-10-CM

## 2020-04-14 LAB
ALBUMIN SERPL BCP-MCNC: 3.4 G/DL (ref 3.5–5)
ALP SERPL-CCNC: 73 U/L (ref 46–116)
ALT SERPL W P-5'-P-CCNC: 30 U/L (ref 12–78)
ANION GAP SERPL CALCULATED.3IONS-SCNC: 9 MMOL/L (ref 4–13)
AST SERPL W P-5'-P-CCNC: 17 U/L (ref 5–45)
ATRIAL RATE: 70 BPM
BASOPHILS # BLD AUTO: 0.01 THOUSANDS/ΜL (ref 0–0.1)
BASOPHILS NFR BLD AUTO: 0 % (ref 0–1)
BILIRUB SERPL-MCNC: 0.5 MG/DL (ref 0.2–1)
BUN SERPL-MCNC: 27 MG/DL (ref 5–25)
CALCIUM SERPL-MCNC: 8.2 MG/DL (ref 8.3–10.1)
CHLORIDE SERPL-SCNC: 105 MMOL/L (ref 100–108)
CO2 SERPL-SCNC: 26 MMOL/L (ref 21–32)
CREAT SERPL-MCNC: 1.58 MG/DL (ref 0.6–1.3)
CRP SERPL QL: 58.3 MG/L
D DIMER PPP FEU-MCNC: 1.63 UG/ML FEU
EOSINOPHIL # BLD AUTO: 0.17 THOUSAND/ΜL (ref 0–0.61)
EOSINOPHIL NFR BLD AUTO: 3 % (ref 0–6)
ERYTHROCYTE [DISTWIDTH] IN BLOOD BY AUTOMATED COUNT: 13.8 % (ref 11.6–15.1)
FERRITIN SERPL-MCNC: 88 NG/ML (ref 8–388)
GFR SERPL CREATININE-BSD FRML MDRD: 39 ML/MIN/1.73SQ M
GLUCOSE SERPL-MCNC: 90 MG/DL (ref 65–140)
HCT VFR BLD AUTO: 42 % (ref 36.5–49.3)
HGB BLD-MCNC: 13.3 G/DL (ref 12–17)
IMM GRANULOCYTES # BLD AUTO: 0.05 THOUSAND/UL (ref 0–0.2)
IMM GRANULOCYTES NFR BLD AUTO: 1 % (ref 0–2)
LYMPHOCYTES # BLD AUTO: 1.48 THOUSANDS/ΜL (ref 0.6–4.47)
LYMPHOCYTES NFR BLD AUTO: 23 % (ref 14–44)
MCH RBC QN AUTO: 30 PG (ref 26.8–34.3)
MCHC RBC AUTO-ENTMCNC: 31.7 G/DL (ref 31.4–37.4)
MCV RBC AUTO: 95 FL (ref 82–98)
MONOCYTES # BLD AUTO: 0.87 THOUSAND/ΜL (ref 0.17–1.22)
MONOCYTES NFR BLD AUTO: 14 % (ref 4–12)
NEUTROPHILS # BLD AUTO: 3.8 THOUSANDS/ΜL (ref 1.85–7.62)
NEUTS SEG NFR BLD AUTO: 59 % (ref 43–75)
NRBC BLD AUTO-RTO: 0 /100 WBCS
P AXIS: 66 DEGREES
PLATELET # BLD AUTO: 110 THOUSANDS/UL (ref 149–390)
PMV BLD AUTO: 9.1 FL (ref 8.9–12.7)
POTASSIUM SERPL-SCNC: 3.9 MMOL/L (ref 3.5–5.3)
PR INTERVAL: 152 MS
PROCALCITONIN SERPL-MCNC: <0.05 NG/ML
PROT SERPL-MCNC: 6.4 G/DL (ref 6.4–8.2)
QRS AXIS: 44 DEGREES
QRSD INTERVAL: 94 MS
QT INTERVAL: 402 MS
QTC INTERVAL: 434 MS
RBC # BLD AUTO: 4.43 MILLION/UL (ref 3.88–5.62)
SODIUM SERPL-SCNC: 140 MMOL/L (ref 136–145)
T WAVE AXIS: 50 DEGREES
TROPONIN I SERPL-MCNC: <0.02 NG/ML
VENTRICULAR RATE: 70 BPM
WBC # BLD AUTO: 6.38 THOUSAND/UL (ref 4.31–10.16)

## 2020-04-14 PROCEDURE — 84145 PROCALCITONIN (PCT): CPT | Performed by: EMERGENCY MEDICINE

## 2020-04-14 PROCEDURE — 3078F DIAST BP <80 MM HG: CPT | Performed by: FAMILY MEDICINE

## 2020-04-14 PROCEDURE — 85379 FIBRIN DEGRADATION QUANT: CPT | Performed by: EMERGENCY MEDICINE

## 2020-04-14 PROCEDURE — 36415 COLL VENOUS BLD VENIPUNCTURE: CPT | Performed by: EMERGENCY MEDICINE

## 2020-04-14 PROCEDURE — 93005 ELECTROCARDIOGRAM TRACING: CPT

## 2020-04-14 PROCEDURE — 99214 OFFICE O/P EST MOD 30 MIN: CPT | Performed by: FAMILY MEDICINE

## 2020-04-14 PROCEDURE — 84484 ASSAY OF TROPONIN QUANT: CPT | Performed by: EMERGENCY MEDICINE

## 2020-04-14 PROCEDURE — 71045 X-RAY EXAM CHEST 1 VIEW: CPT

## 2020-04-14 PROCEDURE — 93010 ELECTROCARDIOGRAM REPORT: CPT | Performed by: INTERNAL MEDICINE

## 2020-04-14 PROCEDURE — 85025 COMPLETE CBC W/AUTO DIFF WBC: CPT | Performed by: EMERGENCY MEDICINE

## 2020-04-14 PROCEDURE — 3008F BODY MASS INDEX DOCD: CPT | Performed by: FAMILY MEDICINE

## 2020-04-14 PROCEDURE — 99285 EMERGENCY DEPT VISIT HI MDM: CPT

## 2020-04-14 PROCEDURE — 83625 ASSAY OF LDH ENZYMES: CPT | Performed by: EMERGENCY MEDICINE

## 2020-04-14 PROCEDURE — 80053 COMPREHEN METABOLIC PANEL: CPT | Performed by: EMERGENCY MEDICINE

## 2020-04-14 PROCEDURE — 99285 EMERGENCY DEPT VISIT HI MDM: CPT | Performed by: EMERGENCY MEDICINE

## 2020-04-14 PROCEDURE — 1036F TOBACCO NON-USER: CPT | Performed by: FAMILY MEDICINE

## 2020-04-14 PROCEDURE — 3074F SYST BP LT 130 MM HG: CPT | Performed by: FAMILY MEDICINE

## 2020-04-14 PROCEDURE — 83615 LACTATE (LD) (LDH) ENZYME: CPT | Performed by: EMERGENCY MEDICINE

## 2020-04-14 PROCEDURE — 1160F RVW MEDS BY RX/DR IN RCRD: CPT | Performed by: FAMILY MEDICINE

## 2020-04-14 PROCEDURE — 87635 SARS-COV-2 COVID-19 AMP PRB: CPT | Performed by: EMERGENCY MEDICINE

## 2020-04-14 PROCEDURE — 1111F DSCHRG MED/CURRENT MED MERGE: CPT | Performed by: FAMILY MEDICINE

## 2020-04-14 PROCEDURE — 82728 ASSAY OF FERRITIN: CPT | Performed by: EMERGENCY MEDICINE

## 2020-04-14 PROCEDURE — 86140 C-REACTIVE PROTEIN: CPT | Performed by: EMERGENCY MEDICINE

## 2020-04-14 PROCEDURE — 4040F PNEUMOC VAC/ADMIN/RCVD: CPT | Performed by: FAMILY MEDICINE

## 2020-04-14 RX ORDER — BENZONATATE 200 MG/1
200 CAPSULE ORAL 3 TIMES DAILY PRN
Qty: 50 CAPSULE | Refills: 1 | Status: SHIPPED | OUTPATIENT
Start: 2020-04-14 | End: 2020-08-31 | Stop reason: ALTCHOICE

## 2020-04-15 ENCOUNTER — TELEPHONE (OUTPATIENT)
Dept: FAMILY MEDICINE CLINIC | Facility: HOSPITAL | Age: 85
End: 2020-04-15

## 2020-04-15 LAB
LDH SERPL-CCNC: 149 IU/L (ref 121–224)
LDH1 CFR SERPL ELPH: 22 % (ref 17–32)
LDH2 CFR SERPL ELPH: 33 % (ref 25–40)
LDH3 CFR SERPL ELPH: 20 % (ref 17–27)
LDH4 CFR SERPL ELPH: 11 % (ref 5–13)
LDH5 CFR SERPL ELPH: 14 % (ref 4–20)
SARS-COV-2 RNA SPEC QL NAA+PROBE: NOT DETECTED

## 2020-04-16 ENCOUNTER — PATIENT OUTREACH (OUTPATIENT)
Dept: FAMILY MEDICINE CLINIC | Facility: HOSPITAL | Age: 85
End: 2020-04-16

## 2020-04-22 ENCOUNTER — TELEPHONE (OUTPATIENT)
Dept: FAMILY MEDICINE CLINIC | Facility: HOSPITAL | Age: 85
End: 2020-04-22

## 2020-04-22 ENCOUNTER — PATIENT OUTREACH (OUTPATIENT)
Dept: FAMILY MEDICINE CLINIC | Facility: HOSPITAL | Age: 85
End: 2020-04-22

## 2020-04-22 ENCOUNTER — EVALUATION (OUTPATIENT)
Dept: PHYSICAL THERAPY | Facility: CLINIC | Age: 85
End: 2020-04-22
Payer: MEDICARE

## 2020-04-22 DIAGNOSIS — H81.10 BENIGN PAROXYSMAL VERTIGO, UNSPECIFIED LATERALITY: Primary | ICD-10-CM

## 2020-04-22 PROCEDURE — 97162 PT EVAL MOD COMPLEX 30 MIN: CPT | Performed by: PHYSICAL THERAPIST

## 2020-04-22 PROCEDURE — 97140 MANUAL THERAPY 1/> REGIONS: CPT | Performed by: PHYSICAL THERAPIST

## 2020-04-22 RX ORDER — MECLIZINE HCL 12.5 MG/1
12.5 TABLET ORAL EVERY 8 HOURS PRN
Qty: 30 TABLET | Refills: 0 | Status: SHIPPED | OUTPATIENT
Start: 2020-04-22 | End: 2020-08-31 | Stop reason: ALTCHOICE

## 2020-04-24 ENCOUNTER — OFFICE VISIT (OUTPATIENT)
Dept: PHYSICAL THERAPY | Facility: CLINIC | Age: 85
End: 2020-04-24
Payer: MEDICARE

## 2020-04-24 DIAGNOSIS — H81.10 BENIGN PAROXYSMAL VERTIGO, UNSPECIFIED LATERALITY: Primary | ICD-10-CM

## 2020-04-24 PROCEDURE — 97112 NEUROMUSCULAR REEDUCATION: CPT

## 2020-04-24 PROCEDURE — 97140 MANUAL THERAPY 1/> REGIONS: CPT

## 2020-04-27 ENCOUNTER — OFFICE VISIT (OUTPATIENT)
Dept: PHYSICAL THERAPY | Facility: CLINIC | Age: 85
End: 2020-04-27
Payer: MEDICARE

## 2020-04-27 DIAGNOSIS — H81.10 BENIGN PAROXYSMAL VERTIGO, UNSPECIFIED LATERALITY: Primary | ICD-10-CM

## 2020-04-27 PROCEDURE — 97140 MANUAL THERAPY 1/> REGIONS: CPT | Performed by: PHYSICAL THERAPIST

## 2020-04-29 ENCOUNTER — OFFICE VISIT (OUTPATIENT)
Dept: PHYSICAL THERAPY | Facility: CLINIC | Age: 85
End: 2020-04-29
Payer: MEDICARE

## 2020-04-29 DIAGNOSIS — H81.10 BENIGN PAROXYSMAL VERTIGO, UNSPECIFIED LATERALITY: Primary | ICD-10-CM

## 2020-04-29 PROCEDURE — 97140 MANUAL THERAPY 1/> REGIONS: CPT | Performed by: PHYSICAL THERAPIST

## 2020-05-04 ENCOUNTER — PATIENT OUTREACH (OUTPATIENT)
Dept: FAMILY MEDICINE CLINIC | Facility: HOSPITAL | Age: 85
End: 2020-05-04

## 2020-05-04 ENCOUNTER — OFFICE VISIT (OUTPATIENT)
Dept: PHYSICAL THERAPY | Facility: CLINIC | Age: 85
End: 2020-05-04
Payer: MEDICARE

## 2020-05-04 DIAGNOSIS — H81.10 BENIGN PAROXYSMAL VERTIGO, UNSPECIFIED LATERALITY: Primary | ICD-10-CM

## 2020-05-04 DIAGNOSIS — M26.609 TEMPORAL MANDIBULAR JOINT DISORDER: ICD-10-CM

## 2020-05-04 PROCEDURE — 97140 MANUAL THERAPY 1/> REGIONS: CPT | Performed by: PHYSICAL THERAPIST

## 2020-05-05 DIAGNOSIS — J41.8 MIXED SIMPLE AND MUCOPURULENT CHRONIC BRONCHITIS (HCC): ICD-10-CM

## 2020-05-05 RX ORDER — LEVALBUTEROL INHALATION SOLUTION 1.25 MG/3ML
SOLUTION RESPIRATORY (INHALATION)
Qty: 225 ML | Refills: 5 | Status: SHIPPED | OUTPATIENT
Start: 2020-05-05 | End: 2020-07-19 | Stop reason: SDUPTHER

## 2020-05-06 ENCOUNTER — OFFICE VISIT (OUTPATIENT)
Dept: PHYSICAL THERAPY | Facility: CLINIC | Age: 85
End: 2020-05-06
Payer: MEDICARE

## 2020-05-06 DIAGNOSIS — H81.10 BENIGN PAROXYSMAL VERTIGO, UNSPECIFIED LATERALITY: Primary | ICD-10-CM

## 2020-05-06 DIAGNOSIS — M26.609 TEMPORAL MANDIBULAR JOINT DISORDER: ICD-10-CM

## 2020-05-06 PROCEDURE — 97140 MANUAL THERAPY 1/> REGIONS: CPT | Performed by: PHYSICAL THERAPIST

## 2020-05-11 ENCOUNTER — OFFICE VISIT (OUTPATIENT)
Dept: PHYSICAL THERAPY | Facility: CLINIC | Age: 85
End: 2020-05-11
Payer: MEDICARE

## 2020-05-11 DIAGNOSIS — M26.609 TEMPORAL MANDIBULAR JOINT DISORDER: Primary | ICD-10-CM

## 2020-05-11 PROCEDURE — 97140 MANUAL THERAPY 1/> REGIONS: CPT | Performed by: PHYSICAL THERAPIST

## 2020-05-13 ENCOUNTER — OFFICE VISIT (OUTPATIENT)
Dept: PHYSICAL THERAPY | Facility: CLINIC | Age: 85
End: 2020-05-13
Payer: MEDICARE

## 2020-05-13 DIAGNOSIS — M26.609 TEMPORAL MANDIBULAR JOINT DISORDER: Primary | ICD-10-CM

## 2020-05-13 PROCEDURE — 97140 MANUAL THERAPY 1/> REGIONS: CPT | Performed by: PHYSICAL THERAPIST

## 2020-05-20 ENCOUNTER — APPOINTMENT (OUTPATIENT)
Dept: PHYSICAL THERAPY | Facility: CLINIC | Age: 85
End: 2020-05-20
Payer: MEDICARE

## 2020-05-27 ENCOUNTER — APPOINTMENT (OUTPATIENT)
Dept: PHYSICAL THERAPY | Facility: CLINIC | Age: 85
End: 2020-05-27
Payer: MEDICARE

## 2020-05-29 ENCOUNTER — OFFICE VISIT (OUTPATIENT)
Dept: FAMILY MEDICINE CLINIC | Facility: HOSPITAL | Age: 85
End: 2020-05-29
Payer: MEDICARE

## 2020-05-29 ENCOUNTER — APPOINTMENT (OUTPATIENT)
Dept: PHYSICAL THERAPY | Facility: CLINIC | Age: 85
End: 2020-05-29
Payer: MEDICARE

## 2020-05-29 VITALS
DIASTOLIC BLOOD PRESSURE: 68 MMHG | HEART RATE: 72 BPM | TEMPERATURE: 98.8 F | SYSTOLIC BLOOD PRESSURE: 118 MMHG | BODY MASS INDEX: 28.34 KG/M2 | HEIGHT: 64 IN | WEIGHT: 166 LBS

## 2020-05-29 DIAGNOSIS — I25.10 CORONARY ARTERY DISEASE INVOLVING NATIVE CORONARY ARTERY OF NATIVE HEART WITHOUT ANGINA PECTORIS: ICD-10-CM

## 2020-05-29 DIAGNOSIS — I10 ESSENTIAL HYPERTENSION: Primary | ICD-10-CM

## 2020-05-29 DIAGNOSIS — R42 DIZZINESS: ICD-10-CM

## 2020-05-29 DIAGNOSIS — R05.3 CHRONIC COUGH: ICD-10-CM

## 2020-05-29 DIAGNOSIS — E03.9 ACQUIRED HYPOTHYROIDISM: ICD-10-CM

## 2020-05-29 DIAGNOSIS — J41.8 MIXED SIMPLE AND MUCOPURULENT CHRONIC BRONCHITIS (HCC): ICD-10-CM

## 2020-05-29 DIAGNOSIS — F06.4 ANXIETY DISORDER DUE TO GENERAL MEDICAL CONDITION: ICD-10-CM

## 2020-05-29 PROCEDURE — 3008F BODY MASS INDEX DOCD: CPT | Performed by: FAMILY MEDICINE

## 2020-05-29 PROCEDURE — 1036F TOBACCO NON-USER: CPT | Performed by: FAMILY MEDICINE

## 2020-05-29 PROCEDURE — 3078F DIAST BP <80 MM HG: CPT | Performed by: FAMILY MEDICINE

## 2020-05-29 PROCEDURE — 3074F SYST BP LT 130 MM HG: CPT | Performed by: FAMILY MEDICINE

## 2020-05-29 PROCEDURE — 99214 OFFICE O/P EST MOD 30 MIN: CPT | Performed by: FAMILY MEDICINE

## 2020-05-29 PROCEDURE — 4040F PNEUMOC VAC/ADMIN/RCVD: CPT | Performed by: FAMILY MEDICINE

## 2020-05-29 PROCEDURE — 1160F RVW MEDS BY RX/DR IN RCRD: CPT | Performed by: FAMILY MEDICINE

## 2020-06-06 DIAGNOSIS — F41.9 ANXIETY: ICD-10-CM

## 2020-06-06 DIAGNOSIS — F51.01 PRIMARY INSOMNIA: ICD-10-CM

## 2020-06-09 DIAGNOSIS — F41.9 ANXIETY: ICD-10-CM

## 2020-06-09 RX ORDER — LORAZEPAM 0.5 MG/1
TABLET ORAL
Qty: 90 TABLET | Refills: 3 | Status: SHIPPED | OUTPATIENT
Start: 2020-06-09 | End: 2020-06-09 | Stop reason: SDUPTHER

## 2020-06-09 RX ORDER — ZOLPIDEM TARTRATE 5 MG/1
TABLET ORAL
Qty: 30 TABLET | Refills: 3 | Status: SHIPPED | OUTPATIENT
Start: 2020-06-09 | End: 2020-11-20

## 2020-06-09 RX ORDER — LORAZEPAM 0.5 MG/1
TABLET ORAL
Qty: 90 TABLET | Refills: 0 | Status: SHIPPED | OUTPATIENT
Start: 2020-06-09 | End: 2020-07-07

## 2020-06-27 DIAGNOSIS — K21.00 GASTROESOPHAGEAL REFLUX DISEASE WITH ESOPHAGITIS: ICD-10-CM

## 2020-06-27 RX ORDER — OMEPRAZOLE 20 MG/1
CAPSULE, DELAYED RELEASE ORAL
Qty: 30 CAPSULE | Refills: 5 | Status: SHIPPED | OUTPATIENT
Start: 2020-06-27 | End: 2020-12-20

## 2020-07-02 DIAGNOSIS — I10 ESSENTIAL HYPERTENSION: ICD-10-CM

## 2020-07-02 DIAGNOSIS — J44.1 COPD WITH ACUTE EXACERBATION (HCC): ICD-10-CM

## 2020-07-02 RX ORDER — ATENOLOL 25 MG/1
TABLET ORAL
Qty: 90 TABLET | Refills: 1 | Status: SHIPPED | OUTPATIENT
Start: 2020-07-02 | End: 2021-01-18

## 2020-07-07 DIAGNOSIS — F41.9 ANXIETY: ICD-10-CM

## 2020-07-07 RX ORDER — LORAZEPAM 0.5 MG/1
TABLET ORAL
Qty: 90 TABLET | Refills: 3 | Status: SHIPPED | OUTPATIENT
Start: 2020-07-07 | End: 2020-10-30 | Stop reason: SDUPTHER

## 2020-07-09 DIAGNOSIS — R10.819 SUPRAPUBIC TENDERNESS: ICD-10-CM

## 2020-07-09 RX ORDER — TAMSULOSIN HYDROCHLORIDE 0.4 MG/1
CAPSULE ORAL
Qty: 30 CAPSULE | Refills: 5 | Status: SHIPPED | OUTPATIENT
Start: 2020-07-09 | End: 2021-01-28

## 2020-07-19 DIAGNOSIS — J41.8 MIXED SIMPLE AND MUCOPURULENT CHRONIC BRONCHITIS (HCC): ICD-10-CM

## 2020-07-19 DIAGNOSIS — J44.1 COPD WITH ACUTE EXACERBATION (HCC): ICD-10-CM

## 2020-07-20 RX ORDER — LEVALBUTEROL INHALATION SOLUTION 1.25 MG/3ML
1.25 SOLUTION RESPIRATORY (INHALATION) 3 TIMES DAILY
Qty: 225 ML | Refills: 0 | Status: SHIPPED | OUTPATIENT
Start: 2020-07-20 | End: 2020-10-30 | Stop reason: SDUPTHER

## 2020-07-28 ENCOUNTER — TELEPHONE (OUTPATIENT)
Dept: PULMONOLOGY | Facility: CLINIC | Age: 85
End: 2020-07-28

## 2020-07-28 NOTE — TELEPHONE ENCOUNTER
Daughter in-law Geraldsung Escalantevannessa calling stated patient isn't going to use BIPAP machine any longer  Patient feels it hurts him   Please advise

## 2020-07-29 ENCOUNTER — PATIENT OUTREACH (OUTPATIENT)
Dept: FAMILY MEDICINE CLINIC | Facility: HOSPITAL | Age: 85
End: 2020-07-29

## 2020-07-29 NOTE — PROGRESS NOTES
Outpatient Care Management Note:    Voice mail message left for Cristobal's daughter, Kaye Mandel, with my contact information requesting a call back with an update or if they needs anything

## 2020-07-29 NOTE — TELEPHONE ENCOUNTER
Called daughter-in-law left voice message to call office back to verify what it means that her father in law reports that his BiPAP hurts him    -  I advised that if his mask is hurting his face we can adjust that along with any pressures ect

## 2020-08-31 ENCOUNTER — OFFICE VISIT (OUTPATIENT)
Dept: FAMILY MEDICINE CLINIC | Facility: HOSPITAL | Age: 85
End: 2020-08-31
Payer: MEDICARE

## 2020-08-31 VITALS
HEIGHT: 64 IN | TEMPERATURE: 98.3 F | BODY MASS INDEX: 28.51 KG/M2 | SYSTOLIC BLOOD PRESSURE: 130 MMHG | HEART RATE: 63 BPM | DIASTOLIC BLOOD PRESSURE: 72 MMHG | WEIGHT: 167 LBS

## 2020-08-31 DIAGNOSIS — Z23 ENCOUNTER FOR IMMUNIZATION: ICD-10-CM

## 2020-08-31 DIAGNOSIS — I10 ESSENTIAL HYPERTENSION: ICD-10-CM

## 2020-08-31 DIAGNOSIS — J41.8 MIXED SIMPLE AND MUCOPURULENT CHRONIC BRONCHITIS (HCC): ICD-10-CM

## 2020-08-31 DIAGNOSIS — E03.9 ACQUIRED HYPOTHYROIDISM: ICD-10-CM

## 2020-08-31 DIAGNOSIS — N18.30 STAGE 3 CHRONIC KIDNEY DISEASE (HCC): ICD-10-CM

## 2020-08-31 DIAGNOSIS — R05.3 CHRONIC COUGH: ICD-10-CM

## 2020-08-31 DIAGNOSIS — Z00.00 MEDICARE ANNUAL WELLNESS VISIT, SUBSEQUENT: Primary | ICD-10-CM

## 2020-08-31 PROBLEM — R65.20 SEVERE SEPSIS (HCC): Status: RESOLVED | Noted: 2019-03-28 | Resolved: 2020-08-31

## 2020-08-31 PROBLEM — A41.9 SEVERE SEPSIS (HCC): Status: RESOLVED | Noted: 2019-03-28 | Resolved: 2020-08-31

## 2020-08-31 PROBLEM — H35.719 CENTRAL SEROUS CHORIORETINOPATHY: Status: ACTIVE | Noted: 2020-08-31

## 2020-08-31 PROBLEM — K27.4: Status: ACTIVE | Noted: 2020-08-31

## 2020-08-31 PROBLEM — K92.2 GASTROINTESTINAL HEMORRHAGE: Status: ACTIVE | Noted: 2020-08-31

## 2020-08-31 PROBLEM — H90.3 BILATERAL SENSORINEURAL HEARING LOSS: Status: ACTIVE | Noted: 2020-08-31

## 2020-08-31 PROBLEM — R91.8 NONSPECIFIC ABNORMAL FINDINGS ON RADIOLOGICAL AND EXAMINATION OF LUNG FIELD: Status: ACTIVE | Noted: 2020-08-31

## 2020-08-31 PROBLEM — H25.10 SENILE NUCLEAR CATARACT: Status: ACTIVE | Noted: 2020-08-31

## 2020-08-31 PROCEDURE — G0439 PPPS, SUBSEQ VISIT: HCPCS | Performed by: FAMILY MEDICINE

## 2020-08-31 PROCEDURE — 99214 OFFICE O/P EST MOD 30 MIN: CPT | Performed by: FAMILY MEDICINE

## 2020-08-31 PROCEDURE — 90662 IIV NO PRSV INCREASED AG IM: CPT | Performed by: FAMILY MEDICINE

## 2020-08-31 PROCEDURE — G0008 ADMIN INFLUENZA VIRUS VAC: HCPCS | Performed by: FAMILY MEDICINE

## 2020-08-31 PROCEDURE — 3078F DIAST BP <80 MM HG: CPT | Performed by: FAMILY MEDICINE

## 2020-08-31 PROCEDURE — 1036F TOBACCO NON-USER: CPT | Performed by: FAMILY MEDICINE

## 2020-08-31 PROCEDURE — 4040F PNEUMOC VAC/ADMIN/RCVD: CPT | Performed by: FAMILY MEDICINE

## 2020-08-31 PROCEDURE — 1170F FXNL STATUS ASSESSED: CPT | Performed by: FAMILY MEDICINE

## 2020-08-31 PROCEDURE — 1125F AMNT PAIN NOTED PAIN PRSNT: CPT | Performed by: FAMILY MEDICINE

## 2020-08-31 PROCEDURE — 1160F RVW MEDS BY RX/DR IN RCRD: CPT | Performed by: FAMILY MEDICINE

## 2020-08-31 PROCEDURE — 3075F SYST BP GE 130 - 139MM HG: CPT | Performed by: FAMILY MEDICINE

## 2020-08-31 PROCEDURE — 3008F BODY MASS INDEX DOCD: CPT | Performed by: FAMILY MEDICINE

## 2020-08-31 NOTE — PATIENT INSTRUCTIONS

## 2020-08-31 NOTE — PROGRESS NOTES
Assessment and Plan:     Problem List Items Addressed This Visit        Endocrine    Acquired hypothyroidism       Respiratory    Mixed simple and mucopurulent chronic bronchitis ([facility])       Cardiovascular and Mediastinum    Essential hypertension       Genitourinary    Stage 3 chronic kidney disease ([facility])       Other    Medicare annual wellness visit, subsequent - Primary    BMI 28 0-28 9,adult    Chronic cough           Preventive health issues were discussed with patient, and age appropriate screening tests were ordered as noted in patient's After Visit Summary  Personalized health advice and appropriate referrals for health education or preventive services given if needed, as noted in patient's After Visit Summary       History of Present Illness:     Patient presents for Medicare Annual Wellness visit    Patient Care Team:  Garcia Long MD as PCP - MD Kev Gayle DPM Malone Cluster, MD Madilyn Rogers, MD Lannis Lawman, BRADEN as Lead OP Care Mgr (Care Coordination)     Problem List:     Patient Active Problem List   Diagnosis    Hydronephrosis of right kidney    CAD (coronary artery disease)    Carotid stenosis    Essential hypertension    Anxiety disorder due to general medical condition    Benign prostatic hyperplasia with lower urinary tract symptoms    GERD (gastroesophageal reflux disease)    Mixed simple and mucopurulent chronic bronchitis (HCC)    Headache    Mixed hyperlipidemia    Acquired hypothyroidism    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[facility])    Insomnia    Iron deficiency anemia    Macular degeneration    Osteoporosis    Other atopic dermatitis    Shortness of breath    Medicare annual wellness visit, subsequent    Eczema    Seborrheic keratoses, inflamed    COPD with acute exacerbation ([facility])    Bronchitis    Suprapubic tenderness    Loose stools    Thrombocytopenia ([facility])    BMI 28 0-28 9,adult    Chronic diastolic heart failure ([facility])    Atrial fibrillation ([facility] )    Community acquired pneumonia    Urinary obstruction    Cervical spine arthritis    Stage 3 chronic kidney disease (HCC)    Aneurysm, pulmonary, arteriovenous    LIVIER (obstructive sleep apnea)    Snoring    Excessive daytime sleepiness    Chest pain    Dizziness    Impacted cerumen of left ear    TMJ arthropathy    Chronic cough    Central serous chorioretinopathy    Bilateral sensorineural hearing loss    Gastrointestinal hemorrhage    Senile nuclear cataract    Peptic ulcer with hemorrhage but without obstruction    Nonspecific abnormal findings on radiological and examination of lung field      Past Medical and Surgical History:     Past Medical History:   Diagnosis Date    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    Compression fracture of thoracic vertebra ([facility])     last assessed 05/07/2012    COPD (chronic obstructive pulmonary disease) ([facility])     Disease of thyroid gland     Heart attack ([facility] )     History of methicillin resistant staphylococcus aureus (MRSA) 03/28/2019    negative nasal swab     Hollenhorst plaque, right eye     last assessed 04/08/2013    Hyperlipidemia     Hypertension     Iron deficiency anemia due to chronic blood loss     last assessed 09/10/2012    Ischemic colitis ([facility] )     last assessed 05/27/2014    Osteoarthritis of both hands     last assessedn 04/30/2013    Peptic ulcer     last assessed 06/14/2013    Polymyalgia rheumatica ([facility] )     last assessesd 10/09/2012    Renal disorder     Upper GI hemorrhage     last assessed 01/29/2015    Varicose veins of lower extremity     last assessed 04/26/2013     Past Surgical History:   Procedure Laterality Date    CORONARY ARTERY BYPASS GRAFT      HEMORRHOID SURGERY      HERNIA REPAIR      RENAL CYST EXCISION      resolved 05/2010    THROMBOENDARTERECTOMY Right     carotid, last assessed 06/18/2013      Family History:     Family History   Problem Relation Age of Onset    Hypertension Mother     Alcohol abuse Mother     Hypertension Father     Alcohol abuse Father     Alcohol abuse Son     Heart disease Family     Stroke Family         syndrome      Social History:        Social History     Socioeconomic History    Marital status:      Spouse name: None    Number of children: None    Years of education: None    Highest education level: None   Occupational History    None   Social Needs    Financial resource strain: Not hard at all   [place] insecurity     Worry: None     Inability: None    Transportation needs     Medical: No     Non-medical: No   Tobacco Use    Smoking status: Former Smoker     Packs/day: 2 00     Years: 50 00     Pack years: 100 00     Types: Cigarettes     Start date:      Last attempt to quit:      Years since quittin 6    Smokeless tobacco: Never Used    Tobacco comment: Quit 40 yrs   ago   Substance and Sexual Activity    Alcohol use: Not Currently     Alcohol/week: 1 0 standard drinks     Types: 1 Glasses of wine per week     Comment: social    Drug use: No    Sexual activity: None   Lifestyle    Physical activity     Days per week: None     Minutes per session: None    Stress: None   Relationships    Social connections     Talks on phone: None     Gets together: None     Attends Congregation service: None     Active member of club or organization: None     Attends meetings of clubs or organizations: None     Relationship status: None    Intimate partner violence     Fear of current or ex partner: None     Emotionally abused: None     Physically abused: None     Forced sexual activity: None   Other Topics Concern    None   Social History Narrative     per [employer]    Always uses seat belt      Medications and Allergies:     Current Outpatient Medications   Medication Sig Dispense Refill    albuterol (PROVENTIL HFA,VENTOLIN HFA) 90 mcg/act inhaler Inhale 2 puffs every 6 (six) hours as needed for wheezing 1 Inhaler 3    atenolol (TENORMIN) 25 mg tablet TAKE ONE TABLET BY MOUTH EVERY DAY 90 tablet 1    B Complex Vitamins (B COMPLEX PO) Take by mouth daily      Cholecalciferol ([employer] VITAMIN D) 2000 units CAPS Take by mouth daily       Cyanocobalamin (B-12 PO) Take by mouth daily      Ferrous Sulfate (IRON) 325 (65 Fe) MG TABS Take by mouth every other day       fluticasone (FLONASE) 50 mcg/act nasal spray 1 spray into each nostril 2 (two) times a day      guaiFENesin (MUCINEX) 600 mg 12 hr tablet Take 1,200 mg by mouth every 12 (twelve) hours      ipratropium (ATROVENT) 0 02 % nebulizer solution Take 1 vial (0 5 mg total) by nebulization 3 (three) times a day 225 mL 0    KRILL OIL PO Take by mouth daily      levalbuterol (XOPENEX) 1 25 mg/3 mL nebulizer solution Take 1 vial (1 25 mg total) by nebulization 3 (three) times a day 225 mL 0    levothyroxine 75 mcg tablet TAKE ONE TABLET BY MOUTH EVERY DAY 30 tablet 5    LORazepam (ATIVAN) 0 5 mg tablet TAKE ONE TABLET BY MOUTH EVERY 8 HOURS AS NEEDED FOR ANXIETY 90 tablet 3    Multiple Vitamins-Minerals (VISION FORMULA/LUTEIN PO) Take by mouth      nystatin-triamcinolone (MYCOLOG-II) cream APPLY SPARINGLY TO THE AFFECTED AREA(S) TWO TIMES A DAY 60 g 2    omeprazole (PriLOSEC) 20 mg delayed release capsule TAKE ONE CAPSULE BY MOUTH EVERY DAY 30 capsule 5    Respiratory Therapy Supplies (SPIROMETER) KIT by Does not apply route 4 (four) times a day 1 kit 0    sodium chloride 0 9 % nebulizer solution TAKE 3 MILLILITERS BY NEBULIZATION ROUTE AS NEEDED FOR WHEEZING 90 mL 1    tamsulosin (FLOMAX) 0 4 mg TAKE ONE CAPSULE BY MOUTH EVERY DAY WITH DINNER 30 capsule 5    zolpidem (AMBIEN) 5 mg tablet TAKE ONE TABLET BY MOUTH AT BEDTIME AS NEEDED FOR SLEEP 30 tablet 3    arformoterol (BROVANA) 15 mcg/2 mL nebulizer solution Take 15 mcg by nebulization 2 (two) times a day      budesonide (PULMICORT) 0 5 mg/2 mL nebulizer solution Take 0 5 mg by nebulization 2 (two) times a day Rinse mouth after use  No current facility-administered medications for this visit  Allergies   Allergen Reactions    Pravastatin Anaphylaxis, Photosensitivity and Headache     Reaction Date: [id number]; Other reaction(s): Headache    Acetaminophen-Codeine GI Intolerance    Atorvastatin      Other reaction(s): Leg Cramps  Other reaction(s): Leg Cramps    Codeine Nausea Only    Colestipol GI Intolerance     Reaction Date: [id number];     Contrast  [Iodinated Diagnostic Agents]     Fenofibrate GI Intolerance     Reaction Date: [id number];     Hydrocodone Vomiting    Niacin      Reaction Date: [id number];     Niaspan  [Niacin Er]     Pitavastatin Myalgia    Pneumococcal Polysaccharide Vaccine     Pravastatin Sodium     Simvastatin     Statins Myalgia    Vicoprofen  [Hydrocodone-Ibuprofen] GI Intolerance    Cyclophosphamide Rash    Ioversol Hives      Immunizations:     Immunization History   Administered Date(s) Administered    INFLUENZA 10/01/2007, 09/24/2008, 09/24/2009, 10/30/2010, 10/11/2011, 10/09/2012    Influenza Split High Dose Preservative Free IM 09/10/2012, 10/02/2013, 11/15/2014, 10/21/2015, 09/17/2016, 09/15/2017    Influenza, high dose seasonal 0 7 mL 10/23/2018, 09/18/2019    Pneumococcal 01/01/2006    Pneumococcal Conjugate 13-Valent 10/23/2018    Pneumococcal Polysaccharide PPV23 04/01/1999, 01/05/2005    Tdap 08/04/2014    Zoster 01/01/2011    Zoster Vaccine Recombinant 11/10/2018      Health Maintenance: There are no preventive care reminders to display for this patient  Topic Date Due    Influenza Vaccine  07/01/2020      Medicare Health Risk Assessment:     /72   Pulse 63   Temp 98 3 °F (36 8 °C)   Ht 5' 4" (1 626 m)   Wt 75 8 kg (167 lb)   BMI 28 67 kg/m²      Peewee Ceron is here for his Subsequent Wellness visit  Health Risk Assessment:   Patient rates overall health as very good   Patient feels that their physical health rating is slightly better  Eyesight was rated as slightly worse  Hearing was rated as slightly worse  Patient feels that their emotional and mental health rating is same  Pain experienced in the last 7 days has been none  Patient states that he has experienced no weight loss or gain in last 6 months  Joint pain    Depression Screening:   PHQ-2 Score: 0      Fall Risk Screening: In the past year, patient has experienced: no history of falling in past year      Home Safety:  Patient does not have trouble with stairs inside or outside of their home  Patient has working smoke alarms and has working carbon monoxide detector  Home safety hazards include: none  Nutrition:   Current diet is Regular  Medications:   Patient is not currently taking any over-the-counter supplements  Patient is not able to manage medications  Activities of Daily Living (ADLs)/Instrumental Activities of Daily Living (IADLs):   Walk and transfer into and out of bed and chair?: Yes  Dress and groom yourself?: Yes    Bathe or shower yourself?: Yes    Feed yourself? Yes  Do your laundry/housekeeping?: No  Manage your money, pay your bills and track your expenses?: No  Make your own meals?: No    Do your own shopping?: No    Previous Hospitalizations:   Any hospitalizations or ED visits within the last 12 months?: Yes    How many hospitalizations have you had in the last year?: 1-2    Advance Care Planning:   Living will: Yes    Durable POA for healthcare:  Yes    Advanced directive: Yes    Five wishes given: No      PREVENTIVE SCREENINGS      Cardiovascular Screening:    General: Screening Not Indicated and History Lipid Disorder      Diabetes Screening:     General: Screening Current      Colorectal Cancer Screening:     General: Screening Not Indicated      Prostate Cancer Screening:    General: Screening Not Indicated      Osteoporosis Screening:    General: Screening Not Indicated and History Osteoporosis      Abdominal Aortic Aneurysm (AAA) Screening:    Risk factors include: tobacco use        Lung Cancer Screening:     General: Screening Not Indicated      Willie Dupont MD

## 2020-08-31 NOTE — PROGRESS NOTES
Assessment/Plan:         Diagnoses and all orders for this visit:    Medicare annual wellness visit, subsequent    Essential hypertension  Comments:  Excellent BP control    Mixed simple and mucopurulent chronic bronchitis ([address] )  Comments:  Stable pulmonary status    Stage 3 chronic kidney disease (HCC)    BMI 28 0-28 9,adult    Acquired hypothyroidism  Comments:  Clinically euthyroid    Chronic cough    Encounter for immunization  -     influenza vaccine, high-dose, PF 0 7 mL (FLUZONE HIGH-DOSE)    Other orders  -     KRILL OIL PO; Take by mouth daily          Subjective:      Patient ID: Annemarie Garcia is a 80 y o  male  3 month follow up  Has moved in with son and daughter in law the past month  Now is having more regular meals    Nose runs all the time  Uses Flonase with benefit    Stopped using CPAP because of discomfort,  Backed down to 1 nebulized treatment a day  Not much cough  Hasnt been to ER for almost 5 months, a good stretch for him  Has been walking much more regularly, has aggravated his knees somewhat          The following portions of the patient's history were reviewed and updated as appropriate: allergies, current medications, past family history, past medical history, past social history, past surgical history and problem list     Review of Systems   Constitutional: Positive for unexpected weight change  Negative for fatigue  HENT: Positive for postnasal drip and rhinorrhea  Respiratory: Positive for cough and wheezing  Cardiovascular: Negative  Gastrointestinal: Negative  Genitourinary: Negative  Musculoskeletal: Positive for arthralgias  Hematological: Negative  Psychiatric/Behavioral: Negative  All other systems reviewed and are negative  Objective:      /72   Pulse 63   Temp 98 3 °F (36 8 °C)   Ht 5' 4" (1 626 m)   Wt 75 8 kg (167 lb)   BMI 28 67 kg/m²          Physical Exam  Vitals signs and nursing note reviewed     Constitutional: Appearance: Normal appearance  HENT:      Right Ear: Tympanic membrane and ear canal normal       Left Ear: Tympanic membrane and ear canal normal    Cardiovascular:      Rate and Rhythm: Normal rate and regular rhythm  Pulses: Normal pulses  Pulmonary:      Effort: Pulmonary effort is normal       Breath sounds: Normal breath sounds  Musculoskeletal:         General: No swelling  Right lower leg: No edema  Left lower leg: No edema  Neurological:      Mental Status: He is alert and oriented to person, place, and time  Psychiatric:         Mood and Affect: Mood normal          Behavior: Behavior normal          Thought Content:  Thought content normal          Judgment: Judgment normal

## 2020-09-17 DIAGNOSIS — J42 CHRONIC BRONCHITIS, UNSPECIFIED CHRONIC BRONCHITIS TYPE (HCC): ICD-10-CM

## 2020-09-17 RX ORDER — ALBUTEROL SULFATE 90 UG/1
2 AEROSOL, METERED RESPIRATORY (INHALATION) EVERY 6 HOURS PRN
Qty: 1 INHALER | Refills: 3 | Status: SHIPPED | OUTPATIENT
Start: 2020-09-17 | End: 2021-09-27 | Stop reason: SDUPTHER

## 2020-10-27 ENCOUNTER — PATIENT OUTREACH (OUTPATIENT)
Dept: FAMILY MEDICINE CLINIC | Facility: HOSPITAL | Age: 85
End: 2020-10-27

## 2020-10-30 DIAGNOSIS — J44.1 COPD WITH ACUTE EXACERBATION (HCC): ICD-10-CM

## 2020-10-30 DIAGNOSIS — F41.9 ANXIETY: ICD-10-CM

## 2020-10-30 DIAGNOSIS — J41.8 MIXED SIMPLE AND MUCOPURULENT CHRONIC BRONCHITIS (HCC): ICD-10-CM

## 2020-10-30 RX ORDER — LEVALBUTEROL INHALATION SOLUTION 1.25 MG/3ML
1.25 SOLUTION RESPIRATORY (INHALATION) 3 TIMES DAILY
Qty: 225 ML | Refills: 3 | Status: SHIPPED | OUTPATIENT
Start: 2020-10-30 | End: 2020-11-03 | Stop reason: SDUPTHER

## 2020-10-30 RX ORDER — LORAZEPAM 0.5 MG/1
TABLET ORAL
Qty: 90 TABLET | Refills: 3 | Status: SHIPPED | OUTPATIENT
Start: 2020-10-30 | End: 2021-03-01

## 2020-10-30 RX ORDER — LEVALBUTEROL INHALATION SOLUTION 1.25 MG/3ML
1.25 SOLUTION RESPIRATORY (INHALATION) 3 TIMES DAILY
Qty: 225 ML | Refills: 0 | Status: SHIPPED | OUTPATIENT
Start: 2020-10-30 | End: 2020-10-30 | Stop reason: SDUPTHER

## 2020-10-30 RX ORDER — LORAZEPAM 0.5 MG/1
TABLET ORAL
Qty: 90 TABLET | Refills: 3 | Status: SHIPPED | OUTPATIENT
Start: 2020-10-30 | End: 2020-10-30 | Stop reason: SDUPTHER

## 2020-11-01 DIAGNOSIS — E03.9 ACQUIRED HYPOTHYROIDISM: ICD-10-CM

## 2020-11-01 RX ORDER — LEVOTHYROXINE SODIUM 0.07 MG/1
TABLET ORAL
Qty: 30 TABLET | Refills: 5 | Status: ON HOLD | OUTPATIENT
Start: 2020-11-01 | End: 2021-09-30 | Stop reason: SDUPTHER

## 2020-11-02 ENCOUNTER — TELEPHONE (OUTPATIENT)
Dept: FAMILY MEDICINE CLINIC | Facility: HOSPITAL | Age: 85
End: 2020-11-02

## 2020-11-03 DIAGNOSIS — J44.1 COPD WITH ACUTE EXACERBATION (HCC): ICD-10-CM

## 2020-11-03 DIAGNOSIS — J41.8 MIXED SIMPLE AND MUCOPURULENT CHRONIC BRONCHITIS (HCC): ICD-10-CM

## 2020-11-03 RX ORDER — LEVALBUTEROL INHALATION SOLUTION 1.25 MG/3ML
1.25 SOLUTION RESPIRATORY (INHALATION) 3 TIMES DAILY
Qty: 225 ML | Refills: 3 | Status: SHIPPED | OUTPATIENT
Start: 2020-11-03 | End: 2022-05-12

## 2020-11-19 DIAGNOSIS — F51.01 PRIMARY INSOMNIA: ICD-10-CM

## 2020-11-20 RX ORDER — ZOLPIDEM TARTRATE 5 MG/1
TABLET ORAL
Qty: 30 TABLET | Refills: 2 | Status: SHIPPED | OUTPATIENT
Start: 2020-11-20 | End: 2021-02-17

## 2020-12-02 ENCOUNTER — OFFICE VISIT (OUTPATIENT)
Dept: FAMILY MEDICINE CLINIC | Facility: HOSPITAL | Age: 85
End: 2020-12-02
Payer: MEDICARE

## 2020-12-02 VITALS
OXYGEN SATURATION: 93 % | WEIGHT: 171 LBS | SYSTOLIC BLOOD PRESSURE: 124 MMHG | HEART RATE: 77 BPM | DIASTOLIC BLOOD PRESSURE: 64 MMHG | TEMPERATURE: 97 F | BODY MASS INDEX: 29.19 KG/M2 | HEIGHT: 64 IN

## 2020-12-02 DIAGNOSIS — J44.9 MODERATE COPD (CHRONIC OBSTRUCTIVE PULMONARY DISEASE) (HCC): ICD-10-CM

## 2020-12-02 DIAGNOSIS — N18.30 STAGE 3 CHRONIC KIDNEY DISEASE, UNSPECIFIED WHETHER STAGE 3A OR 3B CKD (HCC): ICD-10-CM

## 2020-12-02 DIAGNOSIS — K59.00 CONSTIPATION, UNSPECIFIED CONSTIPATION TYPE: ICD-10-CM

## 2020-12-02 DIAGNOSIS — E03.9 ACQUIRED HYPOTHYROIDISM: ICD-10-CM

## 2020-12-02 DIAGNOSIS — D50.8 OTHER IRON DEFICIENCY ANEMIA: ICD-10-CM

## 2020-12-02 DIAGNOSIS — K21.00 GASTROESOPHAGEAL REFLUX DISEASE WITH ESOPHAGITIS WITHOUT HEMORRHAGE: ICD-10-CM

## 2020-12-02 DIAGNOSIS — I10 ESSENTIAL HYPERTENSION: Primary | ICD-10-CM

## 2020-12-02 DIAGNOSIS — E78.2 MIXED HYPERLIPIDEMIA: ICD-10-CM

## 2020-12-02 DIAGNOSIS — D69.6 THROMBOCYTOPENIA, UNSPECIFIED (HCC): ICD-10-CM

## 2020-12-02 PROBLEM — J18.9 COMMUNITY ACQUIRED PNEUMONIA: Status: RESOLVED | Noted: 2019-04-10 | Resolved: 2020-12-02

## 2020-12-02 PROCEDURE — 99215 OFFICE O/P EST HI 40 MIN: CPT | Performed by: FAMILY MEDICINE

## 2020-12-19 DIAGNOSIS — K21.00 GASTROESOPHAGEAL REFLUX DISEASE WITH ESOPHAGITIS: ICD-10-CM

## 2020-12-20 RX ORDER — OMEPRAZOLE 20 MG/1
CAPSULE, DELAYED RELEASE ORAL
Qty: 30 CAPSULE | Refills: 5 | Status: SHIPPED | OUTPATIENT
Start: 2020-12-20 | End: 2021-06-20

## 2021-01-18 DIAGNOSIS — I10 ESSENTIAL HYPERTENSION: ICD-10-CM

## 2021-01-18 RX ORDER — ATENOLOL 25 MG/1
TABLET ORAL
Qty: 90 TABLET | Refills: 1 | Status: SHIPPED | OUTPATIENT
Start: 2021-01-18 | End: 2021-07-17

## 2021-01-25 ENCOUNTER — OFFICE VISIT (OUTPATIENT)
Dept: CARDIOLOGY CLINIC | Facility: CLINIC | Age: 86
End: 2021-01-25
Payer: MEDICARE

## 2021-01-25 VITALS
HEIGHT: 64 IN | DIASTOLIC BLOOD PRESSURE: 64 MMHG | WEIGHT: 170 LBS | HEART RATE: 68 BPM | BODY MASS INDEX: 29.02 KG/M2 | SYSTOLIC BLOOD PRESSURE: 120 MMHG

## 2021-01-25 DIAGNOSIS — I25.10 CORONARY ARTERY DISEASE INVOLVING NATIVE CORONARY ARTERY OF NATIVE HEART WITHOUT ANGINA PECTORIS: Primary | ICD-10-CM

## 2021-01-25 PROCEDURE — 99214 OFFICE O/P EST MOD 30 MIN: CPT | Performed by: INTERNAL MEDICINE

## 2021-01-25 RX ORDER — METAXALONE 800 MG/1
800 TABLET ORAL 3 TIMES DAILY
COMMUNITY
End: 2021-04-26 | Stop reason: SDUPTHER

## 2021-01-25 NOTE — PROGRESS NOTES
Cardiology Follow Up    Stefanie Worthy  6/30/1932  [id number]  [address] [facility]  [address] [place] [id number] [phone]    1  Coronary artery disease involving native coronary artery of native heart without angina pectoris         Interval History: Followup for Coronary disease  He is doing relatively well  He has mild dyspnea  No angina  Main issue is vision loss       Problem List     Hydronephrosis of right kidney    CAD (coronary artery disease)    Carotid stenosis    Essential hypertension    Anxiety disorder due to general medical condition    Benign prostatic hyperplasia with lower urinary tract symptoms    GERD (gastroesophageal reflux disease)    Mixed simple and mucopurulent chronic bronchitis (HCC)    Headache    Mixed hyperlipidemia    Acquired hypothyroidism    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[address] )    Insomnia    Iron deficiency anemia    Overview Signed 3/15/2018 10:04 AM by Gabbie Giraldo MD     Transitioned From: Anemia due to GI blood loss         Macular degeneration    Osteoporosis    Other atopic dermatitis    Shortness of breath    Medicare annual wellness visit, subsequent    Eczema    Seborrheic keratoses, inflamed    Moderate COPD (chronic obstructive pulmonary disease) (HCC)    Bronchitis    Suprapubic tenderness    Loose stools    Thrombocytopenia (HCC)    Overview Deleted 3/31/2019 11:38 AM by Yaquelin Burgos MD            BMI 29 0-29 9,adult    Chronic diastolic heart failure (HCC)    Wt Readings from Last 3 Encounters:   01/25/21 77 1 kg (170 lb)   12/02/20 77 6 kg (171 lb)   08/31/20 75 8 kg (167 lb)                 Atrial fibrillation (HCC)    Urinary obstruction    Cervical spine arthritis    Stage 3 chronic kidney disease    Lab Results   Component Value Date    EGFR 39 04/14/2020    EGFR 31 02/23/2020    EGFR 38 02/22/2020    CREATININE 1 58 (H) 04/14/2020    CREATININE 1 89 (H) 02/23/2020    CREATININE 1 62 (H) 02/22/2020         Aneurysm, pulmonary, arteriovenous    LIVIER (obstructive sleep apnea)    Snoring    Excessive daytime sleepiness    Chest pain    Dizziness    Impacted cerumen of left ear    TMJ arthropathy    Chronic cough    Central serous chorioretinopathy    Bilateral sensorineural hearing loss    Gastrointestinal hemorrhage    Senile nuclear cataract    Peptic ulcer with hemorrhage but without obstruction    Nonspecific abnormal findings on radiological and examination of lung field        Past Medical History:   Diagnosis Date    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    Compression fracture of thoracic vertebra (HCC)     last assessed 05/07/2012    COPD (chronic obstructive pulmonary disease) ([facility] )     Disease of thyroid gland     Heart attack ([facility] )     History of methicillin resistant staphylococcus aureus (MRSA) 03/28/2019    negative nasal swab     Hollenhorst plaque, right eye     last assessed 04/08/2013    Hyperlipidemia     Hypertension     Iron deficiency anemia due to chronic blood loss     last assessed 09/10/2012    Ischemic colitis ([facility] )     last assessed 05/27/2014    Osteoarthritis of both hands     last assessedn 04/30/2013    Peptic ulcer     last assessed 06/14/2013    Polymyalgia rheumatica ([facility] )     last assessesd 10/09/2012    Renal disorder     Upper GI hemorrhage     last assessed 01/29/2015    Varicose veins of lower extremity     last assessed 04/26/2013     Social History     Socioeconomic History    Marital status:       Spouse name: Not on file    Number of children: Not on file    Years of education: Not on file    Highest education level: Not on file   Occupational History    Not on file   Social Needs    Financial resource strain: Not hard at all    Food insecurity     Worry: Not on file     Inability: Not on file    Transportation needs     Medical: No     Non-medical: No   Tobacco Use    Smoking status: Former Smoker     Packs/day: 2 00     Years: 50 00     Pack years: 100 00     Types: Cigarettes     Start date: 65     Quit date:      Years since quittin 0    Smokeless tobacco: Never Used    Tobacco comment: Quit 40 yrs   ago   Substance and Sexual Activity    Alcohol use: Not Currently     Alcohol/week: 1 0 standard drinks     Types: 1 Glasses of wine per week     Comment: social    Drug use: No    Sexual activity: Not on file   Lifestyle    Physical activity     Days per week: Not on file     Minutes per session: Not on file    Stress: Not on file   Relationships    Social connections     Talks on phone: Not on file     Gets together: Not on file     Attends Mu-ism service: Not on file     Active member of club or organization: Not on file     Attends meetings of clubs or organizations: Not on file     Relationship status: Not on file    Intimate partner violence     Fear of current or ex partner: Not on file     Emotionally abused: Not on file     Physically abused: Not on file     Forced sexual activity: Not on file   Other Topics Concern    Not on file   Social History Narrative     per [employer]    Always uses seat belt      Family History   Problem Relation Age of Onset    Hypertension Mother     Alcohol abuse Mother     Hypertension Father     Alcohol abuse Father     Alcohol abuse Son     Heart disease Family     Stroke Family         syndrome     Past Surgical History:   Procedure Laterality Date    CORONARY ARTERY BYPASS GRAFT      HEMORRHOID SURGERY      HERNIA REPAIR      RENAL CYST EXCISION      resolved 2010    THROMBOENDARTERECTOMY Right     carotid, last assessed 2013       Current Outpatient Medications:     albuterol (PROVENTIL HFA,VENTOLIN HFA) 90 mcg/act inhaler, Inhale 2 puffs every 6 (six) hours as needed for wheezing, Disp: 1 Inhaler, Rfl: 3    atenolol (TENORMIN) 25 mg tablet, take 1 tablet by mouth once daily, Disp: 90 tablet, Rfl: 1    B Complex Vitamins (B COMPLEX PO), Take by mouth daily, Disp: , Rfl:     Cholecalciferol ([employer] VITAMIN D) 2000 units CAPS, Take by mouth daily , Disp: , Rfl:     Cyanocobalamin (B-12 PO), Take by mouth daily, Disp: , Rfl:     Ferrous Sulfate (IRON) 325 (65 Fe) MG TABS, Take by mouth every other day , Disp: , Rfl:     fluticasone (FLONASE) 50 mcg/act nasal spray, 1 spray into each nostril 2 (two) times a day, Disp: , Rfl:     guaiFENesin (MUCINEX) 600 mg 12 hr tablet, Take 1,200 mg by mouth every 12 (twelve) hours, Disp: , Rfl:     ipratropium (ATROVENT) 0 02 % nebulizer solution, Take 1 vial (0 5 mg total) by nebulization 3 (three) times a day, Disp: 225 mL, Rfl: 3    KRILL OIL PO, Take by mouth daily, Disp: , Rfl:     levalbuterol (XOPENEX) 1 25 mg/3 mL nebulizer solution, Take 1 vial (1 25 mg total) by nebulization 3 (three) times a day, Disp: 225 mL, Rfl: 3    levothyroxine 75 mcg tablet, take 1 tablet by mouth once daily, Disp: 30 tablet, Rfl: 5    LORazepam (ATIVAN) 0 5 mg tablet, TAKE ONE TABLET BY MOUTH EVERY 8 HOURS AS NEEDED FOR ANXIETY, Disp: 90 tablet, Rfl: 3    metaxalone (SKELAXIN) 800 mg tablet, Take 800 mg by mouth 3 (three) times a day, Disp: , Rfl:     Multiple Vitamins-Minerals (VISION FORMULA/LUTEIN PO), Take by mouth, Disp: , Rfl:     nystatin-triamcinolone (MYCOLOG-II) cream, APPLY SPARINGLY TO THE AFFECTED AREA(S) TWO TIMES A DAY, Disp: 60 g, Rfl: 2    omeprazole (PriLOSEC) 20 mg delayed release capsule, take 1 capsule by mouth once daily, Disp: 30 capsule, Rfl: 5    Respiratory Therapy Supplies (SPIROMETER) KIT, by Does not apply route 4 (four) times a day, Disp: 1 kit, Rfl: 0    sodium chloride 0 9 % nebulizer solution, TAKE 3 MILLILITERS BY NEBULIZATION ROUTE AS NEEDED FOR WHEEZING, Disp: 90 mL, Rfl: 1    tamsulosin (FLOMAX) 0 4 mg, TAKE ONE CAPSULE BY MOUTH EVERY DAY WITH DINNER, Disp: 30 capsule, Rfl: 5    zolpidem (AMBIEN) 5 mg tablet, take 1 tablet by mouth at bedtime if needed for sleep, Disp: 30 tablet, Rfl: 2  Allergies   Allergen Reactions    Pravastatin Anaphylaxis, Photosensitivity and Headache     Reaction Date: [id number];    Other reaction(s): Headache    Acetaminophen-Codeine GI Intolerance    Atorvastatin      Other reaction(s): Leg Cramps  Other reaction(s): Leg Cramps    Codeine Nausea Only    Colestipol GI Intolerance     Reaction Date: [id number];     Contrast  [Iodinated Diagnostic Agents]     Fenofibrate GI Intolerance     Reaction Date: [id number];     Hydrocodone Vomiting    Niacin      Reaction Date: [id number];     Niaspan  [Niacin Er]     Pitavastatin Myalgia    Pneumococcal Polysaccharide Vaccine     Pravastatin Sodium     Simvastatin     Statins Myalgia    Vicoprofen  [Hydrocodone-Ibuprofen] GI Intolerance    Cyclophosphamide Rash    Ioversol Hives       Labs:     Chemistry        Component Value Date/Time     08/28/2015 1730    K 3 9 04/14/2020 1346    K 4 5 08/28/2015 1730     04/14/2020 1346     08/28/2015 1730    CO2 26 04/14/2020 1346    CO2 19 (L) 06/04/2018 1139    BUN 27 (H) 04/14/2020 1346    BUN 28 (H) 08/28/2015 1730    CREATININE 1 58 (H) 04/14/2020 1346    CREATININE 1 79 (H) 08/28/2015 1730        Component Value Date/Time    CALCIUM 8 2 (L) 04/14/2020 1346    CALCIUM 9 7 08/28/2015 1730    ALKPHOS 73 04/14/2020 1346    ALKPHOS 73 08/28/2015 1730    AST 17 04/14/2020 1346    AST 18 08/28/2015 1730    ALT 30 04/14/2020 1346    ALT 27 08/28/2015 1730    BILITOT 0 45 08/28/2015 1730            Lab Results   Component Value Date    CHOL 191 06/10/2015    CHOL 155 08/05/2014    CHOL 133 01/16/2014     Lab Results   Component Value Date    HDL 29 (L) 01/03/2019    HDL 28 (L) 06/25/2018    HDL 29 (L) 06/02/2018     Lab Results   Component Value Date    LDLCALC 147 (H) 01/03/2019    LDLCALC 129 (H) 06/25/2018    LDLCALC 111 (H) 06/02/2018     Lab Results   Component Value Date    TRIG 160 (H) 01/03/2019 TRIG 180 (H) 06/25/2018    TRIG 123 06/02/2018     No results found for: CHOLHDL    Imaging: No results found  Review of Systems   Constitution: Negative  HENT: Positive for hearing loss  Eyes: Positive for blurred vision  Cardiovascular: Negative  Respiratory: Negative  Endocrine: Negative  Hematologic/Lymphatic: Negative  Skin: Negative  Musculoskeletal: Negative  Gastrointestinal: Negative  Genitourinary: Negative  Neurological: Negative  Psychiatric/Behavioral: Negative  Allergic/Immunologic: Negative  Vitals:    01/25/21 1554   BP: 120/64   Pulse: 68           Physical Exam  Vitals signs reviewed  Constitutional:       Appearance: Normal appearance  HENT:      Head: Normocephalic and atraumatic  Nose: Nose normal       Mouth/Throat:      Mouth: Mucous membranes are moist       Pharynx: Oropharynx is clear  Eyes:      General: No scleral icterus  Conjunctiva/sclera: Conjunctivae normal    Neck:      Musculoskeletal: Normal range of motion and neck supple  Cardiovascular:      Rate and Rhythm: Normal rate and regular rhythm  Heart sounds: No murmur  No friction rub  No gallop  Pulmonary:      Effort: Pulmonary effort is normal  No respiratory distress  Breath sounds: Normal breath sounds  No wheezing or rales  Abdominal:      General: Abdomen is flat  Bowel sounds are normal  There is no distension  Palpations: Abdomen is soft  Tenderness: There is no abdominal tenderness  There is no guarding  Musculoskeletal:      Right lower leg: No edema  Left lower leg: No edema  Skin:     General: Skin is warm and dry  Neurological:      General: No focal deficit present  Mental Status: He is alert and oriented to person, place, and time  Psychiatric:         Mood and Affect: Mood normal          Behavior: Behavior normal          Discussion/Summary:    1   Coronary Artery Disease/History of Inferior MI: He has no angina  He is off aspirin due to GI intolerance/bleeding  Meredith Silva Atenolol        2  Dyslipidemia: has had myalgia on statin therapy         3  Carotid Stenosis s/p right CEA            The patient was counseled regarding diagnostic results, instructions for management, risk factor reductions, impressions  total time of encounter was 25 minutes and 15 minutes was spent counseling

## 2021-01-27 DIAGNOSIS — R10.819 SUPRAPUBIC TENDERNESS: ICD-10-CM

## 2021-01-28 RX ORDER — TAMSULOSIN HYDROCHLORIDE 0.4 MG/1
CAPSULE ORAL
Qty: 30 CAPSULE | Refills: 5 | Status: SHIPPED | OUTPATIENT
Start: 2021-01-28 | End: 2021-08-20

## 2021-02-04 ENCOUNTER — IMMUNIZATIONS (OUTPATIENT)
Dept: FAMILY MEDICINE CLINIC | Facility: HOSPITAL | Age: 86
End: 2021-02-04

## 2021-02-04 DIAGNOSIS — Z23 ENCOUNTER FOR IMMUNIZATION: Primary | ICD-10-CM

## 2021-02-04 PROCEDURE — 91300 SARS-COV-2 / COVID-19 MRNA VACCINE (PFIZER-BIONTECH) 30 MCG: CPT

## 2021-02-04 PROCEDURE — 0001A SARS-COV-2 / COVID-19 MRNA VACCINE (PFIZER-BIONTECH) 30 MCG: CPT

## 2021-02-17 DIAGNOSIS — F51.01 PRIMARY INSOMNIA: ICD-10-CM

## 2021-02-17 RX ORDER — ZOLPIDEM TARTRATE 5 MG/1
TABLET ORAL
Qty: 30 TABLET | Refills: 1 | Status: SHIPPED | OUTPATIENT
Start: 2021-02-17 | End: 2021-04-15

## 2021-02-25 DIAGNOSIS — B37.9 MONILIASIS: ICD-10-CM

## 2021-02-27 ENCOUNTER — IMMUNIZATIONS (OUTPATIENT)
Dept: FAMILY MEDICINE CLINIC | Facility: HOSPITAL | Age: 86
End: 2021-02-27

## 2021-02-27 DIAGNOSIS — Z23 ENCOUNTER FOR IMMUNIZATION: Primary | ICD-10-CM

## 2021-02-27 PROCEDURE — 91300 SARS-COV-2 / COVID-19 MRNA VACCINE (PFIZER-BIONTECH) 30 MCG: CPT

## 2021-02-27 PROCEDURE — 0002A SARS-COV-2 / COVID-19 MRNA VACCINE (PFIZER-BIONTECH) 30 MCG: CPT

## 2021-02-28 DIAGNOSIS — F41.9 ANXIETY: ICD-10-CM

## 2021-03-01 RX ORDER — LORAZEPAM 0.5 MG/1
TABLET ORAL
Qty: 90 TABLET | Refills: 1 | Status: SHIPPED | OUTPATIENT
Start: 2021-03-01 | End: 2021-04-28

## 2021-03-17 ENCOUNTER — HOSPITAL ENCOUNTER (OUTPATIENT)
Dept: RADIOLOGY | Facility: HOSPITAL | Age: 86
Discharge: HOME | End: 2021-03-17
Payer: MEDICARE

## 2021-03-17 ENCOUNTER — OFFICE VISIT (OUTPATIENT)
Dept: FAMILY MEDICINE CLINIC | Facility: HOSPITAL | Age: 86
End: 2021-03-17
Payer: MEDICARE

## 2021-03-17 VITALS
SYSTOLIC BLOOD PRESSURE: 128 MMHG | DIASTOLIC BLOOD PRESSURE: 74 MMHG | BODY MASS INDEX: 28.85 KG/M2 | TEMPERATURE: 96.3 F | HEART RATE: 60 BPM | HEIGHT: 64 IN | WEIGHT: 169 LBS

## 2021-03-17 DIAGNOSIS — R07.81 RIB PAIN ON RIGHT SIDE: ICD-10-CM

## 2021-03-17 DIAGNOSIS — R07.81 RIB PAIN ON RIGHT SIDE: Primary | ICD-10-CM

## 2021-03-17 DIAGNOSIS — J44.9 MODERATE COPD (CHRONIC OBSTRUCTIVE PULMONARY DISEASE) (HCC): ICD-10-CM

## 2021-03-17 PROCEDURE — 71101 X-RAY EXAM UNILAT RIBS/CHEST: CPT

## 2021-03-17 PROCEDURE — 99213 OFFICE O/P EST LOW 20 MIN: CPT | Performed by: FAMILY MEDICINE

## 2021-04-15 DIAGNOSIS — F51.01 PRIMARY INSOMNIA: ICD-10-CM

## 2021-04-15 RX ORDER — ZOLPIDEM TARTRATE 5 MG/1
TABLET ORAL
Qty: 30 TABLET | Refills: 0 | Status: SHIPPED | OUTPATIENT
Start: 2021-04-15 | End: 2021-05-16

## 2021-04-26 DIAGNOSIS — M62.830 LUMBAR PARASPINAL MUSCLE SPASM: Primary | ICD-10-CM

## 2021-04-26 RX ORDER — METAXALONE 800 MG/1
TABLET ORAL
Qty: 90 TABLET | Refills: 0 | Status: SHIPPED | OUTPATIENT
Start: 2021-04-26 | End: 2021-05-24

## 2021-04-27 ENCOUNTER — TELEPHONE (OUTPATIENT)
Dept: FAMILY MEDICINE CLINIC | Facility: HOSPITAL | Age: 86
End: 2021-04-27

## 2021-04-28 DIAGNOSIS — F41.9 ANXIETY: ICD-10-CM

## 2021-04-28 RX ORDER — LORAZEPAM 0.5 MG/1
TABLET ORAL
Qty: 90 TABLET | Refills: 0 | Status: SHIPPED | OUTPATIENT
Start: 2021-04-28 | End: 2021-05-24

## 2021-04-29 DIAGNOSIS — F51.01 PRIMARY INSOMNIA: ICD-10-CM

## 2021-04-29 RX ORDER — ZOLPIDEM TARTRATE 5 MG/1
TABLET ORAL
Qty: 30 TABLET | OUTPATIENT
Start: 2021-04-29

## 2021-04-29 NOTE — TELEPHONE ENCOUNTER
Daughter in law called, asking for a call after Dr Filomena Beavers reviews denial to let her know what is being done

## 2021-04-30 NOTE — TELEPHONE ENCOUNTER
The problem is with muscle relaxers altogether- it is med category considered unsafe for 65+ age  There will not be a way to get authorized  I can refer to Pain Management to work on alternate ways for pain relief?

## 2021-04-30 NOTE — TELEPHONE ENCOUNTER
Spoke w/ daughter, she will wait and discuss this further with you at his appointment in May  Does not necessarily thinks he needs pain management as this is intermittent and not constant pain

## 2021-05-16 DIAGNOSIS — F51.01 PRIMARY INSOMNIA: ICD-10-CM

## 2021-05-16 RX ORDER — ZOLPIDEM TARTRATE 5 MG/1
TABLET ORAL
Qty: 30 TABLET | Refills: 0 | Status: SHIPPED | OUTPATIENT
Start: 2021-05-16 | End: 2021-06-14

## 2021-05-19 ENCOUNTER — APPOINTMENT (OUTPATIENT)
Dept: LAB | Facility: CLINIC | Age: 86
End: 2021-05-19
Payer: MEDICARE

## 2021-05-19 DIAGNOSIS — E03.9 ACQUIRED HYPOTHYROIDISM: ICD-10-CM

## 2021-05-19 DIAGNOSIS — N18.30 STAGE 3 CHRONIC KIDNEY DISEASE, UNSPECIFIED WHETHER STAGE 3A OR 3B CKD (HCC): ICD-10-CM

## 2021-05-19 DIAGNOSIS — E78.2 MIXED HYPERLIPIDEMIA: ICD-10-CM

## 2021-05-19 DIAGNOSIS — D69.6 THROMBOCYTOPENIA, UNSPECIFIED (HCC): ICD-10-CM

## 2021-05-19 LAB
ALBUMIN SERPL BCP-MCNC: 3.9 G/DL (ref 3.5–5)
ALP SERPL-CCNC: 88 U/L (ref 46–116)
ALT SERPL W P-5'-P-CCNC: 33 U/L (ref 12–78)
ANION GAP SERPL CALCULATED.3IONS-SCNC: 7 MMOL/L (ref 4–13)
AST SERPL W P-5'-P-CCNC: 39 U/L (ref 5–45)
BILIRUB SERPL-MCNC: 0.78 MG/DL (ref 0.2–1)
BUN SERPL-MCNC: 27 MG/DL (ref 5–25)
CALCIUM SERPL-MCNC: 9.9 MG/DL (ref 8.3–10.1)
CHLORIDE SERPL-SCNC: 105 MMOL/L (ref 100–108)
CHOLEST SERPL-MCNC: 210 MG/DL (ref 50–200)
CO2 SERPL-SCNC: 26 MMOL/L (ref 21–32)
CREAT SERPL-MCNC: 1.81 MG/DL (ref 0.6–1.3)
ERYTHROCYTE [DISTWIDTH] IN BLOOD BY AUTOMATED COUNT: 13.5 % (ref 11.6–15.1)
GFR SERPL CREATININE-BSD FRML MDRD: 33 ML/MIN/1.73SQ M
GLUCOSE P FAST SERPL-MCNC: 104 MG/DL (ref 65–99)
HCT VFR BLD AUTO: 49.5 % (ref 36.5–49.3)
HDLC SERPL-MCNC: 29 MG/DL
HGB BLD-MCNC: 15.5 G/DL (ref 12–17)
LDLC SERPL CALC-MCNC: 142 MG/DL (ref 0–100)
MCH RBC QN AUTO: 30 PG (ref 26.8–34.3)
MCHC RBC AUTO-ENTMCNC: 31.3 G/DL (ref 31.4–37.4)
MCV RBC AUTO: 96 FL (ref 82–98)
PLATELET # BLD AUTO: 101 THOUSANDS/UL (ref 149–390)
PMV BLD AUTO: 10 FL (ref 8.9–12.7)
POTASSIUM SERPL-SCNC: 5.4 MMOL/L (ref 3.5–5.3)
PROT SERPL-MCNC: 8.1 G/DL (ref 6.4–8.2)
RBC # BLD AUTO: 5.16 MILLION/UL (ref 3.88–5.62)
SODIUM SERPL-SCNC: 138 MMOL/L (ref 136–145)
T4 FREE SERPL-MCNC: 1.07 NG/DL (ref 0.76–1.46)
TRIGL SERPL-MCNC: 196 MG/DL
TSH SERPL DL<=0.05 MIU/L-ACNC: 4.04 UIU/ML (ref 0.36–3.74)
WBC # BLD AUTO: 6.42 THOUSAND/UL (ref 4.31–10.16)

## 2021-05-19 PROCEDURE — 84439 ASSAY OF FREE THYROXINE: CPT

## 2021-05-19 PROCEDURE — 80053 COMPREHEN METABOLIC PANEL: CPT

## 2021-05-19 PROCEDURE — 84443 ASSAY THYROID STIM HORMONE: CPT

## 2021-05-19 PROCEDURE — 80061 LIPID PANEL: CPT

## 2021-05-19 PROCEDURE — 36415 COLL VENOUS BLD VENIPUNCTURE: CPT

## 2021-05-19 PROCEDURE — 85027 COMPLETE CBC AUTOMATED: CPT

## 2021-05-24 ENCOUNTER — OFFICE VISIT (OUTPATIENT)
Dept: FAMILY MEDICINE CLINIC | Facility: HOSPITAL | Age: 86
End: 2021-05-24
Payer: MEDICARE

## 2021-05-24 VITALS
TEMPERATURE: 97.7 F | HEIGHT: 64 IN | SYSTOLIC BLOOD PRESSURE: 124 MMHG | DIASTOLIC BLOOD PRESSURE: 82 MMHG | WEIGHT: 167 LBS | BODY MASS INDEX: 28.51 KG/M2 | HEART RATE: 68 BPM

## 2021-05-24 DIAGNOSIS — N18.30 STAGE 3 CHRONIC KIDNEY DISEASE, UNSPECIFIED WHETHER STAGE 3A OR 3B CKD (HCC): ICD-10-CM

## 2021-05-24 DIAGNOSIS — I10 ESSENTIAL HYPERTENSION: Primary | ICD-10-CM

## 2021-05-24 DIAGNOSIS — F41.9 ANXIETY: ICD-10-CM

## 2021-05-24 DIAGNOSIS — H35.3230 BILATERAL EXUDATIVE AGE-RELATED MACULAR DEGENERATION, UNSPECIFIED STAGE (HCC): ICD-10-CM

## 2021-05-24 DIAGNOSIS — M19.049 HAND ARTHROPATHY: ICD-10-CM

## 2021-05-24 DIAGNOSIS — J41.8 MIXED SIMPLE AND MUCOPURULENT CHRONIC BRONCHITIS (HCC): ICD-10-CM

## 2021-05-24 DIAGNOSIS — J44.9 MODERATE COPD (CHRONIC OBSTRUCTIVE PULMONARY DISEASE) (HCC): ICD-10-CM

## 2021-05-24 DIAGNOSIS — E03.9 ACQUIRED HYPOTHYROIDISM: ICD-10-CM

## 2021-05-24 DIAGNOSIS — I25.10 CORONARY ARTERY DISEASE INVOLVING NATIVE CORONARY ARTERY OF NATIVE HEART WITHOUT ANGINA PECTORIS: ICD-10-CM

## 2021-05-24 PROBLEM — H34.219 PARTIAL ARTERIAL OCCLUSION OF RETINA: Status: ACTIVE | Noted: 2021-05-24

## 2021-05-24 PROBLEM — B35.6 TINEA CRURIS: Status: ACTIVE | Noted: 2021-05-24

## 2021-05-24 PROBLEM — K55.9 ISCHEMIC COLITIS (HCC): Status: ACTIVE | Noted: 2021-05-24

## 2021-05-24 PROCEDURE — 99214 OFFICE O/P EST MOD 30 MIN: CPT | Performed by: FAMILY MEDICINE

## 2021-05-24 RX ORDER — LORAZEPAM 0.5 MG/1
TABLET ORAL
Qty: 90 TABLET | Refills: 0 | Status: SHIPPED | OUTPATIENT
Start: 2021-05-24 | End: 2021-06-29

## 2021-05-24 NOTE — PROGRESS NOTES
Assessment/Plan:         Diagnoses and all orders for this visit:    Essential hypertension  Comments:  Excellent BP control    Acquired hypothyroidism  Comments:  Clinically euthyroid    Moderate COPD (chronic obstructive pulmonary disease) ([facility])  Comments:  Stable pulmonary status    Mixed simple and mucopurulent chronic bronchitis ([facility])    Coronary artery disease involving native coronary artery of native heart without angina pectoris    Stage 3 chronic kidney disease, unspecified whether stage 3a or 3b CKD ([facility])  Comments:  Creatinine 1 81, stable over the past year    Bilateral exudative age-related macular degeneration, unspecified stage ([facility])    Hand arthropathy  -     Ambulatory referral to Orthopedic Surgery; Future          Subjective:      Patient ID: Lore Buenrostro is a 80 y o  male  3 month follow up  No recent illness or injury  He is UTD with COVID vaccine    Some pain in middle fingers of both hands  Had surgical management in past    Pain R low back pelvis area  He is aware that muscle relaxer is not covered by his insurance and is considered inappropriate for his age group    Trying to walk on regular basis    Breathing has been good with current inhaler use  Cough is not too bothersome    Medication list reviewed and revised      The following portions of the patient's history were reviewed and updated as appropriate: allergies, current medications, past family history, past medical history, past social history, past surgical history and problem list     Review of Systems   Constitutional: Negative for fatigue and unexpected weight change  HENT: Negative  Eyes: Positive for visual disturbance  Respiratory: Positive for cough and wheezing  Cardiovascular: Negative  Gastrointestinal: Negative  Genitourinary: Positive for frequency  Musculoskeletal: Positive for arthralgias and joint swelling  Neurological: Negative      Psychiatric/Behavioral: The patient is nervous/anxious  All other systems reviewed and are negative  Objective:      /82   Pulse 68   Temp 97 7 °F (36 5 °C)   Ht 5' 4" (1 626 m)   Wt 75 8 kg (167 lb)   BMI 28 67 kg/m²          Physical Exam  Vitals signs and nursing note reviewed  Constitutional:       Appearance: Normal appearance  HENT:      Head: Normocephalic and atraumatic  Cardiovascular:      Rate and Rhythm: Normal rate and regular rhythm  Pulses: Normal pulses  Heart sounds: Normal heart sounds  Pulmonary:      Effort: Pulmonary effort is normal  No respiratory distress  Breath sounds: Normal breath sounds  Musculoskeletal:      Right lower leg: No edema  Left lower leg: No edema  Skin:     Findings: No rash  Neurological:      General: No focal deficit present  Mental Status: He is alert and oriented to person, place, and time  Psychiatric:         Mood and Affect: Mood normal          Behavior: Behavior normal          Thought Content:  Thought content normal          Judgment: Judgment normal

## 2021-06-14 DIAGNOSIS — F51.01 PRIMARY INSOMNIA: ICD-10-CM

## 2021-06-16 RX ORDER — ZOLPIDEM TARTRATE 5 MG/1
TABLET ORAL
Qty: 30 TABLET | Refills: 0 | Status: SHIPPED | OUTPATIENT
Start: 2021-06-16 | End: 2021-07-14

## 2021-06-29 DIAGNOSIS — F41.9 ANXIETY: ICD-10-CM

## 2021-06-29 RX ORDER — LORAZEPAM 0.5 MG/1
TABLET ORAL
Qty: 90 TABLET | Refills: 0 | Status: SHIPPED | OUTPATIENT
Start: 2021-06-29 | End: 2021-07-29

## 2021-07-14 DIAGNOSIS — F51.01 PRIMARY INSOMNIA: ICD-10-CM

## 2021-07-14 RX ORDER — ZOLPIDEM TARTRATE 5 MG/1
TABLET ORAL
Qty: 30 TABLET | Refills: 0 | Status: SHIPPED | OUTPATIENT
Start: 2021-07-14 | End: 2021-08-18

## 2021-07-17 DIAGNOSIS — I10 ESSENTIAL HYPERTENSION: ICD-10-CM

## 2021-07-17 RX ORDER — ATENOLOL 25 MG/1
TABLET ORAL
Qty: 90 TABLET | Refills: 1 | Status: SHIPPED | OUTPATIENT
Start: 2021-07-17 | End: 2021-07-25

## 2021-07-20 ENCOUNTER — TELEPHONE (OUTPATIENT)
Dept: FAMILY MEDICINE CLINIC | Facility: HOSPITAL | Age: 86
End: 2021-07-20

## 2021-07-20 NOTE — TELEPHONE ENCOUNTER
Patient daughter-in-law calling - patient is still in a lot of pain in back/hip  The pain is increasing and tylenol/heat is not helping  DIL asking what else can be done for the patient? Medication?   Urgent Care?    pcb

## 2021-07-20 NOTE — TELEPHONE ENCOUNTER
Spoke to carrillo will start with appt here to decide what testing/meds or referrals he needs  Appt scheduled with you for Friday

## 2021-07-20 NOTE — TELEPHONE ENCOUNTER
Cannot really make recommendations w/o seeing pt - can see Pain Mgt or be seen in the office for eval

## 2021-07-20 NOTE — TELEPHONE ENCOUNTER
Back pain ongoing for awhile, mentioned at last visit  Dr Marcos Shankar mentioned it could be pain from bone on bone  Pain is in lower back and hips  Muscle relaxers have not worked  Dr Marcos Shankar had mentioned using a back brace  Gwendolynjose Stacyvirgil states some days are worse than others and at times the pain can bring him to tears  She is unaware of anything that seems to trigger the pain  States he does walk a lot and not sure if that is helping or hurting him  Do you have any suggestions on what Renzo Martínez can do to help relieve the pain until Dr Marcos Shankar is back

## 2021-07-22 ENCOUNTER — TELEPHONE (OUTPATIENT)
Dept: OBGYN CLINIC | Facility: HOSPITAL | Age: 86
End: 2021-07-22
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
## 2021-07-23 ENCOUNTER — OFFICE VISIT (OUTPATIENT)
Dept: FAMILY MEDICINE CLINIC | Facility: HOSPITAL | Age: 86
End: 2021-07-23
Payer: MEDICARE

## 2021-07-23 VITALS
TEMPERATURE: 96.4 F | WEIGHT: 165.2 LBS | DIASTOLIC BLOOD PRESSURE: 88 MMHG | HEART RATE: 56 BPM | SYSTOLIC BLOOD PRESSURE: 140 MMHG | HEIGHT: 64 IN | BODY MASS INDEX: 28.2 KG/M2

## 2021-07-23 DIAGNOSIS — R10.9 CHRONIC RIGHT FLANK PAIN: Primary | ICD-10-CM

## 2021-07-23 DIAGNOSIS — K27.4: ICD-10-CM

## 2021-07-23 DIAGNOSIS — G89.29 CHRONIC RIGHT FLANK PAIN: Primary | ICD-10-CM

## 2021-07-23 LAB
SL AMB  POCT GLUCOSE, UA: NORMAL
SL AMB LEUKOCYTE ESTERASE,UA: NORMAL
SL AMB POCT BILIRUBIN,UA: NORMAL
SL AMB POCT BLOOD,UA: NORMAL
SL AMB POCT CLARITY,UA: CLEAR
SL AMB POCT COLOR,UA: YELLOW
SL AMB POCT KETONES,UA: NORMAL
SL AMB POCT NITRITE,UA: NORMAL
SL AMB POCT PH,UA: 6
SL AMB POCT SPECIFIC GRAVITY,UA: 1.02
SL AMB POCT URINE PROTEIN: NORMAL
SL AMB POCT UROBILINOGEN: NORMAL

## 2021-07-23 PROCEDURE — 81002 URINALYSIS NONAUTO W/O SCOPE: CPT | Performed by: INTERNAL MEDICINE

## 2021-07-23 PROCEDURE — 99214 OFFICE O/P EST MOD 30 MIN: CPT | Performed by: INTERNAL MEDICINE

## 2021-07-23 NOTE — PROGRESS NOTES
Assessment/Plan:    Peptic ulcer with hemorrhage but without obstruction  Advised to avoid NSAIDs for his current pain d/t h/o PUD with bleeding       Diagnoses and all orders for this visit:    Chronic right flank pain  Comments:  Has persisted for > 3 mos w/o known trigger, has had Xray of ribs w/o abnormality noted, urine dip neg today so reassured pt at this time I don't think further imaging is needed of the kidney's at this time, no actual radicular pain or back pain along spine so will avoid Xrays T-L spine but certainly this imaging would be reasonable if pain not better with PT, encouraged heat and Tylenol, call with new/wores symptoms or if not better in 4-6 wks  Orders:  -     POCT urine dip  -     Ambulatory referral to Physical Therapy; Future    Peptic ulcer with hemorrhage but without obstruction          Subjective:      Patient ID: Bruce Pham is a 80 y o  male  HPI Pt here to discuss chronic back pain/hip/pelvic pain    Pt noting R LBP for less then 3 mos  He notes no specific fall/injury/trauma  The pain is described as "pressure and sharp" and is present all the time  The pain has no known provoking activities other then laying down but after he is still for a while the pain improves  He has tried Tylenol w/o benefit  Pain is R flank/side region  It will intermittently radiate up a few inches  He denies pain radiating to LE/buttock  He notes no weakness/numbness/tingling/loss of control of bowel or bladder function  He notes no urinary symptoms/F/C/abd pain  He notes no previous h/o back issues in the past   He had Xrays of the ribs and results reviewed  Review of Systems   Constitutional: Negative for chills and fever  Respiratory: Negative for cough and shortness of breath  Cardiovascular: Negative for chest pain and palpitations  Gastrointestinal: Negative for abdominal pain  Genitourinary: Negative for dysuria, frequency and hematuria     Musculoskeletal: Positive for back pain and gait problem  Skin: Negative for rash and wound  Neurological: Negative for weakness and numbness  Objective:    /88   Pulse 56   Temp (!) 96 4 °F (35 8 °C) (Temporal)   Ht 5' 4" (1 626 m)   Wt 74 9 kg (165 lb 3 2 oz)   BMI 28 36 kg/m²      Physical Exam  Vitals and nursing note reviewed  Constitutional:       General: He is not in acute distress  Appearance: He is well-developed  He is not ill-appearing  HENT:      Head: Normocephalic and atraumatic  Eyes:      General:         Right eye: No discharge  Left eye: No discharge  Conjunctiva/sclera: Conjunctivae normal    Neck:      Trachea: No tracheal deviation  Cardiovascular:      Rate and Rhythm: Normal rate and regular rhythm  Heart sounds: No murmur heard  Pulmonary:      Effort: Pulmonary effort is normal  No respiratory distress  Breath sounds: Normal breath sounds  No wheezing, rhonchi or rales  Abdominal:      General: There is no distension  Palpations: Abdomen is soft  Tenderness: There is no abdominal tenderness  There is no right CVA tenderness, left CVA tenderness or rebound  Musculoskeletal:         General: Tenderness present  No deformity or signs of injury  Cervical back: Neck supple  Comments: Tender R lateral lower ribs with palp but no CVA tenderness to percussion, 5/5 B/L LE muscle strength   Skin:     General: Skin is warm and dry  Findings: No rash  Neurological:      Mental Status: He is alert  Sensory: No sensory deficit  Motor: No weakness or abnormal muscle tone  Deep Tendon Reflexes: Reflexes normal       Comments: Sensation intact to light touch B/L LE, 2/4 patellar DTR B/L LE, neg SLR test B/L   Psychiatric:         Behavior: Behavior normal          Thought Content:  Thought content normal          Judgment: Judgment normal

## 2021-07-25 DIAGNOSIS — I10 ESSENTIAL HYPERTENSION: ICD-10-CM

## 2021-07-25 RX ORDER — ATENOLOL 25 MG/1
TABLET ORAL
Qty: 90 TABLET | Refills: 1 | Status: SHIPPED | OUTPATIENT
Start: 2021-07-25 | End: 2021-09-30 | Stop reason: HOSPADM

## 2021-07-29 DIAGNOSIS — F41.9 ANXIETY: ICD-10-CM

## 2021-07-29 RX ORDER — LORAZEPAM 0.5 MG/1
TABLET ORAL
Qty: 90 TABLET | Refills: 0 | Status: SHIPPED | OUTPATIENT
Start: 2021-07-29 | End: 2021-08-26

## 2021-08-02 ENCOUNTER — APPOINTMENT (EMERGENCY)
Dept: RADIOLOGY | Facility: HOSPITAL | Age: 86
End: 2021-08-02
Payer: MEDICARE

## 2021-08-02 ENCOUNTER — HOSPITAL ENCOUNTER (OUTPATIENT)
Facility: HOSPITAL | Age: 86
Setting detail: OBSERVATION
Discharge: HOME | End: 2021-08-02
Attending: EMERGENCY MEDICINE | Admitting: INTERNAL MEDICINE
Payer: MEDICARE

## 2021-08-02 ENCOUNTER — APPOINTMENT (EMERGENCY)
Dept: CT IMAGING | Facility: HOSPITAL | Age: 86
End: 2021-08-02
Payer: MEDICARE

## 2021-08-02 ENCOUNTER — TELEPHONE (OUTPATIENT)
Dept: FAMILY MEDICINE CLINIC | Facility: HOSPITAL | Age: 86
End: 2021-08-02

## 2021-08-02 VITALS
OXYGEN SATURATION: 97 % | DIASTOLIC BLOOD PRESSURE: 81 MMHG | SYSTOLIC BLOOD PRESSURE: 174 MMHG | HEART RATE: 60 BPM | RESPIRATION RATE: 20 BRPM | TEMPERATURE: 97.8 F

## 2021-08-02 DIAGNOSIS — R55 NEAR SYNCOPE: Primary | ICD-10-CM

## 2021-08-02 LAB
ALBUMIN SERPL BCP-MCNC: 3.6 G/DL (ref 3.5–5)
ALP SERPL-CCNC: 70 U/L (ref 46–116)
ALT SERPL W P-5'-P-CCNC: 38 U/L (ref 12–78)
ANION GAP SERPL CALCULATED.3IONS-SCNC: 7 MMOL/L (ref 4–13)
APTT PPP: 29 SECONDS (ref 23–37)
AST SERPL W P-5'-P-CCNC: 18 U/L (ref 5–45)
ATRIAL RATE: 59 BPM
BASOPHILS # BLD AUTO: 0.04 THOUSANDS/ΜL (ref 0–0.1)
BASOPHILS NFR BLD AUTO: 1 % (ref 0–1)
BILIRUB SERPL-MCNC: 0.5 MG/DL (ref 0.2–1)
BILIRUB UR QL STRIP: NEGATIVE
BUN SERPL-MCNC: 22 MG/DL (ref 5–25)
CALCIUM SERPL-MCNC: 8.8 MG/DL (ref 8.3–10.1)
CHLORIDE SERPL-SCNC: 106 MMOL/L (ref 100–108)
CLARITY UR: CLEAR
CO2 SERPL-SCNC: 27 MMOL/L (ref 21–32)
COLOR UR: YELLOW
CREAT SERPL-MCNC: 1.5 MG/DL (ref 0.6–1.3)
EOSINOPHIL # BLD AUTO: 0.12 THOUSAND/ΜL (ref 0–0.61)
EOSINOPHIL NFR BLD AUTO: 2 % (ref 0–6)
ERYTHROCYTE [DISTWIDTH] IN BLOOD BY AUTOMATED COUNT: 13.6 % (ref 11.6–15.1)
ERYTHROCYTE [SEDIMENTATION RATE] IN BLOOD: 30 MM/HOUR (ref 0–19)
GFR SERPL CREATININE-BSD FRML MDRD: 41 ML/MIN/1.73SQ M
GLUCOSE SERPL-MCNC: 106 MG/DL (ref 65–140)
GLUCOSE UR STRIP-MCNC: NEGATIVE MG/DL
HCT VFR BLD AUTO: 46.2 % (ref 36.5–49.3)
HGB BLD-MCNC: 14.9 G/DL (ref 12–17)
HGB UR QL STRIP.AUTO: NEGATIVE
IMM GRANULOCYTES # BLD AUTO: 0.01 THOUSAND/UL (ref 0–0.2)
IMM GRANULOCYTES NFR BLD AUTO: 0 % (ref 0–2)
INR PPP: 0.98 (ref 0.84–1.19)
KETONES UR STRIP-MCNC: NEGATIVE MG/DL
LACTATE SERPL-SCNC: 0.9 MMOL/L (ref 0.5–2)
LEUKOCYTE ESTERASE UR QL STRIP: NEGATIVE
LYMPHOCYTES # BLD AUTO: 1.7 THOUSANDS/ΜL (ref 0.6–4.47)
LYMPHOCYTES NFR BLD AUTO: 30 % (ref 14–44)
MCH RBC QN AUTO: 30.3 PG (ref 26.8–34.3)
MCHC RBC AUTO-ENTMCNC: 32.3 G/DL (ref 31.4–37.4)
MCV RBC AUTO: 94 FL (ref 82–98)
MONOCYTES # BLD AUTO: 0.59 THOUSAND/ΜL (ref 0.17–1.22)
MONOCYTES NFR BLD AUTO: 10 % (ref 4–12)
NEUTROPHILS # BLD AUTO: 3.19 THOUSANDS/ΜL (ref 1.85–7.62)
NEUTS SEG NFR BLD AUTO: 57 % (ref 43–75)
NITRITE UR QL STRIP: NEGATIVE
NRBC BLD AUTO-RTO: 0 /100 WBCS
P AXIS: 60 DEGREES
PH UR STRIP.AUTO: 6 [PH]
PLATELET # BLD AUTO: 115 THOUSANDS/UL (ref 149–390)
PMV BLD AUTO: 9 FL (ref 8.9–12.7)
POTASSIUM SERPL-SCNC: 5.2 MMOL/L (ref 3.5–5.3)
PR INTERVAL: 152 MS
PROCALCITONIN SERPL-MCNC: <0.05 NG/ML
PROCALCITONIN SERPL-MCNC: <0.05 NG/ML
PROT SERPL-MCNC: 7.2 G/DL (ref 6.4–8.2)
PROT UR STRIP-MCNC: NEGATIVE MG/DL
PROTHROMBIN TIME: 13 SECONDS (ref 11.6–14.5)
QRS AXIS: 69 DEGREES
QRSD INTERVAL: 94 MS
QT INTERVAL: 416 MS
QTC INTERVAL: 411 MS
RBC # BLD AUTO: 4.92 MILLION/UL (ref 3.88–5.62)
SODIUM SERPL-SCNC: 140 MMOL/L (ref 136–145)
SP GR UR STRIP.AUTO: 1.01 (ref 1–1.03)
T WAVE AXIS: 53 DEGREES
TROPONIN I SERPL-MCNC: <0.02 NG/ML
UROBILINOGEN UR QL STRIP.AUTO: 0.2 E.U./DL
VENTRICULAR RATE: 59 BPM
WBC # BLD AUTO: 5.65 THOUSAND/UL (ref 4.31–10.16)

## 2021-08-02 PROCEDURE — 85025 COMPLETE CBC W/AUTO DIFF WBC: CPT

## 2021-08-02 PROCEDURE — 36415 COLL VENOUS BLD VENIPUNCTURE: CPT

## 2021-08-02 PROCEDURE — 93010 ELECTROCARDIOGRAM REPORT: CPT | Performed by: INTERNAL MEDICINE

## 2021-08-02 PROCEDURE — 99284 EMERGENCY DEPT VISIT MOD MDM: CPT

## 2021-08-02 PROCEDURE — 87040 BLOOD CULTURE FOR BACTERIA: CPT | Performed by: EMERGENCY MEDICINE

## 2021-08-02 PROCEDURE — 85730 THROMBOPLASTIN TIME PARTIAL: CPT | Performed by: EMERGENCY MEDICINE

## 2021-08-02 PROCEDURE — 93005 ELECTROCARDIOGRAM TRACING: CPT

## 2021-08-02 PROCEDURE — 80053 COMPREHEN METABOLIC PANEL: CPT

## 2021-08-02 PROCEDURE — 83605 ASSAY OF LACTIC ACID: CPT | Performed by: EMERGENCY MEDICINE

## 2021-08-02 PROCEDURE — 85652 RBC SED RATE AUTOMATED: CPT | Performed by: EMERGENCY MEDICINE

## 2021-08-02 PROCEDURE — 81003 URINALYSIS AUTO W/O SCOPE: CPT | Performed by: EMERGENCY MEDICINE

## 2021-08-02 PROCEDURE — 84145 PROCALCITONIN (PCT): CPT | Performed by: EMERGENCY MEDICINE

## 2021-08-02 PROCEDURE — 71045 X-RAY EXAM CHEST 1 VIEW: CPT

## 2021-08-02 PROCEDURE — 84484 ASSAY OF TROPONIN QUANT: CPT

## 2021-08-02 PROCEDURE — 85610 PROTHROMBIN TIME: CPT | Performed by: EMERGENCY MEDICINE

## 2021-08-02 PROCEDURE — 99284 EMERGENCY DEPT VISIT MOD MDM: CPT | Performed by: EMERGENCY MEDICINE

## 2021-08-02 PROCEDURE — 74176 CT ABD & PELVIS W/O CONTRAST: CPT

## 2021-08-02 RX ORDER — MECLIZINE HCL 12.5 MG/1
25 TABLET ORAL EVERY 8 HOURS SCHEDULED
Status: DISCONTINUED | OUTPATIENT
Start: 2021-08-02 | End: 2021-08-02 | Stop reason: HOSPADM

## 2021-08-02 RX ADMIN — MECLIZINE 25 MG: 12.5 TABLET ORAL at 18:01

## 2021-08-02 NOTE — TELEPHONE ENCOUNTER
Pts daughter-in-law Derek Ho states pt is suffering from bouts of dizzyness  She would like an order for P T  to realign his crystals again    Derek Ho can be reached at [phone]

## 2021-08-02 NOTE — Clinical Note
Case was discussed with MARLYN and the patient's admission status was agreed to be Admission Status: observation status to the service of Dr Sonya Toure

## 2021-08-03 ENCOUNTER — TELEPHONE (OUTPATIENT)
Dept: FAMILY MEDICINE CLINIC | Facility: HOSPITAL | Age: 86
End: 2021-08-03

## 2021-08-03 NOTE — ED PROVIDER NOTES
History  Chief Complaint   Patient presents with    Dizziness     pt presents to er with daughter in law from home with complaints of dizziness and weakness that startd this am upon awakening  patient lost balance and fell backwards into bed  no head strike  rastan also reports feeling weak with incresed lethargy that alsostarted today        70-year-old male presents for evaluation of feeling lightheaded that started this morning  Patient reports generalized fatigue  He was getting out of bed, felt very lightheaded and then fell backwards into his bed but did not sustain any trauma  Denies associated chest pain, shortness of breath, abdominal pain  Improvement of symptoms while lying still  Standing up aggravates symptoms  Prior to Admission Medications   Prescriptions Last Dose Informant Patient Reported? Taking?    B Complex Vitamins (B COMPLEX PO)  Self Yes No   Sig: Take by mouth daily   Cholecalciferol (CVS VITAMIN D) 2000 units CAPS  Self Yes No   Sig: Take by mouth daily    Cyanocobalamin (B-12 PO)  Self Yes No   Sig: Take by mouth daily   Ferrous Sulfate (IRON) 325 (65 Fe) MG TABS  Self Yes No   Sig: Take by mouth every other day    KRILL OIL PO  Self Yes No   Sig: Take by mouth daily   LORazepam (ATIVAN) 0 5 mg tablet   No No   Sig: take 1 tablet by mouth every 8 hours if needed for anxiety   Multiple Vitamins-Minerals (VISION FORMULA/LUTEIN PO)  Self Yes No   Sig: Take by mouth   Respiratory Therapy Supplies (SPIROMETER) KIT  Self No No   Sig: by Does not apply route 4 (four) times a day   albuterol (PROVENTIL HFA,VENTOLIN HFA) 90 mcg/act inhaler  Self No No   Sig: Inhale 2 puffs every 6 (six) hours as needed for wheezing   atenolol (TENORMIN) 25 mg tablet   No No   Sig: take 1 tablet by mouth once daily   fluticasone (FLONASE) 50 mcg/act nasal spray  Self Yes No   Si spray into each nostril 2 (two) times a day   guaiFENesin (MUCINEX) 600 mg 12 hr tablet  Self Yes No   Sig: Take 1,200 mg by mouth every 12 (twelve) hours   ipratropium (ATROVENT) 0 02 % nebulizer solution  Self No No   Sig: Take 1 vial (0 5 mg total) by nebulization 3 (three) times a day   levalbuterol (XOPENEX) 1 25 mg/3 mL nebulizer solution  Self No No   Sig: Take 1 vial (1 25 mg total) by nebulization 3 (three) times a day   levothyroxine 75 mcg tablet  Self No No   Sig: take 1 tablet by mouth once daily   nystatin-triamcinolone (MYCOLOG-II) cream   No No   Sig: APPLY TO THE AFFECTED AREA(S) SPARINGLY TWICE A DAY   omeprazole (PriLOSEC) 20 mg delayed release capsule   No No   Sig: take 1 capsule by mouth once daily   sodium chloride 0 9 % nebulizer solution  Self No No   Sig: TAKE 3 MILLILITERS BY NEBULIZATION ROUTE AS NEEDED FOR WHEEZING   Patient not taking: Reported on 3/17/2021   tamsulosin (FLOMAX) 0 4 mg   No No   Sig: take 1 capsule by mouth once daily WITH DINNER   zolpidem (AMBIEN) 5 mg tablet   No No   Sig: take 1 tablet by mouth at bedtime if needed for sleep      Facility-Administered Medications: None       Past Medical History:   Diagnosis Date    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    Compression fracture of thoracic vertebra (HCC)     last assessed 05/07/2012    COPD (chronic obstructive pulmonary disease) (HCC)     Disease of thyroid gland     [facility] attack ([facility] [address] )     History of methicillin resistant staphylococcus aureus (MRSA) 03/28/2019    negative nasal swab     Hollenhorst plaque, right eye     last assessed 04/08/2013    Hyperlipidemia     Hypertension     Iron deficiency anemia due to chronic blood loss     last assessed 09/10/2012    Ischemic colitis ([facility] [address] )     last assessed 05/27/2014    Osteoarthritis of both hands     last assessedn 04/30/2013    Peptic ulcer     last assessed 06/14/2013    Polymyalgia rheumatica ([facility] [address] )     last assessesd 10/09/2012    Renal disorder     Upper GI hemorrhage     last assessed 01/29/2015    Varicose veins of lower extremity     last assessed 2013       Past Surgical History:   Procedure Laterality Date    CORONARY ARTERY BYPASS GRAFT      HEMORRHOID SURGERY      HERNIA REPAIR      RENAL CYST EXCISION      resolved 2010    THROMBOENDARTERECTOMY Right     carotid, last assessed 2013       Family History   Problem Relation Age of Onset    Hypertension Mother     Alcohol abuse Mother     Hypertension Father     Alcohol abuse Father     Alcohol abuse Son     Heart disease Family     Stroke Family         syndrome     I have reviewed and agree with the history as documented  E-Cigarette/Vaping    E-Cigarette Use Never User      E-Cigarette/Vaping Substances     Social History     Tobacco Use    Smoking status: Former Smoker     Packs/day: 2 00     Years: 50 00     Pack years: 100 00     Types: Cigarettes     Start date:      Quit date:      Years since quittin 6    Smokeless tobacco: Never Used    Tobacco comment: Quit 40 yrs  ago   Vaping Use    Vaping Use: Never used   Substance Use Topics    Alcohol use: Not Currently     Alcohol/week: 1 0 standard drinks     Types: 1 Glasses of wine per week     Comment: social    Drug use: No       Review of Systems   Constitutional: Negative for fever  Respiratory: Negative for shortness of breath  Cardiovascular: Negative for chest pain  Gastrointestinal: Negative for abdominal pain  Neurological: Positive for light-headedness  Negative for dizziness  All other systems reviewed and are negative  Physical Exam  Physical Exam  Vitals and nursing note reviewed  Constitutional:       General: He is not in acute distress  Appearance: He is well-developed  HENT:      Head: Normocephalic and atraumatic  Right Ear: External ear normal       Left Ear: External ear normal       Nose: Nose normal    Eyes:      General: No scleral icterus  Pulmonary:      Effort: Pulmonary effort is normal  No respiratory distress     Abdominal:      General: There is no distension  Palpations: Abdomen is soft  Musculoskeletal:         General: No deformity  Normal range of motion  Cervical back: Normal range of motion and neck supple  Comments: 5/5 strength of bilateral upper and lower extremities  Skin:     General: Skin is warm  Findings: No rash  Neurological:      General: No focal deficit present  Mental Status: He is alert  Gait: Gait normal       Comments: HINTS exam reassuring, no dysdiadochokinesis, finger to nose intact, heel-to-shin intact, negative Romberg, able to ambulate  Psychiatric:         Mood and Affect: Mood normal          Vital Signs  ED Triage Vitals   Temperature Pulse Respirations Blood Pressure SpO2   08/02/21 1237 08/02/21 1230 08/02/21 1230 08/02/21 1230 08/02/21 1230   97 8 °F (36 6 °C) 60 20 (!) 174/81 97 %      Temp Source Heart Rate Source Patient Position - Orthostatic VS BP Location FiO2 (%)   08/02/21 1237 08/02/21 1230 08/02/21 1230 08/02/21 1230 --   Temporal Monitor Lying Left arm       Pain Score       --                  Vitals:    08/02/21 1230   BP: (!) 174/81   Pulse: 60   Patient Position - Orthostatic VS: Lying         Visual Acuity      ED Medications  Medications - No data to display    Diagnostic Studies  Results Reviewed     Procedure Component Value Units Date/Time    Blood culture #1 [[id number]] Collected: 08/02/21 1249    Lab Status: Preliminary result Specimen: Blood from Arm, Left Updated: 08/02/21 2301     Blood Culture Received in Microbiology Lab  Culture in Progress  Blood culture #2 [[id number]] Collected: 08/02/21 1249    Lab Status: Preliminary result Specimen: Blood from Arm, Right Updated: 08/02/21 2301     Blood Culture Received in Microbiology Lab  Culture in Progress      Procalcitonin Reflex [[id number]]  (Normal) Collected: 08/02/21 1557    Lab Status: Final result Specimen: Blood from Arm, Right Updated: 08/02/21 2150     Procalcitonin <0 05 ng/ml     UA w Reflex to Microscopic w Reflex to Culture [[id number]] Collected: 08/02/21 1543    Lab Status: Final result Specimen: Urine, Clean Catch Updated: 08/02/21 1549     Color, UA Yellow     Clarity, UA Clear     Specific Gravity, UA 1 015     pH, UA 6 0     Leukocytes, UA Negative     Nitrite, UA Negative     Protein, UA Negative mg/dl      Glucose, UA Negative mg/dl      Ketones, UA Negative mg/dl      Urobilinogen, UA 0 2 E U /dl      Bilirubin, UA Negative     Blood, UA Negative    Procalcitonin with AM Reflex [[id number]]  (Normal) Collected: 08/02/21 1249    Lab Status: Final result Specimen: Blood from Arm, Left Updated: 08/02/21 1548     Procalcitonin <0 05 ng/ml     Lactic Acid [[id number]]  (Normal) Collected: 08/02/21 1249    Lab Status: Final result Specimen: Blood from Arm, Left Updated: 08/02/21 1321     LACTIC ACID 0 9 mmol/L     Narrative:      Result may be elevated if tourniquet was used during collection      Protime-INR [[id number]]  (Normal) Collected: 08/02/21 1249    Lab Status: Final result Specimen: Blood from Arm, Left Updated: 08/02/21 1319     Protime 13 0 seconds      INR 0 98    APTT [[id number]]  (Normal) Collected: 08/02/21 1249    Lab Status: Final result Specimen: Blood from Arm, Left Updated: 08/02/21 1319     PTT 29 seconds     Comprehensive metabolic panel [[id number]]  (Abnormal) Collected: 08/02/21 1230    Lab Status: Final result Specimen: Blood from Arm, Right Updated: 08/02/21 1310     Sodium 140 mmol/L      Potassium 5 2 mmol/L      Chloride 106 mmol/L      CO2 27 mmol/L      ANION GAP 7 mmol/L      BUN 22 mg/dL      Creatinine 1 50 mg/dL      Glucose 106 mg/dL      Calcium 8 8 mg/dL      AST 18 U/L      ALT 38 U/L      Alkaline Phosphatase 70 U/L      Total Protein 7 2 g/dL      Albumin 3 6 g/dL      Total Bilirubin 0 50 mg/dL      eGFR 41 ml/min/1 73sq m     Narrative:      [place] guidelines for Chronic Kidney Disease (CKD):     Stage 1 with normal or high GFR (GFR > 90 mL/min/1 73 square meters)    Stage 2 Mild CKD (GFR = 60-89 mL/min/1 73 square meters)    Stage 3A Moderate CKD (GFR = 45-59 mL/min/1 73 square meters)    Stage 3B Moderate CKD (GFR = 30-44 mL/min/1 73 square meters)    Stage 4 Severe CKD (GFR = 15-29 mL/min/1 73 square meters)    Stage 5 End Stage CKD (GFR <15 mL/min/1 73 square meters)  Note: GFR calculation is accurate only with a steady state creatinine    Sedimentation rate, automated [[id number]]  (Abnormal) Collected: 08/02/21 1230    Lab Status: Final result Specimen: Blood from Arm, Right Updated: 08/02/21 1309     Sed Rate 30 mm/hour     Troponin I [[id number]]  (Normal) Collected: 08/02/21 1230    Lab Status: Final result Specimen: Blood from Arm, Right Updated: 08/02/21 1258     Troponin I <0 02 ng/mL     CBC and differential [[id number]]  (Abnormal) Collected: 08/02/21 1230    Lab Status: Final result Specimen: Blood from Arm, Right Updated: 08/02/21 1239     WBC 5 65 Thousand/uL      RBC 4 92 Million/uL      Hemoglobin 14 9 g/dL      Hematocrit 46 2 %      MCV 94 fL      MCH 30 3 pg      MCHC 32 3 g/dL      RDW 13 6 %      MPV 9 0 fL      Platelets 103 Thousands/uL      nRBC 0 /100 WBCs      Neutrophils Relative 57 %      Immat GRANS % 0 %      Lymphocytes Relative 30 %      Monocytes Relative 10 %      Eosinophils Relative 2 %      Basophils Relative 1 %      Neutrophils Absolute 3 19 Thousands/µL      Immature Grans Absolute 0 01 Thousand/uL      Lymphocytes Absolute 1 70 Thousands/µL      Monocytes Absolute 0 59 Thousand/µL      Eosinophils Absolute 0 12 Thousand/µL      Basophils Absolute 0 04 Thousands/µL     Narrative:       No Clots                 CT abdomen pelvis wo contrast   Final Result by Baker Osgood, MD (08/02 1350)      No acute findings  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  Reidentified severe prostamegaly              Workstation performed: [id number]         XR chest 1 view portable   Final Result by Agustin Jones MD (08/02 1511)      No focal consolidation, pleural effusion, or pneumothorax  Workstation performed: [id number]                    Procedures  Procedures         ED Course  ED Course as of Aug 02 2302   Mon Aug 02, 2021   1515 XR chest 1 view portable                             SBIRT 20yo+      Most Recent Value   SBIRT (25 yo +)   In order to provide better care to our patients, we are screening all of our patients for alcohol and drug use  Would it be okay to ask you these screening questions? Yes Filed at: 08/02/2021 1538   Initial Alcohol Screen: US AUDIT-C    1  How often do you have a drink containing alcohol?  0 Filed at: 08/02/2021 1538   2  How many drinks containing alcohol do you have on a typical day you are drinking? 0 Filed at: 08/02/2021 1538   3a  Male UNDER 65: How often do you have five or more drinks on one occasion? 0 Filed at: 08/02/2021 1538   3b  FEMALE Any Age, or MALE 65+: How often do you have 4 or more drinks on one occassion? 0 Filed at: 08/02/2021 1538   Audit-C Score  0 Filed at: 08/02/2021 1538   BETY: How many times in the past year have you    Used an illegal drug or used a prescription medication for non-medical reasons? Never Filed at: 08/02/2021 1538                    MDM  Number of Diagnoses or Management Options  Near syncope: new and requires workup  Diagnosis management comments:  15-year-old male presenting with lightheadedness and fatigue  Obtain sepsis /cardiac evaluation    Discussed all results with patient  Patient reports continued symptoms as he stood up to urinate  Will admit for telemetry  After evaluation by admitting team, patient now would like to go home as he feels better  Patient's son arrived to take him home and all questions answered  FU PCP  RTED          Amount and/or Complexity of Data Reviewed  Clinical lab tests: ordered and reviewed  Tests in the radiology section of CPT®: reviewed and ordered  Tests in the medicine section of CPT®: reviewed and ordered  Discussion of test results with the performing providers: yes  Decide to obtain previous medical records or to obtain history from someone other than the patient: yes  Review and summarize past medical records: yes  Independent visualization of images, tracings, or specimens: yes        Disposition  Final diagnoses:   Near syncope     Time reflects when diagnosis was documented in both MDM as applicable and the Disposition within this note     Time User Action Codes Description Comment    8/2/2021  5:21 PM Tito Myers Add [R55] Near syncope       ED Disposition     ED Disposition Condition Date/Time Comment    Discharge Stable Mon Aug 2, 2021  5:46  [address] discharge to home/self care              Follow-up Information     Follow up With Specialties Details Why Contact Info Additional Information    Antonio Graham MD [facility] Medicine   [facility]  [address]  [address] [facility] [address]  [phone]       [address] [facility]   [facility] [address]  [address] [address], [address]     [address] Emergency Department Emergency Medicine  If symptoms worsen [address] [place], [id number] [phone] [address] Emergency Department, [address] [place] [address] [address]          Discharge Medication List as of 8/2/2021  5:46 PM      CONTINUE these medications which have NOT CHANGED    Details   albuterol (PROVENTIL HFA,VENTOLIN HFA) 90 mcg/act inhaler Inhale 2 puffs every 6 (six) hours as needed for wheezing, Starting Thu 9/17/2020, Normal      atenolol (TENORMIN) 25 mg tablet take 1 tablet by mouth once daily, Normal B Complex Vitamins (B COMPLEX PO) Take by mouth daily, Historical Med      Cholecalciferol ([employer]S VITAMIN D) 2000 units CAPS Take by mouth daily , Historical Med      Cyanocobalamin (B-12 PO) Take by mouth daily, Historical Med      Ferrous Sulfate (IRON) 325 (65 Fe) MG TABS Take by mouth every other day , Historical Med      fluticasone (FLONASE) 50 mcg/act nasal spray 1 spray into each nostril 2 (two) times a day, Historical Med      guaiFENesin (MUCINEX) 600 mg 12 hr tablet Take 1,200 mg by mouth every 12 (twelve) hours, Historical Med      ipratropium (ATROVENT) 0 02 % nebulizer solution Take 1 vial (0 5 mg total) by nebulization 3 (three) times a day, Starting Tue 11/3/2020, Print      KRILL OIL PO Take by mouth daily, Historical Med      levalbuterol (XOPENEX) 1 25 mg/3 mL nebulizer solution Take 1 vial (1 25 mg total) by nebulization 3 (three) times a day, Starting Tue 11/3/2020, Print      levothyroxine 75 mcg tablet take 1 tablet by mouth once daily, Normal      LORazepam (ATIVAN) 0 5 mg tablet take 1 tablet by mouth every 8 hours if needed for anxiety, Normal      Multiple Vitamins-Minerals (VISION FORMULA/LUTEIN PO) Take by mouth, Starting Sat 11/15/2014, Historical Med      nystatin-triamcinolone (MYCOLOG-II) cream APPLY TO THE AFFECTED AREA(S) SPARINGLY TWICE A DAY, Normal      omeprazole (PriLOSEC) 20 mg delayed release capsule take 1 capsule by mouth once daily, Normal      Respiratory Therapy Supplies (SPIROMETER) KIT by Does not apply route 4 (four) times a day, Starting Tue 2/25/2020, Print      sodium chloride 0 9 % nebulizer solution TAKE 3 MILLILITERS BY NEBULIZATION ROUTE AS NEEDED FOR WHEEZING, Normal      tamsulosin (FLOMAX) 0 4 mg take 1 capsule by mouth once daily WITH DINNER, Normal      zolpidem (AMBIEN) 5 mg tablet take 1 tablet by mouth at bedtime if needed for sleep, Normal           No discharge procedures on file      PDMP Review       Value Time User    PDMP Reviewed  Yes 7/29/2021 4:29 PM Kaitlynn Verde MD          ED Provider  Electronically Signed by           Dev Renee DO  08/02/21 5407

## 2021-08-04 ENCOUNTER — TRANSITIONAL CARE MANAGEMENT (OUTPATIENT)
Dept: FAMILY MEDICINE CLINIC | Facility: HOSPITAL | Age: 86
End: 2021-08-04

## 2021-08-05 ENCOUNTER — TRANSITIONAL CARE MANAGEMENT (OUTPATIENT)
Dept: FAMILY MEDICINE CLINIC | Facility: HOSPITAL | Age: 86
End: 2021-08-05

## 2021-08-05 NOTE — PROGRESS NOTES
CALLED HIS DAUGHTER LAWRENCE AT [facility]  ASKING TO BYPASS TCM WAIT FOR APPT  September   PLEASE     ADVISE    DAUGHTER SAID HE WAS ONLY AT [facility] [place] [address]  FROM  12 NOON TO 6 PM      PLEASE ADVISE  REGARDING TCM VS  ED     FOLLOW UP      THANKS

## 2021-08-07 LAB
BACTERIA BLD CULT: NORMAL
BACTERIA BLD CULT: NORMAL

## 2021-08-12 ENCOUNTER — EVALUATION (OUTPATIENT)
Dept: PHYSICAL THERAPY | Facility: CLINIC | Age: 86
End: 2021-08-12
Payer: MEDICARE

## 2021-08-12 DIAGNOSIS — R42 DIZZINESS: Primary | ICD-10-CM

## 2021-08-12 PROCEDURE — 97161 PT EVAL LOW COMPLEX 20 MIN: CPT | Performed by: PHYSICAL THERAPIST

## 2021-08-12 PROCEDURE — 97112 NEUROMUSCULAR REEDUCATION: CPT | Performed by: PHYSICAL THERAPIST

## 2021-08-12 NOTE — PROGRESS NOTES
PT Evaluation     Today's date: 2021  Patient name: Landon Nixon  : 1932  MRN: [id number]  Referring provider: Alondra Ramirez MD  Dx:   Encounter Diagnosis     ICD-10-CM    1  Dizziness  R42                   Assessment  Assessment details: Patient presents with dizziness from BPPV  Demonstrates positive left kasandra-hallpike and sx with returning upright following right kasandra-hallpike  Performed Left CRTx3 with improvement but not resolution of sx  Renzo Goldman presents with the impairments as listed above and would benefit from skilled Vestibular Rehab in Physical Therapy to address these impairments in order to maximize functional capacity and return to prior level of function  Thank you for this referral!  Impairments: activity intolerance and impaired balance    Goals  Impairment Goals:  1  Negative positional testing    Functional Goals upon discharge  1  Patient is able  to perform all positional changes without vertigo  2  Patient is able to perform all ADLs without limitations due to vertigo/dizziness    Plan  Patient would benefit from: PT eval and skilled physical therapy  Planned therapy interventions: manual therapy, home exercise program, neuromuscular re-education, therapeutic exercise, therapeutic training, therapeutic activities, postural training, patient education and balance  Frequency: 2x week  Duration in weeks: 8  Plan of Care expiration date: 10/7/2021  Treatment plan discussed with: patient        Subjective Evaluation    History of Present Illness  Mechanism of injury: 81 yo male presents with dizziness from recent episode of BPPV  Pt reports history of BPPV last year which resolved with PT  Pt recalls rising on Monday on 12 and experiencing dizziness causing him to fall back into bed  Pt went to ER and was diagnosed with BPPV   Pt notes performing HEP provided last year which improved symptoms but did not resolve and continues to experience dizziness with supine to sit transfers  Pain  No pain reported  Location: Cervical           Objective     Concurrent Complaints  Positive for nausea/motion sickness, visual change (macular degeneration), hearing loss, memory loss and poor concentration  Negative for headaches, tinnitus, aural fullness and peripheral neuropathy    Active Range of Motion   Cervical/Thoracic Spine       Cervical    Flexion:  Restriction level: moderate  Extension:  Restriction level: moderate  Left rotation:  Restriction level: moderate  Right rotation:  Restriction level: moderate  Neuro Exam:     Dizziness  Positive for disequilibrium and motion sickness  Negative for vertigo, oscillopsia, light-headedness, rocking or swaying, diplopia and floating or swimming  Exacerbating factors  Positive for turning head and supine to/from sitting  Negative for bending over, rolling in bed, looking up, walking, optokinetic movement and walking in busy environment       Headaches   Patient reports headaches: No      Cervical exam   Ligament Laxity Testing   Alar ligament: WNL  Sharp Danilo: WNL    Oculomotor exam   Oculomotor ROM: WNL  Resting nystagmus: not present   Gaze holding nystagmus: not present left  and not present right  Smooth pursuits: within normal limits    Positional testing   Karly-Hallpike   Left posterior canal: symptomatic  Duration: 10 (seconds)             Precautions: [place], COPD      Manuals 8/12                                                                Neuro Re-Ed             Left CRT 3x            Mccarthy-daroff HEP                                                                             Ther Ex                                                                                                                     Ther Activity                                       Gait Training                                       Modalities

## 2021-08-17 ENCOUNTER — OFFICE VISIT (OUTPATIENT)
Dept: PHYSICAL THERAPY | Facility: CLINIC | Age: 86
End: 2021-08-17
Payer: MEDICARE

## 2021-08-17 DIAGNOSIS — R42 DIZZINESS: Primary | ICD-10-CM

## 2021-08-17 PROCEDURE — 97112 NEUROMUSCULAR REEDUCATION: CPT | Performed by: PHYSICAL THERAPIST

## 2021-08-17 NOTE — PROGRESS NOTES
Daily Note     Today's date: 2021  Patient name: Silvino Pitts  : 1932  MRN: [id number]  Referring provider: Randy Choe MD  Dx:   Encounter Diagnosis     ICD-10-CM    1  Dizziness  R42                   Subjective: Pt reports still feeling dizzy upon rising in the morning, but subsides after 5-10 minutes of sitting  Objective: See treatment diary below      Assessment: Demonstrates negative left kasandra-hallpike, but reproduction of dizziness upon sitting from right CRT  Performed R CRTx5 with improved symptoms noted, but still noted mild brief dizziness upon sitting on finial R CRT  Instructed pt to remain upright until bedtime tonight as a prevention from re-occurence  Patient would benefit from continued PT      Plan: Continue per plan of care        Precautions: [place], COPD      Manuals                                                                Neuro Re-Ed             Left CRT 3x 1x           Mccarthy-daroff HEP            Right CRT  5x                                                               Ther Ex                                                                                                                     Ther Activity                                       Gait Training                                       Modalities

## 2021-08-18 DIAGNOSIS — F51.01 PRIMARY INSOMNIA: ICD-10-CM

## 2021-08-18 RX ORDER — ZOLPIDEM TARTRATE 5 MG/1
TABLET ORAL
Qty: 30 TABLET | Refills: 1 | Status: SHIPPED | OUTPATIENT
Start: 2021-08-18 | End: 2021-11-24

## 2021-08-19 ENCOUNTER — OFFICE VISIT (OUTPATIENT)
Dept: PHYSICAL THERAPY | Facility: CLINIC | Age: 86
End: 2021-08-19
Payer: MEDICARE

## 2021-08-19 DIAGNOSIS — R42 DIZZINESS: Primary | ICD-10-CM

## 2021-08-19 DIAGNOSIS — R10.819 SUPRAPUBIC TENDERNESS: ICD-10-CM

## 2021-08-19 PROCEDURE — 97110 THERAPEUTIC EXERCISES: CPT | Performed by: PHYSICAL THERAPIST

## 2021-08-19 PROCEDURE — 97112 NEUROMUSCULAR REEDUCATION: CPT | Performed by: PHYSICAL THERAPIST

## 2021-08-19 NOTE — PROGRESS NOTES
Daily Note     Today's date: 2021  Patient name: Srini Jeronimo  : 1932  MRN: [id number]  Referring provider: Haroon Pillai MD  Dx:   Encounter Diagnosis     ICD-10-CM    1  Dizziness  R42                   Subjective: Pt reports he is still dizzy when rising in the morning  Objective: See treatment diary below      Assessment: Pt reports dizziness with right kasandra-hallpike and negative with left kasandra-hallpike  Performed R CRTx3, with resolution of dizziness in right kasandra-hallpike position, but progressive worsening of dizziness upon sitting up  Pt reports relief in dizziness when he rubs the top of his head  Performed [employer] Danilo test when pt was dizzy and pt reports decrease in dizziness, but not repeated to rule-in cervical spine  Pt denies dizziness with cervical compression assessment and modified VBI test  Plan to perform manuals to cervical spine in supine and determine if improves pt's dizziness upon sitting up next visit  Patient would benefit from continued PT      Plan: Continue per plan of care        Precautions: [place], COPD      Manuals           Cervical distraction   nv          TPR to suboccipital   nv                                    Neuro Re-Ed             Left CRT 3x 1x 1x          Mccarthy-daroff HEP            Right CRT  5x 3x                                                              Ther Ex                                                                                                                     Ther Activity                                       Gait Training                                       Modalities

## 2021-08-20 RX ORDER — TAMSULOSIN HYDROCHLORIDE 0.4 MG/1
CAPSULE ORAL
Qty: 30 CAPSULE | Refills: 5 | Status: SHIPPED | OUTPATIENT
Start: 2021-08-20 | End: 2022-03-12

## 2021-08-24 ENCOUNTER — OFFICE VISIT (OUTPATIENT)
Dept: PHYSICAL THERAPY | Facility: CLINIC | Age: 86
End: 2021-08-24
Payer: MEDICARE

## 2021-08-24 DIAGNOSIS — R42 DIZZINESS: Primary | ICD-10-CM

## 2021-08-24 PROCEDURE — 97140 MANUAL THERAPY 1/> REGIONS: CPT | Performed by: PHYSICAL THERAPIST

## 2021-08-24 PROCEDURE — 97112 NEUROMUSCULAR REEDUCATION: CPT | Performed by: PHYSICAL THERAPIST

## 2021-08-24 NOTE — PROGRESS NOTES
Daily Note     Today's date: 2021  Patient name: Thais Correa  : 1932  MRN: [id number]  Referring provider: Amanda Fermin MD  Dx:   Encounter Diagnosis     ICD-10-CM    1  Dizziness  R42                   Subjective: Pt reports no dizziness over the last two days when rising in the morning  Objective: See treatment diary below      Assessment: Performed cervical manuals with TPR and cervical distraction  Mild muscle spasm noted in b/l UT and moderate muscle spasm in right side cervical pvm  Performed R CRTx3 with decrease intensity and duration of sx upon sitting from 2 min 30 sec to 1 min, but noted onset of sx in left sidelying during 2nd and 3rd rep  Encouraged pt to perform R CRTx3 at home later this afternoon/evening  Patient would benefit from continued PT      Plan: Continue per plan of care        Precautions: [place], COPD      Manuals          Cervical distraction   nv RUTHY         TPR to suboccipital   nv URTHY                                   Neuro Re-Ed             Left CRT 3x 1x 1x          Mccarthy-daroff HEP            Right CRT  5x 3x 3x                                                             Ther Ex                                                                                                                     Ther Activity                                       Gait Training                                       Modalities

## 2021-08-26 ENCOUNTER — OFFICE VISIT (OUTPATIENT)
Dept: OBGYN CLINIC | Facility: CLINIC | Age: 86
End: 2021-08-26
Payer: MEDICARE

## 2021-08-26 ENCOUNTER — APPOINTMENT (OUTPATIENT)
Dept: RADIOLOGY | Facility: CLINIC | Age: 86
End: 2021-08-26
Payer: MEDICARE

## 2021-08-26 VITALS
SYSTOLIC BLOOD PRESSURE: 133 MMHG | HEIGHT: 64 IN | HEART RATE: 62 BPM | DIASTOLIC BLOOD PRESSURE: 71 MMHG | BODY MASS INDEX: 28.17 KG/M2 | WEIGHT: 165 LBS

## 2021-08-26 DIAGNOSIS — F41.9 ANXIETY: ICD-10-CM

## 2021-08-26 DIAGNOSIS — M76.52 PATELLAR TENDONITIS OF LEFT KNEE: ICD-10-CM

## 2021-08-26 DIAGNOSIS — M25.562 LEFT KNEE PAIN, UNSPECIFIED CHRONICITY: ICD-10-CM

## 2021-08-26 DIAGNOSIS — M17.12 PRIMARY OSTEOARTHRITIS OF LEFT KNEE: Primary | ICD-10-CM

## 2021-08-26 PROCEDURE — 73564 X-RAY EXAM KNEE 4 OR MORE: CPT

## 2021-08-26 PROCEDURE — 99203 OFFICE O/P NEW LOW 30 MIN: CPT | Performed by: ORTHOPAEDIC SURGERY

## 2021-08-26 RX ORDER — LORAZEPAM 0.5 MG/1
TABLET ORAL
Qty: 90 TABLET | Refills: 0 | Status: SHIPPED | OUTPATIENT
Start: 2021-08-26 | End: 2021-09-27

## 2021-08-26 NOTE — PROGRESS NOTES
Assessment:     1  Primary osteoarthritis of left knee    2  Patellar tendonitis of left knee        Plan:     Problem List Items Addressed This Visit        Musculoskeletal and Integument    Primary osteoarthritis of left knee - Primary    Relevant Orders    XR knee 4+ vw left injury    Ambulatory referral to Physical Therapy    Patellar tendonitis of left knee    Relevant Orders    Brace    Ambulatory referral to Physical Therapy        Findings consistent with left knee primary osteoarthritis with patella tendonitis  Imaging and prognosis reviewed with patient  Primary symptoms related to patella tendon  Will give patient patella tendon stabilizing strap, avoid hill and stairs if possible  Also avoid kneeling, crawling activities  Try stationary bike, elliptical, pool, flat ground walking for exercise  We will refer patient to physical therapy  He can use tylenol, OTC voltaren gel for exercise  See back in 6 weeks  All patient's questions were answered to his satisfaction  This note is created using dictation transcription  It may contain typographical errors, grammatical errors, improperly dictated words, background noise and other errors  Subjective:     Patient ID: Srini Jeronimo is a 80 y o  male  Chief Complaint:  80 yr old male in for evaluation of left knee pain  He states he has had anterior -posterior knee pain for the past month without injury  He feels like something is breaking off in his knee when walking  Pain worse with weight bearing activities, especially going up and down the hill  No instability  Using tylenol for pain not helping  He tries to walk for exercise but some days limited  Information on patient's intake form was reviewed  Allergy:  Allergies   Allergen Reactions    Pravastatin Anaphylaxis, Photosensitivity and Headache     Reaction Date: [id number];    Other reaction(s): Headache    Acetaminophen-Codeine GI Intolerance    Atorvastatin      Other reaction(s): Leg Cramps  Other reaction(s): Leg Cramps    Codeine Nausea Only    Colestipol GI Intolerance     Reaction Date: [id number];     Contrast  [Iodinated Diagnostic Agents]     Fenofibrate GI Intolerance     Reaction Date: [id number];     Hydrocodone Vomiting    Niacin      Reaction Date: [id number];     Niaspan  [Niacin Er]     Pitavastatin Myalgia    Pneumococcal Polysaccharide Vaccine     Pravastatin Sodium     Simvastatin     Statins Myalgia    Vicoprofen  [Hydrocodone-Ibuprofen] GI Intolerance    Cyclophosphamide Rash    Ioversol Hives     Medications:  all current active meds have been reviewed  Past Medical History:  Past Medical History:   Diagnosis Date    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    Compression fracture of thoracic vertebra [facility] )     last assessed 05/07/2012    COPD (chronic obstructive pulmonary disease) ([facility] )     Disease of thyroid gland     Heart attack ([facility] )     History of methicillin resistant staphylococcus aureus (MRSA) 03/28/2019    negative nasal swab     Hollenhorst plaque, right eye     last assessed 04/08/2013    Hyperlipidemia     Hypertension     Iron deficiency anemia due to chronic blood loss     last assessed 09/10/2012    Ischemic colitis ([facility] )     last assessed 05/27/2014    Osteoarthritis of both hands     last assessedn 04/30/2013    Peptic ulcer     last assessed 06/14/2013    Polymyalgia rheumatica ([facility] )     last assessesd 10/09/2012    Renal disorder     Upper GI hemorrhage     last assessed 01/29/2015    Varicose veins of lower extremity     last assessed 04/26/2013     Past Surgical History:  Past Surgical History:   Procedure Laterality Date    CORONARY ARTERY BYPASS GRAFT      HEMORRHOID SURGERY      HERNIA REPAIR      RENAL CYST EXCISION      resolved 05/2010    THROMBOENDARTERECTOMY Right     carotid, last assessed 06/18/2013     Family History:  Family History   Problem Relation Age of Onset    Hypertension Mother  Alcohol abuse Mother     Hypertension Father     Alcohol abuse Father     Alcohol abuse Son     Heart disease Family     Stroke Family         syndrome     Social History:  Social History     Substance and Sexual Activity   Alcohol Use Not Currently    Alcohol/week: 1 0 standard drinks    Types: 1 Glasses of wine per week    Comment: social     Social History     Substance and Sexual Activity   Drug Use No     Social History     Tobacco Use   Smoking Status Former Smoker    Packs/day: 2 00    Years: 50 00    Pack years: 100 00    Types: Cigarettes    Start date: 65    Quit date: 46    Years since quittin 6   Smokeless Tobacco Never Used   Tobacco Comment    Quit 40 yrs  ago     Review of Systems   Constitutional: Negative for chills and fever  HENT: Negative for ear pain and sore throat  Eyes: Negative for pain and visual disturbance  Respiratory: Negative for cough and shortness of breath  Cardiovascular: Negative for chest pain and palpitations  Gastrointestinal: Negative for abdominal pain and vomiting  Genitourinary: Negative for dysuria and hematuria  Musculoskeletal: Positive for arthralgias (Left knee) and gait problem ([place]alg[place])  Negative for back pain and joint swelling  Skin: Negative for color change and rash  Neurological: Negative for seizures and syncope  Psychiatric/Behavioral: Negative  All other systems reviewed and are negative  Objective:  BP Readings from Last 1 Encounters:   21 133/71      Wt Readings from Last 1 Encounters:   21 74 8 kg (165 lb)      BMI:   Estimated body mass index is 28 32 kg/m² as calculated from the following:    Height as of this encounter: 5' 4" (1 626 m)  Weight as of this encounter: 74 8 kg (165 lb)  BSA:   Estimated body surface area is 1 8 meters squared as calculated from the following:    Height as of this encounter: 5' 4" (1 626 m)  Weight as of this encounter: 74 8 kg (165 lb)  Physical Exam  Vitals and nursing note reviewed  Constitutional:       Appearance: Normal appearance  He is well-developed  HENT:      Head: Normocephalic and atraumatic  Right Ear: External ear normal       Left Ear: External ear normal    Eyes:      Extraocular Movements: Extraocular movements intact  Conjunctiva/sclera: Conjunctivae normal    Pulmonary:      Effort: Pulmonary effort is normal    Musculoskeletal:      Cervical back: Neck supple  Left knee: No effusion  Instability Tests: Medial Alfonso test negative and lateral Alfonso test negative  Skin:     General: Skin is warm  Neurological:      Mental Status: He is alert and oriented to person, place, and time  Psychiatric:         Mood and Affect: Mood normal          Behavior: Behavior normal        Left Knee Exam     Muscle Strength   The patient has normal left knee strength  Tenderness   The patient is experiencing tenderness in the patellar tendon  Range of Motion   Extension: 0   Flexion: 130     Tests   Alfonso:  Medial - negative Lateral - negative  Varus: negative Valgus: negative  Patellar apprehension: negative    Other   Erythema: absent  Scars: absent  Sensation: normal  Pulse: present  Swelling: none  Effusion: no effusion present    Comments:  Crepitation with motion             I have personally reviewed pertinent films in PACS and my interpretation is xr left knee demonstrates mild to moderate degenerative changes, more pronunced medial and patellofemoral compartments, osteophytes        Scribe Attestation    I,:  Reji Eden am acting as a scribe while in the presence of the attending physician :       I,:  Kay Leavitt MD personally performed the services described in this documentation    as scribed in my presence :

## 2021-08-31 ENCOUNTER — OFFICE VISIT (OUTPATIENT)
Dept: PHYSICAL THERAPY | Facility: CLINIC | Age: 86
End: 2021-08-31
Payer: MEDICARE

## 2021-08-31 DIAGNOSIS — R42 DIZZINESS: Primary | ICD-10-CM

## 2021-08-31 PROCEDURE — 97112 NEUROMUSCULAR REEDUCATION: CPT | Performed by: PHYSICAL THERAPIST

## 2021-08-31 NOTE — PROGRESS NOTES
Daily Note     Today's date: 2021  Patient name: Vern Jernigan  : 1932  MRN: [id number]  Referring provider: Krupa Madrgial MD  Dx:   Encounter Diagnosis     ICD-10-CM    1  Dizziness  R42                   Subjective: Pt reports less intensity, frequency and duration of dizziness over the last few days  Pt notes feeling dizzy early this morning when rising to go to the bathroom, but no dizziness when rising later to get up for the day  Objective: See treatment diary below      Assessment: Performed R CRTx2 and left CRTx1 with no sx reproduced  Encouraged pt to perform HEP provided for R CRTx1 daily  Discussed POC and will place pt on hold for PT when no longer experiencing consistent vertigo with supine to sit tranfers  Patient would benefit from continued PT      Plan: Continue per plan of care        Precautions: [place], COPD      Manuals         Cervical distraction   nv RUTHY         TPR to suboccipital   nv RUTHY                                   Neuro Re-Ed             Left CRT 3x 1x 1x  1x        Mccarthy-daroff HEP            Right CRT  5x 3x 3x 2x                                                            Ther Ex                                                                                                                     Ther Activity                                       Gait Training                                       Modalities

## 2021-09-02 ENCOUNTER — OFFICE VISIT (OUTPATIENT)
Dept: PHYSICAL THERAPY | Facility: CLINIC | Age: 86
End: 2021-09-02
Payer: MEDICARE

## 2021-09-02 DIAGNOSIS — R42 DIZZINESS: Primary | ICD-10-CM

## 2021-09-02 PROCEDURE — 97112 NEUROMUSCULAR REEDUCATION: CPT | Performed by: PHYSICAL THERAPIST

## 2021-09-02 NOTE — PROGRESS NOTES
Daily Note     Today's date: 2021  Patient name: Cuca Hernadez  : 1932  MRN: [id number]  Referring provider: Liss Euceda MD  Dx:   Encounter Diagnosis     ICD-10-CM    1  Dizziness  R42                   Subjective: Pt reports performing R CRT at home over the last two days with no sx of dizziness  Objective: See treatment diary below      Assessment: Pt noted reproduction of dizziness upon rising from right kasandra-hallpike and upon sitting up from R CRT, but sx subsided in 11-13 seconds  Pt reports no dizziness during 5th and 6th set of R CRT  Pt will return to PT if experiences return in dizziness  Encouraged pt to continue R CRTx1 daily to avoid reoccurrence of BPPV  Plan to assess left knee pain next visit  Plan: Progress note during next visit        Precautions: [place], COPD      Manuals  9       Cervical distraction   nv RUTHY         TPR to suboccipital   nv RUTHY                                   Neuro Re-Ed             Left CRT 3x 1x 1x  1x        Mccarthy-daroff HEP            Right CRT  5x 3x 3x 2x 6x                                                           Ther Ex                                                                                                                     Ther Activity                                       Gait Training                                       Modalities

## 2021-09-07 ENCOUNTER — APPOINTMENT (OUTPATIENT)
Dept: PHYSICAL THERAPY | Facility: CLINIC | Age: 86
End: 2021-09-07
Payer: MEDICARE

## 2021-09-09 ENCOUNTER — OFFICE VISIT (OUTPATIENT)
Dept: PHYSICAL THERAPY | Facility: CLINIC | Age: 86
End: 2021-09-09
Payer: MEDICARE

## 2021-09-09 DIAGNOSIS — R42 DIZZINESS: Primary | ICD-10-CM

## 2021-09-09 PROCEDURE — 97112 NEUROMUSCULAR REEDUCATION: CPT | Performed by: PHYSICAL THERAPIST

## 2021-09-09 NOTE — PROGRESS NOTES
Daily Note     Today's date: 2021  Patient name: Landon Nixon  : 1932  MRN: [id number]  Referring provider: Mariana Woody MD  Dx:   Encounter Diagnosis     ICD-10-CM    1  Dizziness  R42                   Subjective: Pt reports return in dizziness yesterday and today  Pt reports attempting CRT at home 3x today without sx resolution  Pt c/o 8/10 dizziness pre tx  Objective: See treatment diary below      Assessment: Pt notes sx lasting 3 min in right kasandra-hallpike  Performed R CRTx3 with patient noting sx resolution with right kasandra-hallpike after the first set  Demonstrates decrease sx from 8/10 to 0/10 after 3 sets  Patient would benefit from continued PT for further instruction in home CRT  Plan: Continue per plan of care        Precautions: [place], COPD      Manuals       Cervical distraction   nv RUTHY         TPR to suboccipital   nv RUTHY                                   Neuro Re-Ed             Left CRT 3x 1x 1x  1x  1x      Mccarthy-daroff HEP            Right CRT  5x 3x 3x 2x 6x 3x                                                          Ther Ex                                                                                                                     Ther Activity                                       Gait Training                                       Modalities

## 2021-09-09 NOTE — PROGRESS NOTES
{SL AMB PT/OT NOTE [id number]}    Today's date: 2021  Patient name: Trisha Mercer  : 1932  MRN: [id number]  Referring provider: Sylvie Salas MD  Dx:   Encounter Diagnosis     ICD-10-CM    1  Primary osteoarthritis of left knee  M17 12    2   Patellar tendonitis of left knee  M76 52                   Assessment/Plan    Subjective Evaluation    History of Present Illness  Mechanism of injury: 81 yo male presents with left knee pain  Pain  Location: Left knee          Objective           Precautions: ***      Manuals                                                                 Neuro Re-Ed                                                                                                        Ther Ex                                                                                                                     Ther Activity                                       Gait Training                                       Modalities

## 2021-09-14 ENCOUNTER — APPOINTMENT (OUTPATIENT)
Dept: PHYSICAL THERAPY | Facility: CLINIC | Age: 86
End: 2021-09-14
Payer: MEDICARE

## 2021-09-15 ENCOUNTER — OFFICE VISIT (OUTPATIENT)
Dept: FAMILY MEDICINE CLINIC | Facility: HOSPITAL | Age: 86
End: 2021-09-15
Payer: MEDICARE

## 2021-09-15 VITALS
BODY MASS INDEX: 27.59 KG/M2 | SYSTOLIC BLOOD PRESSURE: 118 MMHG | TEMPERATURE: 97.4 F | OXYGEN SATURATION: 95 % | WEIGHT: 161.6 LBS | DIASTOLIC BLOOD PRESSURE: 80 MMHG | HEART RATE: 88 BPM | HEIGHT: 64 IN

## 2021-09-15 DIAGNOSIS — I10 ESSENTIAL HYPERTENSION: ICD-10-CM

## 2021-09-15 DIAGNOSIS — R06.02 SHORTNESS OF BREATH: ICD-10-CM

## 2021-09-15 DIAGNOSIS — Z00.00 MEDICARE ANNUAL WELLNESS VISIT, SUBSEQUENT: Primary | ICD-10-CM

## 2021-09-15 DIAGNOSIS — B34.9 VIRAL ILLNESS: ICD-10-CM

## 2021-09-15 DIAGNOSIS — J44.9 MODERATE COPD (CHRONIC OBSTRUCTIVE PULMONARY DISEASE) (HCC): ICD-10-CM

## 2021-09-15 PROCEDURE — G0439 PPPS, SUBSEQ VISIT: HCPCS | Performed by: FAMILY MEDICINE

## 2021-09-15 PROCEDURE — 99214 OFFICE O/P EST MOD 30 MIN: CPT | Performed by: FAMILY MEDICINE

## 2021-09-15 PROCEDURE — U0003 INFECTIOUS AGENT DETECTION BY NUCLEIC ACID (DNA OR RNA); SEVERE ACUTE RESPIRATORY SYNDROME CORONAVIRUS 2 (SARS-COV-2) (CORONAVIRUS DISEASE [COVID-19]), AMPLIFIED PROBE TECHNIQUE, MAKING USE OF HIGH THROUGHPUT TECHNOLOGIES AS DESCRIBED BY CMS-2020-01-R: HCPCS | Performed by: FAMILY MEDICINE

## 2021-09-15 PROCEDURE — U0005 INFEC AGEN DETEC AMPLI PROBE: HCPCS | Performed by: FAMILY MEDICINE

## 2021-09-15 PROCEDURE — 1123F ACP DISCUSS/DSCN MKR DOCD: CPT | Performed by: FAMILY MEDICINE

## 2021-09-15 RX ORDER — PREDNISONE 10 MG/1
TABLET ORAL
Qty: 16 TABLET | Refills: 0 | Status: SHIPPED | OUTPATIENT
Start: 2021-09-15 | End: 2021-09-30 | Stop reason: HOSPADM

## 2021-09-15 NOTE — PROGRESS NOTES
Assessment and Plan:     Problem List Items Addressed This Visit        Respiratory    Moderate COPD (chronic obstructive pulmonary disease) ([facility] )    Relevant Medications    predniSONE 10 mg tablet       Cardiovascular and Mediastinum    Essential hypertension       Other    Shortness of breath    Medicare annual wellness visit, subsequent - Primary    Viral illness    Relevant Orders    Novel Coronavirus (COVID-19), PCR [facility] Collected in Office        BMI Counseling: Body mass index is 27 74 kg/m²  The BMI is above normal  Nutrition recommendations include decreasing portion sizes, moderation in carbohydrate intake and reducing intake of cholesterol  Exercise recommendations include strength training exercises  No pharmacotherapy was ordered  Rationale for BMI follow-up plan is due to patient being overweight or obese  Depression Screening and Follow-up Plan:   Patient was screened for depression during today's encounter  They screened negative with a PHQ-2 score of 2  Preventive health issues were discussed with patient, and age appropriate screening tests were ordered as noted in patient's After Visit Summary  Personalized health advice and appropriate referrals for health education or preventive services given if needed, as noted in patient's After Visit Summary       History of Present Illness:     Patient presents for Medicare Annual Wellness visit    Patient Care Team:  Kayleen Guzman MD as PCP - Albina Ferries, MD Duncan Loach, DPM Renae Babinski, MD [place] Ferdinand, MD     Problem List:     Patient Active Problem List   Diagnosis    Hydronephrosis of right kidney    CAD (coronary artery disease)    Carotid stenosis    Essential hypertension    Anxiety disorder due to general medical condition    Benign prostatic hyperplasia with lower urinary tract symptoms    GERD (gastroesophageal reflux disease)    Mixed simple and mucopurulent chronic bronchitis ([facility] )    Headache    Mixed hyperlipidemia    Acquired hypothyroidism    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[facility]    Insomnia    Iron deficiency anemia    Macular degeneration    Osteoporosis    Other atopic dermatitis    Shortness of breath    Medicare annual wellness visit, subsequent    Eczema    Seborrheic keratoses, inflamed    Moderate COPD (chronic obstructive pulmonary disease) ([facility])    Bronchitis    Suprapubic tenderness    Loose stools    Thrombocytopenia ([facility])    BMI 29 0-29 9,adult    Chronic diastolic heart failure ([facility])    Atrial fibrillation ([facility])    Urinary obstruction    Cervical spine arthritis    Stage 3 chronic kidney disease ([facility])    Aneurysm, pulmonary, arteriovenous    LIVIER (obstructive sleep apnea)    Snoring    Excessive daytime sleepiness    Chest pain    Dizziness    Impacted cerumen of left ear    TMJ arthropathy    Chronic cough    Central serous chorioretinopathy    Bilateral sensorineural hearing loss    Gastrointestinal hemorrhage    Senile nuclear cataract    Peptic ulcer with hemorrhage but without obstruction    Nonspecific abnormal findings on radiological and examination of lung field    Tinea cruris    Partial arterial occlusion of retina    Ischemic colitis ([facility] )    Primary osteoarthritis of left knee    Patellar tendonitis of left knee    Viral illness      Past Medical and Surgical History:     Past Medical History:   Diagnosis Date    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    Compression fracture of thoracic vertebra ([facility])     last assessed 05/07/2012    COPD (chronic obstructive pulmonary disease) ([facility])     Disease of thyroid gland     Heart attack ([facility] )     History of methicillin resistant staphylococcus aureus ([facility]SA) 03/28/2019    negative nasal swab     Hollenhorst plaque, right eye     last assessed 04/08/2013    Hyperlipidemia     Hypertension     Iron deficiency anemia due to chronic blood loss     last assessed 09/10/2012    Ischemic colitis ([facility] [address] )     last assessed 2014    Osteoarthritis of both hands     last assessedn 2013    Peptic ulcer     last assessed 2013    Polymyalgia rheumatica ([facility] )     last assessesd 10/09/2012    Renal disorder     Upper GI hemorrhage     last assessed 2015    Varicose veins of lower extremity     last assessed 2013     Past Surgical History:   Procedure Laterality Date    CORONARY ARTERY BYPASS GRAFT      HEMORRHOID SURGERY      HERNIA REPAIR      RENAL CYST EXCISION      resolved 2010    THROMBOENDARTERECTOMY Right     carotid, last assessed 2013      Family History:     Family History   Problem Relation Age of Onset    Hypertension Mother     Alcohol abuse Mother     Hypertension Father     Alcohol abuse Father     Alcohol abuse Son     Heart disease Family     Stroke Family         syndrome      Social History:     Social History     Socioeconomic History    Marital status:      Spouse name: None    Number of children: None    Years of education: None    Highest education level: None   Occupational History    None   Tobacco Use    Smoking status: Former Smoker     Packs/day: 2 00     Years: 50 00     Pack years: 100 00     Types: Cigarettes     Start date:      Quit date:      Years since quittin 7    Smokeless tobacco: Never Used    Tobacco comment: Quit 40 yrs   ago   Vaping Use    Vaping Use: Never used   Substance and Sexual Activity    Alcohol use: Not Currently     Alcohol/week: 1 0 standard drinks     Types: 1 Glasses of wine per week     Comment: social    Drug use: No    Sexual activity: None   Other Topics Concern    None   Social History Narrative     per [employer]    Always uses seat belt     Social Determinants of Health     Financial Resource Strain: Low Risk     Difficulty of Paying Living Expenses: Not hard at all   Food Insecurity:     Worried About [employer] [employer] in the Last Year:     Behz[employer] thompson Food in the Last Year:    Transportation Needs: No Transportation Needs    Lack of Transportation (Medical): No    Lack of Transportation (Non-Medical):  No   Physical Activity:     Days of Exercise per Week:     Minutes of Exercise per Session:    Stress:     Feeling of Stress :    Social Connections:     Frequency of Communication with Friends and Family:     Frequency of Social Gatherings with Friends and Family:     Attends Sabianist Services:     Active Member of Clubs or Organizations:     Attends Club or Organization Meetings:     Marital Status:    Intimate Partner Violence:     Fear of Current or Ex-Partner:     Emotionally Abused:     Physically Abused:     Sexually Abused:       Medications and Allergies:     Current Outpatient Medications   Medication Sig Dispense Refill    albuterol (PROVENTIL HFA,VENTOLIN HFA) 90 mcg/act inhaler Inhale 2 puffs every 6 (six) hours as needed for wheezing 1 Inhaler 3    atenolol (TENORMIN) 25 mg tablet take 1 tablet by mouth once daily 90 tablet 1    B Complex Vitamins (B COMPLEX PO) Take by mouth daily      Cholecalciferol ([employer] VITAMIN D) 2000 units CAPS Take by mouth daily       Cyanocobalamin (B-12 PO) Take by mouth daily      Ferrous Sulfate (IRON) 325 (65 Fe) MG TABS Take by mouth every other day       fluticasone (FLONASE) 50 mcg/act nasal spray 1 spray into each nostril 2 (two) times a day      guaiFENesin (MUCINEX) 600 mg 12 hr tablet Take 1,200 mg by mouth every 12 (twelve) hours      ipratropium (ATROVENT) 0 02 % nebulizer solution Take 1 vial (0 5 mg total) by nebulization 3 (three) times a day 225 mL 3    KRILL OIL PO Take by mouth daily      levalbuterol (XOPENEX) 1 25 mg/3 mL nebulizer solution Take 1 vial (1 25 mg total) by nebulization 3 (three) times a day 225 mL 3    levothyroxine 75 mcg tablet take 1 tablet by mouth once daily 30 tablet 5    LORazepam (ATIVAN) 0 5 mg tablet take 1 tablet by mouth every 8 hours if needed for anxiety 90 tablet 0    Multiple Vitamins-Minerals (VISION FORMULA/LUTEIN PO) Take by mouth      nystatin-triamcinolone (MYCOLOG-II) cream APPLY TO THE AFFECTED AREA(S) SPARINGLY TWICE A DAY 60 g 2    omeprazole (PriLOSEC) 20 mg delayed release capsule take 1 capsule by mouth once daily 30 capsule 5    Respiratory Therapy Supplies (SPIROMETER) KIT by Does not apply route 4 (four) times a day 1 kit 0    sodium chloride 0 9 % nebulizer solution TAKE 3 MILLILITERS BY NEBULIZATION ROUTE AS NEEDED FOR WHEEZING 90 mL 1    tamsulosin (FLOMAX) 0 4 mg take 1 capsule by mouth once daily with dinner 30 capsule 5    zolpidem (AMBIEN) 5 mg tablet take 1 tablet by mouth at bedtime if needed for sleep 30 tablet 1    predniSONE 10 mg tablet Take 4 tablets today, then three tablets daily for 2 days, two tablets daily for 2 days, then one tablet daily for 2 days  16 tablet 0     No current facility-administered medications for this visit  Allergies   Allergen Reactions    Pravastatin Anaphylaxis, Photosensitivity and Headache     Reaction Date: [id number];    Other reaction(s): Headache    Acetaminophen-Codeine GI Intolerance    Atorvastatin      Other reaction(s): Leg Cramps  Other reaction(s): Leg Cramps    Codeine Nausea Only    Colestipol GI Intolerance     Reaction Date: [id number];     Contrast  [Iodinated Diagnostic Agents]     Fenofibrate GI Intolerance     Reaction Date: [id number];     Hydrocodone Vomiting    Niacin      Reaction Date: [id number];     Niaspan  [Niacin Er]     Pitavastatin Myalgia    Pneumococcal Polysaccharide Vaccine     Pravastatin Sodium     Simvastatin     Statins Myalgia    Vicoprofen  [Hydrocodone-Ibuprofen] GI Intolerance    Cyclophosphamide Rash    Ioversol Hives      Immunizations:     Immunization History   Administered Date(s) Administered    INFLUENZA 10/01/2007, 09/24/2008, 09/24/2009, 10/30/2010, 10/11/2011, 10/09/2012    Influenza Split High Dose Preservative Free IM 09/10/2012, 10/02/2013, 11/15/2014, 10/21/2015, 09/17/2016, 09/15/2017    Influenza, high dose seasonal 0 7 mL 10/23/2018, 09/18/2019, 08/31/2020    Pneumococcal 01/01/2006    Pneumococcal Conjugate 13-Valent 10/23/2018    Pneumococcal Polysaccharide PPV23 04/01/1999, 01/05/2005    SARS-CoV-2 / COVID-19 mRNA IM ([employer]) 02/04/2021, 02/27/2021    Tdap 08/04/2014    Zoster 01/01/2011    Zoster Vaccine Recombinant 11/10/2018      Health Maintenance: There are no preventive care reminders to display for this patient  Topic Date Due    Influenza Vaccine (1) 09/01/2021      Medicare Health Risk Assessment:     /80 (Patient Position: Sitting, Cuff Size: Standard)   Pulse 88   Temp (!) 97 4 °F (36 3 °C) (Tympanic)   Ht 5' 4" (1 626 m)   Wt 73 3 kg (161 lb 9 6 oz)   SpO2 95%   BMI 27 74 kg/m²      Robel Montanez is here for his Subsequent Wellness visit  Last Medicare Wellness visit information reviewed, patient interviewed and updates made to the record today  Health Risk Assessment:   Patient rates overall health as poor  Patient feels that their physical health rating is slightly better  Patient is dissatisfied with their life  Eyesight was rated as slightly worse  Hearing was rated as same  Patient feels that their emotional and mental health rating is slightly worse  Patients states they are sometimes angry  Patient states they are sometimes unusually tired/fatigued  Pain experienced in the last 7 days has been some  Patient's pain rating has been 7/10  Patient states that he has experienced no weight loss or gain in last 6 months  Depression Screening:   PHQ-2 Score: 2      Fall Risk Screening:    In the past year, patient has experienced: history of falling in past year    Number of falls: 2 or more  Injured during fall?: No    Feels unsteady when standing or walking?: Yes    Worried about falling?: No      Home Safety:  Patient has trouble with stairs inside or outside of their home  Patient has working smoke alarms and has working carbon monoxide detector  Home safety hazards include: none  Nutrition:   Current diet is Regular  Medications:   Patient is currently taking over-the-counter supplements  OTC medications include: see medication list  Patient is able to manage medications  Activities of Daily Living (ADLs)/Instrumental Activities of Daily Living (IADLs):   Walk and transfer into and out of bed and chair?: Yes  Dress and groom yourself?: Yes    Bathe or shower yourself?: Yes    Feed yourself? Yes  Do your laundry/housekeeping?: No  Manage your money, pay your bills and track your expenses?: No  Make your own meals?: No    Do your own shopping?: No    Previous Hospitalizations:   Any hospitalizations or ED visits within the last 12 months?: Yes    How many hospitalizations have you had in the last year?: 1-2    Advance Care Planning:   Living will: Yes    Durable POA for healthcare:  Yes    Advanced directive: Yes    Advanced directive counseling given: Yes    Five wishes given: Yes    Patient declined ACP directive: No    End of Life Decisions reviewed with patient: Yes    Provider agrees with end of life decisions: Yes      Cognitive Screening:   Provider or family/friend/caregiver concerned regarding cognition?: No    PREVENTIVE SCREENINGS      Cardiovascular Screening:    General: Screening Not Indicated and History Lipid Disorder      Diabetes Screening:     General: Screening Current      Colorectal Cancer Screening:     General: Screening Not Indicated      Prostate Cancer Screening:    General: Screening Not Indicated      Osteoporosis Screening:    General: Screening Not Indicated and History Osteoporosis      Abdominal Aortic Aneurysm (AAA) Screening:    Risk factors include: tobacco use        Lung Cancer Screening:     General: Screening Not Indicated      Hepatitis C Screening:    General: Screening Not Indicated    Screening, Brief Intervention, and Referral to Treatment (SBIRT)    Screening    Typical number of drinks in a week: 0    Single Item Drug Screening:  How often have you used an illegal drug (including marijuana) or a prescription medication for non-medical reasons in the past year? never    Single Item Drug Screen Score: 0  Interpretation: Negative screen for possible drug use disorder    Brief Intervention  Alcohol & drug use screenings were reviewed  No concerns regarding substance use disorder identified         Vi Marcelino MD

## 2021-09-15 NOTE — PROGRESS NOTES
Assessment/Plan:         Diagnoses and all orders for this visit:    Medicare annual wellness visit, subsequent    Viral illness  -     Novel Coronavirus (COVID-19), PCR [facility] Collected in Office    Moderate COPD (chronic obstructive pulmonary disease) (HCC)  -     predniSONE 10 mg tablet; Take 4 tablets today, then three tablets daily for 2 days, two tablets daily for 2 days, then one tablet daily for 2 days  Essential hypertension  Comments:  Excellent BP control    Shortness of breath          Subjective:      Patient ID: Thais Correa is a 80 y o  male  6 month follow up  Presents today with his son who is pushing him in a wheelchair  They report in the room that he hasnt felt well for the past 3 days or so with significant fatigue, coughing and SOB  His daughter in law was diagnosed with COVID upon return from a trip and got a positive result yesterday  He was in contact with her daily since she returned since he lives in their home  His son is asymptomatic at this time but hasnt been quarantining     I did tell them we need to assess for COVID and I obtained a sample while wearing mask and gloves and had them leave the office shorter than 15 minutes in direct contact with him  He actually had a negative rapid COVID several days ago before symptoms so he could visit a friend    I also offered a steroid taper because of his underlying COPD which is exacerbated by current illness  The following portions of the patient's history were reviewed and updated as appropriate: allergies, current medications, past family history, past medical history, past social history, past surgical history and problem list     Review of Systems   Constitutional: Positive for fatigue and unexpected weight change  Negative for fever  HENT: Positive for congestion, sinus pressure and sinus pain  Negative for sore throat  Respiratory: Positive for cough, chest tightness, shortness of breath and wheezing  Cardiovascular: Negative for chest pain  Musculoskeletal: Positive for arthralgias  All other systems reviewed and are negative  Objective:      /80 (Patient Position: Sitting, Cuff Size: Standard)   Pulse 88   Temp (!) 97 4 °F (36 3 °C) (Tympanic)   Ht 5' 4" (1 626 m)   Wt 73 3 kg (161 lb 9 6 oz)   SpO2 95%   BMI 27 74 kg/m²          Physical Exam  Vitals and nursing note reviewed  Constitutional:       Appearance: He is ill-appearing  He is not toxic-appearing  HENT:      Head: Normocephalic and atraumatic  Cardiovascular:      Rate and Rhythm: Normal rate  Heart sounds: Normal heart sounds  Pulmonary:      Effort: No respiratory distress  Breath sounds: Decreased air movement present  No stridor  Decreased breath sounds, wheezing and rhonchi present  Musculoskeletal:      Right lower leg: No edema  Left lower leg: No edema  Neurological:      General: No focal deficit present  Mental Status: He is oriented to person, place, and time     Psychiatric:         Mood and Affect: Mood normal

## 2021-09-16 ENCOUNTER — APPOINTMENT (OUTPATIENT)
Dept: PHYSICAL THERAPY | Facility: CLINIC | Age: 86
End: 2021-09-16
Payer: MEDICARE

## 2021-09-16 LAB — SARS-COV-2 RNA RESP QL NAA+PROBE: NEGATIVE

## 2021-09-21 ENCOUNTER — OFFICE VISIT (OUTPATIENT)
Dept: PHYSICAL THERAPY | Facility: CLINIC | Age: 86
End: 2021-09-21
Payer: MEDICARE

## 2021-09-21 DIAGNOSIS — R42 DIZZINESS: Primary | ICD-10-CM

## 2021-09-21 PROCEDURE — 97112 NEUROMUSCULAR REEDUCATION: CPT | Performed by: PHYSICAL THERAPIST

## 2021-09-21 NOTE — PROGRESS NOTES
Daily Note     Today's date: 2021  Patient name: Gregorio Phillip  : 1932  MRN: [id number]  Referring provider: Prince Correa MD  Dx:   Encounter Diagnosis     ICD-10-CM    1  Dizziness  R42                   Subjective: Pt reports persistent reoccurance of dizziness sx despite HEP and PT  Pt c/o increasing eye/head pressure during episodes of dizziness  Objective: See treatment diary below      Assessment: Discussed the benefits of seeing an ENT since sx are not resolving  Performed R CRTx6 with resolution in sx noted  Sx lasting 4 minutes upon sitting up after first R CRT, 1 min 30 sec [id number] set, 1 min 53 sec after 3rd set, 58 sec after 4th set, and no dizziness after 5th set  Pt reports resolution of eye/head pressure after the 6th set  Encouraged pt to continue HEP  Plan: Holding PT until consol with ENT  Addendum 10/21/21: Patient has not returned to PT since this visit  Discharge at this time       Precautions: [place], COPD      Manuals      Cervical distraction   kalee BLISS         TPR to suboccipital   kalee BLISS                                   Neuro Re-Ed             Left CRT 3x 1x 1x  1x  1x      Mccarthy-daroff HEP            Right CRT  5x 3x 3x 2x 6x 3x 6x                                                         Ther Ex                                                                                                                     Ther Activity                                       Gait Training                                       Modalities

## 2021-09-23 ENCOUNTER — APPOINTMENT (OUTPATIENT)
Dept: PHYSICAL THERAPY | Facility: CLINIC | Age: 86
End: 2021-09-23
Payer: MEDICARE

## 2021-09-25 DIAGNOSIS — F41.9 ANXIETY: ICD-10-CM

## 2021-09-25 DIAGNOSIS — J42 CHRONIC BRONCHITIS, UNSPECIFIED CHRONIC BRONCHITIS TYPE (HCC): ICD-10-CM

## 2021-09-27 RX ORDER — ALBUTEROL SULFATE 90 UG/1
2 AEROSOL, METERED RESPIRATORY (INHALATION) EVERY 6 HOURS PRN
Qty: 18 G | Refills: 3 | Status: SHIPPED | OUTPATIENT
Start: 2021-09-27

## 2021-09-27 RX ORDER — LORAZEPAM 0.5 MG/1
TABLET ORAL
Qty: 90 TABLET | Refills: 0 | Status: SHIPPED | OUTPATIENT
Start: 2021-09-27 | End: 2021-10-26

## 2021-09-28 ENCOUNTER — APPOINTMENT (OUTPATIENT)
Dept: PHYSICAL THERAPY | Facility: CLINIC | Age: 86
End: 2021-09-28
Payer: MEDICARE

## 2021-09-28 ENCOUNTER — HOSPITAL ENCOUNTER (INPATIENT)
Facility: HOSPITAL | Age: 86
LOS: 2 days | Discharge: HOME HEALTH | DRG: 191 | End: 2021-09-30
Attending: EMERGENCY MEDICINE | Admitting: INTERNAL MEDICINE
Payer: MEDICARE

## 2021-09-28 ENCOUNTER — APPOINTMENT (INPATIENT)
Dept: NUCLEAR MEDICINE | Facility: HOSPITAL | Age: 86
DRG: 191 | End: 2021-09-28
Payer: MEDICARE

## 2021-09-28 ENCOUNTER — APPOINTMENT (INPATIENT)
Dept: NON INVASIVE DIAGNOSTICS | Facility: HOSPITAL | Age: 86
DRG: 191 | End: 2021-09-28
Payer: MEDICARE

## 2021-09-28 ENCOUNTER — APPOINTMENT (EMERGENCY)
Dept: CT IMAGING | Facility: HOSPITAL | Age: 86
DRG: 191 | End: 2021-09-28
Payer: MEDICARE

## 2021-09-28 ENCOUNTER — APPOINTMENT (EMERGENCY)
Dept: RADIOLOGY | Facility: HOSPITAL | Age: 86
DRG: 191 | End: 2021-09-28
Payer: MEDICARE

## 2021-09-28 DIAGNOSIS — R00.0 TACHYCARDIA: ICD-10-CM

## 2021-09-28 DIAGNOSIS — M17.12 PRIMARY OSTEOARTHRITIS OF LEFT KNEE: ICD-10-CM

## 2021-09-28 DIAGNOSIS — R79.89 D-DIMER, ELEVATED: ICD-10-CM

## 2021-09-28 DIAGNOSIS — N18.30 STAGE 3 CHRONIC KIDNEY DISEASE, UNSPECIFIED WHETHER STAGE 3A OR 3B CKD (HCC): ICD-10-CM

## 2021-09-28 DIAGNOSIS — J44.1 COPD EXACERBATION (HCC): Primary | ICD-10-CM

## 2021-09-28 DIAGNOSIS — R07.9 CHEST PAIN: ICD-10-CM

## 2021-09-28 DIAGNOSIS — E03.9 ACQUIRED HYPOTHYROIDISM: ICD-10-CM

## 2021-09-28 PROBLEM — R07.89 CHEST PRESSURE: Status: ACTIVE | Noted: 2021-09-28

## 2021-09-28 LAB
ALBUMIN SERPL BCP-MCNC: 3 G/DL (ref 3.5–5)
ALP SERPL-CCNC: 98 U/L (ref 46–116)
ALT SERPL W P-5'-P-CCNC: 26 U/L (ref 12–78)
ANION GAP SERPL CALCULATED.3IONS-SCNC: 9 MMOL/L (ref 4–13)
APTT PPP: 34 SECONDS (ref 23–37)
APTT PPP: 55 SECONDS (ref 23–37)
AST SERPL W P-5'-P-CCNC: 27 U/L (ref 5–45)
ATRIAL RATE: 105 BPM
ATRIAL RATE: 97 BPM
BASOPHILS # BLD AUTO: 0.03 THOUSANDS/ΜL (ref 0–0.1)
BASOPHILS NFR BLD AUTO: 1 % (ref 0–1)
BILIRUB SERPL-MCNC: 0.8 MG/DL (ref 0.2–1)
BUN SERPL-MCNC: 22 MG/DL (ref 5–25)
CALCIUM ALBUM COR SERPL-MCNC: 10 MG/DL (ref 8.3–10.1)
CALCIUM SERPL-MCNC: 9.2 MG/DL (ref 8.3–10.1)
CHLORIDE SERPL-SCNC: 101 MMOL/L (ref 100–108)
CK SERPL-CCNC: 24 U/L (ref 39–308)
CO2 SERPL-SCNC: 24 MMOL/L (ref 21–32)
CREAT SERPL-MCNC: 1.69 MG/DL (ref 0.6–1.3)
D DIMER PPP FEU-MCNC: 1.83 UG/ML FEU
EOSINOPHIL # BLD AUTO: 0.26 THOUSAND/ΜL (ref 0–0.61)
EOSINOPHIL NFR BLD AUTO: 4 % (ref 0–6)
ERYTHROCYTE [DISTWIDTH] IN BLOOD BY AUTOMATED COUNT: 13.3 % (ref 11.6–15.1)
FLUAV RNA RESP QL NAA+PROBE: NEGATIVE
FLUBV RNA RESP QL NAA+PROBE: NEGATIVE
GFR SERPL CREATININE-BSD FRML MDRD: 35 ML/MIN/1.73SQ M
GLUCOSE SERPL-MCNC: 106 MG/DL (ref 65–140)
HCT VFR BLD AUTO: 44.7 % (ref 36.5–49.3)
HGB BLD-MCNC: 14.2 G/DL (ref 12–17)
IMM GRANULOCYTES # BLD AUTO: 0.04 THOUSAND/UL (ref 0–0.2)
IMM GRANULOCYTES NFR BLD AUTO: 1 % (ref 0–2)
INR PPP: 1.05 (ref 0.84–1.19)
INR PPP: 1.05 (ref 0.84–1.19)
LYMPHOCYTES # BLD AUTO: 1.41 THOUSANDS/ΜL (ref 0.6–4.47)
LYMPHOCYTES NFR BLD AUTO: 22 % (ref 14–44)
MAGNESIUM SERPL-MCNC: 1.9 MG/DL (ref 1.6–2.6)
MCH RBC QN AUTO: 29.1 PG (ref 26.8–34.3)
MCHC RBC AUTO-ENTMCNC: 31.8 G/DL (ref 31.4–37.4)
MCV RBC AUTO: 92 FL (ref 82–98)
MONOCYTES # BLD AUTO: 0.98 THOUSAND/ΜL (ref 0.17–1.22)
MONOCYTES NFR BLD AUTO: 15 % (ref 4–12)
NEUTROPHILS # BLD AUTO: 3.79 THOUSANDS/ΜL (ref 1.85–7.62)
NEUTS SEG NFR BLD AUTO: 57 % (ref 43–75)
NRBC BLD AUTO-RTO: 0 /100 WBCS
NT-PROBNP SERPL-MCNC: 756 PG/ML
P AXIS: 70 DEGREES
P AXIS: 86 DEGREES
PLATELET # BLD AUTO: 109 THOUSANDS/UL (ref 149–390)
PMV BLD AUTO: 9.6 FL (ref 8.9–12.7)
POTASSIUM SERPL-SCNC: 4.3 MMOL/L (ref 3.5–5.3)
POTASSIUM SERPL-SCNC: 4.5 MMOL/L (ref 3.5–5.3)
PR INTERVAL: 148 MS
PR INTERVAL: 150 MS
PROCALCITONIN SERPL-MCNC: 0.07 NG/ML
PROT SERPL-MCNC: 7.5 G/DL (ref 6.4–8.2)
PROTHROMBIN TIME: 13.5 SECONDS (ref 11.6–14.5)
PROTHROMBIN TIME: 13.6 SECONDS (ref 11.6–14.5)
QRS AXIS: 70 DEGREES
QRS AXIS: 73 DEGREES
QRSD INTERVAL: 90 MS
QRSD INTERVAL: 90 MS
QT INTERVAL: 354 MS
QT INTERVAL: 364 MS
QTC INTERVAL: 422 MS
QTC INTERVAL: 467 MS
RBC # BLD AUTO: 4.88 MILLION/UL (ref 3.88–5.62)
RSV RNA RESP QL NAA+PROBE: NEGATIVE
SARS-COV-2 RNA RESP QL NAA+PROBE: NEGATIVE
SODIUM SERPL-SCNC: 134 MMOL/L (ref 136–145)
T WAVE AXIS: 48 DEGREES
T WAVE AXIS: 50 DEGREES
TROPONIN I SERPL-MCNC: <0.02 NG/ML
TROPONIN I SERPL-MCNC: <0.02 NG/ML
VENTRICULAR RATE: 105 BPM
VENTRICULAR RATE: 81 BPM
WBC # BLD AUTO: 6.51 THOUSAND/UL (ref 4.31–10.16)

## 2021-09-28 PROCEDURE — 94640 AIRWAY INHALATION TREATMENT: CPT

## 2021-09-28 PROCEDURE — 94664 DEMO&/EVAL PT USE INHALER: CPT

## 2021-09-28 PROCEDURE — 85025 COMPLETE CBC W/AUTO DIFF WBC: CPT | Performed by: EMERGENCY MEDICINE

## 2021-09-28 PROCEDURE — 82550 ASSAY OF CK (CPK): CPT | Performed by: EMERGENCY MEDICINE

## 2021-09-28 PROCEDURE — 85610 PROTHROMBIN TIME: CPT | Performed by: INTERNAL MEDICINE

## 2021-09-28 PROCEDURE — 99285 EMERGENCY DEPT VISIT HI MDM: CPT

## 2021-09-28 PROCEDURE — 93306 TTE W/DOPPLER COMPLETE: CPT

## 2021-09-28 PROCEDURE — 93005 ELECTROCARDIOGRAM TRACING: CPT

## 2021-09-28 PROCEDURE — 93010 ELECTROCARDIOGRAM REPORT: CPT | Performed by: INTERNAL MEDICINE

## 2021-09-28 PROCEDURE — 93970 EXTREMITY STUDY: CPT | Performed by: INTERNAL MEDICINE

## 2021-09-28 PROCEDURE — 36415 COLL VENOUS BLD VENIPUNCTURE: CPT | Performed by: EMERGENCY MEDICINE

## 2021-09-28 PROCEDURE — 84484 ASSAY OF TROPONIN QUANT: CPT | Performed by: INTERNAL MEDICINE

## 2021-09-28 PROCEDURE — 99223 1ST HOSP IP/OBS HIGH 75: CPT | Performed by: INTERNAL MEDICINE

## 2021-09-28 PROCEDURE — 80053 COMPREHEN METABOLIC PANEL: CPT | Performed by: EMERGENCY MEDICINE

## 2021-09-28 PROCEDURE — 85610 PROTHROMBIN TIME: CPT | Performed by: EMERGENCY MEDICINE

## 2021-09-28 PROCEDURE — 84132 ASSAY OF SERUM POTASSIUM: CPT | Performed by: INTERNAL MEDICINE

## 2021-09-28 PROCEDURE — 83880 ASSAY OF NATRIURETIC PEPTIDE: CPT | Performed by: EMERGENCY MEDICINE

## 2021-09-28 PROCEDURE — G1004 CDSM NDSC: HCPCS

## 2021-09-28 PROCEDURE — 78580 LUNG PERFUSION IMAGING: CPT

## 2021-09-28 PROCEDURE — 0241U HB NFCT DS VIR RESP RNA 4 TRGT: CPT | Performed by: INTERNAL MEDICINE

## 2021-09-28 PROCEDURE — 99285 EMERGENCY DEPT VISIT HI MDM: CPT | Performed by: EMERGENCY MEDICINE

## 2021-09-28 PROCEDURE — 94760 N-INVAS EAR/PLS OXIMETRY 1: CPT

## 2021-09-28 PROCEDURE — 84145 PROCALCITONIN (PCT): CPT | Performed by: INTERNAL MEDICINE

## 2021-09-28 PROCEDURE — 96374 THER/PROPH/DIAG INJ IV PUSH: CPT

## 2021-09-28 PROCEDURE — A9540 TC99M MAA: HCPCS

## 2021-09-28 PROCEDURE — 85730 THROMBOPLASTIN TIME PARTIAL: CPT | Performed by: EMERGENCY MEDICINE

## 2021-09-28 PROCEDURE — 85730 THROMBOPLASTIN TIME PARTIAL: CPT | Performed by: INTERNAL MEDICINE

## 2021-09-28 PROCEDURE — 83735 ASSAY OF MAGNESIUM: CPT | Performed by: EMERGENCY MEDICINE

## 2021-09-28 PROCEDURE — 93970 EXTREMITY STUDY: CPT

## 2021-09-28 PROCEDURE — 85379 FIBRIN DEGRADATION QUANT: CPT | Performed by: EMERGENCY MEDICINE

## 2021-09-28 PROCEDURE — 93306 TTE W/DOPPLER COMPLETE: CPT | Performed by: INTERNAL MEDICINE

## 2021-09-28 PROCEDURE — 84484 ASSAY OF TROPONIN QUANT: CPT | Performed by: EMERGENCY MEDICINE

## 2021-09-28 PROCEDURE — 71045 X-RAY EXAM CHEST 1 VIEW: CPT

## 2021-09-28 RX ORDER — ATENOLOL 25 MG/1
25 TABLET ORAL DAILY
Status: DISCONTINUED | OUTPATIENT
Start: 2021-09-28 | End: 2021-09-29

## 2021-09-28 RX ORDER — FLUTICASONE PROPIONATE 50 MCG
1 SPRAY, SUSPENSION (ML) NASAL 2 TIMES DAILY
Status: DISCONTINUED | OUTPATIENT
Start: 2021-09-28 | End: 2021-09-30 | Stop reason: HOSPADM

## 2021-09-28 RX ORDER — HYDROXYZINE HYDROCHLORIDE 25 MG/1
25 TABLET, FILM COATED ORAL EVERY 6 HOURS PRN
Status: DISCONTINUED | OUTPATIENT
Start: 2021-09-28 | End: 2021-09-30 | Stop reason: HOSPADM

## 2021-09-28 RX ORDER — HEPARIN SODIUM 1000 [USP'U]/ML
4000 INJECTION, SOLUTION INTRAVENOUS; SUBCUTANEOUS ONCE
Status: COMPLETED | OUTPATIENT
Start: 2021-09-28 | End: 2021-09-28

## 2021-09-28 RX ORDER — MELATONIN
2000 DAILY
Status: DISCONTINUED | OUTPATIENT
Start: 2021-09-28 | End: 2021-09-30 | Stop reason: HOSPADM

## 2021-09-28 RX ORDER — MAGNESIUM SULFATE 1 G/100ML
1 INJECTION INTRAVENOUS ONCE
Status: COMPLETED | OUTPATIENT
Start: 2021-09-28 | End: 2021-09-28

## 2021-09-28 RX ORDER — ACETAMINOPHEN 325 MG/1
650 TABLET ORAL EVERY 6 HOURS PRN
Status: DISCONTINUED | OUTPATIENT
Start: 2021-09-28 | End: 2021-09-30 | Stop reason: HOSPADM

## 2021-09-28 RX ORDER — LEVALBUTEROL 1.25 MG/.5ML
1.25 SOLUTION, CONCENTRATE RESPIRATORY (INHALATION)
Status: DISCONTINUED | OUTPATIENT
Start: 2021-09-28 | End: 2021-09-30 | Stop reason: HOSPADM

## 2021-09-28 RX ORDER — TAMSULOSIN HYDROCHLORIDE 0.4 MG/1
0.4 CAPSULE ORAL
Status: DISCONTINUED | OUTPATIENT
Start: 2021-09-28 | End: 2021-09-30 | Stop reason: HOSPADM

## 2021-09-28 RX ORDER — GUAIFENESIN 600 MG
1200 TABLET, EXTENDED RELEASE 12 HR ORAL EVERY 12 HOURS SCHEDULED
Status: DISCONTINUED | OUTPATIENT
Start: 2021-09-28 | End: 2021-09-30 | Stop reason: HOSPADM

## 2021-09-28 RX ORDER — LEVOTHYROXINE SODIUM 0.07 MG/1
37.5 TABLET ORAL
Status: DISCONTINUED | OUTPATIENT
Start: 2021-09-28 | End: 2021-09-30 | Stop reason: HOSPADM

## 2021-09-28 RX ORDER — METHYLPREDNISOLONE SODIUM SUCCINATE 40 MG/ML
40 INJECTION, POWDER, LYOPHILIZED, FOR SOLUTION INTRAMUSCULAR; INTRAVENOUS EVERY 12 HOURS SCHEDULED
Status: DISCONTINUED | OUTPATIENT
Start: 2021-09-28 | End: 2021-09-29

## 2021-09-28 RX ORDER — ALBUTEROL SULFATE 90 UG/1
2 AEROSOL, METERED RESPIRATORY (INHALATION) EVERY 6 HOURS PRN
Status: DISCONTINUED | OUTPATIENT
Start: 2021-09-28 | End: 2021-09-30 | Stop reason: HOSPADM

## 2021-09-28 RX ORDER — HEPARIN SODIUM 1000 [USP'U]/ML
4000 INJECTION, SOLUTION INTRAVENOUS; SUBCUTANEOUS
Status: DISCONTINUED | OUTPATIENT
Start: 2021-09-28 | End: 2021-09-28

## 2021-09-28 RX ORDER — HEPARIN SODIUM 10000 [USP'U]/100ML
3-20 INJECTION, SOLUTION INTRAVENOUS
Status: DISCONTINUED | OUTPATIENT
Start: 2021-09-28 | End: 2021-09-28

## 2021-09-28 RX ORDER — LEVALBUTEROL 1.25 MG/.5ML
1.25 SOLUTION, CONCENTRATE RESPIRATORY (INHALATION) 3 TIMES DAILY
Status: DISCONTINUED | OUTPATIENT
Start: 2021-09-28 | End: 2021-09-28

## 2021-09-28 RX ORDER — METHYLPREDNISOLONE SODIUM SUCCINATE 125 MG/2ML
80 INJECTION, POWDER, LYOPHILIZED, FOR SOLUTION INTRAMUSCULAR; INTRAVENOUS ONCE
Status: COMPLETED | OUTPATIENT
Start: 2021-09-28 | End: 2021-09-28

## 2021-09-28 RX ORDER — LORAZEPAM 0.5 MG/1
0.5 TABLET ORAL EVERY 8 HOURS PRN
Status: DISCONTINUED | OUTPATIENT
Start: 2021-09-28 | End: 2021-09-30 | Stop reason: HOSPADM

## 2021-09-28 RX ORDER — METOPROLOL TARTRATE 5 MG/5ML
5 INJECTION INTRAVENOUS EVERY 6 HOURS PRN
Status: DISCONTINUED | OUTPATIENT
Start: 2021-09-28 | End: 2021-09-30 | Stop reason: HOSPADM

## 2021-09-28 RX ORDER — ZOLPIDEM TARTRATE 5 MG/1
5 TABLET ORAL
Status: DISCONTINUED | OUTPATIENT
Start: 2021-09-28 | End: 2021-09-30 | Stop reason: HOSPADM

## 2021-09-28 RX ORDER — HEPARIN SODIUM 1000 [USP'U]/ML
2000 INJECTION, SOLUTION INTRAVENOUS; SUBCUTANEOUS
Status: DISCONTINUED | OUTPATIENT
Start: 2021-09-28 | End: 2021-09-28

## 2021-09-28 RX ORDER — FUROSEMIDE 10 MG/ML
40 INJECTION INTRAMUSCULAR; INTRAVENOUS ONCE
Status: COMPLETED | OUTPATIENT
Start: 2021-09-28 | End: 2021-09-28

## 2021-09-28 RX ORDER — IPRATROPIUM BROMIDE AND ALBUTEROL SULFATE 2.5; .5 MG/3ML; MG/3ML
3 SOLUTION RESPIRATORY (INHALATION) ONCE
Status: COMPLETED | OUTPATIENT
Start: 2021-09-28 | End: 2021-09-28

## 2021-09-28 RX ORDER — HEPARIN SODIUM 5000 [USP'U]/ML
5000 INJECTION, SOLUTION INTRAVENOUS; SUBCUTANEOUS EVERY 8 HOURS SCHEDULED
Status: DISCONTINUED | OUTPATIENT
Start: 2021-09-28 | End: 2021-09-30 | Stop reason: HOSPADM

## 2021-09-28 RX ORDER — PANTOPRAZOLE SODIUM 20 MG/1
20 TABLET, DELAYED RELEASE ORAL
Status: DISCONTINUED | OUTPATIENT
Start: 2021-09-28 | End: 2021-09-30 | Stop reason: HOSPADM

## 2021-09-28 RX ORDER — ONDANSETRON 2 MG/ML
4 INJECTION INTRAMUSCULAR; INTRAVENOUS EVERY 6 HOURS PRN
Status: DISCONTINUED | OUTPATIENT
Start: 2021-09-28 | End: 2021-09-30 | Stop reason: HOSPADM

## 2021-09-28 RX ADMIN — IPRATROPIUM BROMIDE AND ALBUTEROL SULFATE 3 ML: 2.5; .5 SOLUTION RESPIRATORY (INHALATION) at 03:46

## 2021-09-28 RX ADMIN — TAMSULOSIN HYDROCHLORIDE 0.4 MG: 0.4 CAPSULE ORAL at 16:57

## 2021-09-28 RX ADMIN — METHYLPREDNISOLONE SODIUM SUCCINATE 80 MG: 125 INJECTION, POWDER, FOR SOLUTION INTRAMUSCULAR; INTRAVENOUS at 03:46

## 2021-09-28 RX ADMIN — LORAZEPAM 0.5 MG: 0.5 TABLET ORAL at 21:25

## 2021-09-28 RX ADMIN — FUROSEMIDE 40 MG: 10 INJECTION, SOLUTION INTRAMUSCULAR; INTRAVENOUS at 13:02

## 2021-09-28 RX ADMIN — LEVALBUTEROL HYDROCHLORIDE 1.25 MG: 1.25 SOLUTION, CONCENTRATE RESPIRATORY (INHALATION) at 09:12

## 2021-09-28 RX ADMIN — ATENOLOL 25 MG: 25 TABLET ORAL at 09:11

## 2021-09-28 RX ADMIN — AZITHROMYCIN MONOHYDRATE 500 MG: 500 INJECTION, POWDER, LYOPHILIZED, FOR SOLUTION INTRAVENOUS at 05:29

## 2021-09-28 RX ADMIN — PANTOPRAZOLE SODIUM 20 MG: 20 TABLET, DELAYED RELEASE ORAL at 07:43

## 2021-09-28 RX ADMIN — METOPROLOL TARTRATE 5 MG: 5 INJECTION INTRAVENOUS at 21:04

## 2021-09-28 RX ADMIN — METHYLPREDNISOLONE SODIUM SUCCINATE 40 MG: 40 INJECTION, POWDER, FOR SOLUTION INTRAMUSCULAR; INTRAVENOUS at 23:36

## 2021-09-28 RX ADMIN — GUAIFENESIN 1200 MG: 600 TABLET ORAL at 21:04

## 2021-09-28 RX ADMIN — GUAIFENESIN 1200 MG: 600 TABLET ORAL at 09:11

## 2021-09-28 RX ADMIN — HEPARIN SODIUM 12 UNITS/KG/HR: 10000 INJECTION, SOLUTION INTRAVENOUS at 06:17

## 2021-09-28 RX ADMIN — MAGNESIUM SULFATE IN DEXTROSE 1 G: 10 INJECTION, SOLUTION INTRAVENOUS at 19:29

## 2021-09-28 RX ADMIN — HEPARIN SODIUM 4000 UNITS: 1000 INJECTION INTRAVENOUS; SUBCUTANEOUS at 05:38

## 2021-09-28 RX ADMIN — HEPARIN SODIUM 5000 UNITS: 5000 INJECTION INTRAVENOUS; SUBCUTANEOUS at 16:57

## 2021-09-28 RX ADMIN — METHYLPREDNISOLONE SODIUM SUCCINATE 40 MG: 40 INJECTION, POWDER, FOR SOLUTION INTRAMUSCULAR; INTRAVENOUS at 13:06

## 2021-09-28 RX ADMIN — LORAZEPAM 0.5 MG: 0.5 TABLET ORAL at 13:14

## 2021-09-28 RX ADMIN — IPRATROPIUM BROMIDE 0.5 MG: 0.5 SOLUTION RESPIRATORY (INHALATION) at 20:25

## 2021-09-28 RX ADMIN — HYDROXYZINE HYDROCHLORIDE 25 MG: 25 TABLET ORAL at 22:42

## 2021-09-28 RX ADMIN — HEPARIN SODIUM 5000 UNITS: 5000 INJECTION INTRAVENOUS; SUBCUTANEOUS at 21:14

## 2021-09-28 RX ADMIN — LEVALBUTEROL HYDROCHLORIDE 1.25 MG: 1.25 SOLUTION, CONCENTRATE RESPIRATORY (INHALATION) at 20:25

## 2021-09-28 RX ADMIN — Medication 2000 UNITS: at 09:11

## 2021-09-28 RX ADMIN — FLUTICASONE PROPIONATE 1 SPRAY: 50 SPRAY, METERED NASAL at 09:11

## 2021-09-28 RX ADMIN — FLUTICASONE PROPIONATE 1 SPRAY: 50 SPRAY, METERED NASAL at 16:57

## 2021-09-28 RX ADMIN — IPRATROPIUM BROMIDE 0.5 MG: 0.5 SOLUTION RESPIRATORY (INHALATION) at 14:49

## 2021-09-28 RX ADMIN — HEPARIN SODIUM 2000 UNITS: 1000 INJECTION INTRAVENOUS; SUBCUTANEOUS at 15:13

## 2021-09-28 RX ADMIN — LEVALBUTEROL HYDROCHLORIDE 1.25 MG: 1.25 SOLUTION, CONCENTRATE RESPIRATORY (INHALATION) at 14:49

## 2021-09-28 RX ADMIN — IPRATROPIUM BROMIDE 0.5 MG: 0.5 SOLUTION RESPIRATORY (INHALATION) at 09:12

## 2021-09-28 RX ADMIN — LEVOTHYROXINE SODIUM 37.5 MCG: 75 TABLET ORAL at 07:43

## 2021-09-29 PROBLEM — I48.91 NEW ONSET ATRIAL FIBRILLATION (HCC): Status: ACTIVE | Noted: 2021-09-29

## 2021-09-29 LAB
ANION GAP SERPL CALCULATED.3IONS-SCNC: 10 MMOL/L (ref 4–13)
ATRIAL RATE: 131 BPM
BASOPHILS # BLD AUTO: 0.02 THOUSANDS/ΜL (ref 0–0.1)
BASOPHILS NFR BLD AUTO: 0 % (ref 0–1)
BUN SERPL-MCNC: 27 MG/DL (ref 5–25)
CALCIUM SERPL-MCNC: 8.8 MG/DL (ref 8.3–10.1)
CHLORIDE SERPL-SCNC: 101 MMOL/L (ref 100–108)
CO2 SERPL-SCNC: 23 MMOL/L (ref 21–32)
CREAT SERPL-MCNC: 1.74 MG/DL (ref 0.6–1.3)
EOSINOPHIL # BLD AUTO: 0 THOUSAND/ΜL (ref 0–0.61)
EOSINOPHIL NFR BLD AUTO: 0 % (ref 0–6)
ERYTHROCYTE [DISTWIDTH] IN BLOOD BY AUTOMATED COUNT: 13.2 % (ref 11.6–15.1)
GFR SERPL CREATININE-BSD FRML MDRD: 34 ML/MIN/1.73SQ M
GLUCOSE SERPL-MCNC: 144 MG/DL (ref 65–140)
HCT VFR BLD AUTO: 41.3 % (ref 36.5–49.3)
HGB BLD-MCNC: 13.5 G/DL (ref 12–17)
IMM GRANULOCYTES # BLD AUTO: 0.05 THOUSAND/UL (ref 0–0.2)
IMM GRANULOCYTES NFR BLD AUTO: 1 % (ref 0–2)
LYMPHOCYTES # BLD AUTO: 0.89 THOUSANDS/ΜL (ref 0.6–4.47)
LYMPHOCYTES NFR BLD AUTO: 9 % (ref 14–44)
MAGNESIUM SERPL-MCNC: 2.2 MG/DL (ref 1.6–2.6)
MCH RBC QN AUTO: 29.4 PG (ref 26.8–34.3)
MCHC RBC AUTO-ENTMCNC: 32.7 G/DL (ref 31.4–37.4)
MCV RBC AUTO: 90 FL (ref 82–98)
MONOCYTES # BLD AUTO: 0.45 THOUSAND/ΜL (ref 0.17–1.22)
MONOCYTES NFR BLD AUTO: 5 % (ref 4–12)
NEUTROPHILS # BLD AUTO: 8.66 THOUSANDS/ΜL (ref 1.85–7.62)
NEUTS SEG NFR BLD AUTO: 85 % (ref 43–75)
NRBC BLD AUTO-RTO: 0 /100 WBCS
PLATELET # BLD AUTO: 136 THOUSANDS/UL (ref 149–390)
PMV BLD AUTO: 10.1 FL (ref 8.9–12.7)
POTASSIUM SERPL-SCNC: 4.8 MMOL/L (ref 3.5–5.3)
PROCALCITONIN SERPL-MCNC: <0.05 NG/ML
QRS AXIS: 7 DEGREES
QRSD INTERVAL: 84 MS
QT INTERVAL: 330 MS
QTC INTERVAL: 472 MS
RBC # BLD AUTO: 4.59 MILLION/UL (ref 3.88–5.62)
SODIUM SERPL-SCNC: 134 MMOL/L (ref 136–145)
T WAVE AXIS: 20 DEGREES
VENTRICULAR RATE: 123 BPM
WBC # BLD AUTO: 10.07 THOUSAND/UL (ref 4.31–10.16)

## 2021-09-29 PROCEDURE — 97162 PT EVAL MOD COMPLEX 30 MIN: CPT

## 2021-09-29 PROCEDURE — 85025 COMPLETE CBC W/AUTO DIFF WBC: CPT | Performed by: INTERNAL MEDICINE

## 2021-09-29 PROCEDURE — 97165 OT EVAL LOW COMPLEX 30 MIN: CPT

## 2021-09-29 PROCEDURE — 99232 SBSQ HOSP IP/OBS MODERATE 35: CPT | Performed by: INTERNAL MEDICINE

## 2021-09-29 PROCEDURE — 83735 ASSAY OF MAGNESIUM: CPT | Performed by: INTERNAL MEDICINE

## 2021-09-29 PROCEDURE — 99222 1ST HOSP IP/OBS MODERATE 55: CPT | Performed by: INTERNAL MEDICINE

## 2021-09-29 PROCEDURE — 94640 AIRWAY INHALATION TREATMENT: CPT

## 2021-09-29 PROCEDURE — 94760 N-INVAS EAR/PLS OXIMETRY 1: CPT

## 2021-09-29 PROCEDURE — 80048 BASIC METABOLIC PNL TOTAL CA: CPT | Performed by: INTERNAL MEDICINE

## 2021-09-29 PROCEDURE — 84145 PROCALCITONIN (PCT): CPT | Performed by: INTERNAL MEDICINE

## 2021-09-29 PROCEDURE — 99232 SBSQ HOSP IP/OBS MODERATE 35: CPT | Performed by: PHYSICIAN ASSISTANT

## 2021-09-29 RX ORDER — SODIUM CHLORIDE FOR INHALATION 0.9 %
3 VIAL, NEBULIZER (ML) INHALATION
Status: DISCONTINUED | OUTPATIENT
Start: 2021-09-29 | End: 2021-09-30 | Stop reason: HOSPADM

## 2021-09-29 RX ORDER — PREDNISONE 20 MG/1
40 TABLET ORAL DAILY
Status: DISCONTINUED | OUTPATIENT
Start: 2021-09-29 | End: 2021-09-30 | Stop reason: HOSPADM

## 2021-09-29 RX ORDER — SODIUM CHLORIDE FOR INHALATION 0.9 %
VIAL, NEBULIZER (ML) INHALATION
Status: COMPLETED
Start: 2021-09-29 | End: 2021-09-29

## 2021-09-29 RX ORDER — METOPROLOL TARTRATE 50 MG/1
50 TABLET, FILM COATED ORAL EVERY 12 HOURS SCHEDULED
Status: DISCONTINUED | OUTPATIENT
Start: 2021-09-29 | End: 2021-09-30 | Stop reason: HOSPADM

## 2021-09-29 RX ADMIN — LEVOTHYROXINE SODIUM 37.5 MCG: 75 TABLET ORAL at 05:40

## 2021-09-29 RX ADMIN — HEPARIN SODIUM 5000 UNITS: 5000 INJECTION INTRAVENOUS; SUBCUTANEOUS at 05:40

## 2021-09-29 RX ADMIN — METOPROLOL TARTRATE 50 MG: 50 TABLET, FILM COATED ORAL at 21:23

## 2021-09-29 RX ADMIN — HEPARIN SODIUM 5000 UNITS: 5000 INJECTION INTRAVENOUS; SUBCUTANEOUS at 21:23

## 2021-09-29 RX ADMIN — IPRATROPIUM BROMIDE 0.5 MG: 0.5 SOLUTION RESPIRATORY (INHALATION) at 13:54

## 2021-09-29 RX ADMIN — HYDROXYZINE HYDROCHLORIDE 25 MG: 25 TABLET ORAL at 15:28

## 2021-09-29 RX ADMIN — FLUTICASONE PROPIONATE 1 SPRAY: 50 SPRAY, METERED NASAL at 08:42

## 2021-09-29 RX ADMIN — IPRATROPIUM BROMIDE 0.5 MG: 0.5 SOLUTION RESPIRATORY (INHALATION) at 08:04

## 2021-09-29 RX ADMIN — ISODIUM CHLORIDE 3 ML: 0.03 SOLUTION RESPIRATORY (INHALATION) at 20:21

## 2021-09-29 RX ADMIN — Medication 2000 UNITS: at 08:47

## 2021-09-29 RX ADMIN — PANTOPRAZOLE SODIUM 20 MG: 20 TABLET, DELAYED RELEASE ORAL at 05:40

## 2021-09-29 RX ADMIN — TAMSULOSIN HYDROCHLORIDE 0.4 MG: 0.4 CAPSULE ORAL at 17:04

## 2021-09-29 RX ADMIN — FLUTICASONE PROPIONATE 1 SPRAY: 50 SPRAY, METERED NASAL at 17:04

## 2021-09-29 RX ADMIN — PREDNISONE 40 MG: 20 TABLET ORAL at 11:15

## 2021-09-29 RX ADMIN — LEVALBUTEROL HYDROCHLORIDE 1.25 MG: 1.25 SOLUTION, CONCENTRATE RESPIRATORY (INHALATION) at 08:04

## 2021-09-29 RX ADMIN — LEVALBUTEROL HYDROCHLORIDE 1.25 MG: 1.25 SOLUTION, CONCENTRATE RESPIRATORY (INHALATION) at 13:54

## 2021-09-29 RX ADMIN — LEVALBUTEROL HYDROCHLORIDE 1.25 MG: 1.25 SOLUTION, CONCENTRATE RESPIRATORY (INHALATION) at 20:21

## 2021-09-29 RX ADMIN — GUAIFENESIN 1200 MG: 600 TABLET ORAL at 21:23

## 2021-09-29 RX ADMIN — ATENOLOL 25 MG: 25 TABLET ORAL at 08:47

## 2021-09-29 RX ADMIN — HEPARIN SODIUM 5000 UNITS: 5000 INJECTION INTRAVENOUS; SUBCUTANEOUS at 14:11

## 2021-09-29 RX ADMIN — GUAIFENESIN 1200 MG: 600 TABLET ORAL at 08:47

## 2021-09-29 RX ADMIN — AZITHROMYCIN MONOHYDRATE 500 MG: 500 INJECTION, POWDER, LYOPHILIZED, FOR SOLUTION INTRAVENOUS at 05:40

## 2021-09-29 RX ADMIN — LORAZEPAM 0.5 MG: 0.5 TABLET ORAL at 21:23

## 2021-09-29 RX ADMIN — LORAZEPAM 0.5 MG: 0.5 TABLET ORAL at 11:15

## 2021-09-30 ENCOUNTER — APPOINTMENT (OUTPATIENT)
Dept: PHYSICAL THERAPY | Facility: CLINIC | Age: 86
End: 2021-09-30
Payer: MEDICARE

## 2021-09-30 VITALS
SYSTOLIC BLOOD PRESSURE: 122 MMHG | OXYGEN SATURATION: 95 % | DIASTOLIC BLOOD PRESSURE: 57 MMHG | BODY MASS INDEX: 26.65 KG/M2 | TEMPERATURE: 97.5 F | HEART RATE: 75 BPM | HEIGHT: 64 IN | RESPIRATION RATE: 17 BRPM | WEIGHT: 156.09 LBS

## 2021-09-30 PROBLEM — I48.91 ATRIAL FIBRILLATION (HCC): Status: RESOLVED | Noted: 2019-04-10 | Resolved: 2021-09-30

## 2021-09-30 PROBLEM — J44.1 COPD EXACERBATION (HCC): Status: RESOLVED | Noted: 2021-09-28 | Resolved: 2021-09-30

## 2021-09-30 PROBLEM — R79.89 ELEVATED D-DIMER: Status: RESOLVED | Noted: 2021-09-28 | Resolved: 2021-09-30

## 2021-09-30 PROBLEM — R07.89 CHEST PRESSURE: Status: RESOLVED | Noted: 2021-09-28 | Resolved: 2021-09-30

## 2021-09-30 LAB
ANION GAP SERPL CALCULATED.3IONS-SCNC: 12 MMOL/L (ref 4–13)
BUN SERPL-MCNC: 38 MG/DL (ref 5–25)
CALCIUM SERPL-MCNC: 8.4 MG/DL (ref 8.3–10.1)
CHLORIDE SERPL-SCNC: 102 MMOL/L (ref 100–108)
CO2 SERPL-SCNC: 21 MMOL/L (ref 21–32)
CREAT SERPL-MCNC: 1.97 MG/DL (ref 0.6–1.3)
GFR SERPL CREATININE-BSD FRML MDRD: 29 ML/MIN/1.73SQ M
GLUCOSE SERPL-MCNC: 107 MG/DL (ref 65–140)
POTASSIUM SERPL-SCNC: 4.8 MMOL/L (ref 3.5–5.3)
SODIUM SERPL-SCNC: 135 MMOL/L (ref 136–145)

## 2021-09-30 PROCEDURE — 94760 N-INVAS EAR/PLS OXIMETRY 1: CPT

## 2021-09-30 PROCEDURE — 94640 AIRWAY INHALATION TREATMENT: CPT

## 2021-09-30 PROCEDURE — 99239 HOSP IP/OBS DSCHRG MGMT >30: CPT | Performed by: PHYSICIAN ASSISTANT

## 2021-09-30 PROCEDURE — 80048 BASIC METABOLIC PNL TOTAL CA: CPT | Performed by: PHYSICIAN ASSISTANT

## 2021-09-30 PROCEDURE — 99232 SBSQ HOSP IP/OBS MODERATE 35: CPT | Performed by: INTERNAL MEDICINE

## 2021-09-30 RX ORDER — METOPROLOL TARTRATE 50 MG/1
50 TABLET, FILM COATED ORAL EVERY 12 HOURS SCHEDULED
Qty: 60 TABLET | Refills: 0 | Status: SHIPPED | OUTPATIENT
Start: 2021-09-30 | End: 2021-10-06 | Stop reason: SDUPTHER

## 2021-09-30 RX ORDER — UMECLIDINIUM BROMIDE AND VILANTEROL TRIFENATATE 62.5; 25 UG/1; UG/1
1 POWDER RESPIRATORY (INHALATION) DAILY
Qty: 60 BLISTER | Refills: 0 | Status: SHIPPED | OUTPATIENT
Start: 2021-09-30 | End: 2021-11-02

## 2021-09-30 RX ORDER — LEVOTHYROXINE SODIUM 0.07 MG/1
75 TABLET ORAL DAILY
Qty: 30 TABLET | Refills: 0
Start: 2021-09-30 | End: 2021-11-30

## 2021-09-30 RX ORDER — PREDNISONE 20 MG/1
40 TABLET ORAL DAILY
Qty: 6 TABLET | Refills: 0 | Status: SHIPPED | OUTPATIENT
Start: 2021-10-01 | End: 2021-10-04

## 2021-09-30 RX ORDER — TIOTROPIUM BROMIDE AND OLODATEROL 3.124; 2.736 UG/1; UG/1
2 SPRAY, METERED RESPIRATORY (INHALATION) DAILY
Qty: 4 G | Refills: 0 | Status: SHIPPED | OUTPATIENT
Start: 2021-09-30 | End: 2021-09-30 | Stop reason: HOSPADM

## 2021-09-30 RX ORDER — UMECLIDINIUM BROMIDE AND VILANTEROL TRIFENATATE 62.5; 25 UG/1; UG/1
1 POWDER RESPIRATORY (INHALATION) DAILY
Qty: 60 BLISTER | Refills: 0 | Status: SHIPPED | OUTPATIENT
Start: 2021-09-30 | End: 2021-09-30 | Stop reason: HOSPADM

## 2021-09-30 RX ORDER — GLYCOPYRROLATE AND FORMOTEROL FUMARATE 9; 4.8 UG/1; UG/1
2 AEROSOL, METERED RESPIRATORY (INHALATION) 2 TIMES DAILY
Qty: 10.7 G | Refills: 0 | Status: SHIPPED | OUTPATIENT
Start: 2021-09-30 | End: 2021-09-30 | Stop reason: HOSPADM

## 2021-09-30 RX ADMIN — METOPROLOL TARTRATE 50 MG: 50 TABLET, FILM COATED ORAL at 08:05

## 2021-09-30 RX ADMIN — FLUTICASONE PROPIONATE 1 SPRAY: 50 SPRAY, METERED NASAL at 08:06

## 2021-09-30 RX ADMIN — ISODIUM CHLORIDE 3 ML: 0.03 SOLUTION RESPIRATORY (INHALATION) at 08:49

## 2021-09-30 RX ADMIN — GUAIFENESIN 1200 MG: 600 TABLET ORAL at 08:05

## 2021-09-30 RX ADMIN — AZITHROMYCIN MONOHYDRATE 500 MG: 500 INJECTION, POWDER, LYOPHILIZED, FOR SOLUTION INTRAVENOUS at 04:11

## 2021-09-30 RX ADMIN — LEVALBUTEROL HYDROCHLORIDE 1.25 MG: 1.25 SOLUTION, CONCENTRATE RESPIRATORY (INHALATION) at 08:49

## 2021-09-30 RX ADMIN — Medication 2000 UNITS: at 08:04

## 2021-09-30 RX ADMIN — LORAZEPAM 0.5 MG: 0.5 TABLET ORAL at 05:29

## 2021-09-30 RX ADMIN — LEVALBUTEROL HYDROCHLORIDE 1.25 MG: 1.25 SOLUTION, CONCENTRATE RESPIRATORY (INHALATION) at 14:39

## 2021-09-30 RX ADMIN — HEPARIN SODIUM 5000 UNITS: 5000 INJECTION INTRAVENOUS; SUBCUTANEOUS at 05:29

## 2021-09-30 RX ADMIN — PREDNISONE 40 MG: 20 TABLET ORAL at 08:05

## 2021-09-30 RX ADMIN — LEVOTHYROXINE SODIUM 37.5 MCG: 75 TABLET ORAL at 05:29

## 2021-09-30 RX ADMIN — ISODIUM CHLORIDE 3 ML: 0.03 SOLUTION RESPIRATORY (INHALATION) at 14:39

## 2021-09-30 RX ADMIN — PANTOPRAZOLE SODIUM 20 MG: 20 TABLET, DELAYED RELEASE ORAL at 05:29

## 2021-10-01 ENCOUNTER — TELEPHONE (OUTPATIENT)
Dept: FAMILY MEDICINE CLINIC | Facility: HOSPITAL | Age: 86
End: 2021-10-01

## 2021-10-01 ENCOUNTER — TRANSITIONAL CARE MANAGEMENT (OUTPATIENT)
Dept: FAMILY MEDICINE CLINIC | Facility: HOSPITAL | Age: 86
End: 2021-10-01

## 2021-10-06 ENCOUNTER — OFFICE VISIT (OUTPATIENT)
Dept: FAMILY MEDICINE CLINIC | Facility: HOSPITAL | Age: 86
End: 2021-10-06
Payer: MEDICARE

## 2021-10-06 ENCOUNTER — LAB (OUTPATIENT)
Dept: LAB | Facility: HOSPITAL | Age: 86
End: 2021-10-06
Payer: MEDICARE

## 2021-10-06 VITALS
HEIGHT: 64 IN | HEART RATE: 64 BPM | WEIGHT: 161 LBS | SYSTOLIC BLOOD PRESSURE: 98 MMHG | TEMPERATURE: 98.4 F | DIASTOLIC BLOOD PRESSURE: 66 MMHG | BODY MASS INDEX: 27.49 KG/M2

## 2021-10-06 DIAGNOSIS — N18.4 CHRONIC RENAL DISEASE, STAGE IV (HCC): ICD-10-CM

## 2021-10-06 DIAGNOSIS — F32.1 CURRENT MODERATE EPISODE OF MAJOR DEPRESSIVE DISORDER WITHOUT PRIOR EPISODE (HCC): ICD-10-CM

## 2021-10-06 DIAGNOSIS — R00.0 TACHYCARDIA: ICD-10-CM

## 2021-10-06 DIAGNOSIS — I10 ESSENTIAL HYPERTENSION: ICD-10-CM

## 2021-10-06 DIAGNOSIS — I49.1 PAC (PREMATURE ATRIAL CONTRACTION): ICD-10-CM

## 2021-10-06 DIAGNOSIS — I25.10 CORONARY ARTERY DISEASE INVOLVING NATIVE CORONARY ARTERY OF NATIVE HEART WITHOUT ANGINA PECTORIS: ICD-10-CM

## 2021-10-06 DIAGNOSIS — G47.19 EXCESSIVE DAYTIME SLEEPINESS: ICD-10-CM

## 2021-10-06 DIAGNOSIS — J41.8 MIXED SIMPLE AND MUCOPURULENT CHRONIC BRONCHITIS (HCC): Primary | ICD-10-CM

## 2021-10-06 DIAGNOSIS — N18.30 STAGE 3 CHRONIC KIDNEY DISEASE, UNSPECIFIED WHETHER STAGE 3A OR 3B CKD (HCC): ICD-10-CM

## 2021-10-06 LAB
ANION GAP SERPL CALCULATED.3IONS-SCNC: 4 MMOL/L (ref 4–13)
BUN SERPL-MCNC: 35 MG/DL (ref 5–25)
CALCIUM SERPL-MCNC: 9.2 MG/DL (ref 8.3–10.1)
CHLORIDE SERPL-SCNC: 105 MMOL/L (ref 100–108)
CO2 SERPL-SCNC: 27 MMOL/L (ref 21–32)
CREAT SERPL-MCNC: 1.99 MG/DL (ref 0.6–1.3)
GFR SERPL CREATININE-BSD FRML MDRD: 29 ML/MIN/1.73SQ M
GLUCOSE SERPL-MCNC: 80 MG/DL (ref 65–140)
POTASSIUM SERPL-SCNC: 4.2 MMOL/L (ref 3.5–5.3)
SODIUM SERPL-SCNC: 136 MMOL/L (ref 136–145)

## 2021-10-06 PROCEDURE — 36415 COLL VENOUS BLD VENIPUNCTURE: CPT

## 2021-10-06 PROCEDURE — 80048 BASIC METABOLIC PNL TOTAL CA: CPT

## 2021-10-06 PROCEDURE — 99495 TRANSJ CARE MGMT MOD F2F 14D: CPT | Performed by: FAMILY MEDICINE

## 2021-10-06 RX ORDER — METOPROLOL TARTRATE 50 MG/1
25 TABLET, FILM COATED ORAL EVERY 12 HOURS SCHEDULED
Qty: 30 TABLET | Refills: 0
Start: 2021-10-06 | End: 2021-10-12 | Stop reason: ALTCHOICE

## 2021-10-06 RX ORDER — ESCITALOPRAM OXALATE 5 MG/1
5 TABLET ORAL DAILY
Qty: 30 TABLET | Refills: 1 | Status: SHIPPED | OUTPATIENT
Start: 2021-10-06 | End: 2021-11-29

## 2021-10-12 ENCOUNTER — EPISODE CHANGES (OUTPATIENT)
Dept: CASE MANAGEMENT | Facility: HOSPITAL | Age: 86
End: 2021-10-12

## 2021-10-12 ENCOUNTER — OFFICE VISIT (OUTPATIENT)
Dept: CARDIOLOGY CLINIC | Facility: CLINIC | Age: 86
End: 2021-10-12
Payer: MEDICARE

## 2021-10-12 VITALS
DIASTOLIC BLOOD PRESSURE: 64 MMHG | HEART RATE: 69 BPM | WEIGHT: 163.2 LBS | BODY MASS INDEX: 27.86 KG/M2 | HEIGHT: 64 IN | SYSTOLIC BLOOD PRESSURE: 118 MMHG

## 2021-10-12 DIAGNOSIS — Z09 HOSPITAL DISCHARGE FOLLOW-UP: Primary | ICD-10-CM

## 2021-10-12 DIAGNOSIS — I47.1 MULTIFOCAL ATRIAL TACHYCARDIA (HCC): ICD-10-CM

## 2021-10-12 DIAGNOSIS — I49.1 PAC (PREMATURE ATRIAL CONTRACTION): ICD-10-CM

## 2021-10-12 DIAGNOSIS — R00.0 TACHYCARDIA: ICD-10-CM

## 2021-10-12 DIAGNOSIS — G47.33 OSA (OBSTRUCTIVE SLEEP APNEA): ICD-10-CM

## 2021-10-12 DIAGNOSIS — I50.32 CHRONIC DIASTOLIC HEART FAILURE (HCC): ICD-10-CM

## 2021-10-12 DIAGNOSIS — I25.10 CORONARY ARTERY DISEASE INVOLVING NATIVE CORONARY ARTERY OF NATIVE HEART WITHOUT ANGINA PECTORIS: ICD-10-CM

## 2021-10-12 DIAGNOSIS — I21.19 INFERIOR MI (HCC): ICD-10-CM

## 2021-10-12 DIAGNOSIS — E78.2 MIXED HYPERLIPIDEMIA: ICD-10-CM

## 2021-10-12 DIAGNOSIS — I10 ESSENTIAL HYPERTENSION: ICD-10-CM

## 2021-10-12 PROCEDURE — 99214 OFFICE O/P EST MOD 30 MIN: CPT | Performed by: NURSE PRACTITIONER

## 2021-10-12 PROCEDURE — 93000 ELECTROCARDIOGRAM COMPLETE: CPT | Performed by: NURSE PRACTITIONER

## 2021-10-15 ENCOUNTER — TELEPHONE (OUTPATIENT)
Dept: FAMILY MEDICINE CLINIC | Facility: HOSPITAL | Age: 86
End: 2021-10-15

## 2021-10-15 ENCOUNTER — OFFICE VISIT (OUTPATIENT)
Dept: FAMILY MEDICINE CLINIC | Facility: HOSPITAL | Age: 86
End: 2021-10-15
Payer: MEDICARE

## 2021-10-15 VITALS
OXYGEN SATURATION: 95 % | HEIGHT: 64 IN | TEMPERATURE: 98.9 F | SYSTOLIC BLOOD PRESSURE: 120 MMHG | HEART RATE: 92 BPM | DIASTOLIC BLOOD PRESSURE: 76 MMHG | WEIGHT: 164 LBS | BODY MASS INDEX: 28 KG/M2

## 2021-10-15 DIAGNOSIS — J18.9 PNEUMONIA OF RIGHT LOWER LOBE DUE TO INFECTIOUS ORGANISM: Primary | ICD-10-CM

## 2021-10-15 DIAGNOSIS — I10 ESSENTIAL HYPERTENSION: ICD-10-CM

## 2021-10-15 DIAGNOSIS — J44.9 MODERATE COPD (CHRONIC OBSTRUCTIVE PULMONARY DISEASE) (HCC): ICD-10-CM

## 2021-10-15 PROCEDURE — 99214 OFFICE O/P EST MOD 30 MIN: CPT | Performed by: FAMILY MEDICINE

## 2021-10-15 RX ORDER — CEFDINIR 300 MG/1
300 CAPSULE ORAL EVERY 12 HOURS SCHEDULED
Qty: 14 CAPSULE | Refills: 0 | Status: SHIPPED | OUTPATIENT
Start: 2021-10-15 | End: 2021-10-22

## 2021-10-15 RX ORDER — PREDNISONE 10 MG/1
30 TABLET ORAL DAILY
Qty: 30 TABLET | Refills: 0 | Status: SHIPPED | OUTPATIENT
Start: 2021-10-15 | End: 2021-10-22 | Stop reason: SDUPTHER

## 2021-10-20 ENCOUNTER — TELEPHONE (OUTPATIENT)
Dept: FAMILY MEDICINE CLINIC | Facility: HOSPITAL | Age: 86
End: 2021-10-20

## 2021-10-22 ENCOUNTER — TELEPHONE (OUTPATIENT)
Dept: NEPHROLOGY | Facility: HOSPITAL | Age: 86
End: 2021-10-22

## 2021-10-22 ENCOUNTER — OFFICE VISIT (OUTPATIENT)
Dept: FAMILY MEDICINE CLINIC | Facility: HOSPITAL | Age: 86
End: 2021-10-22
Payer: MEDICARE

## 2021-10-22 VITALS
WEIGHT: 163 LBS | TEMPERATURE: 97.7 F | DIASTOLIC BLOOD PRESSURE: 70 MMHG | OXYGEN SATURATION: 95 % | HEART RATE: 67 BPM | SYSTOLIC BLOOD PRESSURE: 110 MMHG | BODY MASS INDEX: 27.83 KG/M2 | HEIGHT: 64 IN

## 2021-10-22 DIAGNOSIS — J18.9 PNEUMONIA OF RIGHT LOWER LOBE DUE TO INFECTIOUS ORGANISM: ICD-10-CM

## 2021-10-22 DIAGNOSIS — J44.9 MODERATE COPD (CHRONIC OBSTRUCTIVE PULMONARY DISEASE) (HCC): ICD-10-CM

## 2021-10-22 DIAGNOSIS — I10 ESSENTIAL HYPERTENSION: ICD-10-CM

## 2021-10-22 DIAGNOSIS — F51.01 PRIMARY INSOMNIA: ICD-10-CM

## 2021-10-22 DIAGNOSIS — J41.8 MIXED SIMPLE AND MUCOPURULENT CHRONIC BRONCHITIS (HCC): Primary | ICD-10-CM

## 2021-10-22 DIAGNOSIS — N18.32 STAGE 3B CHRONIC KIDNEY DISEASE (HCC): ICD-10-CM

## 2021-10-22 PROCEDURE — 99214 OFFICE O/P EST MOD 30 MIN: CPT | Performed by: FAMILY MEDICINE

## 2021-10-22 RX ORDER — PREDNISONE 10 MG/1
TABLET ORAL
Qty: 15 TABLET | Refills: 0 | Status: SHIPPED | OUTPATIENT
Start: 2021-10-22 | End: 2021-11-09 | Stop reason: DRUGHIGH

## 2021-10-22 RX ORDER — RAMELTEON 8 MG/1
8 TABLET ORAL
Start: 2021-10-22 | End: 2021-12-23 | Stop reason: ALTCHOICE

## 2021-10-26 DIAGNOSIS — F41.9 ANXIETY: ICD-10-CM

## 2021-10-26 RX ORDER — LORAZEPAM 0.5 MG/1
TABLET ORAL
Qty: 90 TABLET | Refills: 0 | Status: SHIPPED | OUTPATIENT
Start: 2021-10-26 | End: 2021-11-30

## 2021-10-27 ENCOUNTER — CLINICAL SUPPORT (OUTPATIENT)
Dept: CARDIOLOGY CLINIC | Facility: CLINIC | Age: 86
End: 2021-10-27
Payer: MEDICARE

## 2021-10-27 DIAGNOSIS — R00.0 TACHYCARDIA: ICD-10-CM

## 2021-10-27 DIAGNOSIS — I47.1 MULTIFOCAL ATRIAL TACHYCARDIA (HCC): ICD-10-CM

## 2021-10-27 PROCEDURE — 93242 EXT ECG>48HR<7D RECORDING: CPT | Performed by: NURSE PRACTITIONER

## 2021-10-28 ENCOUNTER — TELEPHONE (OUTPATIENT)
Dept: FAMILY MEDICINE CLINIC | Facility: HOSPITAL | Age: 86
End: 2021-10-28

## 2021-11-08 ENCOUNTER — TELEPHONE (OUTPATIENT)
Dept: FAMILY MEDICINE CLINIC | Facility: HOSPITAL | Age: 86
End: 2021-11-08

## 2021-11-15 ENCOUNTER — TELEPHONE (OUTPATIENT)
Dept: FAMILY MEDICINE CLINIC | Facility: HOSPITAL | Age: 86
End: 2021-11-15

## 2021-11-15 DIAGNOSIS — J44.1 COPD WITH ACUTE EXACERBATION (HCC): ICD-10-CM

## 2021-11-17 ENCOUNTER — DOCUMENTATION (OUTPATIENT)
Dept: CARDIOLOGY CLINIC | Facility: CLINIC | Age: 86
End: 2021-11-17

## 2021-11-18 ENCOUNTER — APPOINTMENT (OUTPATIENT)
Dept: LAB | Facility: CLINIC | Age: 86
End: 2021-11-18
Payer: MEDICARE

## 2021-11-18 DIAGNOSIS — R05.9 COUGH: ICD-10-CM

## 2021-11-18 DIAGNOSIS — I25.10 CORONARY ARTERY DISEASE INVOLVING NATIVE CORONARY ARTERY OF NATIVE HEART WITHOUT ANGINA PECTORIS: ICD-10-CM

## 2021-11-18 LAB
ANION GAP SERPL CALCULATED.3IONS-SCNC: 7 MMOL/L (ref 4–13)
BUN SERPL-MCNC: 20 MG/DL (ref 5–25)
CALCIUM SERPL-MCNC: 9.2 MG/DL (ref 8.3–10.1)
CHLORIDE SERPL-SCNC: 108 MMOL/L (ref 100–108)
CO2 SERPL-SCNC: 24 MMOL/L (ref 21–32)
CREAT SERPL-MCNC: 1.74 MG/DL (ref 0.6–1.3)
GFR SERPL CREATININE-BSD FRML MDRD: 34 ML/MIN/1.73SQ M
GLUCOSE SERPL-MCNC: 118 MG/DL (ref 65–140)
NT-PROBNP SERPL-MCNC: 843 PG/ML
POTASSIUM SERPL-SCNC: 4.6 MMOL/L (ref 3.5–5.3)
SODIUM SERPL-SCNC: 139 MMOL/L (ref 136–145)

## 2021-11-18 PROCEDURE — 80048 BASIC METABOLIC PNL TOTAL CA: CPT

## 2021-11-18 PROCEDURE — 83880 ASSAY OF NATRIURETIC PEPTIDE: CPT

## 2021-11-18 PROCEDURE — 36415 COLL VENOUS BLD VENIPUNCTURE: CPT

## 2021-11-19 ENCOUNTER — TELEPHONE (OUTPATIENT)
Dept: CARDIOLOGY CLINIC | Facility: CLINIC | Age: 86
End: 2021-11-19

## 2021-11-19 DIAGNOSIS — J44.9 MODERATE COPD (CHRONIC OBSTRUCTIVE PULMONARY DISEASE) (HCC): ICD-10-CM

## 2021-11-19 RX ORDER — DEXTROMETHORPHAN HYDROBROMIDE AND PROMETHAZINE HYDROCHLORIDE 15; 6.25 MG/5ML; MG/5ML
SYRUP ORAL
Qty: 180 ML | Refills: 1 | Status: SHIPPED | OUTPATIENT
Start: 2021-11-19 | End: 2022-02-03

## 2021-11-22 ENCOUNTER — TELEPHONE (OUTPATIENT)
Dept: FAMILY MEDICINE CLINIC | Facility: HOSPITAL | Age: 86
End: 2021-11-22

## 2021-11-24 DIAGNOSIS — F51.01 PRIMARY INSOMNIA: ICD-10-CM

## 2021-11-24 RX ORDER — ZOLPIDEM TARTRATE 5 MG/1
TABLET ORAL
Qty: 30 TABLET | Refills: 1 | Status: SHIPPED | OUTPATIENT
Start: 2021-11-24 | End: 2022-01-25

## 2021-11-26 ENCOUNTER — HOSPITAL ENCOUNTER (OUTPATIENT)
Dept: RADIOLOGY | Facility: HOSPITAL | Age: 86
Discharge: HOME | End: 2021-11-26
Attending: INTERNAL MEDICINE
Payer: MEDICARE

## 2021-11-26 ENCOUNTER — OFFICE VISIT (OUTPATIENT)
Dept: CARDIOLOGY CLINIC | Facility: CLINIC | Age: 86
End: 2021-11-26
Payer: MEDICARE

## 2021-11-26 VITALS
DIASTOLIC BLOOD PRESSURE: 56 MMHG | WEIGHT: 165.8 LBS | SYSTOLIC BLOOD PRESSURE: 130 MMHG | HEART RATE: 110 BPM | BODY MASS INDEX: 28.31 KG/M2 | HEIGHT: 64 IN

## 2021-11-26 DIAGNOSIS — J44.1 CHRONIC OBSTRUCTIVE PULMONARY DISEASE WITH ACUTE EXACERBATION (HCC): Primary | ICD-10-CM

## 2021-11-26 DIAGNOSIS — J44.1 CHRONIC OBSTRUCTIVE PULMONARY DISEASE WITH ACUTE EXACERBATION (HCC): ICD-10-CM

## 2021-11-26 DIAGNOSIS — I47.1 SVT (SUPRAVENTRICULAR TACHYCARDIA) (HCC): ICD-10-CM

## 2021-11-26 DIAGNOSIS — I25.10 CORONARY ARTERY DISEASE INVOLVING NATIVE CORONARY ARTERY OF NATIVE HEART WITHOUT ANGINA PECTORIS: ICD-10-CM

## 2021-11-26 PROCEDURE — 99214 OFFICE O/P EST MOD 30 MIN: CPT | Performed by: INTERNAL MEDICINE

## 2021-11-26 PROCEDURE — 93000 ELECTROCARDIOGRAM COMPLETE: CPT | Performed by: INTERNAL MEDICINE

## 2021-11-26 PROCEDURE — 71046 X-RAY EXAM CHEST 2 VIEWS: CPT

## 2021-11-26 RX ORDER — PREDNISONE 10 MG/1
TABLET ORAL
Qty: 31 TABLET | Refills: 0 | Status: SHIPPED | OUTPATIENT
Start: 2021-11-26 | End: 2021-12-20

## 2021-11-26 RX ORDER — AMIODARONE HYDROCHLORIDE 200 MG/1
200 TABLET ORAL 2 TIMES DAILY
Qty: 90 TABLET | Refills: 3 | Status: SHIPPED | OUTPATIENT
Start: 2021-11-26 | End: 2021-12-23

## 2021-11-29 DIAGNOSIS — F32.1 CURRENT MODERATE EPISODE OF MAJOR DEPRESSIVE DISORDER WITHOUT PRIOR EPISODE (HCC): ICD-10-CM

## 2021-11-29 RX ORDER — ESCITALOPRAM OXALATE 5 MG/1
TABLET ORAL
Qty: 30 TABLET | Refills: 1 | Status: SHIPPED | OUTPATIENT
Start: 2021-11-29 | End: 2022-01-27

## 2021-11-30 ENCOUNTER — OFFICE VISIT (OUTPATIENT)
Dept: FAMILY MEDICINE CLINIC | Facility: HOSPITAL | Age: 86
End: 2021-11-30
Payer: MEDICARE

## 2021-11-30 VITALS
WEIGHT: 170.4 LBS | TEMPERATURE: 97.3 F | OXYGEN SATURATION: 99 % | HEART RATE: 72 BPM | SYSTOLIC BLOOD PRESSURE: 120 MMHG | HEIGHT: 64 IN | BODY MASS INDEX: 29.09 KG/M2 | DIASTOLIC BLOOD PRESSURE: 80 MMHG

## 2021-11-30 DIAGNOSIS — F06.4 ANXIETY DISORDER DUE TO GENERAL MEDICAL CONDITION: ICD-10-CM

## 2021-11-30 DIAGNOSIS — J44.9 MODERATE COPD (CHRONIC OBSTRUCTIVE PULMONARY DISEASE) (HCC): ICD-10-CM

## 2021-11-30 DIAGNOSIS — K27.4: ICD-10-CM

## 2021-11-30 DIAGNOSIS — N18.4 CHRONIC RENAL DISEASE, STAGE IV (HCC): ICD-10-CM

## 2021-11-30 DIAGNOSIS — F32.1 CURRENT MODERATE EPISODE OF MAJOR DEPRESSIVE DISORDER WITHOUT PRIOR EPISODE (HCC): ICD-10-CM

## 2021-11-30 DIAGNOSIS — I25.10 CORONARY ARTERY DISEASE INVOLVING NATIVE CORONARY ARTERY OF NATIVE HEART WITHOUT ANGINA PECTORIS: ICD-10-CM

## 2021-11-30 DIAGNOSIS — I10 ESSENTIAL HYPERTENSION: ICD-10-CM

## 2021-11-30 DIAGNOSIS — E03.9 ACQUIRED HYPOTHYROIDISM: ICD-10-CM

## 2021-11-30 DIAGNOSIS — J41.8 MIXED SIMPLE AND MUCOPURULENT CHRONIC BRONCHITIS (HCC): Primary | ICD-10-CM

## 2021-11-30 DIAGNOSIS — F41.9 ANXIETY: ICD-10-CM

## 2021-11-30 PROCEDURE — 99214 OFFICE O/P EST MOD 30 MIN: CPT | Performed by: FAMILY MEDICINE

## 2021-11-30 RX ORDER — LORAZEPAM 0.5 MG/1
TABLET ORAL
Qty: 90 TABLET | Refills: 0 | Status: SHIPPED | OUTPATIENT
Start: 2021-11-30 | End: 2022-01-02

## 2021-11-30 RX ORDER — LEVOTHYROXINE SODIUM 0.07 MG/1
TABLET ORAL
Qty: 30 TABLET | Refills: 0 | Status: SHIPPED | OUTPATIENT
Start: 2021-11-30 | End: 2022-01-27

## 2021-12-03 ENCOUNTER — TELEPHONE (OUTPATIENT)
Dept: FAMILY MEDICINE CLINIC | Facility: HOSPITAL | Age: 86
End: 2021-12-03

## 2021-12-18 DIAGNOSIS — K21.00 GASTROESOPHAGEAL REFLUX DISEASE WITH ESOPHAGITIS: ICD-10-CM

## 2021-12-18 RX ORDER — OMEPRAZOLE 20 MG/1
CAPSULE, DELAYED RELEASE ORAL
Qty: 30 CAPSULE | Refills: 5 | Status: SHIPPED | OUTPATIENT
Start: 2021-12-18 | End: 2021-12-25

## 2021-12-23 ENCOUNTER — OFFICE VISIT (OUTPATIENT)
Dept: CARDIOLOGY CLINIC | Facility: CLINIC | Age: 86
End: 2021-12-23
Payer: MEDICARE

## 2021-12-23 VITALS
DIASTOLIC BLOOD PRESSURE: 76 MMHG | BODY MASS INDEX: 28.85 KG/M2 | SYSTOLIC BLOOD PRESSURE: 136 MMHG | WEIGHT: 169 LBS | HEART RATE: 96 BPM | HEIGHT: 64 IN

## 2021-12-23 DIAGNOSIS — I47.1 SVT (SUPRAVENTRICULAR TACHYCARDIA) (HCC): Primary | ICD-10-CM

## 2021-12-23 PROCEDURE — 93000 ELECTROCARDIOGRAM COMPLETE: CPT | Performed by: INTERNAL MEDICINE

## 2021-12-23 PROCEDURE — 99214 OFFICE O/P EST MOD 30 MIN: CPT | Performed by: INTERNAL MEDICINE

## 2021-12-23 RX ORDER — AMIODARONE HYDROCHLORIDE 200 MG/1
200 TABLET ORAL DAILY
Qty: 90 TABLET | Refills: 3 | Status: SHIPPED | OUTPATIENT
Start: 2021-12-23

## 2021-12-25 DIAGNOSIS — K21.00 GASTROESOPHAGEAL REFLUX DISEASE WITH ESOPHAGITIS: ICD-10-CM

## 2021-12-25 RX ORDER — OMEPRAZOLE 20 MG/1
CAPSULE, DELAYED RELEASE ORAL
Qty: 30 CAPSULE | Refills: 5 | Status: SHIPPED | OUTPATIENT
Start: 2021-12-25

## 2022-01-01 DIAGNOSIS — F41.9 ANXIETY: ICD-10-CM

## 2022-01-02 RX ORDER — LORAZEPAM 0.5 MG/1
TABLET ORAL
Qty: 90 TABLET | Refills: 1 | Status: SHIPPED | OUTPATIENT
Start: 2022-01-02 | End: 2022-03-03

## 2022-01-09 ENCOUNTER — HOSPITAL ENCOUNTER (EMERGENCY)
Facility: HOSPITAL | Age: 87
Discharge: HOME | End: 2022-01-09
Attending: EMERGENCY MEDICINE | Admitting: EMERGENCY MEDICINE
Payer: MEDICARE

## 2022-01-09 ENCOUNTER — APPOINTMENT (EMERGENCY)
Dept: RADIOLOGY | Facility: HOSPITAL | Age: 87
End: 2022-01-09
Payer: MEDICARE

## 2022-01-09 VITALS
HEART RATE: 72 BPM | SYSTOLIC BLOOD PRESSURE: 141 MMHG | DIASTOLIC BLOOD PRESSURE: 65 MMHG | RESPIRATION RATE: 18 BRPM | OXYGEN SATURATION: 95 % | TEMPERATURE: 98.4 F

## 2022-01-09 DIAGNOSIS — S29.012A STRAIN OF THORACIC BACK REGION: Primary | ICD-10-CM

## 2022-01-09 LAB
ALBUMIN SERPL BCP-MCNC: 3.2 G/DL (ref 3.5–5)
ALP SERPL-CCNC: 103 U/L (ref 46–116)
ALT SERPL W P-5'-P-CCNC: 35 U/L (ref 12–78)
ANION GAP SERPL CALCULATED.3IONS-SCNC: 10 MMOL/L (ref 4–13)
AST SERPL W P-5'-P-CCNC: 16 U/L (ref 5–45)
BACTERIA UR QL AUTO: NORMAL /HPF
BASOPHILS # BLD AUTO: 0.03 THOUSANDS/ΜL (ref 0–0.1)
BASOPHILS NFR BLD AUTO: 1 % (ref 0–1)
BILIRUB SERPL-MCNC: 0.3 MG/DL (ref 0.2–1)
BILIRUB UR QL STRIP: NEGATIVE
BUN SERPL-MCNC: 18 MG/DL (ref 5–25)
CALCIUM ALBUM COR SERPL-MCNC: 8.9 MG/DL (ref 8.3–10.1)
CALCIUM SERPL-MCNC: 8.3 MG/DL (ref 8.3–10.1)
CHLORIDE SERPL-SCNC: 103 MMOL/L (ref 100–108)
CLARITY UR: CLEAR
CO2 SERPL-SCNC: 24 MMOL/L (ref 21–32)
COLOR UR: YELLOW
CREAT SERPL-MCNC: 1.7 MG/DL (ref 0.6–1.3)
EOSINOPHIL # BLD AUTO: 0.1 THOUSAND/ΜL (ref 0–0.61)
EOSINOPHIL NFR BLD AUTO: 2 % (ref 0–6)
ERYTHROCYTE [DISTWIDTH] IN BLOOD BY AUTOMATED COUNT: 13.7 % (ref 11.6–15.1)
GFR SERPL CREATININE-BSD FRML MDRD: 34 ML/MIN/1.73SQ M
GLUCOSE SERPL-MCNC: 100 MG/DL (ref 65–140)
GLUCOSE UR STRIP-MCNC: NEGATIVE MG/DL
HCT VFR BLD AUTO: 41 % (ref 36.5–49.3)
HGB BLD-MCNC: 12.7 G/DL (ref 12–17)
HGB UR QL STRIP.AUTO: NEGATIVE
IMM GRANULOCYTES # BLD AUTO: 0.04 THOUSAND/UL (ref 0–0.2)
IMM GRANULOCYTES NFR BLD AUTO: 1 % (ref 0–2)
KETONES UR STRIP-MCNC: NEGATIVE MG/DL
LEUKOCYTE ESTERASE UR QL STRIP: NEGATIVE
LYMPHOCYTES # BLD AUTO: 1.55 THOUSANDS/ΜL (ref 0.6–4.47)
LYMPHOCYTES NFR BLD AUTO: 31 % (ref 14–44)
MCH RBC QN AUTO: 29.1 PG (ref 26.8–34.3)
MCHC RBC AUTO-ENTMCNC: 31 G/DL (ref 31.4–37.4)
MCV RBC AUTO: 94 FL (ref 82–98)
MONOCYTES # BLD AUTO: 0.62 THOUSAND/ΜL (ref 0.17–1.22)
MONOCYTES NFR BLD AUTO: 12 % (ref 4–12)
MUCOUS THREADS UR QL AUTO: NORMAL
NEUTROPHILS # BLD AUTO: 2.64 THOUSANDS/ΜL (ref 1.85–7.62)
NEUTS SEG NFR BLD AUTO: 53 % (ref 43–75)
NITRITE UR QL STRIP: NEGATIVE
NON-SQ EPI CELLS URNS QL MICRO: NORMAL /HPF
NRBC BLD AUTO-RTO: 0 /100 WBCS
PH UR STRIP.AUTO: 6 [PH]
PLATELET # BLD AUTO: 152 THOUSANDS/UL (ref 149–390)
PMV BLD AUTO: 8.3 FL (ref 8.9–12.7)
POTASSIUM SERPL-SCNC: 3.9 MMOL/L (ref 3.5–5.3)
PROT SERPL-MCNC: 6.6 G/DL (ref 6.4–8.2)
PROT UR STRIP-MCNC: ABNORMAL MG/DL
RBC # BLD AUTO: 4.36 MILLION/UL (ref 3.88–5.62)
RBC #/AREA URNS AUTO: NORMAL /HPF
SODIUM SERPL-SCNC: 137 MMOL/L (ref 136–145)
SP GR UR STRIP.AUTO: 1.02 (ref 1–1.03)
UROBILINOGEN UR QL STRIP.AUTO: 0.2 E.U./DL
WBC # BLD AUTO: 4.98 THOUSAND/UL (ref 4.31–10.16)
WBC #/AREA URNS AUTO: NORMAL /HPF

## 2022-01-09 PROCEDURE — 99284 EMERGENCY DEPT VISIT MOD MDM: CPT

## 2022-01-09 PROCEDURE — 80053 COMPREHEN METABOLIC PANEL: CPT | Performed by: EMERGENCY MEDICINE

## 2022-01-09 PROCEDURE — 81001 URINALYSIS AUTO W/SCOPE: CPT | Performed by: PHYSICIAN ASSISTANT

## 2022-01-09 PROCEDURE — 71101 X-RAY EXAM UNILAT RIBS/CHEST: CPT

## 2022-01-09 PROCEDURE — 85025 COMPLETE CBC W/AUTO DIFF WBC: CPT | Performed by: EMERGENCY MEDICINE

## 2022-01-09 PROCEDURE — 99285 EMERGENCY DEPT VISIT HI MDM: CPT | Performed by: PHYSICIAN ASSISTANT

## 2022-01-09 PROCEDURE — 36415 COLL VENOUS BLD VENIPUNCTURE: CPT

## 2022-01-09 RX ORDER — LIDOCAINE 50 MG/G
1 PATCH TOPICAL ONCE
Status: DISCONTINUED | OUTPATIENT
Start: 2022-01-09 | End: 2022-01-09 | Stop reason: HOSPADM

## 2022-01-09 RX ADMIN — LIDOCAINE 5% 1 PATCH: 700 PATCH TOPICAL at 12:55

## 2022-01-09 NOTE — ED PROVIDER NOTES
History  Chief Complaint   Patient presents with    Flank Pain     right sided flank pain for a couple of weeks  No urinary symptoms  Patient is an 79 y/o M with h/o COPD that presents to the ED with right back pain that started 2 weeks ago  He denies trauma  Pain is worse with reaching and twisting to the left and coughing  He has been taking tylenol for pain, which helps a little  No urinary symptoms  No fevers, chills  History provided by:  Patient  Flank Pain  Pain location:  R flank  Pain quality: aching    Pain radiates to:  Does not radiate  Pain severity:  Moderate  Onset quality:  Gradual  Duration:  2 weeks  Timing:  Constant  Progression:  Unchanged  Chronicity:  New  Context: not sick contacts, not suspicious food intake and not trauma    Relieved by: rest   Worsened by:  Coughing and movement  Ineffective treatments:  Acetaminophen  Associated symptoms: cough    Associated symptoms: no chest pain, no chills, no constipation, no diarrhea, no dysuria, no fever, no hematuria, no nausea, no shortness of breath, no sore throat and no vomiting    Risk factors: being elderly    Risk factors: not obese and no recent hospitalization        Prior to Admission Medications   Prescriptions Last Dose Informant Patient Reported? Taking?    Anoro Ellipta 62 5-25 MCG/INH inhaler  Family Member No No   Sig: inhale 1 puff by mouth and INTO THE LUNGS once daily   B Complex Vitamins (B COMPLEX PO)  Family Member Yes No   Sig: Take by mouth daily   Cholecalciferol ([employer] VITAMIN D) 2000 units CAPS  Family Member Yes No   Sig: Take by mouth daily    Cyanocobalamin (B-12 PO)  Family Member Yes No   Sig: Take by mouth daily   Ferrous Sulfate (IRON) 325 (65 Fe) MG TABS  Family Member Yes No   Sig: Take by mouth every other day    KRILL OIL PO  Family Member Yes No   Sig: Take by mouth daily   LORazepam (ATIVAN) 0 5 mg tablet   No No   Sig: take 1 tablet by mouth every 8 hours if needed for anxiety   Multiple Vitamins-Minerals (VISION FORMULA/LUTEIN PO)  Family Member Yes No   Sig: Take by mouth   Respiratory Therapy Supplies (SPIROMETER) KIT  Family Member No No   Sig: by Does not apply route 4 (four) times a day   albuterol (PROVENTIL HFA,VENTOLIN HFA) 90 mcg/act inhaler  Family Member No No   Sig: Inhale 2 puffs every 6 (six) hours as needed for wheezing   amiodarone 200 mg tablet   No No   Sig: Take 1 tablet (200 mg total) by mouth daily For fourteen days followed by 200 mg once a day     escitalopram (LEXAPRO) 5 mg tablet  Family Member No No   Sig: take 1 tablet by mouth once daily   fluticasone (FLONASE) 50 mcg/act nasal spray  Family Member Yes No   Si spray into each nostril 2 (two) times a day   furosemide (LASIX) 20 mg tablet  Family Member No No   Sig: Take 1 tablet (20 mg total) by mouth every other day   Patient not taking: Reported on 2021    guaiFENesin (MUCINEX) 600 mg 12 hr tablet  Family Member Yes No   Sig: Take 1,200 mg by mouth every 12 (twelve) hours   ipratropium (ATROVENT) 0 02 % nebulizer solution  Family Member No No   Sig: Take 2 5 mL (0 5 mg total) by nebulization 3 (three) times a day   levalbuterol (XOPENEX) 1 25 mg/3 mL nebulizer solution  Family Member No No   Sig: Take 1 vial (1 25 mg total) by nebulization 3 (three) times a day   levothyroxine 75 mcg tablet  Family Member No No   Sig: take 1 tablet by mouth once daily   nystatin-triamcinolone (MYCOLOG-II) cream  Family Member No No   Sig: APPLY TO THE AFFECTED AREA(S) SPARINGLY TWICE A DAY   omeprazole (PriLOSEC) 20 mg delayed release capsule   No No   Sig: take 1 capsule by mouth once daily   primidone (MYSOLINE) 50 mg tablet  Family Member No No   Sig: Take 1 tablet (50 mg total) by mouth every 12 (twelve) hours   promethazine-dextromethorphan (PHENERGAN-DM) 6 25-15 mg/5 mL oral syrup  Family Member No No   Sig: take 5 milliliter by mouth four times a day if needed for cough   sodium chloride 0 9 % nebulizer solution  Family Member No No   Sig: TAKE 3 MILLILITERS BY NEBULIZATION ROUTE AS NEEDED FOR WHEEZING   tamsulosin (FLOMAX) 0 4 mg  Family Member No No   Sig: take 1 capsule by mouth once daily with dinner   zolpidem (AMBIEN) 5 mg tablet  Family Member No No   Sig: take 1 tablet by mouth at bedtime if needed for sleep      Facility-Administered Medications: None       Past Medical History:   Diagnosis Date    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    Compression fracture of thoracic vertebra ([facility] )     last assessed 05/07/2012    COPD (chronic obstructive pulmonary disease) ([facility] )     Disease of thyroid gland     Heart attack ([facility] )     History of methicillin resistant staphylococcus aureus (MRSA) 03/28/2019    negative nasal swab     Hollenhorst plaque, right eye     last assessed 04/08/2013    Hyperlipidemia     Hypertension     Iron deficiency anemia due to chronic blood loss     last assessed 09/10/2012    Ischemic colitis ([facility] )     last assessed 05/27/2014    Osteoarthritis of both hands     last assessedn 04/30/2013    Peptic ulcer     last assessed 06/14/2013    Polymyalgia rheumatica ([place] [address] )     last assessesd 10/09/2012    Renal disorder     Upper GI hemorrhage     last assessed 01/29/2015    Varicose veins of lower extremity     last assessed 04/26/2013       Past Surgical History:   Procedure Laterality Date    CORONARY ARTERY BYPASS GRAFT      HEMORRHOID SURGERY      HERNIA REPAIR      RENAL CYST EXCISION      resolved 05/2010    THROMBOENDARTERECTOMY Right     carotid, last assessed 06/18/2013       Family History   Problem Relation Age of Onset    Hypertension Mother     Alcohol abuse Mother     Hypertension Father     Alcohol abuse Father     Alcohol abuse Son     Heart disease Family     Stroke Family         syndrome     I have reviewed and agree with the history as documented      E-Cigarette/Vaping    E-Cigarette Use Never User      E-Cigarette/Vaping Substances Social History     Tobacco Use    Smoking status: Former Smoker     Packs/day: 2 00     Years: 50 00     Pack years: 100 00     Types: Cigarettes     Start date: 65     Quit date:      Years since quittin 0    Smokeless tobacco: Never Used    Tobacco comment: Quit 40 yrs  ago   Vaping Use    Vaping Use: Never used   Substance Use Topics    Alcohol use: Never     Alcohol/week: 1 0 standard drink     Types: 1 Glasses of wine per week     Comment: social    Drug use: Never       Review of Systems   Constitutional: Negative for chills and fever  HENT: Negative for congestion and sore throat  Respiratory: Positive for cough  Negative for shortness of breath  Cardiovascular: Negative for chest pain, palpitations and leg swelling  Gastrointestinal: Negative for abdominal pain, constipation, diarrhea, nausea and vomiting  Genitourinary: Positive for flank pain  Negative for dysuria and hematuria  Musculoskeletal: Positive for back pain  Negative for neck pain  Skin: Negative for color change and rash  Neurological: Negative for dizziness, weakness and numbness  Psychiatric/Behavioral: Negative for confusion  All other systems reviewed and are negative  Physical Exam  Physical Exam  Vitals and nursing note reviewed  Constitutional:       General: He is not in acute distress  Appearance: Normal appearance  He is well-developed, well-groomed and normal weight  He is not ill-appearing or diaphoretic  HENT:      Head: Normocephalic and atraumatic  Right Ear: External ear normal       Left Ear: External ear normal       Nose: Nose normal    Eyes:      Conjunctiva/sclera: Conjunctivae normal       Pupils: Pupils are equal    Cardiovascular:      Rate and Rhythm: Normal rate and regular rhythm  Heart sounds: Normal heart sounds  Pulmonary:      Effort: Pulmonary effort is normal       Breath sounds: Decreased breath sounds present     Abdominal:      General: Abdomen is flat  Bowel sounds are normal       Palpations: Abdomen is soft  Tenderness: There is no abdominal tenderness  Musculoskeletal:      Cervical back: Normal, normal range of motion and neck supple  No spinous process tenderness or muscular tenderness  Thoracic back: Normal       Lumbar back: Tenderness present  No deformity or bony tenderness  Normal range of motion  Back:    Skin:     General: Skin is warm and dry  Findings: No bruising, erythema or rash  Neurological:      Mental Status: He is alert and oriented to person, place, and time  Sensory: Sensation is intact  Motor: Motor function is intact  Psychiatric:         Mood and Affect: Mood normal          Behavior: Behavior is cooperative           Vital Signs  ED Triage Vitals [01/09/22 1056]   Temperature Pulse Respirations Blood Pressure SpO2   98 4 °F (36 9 °C) 99 20 144/76 95 %      Temp Source Heart Rate Source Patient Position - Orthostatic VS BP Location FiO2 (%)   Temporal Monitor Sitting Right arm --      Pain Score       8           Vitals:    01/09/22 1056 01/09/22 1255   BP: 144/76 141/65   Pulse: 99 72   Patient Position - Orthostatic VS: Sitting Sitting         Visual Acuity      ED Medications  Medications   lidocaine (LIDODERM) 5 % patch 1 patch (1 patch Topical Medication Applied 1/9/22 1255)       Diagnostic Studies  Results Reviewed     Procedure Component Value Units Date/Time    Urine Microscopic [[id number]]  (Normal) Collected: 01/09/22 1213    Lab Status: Final result Specimen: Urine, Clean Catch Updated: 01/09/22 1233     RBC, UA None Seen /hpf      WBC, UA 0-1 /hpf      Epithelial Cells None Seen /hpf      Bacteria, UA None Seen /hpf      MUCUS THREADS None Seen    UA w Reflex to Microscopic w Reflex to Culture [[id number]]  (Abnormal) Collected: 01/09/22 1213    Lab Status: Final result Specimen: Urine, Clean Catch Updated: 01/09/22 1219     Color, UA Yellow     Clarity, UA Clear Specific [place], UA 1 020     pH, UA 6 0     Leukocytes, UA Negative     Nitrite, UA Negative     Protein, UA Trace mg/dl      Glucose, UA Negative mg/dl      Ketones, UA Negative mg/dl      Urobilinogen, UA 0 2 E U /dl      Bilirubin, UA Negative     Blood, UA Negative    Comprehensive metabolic panel [[id number]]  (Abnormal) Collected: 01/09/22 1100    Lab Status: Final result Specimen: Blood from Arm, Left Updated: 01/09/22 1129     Sodium 137 mmol/L      Potassium 3 9 mmol/L      Chloride 103 mmol/L      CO2 24 mmol/L      ANION GAP 10 mmol/L      BUN 18 mg/dL      Creatinine 1 70 mg/dL      Glucose 100 mg/dL      Calcium 8 3 mg/dL      Corrected Calcium 8 9 mg/dL      AST 16 U/L      ALT 35 U/L      Alkaline Phosphatase 103 U/L      Total Protein 6 6 g/dL      Albumin 3 2 g/dL      Total Bilirubin 0 30 mg/dL      eGFR 34 ml/min/1 73sq m     Narrative:      National Kidney Disease Foundation guidelines for Chronic Kidney Disease (CKD):     Stage 1 with normal or high GFR (GFR > 90 mL/min/1 73 square meters)    Stage 2 Mild CKD (GFR = 60-89 mL/min/1 73 square meters)    Stage 3A Moderate CKD (GFR = 45-59 mL/min/1 73 square meters)    Stage 3B Moderate CKD (GFR = 30-44 mL/min/1 73 square meters)    Stage 4 Severe CKD (GFR = 15-29 mL/min/1 73 square meters)    Stage 5 End Stage CKD (GFR <15 mL/min/1 73 square meters)  Note: GFR calculation is accurate only with a steady state creatinine    CBC and differential [[id number]]  (Abnormal) Collected: 01/09/22 1100    Lab Status: Final result Specimen: Blood from Arm, Left Updated: 01/09/22 1108     WBC 4 98 Thousand/uL      RBC 4 36 Million/uL      Hemoglobin 12 7 g/dL      Hematocrit 41 0 %      MCV 94 fL      MCH 29 1 pg      MCHC 31 0 g/dL      RDW 13 7 %      MPV 8 3 fL      Platelets 567 Thousands/uL      nRBC 0 /100 WBCs      Neutrophils Relative 53 %      Immat GRANS % 1 %      Lymphocytes Relative 31 %      Monocytes Relative 12 %      Eosinophils Relative 2 %      Basophils Relative 1 %      Neutrophils Absolute 2 64 Thousands/µL      Immature Grans Absolute 0 04 Thousand/uL      Lymphocytes Absolute 1 55 Thousands/µL      Monocytes Absolute 0 62 Thousand/µL      Eosinophils Absolute 0 10 Thousand/µL      Basophils Absolute 0 03 Thousands/µL                  XR ribs with pa chest min 3 views RIGHT   ED Interpretation by Fifi Garner PA-C (01/09 1332)   No acute abnormalities, no change from prior  Procedures  Procedures         ED Course                                             MDM  Number of Diagnoses or Management Options  Strain of thoracic back region: established and worsening  Diagnosis management comments: Patient with right back pain, no history of trauma, worse with movement, will order xray to r/o rib fracture or pneumonia  Patient's pain improved with lidoderm patch, advised OTC lidocaine patches and f/u with PCP  Amount and/or Complexity of Data Reviewed  Tests in the radiology section of CPT®: ordered and reviewed  Independent visualization of images, tracings, or specimens: yes    Patient Progress  Patient progress: stable      Disposition  Final diagnoses:   Strain of thoracic back region     Time reflects when diagnosis was documented in both MDM as applicable and the Disposition within this note     Time User Action Codes Description Comment    1/9/2022  1:32 PM Roxane Boeck Add [S29 012A] Strain of thoracic back region       ED Disposition     ED Disposition Condition Date/Time Comment    Discharge Stable Sun Jan 9, 2022  1:32  [address] discharge to home/self care              Follow-up Information     Follow up With Specialties Details Why Contact Info    Evangelista Ta MD Family Medicine Call in 1 week For lucille Paul  [address] [facility]  [address] [address] [phone]            Patient's Medications   Discharge Prescriptions    No medications on file       No discharge procedures on file      PDMP Review       Value Time User    PDMP Reviewed  Yes 1/2/2022  5:34 PM Cynthia Steen MD          ED Provider  Electronically Signed by           Epifanio Raya PA-C  01/09/22 3589

## 2022-01-09 NOTE — DISCHARGE INSTRUCTIONS
Over the counter lidocaine patches to back  Tylenol as needed for pain  Follow up with family doctor in 1 week for recheck

## 2022-01-12 ENCOUNTER — VBI (OUTPATIENT)
Dept: FAMILY MEDICINE CLINIC | Facility: HOSPITAL | Age: 87
End: 2022-01-12

## 2022-01-12 NOTE — TELEPHONE ENCOUNTER
Jamie Her    ED Visit Information     Ed visit date: 1/9/2022  Diagnosis Description:   In Network? [facility]  Discharge status: Home  Discharged with meds ? No  Number of ED visits to date: 1  ED Severity:NA     Outreach Information    Outreach successful: Yes 1  Date letter mailed:NA  Date Finalized:1/12/2022    Care Coordination    Follow up appointment with pcp: no Declined  Transportation issues ? No    Value [employer] type: 7 Day Outreach  Emergent necessity warranted by diagnosis: No  [facility] PCP: Yes  Transportation: Friend/Family Transport  Called PCP first?: No  Feels able to call PCP for urgent problems ?: Yes  Understands what emergencies can be handled by PCP ?: Yes  Ever any problems getting appointment with PCP for minor emergency/urgency problems?: No  Practice Contacted Patient ?: No  Pt had ED follow up with pcp/staff ?: No    Seen for follow-up out of network ?: No  Reason Patient went to ED instead of Urgent Care or PCP?: Perceived Severity of Illness  Urgent care Education?: Yes  01/12/2022 03:31 PM Phone (Mariel Barba) Jyoti Quezada (Self) [phone] (H)   Call Complete  Personal communication with patient's daughter regarding recent ED visit  Moriah Corey stated that Kirby Jewell is doing well and they do not feel that a follow up appt is needed at [facility] time

## 2022-01-18 ENCOUNTER — CONSULT (OUTPATIENT)
Dept: NEPHROLOGY | Facility: HOSPITAL | Age: 87
End: 2022-01-18
Payer: MEDICARE

## 2022-01-18 VITALS
BODY MASS INDEX: 28.71 KG/M2 | SYSTOLIC BLOOD PRESSURE: 123 MMHG | DIASTOLIC BLOOD PRESSURE: 75 MMHG | WEIGHT: 168.2 LBS | HEIGHT: 64 IN | HEART RATE: 99 BPM

## 2022-01-18 DIAGNOSIS — D50.8 OTHER IRON DEFICIENCY ANEMIA: ICD-10-CM

## 2022-01-18 DIAGNOSIS — N13.30 HYDRONEPHROSIS OF RIGHT KIDNEY: ICD-10-CM

## 2022-01-18 DIAGNOSIS — I50.32 CHRONIC DIASTOLIC HEART FAILURE (HCC): ICD-10-CM

## 2022-01-18 DIAGNOSIS — I10 ESSENTIAL HYPERTENSION: ICD-10-CM

## 2022-01-18 DIAGNOSIS — N18.30 STAGE 3 CHRONIC KIDNEY DISEASE, UNSPECIFIED WHETHER STAGE 3A OR 3B CKD (HCC): ICD-10-CM

## 2022-01-18 DIAGNOSIS — N18.32 STAGE 3B CHRONIC KIDNEY DISEASE (HCC): Primary | ICD-10-CM

## 2022-01-18 PROBLEM — N18.4 CHRONIC RENAL DISEASE, STAGE IV (HCC): Status: RESOLVED | Noted: 2021-10-06 | Resolved: 2022-01-18

## 2022-01-18 PROCEDURE — 99204 OFFICE O/P NEW MOD 45 MIN: CPT | Performed by: INTERNAL MEDICINE

## 2022-01-18 NOTE — PATIENT INSTRUCTIONS
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, age related nephron loss and probable cardiorenal syndrome as well as chronic right sided hydronephrosis  -b/l sCr 1 5-1 8 since Oct  2013, last sCr 1 7 as of 1/9/22  -in office urine sample bland(no blood/protein)  -recent UA with microscopy shows trace protein with 0-1 WBCs   -avoid nonsteroidals including ibuprofen, aleve, advil, motrin, naproxen, celebrex, indomethacin, toradol  -chronic severe right hydronephrosis with cortical thinning without left hydronephrosis noted on CT abdomen performed August 2021  -continue to monitor BMP, repeat testing in April and July 2022     2  HTN - BP in office well controlled  -avoid high salt/sodium diet  -avoid caffeine  -continue flomax, amiodarone 200mg daily   -no longer on lasix every other day as weight stable    3  Chronic diastolic CHF - monitor daily weight, no longer on lasix, grade 1 diastolic dysfunction with EF 55% as of echo performed Sept 2021    4  Anemia - Hgb normal range on oral iron, monitor CBC    5  Right hydronephrosis of kidney - obtain renal ultrasound ASAP, refer to urology    RTC in 6 months  Call cardiology if you notice increased weight gain or swelling in the legs

## 2022-01-24 DIAGNOSIS — F51.01 PRIMARY INSOMNIA: ICD-10-CM

## 2022-01-25 DIAGNOSIS — G25.0 ESSENTIAL TREMOR: ICD-10-CM

## 2022-01-25 RX ORDER — PRIMIDONE 50 MG/1
TABLET ORAL
Qty: 60 TABLET | Refills: 1 | Status: SHIPPED | OUTPATIENT
Start: 2022-01-25 | End: 2022-04-20

## 2022-01-25 RX ORDER — ZOLPIDEM TARTRATE 5 MG/1
TABLET ORAL
Qty: 30 TABLET | Refills: 0 | Status: SHIPPED | OUTPATIENT
Start: 2022-01-25 | End: 2022-02-25

## 2022-01-27 DIAGNOSIS — F32.1 CURRENT MODERATE EPISODE OF MAJOR DEPRESSIVE DISORDER WITHOUT PRIOR EPISODE (HCC): ICD-10-CM

## 2022-01-27 DIAGNOSIS — E03.9 ACQUIRED HYPOTHYROIDISM: ICD-10-CM

## 2022-01-27 RX ORDER — ESCITALOPRAM OXALATE 5 MG/1
TABLET ORAL
Qty: 30 TABLET | Refills: 1 | Status: SHIPPED | OUTPATIENT
Start: 2022-01-27 | End: 2022-03-26

## 2022-01-27 RX ORDER — LEVOTHYROXINE SODIUM 0.07 MG/1
TABLET ORAL
Qty: 30 TABLET | Refills: 0 | Status: SHIPPED | OUTPATIENT
Start: 2022-01-27 | End: 2022-03-19

## 2022-01-30 DIAGNOSIS — B37.9 MONILIASIS: ICD-10-CM

## 2022-02-01 ENCOUNTER — HOSPITAL ENCOUNTER (OUTPATIENT)
Dept: ULTRASOUND IMAGING | Facility: CLINIC | Age: 87
Discharge: HOME | End: 2022-02-01
Payer: MEDICARE

## 2022-02-01 DIAGNOSIS — N13.30 HYDRONEPHROSIS OF RIGHT KIDNEY: ICD-10-CM

## 2022-02-01 DIAGNOSIS — N18.32 STAGE 3B CHRONIC KIDNEY DISEASE (HCC): ICD-10-CM

## 2022-02-01 PROCEDURE — 76770 US EXAM ABDO BACK WALL COMP: CPT

## 2022-02-03 DIAGNOSIS — J44.9 MODERATE COPD (CHRONIC OBSTRUCTIVE PULMONARY DISEASE) (HCC): ICD-10-CM

## 2022-02-03 RX ORDER — DEXTROMETHORPHAN HYDROBROMIDE AND PROMETHAZINE HYDROCHLORIDE 15; 6.25 MG/5ML; MG/5ML
SYRUP ORAL
Qty: 180 ML | Refills: 1 | Status: SHIPPED | OUTPATIENT
Start: 2022-02-03

## 2022-02-15 ENCOUNTER — OFFICE VISIT (OUTPATIENT)
Dept: PULMONOLOGY | Facility: HOSPITAL | Age: 87
End: 2022-02-15
Payer: MEDICARE

## 2022-02-15 VITALS
WEIGHT: 168 LBS | RESPIRATION RATE: 16 BRPM | SYSTOLIC BLOOD PRESSURE: 120 MMHG | DIASTOLIC BLOOD PRESSURE: 66 MMHG | HEART RATE: 90 BPM | OXYGEN SATURATION: 94 % | HEIGHT: 64 IN | BODY MASS INDEX: 28.68 KG/M2

## 2022-02-15 DIAGNOSIS — F17.201 TOBACCO ABUSE, IN REMISSION: ICD-10-CM

## 2022-02-15 DIAGNOSIS — R06.02 SHORTNESS OF BREATH: Primary | ICD-10-CM

## 2022-02-15 DIAGNOSIS — J44.1 COPD EXACERBATION (HCC): ICD-10-CM

## 2022-02-15 DIAGNOSIS — J43.2 CENTRILOBULAR EMPHYSEMA (HCC): ICD-10-CM

## 2022-02-15 DIAGNOSIS — R91.8 PULMONARY NODULES: ICD-10-CM

## 2022-02-15 DIAGNOSIS — G47.33 OSA (OBSTRUCTIVE SLEEP APNEA): ICD-10-CM

## 2022-02-15 PROCEDURE — 99214 OFFICE O/P EST MOD 30 MIN: CPT | Performed by: PHYSICIAN ASSISTANT

## 2022-02-15 NOTE — PROGRESS NOTES
Assessment & Plan:    1  Shortness of breath     2  Centrilobular emphysema ([address] )     3  LIVIER (obstructive sleep apnea)     4  Pulmonary nodules  CT chest without contrast   5  Tobacco abuse, in remission  CT chest without contrast   6  COPD exacerbation [facility])  Ambulatory referral to Pulmonology       · Patient presenting for follow-up  He has not been seen since September hospitalization for shortness of breath which was determined to be secondary to emphysema and CHF  Since then, had multiple rounds of prednisone for chronic bronchitis  He has been off prednisone for months now and doing much better  · Continue Anoro Ellipta 1 puff daily  He uses ipratropium and levalbuterol via nebulizer once daily  Advised that he can use the rescue inhaler up to every 6 hours as needed and as a preventative measure prior to exercise although this can contribute to tremors  · It seems that his breathing has been stable the last few months so I will not change his regimen at all  If he again finds frequent need for prednisone, at that point would recommend addition of inhaled corticosteroids  · Discussed previous CT chest done showing multiple pulmonary nodules and that follow-up is overdue  Repeat CT ordered  · I recommended getting the COVID-19 booster      Patient is accompanied by his daughter, who helps provide history  All of their questions were answered  Subjective:     Patient ID: Sunny Marquez is a 80 y o  male  Chief Complaint:    Amanda Dalton is a pleasant 42-year-old male with past medical history including emphysema, CHF, CKD, LIVIER that is untreated, lung nodules, hypertension, hyperlipidemia presenting for follow-up  Patient had been suffering from uncontrolled coughing fits for months and had been on prednisone for quite a while however for the last few months is doing much better and off the prednisone completely  He does still have shortness of breath with exertion    Despite this, when the weather is nice he goes for about 3 walks a day  He has 100 pack year smoking history and quit over 20 years ago  The following portions of the patient's history were reviewed in this encounter and updated as appropriate: Past medical, social, surgical, family, allergies    Review of Systems   Constitutional: Negative for fever  Respiratory: Positive for cough (Improved), chest tightness, shortness of breath and wheezing  All other systems reviewed and are negative  Objective:  Vitals:    02/15/22 1425   BP: 120/66   Pulse: 90   Resp: 16   SpO2: 94%   Weight: 76 2 kg (168 lb)   Height: 5' 4" (1 626 m)       Physical Exam  Vitals and nursing note reviewed  Constitutional:       General: He is not in acute distress  Appearance: He is well-developed  He is not diaphoretic  HENT:      Head: Normocephalic and atraumatic  Right Ear: External ear normal       Left Ear: External ear normal       Ears:      Comments: Hearing loss  Eyes:      General: No scleral icterus  Right eye: No discharge  Left eye: No discharge  Conjunctiva/sclera: Conjunctivae normal    Neck:      Trachea: No tracheal deviation  Cardiovascular:      Rate and Rhythm: Normal rate and regular rhythm  Heart sounds: Normal heart sounds  No murmur heard  No friction rub  No gallop  Pulmonary:      Effort: Pulmonary effort is normal  No respiratory distress  Breath sounds: No stridor  Wheezing present  Abdominal:      General: There is no distension  Tenderness: There is no guarding  Musculoskeletal:         General: No tenderness or deformity  Normal range of motion  Cervical back: Normal range of motion and neck supple  Skin:     General: Skin is warm and dry  Coloration: Skin is not pale  Findings: No erythema or rash  Neurological:      Mental Status: He is alert and oriented to person, place, and time  Cranial Nerves: No cranial nerve deficit        Motor: No abnormal muscle tone  Psychiatric:         Behavior: Behavior normal          Thought Content:  Thought content normal          Judgment: Judgment normal          Lab Review:   Admission on 01/09/2022, Discharged on 01/09/2022   Component Date Value    WBC 01/09/2022 4 98     RBC 01/09/2022 4 36     Hemoglobin 01/09/2022 12 7     Hematocrit 01/09/2022 41 0     MCV 01/09/2022 94     MCH 01/09/2022 29 1     MCHC 01/09/2022 31 0*    RDW 01/09/2022 13 7     MPV 01/09/2022 8 3*    Platelets 66/93/8462 152     nRBC 01/09/2022 0     Neutrophils Relative 01/09/2022 53     Immat GRANS % 01/09/2022 1     Lymphocytes Relative 01/09/2022 31     Monocytes Relative 01/09/2022 12     Eosinophils Relative 01/09/2022 2     Basophils Relative 01/09/2022 1     Neutrophils Absolute 01/09/2022 2 64     Immature Grans Absolute 01/09/2022 0 04     Lymphocytes Absolute 01/09/2022 1 55     Monocytes Absolute 01/09/2022 0 62     Eosinophils Absolute 01/09/2022 0 10     Basophils Absolute 01/09/2022 0 03     Sodium 01/09/2022 137     Potassium 01/09/2022 3 9     Chloride 01/09/2022 103     CO2 01/09/2022 24     ANION GAP 01/09/2022 10     BUN 01/09/2022 18     Creatinine 01/09/2022 1 70*    Glucose 01/09/2022 100     Calcium 01/09/2022 8 3     Corrected Calcium 01/09/2022 8 9     AST 01/09/2022 16     ALT 01/09/2022 35     Alkaline Phosphatase 01/09/2022 103     Total Protein 01/09/2022 6 6     Albumin 01/09/2022 3 2*    Total Bilirubin 01/09/2022 0 30     eGFR 01/09/2022 34     Color, UA 01/09/2022 Yellow     Clarity, UA 01/09/2022 Clear     Specific Gravity, UA 01/09/2022 1 020     pH, UA 01/09/2022 6 0     Leukocytes, UA 01/09/2022 Negative     Nitrite, UA 01/09/2022 Negative     Protein, UA 01/09/2022 Trace*    Glucose, UA 01/09/2022 Negative     Ketones, UA 01/09/2022 Negative     Urobilinogen, UA 01/09/2022 0 2     Bilirubin, UA 01/09/2022 Negative     Blood, UA 01/09/2022 Negative  RBC, UA 01/09/2022 None Seen     WBC, UA 01/09/2022 0-1     Epithelial Cells 01/09/2022 None Seen     Bacteria, UA 01/09/2022 None Seen     MUCUS THREADS 01/09/2022 None Seen

## 2022-02-24 ENCOUNTER — HOSPITAL ENCOUNTER (OUTPATIENT)
Dept: CT IMAGING | Facility: HOSPITAL | Age: 87
Discharge: HOME | End: 2022-02-24
Payer: MEDICARE

## 2022-02-24 DIAGNOSIS — R91.8 PULMONARY NODULES: ICD-10-CM

## 2022-02-24 DIAGNOSIS — F17.201 TOBACCO ABUSE, IN REMISSION: ICD-10-CM

## 2022-02-24 DIAGNOSIS — F51.01 PRIMARY INSOMNIA: ICD-10-CM

## 2022-02-24 PROCEDURE — 71250 CT THORAX DX C-: CPT

## 2022-02-24 PROCEDURE — G1004 CDSM NDSC: HCPCS

## 2022-02-25 RX ORDER — ZOLPIDEM TARTRATE 5 MG/1
TABLET ORAL
Qty: 30 TABLET | Refills: 1 | Status: SHIPPED | OUTPATIENT
Start: 2022-02-25 | End: 2022-07-05

## 2022-03-01 NOTE — PROGRESS NOTES
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  Because patient asymptomatic, his advanced age, and comorbidities, would recommend observation and conservative management  Patient and daughter are amenable with this plan  He will continue to follow with Nephrology  2  BPH with lower urinary tract symptoms- urine dip in the office today appears negative for infection or blood  PVR assessment is 10 mL  He has no urinary complaints  Patient will remain on tamsulosin 0 4 mg HS as prescribed by his PCP  Follow-up in 6 months with repeat renal ultrasound and BMP  If findings remain stable, consider annual follow-up or as needed  They were instructed to call sooner with any issues      -All questions answered, patient and daughter agrees with plan      History of Present Illness  80 y o  male who presents in consultation today for evaluation of right sided hydronephrosis and BPH  Patient referred by his PCP and nephrology  He is accompanied today by his daughter as primary historian  He presented to ER on 1/9/2022 for evaluation of right sided flank pain  No upper tract imaging was performed, but his creatinine was elevated to 1 70  This is within his baseline  He was seen in consultation by nephrology, and renal ultrasound performed 2/1/2022  Results identified severe right sided hydronephrosis and BPH    He has a known history of right UPJ obstruction and severe hydronephrosis noted on imaging dating back to 2014  Patient attempted do describe a procedure performed many years ago involving his kidney an aspiration of fluid  However, he cannot recall details and no records available to review  He is currently on tamsulosin 0 4mg HS  Patient denies any lower urinary tract symptoms, gross hematuria, or dysuria  He denies any strong family history of prostate or kidney malignancy  Patient denies any additional urologic history including nephrolithiasis or urinary tract infections  Review of Systems  Review of Systems   Constitutional: Negative  HENT: Negative  Respiratory: Negative  Cardiovascular: Negative  Gastrointestinal: Negative  Genitourinary: Negative for decreased urine volume, difficulty urinating, dysuria, flank pain, frequency, hematuria and urgency  Musculoskeletal: Positive for arthralgias  Skin: Negative  Neurological: Negative  Psychiatric/Behavioral: Negative  AUA SYMPTOM SCORE      Most Recent Value   AUA SYMPTOM SCORE    How often have you had a sensation of not emptying your bladder completely after you finished urinating? 0   How often have you had to urinate again less than two hours after you finished urinating? 0   How often have you found you stopped and started again several times when you urinate? 0   How often have you found it difficult to postpone urination? 0   How often have you had a weak urinary stream? 0   How often have you had to push or strain to begin urination? 0   How many times did you most typically get up to urinate from the time you went to bed at night until the time you got up in the morning?  3   Quality of Life: If you were to spend the rest of your life with your urinary condition just the way it is now, how would you feel about that? 0   AUA SYMPTOM SCORE 3          Past Medical History  Past Medical History:   Diagnosis Date    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    Chronic kidney disease     Compression fracture of thoracic vertebra (HCC)     last assessed 2012    [facility]     Disease of thyroid gland     Heart attack ([facility] )     History of methicillin resistant staphylococcus aureus (MRSA) 2019    negative nasal swab     Hollenhorst plaque, right eye     last assessed 2013    Hyperlipidemia     Hypertension     Iron deficiency anemia due to chronic blood loss     last assessed 09/10/2012    Ischemic colitis ([facility] )     last assessed 2014    Osteoarthritis of both hands     last assessedn 2013    Peptic ulcer     last assessed 2013    Polymyalgia rheumatica ([facility] )     last assessesd 10/09/2012    Renal disorder     Upper GI hemorrhage     last assessed 2015    Varicose veins of lower extremity     last assessed 2013       Past Social History  Past Surgical History:   Procedure Laterality Date    CORONARY ARTERY BYPASS GRAFT      HEMORRHOID SURGERY      HERNIA REPAIR      RENAL CYST EXCISION      resolved 2010    THROMBOENDARTERECTOMY Right     carotid, last assessed 2013       Past Family History  Family History   Problem Relation Age of Onset    Hypertension Mother     Alcohol abuse Mother     Hypertension Father     Alcohol abuse Father     Alcohol abuse Son     Heart disease Family     Stroke Family         syndrome       Past Social history  Social History     Socioeconomic History    Marital status:      Spouse name: Not on file    Number of children: Not on file    Years of education: Not on file    Highest education level: Not on file   Occupational History    Not on file   Tobacco Use    Smoking status: Former Smoker     Packs/day: 2 00     Years: 50 00     Pack years: 100 00     Types: Cigarettes     Start date: 65     Quit date:      Years since quittin 1    Smokeless tobacco: Never Used    Tobacco comment: Quit 40 yrs   ago   Vaping Use    Vaping Use: Never used   Substance and Sexual Activity    Alcohol use: Never     Alcohol/week: 1 0 standard drink     Types: 1 Glasses of wine per week     Comment: social    Drug use: Never    Sexual activity: Never   Other Topics Concern    Not on file   Social History Narrative     per [employer]    Always uses seat belt     Social Determinants of Health     Financial Resource Strain: Low Risk     Difficulty of Paying Living Expenses: Not hard at all   Food Insecurity: Not on file   Transportation Needs: No Transportation Needs    Lack of Transportation (Medical): No    Lack of Transportation (Non-Medical): No   Physical Activity: Not on file   Stress: Not on file   Social Connections: Not on file   Intimate Partner Violence: Not on file   Housing Stability: Not on file       Current Medications  Current Outpatient Medications   Medication Sig Dispense Refill    albuterol (PROVENTIL HFA,VENTOLIN HFA) 90 mcg/act inhaler Inhale 2 puffs every 6 (six) hours as needed for wheezing 18 g 3    amiodarone 200 mg tablet Take 1 tablet (200 mg total) by mouth daily For fourteen days followed by 200 mg once a day   90 tablet 3    Anoro Ellipta 62 5-25 MCG/INH inhaler inhale 1 puff by mouth and INTO THE LUNGS once daily 60 blister 3    B Complex Vitamins (B COMPLEX PO) Take by mouth daily      Cholecalciferol ([employer] VITAMIN D) 2000 units CAPS Take by mouth daily       Cyanocobalamin (B-12 PO) Take by mouth daily      escitalopram (LEXAPRO) 5 mg tablet take 1 tablet by mouth once daily 30 tablet 1    Ferrous Sulfate (IRON) 325 (65 Fe) MG TABS Take by mouth every other day       fluticasone (FLONASE) 50 mcg/act nasal spray 1 spray into each nostril 2 (two) times a day      furosemide (LASIX) 20 mg tablet Take 1 tablet (20 mg total) by mouth every other day (Patient not taking: Reported on 11/30/2021 ) 15 tablet 3    guaiFENesin (MUCINEX) 600 mg 12 hr tablet Take 1,200 mg by mouth every 12 (twelve) hours      ipratropium (ATROVENT) 0 02 % nebulizer solution Take 2 5 mL (0 5 mg total) by nebulization 3 (three) times a day 225 mL 3    KRILL OIL PO Take by mouth daily      levalbuterol (XOPENEX) 1 25 mg/3 mL nebulizer solution Take 1 vial (1 25 mg total) by nebulization 3 (three) times a day 225 mL 3    levothyroxine 75 mcg tablet take 1 tablet by mouth once daily 30 tablet 0    LORazepam (ATIVAN) 0 5 mg tablet take 1 tablet by mouth every 8 hours if needed for anxiety 90 tablet 1    Multiple Vitamins-Minerals (VISION FORMULA/LUTEIN PO) Take by mouth      nystatin-triamcinolone (MYCOLOG-II) cream APPLY TO THE AFFECTED AREA(S) SPARINGLY TWICE A DAY 60 g 2    omeprazole (PriLOSEC) 20 mg delayed release capsule take 1 capsule by mouth once daily 30 capsule 5    primidone (MYSOLINE) 50 mg tablet take 1 tablet by mouth every 12 hours 60 tablet 1    promethazine-dextromethorphan (PHENERGAN-DM) 6 25-15 mg/5 mL oral syrup take 5 milliliter by mouth four times a day if needed for cough 180 mL 1    Respiratory Therapy Supplies (SPIROMETER) KIT by Does not apply route 4 (four) times a day 1 kit 0    sodium chloride 0 9 % nebulizer solution TAKE 3 MILLILITERS BY NEBULIZATION ROUTE AS NEEDED FOR WHEEZING 90 mL 1    tamsulosin (FLOMAX) 0 4 mg take 1 capsule by mouth once daily with dinner 30 capsule 5    zolpidem (AMBIEN) 5 mg tablet take 1 tablet by mouth at bedtime if needed for sleep 30 tablet 1     No current facility-administered medications for this visit  Allergies  Allergies   Allergen Reactions    Pravastatin Anaphylaxis, Photosensitivity and Headache     Reaction Date: [id number];    Other reaction(s): Headache    Acetaminophen-Codeine GI Intolerance    Atorvastatin      Other reaction(s): Leg Cramps  Other reaction(s): Leg Cramps    Codeine Nausea Only    Colestipol GI Intolerance     Reaction Date: [id number];     Contrast  [Iodinated Diagnostic Agents]     Fenofibrate GI Intolerance     Reaction Date: [id number];     Hydrocodone Vomiting    Niacin      Reaction Date: [id number];     Niaspan  [Niacin Er]     Pitavastatin Myalgia    Pneumococcal Polysaccharide Vaccine     Pravastatin Sodium     Simvastatin     Statins Myalgia    Vicoprofen  [Hydrocodone-Ibuprofen] GI Intolerance    Cyclophosphamide Rash    Ioversol Hives       Past Medical History, Social History, Family History, medications and allergies were reviewed  Vitals  There were no vitals filed for this visit  Physical Exam  Physical Exam  Constitutional:       Appearance: Normal appearance  He is well-developed  HENT:      Head: Normocephalic  Eyes:      Pupils: Pupils are equal, round, and reactive to light  Pulmonary:      Effort: Pulmonary effort is normal    Abdominal:      Palpations: Abdomen is soft  Tenderness: There is no right CVA tenderness or left CVA tenderness  Musculoskeletal:         General: Normal range of motion  Cervical back: Normal range of motion  Skin:     General: Skin is warm and dry  Neurological:      General: No focal deficit present  Mental Status: He is alert and oriented to person, place, and time  Psychiatric:         Mood and Affect: Mood normal          Behavior: Behavior normal          Thought Content:  Thought content normal          Judgment: Judgment normal          Results    I have personally reviewed all pertinent lab results and reviewed with patient  Lab Results   Component Value Date    PSA 75 4 (H) 07/22/2017    PSA 45 5 (H) 05/04/2016    PSA 41 9 (H) 02/11/2015     Lab Results   Component Value Date    GLUCOSE 92 08/28/2015    CALCIUM 8 3 01/09/2022     08/28/2015    K 3 9 01/09/2022    CO2 24 01/09/2022     01/09/2022    BUN 18 01/09/2022    CREATININE 1 70 (H) 01/09/2022     Lab Results   Component Value Date    WBC 4 98 01/09/2022    HGB 12 7 01/09/2022    HCT 41 0 01/09/2022    MCV 94 01/09/2022     01/09/2022     No results found for this or any previous visit (from the past 1 hour(s))

## 2022-03-02 ENCOUNTER — OFFICE VISIT (OUTPATIENT)
Dept: UROLOGY | Facility: HOSPITAL | Age: 87
End: 2022-03-02
Attending: INTERNAL MEDICINE
Payer: MEDICARE

## 2022-03-02 VITALS
OXYGEN SATURATION: 96 % | HEIGHT: 64 IN | HEART RATE: 117 BPM | WEIGHT: 173 LBS | BODY MASS INDEX: 29.53 KG/M2 | SYSTOLIC BLOOD PRESSURE: 110 MMHG | DIASTOLIC BLOOD PRESSURE: 68 MMHG

## 2022-03-02 DIAGNOSIS — N13.30 HYDRONEPHROSIS OF RIGHT KIDNEY: ICD-10-CM

## 2022-03-02 DIAGNOSIS — N40.1 BENIGN PROSTATIC HYPERPLASIA WITH LOWER URINARY TRACT SYMPTOMS, SYMPTOM DETAILS UNSPECIFIED: Primary | ICD-10-CM

## 2022-03-02 DIAGNOSIS — N18.32 STAGE 3B CHRONIC KIDNEY DISEASE (HCC): ICD-10-CM

## 2022-03-02 LAB

## 2022-03-02 PROCEDURE — 81002 URINALYSIS NONAUTO W/O SCOPE: CPT | Performed by: NURSE PRACTITIONER

## 2022-03-02 PROCEDURE — 51798 US URINE CAPACITY MEASURE: CPT | Performed by: NURSE PRACTITIONER

## 2022-03-02 PROCEDURE — 99204 OFFICE O/P NEW MOD 45 MIN: CPT | Performed by: NURSE PRACTITIONER

## 2022-03-03 DIAGNOSIS — F41.9 ANXIETY: ICD-10-CM

## 2022-03-03 RX ORDER — LORAZEPAM 0.5 MG/1
TABLET ORAL
Qty: 90 TABLET | Refills: 0 | Status: SHIPPED | OUTPATIENT
Start: 2022-03-03 | End: 2022-04-01

## 2022-03-12 DIAGNOSIS — R10.819 SUPRAPUBIC TENDERNESS: ICD-10-CM

## 2022-03-12 RX ORDER — TAMSULOSIN HYDROCHLORIDE 0.4 MG/1
CAPSULE ORAL
Qty: 30 CAPSULE | Refills: 5 | Status: SHIPPED | OUTPATIENT
Start: 2022-03-12

## 2022-03-15 DIAGNOSIS — J44.1 COPD EXACERBATION (HCC): ICD-10-CM

## 2022-03-15 RX ORDER — UMECLIDINIUM BROMIDE AND VILANTEROL TRIFENATATE 62.5; 25 UG/1; UG/1
POWDER RESPIRATORY (INHALATION)
Qty: 60 BLISTER | Refills: 3 | Status: SHIPPED | OUTPATIENT
Start: 2022-03-15 | End: 2022-05-12

## 2022-03-16 ENCOUNTER — OFFICE VISIT (OUTPATIENT)
Dept: FAMILY MEDICINE CLINIC | Facility: HOSPITAL | Age: 87
End: 2022-03-16
Payer: MEDICARE

## 2022-03-16 VITALS
HEIGHT: 64 IN | HEART RATE: 93 BPM | BODY MASS INDEX: 29.6 KG/M2 | WEIGHT: 173.4 LBS | SYSTOLIC BLOOD PRESSURE: 118 MMHG | TEMPERATURE: 98.2 F | DIASTOLIC BLOOD PRESSURE: 72 MMHG | OXYGEN SATURATION: 94 %

## 2022-03-16 DIAGNOSIS — N13.30 HYDRONEPHROSIS OF RIGHT KIDNEY: ICD-10-CM

## 2022-03-16 DIAGNOSIS — I10 ESSENTIAL HYPERTENSION: ICD-10-CM

## 2022-03-16 DIAGNOSIS — E03.9 ACQUIRED HYPOTHYROIDISM: ICD-10-CM

## 2022-03-16 DIAGNOSIS — F06.4 ANXIETY DISORDER DUE TO GENERAL MEDICAL CONDITION: ICD-10-CM

## 2022-03-16 DIAGNOSIS — I25.10 CORONARY ARTERY DISEASE INVOLVING NATIVE CORONARY ARTERY OF NATIVE HEART WITHOUT ANGINA PECTORIS: ICD-10-CM

## 2022-03-16 DIAGNOSIS — J41.8 MIXED SIMPLE AND MUCOPURULENT CHRONIC BRONCHITIS (HCC): Primary | ICD-10-CM

## 2022-03-16 DIAGNOSIS — N18.30 STAGE 3 CHRONIC KIDNEY DISEASE, UNSPECIFIED WHETHER STAGE 3A OR 3B CKD (HCC): ICD-10-CM

## 2022-03-16 PROCEDURE — 99214 OFFICE O/P EST MOD 30 MIN: CPT | Performed by: FAMILY MEDICINE

## 2022-03-16 RX ORDER — PROMETHAZINE HYDROCHLORIDE 6.25 MG/5ML
6.25 SYRUP ORAL 4 TIMES DAILY PRN
Qty: 118 ML | Refills: 3 | Status: SHIPPED | OUTPATIENT
Start: 2022-03-16

## 2022-03-16 NOTE — PROGRESS NOTES
Assessment/Plan:         Diagnoses and all orders for this visit:    Mixed simple and mucopurulent chronic bronchitis ([facility] )  Comments:  Symptomatic but stable  Orders:  -     promethazine (PHENERGAN) 12 5 mg/10 mL syrup; Take 5 mL (6 25 mg total) by mouth 4 (four) times a day as needed (cough)    Coronary artery disease involving native coronary artery of native heart without angina pectoris  Comments:  CAD asymptomatic    Acquired hypothyroidism  Comments:  Clinically euthyroid    Essential hypertension  Comments:  Excellent BP control    Hydronephrosis of right kidney  Comments:  On observational status with Urology    Stage 3 chronic kidney disease, unspecified whether stage 3a or 3b CKD ([facility] )    Anxiety disorder due to general medical condition      Controlled Substance Review    [place] PDMP or [place]  reviewed: No red flags were identified; safe to proceed with prescription           Subjective:      Patient ID: Mukesh Julian is a 80 y o  male  4 month follow up  Seen with son, with whom he lives    No recent illness or injury  Getting back to more regular walking, has a friend neighbor with whom he walks and shares company    Some wheezing off and on    Cough is main complaint, is overall less than it was at its worst   Again noted that this is a very chronic problem for him and represents his chronic bronchitis    Medications reviewed and list revised      The following portions of the patient's history were reviewed and updated as appropriate: allergies, current medications, past family history, past medical history, past social history, past surgical history and problem list     Review of Systems   Constitutional: Positive for unexpected weight change  HENT: Positive for congestion and postnasal drip  Respiratory: Positive for cough, chest tightness and wheezing  Cardiovascular: Negative  Gastrointestinal: Negative  Genitourinary: Negative  Musculoskeletal: Positive for arthralgias  Hematological: Negative  Psychiatric/Behavioral: Negative for sleep disturbance  The patient is nervous/anxious  All other systems reviewed and are negative  Objective:      /72 (Patient Position: Sitting, Cuff Size: Large)   Pulse 93   Temp 98 2 °F (36 8 °C) (Tympanic)   Ht 5' 4" (1 626 m)   Wt 78 7 kg (173 lb 6 4 oz)   SpO2 94%   BMI 29 76 kg/m²          Physical Exam  Vitals and nursing note reviewed  Constitutional:       Appearance: Normal appearance  HENT:      Right Ear: There is no impacted cerumen  Left Ear: There is no impacted cerumen  Nose: Nose normal       Mouth/Throat:      Mouth: Mucous membranes are moist    Neck:      Vascular: No carotid bruit  Cardiovascular:      Rate and Rhythm: Normal rate and regular rhythm  Pulses: Normal pulses  Heart sounds: Normal heart sounds  Pulmonary:      Effort: Pulmonary effort is normal  No respiratory distress  Breath sounds: Rhonchi present  Abdominal:      General: Abdomen is flat  Musculoskeletal:      Right lower leg: No edema  Left lower leg: No edema  Skin:     Findings: No rash  Neurological:      General: No focal deficit present  Mental Status: He is alert and oriented to person, place, and time  Psychiatric:         Mood and Affect: Mood normal          Behavior: Behavior normal        BMI Counseling: Body mass index is 29 76 kg/m²  The BMI is above normal  Nutrition recommendations include reducing portion sizes, decreasing overall calorie intake and moderation in carbohydrate intake  Exercise recommendations include exercising 3-5 times per week

## 2022-03-19 DIAGNOSIS — E03.9 ACQUIRED HYPOTHYROIDISM: ICD-10-CM

## 2022-03-19 RX ORDER — LEVOTHYROXINE SODIUM 0.07 MG/1
TABLET ORAL
Qty: 30 TABLET | Refills: 0 | Status: SHIPPED | OUTPATIENT
Start: 2022-03-19 | End: 2022-06-10

## 2022-03-26 DIAGNOSIS — F32.1 CURRENT MODERATE EPISODE OF MAJOR DEPRESSIVE DISORDER WITHOUT PRIOR EPISODE (HCC): ICD-10-CM

## 2022-03-26 RX ORDER — ESCITALOPRAM OXALATE 5 MG/1
TABLET ORAL
Qty: 30 TABLET | Refills: 1 | Status: SHIPPED | OUTPATIENT
Start: 2022-03-26 | End: 2022-05-24

## 2022-04-01 DIAGNOSIS — F41.9 ANXIETY: ICD-10-CM

## 2022-04-01 RX ORDER — LORAZEPAM 0.5 MG/1
TABLET ORAL
Qty: 90 TABLET | Refills: 0 | Status: SHIPPED | OUTPATIENT
Start: 2022-04-01 | End: 2022-04-26

## 2022-04-02 ENCOUNTER — APPOINTMENT (OUTPATIENT)
Dept: LAB | Facility: CLINIC | Age: 87
End: 2022-04-02
Payer: MEDICARE

## 2022-04-02 DIAGNOSIS — N18.32 STAGE 3B CHRONIC KIDNEY DISEASE (HCC): ICD-10-CM

## 2022-04-02 LAB
ANION GAP SERPL CALCULATED.3IONS-SCNC: 3 MMOL/L (ref 4–13)
BACTERIA UR QL AUTO: ABNORMAL /HPF
BILIRUB UR QL STRIP: NEGATIVE
BUN SERPL-MCNC: 30 MG/DL (ref 5–25)
CALCIUM SERPL-MCNC: 9.6 MG/DL (ref 8.3–10.1)
CHLORIDE SERPL-SCNC: 104 MMOL/L (ref 100–108)
CLARITY UR: CLEAR
CO2 SERPL-SCNC: 27 MMOL/L (ref 21–32)
COLOR UR: YELLOW
CREAT SERPL-MCNC: 2.24 MG/DL (ref 0.6–1.3)
CREAT UR-MCNC: 144 MG/DL
GFR SERPL CREATININE-BSD FRML MDRD: 25 ML/MIN/1.73SQ M
GLUCOSE P FAST SERPL-MCNC: 105 MG/DL (ref 65–99)
GLUCOSE UR STRIP-MCNC: NEGATIVE MG/DL
HGB UR QL STRIP.AUTO: NEGATIVE
HYALINE CASTS #/AREA URNS LPF: ABNORMAL /LPF
KETONES UR STRIP-MCNC: NEGATIVE MG/DL
LEUKOCYTE ESTERASE UR QL STRIP: NEGATIVE
NITRITE UR QL STRIP: NEGATIVE
NON-SQ EPI CELLS URNS QL MICRO: ABNORMAL /HPF
PH UR STRIP.AUTO: 6 [PH]
POTASSIUM SERPL-SCNC: 5.2 MMOL/L (ref 3.5–5.3)
PROT UR STRIP-MCNC: ABNORMAL MG/DL
PROT UR-MCNC: 25 MG/DL
PROT/CREAT UR: 0.17 MG/G{CREAT} (ref 0–0.1)
RBC #/AREA URNS AUTO: ABNORMAL /HPF
SODIUM SERPL-SCNC: 134 MMOL/L (ref 136–145)
SP GR UR STRIP.AUTO: 1.02 (ref 1–1.03)
UROBILINOGEN UR STRIP-ACNC: <2 MG/DL
WBC #/AREA URNS AUTO: ABNORMAL /HPF

## 2022-04-02 PROCEDURE — 36415 COLL VENOUS BLD VENIPUNCTURE: CPT

## 2022-04-02 PROCEDURE — 81001 URINALYSIS AUTO W/SCOPE: CPT

## 2022-04-02 PROCEDURE — 84156 ASSAY OF PROTEIN URINE: CPT

## 2022-04-02 PROCEDURE — 80048 BASIC METABOLIC PNL TOTAL CA: CPT

## 2022-04-02 PROCEDURE — 82570 ASSAY OF URINE CREATININE: CPT

## 2022-04-06 ENCOUNTER — TELEPHONE (OUTPATIENT)
Dept: OTHER | Facility: HOSPITAL | Age: 87
End: 2022-04-06

## 2022-04-06 ENCOUNTER — TELEPHONE (OUTPATIENT)
Dept: NEPHROLOGY | Facility: HOSPITAL | Age: 87
End: 2022-04-06

## 2022-04-06 DIAGNOSIS — N18.30 STAGE 3 CHRONIC KIDNEY DISEASE, UNSPECIFIED WHETHER STAGE 3A OR 3B CKD (HCC): Primary | ICD-10-CM

## 2022-04-06 NOTE — TELEPHONE ENCOUNTER
Spoke with Radha Ulloa  Patient denies LE edema  He is not checking his weight  He is not taking Lasix  Radha Ulloa is not aware how much he is drinking but thinks it is around 30 oz of water  She will talk to him today and return call tomorrow  He has a history of chronic hydro  If he is hydrating adequately, he should have repeat renal US and follow with urology in regards to this  We will encourage him to drink water and likely repeat labs      Thank you

## 2022-04-06 NOTE — TELEPHONE ENCOUNTER
Patients daughter, Dakota Coughlin called stating she received a vm to call back to discuss labs   She can be reached at [phone]

## 2022-04-07 ENCOUNTER — TELEPHONE (OUTPATIENT)
Dept: NEPHROLOGY | Facility: CLINIC | Age: 87
End: 2022-04-07

## 2022-04-07 NOTE — TELEPHONE ENCOUNTER
Patient's daughter called and stated she is not sure of the amount of liquid the patient intakes on a daily basis but they will be  working on increasing the patients liquids and will get the lab work done next week sometime

## 2022-04-12 ENCOUNTER — OFFICE VISIT (OUTPATIENT)
Dept: FAMILY MEDICINE CLINIC | Facility: HOSPITAL | Age: 87
End: 2022-04-12
Payer: MEDICARE

## 2022-04-12 ENCOUNTER — HOSPITAL ENCOUNTER (EMERGENCY)
Facility: HOSPITAL | Age: 87
Discharge: HOME | End: 2022-04-12
Attending: EMERGENCY MEDICINE | Admitting: EMERGENCY MEDICINE
Payer: MEDICARE

## 2022-04-12 ENCOUNTER — APPOINTMENT (EMERGENCY)
Dept: CT IMAGING | Facility: HOSPITAL | Age: 87
End: 2022-04-12
Payer: MEDICARE

## 2022-04-12 VITALS
HEART RATE: 78 BPM | DIASTOLIC BLOOD PRESSURE: 94 MMHG | RESPIRATION RATE: 17 BRPM | TEMPERATURE: 97.1 F | SYSTOLIC BLOOD PRESSURE: 133 MMHG | OXYGEN SATURATION: 91 %

## 2022-04-12 VITALS
HEIGHT: 64 IN | TEMPERATURE: 97.4 F | HEART RATE: 88 BPM | DIASTOLIC BLOOD PRESSURE: 82 MMHG | BODY MASS INDEX: 28.92 KG/M2 | WEIGHT: 169.4 LBS | SYSTOLIC BLOOD PRESSURE: 122 MMHG

## 2022-04-12 DIAGNOSIS — S76.011A: Primary | ICD-10-CM

## 2022-04-12 DIAGNOSIS — R33.9 URINARY RETENTION: Primary | ICD-10-CM

## 2022-04-12 DIAGNOSIS — R10.31 RIGHT INGUINAL PAIN: ICD-10-CM

## 2022-04-12 DIAGNOSIS — N40.1 BENIGN PROSTATIC HYPERPLASIA WITH LOWER URINARY TRACT SYMPTOMS, SYMPTOM DETAILS UNSPECIFIED: ICD-10-CM

## 2022-04-12 DIAGNOSIS — R10.84 GENERALIZED ABDOMINAL PAIN: ICD-10-CM

## 2022-04-12 DIAGNOSIS — M25.559 HIP PAIN: ICD-10-CM

## 2022-04-12 DIAGNOSIS — M19.90 DJD (DEGENERATIVE JOINT DISEASE): ICD-10-CM

## 2022-04-12 LAB
ALBUMIN SERPL BCP-MCNC: 3.6 G/DL (ref 3.5–5)
ALP SERPL-CCNC: 110 U/L (ref 46–116)
ALT SERPL W P-5'-P-CCNC: 39 U/L (ref 12–78)
ANION GAP SERPL CALCULATED.3IONS-SCNC: 10 MMOL/L (ref 4–13)
AST SERPL W P-5'-P-CCNC: 17 U/L (ref 5–45)
BASOPHILS # BLD AUTO: 0.02 THOUSANDS/ΜL (ref 0–0.1)
BASOPHILS NFR BLD AUTO: 0 % (ref 0–1)
BILIRUB SERPL-MCNC: 0.35 MG/DL (ref 0.2–1)
BILIRUB UR QL STRIP: NEGATIVE
BUN SERPL-MCNC: 24 MG/DL (ref 5–25)
CALCIUM SERPL-MCNC: 9 MG/DL (ref 8.3–10.1)
CHLORIDE SERPL-SCNC: 103 MMOL/L (ref 100–108)
CLARITY UR: CLEAR
CO2 SERPL-SCNC: 25 MMOL/L (ref 21–32)
COLOR UR: YELLOW
CREAT SERPL-MCNC: 1.85 MG/DL (ref 0.6–1.3)
EOSINOPHIL # BLD AUTO: 0.09 THOUSAND/ΜL (ref 0–0.61)
EOSINOPHIL NFR BLD AUTO: 2 % (ref 0–6)
ERYTHROCYTE [DISTWIDTH] IN BLOOD BY AUTOMATED COUNT: 14 % (ref 11.6–15.1)
GFR SERPL CREATININE-BSD FRML MDRD: 31 ML/MIN/1.73SQ M
GLUCOSE SERPL-MCNC: 94 MG/DL (ref 65–140)
GLUCOSE UR STRIP-MCNC: NEGATIVE MG/DL
HCT VFR BLD AUTO: 47.5 % (ref 36.5–49.3)
HGB BLD-MCNC: 15 G/DL (ref 12–17)
HGB UR QL STRIP.AUTO: NEGATIVE
IMM GRANULOCYTES # BLD AUTO: 0.02 THOUSAND/UL (ref 0–0.2)
IMM GRANULOCYTES NFR BLD AUTO: 0 % (ref 0–2)
KETONES UR STRIP-MCNC: NEGATIVE MG/DL
LACTATE SERPL-SCNC: 1.2 MMOL/L (ref 0.5–2)
LEUKOCYTE ESTERASE UR QL STRIP: NEGATIVE
LIPASE SERPL-CCNC: 177 U/L (ref 73–393)
LYMPHOCYTES # BLD AUTO: 1.44 THOUSANDS/ΜL (ref 0.6–4.47)
LYMPHOCYTES NFR BLD AUTO: 27 % (ref 14–44)
MCH RBC QN AUTO: 29.6 PG (ref 26.8–34.3)
MCHC RBC AUTO-ENTMCNC: 31.6 G/DL (ref 31.4–37.4)
MCV RBC AUTO: 94 FL (ref 82–98)
MONOCYTES # BLD AUTO: 0.6 THOUSAND/ΜL (ref 0.17–1.22)
MONOCYTES NFR BLD AUTO: 11 % (ref 4–12)
NEUTROPHILS # BLD AUTO: 3.23 THOUSANDS/ΜL (ref 1.85–7.62)
NEUTS SEG NFR BLD AUTO: 60 % (ref 43–75)
NITRITE UR QL STRIP: NEGATIVE
NRBC BLD AUTO-RTO: 0 /100 WBCS
PH UR STRIP.AUTO: 6 [PH]
PLATELET # BLD AUTO: 135 THOUSANDS/UL (ref 149–390)
PMV BLD AUTO: 8.8 FL (ref 8.9–12.7)
POTASSIUM SERPL-SCNC: 4.8 MMOL/L (ref 3.5–5.3)
PROT SERPL-MCNC: 7.5 G/DL (ref 6.4–8.2)
PROT UR STRIP-MCNC: NEGATIVE MG/DL
RBC # BLD AUTO: 5.07 MILLION/UL (ref 3.88–5.62)
SODIUM SERPL-SCNC: 138 MMOL/L (ref 136–145)
SP GR UR STRIP.AUTO: 1.02 (ref 1–1.03)
UROBILINOGEN UR QL STRIP.AUTO: 0.2 E.U./DL
WBC # BLD AUTO: 5.4 THOUSAND/UL (ref 4.31–10.16)

## 2022-04-12 PROCEDURE — 99284 EMERGENCY DEPT VISIT MOD MDM: CPT

## 2022-04-12 PROCEDURE — 99284 EMERGENCY DEPT VISIT MOD MDM: CPT | Performed by: EMERGENCY MEDICINE

## 2022-04-12 PROCEDURE — 99214 OFFICE O/P EST MOD 30 MIN: CPT | Performed by: NURSE PRACTITIONER

## 2022-04-12 PROCEDURE — 81003 URINALYSIS AUTO W/O SCOPE: CPT

## 2022-04-12 PROCEDURE — 74176 CT ABD & PELVIS W/O CONTRAST: CPT

## 2022-04-12 PROCEDURE — G1004 CDSM NDSC: HCPCS

## 2022-04-12 PROCEDURE — 96361 HYDRATE IV INFUSION ADD-ON: CPT

## 2022-04-12 PROCEDURE — 36415 COLL VENOUS BLD VENIPUNCTURE: CPT | Performed by: EMERGENCY MEDICINE

## 2022-04-12 PROCEDURE — 96360 HYDRATION IV INFUSION INIT: CPT

## 2022-04-12 PROCEDURE — 83605 ASSAY OF LACTIC ACID: CPT | Performed by: EMERGENCY MEDICINE

## 2022-04-12 PROCEDURE — 85025 COMPLETE CBC W/AUTO DIFF WBC: CPT | Performed by: EMERGENCY MEDICINE

## 2022-04-12 PROCEDURE — 80053 COMPREHEN METABOLIC PANEL: CPT | Performed by: EMERGENCY MEDICINE

## 2022-04-12 PROCEDURE — 83690 ASSAY OF LIPASE: CPT | Performed by: EMERGENCY MEDICINE

## 2022-04-12 RX ORDER — ACETAMINOPHEN 325 MG/1
650 TABLET ORAL ONCE
Status: DISCONTINUED | OUTPATIENT
Start: 2022-04-12 | End: 2022-04-12

## 2022-04-12 RX ORDER — LORAZEPAM 0.5 MG/1
0.5 TABLET ORAL ONCE
Status: COMPLETED | OUTPATIENT
Start: 2022-04-12 | End: 2022-04-12

## 2022-04-12 RX ADMIN — LORAZEPAM 0.5 MG: 0.5 TABLET ORAL at 16:05

## 2022-04-12 RX ADMIN — SODIUM CHLORIDE 500 ML: 0.9 INJECTION, SOLUTION INTRAVENOUS at 15:41

## 2022-04-12 RX ADMIN — SODIUM CHLORIDE 500 ML: 0.9 INJECTION, SOLUTION INTRAVENOUS at 17:12

## 2022-04-12 NOTE — PROGRESS NOTES
Assessment/Plan:     My concern is for urinary retention that is likely causing his pain  Also need to r/o UTI and pt is not able to urinate in office  Send to ER for possible straight cath  Diagnoses and all orders for this visit:    Urinary retention    Generalized abdominal pain    Hip pain    Right inguinal pain    Benign prostatic hyperplasia with lower urinary tract symptoms, symptom details unspecified          Subjective:     Patient ID: Pedro Dc is a 80 y o  male  Has right groin and hip pain for the last 4 days  Denies any injury, fall or precipitating event  Here with daughter who reports that he had some issues with his kidney function and they thought it was r/t dehydration so he drank 70 oz fluid 4 days ago and was up all night urinating  Hip pain started around that time  Because he didn't like urinating so frequently he has drastically reduced his fluids  He reports that he has been only going in very small amounts for the last 2 days  Has barely urinated at all today  When he does goes it's only a dribble  He was having pain with urination when he was up all night urinating a few nights ago but no further pain  No fever or chills  He does have BPH and follows with urology and on Flomax which he takes nightly  He is blind so he is not sure if he has any blood in his urine  He denies h/o hip pain or hip problems  His last BM was this morning  He c/o abdominal pain and nausea but no vomiting  Review of Systems   Constitutional: Negative for chills and fever  Gastrointestinal: Positive for abdominal pain and nausea  Negative for vomiting  Genitourinary: Positive for decreased urine volume, difficulty urinating and dysuria  Musculoskeletal: Positive for arthralgias (right hip and groin)           The following portions of the patient's history were reviewed and updated as appropriate: allergies, current medications, past family history, past medical history, past social history, past surgical history and problem list     Objective:  Vitals:    04/12/22 1245   BP: 122/82   Pulse: 88   Temp: (!) 97 4 °F (36 3 °C)      Physical Exam  Vitals reviewed  Constitutional:       Appearance: Normal appearance  Cardiovascular:      Rate and Rhythm: Normal rate and regular rhythm  Heart sounds: Murmur heard  Pulmonary:      Effort: Pulmonary effort is normal       Breath sounds: Normal breath sounds  Abdominal:      General: Abdomen is protuberant  Palpations: Abdomen is soft  There is no hepatomegaly or splenomegaly  Tenderness: There is generalized abdominal tenderness and tenderness in the suprapubic area  Musculoskeletal:      Right hip: Tenderness present  Normal range of motion  Skin:     General: Skin is warm and dry  Neurological:      Mental Status: He is alert and oriented to person, place, and time  Psychiatric:         Mood and Affect: Mood normal          Behavior: Behavior normal          Thought Content:  Thought content normal          Judgment: Judgment normal

## 2022-04-12 NOTE — DISCHARGE INSTRUCTIONS
Continue present medications  Try Tylenol as needed for right hip pain  Try the lidocaine patch for right hip pain  Follow-up with your urologist and with your family doctor this week  Return if problem arises

## 2022-04-12 NOTE — ED PROVIDER NOTES
History  Chief Complaint   Patient presents with    Possible UTI     [t's daughter in law reports that yolande has been decreasing in fluid intake and has been decreasing in urine  reports of hip and groin pain  yolande states that he has lower abdomen pain and only has an issue urinating at night time  was at the doctors and was sent here because he was unable to urinate at the doctors to be checked for uti      This 68-year-old male with history of chronic kidney disease, COPD, hypertension, CAD status post MI and status post CABG and prior thoracic compression fracture complains of abdominal pain  Pain is in the lower abdomen and suprapubic region, right inguinal region and right hip  This started about 3 days ago  Four days ago he drank a lot of fluid but was having difficulty voiding  Over the past 2-3 days he has had been drinking less fluid due to that difficulty  He went to the PCPs office today but was unable to void to check the urine and they sent him here  Patient denies recent fall, fever, vomiting, diarrhea or dysuria  He has had nausea today  He is blind and therefore does not know if he has had any blood in the urine or the stool  He states he has chronic back pain but when I percuss over the right flank pain is worse than typical for him  Prior to Admission Medications   Prescriptions Last Dose Informant Patient Reported? Taking?    Anoro Ellipta 62 5-25 MCG/INH inhaler   No No   Sig: inhale 1 puff by mouth and INTO THE LUNGS once daily   B Complex Vitamins (B COMPLEX PO)  Family Member Yes No   Sig: Take by mouth daily   Cholecalciferol ([employer]S VITAMIN D) 2000 units CAPS  Family Member Yes No   Sig: Take by mouth daily    Cyanocobalamin (B-12 PO)  Family Member Yes No   Sig: Take by mouth daily   Ferrous Sulfate (IRON) 325 (65 Fe) MG TABS  Family Member Yes No   Sig: Take by mouth every other day    KRILL OIL PO  Family Member Yes No   Sig: Take by mouth daily   LORazepam (ATIVAN) 0 5 mg tablet   No No   Sig: take 1 tablet by mouth every 8 hours if needed for anxiety   Multiple Vitamins-Minerals (VISION FORMULA/LUTEIN PO)  Family Member Yes No   Sig: Take by mouth   Respiratory Therapy Supplies (SPIROMETER) KIT  Family Member No No   Sig: by Does not apply route 4 (four) times a day   albuterol (PROVENTIL HFA,VENTOLIN HFA) 90 mcg/act inhaler  Family Member No No   Sig: Inhale 2 puffs every 6 (six) hours as needed for wheezing   amiodarone 200 mg tablet  Family Member No No   Sig: Take 1 tablet (200 mg total) by mouth daily For fourteen days followed by 200 mg once a day     escitalopram (LEXAPRO) 5 mg tablet   No No   Sig: take 1 tablet by mouth once daily   fluticasone (FLONASE) 50 mcg/act nasal spray  Family Member Yes No   Si spray into each nostril 2 (two) times a day   guaiFENesin (MUCINEX) 600 mg 12 hr tablet  Family Member Yes No   Sig: Take 1,200 mg by mouth every 12 (twelve) hours   ipratropium (ATROVENT) 0 02 % nebulizer solution  Family Member No No   Sig: Take 2 5 mL (0 5 mg total) by nebulization 3 (three) times a day   levalbuterol (XOPENEX) 1 25 mg/3 mL nebulizer solution  Family Member No No   Sig: Take 1 vial (1 25 mg total) by nebulization 3 (three) times a day   levothyroxine 75 mcg tablet   No No   Sig: take 1 tablet by mouth once daily   nystatin-triamcinolone (MYCOLOG-II) cream  Family Member No No   Sig: APPLY TO THE AFFECTED AREA(S) SPARINGLY TWICE A DAY   omeprazole (PriLOSEC) 20 mg delayed release capsule  Family Member No No   Sig: take 1 capsule by mouth once daily   primidone (MYSOLINE) 50 mg tablet  Family Member No No   Sig: take 1 tablet by mouth every 12 hours   promethazine (PHENERGAN) 12 5 mg/10 mL syrup   No No   Sig: Take 5 mL (6 25 mg total) by mouth 4 (four) times a day as needed (cough)   promethazine-dextromethorphan (PHENERGAN-DM) 6 25-15 mg/5 mL oral syrup  Family Member No No   Sig: take 5 milliliter by mouth four times a day if needed for cough sodium chloride 0 9 % nebulizer solution  Family Member No No   Sig: TAKE 3 MILLILITERS BY NEBULIZATION ROUTE AS NEEDED FOR WHEEZING   tamsulosin (FLOMAX) 0 4 mg   No No   Sig: take 1 capsule by mouth once daily with dinner   zolpidem (AMBIEN) 5 mg tablet  Family Member No No   Sig: take 1 tablet by mouth at bedtime if needed for sleep      Facility-Administered Medications: None       Past Medical History:   Diagnosis Date    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    Chronic kidney disease     Compression fracture of thoracic vertebra (HCC)     last assessed 05/07/2012    [facility]     Disease of thyroid gland     Heart attack ([facility] )     History of methicillin resistant staphylococcus aureus (MRSA) 03/28/2019    negative nasal swab     Hollenhorst plaque, right eye     last assessed 04/08/2013    Hyperlipidemia     Hypertension     Iron deficiency anemia due to chronic blood loss     last assessed 09/10/2012    Ischemic colitis ([facility] )     last assessed 05/27/2014    Osteoarthritis of both hands     last assessedn 04/30/2013    Peptic ulcer     last assessed 06/14/2013    Polymyalgia rheumatica ([facility] )     last assessesd 10/09/2012    Renal disorder     Upper GI hemorrhage     last assessed 01/29/2015    Varicose veins of lower extremity     last assessed 04/26/2013       Past Surgical History:   Procedure Laterality Date    CORONARY ARTERY BYPASS GRAFT      HEMORRHOID SURGERY      HERNIA REPAIR      RENAL CYST EXCISION      resolved 05/2010    THROMBOENDARTERECTOMY Right     carotid, last assessed 06/18/2013       Family History   Problem Relation Age of Onset    Hypertension Mother     Alcohol abuse Mother     Hypertension Father     Alcohol abuse Father     Alcohol abuse Son     Heart disease Family     Stroke Family         syndrome     I have reviewed and agree with the history as documented      E-Cigarette/Vaping    E-Cigarette Use Never User      E-Cigarette/Vaping Substances     Social History     Tobacco Use    Smoking status: Former Smoker     Packs/day: 2 00     Years: 50 00     Pack years: 100 00     Types: Cigarettes     Start date: 65     Quit date:      Years since quittin 2    Smokeless tobacco: Never Used    Tobacco comment: Quit 40 yrs  ago   Vaping Use    Vaping Use: Never used   Substance Use Topics    Alcohol use: Never     Alcohol/week: 1 0 standard drink     Types: 1 Glasses of wine per week     Comment: social    Drug use: Never       Review of Systems   HENT: Positive for hearing loss  Eyes: Positive for visual disturbance  Respiratory: Negative for cough and shortness of breath  Cardiovascular: Negative for chest pain, palpitations and leg swelling  Gastrointestinal: Positive for abdominal pain and nausea  Negative for vomiting  Genitourinary: Positive for difficulty urinating and enuresis  Negative for dysuria, penile swelling and scrotal swelling  Musculoskeletal: Positive for back pain and gait problem  All other systems reviewed and are negative  Physical Exam  Physical Exam  Vitals and nursing note reviewed  Constitutional:       General: He is not in acute distress  Appearance: He is well-developed  He is not ill-appearing or diaphoretic  HENT:      Head: Normocephalic and atraumatic  Right Ear: External ear normal       Left Ear: External ear normal       Nose: Nose normal       Mouth/Throat:      Mouth: Mucous membranes are moist       Pharynx: Oropharynx is clear  Eyes:      General: No scleral icterus  Conjunctiva/sclera: Conjunctivae normal       Pupils: Pupils are equal, round, and reactive to light  Neck:      Vascular: No JVD  Cardiovascular:      Rate and Rhythm: Normal rate and regular rhythm  Heart sounds: Normal heart sounds  No murmur heard        Pulmonary:      Effort: Pulmonary effort is normal       Breath sounds: Normal breath sounds  Chest:      Chest wall: No tenderness  Abdominal:      General: Bowel sounds are normal  There is no distension  Palpations: Abdomen is soft  There is no mass  Tenderness: There is abdominal tenderness (Mild, generalized)  There is right CVA tenderness  There is no left CVA tenderness, guarding or rebound  Hernia: No hernia is present  Genitourinary:     Penis: Normal        Testes: Normal       Rectum: Guaiac result negative  Musculoskeletal:         General: Tenderness (Mild tenderness of the right lumbar paraspinal muscles as well as the right gluteals, laterally ) present  Normal range of motion  Cervical back: Normal range of motion and neck supple  Right lower leg: No edema  Left lower leg: No edema  Lymphadenopathy:      Cervical: No cervical adenopathy  Skin:     General: Skin is warm and dry  Capillary Refill: Capillary refill takes less than 2 seconds  Findings: No rash  Neurological:      General: No focal deficit present  Mental Status: He is alert and oriented to person, place, and time  Mental status is at baseline  Cranial Nerves: No cranial nerve deficit  Sensory: Sensory deficit ( blindness, hard of hearing) present  Motor: No weakness  Coordination: Coordination normal       Deep Tendon Reflexes: Reflexes are normal and symmetric     Psychiatric:         Mood and Affect: Mood normal          Behavior: Behavior normal          Vital Signs  ED Triage Vitals [04/12/22 1437]   Temperature Pulse Respirations Blood Pressure SpO2   (!) 97 1 °F (36 2 °C) 80 18 132/63 95 %      Temp Source Heart Rate Source Patient Position - Orthostatic VS BP Location FiO2 (%)   Temporal Monitor Sitting Left arm --      Pain Score       --           Vitals:    04/12/22 1437 04/12/22 1600 04/12/22 1630 04/12/22 1715   BP: 132/63 156/68 159/71 133/94   Pulse: 80 74 72 78   Patient Position - Orthostatic VS: Sitting Lying Lying Lying Visual Acuity      ED Medications  Medications   sodium chloride 0 9 % bolus 500 mL (0 mL Intravenous Stopped 4/12/22 1711)   LORazepam (ATIVAN) tablet 0 5 mg (0 5 mg Oral Given 4/12/22 1605)   sodium chloride 0 9 % bolus 500 mL (0 mL Intravenous Stopped 4/12/22 1835)       Diagnostic Studies  Results Reviewed     Procedure Component Value Units Date/Time    UA w Reflex to Microscopic w Reflex to Culture [[id number]] Collected: 04/12/22 1715    Lab Status: Final result Specimen: Urine, Clean Catch Updated: 04/12/22 1720     Color, UA Yellow     Clarity, UA Clear     Specific [place], UA 1 020     pH, UA 6 0     Leukocytes, UA Negative     Nitrite, UA Negative     Protein, UA Negative mg/dl      Glucose, UA Negative mg/dl      Ketones, UA Negative mg/dl      Urobilinogen, UA 0 2 E U /dl      Bilirubin, UA Negative     Blood, UA Negative    Lactic acid [[id number]]  (Normal) Collected: 04/12/22 1534    Lab Status: Final result Specimen: Blood from Hand, Left Updated: 04/12/22 1615     LACTIC ACID 1 2 mmol/L     Narrative:      Result may be elevated if tourniquet was used during collection      Lipase [[id number]]  (Normal) Collected: 04/12/22 1534    Lab Status: Final result Specimen: Blood from Hand, Left Updated: 04/12/22 1615     Lipase 177 u/L     Comprehensive metabolic panel [[id number]]  (Abnormal) Collected: 04/12/22 1534    Lab Status: Final result Specimen: Blood from Hand, Left Updated: 04/12/22 1614     Sodium 138 mmol/L      Potassium 4 8 mmol/L      Chloride 103 mmol/L      CO2 25 mmol/L      ANION GAP 10 mmol/L      BUN 24 mg/dL      Creatinine 1 85 mg/dL      Glucose 94 mg/dL      Calcium 9 0 mg/dL      AST 17 U/L      ALT 39 U/L      Alkaline Phosphatase 110 U/L      Total Protein 7 5 g/dL      Albumin 3 6 g/dL      Total Bilirubin 0 35 mg/dL      eGFR 31 ml/min/1 73sq m     Narrative:      Robbin guidelines for Chronic Kidney Disease (CKD):     Stage 1 with normal or high GFR (GFR > 90 mL/min/1 73 square meters)    Stage 2 Mild CKD (GFR = 60-89 mL/min/1 73 square meters)    Stage 3A Moderate CKD (GFR = 45-59 mL/min/1 73 square meters)    Stage 3B Moderate CKD (GFR = 30-44 mL/min/1 73 square meters)    Stage 4 Severe CKD (GFR = 15-29 mL/min/1 73 square meters)    Stage 5 End Stage CKD (GFR <15 mL/min/1 73 square meters)  Note: GFR calculation is accurate only with a steady state creatinine    CBC and differential [[id number]]  (Abnormal) Collected: 04/12/22 1534    Lab Status: Final result Specimen: Blood from Hand, Left Updated: 04/12/22 1614     WBC 5 40 Thousand/uL      RBC 5 07 Million/uL      Hemoglobin 15 0 g/dL      Hematocrit 47 5 %      MCV 94 fL      MCH 29 6 pg      MCHC 31 6 g/dL      RDW 14 0 %      MPV 8 8 fL      Platelets 027 Thousands/uL      nRBC 0 /100 WBCs      Neutrophils Relative 60 %      Immat GRANS % 0 %      Lymphocytes Relative 27 %      Monocytes Relative 11 %      Eosinophils Relative 2 %      Basophils Relative 0 %      Neutrophils Absolute 3 23 Thousands/µL      Immature Grans Absolute 0 02 Thousand/uL      Lymphocytes Absolute 1 44 Thousands/µL      Monocytes Absolute 0 60 Thousand/µL      Eosinophils Absolute 0 09 Thousand/µL      Basophils Absolute 0 02 Thousands/µL     UA (URINE) with reflex to Scope [[id number]] Collected: 04/12/22 1522    Lab Status: No result Specimen: Urine, Clean Catch                  CT abdomen pelvis wo contrast   Final Result by Alexis Malloy MD (04/12 1808)         1  No evidence of bowel obstruction, colitis or diverticulitis  Normal appendix  2   Stable groundglass opacity in the right middle lobe  Follow-up as previously recommended              Workstation performed: [id number]                    Procedures  Procedures         ED Course  ED Course as of 04/12/22 1907 Tue Apr 12, 2022   1642 39 cc in urinary bladder                                             MDM  Number of Diagnoses or Management Options  Benign prostatic hyperplasia with lower urinary tract symptoms, symptom details unspecified: established and worsening  DJD (degenerative joint disease): established and worsening  Strain of muscle of right hip: new and requires workup  Diagnosis management comments: Lower abdominal, suprapubic lumbar right hip and right groin pain for several days with apparent urinary retention  No fever vomiting diarrhea  Patient is blind and does not know if he has had hematuria or melena  Unable to void at the office and sent here  Urinary bladder is actually nearly empty because patient has decreased fluid intakes recently  Check kidney function, check for infection, EKG appendicitis, ischemic a bowel, obstructive uropathy  Labs and imaging knee reassuring  Patient has muscular discomfort in the right hip and buttock  Would likely the mild arthritis of the hip  Patient has no other pain currently and feels much better         Amount and/or Complexity of Data Reviewed  Clinical lab tests: ordered and reviewed  Tests in the radiology section of CPT®: ordered and reviewed  Review and summarize past medical records: yes  Independent visualization of images, tracings, or specimens: yes        Disposition  Final diagnoses:   Strain of muscle of right hip   DJD (degenerative joint disease)   Benign prostatic hyperplasia with lower urinary tract symptoms, symptom details unspecified     Time reflects when diagnosis was documented in both MDM as applicable and the Disposition within this note     Time User Action Codes Description Comment    4/12/2022  6:35 PM Lon Plaster Add [H62 996R] Strain of muscle of right hip     4/12/2022  6:35 PM Lon Plaster Add [M19 90] DJD (degenerative joint disease)     4/12/2022  6:35 PM Lon Plaster Add [N40 1] Benign prostatic hyperplasia with lower urinary tract symptoms, symptom details unspecified       ED Disposition     ED Disposition Condition Date/Time Comment Discharge Stable Tue Apr 12, 2022  6:34  [address] discharge to home/self care              Follow-up Information     Follow up With Specialties Details Why Contact Info Additional Information    Hany Spann MD Family Medicine Call  As needed [place]  [address]  [address] [address]       [facility] [address] [facility] Call in 1 day  [address] [address] [address] [facility] [phone]  [phone]  [facility] [address] [facility], [facility] [address], [facility] [address] [place], [place], [address]          Discharge Medication List as of 4/12/2022  6:37 PM      CONTINUE these medications which have NOT CHANGED    Details   albuterol (PROVENTIL HFA,VENTOLIN HFA) 90 mcg/act inhaler Inhale 2 puffs every 6 (six) hours as needed for wheezing, Starting Mon 9/27/2021, Normal      amiodarone 200 mg tablet Take 1 tablet (200 mg total) by mouth daily For fourteen days followed by 200 mg once a day , Starting u 12/23/2021, Normal      Anoro Ellipta 62 5-25 MCG/INH inhaler inhale 1 puff by mouth and INTO THE LUNGS once daily, Normal      B Complex Vitamins (B COMPLEX PO) Take by mouth daily, Historical Med      Cholecalciferol ([employer]S VITAMIN D) 2000 units CAPS Take by mouth daily , Historical Med      Cyanocobalamin (B-12 PO) Take by mouth daily, Historical Med      escitalopram (LEXAPRO) 5 mg tablet take 1 tablet by mouth once daily, Normal      Ferrous Sulfate (IRON) 325 (65 Fe) MG TABS Take by mouth every other day , Historical Med      fluticasone (FLONASE) 50 mcg/act nasal spray 1 spray into each nostril 2 (two) times a day, Historical Med      guaiFENesin (MUCINEX) 600 mg 12 hr tablet Take 1,200 mg by mouth every 12 (twelve) hours, Historical Med      ipratropium (ATROVENT) 0 02 % nebulizer solution Take 2 5 mL (0 5 mg total) by nebulization 3 (three) times a day, Starting Mon 11/15/2021, Normal      KRILL OIL PO Take by mouth daily, Historical Med      levalbuterol (XOPENEX) 1 25 mg/3 mL nebulizer solution Take 1 vial (1 25 mg total) by nebulization 3 (three) times a day, Starting Tue 11/3/2020, Print      levothyroxine 75 mcg tablet take 1 tablet by mouth once daily, Normal      LORazepam (ATIVAN) 0 5 mg tablet take 1 tablet by mouth every 8 hours if needed for anxiety, Normal      Multiple Vitamins-Minerals (VISION FORMULA/LUTEIN PO) Take by mouth, Starting Sat 11/15/2014, Historical Med      nystatin-triamcinolone (MYCOLOG-II) cream APPLY TO THE AFFECTED AREA(S) SPARINGLY TWICE A DAY, Normal      omeprazole (PriLOSEC) 20 mg delayed release capsule take 1 capsule by mouth once daily, Normal      primidone (MYSOLINE) 50 mg tablet take 1 tablet by mouth every 12 hours, Normal      promethazine (PHENERGAN) 12 5 mg/10 mL syrup Take 5 mL (6 25 mg total) by mouth 4 (four) times a day as needed (cough), Starting Wed 3/16/2022, Print      promethazine-dextromethorphan (PHENERGAN-DM) 6 25-15 mg/5 mL oral syrup take 5 milliliter by mouth four times a day if needed for cough, Normal      Respiratory Therapy Supplies (SPIROMETER) KIT by Does not apply route 4 (four) times a day, Starting Tue 2/25/2020, Print      sodium chloride 0 9 % nebulizer solution TAKE 3 MILLILITERS BY NEBULIZATION ROUTE AS NEEDED FOR WHEEZING, Normal      tamsulosin (FLOMAX) 0 4 mg take 1 capsule by mouth once daily with dinner, Normal      zolpidem (AMBIEN) 5 mg tablet take 1 tablet by mouth at bedtime if needed for sleep, Normal             No discharge procedures on file      PDMP Review       Value Time User    PDMP Reviewed  Yes 4/1/2022  4:34 PM Jay Jewell MD          ED Provider  Electronically Signed by           Mari Iverson DO  04/12/22 3520

## 2022-04-20 DIAGNOSIS — G25.0 ESSENTIAL TREMOR: ICD-10-CM

## 2022-04-20 RX ORDER — PRIMIDONE 50 MG/1
50 TABLET ORAL EVERY 12 HOURS
Qty: 60 TABLET | Refills: 1 | Status: SHIPPED | OUTPATIENT
Start: 2022-04-20 | End: 2022-04-25 | Stop reason: SDUPTHER

## 2022-04-20 RX ORDER — PRIMIDONE 50 MG/1
TABLET ORAL
Qty: 60 TABLET | Refills: 1 | Status: SHIPPED | OUTPATIENT
Start: 2022-04-20 | End: 2022-04-20 | Stop reason: SDUPTHER

## 2022-04-25 ENCOUNTER — TELEPHONE (OUTPATIENT)
Dept: FAMILY MEDICINE CLINIC | Facility: HOSPITAL | Age: 87
End: 2022-04-25

## 2022-04-25 DIAGNOSIS — G25.0 ESSENTIAL TREMOR: ICD-10-CM

## 2022-04-25 RX ORDER — PRIMIDONE 50 MG/1
50 TABLET ORAL EVERY 12 HOURS
Qty: 60 TABLET | Refills: 1 | Status: CANCELLED | OUTPATIENT
Start: 2022-04-25

## 2022-04-25 NOTE — TELEPHONE ENCOUNTER
Pts daughter-in-law Amanda Garnett states per pharmacy pt needs a new script for primidone (MYSOLINE) 50 mg tablet sent to [facility] in [place]

## 2022-05-03 ENCOUNTER — APPOINTMENT (OUTPATIENT)
Dept: RADIOLOGY | Facility: CLINIC | Age: 87
End: 2022-05-03
Payer: MEDICARE

## 2022-05-03 ENCOUNTER — OFFICE VISIT (OUTPATIENT)
Dept: URGENT CARE | Facility: CLINIC | Age: 87
End: 2022-05-03
Payer: MEDICARE

## 2022-05-03 VITALS
HEART RATE: 120 BPM | OXYGEN SATURATION: 95 % | TEMPERATURE: 98.8 F | WEIGHT: 165 LBS | HEIGHT: 64 IN | RESPIRATION RATE: 20 BRPM | BODY MASS INDEX: 28.17 KG/M2

## 2022-05-03 DIAGNOSIS — R06.2 WHEEZING: ICD-10-CM

## 2022-05-03 DIAGNOSIS — R05.9 COUGH: ICD-10-CM

## 2022-05-03 DIAGNOSIS — J20.9 ACUTE BRONCHITIS, UNSPECIFIED ORGANISM: Primary | ICD-10-CM

## 2022-05-03 LAB
FLUAV RNA RESP QL NAA+PROBE: NEGATIVE
FLUBV RNA RESP QL NAA+PROBE: NEGATIVE
SARS-COV-2 RNA RESP QL NAA+PROBE: NEGATIVE

## 2022-05-03 PROCEDURE — G0463 HOSPITAL OUTPT CLINIC VISIT: HCPCS | Performed by: PHYSICIAN ASSISTANT

## 2022-05-03 PROCEDURE — 99213 OFFICE O/P EST LOW 20 MIN: CPT | Performed by: PHYSICIAN ASSISTANT

## 2022-05-03 PROCEDURE — 87636 SARSCOV2 & INF A&B AMP PRB: CPT | Performed by: PHYSICIAN ASSISTANT

## 2022-05-03 PROCEDURE — 71046 X-RAY EXAM CHEST 2 VIEWS: CPT

## 2022-05-03 RX ORDER — CEFDINIR 300 MG/1
300 CAPSULE ORAL EVERY 12 HOURS SCHEDULED
Qty: 10 CAPSULE | Refills: 0 | Status: SHIPPED | OUTPATIENT
Start: 2022-05-03 | End: 2022-05-12

## 2022-05-03 RX ORDER — AZITHROMYCIN 250 MG/1
TABLET, FILM COATED ORAL
Qty: 6 TABLET | Refills: 0 | Status: SHIPPED | OUTPATIENT
Start: 2022-05-03 | End: 2022-05-03 | Stop reason: ALTCHOICE

## 2022-05-03 NOTE — PROGRESS NOTES
[facility]        NAME: Kiara Raygoza is a 80 y o  male  : 1932    MRN: [id number]  DATE: May 6, 2022  TIME: 6:26 AM    Assessment and Plan   Acute bronchitis, unspecified organism [J20 9]  1  Acute bronchitis, unspecified organism  cefdinir (OMNICEF) 300 mg capsule    DISCONTINUED: azithromycin (ZITHROMAX) 250 mg tablet   2  Cough  XR chest pa & lateral   3  Wheezing  XR chest pa & lateral    Cov/Flu-Collected at [facility] or [facility]     Covering pt for possible bronchitis due to his cough and hx of COPD  Discussed with family to get and check pulse ox and if O2 sats start to go below 94% and he is becoming more symptomatic to go to the ER  Pt should also F/U with PCP within the next 2-3 days  Pt swabbed for Flu and Covid  Pharmacy called that azithromycin would interact with his Amiodarone  Changed medication to THOMAS GUERITA for 5 days  Patient Instructions       Follow up with PCP in 3-5 days  Proceed to  ER if symptoms worsen  Chief Complaint     Chief Complaint   Patient presents with    Cold Like Symptoms     Pt presents with sinus congestion, headache, non productive cough for approx five days  History of Present Illness       49-year-old male presents the clinic with sinus congestion, headache, nonproductive cough that started 5 days ago  Patient states that he has been taking Coricidin for his symptoms since Friday  Pt also recently started taking cough medicine on   Pt has a walker at home that he uses at times  Pt has hx of knee pain and uses it if he has a pain flare up  Family states that the cough sounds more rough and agitated and has been using his nebulizer once a day and does help his symptoms and has been using the regular inhaler 3-4 times a day  Pt is vaccinated for Flu and Covid  Patient denies fevers but states that he has coughing fits with only temporary mild improvement after using his neb treatment or his inhaler    Patient states that due to his coughing fits he feels worsening heartburn sensation  Patient has a history of GERD and does take medicine for this  Pt used his nebulizer this morning  Review of Systems   Review of Systems   Constitutional: Positive for chills (intermittent) and fatigue  Negative for appetite change and fever  HENT: Positive for congestion, hearing loss (ears feel clogged), rhinorrhea, sinus pressure (behind his eyes) and sore throat  Negative for ear pain and voice change  Respiratory: Positive for cough, shortness of breath (with movement, hx of COPD) and wheezing  Negative for chest tightness  Gastrointestinal: Positive for abdominal pain (heartburn sensation, hx of GERD)  Negative for diarrhea, nausea and vomiting  Musculoskeletal: Positive for myalgias  Neurological: Positive for dizziness and headaches  Psychiatric/Behavioral: Positive for agitation  Current Medications     No current facility-administered medications for this visit  No current outpatient medications on file      Facility-Administered Medications Ordered in Other Visits:     acetaminophen (TYLENOL) tablet 650 mg, 650 mg, Oral, Q6H PRN, Sophie Chaney PA-C, 650 mg at 05/05/22 1305    albuterol inhalation solution 2 5 mg, 2 5 mg, Nebulization, Q6H PRN, Polina Dominique DO, 2 5 mg at 05/06/22 0219    aluminum-magnesium hydroxide-simethicone (MYLANTA) oral suspension 30 mL, 30 mL, Oral, Q6H PRN, Sophie Chaney PA-C    amiodarone tablet 200 mg, 200 mg, Oral, Daily, Sophie Chaney PA-C    azithromycin (ZITHROMAX) 500 mg in sodium chloride 0 9% 250mL IVPB 500 mg, 500 mg, Intravenous, Q24H, Sophie Chaney PA-C, 500 mg at 05/05/22 1127    benzonatate (TESSALON PERLES) capsule 100 mg, 100 mg, Oral, TID PRN, Madonna Reid PA-C, 100 mg at 05/06/22 0234    escitalopram (LEXAPRO) tablet 5 mg, 5 mg, Oral, Daily, Sophie Chaney PA-C    ferrous sulfate tablet 325 mg, 325 mg, Oral, Every Other Day, Sophie Chaney PA-C   fluticasone (FLONASE) 50 mcg/act nasal spray 1 spray, 1 spray, Nasal, BID, Keyanna Partida PA-C, 1 spray at 05/05/22 1832    guaiFENesin (MUCINEX) 12 hr tablet 1,200 mg, 1,200 mg, Oral, Q12H [address], Keyanna Partida PA-C, 1,200 mg at 05/05/22 2200    heparin (porcine) subcutaneous injection 5,000 Units, 5,000 Units, Subcutaneous, Q8H [address], Keyanna Partida PA-C, 5,000 Units at 05/06/22 0607    ipratropium (ATROVENT) 0 02 % inhalation solution 0 5 mg, 0 5 mg, Nebulization, TID, Keyanna Partida PA-C, 0 5 mg at 05/05/22 1922    levalbuterol [facility]) inhalation solution 1 25 mg, 1 25 mg, Nebulization, TID, 1 25 mg at 05/05/22 1923 **AND** [DISCONTINUED] sodium chloride 0 9 % inhalation solution 3 mL, 3 mL, Nebulization, TID, Keyanna Partida PA-C    levothyroxine tablet 75 mcg, 75 mcg, Oral, Early Morning, Keyanna Partida PA-C, 75 mcg at 05/06/22 4397    LORazepam (ATIVAN) tablet 0 5 mg, 0 5 mg, Oral, TID, Keyanna Partida PA-C, 0 5 mg at 05/05/22 1832    methylPREDNISolone sodium succinate (Solu-MEDROL) injection 40 mg, 40 mg, Intravenous, Q8H, Keyanna Partida PA-C, 40 mg at 05/06/22 0000    multivitamin-minerals (CENTRUM) tablet 1 tablet, 1 tablet, Oral, Daily, Keyanna Partida PA-C    ondansetron (ZOFRAN-ODT) dispersible tablet 4 mg, 4 mg, Oral, Q6H PRN, Megan Sos, DO    pantoprazole (PROTONIX) EC tablet 20 mg, 20 mg, Oral, Early Morning, Keyanna Partida PA-C, 20 mg at 05/06/22 0608    primidone (MYSOLINE) tablet 50 mg, 50 mg, Oral, Q12H [address], Keyanna Partida PA-C, 50 mg at 05/05/22 2230    promethazine (PHENERGAN) oral syrup 6 25 mg, 6 25 mg, Oral, 4x Daily PRN, Keyanna Partida PA-C, 6 25 mg at 05/06/22 0411    senna (SENOKOT) tablet 8 6 mg, 1 tablet, Oral, BID PRN, Keyanna Partida PA-C    tamsulosin [facility]) capsule 0 4 mg, 0 4 mg, Oral, Daily With Dinner, Keyanna Partida PA-C, 0 4 mg at 05/05/22 1755    traMADol (ULTRAM) tablet 50 mg, 50 mg, Oral, Q6H PRN, Michelle Morales DO, 50 mg at 05/05/22 1554    zolpidem (AMBIEN) tablet 5 mg, 5 mg, Oral, HS PRN, Jordin Arce PA-C, 5 mg at 05/05/22 2830    Current Allergies     Allergies as of 05/03/2022 - Reviewed 05/03/2022   Allergen Reaction Noted    Pravastatin Anaphylaxis, Photosensitivity, and Headache 05/18/2011    Acetaminophen-codeine GI Intolerance 05/14/2016    Atorvastatin  05/18/2011    Codeine Nausea Only 06/17/2015    Colestipol GI Intolerance 05/18/2011    Contrast  [iodinated diagnostic agents]  03/27/2013    Fenofibrate GI Intolerance 05/18/2011    Hydrocodone Vomiting 05/07/2012    Niacin  04/22/2012    Niaspan  [niacin er]  05/14/2016    Pitavastatin Myalgia 06/12/2017    Pneumococcal polysaccharide vaccine  05/07/2012    Pravastatin sodium  05/14/2016    Simvastatin  05/14/2016    Statins Myalgia 02/05/2014    Vicoprofen  [hydrocodone-ibuprofen] GI Intolerance 05/14/2016    Cyclophosphamide Rash 05/14/2016    Ioversol Hives 05/14/2016            The following portions of the patient's history were reviewed and updated as appropriate: allergies, current medications, past family history, past medical history, past social history, past surgical history and problem list      Past Medical History:   Diagnosis Date    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 [address] )     last assessed 05/07/2012    COPD (chronic obstructive pulmonary disease) ([facility] [address] )     Disease of thyroid gland     Heart attack ([facility] [address] )     History of methicillin resistant staphylococcus aureus (MRSA) 03/28/2019    negative nasal swab     Hollenhorst plaque, right eye     last assessed 04/08/2013    Hyperlipidemia     Hypertension     Iron deficiency anemia due to chronic blood loss     last assessed 09/10/2012    Ischemic colitis ([facility] [address] )     last assessed 05/27/2014    Osteoarthritis of both hands     last assessedn 04/30/2013    Peptic ulcer     last assessed 06/14/2013    Polymyalgia rheumatica ([facility] [address] ) last assessesd 10/09/2012    Renal disorder     Upper GI hemorrhage     last assessed 01/29/2015    Varicose veins of lower extremity     last assessed 04/26/2013       Past Surgical History:   Procedure Laterality Date    CORONARY ARTERY BYPASS GRAFT      HEMORRHOID SURGERY      HERNIA REPAIR      RENAL CYST EXCISION      resolved 05/2010    THROMBOENDARTERECTOMY Right     carotid, last assessed 06/18/2013       Family History   Problem Relation Age of Onset    Hypertension Mother     Alcohol abuse Mother     Hypertension Father     Alcohol abuse Father     Alcohol abuse Son     Heart disease Family     Stroke Family         syndrome         Medications have been verified  Objective   Pulse (!) 120   Temp 98 8 °F (37 1 °C)   Resp 20   Ht 5' 4" (1 626 m)   Wt 74 8 kg (165 lb)   SpO2 95%   BMI 28 32 kg/m²   No LMP for male patient  Physical Exam     Physical Exam  Vitals and nursing note reviewed  Constitutional:       General: He is not in acute distress  Appearance: He is well-developed  He is ill-appearing  He is not diaphoretic  Comments: Pt is sitting in wheelchair   HENT:      Head: Normocephalic and atraumatic  Cardiovascular:      Rate and Rhythm: Regular rhythm  Tachycardia present  Pulmonary:      Effort: Pulmonary effort is normal       Breath sounds: Wheezing (diffuse expiratory wheezes notes in all lung fields) present  Comments: O2 sat at 95%  Chest x-ray compared with previous CXR and radiology read as no acute cardiopulmonary disease  Skin:     General: Skin is warm and dry  Neurological:      Mental Status: He is alert and oriented to person, place, and time

## 2022-05-03 NOTE — PATIENT INSTRUCTIONS
Acute Bronchitis   AMBULATORY CARE:   Acute bronchitis  is swelling and irritation in your lungs  It is usually caused by a virus and most often happens in the winter  Bronchitis may also be caused by bacteria or by a chemical irritant, such as smoke  Common symptoms include the following:   · Cough that lasts up to 3 weeks, stuffy nose    · Hoarseness, sore throat    · A fever and chills    · Feeling more tired than usual, and body aches    · Wheezing or pain when you breathe or cough    Seek care immediately if:   · You cough up blood  · Your lips or fingernails turn blue  · You feel like you are not getting enough air when you breathe  Call your doctor if:   · Your symptoms do not go away or get worse, even after treatment  · Your cough does not get better within 4 weeks  · You have questions or concerns about your condition or care  Medicines: You may  need any of the following:  · Cough suppressants  decrease your urge to cough  · Decongestants  help loosen mucus in your lungs and make it easier to cough up  This can help you breathe easier  · Inhalers  may be given  Your healthcare provider may give you one or more inhalers to help you breathe easier and cough less  An inhaler gives your medicine to open your airways  Ask your healthcare provider to show you how to use your inhaler correctly  · Antibiotics  may be given for up to 5 days if your bronchitis is caused by bacteria  · Acetaminophen  decreases pain and fever  It is available without a doctor's order  Ask how much to take and how often to take it  Follow directions  Read the labels of all other medicines you are using to see if they also contain acetaminophen, or ask your doctor or pharmacist  Acetaminophen can cause liver damage if not taken correctly  Do not use more than 4 grams (4,000 milligrams) total of acetaminophen in one day  · NSAIDs  help decrease swelling and pain or fever   This medicine is available with or without a doctor's order  NSAIDs can cause stomach bleeding or kidney problems in certain people  If you take blood thinner medicine, always ask your healthcare provider if NSAIDs are safe for you  Always read the medicine label and follow directions  · Take your medicine as directed  Contact your healthcare provider if you think your medicine is not helping or if you have side effects  Tell him of her if you are allergic to any medicine  Keep a list of the medicines, vitamins, and herbs you take  Include the amounts, and when and why you take them  Bring the list or the pill bottles to follow-up visits  Carry your medicine list with you in case of an emergency  Self-care:   · Drink liquids as directed  You may need to drink more liquids than usual to stay hydrated  Ask how much liquid to drink each day and which liquids are best for you  · Use a cool mist humidifier  to increase air moisture in your home  This may make it easier for you to breathe and help decrease your cough  · Get more rest   Rest helps your body to heal  Slowly start to do more each day  Rest when you feel it is needed  · Avoid irritants in the air  Avoid chemicals, fumes, and dust  Wear a face mask if you must work around dust or fumes  Stay inside on days when air pollution levels are high  If you have allergies, stay inside when pollen counts are high  Do not use aerosol products, such as spray-on deodorant, bug spray, and hair spray  · Do not smoke or be around others who are smoking  Nicotine and other chemicals in cigarettes and cigars can cause lung damage  Ask your healthcare provider for information if you currently smoke and need help to quit  E-cigarettes or smokeless tobacco still contain nicotine  Talk to your healthcare provider before you use these products  Prevent acute bronchitis:       · Ask about vaccines you may need    Get a flu vaccine each year as soon as recommended, usually in September or October  Ask your healthcare provider if you should also get a pneumonia or COVID-19 vaccine  Your healthcare provider can tell you if you should also get other vaccines, and when to get them  · Prevent the spread of germs  You can decrease your risk for acute bronchitis and other illnesses by doing the following:    ? Wash your hands often with soap and water  Carry germ-killing hand lotion or gel with you  You can use the lotion or gel to clean your hands when soap and water are not available  ? Do not touch your eyes, nose, or mouth unless you have washed your hands first     ? Always cover your mouth when you cough to prevent the spread of germs  It is best to cough into a tissue or your shirt sleeve instead of into your hand  Ask those around you to cover their mouths when they cough  ? Try to avoid people who have a cold or the flu  If you are sick, stay away from others as much as possible  Follow up with your doctor as directed:  Write down questions you have so you will remember to ask them during your follow-up visits  © Copyright [employer] 2022 Information is for End User's use only and may not be sold, redistributed or otherwise used for commercial purposes  All illustrations and images included in CareNotes® are the copyrighted property of [employer]  or [facility] Radha Roblero   The above information is an  only  It is not intended as medical advice for individual conditions or treatments  Talk to your doctor, nurse or pharmacist before following any medical regimen to see if it is safe and effective for you

## 2022-05-05 ENCOUNTER — HOSPITAL ENCOUNTER (INPATIENT)
Facility: HOSPITAL | Age: 87
LOS: 7 days | Discharge: HOME HEALTH | DRG: 202 | End: 2022-05-12
Attending: EMERGENCY MEDICINE | Admitting: INTERNAL MEDICINE
Payer: MEDICARE

## 2022-05-05 ENCOUNTER — APPOINTMENT (EMERGENCY)
Dept: RADIOLOGY | Facility: HOSPITAL | Age: 87
DRG: 202 | End: 2022-05-05
Payer: MEDICARE

## 2022-05-05 DIAGNOSIS — J96.91 RESPIRATORY FAILURE WITH HYPOXIA (HCC): ICD-10-CM

## 2022-05-05 DIAGNOSIS — J43.2 CENTRILOBULAR EMPHYSEMA (HCC): ICD-10-CM

## 2022-05-05 DIAGNOSIS — J44.1 COPD EXACERBATION (HCC): Primary | ICD-10-CM

## 2022-05-05 DIAGNOSIS — J96.01 ACUTE RESPIRATORY FAILURE WITH HYPOXIA (HCC): ICD-10-CM

## 2022-05-05 PROBLEM — I47.1 PAROXYSMAL SVT (SUPRAVENTRICULAR TACHYCARDIA) (HCC): Status: ACTIVE | Noted: 2022-05-05

## 2022-05-05 PROBLEM — R65.10 SIRS (SYSTEMIC INFLAMMATORY RESPONSE SYNDROME) (HCC): Status: ACTIVE | Noted: 2022-05-05

## 2022-05-05 LAB
2HR DELTA HS TROPONIN: -2 NG/L
4HR DELTA HS TROPONIN: -2 NG/L
ALBUMIN SERPL BCP-MCNC: 3.4 G/DL (ref 3.5–5)
ALP SERPL-CCNC: 144 U/L (ref 46–116)
ALT SERPL W P-5'-P-CCNC: 79 U/L (ref 12–78)
ANION GAP SERPL CALCULATED.3IONS-SCNC: 10 MMOL/L (ref 4–13)
APTT PPP: 38 SECONDS (ref 23–37)
AST SERPL W P-5'-P-CCNC: 37 U/L (ref 5–45)
ATRIAL RATE: 114 BPM
ATRIAL RATE: 128 BPM
BACTERIA UR QL AUTO: NORMAL /HPF
BASOPHILS # BLD AUTO: 0.03 THOUSANDS/ΜL (ref 0–0.1)
BASOPHILS NFR BLD AUTO: 0 % (ref 0–1)
BILIRUB SERPL-MCNC: 0.6 MG/DL (ref 0.2–1)
BILIRUB UR QL STRIP: NEGATIVE
BUN SERPL-MCNC: 20 MG/DL (ref 5–25)
CALCIUM ALBUM COR SERPL-MCNC: 9.8 MG/DL (ref 8.3–10.1)
CALCIUM SERPL-MCNC: 9.3 MG/DL (ref 8.3–10.1)
CARDIAC TROPONIN I PNL SERPL HS: 10 NG/L
CARDIAC TROPONIN I PNL SERPL HS: 8 NG/L
CARDIAC TROPONIN I PNL SERPL HS: 8 NG/L
CHLORIDE SERPL-SCNC: 100 MMOL/L (ref 100–108)
CLARITY UR: CLEAR
CO2 SERPL-SCNC: 23 MMOL/L (ref 21–32)
COARSE GRAN CASTS URNS QL MICRO: NORMAL /LPF
COLOR UR: YELLOW
CREAT SERPL-MCNC: 1.78 MG/DL (ref 0.6–1.3)
EOSINOPHIL # BLD AUTO: 0.2 THOUSAND/ΜL (ref 0–0.61)
EOSINOPHIL NFR BLD AUTO: 2 % (ref 0–6)
ERYTHROCYTE [DISTWIDTH] IN BLOOD BY AUTOMATED COUNT: 13.7 % (ref 11.6–15.1)
FLUAV RNA RESP QL NAA+PROBE: NEGATIVE
FLUBV RNA RESP QL NAA+PROBE: NEGATIVE
GFR SERPL CREATININE-BSD FRML MDRD: 33 ML/MIN/1.73SQ M
GLUCOSE SERPL-MCNC: 106 MG/DL (ref 65–140)
GLUCOSE UR STRIP-MCNC: NEGATIVE MG/DL
HCT VFR BLD AUTO: 44.8 % (ref 36.5–49.3)
HGB BLD-MCNC: 14 G/DL (ref 12–17)
HGB UR QL STRIP.AUTO: NEGATIVE
IMM GRANULOCYTES # BLD AUTO: 0.05 THOUSAND/UL (ref 0–0.2)
IMM GRANULOCYTES NFR BLD AUTO: 1 % (ref 0–2)
INR PPP: 1.07 (ref 0.84–1.19)
KETONES UR STRIP-MCNC: ABNORMAL MG/DL
LACTATE SERPL-SCNC: 1 MMOL/L (ref 0.5–2)
LEUKOCYTE ESTERASE UR QL STRIP: NEGATIVE
LYMPHOCYTES # BLD AUTO: 1.3 THOUSANDS/ΜL (ref 0.6–4.47)
LYMPHOCYTES NFR BLD AUTO: 15 % (ref 14–44)
MCH RBC QN AUTO: 29.5 PG (ref 26.8–34.3)
MCHC RBC AUTO-ENTMCNC: 31.3 G/DL (ref 31.4–37.4)
MCV RBC AUTO: 94 FL (ref 82–98)
MONOCYTES # BLD AUTO: 1.08 THOUSAND/ΜL (ref 0.17–1.22)
MONOCYTES NFR BLD AUTO: 13 % (ref 4–12)
NEUTROPHILS # BLD AUTO: 5.81 THOUSANDS/ΜL (ref 1.85–7.62)
NEUTS SEG NFR BLD AUTO: 69 % (ref 43–75)
NITRITE UR QL STRIP: NEGATIVE
NON-SQ EPI CELLS URNS QL MICRO: NORMAL /HPF
NRBC BLD AUTO-RTO: 0 /100 WBCS
NT-PROBNP SERPL-MCNC: 616 PG/ML
P AXIS: 69 DEGREES
PH UR STRIP.AUTO: 6 [PH]
PLATELET # BLD AUTO: 157 THOUSANDS/UL (ref 149–390)
PMV BLD AUTO: 9 FL (ref 8.9–12.7)
POTASSIUM SERPL-SCNC: 4.3 MMOL/L (ref 3.5–5.3)
PR INTERVAL: 152 MS
PR INTERVAL: 172 MS
PROCALCITONIN SERPL-MCNC: 0.21 NG/ML
PROT SERPL-MCNC: 7.9 G/DL (ref 6.4–8.2)
PROT UR STRIP-MCNC: ABNORMAL MG/DL
PROTHROMBIN TIME: 13.8 SECONDS (ref 11.6–14.5)
QRS AXIS: 103 DEGREES
QRS AXIS: 128 DEGREES
QRSD INTERVAL: 92 MS
QRSD INTERVAL: 94 MS
QT INTERVAL: 308 MS
QT INTERVAL: 322 MS
QTC INTERVAL: 443 MS
QTC INTERVAL: 449 MS
RBC # BLD AUTO: 4.75 MILLION/UL (ref 3.88–5.62)
RBC #/AREA URNS AUTO: NORMAL /HPF
RSV RNA RESP QL NAA+PROBE: NEGATIVE
SARS-COV-2 RNA RESP QL NAA+PROBE: NEGATIVE
SODIUM SERPL-SCNC: 133 MMOL/L (ref 136–145)
SP GR UR STRIP.AUTO: 1.02 (ref 1–1.03)
T WAVE AXIS: 49 DEGREES
T WAVE AXIS: 50 DEGREES
UROBILINOGEN UR QL STRIP.AUTO: 0.2 E.U./DL
VENTRICULAR RATE: 114 BPM
VENTRICULAR RATE: 128 BPM
WBC # BLD AUTO: 8.47 THOUSAND/UL (ref 4.31–10.16)
WBC #/AREA URNS AUTO: NORMAL /HPF

## 2022-05-05 PROCEDURE — 84145 PROCALCITONIN (PCT): CPT | Performed by: EMERGENCY MEDICINE

## 2022-05-05 PROCEDURE — 36415 COLL VENOUS BLD VENIPUNCTURE: CPT | Performed by: EMERGENCY MEDICINE

## 2022-05-05 PROCEDURE — 99285 EMERGENCY DEPT VISIT HI MDM: CPT

## 2022-05-05 PROCEDURE — 0241U HB NFCT DS VIR RESP RNA 4 TRGT: CPT | Performed by: EMERGENCY MEDICINE

## 2022-05-05 PROCEDURE — 94645 CONT INHLJ TX EACH ADDL HOUR: CPT

## 2022-05-05 PROCEDURE — 94760 N-INVAS EAR/PLS OXIMETRY 1: CPT

## 2022-05-05 PROCEDURE — 83605 ASSAY OF LACTIC ACID: CPT | Performed by: EMERGENCY MEDICINE

## 2022-05-05 PROCEDURE — 83880 ASSAY OF NATRIURETIC PEPTIDE: CPT | Performed by: EMERGENCY MEDICINE

## 2022-05-05 PROCEDURE — 94002 VENT MGMT INPAT INIT DAY: CPT

## 2022-05-05 PROCEDURE — 84484 ASSAY OF TROPONIN QUANT: CPT | Performed by: EMERGENCY MEDICINE

## 2022-05-05 PROCEDURE — 99291 CRITICAL CARE FIRST HOUR: CPT | Performed by: EMERGENCY MEDICINE

## 2022-05-05 PROCEDURE — 80053 COMPREHEN METABOLIC PANEL: CPT | Performed by: EMERGENCY MEDICINE

## 2022-05-05 PROCEDURE — 85610 PROTHROMBIN TIME: CPT | Performed by: EMERGENCY MEDICINE

## 2022-05-05 PROCEDURE — 87040 BLOOD CULTURE FOR BACTERIA: CPT | Performed by: EMERGENCY MEDICINE

## 2022-05-05 PROCEDURE — 94644 CONT INHLJ TX 1ST HOUR: CPT

## 2022-05-05 PROCEDURE — 94640 AIRWAY INHALATION TREATMENT: CPT

## 2022-05-05 PROCEDURE — 81001 URINALYSIS AUTO W/SCOPE: CPT

## 2022-05-05 PROCEDURE — 93005 ELECTROCARDIOGRAM TRACING: CPT

## 2022-05-05 PROCEDURE — 93010 ELECTROCARDIOGRAM REPORT: CPT | Performed by: INTERNAL MEDICINE

## 2022-05-05 PROCEDURE — 94668 MNPJ CHEST WALL SBSQ: CPT

## 2022-05-05 PROCEDURE — 85730 THROMBOPLASTIN TIME PARTIAL: CPT | Performed by: EMERGENCY MEDICINE

## 2022-05-05 PROCEDURE — 71045 X-RAY EXAM CHEST 1 VIEW: CPT

## 2022-05-05 PROCEDURE — 84484 ASSAY OF TROPONIN QUANT: CPT

## 2022-05-05 PROCEDURE — 94664 DEMO&/EVAL PT USE INHALER: CPT

## 2022-05-05 PROCEDURE — 96374 THER/PROPH/DIAG INJ IV PUSH: CPT

## 2022-05-05 PROCEDURE — 85025 COMPLETE CBC W/AUTO DIFF WBC: CPT | Performed by: EMERGENCY MEDICINE

## 2022-05-05 PROCEDURE — 99223 1ST HOSP IP/OBS HIGH 75: CPT

## 2022-05-05 RX ORDER — LORAZEPAM 0.5 MG/1
0.5 TABLET ORAL EVERY 8 HOURS PRN
Status: DISCONTINUED | OUTPATIENT
Start: 2022-05-05 | End: 2022-05-05

## 2022-05-05 RX ORDER — FERROUS SULFATE 325(65) MG
325 TABLET ORAL EVERY OTHER DAY
Status: DISCONTINUED | OUTPATIENT
Start: 2022-05-06 | End: 2022-05-12 | Stop reason: HOSPADM

## 2022-05-05 RX ORDER — ONDANSETRON 4 MG/1
4 TABLET, ORALLY DISINTEGRATING ORAL EVERY 6 HOURS PRN
Status: DISCONTINUED | OUTPATIENT
Start: 2022-05-05 | End: 2022-05-12 | Stop reason: HOSPADM

## 2022-05-05 RX ORDER — AMIODARONE HYDROCHLORIDE 200 MG/1
200 TABLET ORAL DAILY
Status: DISCONTINUED | OUTPATIENT
Start: 2022-05-05 | End: 2022-05-12 | Stop reason: HOSPADM

## 2022-05-05 RX ORDER — TRAMADOL HYDROCHLORIDE 50 MG/1
50 TABLET ORAL EVERY 6 HOURS PRN
Status: DISCONTINUED | OUTPATIENT
Start: 2022-05-05 | End: 2022-05-12 | Stop reason: HOSPADM

## 2022-05-05 RX ORDER — ZOLPIDEM TARTRATE 5 MG/1
5 TABLET ORAL
Status: DISCONTINUED | OUTPATIENT
Start: 2022-05-05 | End: 2022-05-12 | Stop reason: HOSPADM

## 2022-05-05 RX ORDER — LORAZEPAM 0.5 MG/1
0.5 TABLET ORAL 3 TIMES DAILY
Status: DISCONTINUED | OUTPATIENT
Start: 2022-05-05 | End: 2022-05-12 | Stop reason: HOSPADM

## 2022-05-05 RX ORDER — SODIUM CHLORIDE FOR INHALATION 0.9 %
3 VIAL, NEBULIZER (ML) INHALATION
Status: DISCONTINUED | OUTPATIENT
Start: 2022-05-05 | End: 2022-05-05

## 2022-05-05 RX ORDER — FLUTICASONE PROPIONATE 50 MCG
1 SPRAY, SUSPENSION (ML) NASAL 2 TIMES DAILY
Status: DISCONTINUED | OUTPATIENT
Start: 2022-05-05 | End: 2022-05-12 | Stop reason: HOSPADM

## 2022-05-05 RX ORDER — ALBUTEROL SULFATE 2.5 MG/3ML
2.5 SOLUTION RESPIRATORY (INHALATION) EVERY 6 HOURS PRN
Status: DISCONTINUED | OUTPATIENT
Start: 2022-05-05 | End: 2022-05-12 | Stop reason: HOSPADM

## 2022-05-05 RX ORDER — HEPARIN SODIUM 5000 [USP'U]/ML
5000 INJECTION, SOLUTION INTRAVENOUS; SUBCUTANEOUS EVERY 8 HOURS SCHEDULED
Status: DISCONTINUED | OUTPATIENT
Start: 2022-05-05 | End: 2022-05-12 | Stop reason: HOSPADM

## 2022-05-05 RX ORDER — PROMETHAZINE HYDROCHLORIDE 6.25 MG/5ML
6.25 SYRUP ORAL 4 TIMES DAILY PRN
Status: DISCONTINUED | OUTPATIENT
Start: 2022-05-05 | End: 2022-05-12 | Stop reason: HOSPADM

## 2022-05-05 RX ORDER — PANTOPRAZOLE SODIUM 20 MG/1
20 TABLET, DELAYED RELEASE ORAL
Status: DISCONTINUED | OUTPATIENT
Start: 2022-05-05 | End: 2022-05-12 | Stop reason: HOSPADM

## 2022-05-05 RX ORDER — SENNOSIDES 8.6 MG
1 TABLET ORAL 2 TIMES DAILY PRN
Status: DISCONTINUED | OUTPATIENT
Start: 2022-05-05 | End: 2022-05-12 | Stop reason: HOSPADM

## 2022-05-05 RX ORDER — LEVALBUTEROL 1.25 MG/.5ML
1.25 SOLUTION, CONCENTRATE RESPIRATORY (INHALATION)
Status: DISCONTINUED | OUTPATIENT
Start: 2022-05-05 | End: 2022-05-12 | Stop reason: HOSPADM

## 2022-05-05 RX ORDER — PRIMIDONE 50 MG/1
50 TABLET ORAL EVERY 12 HOURS SCHEDULED
Status: DISCONTINUED | OUTPATIENT
Start: 2022-05-05 | End: 2022-05-12 | Stop reason: HOSPADM

## 2022-05-05 RX ORDER — ESCITALOPRAM OXALATE 10 MG/1
5 TABLET ORAL DAILY
Status: DISCONTINUED | OUTPATIENT
Start: 2022-05-05 | End: 2022-05-12 | Stop reason: HOSPADM

## 2022-05-05 RX ORDER — GUAIFENESIN 600 MG
1200 TABLET, EXTENDED RELEASE 12 HR ORAL EVERY 12 HOURS SCHEDULED
Status: DISCONTINUED | OUTPATIENT
Start: 2022-05-05 | End: 2022-05-12 | Stop reason: HOSPADM

## 2022-05-05 RX ORDER — ACETAMINOPHEN 325 MG/1
650 TABLET ORAL EVERY 6 HOURS PRN
Status: DISCONTINUED | OUTPATIENT
Start: 2022-05-05 | End: 2022-05-12 | Stop reason: HOSPADM

## 2022-05-05 RX ORDER — ALBUTEROL SULFATE 2.5 MG/3ML
10 SOLUTION RESPIRATORY (INHALATION) ONCE
Status: COMPLETED | OUTPATIENT
Start: 2022-05-05 | End: 2022-05-05

## 2022-05-05 RX ORDER — METHYLPREDNISOLONE SODIUM SUCCINATE 125 MG/2ML
80 INJECTION, POWDER, LYOPHILIZED, FOR SOLUTION INTRAMUSCULAR; INTRAVENOUS ONCE
Status: COMPLETED | OUTPATIENT
Start: 2022-05-05 | End: 2022-05-05

## 2022-05-05 RX ORDER — MAGNESIUM HYDROXIDE/ALUMINUM HYDROXICE/SIMETHICONE 120; 1200; 1200 MG/30ML; MG/30ML; MG/30ML
30 SUSPENSION ORAL EVERY 6 HOURS PRN
Status: DISCONTINUED | OUTPATIENT
Start: 2022-05-05 | End: 2022-05-12 | Stop reason: HOSPADM

## 2022-05-05 RX ORDER — TAMSULOSIN HYDROCHLORIDE 0.4 MG/1
0.4 CAPSULE ORAL
Status: DISCONTINUED | OUTPATIENT
Start: 2022-05-05 | End: 2022-05-12 | Stop reason: HOSPADM

## 2022-05-05 RX ORDER — METHYLPREDNISOLONE SODIUM SUCCINATE 40 MG/ML
40 INJECTION, POWDER, LYOPHILIZED, FOR SOLUTION INTRAMUSCULAR; INTRAVENOUS EVERY 8 HOURS
Status: DISCONTINUED | OUTPATIENT
Start: 2022-05-05 | End: 2022-05-09

## 2022-05-05 RX ADMIN — LEVALBUTEROL HYDROCHLORIDE 1.25 MG: 1.25 SOLUTION, CONCENTRATE RESPIRATORY (INHALATION) at 14:02

## 2022-05-05 RX ADMIN — HEPARIN SODIUM 5000 UNITS: 5000 INJECTION INTRAVENOUS; SUBCUTANEOUS at 22:00

## 2022-05-05 RX ADMIN — ALBUTEROL SULFATE 10 MG: 2.5 SOLUTION RESPIRATORY (INHALATION) at 09:18

## 2022-05-05 RX ADMIN — AZITHROMYCIN MONOHYDRATE 500 MG: 500 INJECTION, POWDER, LYOPHILIZED, FOR SOLUTION INTRAVENOUS at 11:27

## 2022-05-05 RX ADMIN — GUAIFENESIN 1200 MG: 600 TABLET ORAL at 22:00

## 2022-05-05 RX ADMIN — LORAZEPAM 0.5 MG: 0.5 TABLET ORAL at 11:17

## 2022-05-05 RX ADMIN — LEVALBUTEROL HYDROCHLORIDE 1.25 MG: 1.25 SOLUTION, CONCENTRATE RESPIRATORY (INHALATION) at 19:23

## 2022-05-05 RX ADMIN — IPRATROPIUM BROMIDE 0.5 MG: 0.5 SOLUTION RESPIRATORY (INHALATION) at 19:22

## 2022-05-05 RX ADMIN — TRAMADOL HYDROCHLORIDE 50 MG: 50 TABLET, COATED ORAL at 15:54

## 2022-05-05 RX ADMIN — ZOLPIDEM TARTRATE 5 MG: 5 TABLET ORAL at 22:10

## 2022-05-05 RX ADMIN — TAMSULOSIN HYDROCHLORIDE 0.4 MG: 0.4 CAPSULE ORAL at 17:55

## 2022-05-05 RX ADMIN — IPRATROPIUM BROMIDE 0.5 MG: 0.5 SOLUTION RESPIRATORY (INHALATION) at 14:02

## 2022-05-05 RX ADMIN — ALBUTEROL SULFATE 10 MG: 2.5 SOLUTION RESPIRATORY (INHALATION) at 07:41

## 2022-05-05 RX ADMIN — FLUTICASONE PROPIONATE 1 SPRAY: 50 SPRAY, METERED NASAL at 18:32

## 2022-05-05 RX ADMIN — HEPARIN SODIUM 5000 UNITS: 5000 INJECTION INTRAVENOUS; SUBCUTANEOUS at 13:23

## 2022-05-05 RX ADMIN — Medication 6.25 MG: at 22:10

## 2022-05-05 RX ADMIN — ACETAMINOPHEN 650 MG: 325 TABLET, FILM COATED ORAL at 13:05

## 2022-05-05 RX ADMIN — METHYLPREDNISOLONE SODIUM SUCCINATE 40 MG: 40 INJECTION, POWDER, FOR SOLUTION INTRAMUSCULAR; INTRAVENOUS at 15:53

## 2022-05-05 RX ADMIN — LORAZEPAM 0.5 MG: 0.5 TABLET ORAL at 18:32

## 2022-05-05 RX ADMIN — PRIMIDONE 50 MG: 50 TABLET ORAL at 22:30

## 2022-05-05 RX ADMIN — METHYLPREDNISOLONE SODIUM SUCCINATE 80 MG: 125 INJECTION, POWDER, FOR SOLUTION INTRAMUSCULAR; INTRAVENOUS at 07:34

## 2022-05-05 RX ADMIN — IPRATROPIUM BROMIDE 1 MG: 0.5 SOLUTION RESPIRATORY (INHALATION) at 07:41

## 2022-05-05 NOTE — ASSESSMENT & PLAN NOTE
Patient presented to ED with increased cough and shortness of breath over the last 3 days   Outpatient medications:  Anoro, Atrovent, Xopenex, p r n   Albuterol   Was seen at  earlier in the week given cefdinir due to bronchitis     CXR:  No acute cardiopulmonary findings   Sputum cultures ordered   CBC showing WBC of 8 4   Oxygen via NC: requiring 1, goal 88-92%    Respiratory protocol     Steroids    Azithromycin

## 2022-05-05 NOTE — H&P
Venkata Johnson  H&P- Mekhi Alfaro 6/30/1932, 80 y o  male MRN: [id number]  Unit/Bed#: ALICE Encounter: [id number]  Primary Care Provider: Liv Nelson MD   Date and time admitted to hospital: 5/5/2022  7:10 AM    * COPD exacerbation [facility])  Assessment & Plan  Patient presented to ED with increased cough and shortness of breath over the last 3 days   Outpatient medications:  Anoro, Atrovent, Xopenex, p r n   Albuterol   Was seen at  earlier in the week given cefdinir due to bronchitis     CXR:  No acute cardiopulmonary findings   Sputum cultures ordered   CBC showing WBC of 8 4   Oxygen via NC: requiring 1, goal 88-92%    Respiratory protocol     Steroids    Azithromycin      Paroxysmal SVT (supraventricular tachycardia) (HCC)  Assessment & Plan  Continue amiodarone  Follows with Dr Lyn Guerrier as outpatient  No need for telemetry    Chronic renal disease, stage IV [facility])  Assessment & Plan  Lab Results   Component Value Date    EGFR 33 05/05/2022    EGFR 31 04/12/2022    EGFR 25 04/02/2022    CREATININE 1 78 (H) 05/05/2022    CREATININE 1 85 (H) 04/12/2022    CREATININE 2 24 (H) 04/02/2022   Creatinine at baseline is 1 7-1 8  Creatinine on admission 1 78  Continue to monitor  Avoid hypotension, nephrotoxic agents, NSAIDs    LIVIER (obstructive sleep apnea)  Assessment & Plan  Patient has returned CPAP due to not using  Encourage follow-up with primary to evaluate for other options  Potential need for nighttime oxygen    Chronic diastolic heart failure (HCC)  Assessment & Plan  Wt Readings from Last 3 Encounters:   05/03/22 74 8 kg (165 lb)   04/12/22 76 8 kg (169 lb 6 4 oz)   03/16/22 78 7 kg (173 lb 6 4 oz)      Last echo 09/28/2021-systolic function normal, EF 38%, grade 1 diastolic dysfunction, trace MR, mild AR, aortic stenosis, mild TR  Appears euvolemic on exam  Not currently on diuretics  Continue to monitor I&O        Acquired hypothyroidism  Assessment & Plan  Continue levothyroxine 75 mcg    Anxiety disorder due to general medical condition  Assessment & Plan  Patient prescribed lorazepam 0 5 mg q 8 hours p r n  Endorses using 3 times a day every day    Essential hypertension  Assessment & Plan  Not currently on BP medications  BP adequate continue to monitor    CAD (coronary artery disease)  Assessment & Plan  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  Currently no chest pain    VTE Prophylaxis: Heparin  / sequential compression device and foot pump applied   Code Status: Level 3  POLST: There is no POLST form on file for this patient (pre-hospital)  Discussion with family:  Discussed with patient's son at bedside    Anticipated Length of Stay:  Patient will be admitted on an Inpatient basis with an anticipated length of stay of  > 2 midnights  Justification for Hospital Stay:  COPD exacerbation, breathing treatments, steroids    Total Time for Visit, including Counseling / Coordination of Care: 60 minutes  Greater than 50% of this total time spent on direct patient counseling and coordination of care  Chief Complaint:   Shortness of breath and coughing x3 days    History of Present Illness:    Katie Brown is a 80 y o  male history of paroxysmal SVT, COPD, chronic renal disease, diastolic heart failure, hypothyroidism, hypertension, CAD who presents with shortness of breath and increased coughing for approximately 3 days  Endorses earlier in the week going to urgent care due to having cough, prescribed cefdinir  In the last 3 days cough and shortness of breath have gotten worse, productive cough patient unsure of color  Denies any fever/chills, chest pain, abdominal pain, nausea/vomiting, urinary symptoms, diarrhea  Endorses increased cough and shortness of breath with headache associated with cough  Has had prior exacerbations in the past   Is compliant with his outpatient inhalers and nebulizers    Is supposed to be using CPAP at night however has returned CPAP due to noncompliance  No recent sick contacts  Review of Systems:    Review of Systems   Constitutional: Negative for activity change, chills and fever  HENT: Positive for hearing loss (Chronic, uses hearing aid)  Negative for congestion, rhinorrhea and sore throat  Eyes: Negative for visual disturbance  Respiratory: Positive for cough and shortness of breath  Negative for chest tightness  Cardiovascular: Negative for chest pain and palpitations  Gastrointestinal: Negative for abdominal pain, constipation, diarrhea, nausea and vomiting  Genitourinary: Negative for difficulty urinating, dysuria, frequency and urgency  Musculoskeletal: Negative for arthralgias and myalgias  Skin: Negative for rash  Neurological: Positive for tremors (Chronic)  Negative for seizures, syncope, weakness and headaches  All other systems reviewed and are negative        Past Medical and Surgical History:     Past Medical History:   Diagnosis Date    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    Chronic kidney disease     Compression fracture of thoracic vertebra ([facility] )     last assessed 05/07/2012    COPD (chronic obstructive pulmonary disease) ([facility] )     Disease of thyroid gland     Heart attack ([facility] )     History of methicillin resistant staphylococcus aureus (MRSA) 03/28/2019    negative nasal swab     Hollenhorst plaque, right eye     last assessed 04/08/2013    Hyperlipidemia     Hypertension     Iron deficiency anemia due to chronic blood loss     last assessed 09/10/2012    Ischemic colitis ([facility] )     last assessed 05/27/2014    Osteoarthritis of both hands     last assessedn 04/30/2013    Peptic ulcer     last assessed 06/14/2013    Polymyalgia rheumatica ([facility] )     last assessesd 10/09/2012    Renal disorder     Upper GI hemorrhage     last assessed 01/29/2015    Varicose veins of lower extremity     last assessed 04/26/2013       Past Surgical History:   Procedure Laterality Date    CORONARY ARTERY BYPASS GRAFT      HEMORRHOID SURGERY      HERNIA REPAIR      RENAL CYST EXCISION      resolved 05/2010    THROMBOENDARTERECTOMY Right     carotid, last assessed 06/18/2013       Meds/Allergies:    Prior to Admission medications    Medication Sig Start Date End Date Taking? Authorizing Provider   albuterol (PROVENTIL HFA,VENTOLIN HFA) 90 mcg/act inhaler Inhale 2 puffs every 6 (six) hours as needed for wheezing 9/27/21   Lindsey Rosales MD   amiodarone 200 mg tablet Take 1 tablet (200 mg total) by mouth daily For fourteen days followed by 200 mg once a day   12/23/21   Moses Agosto MD   Ciaran Naresh 62 5-25 MCG/INH inhaler inhale 1 puff by mouth and INTO THE LUNGS once daily 3/15/22   Lindsey Rosales MD   B Complex Vitamins (B COMPLEX PO) Take by mouth daily    Historical Provider, MD   cefdinir (OMNICEF) 300 mg capsule Take 1 capsule (300 mg total) by mouth every 12 (twelve) hours for 5 days 5/3/22 5/8/22  Shobha Campo PA-C   Cholecalciferol ([employer] VITAMIN D) 2000 units CAPS Take by mouth daily     Historical Provider, MD   Cyanocobalamin (B-12 PO) Take by mouth daily    Historical Provider, MD   escitalopram (LEXAPRO) 5 mg tablet take 1 tablet by mouth once daily 3/26/22   Lindsey Rosales MD   Ferrous Sulfate (IRON) 325 (65 Fe) MG TABS Take by mouth every other day     Historical Provider, MD   fluticasone (FLONASE) 50 mcg/act nasal spray 1 spray into each nostril 2 (two) times a day    Historical Provider, MD   guaiFENesin (MUCINEX) 600 mg 12 hr tablet Take 1,200 mg by mouth every 12 (twelve) hours    Historical Provider, MD   ipratropium (ATROVENT) 0 02 % nebulizer solution Take 2 5 mL (0 5 mg total) by nebulization 3 (three) times a day 11/15/21   Lindsey Rosales MD   KRILL OIL PO Take by mouth daily    Historical Provider, MD   levalbuterol (XOPENEX) 1 25 mg/3 mL nebulizer solution Take 1 vial (1 25 mg total) by nebulization 3 (three) times a day 11/3/20   Lindsey Rosales MD   levothyroxine 75 mcg tablet take 1 tablet by mouth once daily 3/19/22   Rekha Kincaid MD   LORazepam (ATIVAN) 0 5 mg tablet take 1 tablet by mouth every 8 hours if needed for anxiety  Patient taking differently: take 1 tablet by mouth every 8 hours if needed for anxiety - PER SON ---- patient takes medication DAILY even though it is PRN  Son states he gets medication up to 3 times a day but does take it daily  4/26/22   Rekha Kincaid MD   Multiple Vitamins-Minerals (VISION FORMULA/LUTEIN PO) Take by mouth 11/15/14   Historical Provider, MD   nystatin-triamcinolone (MYCOLOG-II) cream APPLY TO THE AFFECTED AREA(S) SPARINGLY TWICE A DAY 1/30/22   Rekha Kincaid MD   omeprazole (PriLOSEC) 20 mg delayed release capsule take 1 capsule by mouth once daily 12/25/21   Rekha Kincaid MD   primidone (MYSOLINE) 50 mg tablet Take 1 tablet (50 mg total) by mouth every 12 (twelve) hours 4/25/22   Rekha Kincaid MD   promethazine (PHENERGAN) 12 5 mg/10 mL syrup Take 5 mL (6 25 mg total) by mouth 4 (four) times a day as needed (cough) 3/16/22   Rekha Kincaid MD   promethazine-dextromethorphan [facility]) 6 25-15 mg/5 mL oral syrup take 5 milliliter by mouth four times a day if needed for cough 2/3/22   Rekha Kincaid MD   Respiratory Therapy Supplies (SPIROMETER) KIT by Does not apply route 4 (four) times a day 2/25/20   Rekha Kincaid MD   sodium chloride 0 9 % nebulizer solution TAKE 3 MILLILITERS BY NEBULIZATION ROUTE AS NEEDED FOR WHEEZING 11/16/19   Rekha Kincaid MD   tamsulosin [employer]) 0 4 mg take 1 capsule by mouth once daily with dinner 3/12/22   Rekha Kincaid MD   zolpidem Aakash Oka) 5 mg tablet take 1 tablet by mouth at bedtime if needed for sleep 2/25/22   Rekha Kincaid MD     I have reviewed home medications with patient personally  Allergies: Allergies   Allergen Reactions    Pravastatin Anaphylaxis, Photosensitivity and Headache     Reaction Date: [id number];    Other reaction(s): Headache    Acetaminophen-Codeine GI Intolerance    Atorvastatin      Other reaction(s): Leg Cramps  Other reaction(s): Leg Cramps    Codeine Nausea Only    Colestipol GI Intolerance     Reaction Date: ;     Contrast  [Iodinated Diagnostic Agents]     Fenofibrate GI Intolerance     Reaction Date: ;     Hydrocodone Vomiting    Niacin      Reaction Date: ;     Niaspan  [Niacin Er]     Pitavastatin Myalgia    Pneumococcal Polysaccharide Vaccine     Pravastatin Sodium     Simvastatin     Statins Myalgia    Vicoprofen  [Hydrocodone-Ibuprofen] GI Intolerance    Cyclophosphamide Rash    Ioversol Hives       Social History:     Marital Status:    Occupation:  Retired  Patient Pre-hospital Living Situation:  Home  Patient Pre-hospital Level of Mobility:  No restriction  Patient Pre-hospital Diet Restrictions:  No restriction  Substance Use History:   Social History     Substance and Sexual Activity   Alcohol Use Never    Alcohol/week: 1 0 standard drink    Types: 1 Glasses of wine per week    Comment: social     Social History     Tobacco Use   Smoking Status Former Smoker    Packs/day: 2 00    Years: 50 00    Pack years: 100 00    Types: Cigarettes    Start date:     Quit date: 46    Years since quittin 3   Smokeless Tobacco Never Used   Tobacco Comment    Quit 40 yrs   ago     Social History     Substance and Sexual Activity   Drug Use Never       Family History:    Family History   Problem Relation Age of Onset    Hypertension Mother     Alcohol abuse Mother     Hypertension Father     Alcohol abuse Father     Alcohol abuse Son     Heart disease Family     Stroke Family         syndrome       Physical Exam:     Vitals:   Blood Pressure: 109/57 (22)  Pulse: (!) 127 (22)  Temperature: (!) 97 4 °F (36 3 °C) (22)  Temp Source: Temporal (22)  Respirations: (!) 27 (22)  SpO2: 97 % (22 0930)    Physical Exam  Vitals and nursing note reviewed  Constitutional:       Appearance: Normal appearance  He is ill-appearing  HENT:      Head: Normocephalic and atraumatic  Nose: No congestion  Mouth/Throat:      Mouth: Mucous membranes are moist    Eyes:      Conjunctiva/sclera: Conjunctivae normal    Cardiovascular:      Rate and Rhythm: Normal rate and regular rhythm  Pulses: Normal pulses  Heart sounds: Normal heart sounds  No murmur heard  Pulmonary:      Effort: No respiratory distress  Breath sounds: Wheezing (Severe diffuse wheezing) present  Comments: Increased pulmonary effort, currently using nebulized breathing treatment  Abdominal:      General: Bowel sounds are normal       Palpations: Abdomen is soft  Tenderness: There is no abdominal tenderness  Musculoskeletal:         General: Normal range of motion  Right lower leg: No edema  Left lower leg: No edema  Skin:     General: Skin is warm and dry  Neurological:      Mental Status: He is alert and oriented to person, place, and time  Additional Data:     Lab Results: I have personally reviewed pertinent reports        Results from last 7 days   Lab Units 05/05/22  0733   WBC Thousand/uL 8 47   HEMOGLOBIN g/dL 14 0   HEMATOCRIT % 44 8   PLATELETS Thousands/uL 157   NEUTROS PCT % 69   LYMPHS PCT % 15   MONOS PCT % 13*   EOS PCT % 2     Results from last 7 days   Lab Units 05/05/22  0733   SODIUM mmol/L 133*   POTASSIUM mmol/L 4 3   CHLORIDE mmol/L 100   CO2 mmol/L 23   BUN mg/dL 20   CREATININE mg/dL 1 78*   ANION GAP mmol/L 10   CALCIUM mg/dL 9 3   ALBUMIN g/dL 3 4*   TOTAL BILIRUBIN mg/dL 0 60   ALK PHOS U/L 144*   ALT U/L 79*   AST U/L 37   GLUCOSE RANDOM mg/dL 106     Results from last 7 days   Lab Units 05/05/22  0733   INR  1 07             Results from last 7 days   Lab Units 05/05/22  0733   LACTIC ACID mmol/L 1 0   PROCALCITONIN ng/ml 0 21       Imaging: I have personally reviewed pertinent reports  XR chest 1 view portable   Final Result by Catie Rose MD (05/05 0848)      No acute cardiopulmonary disease  Workstation performed: [id number]             Allscripts / Epic Records Reviewed: Yes     ** Please Note: This note has been constructed using a voice recognition system   **

## 2022-05-05 NOTE — ASSESSMENT & PLAN NOTE
Patient has returned CPAP due to not using  Encourage follow-up with primary to evaluate for other options  Potential need for nighttime oxygen

## 2022-05-05 NOTE — ASSESSMENT & PLAN NOTE
Lab Results   Component Value Date    EGFR 33 05/05/2022    EGFR 31 04/12/2022    EGFR 25 04/02/2022    CREATININE 1 78 (H) 05/05/2022    CREATININE 1 85 (H) 04/12/2022    CREATININE 2 24 (H) 04/02/2022   Creatinine at baseline is 1 7-1 8  Creatinine on admission 1 78  Continue to monitor  Avoid hypotension, nephrotoxic agents, NSAIDs

## 2022-05-05 NOTE — ED PROVIDER NOTES
History  Chief Complaint   Patient presents with    Shortness of Breath     pt to er with complaints of shortness of breath that started about last thursday  was recently put on abx for bronchitits  66-year-old male with a history of COPD presents for evaluation continued cough, congestion and dyspnea with associated wheezing  Patient states symptoms started 3 days ago and was evaluated at urgent care  Patient was started on cefdinir and has been compliant for the last 2 days but symptoms are only worsening  During evaluation, patient is tachypneic but able to speak in complete sentences  Diffuse wheezing on examination  Symptoms are exacerbated by exertion  Prior to Admission Medications   Prescriptions Last Dose Informant Patient Reported? Taking? Anoro Ellipta 62 5-25 MCG/INH inhaler   No No   Sig: inhale 1 puff by mouth and INTO THE LUNGS once daily   B Complex Vitamins (B COMPLEX PO)  Family Member Yes No   Sig: Take by mouth daily   Cholecalciferol (CVS VITAMIN D) 2000 units CAPS  Family Member Yes No   Sig: Take by mouth daily    Cyanocobalamin (B-12 PO)  Family Member Yes No   Sig: Take by mouth daily   Ferrous Sulfate (IRON) 325 (65 Fe) MG TABS  Family Member Yes No   Sig: Take by mouth every other day    KRILL OIL PO  Family Member Yes No   Sig: Take by mouth daily   LORazepam (ATIVAN) 0 5 mg tablet   No No   Sig: take 1 tablet by mouth every 8 hours if needed for anxiety   Patient taking differently: take 1 tablet by mouth every 8 hours if needed for anxiety - PER SON ---- patient takes medication DAILY even though it is PRN   Son states he gets medication up to 3 times a day but does take it daily    Multiple Vitamins-Minerals (VISION FORMULA/LUTEIN PO)  Family Member Yes No   Sig: Take by mouth   Respiratory Therapy Supplies (SPIROMETER) KIT  Family Member No No   Sig: by Does not apply route 4 (four) times a day   albuterol (PROVENTIL HFA,VENTOLIN HFA) 90 mcg/act inhaler  Family Member No No   Sig: Inhale 2 puffs every 6 (six) hours as needed for wheezing   amiodarone 200 mg tablet  Family Member No No   Sig: Take 1 tablet (200 mg total) by mouth daily For fourteen days followed by 200 mg once a day     cefdinir (OMNICEF) 300 mg capsule   No No   Sig: Take 1 capsule (300 mg total) by mouth every 12 (twelve) hours for 5 days   escitalopram (LEXAPRO) 5 mg tablet   No No   Sig: take 1 tablet by mouth once daily   fluticasone (FLONASE) 50 mcg/act nasal spray  Family Member Yes No   Si spray into each nostril 2 (two) times a day   guaiFENesin (MUCINEX) 600 mg 12 hr tablet  Family Member Yes No   Sig: Take 1,200 mg by mouth every 12 (twelve) hours   ipratropium (ATROVENT) 0 02 % nebulizer solution  Family Member No No   Sig: Take 2 5 mL (0 5 mg total) by nebulization 3 (three) times a day   levalbuterol (XOPENEX) 1 25 mg/3 mL nebulizer solution  Family Member No No   Sig: Take 1 vial (1 25 mg total) by nebulization 3 (three) times a day   levothyroxine 75 mcg tablet   No No   Sig: take 1 tablet by mouth once daily   nystatin-triamcinolone (MYCOLOG-II) cream  Family Member No No   Sig: APPLY TO THE AFFECTED AREA(S) SPARINGLY TWICE A DAY   omeprazole (PriLOSEC) 20 mg delayed release capsule  Family Member No No   Sig: take 1 capsule by mouth once daily   primidone (MYSOLINE) 50 mg tablet   No No   Sig: Take 1 tablet (50 mg total) by mouth every 12 (twelve) hours   promethazine (PHENERGAN) 12 5 mg/10 mL syrup   No No   Sig: Take 5 mL (6 25 mg total) by mouth 4 (four) times a day as needed (cough)   promethazine-dextromethorphan (PHENERGAN-DM) 6 25-15 mg/5 mL oral syrup  Family Member No No   Sig: take 5 milliliter by mouth four times a day if needed for cough   sodium chloride 0 9 % nebulizer solution  Family Member No No   Sig: TAKE 3 MILLILITERS BY NEBULIZATION ROUTE AS NEEDED FOR WHEEZING   tamsulosin (FLOMAX) 0 4 mg   No No   Sig: take 1 capsule by mouth once daily with dinner   zolpidem (AMBIEN) 5 mg tablet  Family Member No No   Sig: take 1 tablet by mouth at bedtime if needed for sleep      Facility-Administered Medications: None       Past Medical History:   Diagnosis Date    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    Chronic kidney disease     Compression fracture of thoracic vertebra (HCC)     last assessed 05/07/2012    [facility]ch[facility] obstructive pulmonary disease) ([facility]     Disease of thyroid gland     Heart attack ([facility] )     History of methicillin resistant staphylococcus aureus (MRSA) 03/28/2019    negative nasal swab     Hollenhorst plaque, right eye     last assessed 04/08/2013    Hyperlipidemia     Hypertension     Iron deficiency anemia due to chronic blood loss     last assessed 09/10/2012    Ischemic colitis ([facility] )     last assessed 05/27/2014    Osteoarthritis of both hands     last assessedn 04/30/2013    Peptic ulcer     last assessed 06/14/2013    Polymyalgia rheumatica ([place] [address] )     last assessesd 10/09/2012    Renal disorder     Upper GI hemorrhage     last assessed 01/29/2015    Varicose veins of lower extremity     last assessed 04/26/2013       Past Surgical History:   Procedure Laterality Date    CORONARY ARTERY BYPASS GRAFT      HEMORRHOID SURGERY      HERNIA REPAIR      RENAL CYST EXCISION      resolved 05/2010    THROMBOENDARTERECTOMY Right     carotid, last assessed 06/18/2013       Family History   Problem Relation Age of Onset    Hypertension Mother     Alcohol abuse Mother     Hypertension Father     Alcohol abuse Father     Alcohol abuse Son     Heart disease Family     Stroke Family         syndrome     I have reviewed and agree with the history as documented      E-Cigarette/Vaping    E-Cigarette Use Never User      E-Cigarette/Vaping Substances     Social History     Tobacco Use    Smoking status: Former Smoker     Packs/day: 2 00     Years: 50 00     Pack years: 100 00     Types: Cigarettes     Start date: 1944 Quit date: 46     Years since quittin 3    Smokeless tobacco: Never Used    Tobacco comment: Quit 40 yrs  ago   Vaping Use    Vaping Use: Never used   Substance Use Topics    Alcohol use: Never     Alcohol/week: 1 0 standard drink     Types: 1 Glasses of wine per week     Comment: social    Drug use: Never       Review of Systems   Constitutional: Negative for fever  Respiratory: Positive for cough, shortness of breath and wheezing  All other systems reviewed and are negative  Physical Exam  Physical Exam  Vitals and nursing note reviewed  Constitutional:       General: He is not in acute distress  Appearance: He is well-developed  HENT:      Head: Normocephalic and atraumatic  Right Ear: External ear normal       Left Ear: External ear normal       Nose: Nose normal    Eyes:      General: No scleral icterus  Cardiovascular:      Rate and Rhythm: Tachycardia present  Pulmonary:      Effort: No respiratory distress  Breath sounds: Wheezing present  Comments: Diffuse wheezing  Abdominal:      General: There is no distension  Palpations: Abdomen is soft  Musculoskeletal:         General: No deformity  Normal range of motion  Cervical back: Normal range of motion and neck supple  Skin:     General: Skin is warm  Findings: No rash  Neurological:      General: No focal deficit present  Mental Status: He is alert        Gait: Gait normal    Psychiatric:         Mood and Affect: Mood normal          Vital Signs  ED Triage Vitals   Temperature Pulse Respirations Blood Pressure SpO2   22 0726 22 0715 22 0715 22 0715 22 0715   (!) 97 4 °F (36 3 °C) (!) 121 (!) 28 159/87 90 %      Temp Source Heart Rate Source Patient Position - Orthostatic VS BP Location FiO2 (%)   22 0726 -- 22 0715 22 0715 22 1041   Temporal  Lying Right arm 40      Pain Score       22 1000       No Pain           Vitals: 05/05/22 1326 05/05/22 1402 05/05/22 1411 05/05/22 1500   BP:   126/65    Pulse: (!) 112 (!) 110 (!) 109 (!) 108   Patient Position - Orthostatic VS:   Lying          Visual Acuity      ED Medications  Medications   amiodarone tablet 200 mg (200 mg Oral Not Given 5/5/22 1124)   escitalopram (LEXAPRO) tablet 5 mg (5 mg Oral Not Given 5/5/22 1124)   ferrous sulfate tablet 325 mg (has no administration in time range)   fluticasone (FLONASE) 50 mcg/act nasal spray 1 spray (1 spray Nasal Not Given 5/5/22 1125)   guaiFENesin (MUCINEX) 12 hr tablet 1,200 mg (1,200 mg Oral Not Given 5/5/22 1124)   levothyroxine tablet 75 mcg (has no administration in time range)   multivitamin-minerals (CENTRUM) tablet 1 tablet (has no administration in time range)   pantoprazole (PROTONIX) EC tablet 20 mg (20 mg Oral Not Given 5/5/22 1125)   primidone (MYSOLINE) tablet 50 mg (50 mg Oral Not Given 5/5/22 1130)   promethazine (PHENERGAN) oral syrup 6 25 mg (has no administration in time range)   tamsulosin (FLOMAX) capsule 0 4 mg (has no administration in time range)   zolpidem (AMBIEN) tablet 5 mg (has no administration in time range)   acetaminophen (TYLENOL) tablet 650 mg (650 mg Oral Given 5/5/22 1305)   senna (SENOKOT) tablet 8 6 mg (has no administration in time range)   aluminum-magnesium hydroxide-simethicone (MYLANTA) oral suspension 30 mL (has no administration in time range)   ipratropium (ATROVENT) 0 02 % inhalation solution 0 5 mg (0 5 mg Nebulization Given 5/5/22 1402)   levalbuterol (XOPENEX) inhalation solution 1 25 mg (1 25 mg Nebulization Given 5/5/22 1402)   methylPREDNISolone sodium succinate (Solu-MEDROL) injection 40 mg (40 mg Intravenous Given 5/5/22 1553)   azithromycin (ZITHROMAX) 500 mg in sodium chloride 0 9% 250mL IVPB 500 mg (500 mg Intravenous New Bag 5/5/22 1127)   heparin (porcine) subcutaneous injection 5,000 Units (5,000 Units Subcutaneous Given 5/5/22 1323)   albuterol inhalation solution 2 5 mg (has no administration in time range)   LORazepam (ATIVAN) tablet 0 5 mg (0 5 mg Oral Given 5/5/22 1117)   traMADol (ULTRAM) tablet 50 mg (50 mg Oral Given 5/5/22 1554)   ondansetron (ZOFRAN-ODT) dispersible tablet 4 mg (has no administration in time range)   albuterol inhalation solution 10 mg (10 mg Nebulization Given 5/5/22 0741)   ipratropium (ATROVENT) 0 02 % inhalation solution 1 mg (1 mg Nebulization Given 5/5/22 0741)   methylPREDNISolone sodium succinate (Solu-MEDROL) injection 80 mg (80 mg Intravenous Given 5/5/22 0734)   albuterol inhalation solution 10 mg (10 mg Nebulization Given 5/5/22 0582)       Diagnostic Studies  Results Reviewed     Procedure Component Value Units Date/Time    UA w Reflex to Microscopic w Reflex to Culture [[id number]]  (Abnormal) Collected: 05/05/22 1518    Lab Status: Final result Specimen: Urine, Clean Catch Updated: 05/05/22 1526     Color, UA Yellow     Clarity, UA Clear     Specific Gravity, UA 1 025     pH, UA 6 0     Leukocytes, UA Negative     Nitrite, UA Negative     Protein, UA Trace mg/dl      Glucose, UA Negative mg/dl      Ketones, UA Trace mg/dl      Urobilinogen, UA 0 2 E U /dl      Bilirubin, UA Negative     Blood, UA Negative    HS Troponin I 4hr [[id number]]  (Normal) Collected: 05/05/22 1148    Lab Status: Final result Specimen: Blood from Arm, Right Updated: 05/05/22 1237     hs TnI 4hr 8 ng/L      Delta 4hr hsTnI -2 ng/L     HS Troponin I 2hr [[id number]]  (Normal) Collected: 05/05/22 0935    Lab Status: Final result Specimen: Blood from Arm, Left Updated: 05/05/22 1020     hs TnI 2hr 8 ng/L      Delta 2hr hsTnI -2 ng/L     Sputum culture and Gram stain [[id number]]     Lab Status: No result Specimen: Sputum     COVID[facility], [facility]/B, [facility] [[id number]]  (Normal) Collected: 05/05/22 0812    Lab Status: Final result Specimen: Nares from Nasopharyngeal Swab Updated: 05/05/22 0856     SARS-CoV-2 Negative     INFLUENZA A PCR Negative     INFLUENZA B PCR Negative RSV PCR Negative    Narrative:      FOR PEDIATRIC PATIENTS - copy/paste COVID Guidelines URL to browser: [email or web address]    SARS-CoV-2 assay is a Nucleic Acid Amplification assay intended for the  qualitative detection of nucleic acid from SARS-CoV-2 in nasopharyngeal  swabs  Results are for the presumptive identification of SARS-CoV-2 RNA  Positive results are indicative of infection with SARS-CoV-2, the virus  causing COVID-19, but do not rule out bacterial infection or co-infection  with other viruses  Laboratories within the [place] and its  territories are required to report all positive results to the appropriate  public health authorities  Negative results do not preclude SARS-CoV-2  infection and should not be used as the sole basis for treatment or other  patient management decisions  Negative results must be combined with  clinical observations, patient history, and epidemiological information  This test has not been FDA cleared or approved  This test has been authorized by FDA under an Emergency Use Authorization  (EUA)  This test is only authorized for the duration of time the  declaration that circumstances exist justifying the authorization of the  emergency use of an in vitro diagnostic tests for detection of SARS-CoV-2  virus and/or diagnosis of COVID-19 infection under section 564(b)(1) of  the Act, 21 U  S C  860TUQ-1(N)(2), unless the authorization is terminated  or revoked sooner  The test has been validated but independent review by FDA  and CLIA is pending  Test performed using [employer]: This RT-PCR assay targets N2,  a region unique to SARS-CoV-2  A conserved region in the E-gene was chosen  for pan-Sarbecovirus detection which includes SARS-CoV-2      Procalcitonin [[id number]]  (Normal) Collected: 05/05/22 0256    Lab Status: Final result Specimen: Blood from Arm, Left Updated: 05/05/22 9562     Procalcitonin 0 21 ng/ml     NT-BNP PRO [[id number]]  (Abnormal) Collected: 05/05/22 0733    Lab Status: Final result Specimen: Blood from Arm, Left Updated: 05/05/22 0821     NT-proBNP 616 pg/mL     HS Troponin 0hr (reflex protocol) [[id number]]  (Normal) Collected: 05/05/22 0733    Lab Status: Final result Specimen: Blood from Arm, Left Updated: 05/05/22 0821     hs TnI 0hr 10 ng/L     Lactic Acid [[id number]]  (Normal) Collected: 05/05/22 0733    Lab Status: Final result Specimen: Blood from Arm, Left Updated: 05/05/22 0815     LACTIC ACID 1 0 mmol/L     Narrative:      Result may be elevated if tourniquet was used during collection      Comprehensive metabolic panel [[id number]]  (Abnormal) Collected: 05/05/22 0733    Lab Status: Final result Specimen: Blood from Arm, Left Updated: 05/05/22 0813     Sodium 133 mmol/L      Potassium 4 3 mmol/L      Chloride 100 mmol/L      CO2 23 mmol/L      ANION GAP 10 mmol/L      BUN 20 mg/dL      Creatinine 1 78 mg/dL      Glucose 106 mg/dL      Calcium 9 3 mg/dL      Corrected Calcium 9 8 mg/dL      AST 37 U/L      ALT 79 U/L      Alkaline Phosphatase 144 U/L      Total Protein 7 9 g/dL      Albumin 3 4 g/dL      Total Bilirubin 0 60 mg/dL      eGFR 33 ml/min/1 73sq m     Narrative:      [place] guidelines for Chronic Kidney Disease (CKD):     Stage 1 with normal or high GFR (GFR > 90 mL/min/1 73 square meters)    Stage 2 Mild CKD (GFR = 60-89 mL/min/1 73 square meters)    Stage 3A Moderate CKD (GFR = 45-59 mL/min/1 73 square meters)    Stage 3B Moderate CKD (GFR = 30-44 mL/min/1 73 square meters)    Stage 4 Severe CKD (GFR = 15-29 mL/min/1 73 square meters)    Stage 5 End Stage CKD (GFR <15 mL/min/1 73 square meters)  Note: GFR calculation is accurate only with a steady state creatinine    Protime-INR [[id number]]  (Normal) Collected: 05/05/22 0733    Lab Status: Final result Specimen: Blood from Arm, Left Updated: 05/05/22 0807     Protime 13 8 seconds INR 1 07    APTT [[id number]]  (Abnormal) Collected: 05/05/22 0733    Lab Status: Final result Specimen: Blood from Arm, Left Updated: 05/05/22 0807     PTT 38 seconds     CBC and differential [[id number]]  (Abnormal) Collected: 05/05/22 0733    Lab Status: Final result Specimen: Blood from Arm, Left Updated: 05/05/22 0747     WBC 8 47 Thousand/uL      RBC 4 75 Million/uL      Hemoglobin 14 0 g/dL      Hematocrit 44 8 %      MCV 94 fL      MCH 29 5 pg      MCHC 31 3 g/dL      RDW 13 7 %      MPV 9 0 fL      Platelets 400 Thousands/uL      nRBC 0 /100 WBCs      Neutrophils Relative 69 %      Immat GRANS % 1 %      Lymphocytes Relative 15 %      Monocytes Relative 13 %      Eosinophils Relative 2 %      Basophils Relative 0 %      Neutrophils Absolute 5 81 Thousands/µL      Immature Grans Absolute 0 05 Thousand/uL      Lymphocytes Absolute 1 30 Thousands/µL      Monocytes Absolute 1 08 Thousand/µL      Eosinophils Absolute 0 20 Thousand/µL      Basophils Absolute 0 03 Thousands/µL     Blood culture #1 [[id number]] Collected: 05/05/22 0733    Lab Status: In process Specimen: Blood from Arm, Left Updated: 05/05/22 0744    Blood culture #2 [[id number]] Collected: 05/05/22 0733    Lab Status: In process Specimen: Blood from Hand, Left Updated: 05/05/22 0744                 XR chest 1 view portable   Final Result by Cole Nguyen MD (05/05 0848)      No acute cardiopulmonary disease  Workstation performed: [id number]                    Procedures  CriticalCare Time  Performed by: Nilsa Orozco DO  Authorized by:  Nilsa Orozco DO     Critical care provider statement:     Critical care time (minutes):  35    Critical care time was exclusive of:  Separately billable procedures and treating other patients and teaching time    Critical care was necessary to treat or prevent imminent or life-threatening deterioration of the following conditions:  Sepsis and respiratory failure    Critical care was time spent personally by me on the following activities:  Blood draw for specimens, obtaining history from patient or surrogate, development of treatment plan with patient or surrogate, discussions with primary provider, evaluation of patient's response to treatment, examination of patient, ordering and performing treatments and interventions, ordering and review of laboratory studies, ordering and review of radiographic studies, re-evaluation of patient's condition and review of old charts             ED Course                               SBIRT 22yo+      Most Recent Value   SBIRT (22 yo +)    In order to provide better care to our patients, we are screening all of our patients for alcohol and drug use  Would it be okay to ask you these screening questions? Yes Filed at: 05/05/2022 6107   Initial Alcohol Screen: US AUDIT-C     1  How often do you have a drink containing alcohol? 1 Filed at: 05/05/2022 0727   2  How many drinks containing alcohol do you have on a typical day you are drinking? 0 Filed at: 05/05/2022 0727   3a  Male UNDER 65: How often do you have five or more drinks on one occasion? 0 Filed at: 05/05/2022 0727   3b  FEMALE Any Age, or MALE 65+: How often do you have 4 or more drinks on one occassion? 0 Filed at: 05/05/2022 7325   Audit-C Score 1 Filed at: 05/05/2022 7490   BETY: How many times in the past year have you    Used an illegal drug or used a prescription medication for non-medical reasons? Never Filed at: 05/05/2022 4415                    MDM  Number of Diagnoses or Management Options  COPD exacerbation [facility]): new and requires workup  Respiratory failure with hypoxia [facility]): new and requires workup  Diagnosis management comments: 80-year-old male presenting with dyspnea and associated wheezing consistent with acute COPD exacerbation  Patient also noted to be hypoxic requiring 1-2 L nasal cannula  Obtain sepsis/cardiac evaluation  Symptom control with nebulizer, steroids      Upon reassessment, patient with improvement of symptoms after initial nebulizer treatment but still remains tachypneic with associated wheezing  Will administer another treatment  Patient will require admission for hypoxic respiratory failure, COPD exacerbation  Amount and/or Complexity of Data Reviewed  Clinical lab tests: reviewed and ordered  Tests in the radiology section of CPT®: ordered and reviewed  Tests in the medicine section of CPT®: ordered and reviewed  Decide to obtain previous medical records or to obtain history from someone other than the patient: yes  Review and summarize past medical records: yes        Disposition  Final diagnoses:   COPD exacerbation ([facility] )   Respiratory failure with hypoxia ([facility] )     Time reflects when diagnosis was documented in both MDM as applicable and the Disposition within this note     Time User Action Codes Description Comment    5/5/2022  9:01 AM Alisa Real Add [J44 1] COPD exacerbation ([facility] )     5/5/2022  9:01 AM Alisa Real Add [J96 91] Respiratory failure with hypoxia [facility])       ED Disposition     ED Disposition Condition Date/Time Comment    Admit Stable Thu May 5, 2022  9:01 AM Case was discussed with MARLYN and the patient's admission status was agreed to be Admission Status: inpatient status to the service of Dr Madelin Das   Follow-up Information    None         Current Discharge Medication List      CONTINUE these medications which have NOT CHANGED    Details   albuterol (PROVENTIL HFA,VENTOLIN HFA) 90 mcg/act inhaler Inhale 2 puffs every 6 (six) hours as needed for wheezing  Qty: 18 g, Refills: 3    Comments: Substitution to a formulary equivalent within the same pharmaceutical class is authorized  Associated Diagnoses: Chronic bronchitis, unspecified chronic bronchitis type (HCC)      amiodarone 200 mg tablet Take 1 tablet (200 mg total) by mouth daily For fourteen days followed by 200 mg once a day    Qty: 90 tablet, Refills: 3    Associated Diagnoses: SVT (supraventricular tachycardia) ([facility])      Anoro Ellipta 62 5-25 MCG/INH inhaler inhale 1 puff by mouth and INTO THE LUNGS once daily  Qty: 60 blister, Refills: 3    Associated Diagnoses: COPD exacerbation (HCC)      B Complex Vitamins (B COMPLEX PO) Take by mouth daily      cefdinir (OMNICEF) 300 mg capsule Take 1 capsule (300 mg total) by mouth every 12 (twelve) hours for 5 days  Qty: 10 capsule, Refills: 0    Associated Diagnoses: Acute bronchitis, unspecified organism      Cholecalciferol ([employer]S VITAMIN D) 2000 units CAPS Take by mouth daily       Cyanocobalamin (B-12 PO) Take by mouth daily      escitalopram (LEXAPRO) 5 mg tablet take 1 tablet by mouth once daily  Qty: 30 tablet, Refills: 1    Associated Diagnoses: Current moderate episode of major depressive disorder without prior episode ([facility])      Ferrous Sulfate (IRON) 325 (65 Fe) MG TABS Take by mouth every other day       fluticasone (FLONASE) 50 mcg/act nasal spray 1 spray into each nostril 2 (two) times a day      guaiFENesin (MUCINEX) 600 mg 12 hr tablet Take 1,200 mg by mouth every 12 (twelve) hours      ipratropium (ATROVENT) 0 02 % nebulizer solution Take 2 5 mL (0 5 mg total) by nebulization 3 (three) times a day  Qty: 225 mL, Refills: 3    Associated Diagnoses: COPD with acute exacerbation ([facility])      KRILL OIL PO Take by mouth daily      levalbuterol (XOPENEX) 1 25 mg/3 mL nebulizer solution Take 1 vial (1 25 mg total) by nebulization 3 (three) times a day  Qty: 225 mL, Refills: 3    Associated Diagnoses: Mixed simple and mucopurulent chronic bronchitis ([facility])      levothyroxine 75 mcg tablet take 1 tablet by mouth once daily  Qty: 30 tablet, Refills: 0    Associated Diagnoses: Acquired hypothyroidism      LORazepam (ATIVAN) 0 5 mg tablet take 1 tablet by mouth every 8 hours if needed for anxiety  Qty: 90 tablet, Refills: 0    Comments: DNF before 4/29/2022  Associated Diagnoses: Anxiety      Multiple Vitamins-Minerals (VISION FORMULA/LUTEIN PO) Take by mouth      nystatin-triamcinolone (MYCOLOG-II) cream APPLY TO THE AFFECTED AREA(S) SPARINGLY TWICE A DAY  Qty: 60 g, Refills: 2    Associated Diagnoses: Moniliasis      omeprazole (PriLOSEC) 20 mg delayed release capsule take 1 capsule by mouth once daily  Qty: 30 capsule, Refills: 5    Associated Diagnoses: Gastroesophageal reflux disease with esophagitis      primidone (MYSOLINE) 50 mg tablet Take 1 tablet (50 mg total) by mouth every 12 (twelve) hours  Qty: 60 tablet, Refills: 1    Associated Diagnoses: Essential tremor      promethazine (PHENERGAN) 12 5 mg/10 mL syrup Take 5 mL (6 25 mg total) by mouth 4 (four) times a day as needed (cough)  Qty: 118 mL, Refills: 3    Associated Diagnoses: Mixed simple and mucopurulent chronic bronchitis (HCC)      promethazine-dextromethorphan (PHENERGAN-DM) 6 25-15 mg/5 mL oral syrup take 5 milliliter by mouth four times a day if needed for cough  Qty: 180 mL, Refills: 1    Associated Diagnoses: Moderate COPD (chronic obstructive pulmonary disease) ([facility])      Respiratory Therapy Supplies (SPIROMETER) KIT by Does not apply route 4 (four) times a day  Qty: 1 kit, Refills: 0    Associated Diagnoses: Pulmonary emphysema, unspecified emphysema type ([facility])      sodium chloride 0 9 % nebulizer solution TAKE 3 MILLILITERS BY NEBULIZATION ROUTE AS NEEDED FOR WHEEZING  Qty: 90 mL, Refills: 1    Associated Diagnoses: Mixed simple and mucopurulent chronic bronchitis ([facility])      tamsulosin (FLOMAX) 0 4 mg take 1 capsule by mouth once daily with dinner  Qty: 30 capsule, Refills: 5    Associated Diagnoses: Suprapubic tenderness      zolpidem (AMBIEN) 5 mg tablet take 1 tablet by mouth at bedtime if needed for sleep  Qty: 30 tablet, Refills: 1    Associated Diagnoses: Primary insomnia             No discharge procedures on file      PDMP Review       Value Time User    PDMP Reviewed  Yes 5/5/2022  9:16 AM Corby Batista PA-C          ED Provider  Electronically Signed by           Jean Daniel DO  05/05/22 [id number]

## 2022-05-05 NOTE — ASSESSMENT & PLAN NOTE
· SIRS , POA, likely due to COPD exacerbation, as evidenced by tachycardia and tachypnea, being treated respiratory protocol and steroids with azithromycin   · HR >90 and RR >20, no source of infection suspected at this time

## 2022-05-05 NOTE — ASSESSMENT & PLAN NOTE
Wt Readings from Last 3 Encounters:   05/03/22 74 8 kg (165 lb)   04/12/22 76 8 kg (169 lb 6 4 oz)   03/16/22 78 7 kg (173 lb 6 4 oz)      Last echo 09/28/2021-systolic function normal, EF 75%, grade 1 diastolic dysfunction, trace MR, mild AR, aortic stenosis, mild TR  Appears euvolemic on exam  Not currently on diuretics  Continue to monitor I&O

## 2022-05-05 NOTE — ED NOTES
Patient taken off oxygen after heart neb; oxygen desat to 92%  Patient requesting to be put back on oxygen at this time   Patient placed on 1L supplemental oxygen      Angelito Bowman RN  05/05/22 [id number]

## 2022-05-05 NOTE — CASE MANAGEMENT
Case Management Assessment & Discharge Planning Note    Patient name Denis Dire  Location [facility] POOL/ALICE MRN [id number]  : 1932 Date 2022       Current Admission Date: 2022  Current Admission Diagnosis:COPD exacerbation [facility])   Patient Active Problem List    Diagnosis Date Noted    Paroxysmal SVT (supraventricular tachycardia) ([facility] ) 2022    Centrilobular emphysema ([facility] ) 02/15/2022    Current moderate episode of major depressive disorder without prior episode ([facility] ) 10/06/2021    Chronic renal disease, stage IV ([facility] ) 10/06/2021    PAC (premature atrial contraction) 10/06/2021    COPD exacerbation ([place] [address] ) 2021    Viral illness 09/15/2021    Primary osteoarthritis of left knee 2021    Patellar tendonitis of left knee 2021    Tinea cruris 2021    Partial arterial occlusion of retina 2021    Ischemic colitis ([place] [address] ) 2021    Central serous chorioretinopathy 2020    Bilateral sensorineural hearing loss 2020    Gastrointestinal hemorrhage 2020    Senile nuclear cataract 2020    Peptic ulcer with hemorrhage but without obstruction 2020    Nonspecific abnormal findings on radiological and examination of lung field 2020    TMJ arthropathy 2020    Chronic cough 2020    Impacted cerumen of left ear 03/15/2020    Dizziness 2020    Chest pain 2020    LIVIER (obstructive sleep apnea)     Snoring     Excessive daytime sleepiness     Aneurysm, pulmonary, arteriovenous 10/20/2019    Stage 3 chronic kidney disease ([facility] ) 2019    Cervical spine arthritis 2019    Chronic diastolic heart failure ([facility] ) 04/10/2019    Pneumonia of right lower lobe due to infectious organism 04/10/2019    Urinary obstruction 04/10/2019    BMI 29 0-29 9,adult 2019    Thrombocytopenia (HCC) 2019    Suprapubic tenderness 2019    Loose stools 2019    Bronchitis 03/28/2019    Eczema 08/22/2018    Seborrheic keratoses, inflamed 08/22/2018    Medicare annual wellness visit, subsequent 07/13/2018    Shortness of breath 06/04/2018    Tachycardia 06/04/2018    Other atopic dermatitis 08/16/2017    Headache 01/20/2017    Insomnia 08/18/2016    Hydronephrosis of right kidney 05/14/2016    CAD (coronary artery disease) 05/14/2016    Carotid stenosis 05/14/2016    Essential hypertension 05/14/2016    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[facility] ) 06/11/2015    Macular degeneration 11/15/2014    GERD (gastroesophageal reflux disease) 06/06/2014    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/26/2013    Iron deficiency anemia 10/09/2012    Benign prostatic hyperplasia with lower urinary tract symptoms 05/07/2012    Mixed simple and mucopurulent chronic bronchitis ([facility] ) 05/07/2012    Mixed hyperlipidemia 05/07/2012    Acquired hypothyroidism 05/07/2012    Osteoporosis 05/07/2012      LOS (days): 0  Geometric Mean LOS (GMLOS) (days):   Days to GMLOS:     OBJECTIVE:              Current admission status: Inpatient       Preferred Pharmacy:   [address], [address] [facility]  [address] [place] [address]  Phone: [phone] Fax: [address], [address] [facility]  [address] [place] [address]  Phone: [phone] Fax: [phone]    Primary Care Provider: Chastity Browning MD    Primary Insurance: MEDICARE  Secondary Insurance: [facility] HEALTH OPTIONS PROGRAM    ASSESSMENT:  [address] [place], [address] [place] [address] - Son   Primary Phone: [phone] (Mobile)               Advance Directives  Does patient have a [address]?: Yes  Does patient have Advance Directives?: Yes  Advance Directives: Living will,Power of  for health care  Primary Contact: Vernon Tuttle         Readmission Root Cause  30 Day Readmission: No    Patient Information  Admitted from[de-identified] Home  Mental Status: Alert  During Assessment patient was accompanied by: Son  Assessment information provided by[de-identified] Son  Primary Caregiver: Self  Support Systems: Self,Son,Family members  [place] of Residence: [address] [facility] do you live in?: [address] [place] [address] entry access options  Select all that apply : No steps to enter home  Type of Current Residence: 2 story home  Upon entering residence, is there a bedroom on the main floor (no further steps)?: Yes  Upon entering residence, is there a bathroom on the main floor (no further steps)?: Yes  In the last 12 months, was there a time when you were not able to pay the mortgage or rent on time?: No  In the last 12 months, how many places have you lived?: 1  In the last 12 months, was there a time when you did not have a steady place to sleep or slept in a shelter (including now)?: No  Homeless/housing insecurity resource given?: N/A  Living Arrangements: Lives w/ Son  Is patient a ?: Yes  Is patient active with [facility])?: No    Activities of Daily Living Prior to Admission  Functional Status: Independent  Completes ADLs independently?: Yes  Ambulates independently?: Yes  Does patient use assisted devices?: Yes  Assisted Devices (DME) used: [address] Name (respiratory supplies):  Adapt  Does patient currently own DME?: Yes  What DME does the patient currently own?: Nebulizer,Walker  Does patient have a history of Outpatient Therapy (PT/OT)?: No  Does the patient have a history of Short-Term Rehab?: No  Does patient have a history of HHC?: Yes  Does patient currently have [facility]?: No         Patient Information Continued  Income Source: Pension/nursing home  Does patient have prescription coverage?: Yes  Within the past 12 months, you worried that your food would run out before you got the money to buy more : Never true  Within the past 12 months, the food you bought just didnt last and you didnt have money to get more : Never true  Food insecurity resource given?: N/A  Does patient receive dialysis treatments?: No  Does patient have a history of substance abuse?: No  Does patient have a history of Mental Health Diagnosis?: No         Means of Transportation  Means of Transport to Appts[de-identified] Family transport  In the past 12 months, has lack of transportation kept you from medical appointments or from getting medications?: No  In the past 12 months, has lack of transportation kept you from meetings, work, or from getting things needed for daily living?: No  Was application for public transport provided?: N/A        DISCHARGE DETAILS:    Discharge planning discussed with[de-identified] Patient son Del Donovan at bedside  Freedom of Choice: Yes  Comments - Freedom of Choice: Discussed  CM contacted family/caregiver?: Yes  Were Treatment Team discharge recommendations reviewed with patient/caregiver?: Yes  Did patient/caregiver verbalize understanding of patient care needs?: Yes       Contacts  Patient Contacts: Deon Kearney  Relationship to Patient[de-identified] Family  Contact Method: In Person  Reason/Outcome: Discharge [address]         Is the patient interested in [address] at discharge?: No (pending recommendations)    DME Referral Provided  Referral made for DME?: No    Other Referral/Resources/Interventions Provided:  Interventions: HHC  Referral Comments: HHC offered  Family is not sure it is needed at this time            Discharge Destination Plan[de-identified] Home,Home with [place] at Discharge : Family                                      Additional Comments: Cm met with pt and his son Del Donovan at bedside in the ED  Pt was sleeping with nebulizer treatment on  Pts son states that pt resides with him and his wife in a 2 st with 1st floor set up for pt  He uses a walker as needed  Dtr in law works from home so pt is not alone in the house often  Pt also has many visitors and support at home   He has no hx of HHC, Op PT/OT, STR, MH or D&A  Pt is essentially indpt at home  Family provides transportation  Pts son Ivelisse Stover is his POA and pt has AD  Pt has a [facility] PCP and uses [facility] following

## 2022-05-05 NOTE — ED NOTES
Per the son, patients ativan is given daily up to 3 times a day even though it is only a PRN medication  He states it will be better for everyone if the patient continues getting this medication every day like he does at home  Inpatient provider, Grazyna Bahena, made aware via tiger text and not written in med rec        Mckenzie Chou RN  05/05/22 0053

## 2022-05-06 PROBLEM — J96.01 ACUTE RESPIRATORY FAILURE WITH HYPOXIA (HCC): Status: ACTIVE | Noted: 2022-05-06

## 2022-05-06 PROBLEM — E87.1 HYPONATREMIA: Status: ACTIVE | Noted: 2022-05-06

## 2022-05-06 LAB
ANION GAP SERPL CALCULATED.3IONS-SCNC: 6 MMOL/L (ref 4–13)
BUN SERPL-MCNC: 27 MG/DL (ref 5–25)
CALCIUM SERPL-MCNC: 8.4 MG/DL (ref 8.3–10.1)
CHLORIDE SERPL-SCNC: 100 MMOL/L (ref 100–108)
CO2 SERPL-SCNC: 23 MMOL/L (ref 21–32)
CREAT SERPL-MCNC: 1.8 MG/DL (ref 0.6–1.3)
ERYTHROCYTE [DISTWIDTH] IN BLOOD BY AUTOMATED COUNT: 13.6 % (ref 11.6–15.1)
GFR SERPL CREATININE-BSD FRML MDRD: 32 ML/MIN/1.73SQ M
GLUCOSE SERPL-MCNC: 150 MG/DL (ref 65–140)
HCT VFR BLD AUTO: 36.8 % (ref 36.5–49.3)
HGB BLD-MCNC: 11.8 G/DL (ref 12–17)
MCH RBC QN AUTO: 29.7 PG (ref 26.8–34.3)
MCHC RBC AUTO-ENTMCNC: 32.1 G/DL (ref 31.4–37.4)
MCV RBC AUTO: 93 FL (ref 82–98)
OSMOLALITY UR/SERPL-RTO: 290 MMOL/KG (ref 282–298)
PLATELET # BLD AUTO: 136 THOUSANDS/UL (ref 149–390)
PMV BLD AUTO: 9.2 FL (ref 8.9–12.7)
POTASSIUM SERPL-SCNC: 4.6 MMOL/L (ref 3.5–5.3)
PROCALCITONIN SERPL-MCNC: 0.25 NG/ML
RBC # BLD AUTO: 3.97 MILLION/UL (ref 3.88–5.62)
SODIUM SERPL-SCNC: 129 MMOL/L (ref 136–145)
TSH SERPL DL<=0.05 MIU/L-ACNC: 1.57 UIU/ML (ref 0.45–4.5)
URATE SERPL-MCNC: 6 MG/DL (ref 4.2–8)
WBC # BLD AUTO: 9.35 THOUSAND/UL (ref 4.31–10.16)

## 2022-05-06 PROCEDURE — 83930 ASSAY OF BLOOD OSMOLALITY: CPT | Performed by: NURSE PRACTITIONER

## 2022-05-06 PROCEDURE — 99223 1ST HOSP IP/OBS HIGH 75: CPT | Performed by: INTERNAL MEDICINE

## 2022-05-06 PROCEDURE — 94668 MNPJ CHEST WALL SBSQ: CPT

## 2022-05-06 PROCEDURE — 99232 SBSQ HOSP IP/OBS MODERATE 35: CPT | Performed by: NURSE PRACTITIONER

## 2022-05-06 PROCEDURE — 84443 ASSAY THYROID STIM HORMONE: CPT | Performed by: NURSE PRACTITIONER

## 2022-05-06 PROCEDURE — 94640 AIRWAY INHALATION TREATMENT: CPT

## 2022-05-06 PROCEDURE — 94760 N-INVAS EAR/PLS OXIMETRY 1: CPT

## 2022-05-06 PROCEDURE — 84550 ASSAY OF BLOOD/URIC ACID: CPT | Performed by: NURSE PRACTITIONER

## 2022-05-06 PROCEDURE — 84145 PROCALCITONIN (PCT): CPT

## 2022-05-06 PROCEDURE — 80048 BASIC METABOLIC PNL TOTAL CA: CPT

## 2022-05-06 PROCEDURE — 85027 COMPLETE CBC AUTOMATED: CPT

## 2022-05-06 RX ORDER — BENZONATATE 100 MG/1
100 CAPSULE ORAL 3 TIMES DAILY PRN
Status: DISCONTINUED | OUTPATIENT
Start: 2022-05-06 | End: 2022-05-12 | Stop reason: HOSPADM

## 2022-05-06 RX ADMIN — PRIMIDONE 50 MG: 50 TABLET ORAL at 08:37

## 2022-05-06 RX ADMIN — BENZONATATE 100 MG: 100 CAPSULE ORAL at 02:34

## 2022-05-06 RX ADMIN — AZITHROMYCIN MONOHYDRATE 500 MG: 500 INJECTION, POWDER, LYOPHILIZED, FOR SOLUTION INTRAVENOUS at 11:39

## 2022-05-06 RX ADMIN — Medication 6.25 MG: at 04:11

## 2022-05-06 RX ADMIN — ESCITALOPRAM OXALATE 5 MG: 10 TABLET ORAL at 08:36

## 2022-05-06 RX ADMIN — ACETAMINOPHEN 650 MG: 325 TABLET, FILM COATED ORAL at 20:24

## 2022-05-06 RX ADMIN — TAMSULOSIN HYDROCHLORIDE 0.4 MG: 0.4 CAPSULE ORAL at 15:57

## 2022-05-06 RX ADMIN — BENZONATATE 100 MG: 100 CAPSULE ORAL at 20:24

## 2022-05-06 RX ADMIN — LORAZEPAM 0.5 MG: 0.5 TABLET ORAL at 15:19

## 2022-05-06 RX ADMIN — PRIMIDONE 50 MG: 50 TABLET ORAL at 20:24

## 2022-05-06 RX ADMIN — METHYLPREDNISOLONE SODIUM SUCCINATE 40 MG: 40 INJECTION, POWDER, FOR SOLUTION INTRAMUSCULAR; INTRAVENOUS at 23:03

## 2022-05-06 RX ADMIN — LORAZEPAM 0.5 MG: 0.5 TABLET ORAL at 20:24

## 2022-05-06 RX ADMIN — ZOLPIDEM TARTRATE 5 MG: 5 TABLET ORAL at 23:21

## 2022-05-06 RX ADMIN — METHYLPREDNISOLONE SODIUM SUCCINATE 40 MG: 40 INJECTION, POWDER, FOR SOLUTION INTRAMUSCULAR; INTRAVENOUS at 00:00

## 2022-05-06 RX ADMIN — LEVALBUTEROL HYDROCHLORIDE 1.25 MG: 1.25 SOLUTION, CONCENTRATE RESPIRATORY (INHALATION) at 07:08

## 2022-05-06 RX ADMIN — Medication 6.25 MG: at 11:49

## 2022-05-06 RX ADMIN — LEVOTHYROXINE SODIUM 75 MCG: 25 TABLET ORAL at 06:08

## 2022-05-06 RX ADMIN — LEVALBUTEROL HYDROCHLORIDE 1.25 MG: 1.25 SOLUTION, CONCENTRATE RESPIRATORY (INHALATION) at 13:29

## 2022-05-06 RX ADMIN — FLUTICASONE PROPIONATE 1 SPRAY: 50 SPRAY, METERED NASAL at 17:11

## 2022-05-06 RX ADMIN — IPRATROPIUM BROMIDE 0.5 MG: 0.5 SOLUTION RESPIRATORY (INHALATION) at 19:09

## 2022-05-06 RX ADMIN — LEVALBUTEROL HYDROCHLORIDE 1.25 MG: 1.25 SOLUTION, CONCENTRATE RESPIRATORY (INHALATION) at 19:09

## 2022-05-06 RX ADMIN — METHYLPREDNISOLONE SODIUM SUCCINATE 40 MG: 40 INJECTION, POWDER, FOR SOLUTION INTRAMUSCULAR; INTRAVENOUS at 08:37

## 2022-05-06 RX ADMIN — PANTOPRAZOLE SODIUM 20 MG: 20 TABLET, DELAYED RELEASE ORAL at 06:08

## 2022-05-06 RX ADMIN — FLUTICASONE PROPIONATE 1 SPRAY: 50 SPRAY, METERED NASAL at 08:38

## 2022-05-06 RX ADMIN — BENZONATATE 100 MG: 100 CAPSULE ORAL at 08:36

## 2022-05-06 RX ADMIN — HEPARIN SODIUM 5000 UNITS: 5000 INJECTION INTRAVENOUS; SUBCUTANEOUS at 15:19

## 2022-05-06 RX ADMIN — LORAZEPAM 0.5 MG: 0.5 TABLET ORAL at 08:37

## 2022-05-06 RX ADMIN — HEPARIN SODIUM 5000 UNITS: 5000 INJECTION INTRAVENOUS; SUBCUTANEOUS at 23:03

## 2022-05-06 RX ADMIN — METHYLPREDNISOLONE SODIUM SUCCINATE 40 MG: 40 INJECTION, POWDER, FOR SOLUTION INTRAMUSCULAR; INTRAVENOUS at 15:19

## 2022-05-06 RX ADMIN — GUAIFENESIN 1200 MG: 600 TABLET ORAL at 20:25

## 2022-05-06 RX ADMIN — HEPARIN SODIUM 5000 UNITS: 5000 INJECTION INTRAVENOUS; SUBCUTANEOUS at 06:07

## 2022-05-06 RX ADMIN — AMIODARONE HYDROCHLORIDE 200 MG: 200 TABLET ORAL at 08:37

## 2022-05-06 RX ADMIN — GUAIFENESIN 1200 MG: 600 TABLET ORAL at 08:36

## 2022-05-06 RX ADMIN — IPRATROPIUM BROMIDE 0.5 MG: 0.5 SOLUTION RESPIRATORY (INHALATION) at 07:08

## 2022-05-06 RX ADMIN — Medication 1 TABLET: at 08:37

## 2022-05-06 RX ADMIN — IPRATROPIUM BROMIDE 0.5 MG: 0.5 SOLUTION RESPIRATORY (INHALATION) at 13:29

## 2022-05-06 RX ADMIN — Medication 6.25 MG: at 17:10

## 2022-05-06 RX ADMIN — ALBUTEROL SULFATE 2.5 MG: 2.5 SOLUTION RESPIRATORY (INHALATION) at 02:19

## 2022-05-06 RX ADMIN — FERROUS SULFATE TAB 325 MG (65 MG ELEMENTAL FE) 325 MG: 325 (65 FE) TAB at 08:36

## 2022-05-06 NOTE — ASSESSMENT & PLAN NOTE
Wt Readings from Last 3 Encounters:   05/03/22 74 8 kg (165 lb)   04/12/22 76 8 kg (169 lb 6 4 oz)   03/16/22 78 7 kg (173 lb 6 4 oz)     · Last echo 09/28/2021-systolic function normal, EF 19%, grade 1 diastolic dysfunction, trace MR, mild AR, aortic stenosis, mild TR  · Appears euvolemic on exam  · Not currently on diuretics  · Continue to monitor I&O

## 2022-05-06 NOTE — CONSULTS
Consultation - Pulmonary Medicine   Laly Mahan 80 y o  male MRN: [id number]  Unit/Bed#: -01 Encounter: [id number]      Assessment & Plan:   Acute pulmonary insufficiency  Chronic bronchitis with acute exacerbation  SIRS, POA  Pulmonary nodule  LIVIER    · Patient currently 95% on 2 L  Can likely be titrated off of supplemental oxygen  Maintain pulse ox greater than 89%  · Chest x-ray negative for acute pulmonary disease  · Continue azithromycin x3 days total  · IV Solu-Medrol 40 mg q 8 hours  · Continue around the clock nebs  · Would discharge patient on Trelegy Ellipta 1 puff daily to replace his Anoro  · Outpatient pulmonary follow-up to include annual CT follow-up for lung nodule  · Patient refuses CPAP    Discussed with Dr Brianna Ruiz    History of Present Illness   Physician Requesting Consult: DO Radha  Reason for Consult / Principal Problem: COPD  Hx and PE limited by: patient is extremely hard of hearing  HPI: Laly Mahan is a 80y o  year old male who presents with cough and shortness of breath x3 days  Patient received a few days of antibiotics prior to admission (cefdinir)  He denies fevers or chills  Reports persistent chest tightness, coughing and wheezing  No lower extremity edema is noted  BNP is minimally elevated  Patient says he is feeling better since being admitted into the hospital   He quit smoking over 20 years ago and follows with us from a pulmonary standpoint  He has a large pulmonary nodule which is monitored on CT imaging  He was hospitalized for COPD September 2021  Consults    Review of Systems   Respiratory: Positive for cough, chest tightness, shortness of breath and wheezing  All other systems reviewed and are negative        Historical Information   Past Medical History:   Diagnosis Date    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    Chronic kidney disease     Compression fracture of thoracic vertebra ([facility] )     last assessed 2012    COPD (chronic obstructive pulmonary disease) (HCC)     Disease of thyroid gland     Heart attack ([facility] )     History of methicillin resistant staphylococcus aureus (MRSA) 2019    negative nasal swab     Hollenhorst plaque, right eye     last assessed 2013    Hyperlipidemia     Hypertension     Iron deficiency anemia due to chronic blood loss     last assessed 09/10/2012    Ischemic colitis ([facility] )     last assessed 2014    Osteoarthritis of both hands     last assessedn 2013    Peptic ulcer     last assessed 2013    Polymyalgia rheumatica ([facility] )     last assessesd 10/09/2012    Renal disorder     Upper GI hemorrhage     last assessed 2015    Varicose veins of lower extremity     last assessed 2013     Past Surgical History:   Procedure Laterality Date    CORONARY ARTERY BYPASS GRAFT      HEMORRHOID SURGERY      HERNIA REPAIR      RENAL CYST EXCISION      resolved 2010    THROMBOENDARTERECTOMY Right     carotid, last assessed 2013     Social History   Social History     Substance and Sexual Activity   Alcohol Use Never    Alcohol/week: 1 0 standard drink    Types: 1 Glasses of wine per week    Comment: social     Social History     Substance and Sexual Activity   Drug Use Never     E-Cigarette/Vaping    E-Cigarette Use Never User      E-Cigarette/Vaping Substances     Social History     Tobacco Use   Smoking Status Former Smoker    Packs/day: 2 00    Years: 50 00    Pack years: 100 00    Types: Cigarettes    Start date: 65    Quit date: 46    Years since quittin 3   Smokeless Tobacco Never Used   Tobacco Comment    Quit 40 yrs   ago       Family History:   Family History   Problem Relation Age of Onset    Hypertension Mother     Alcohol abuse Mother     Hypertension Father     Alcohol abuse Father     Alcohol abuse Son     Heart disease Family     Stroke Family         syndrome       Meds/Allergies   all current active meds have been reviewed    Allergies   Allergen Reactions    Pravastatin Anaphylaxis, Photosensitivity and Headache     Reaction Date: [id number]; Other reaction(s): Headache    Acetaminophen-Codeine GI Intolerance    Atorvastatin      Other reaction(s): Leg Cramps  Other reaction(s): Leg Cramps    Codeine Nausea Only    Colestipol GI Intolerance     Reaction Date: [id number];     Contrast  [Iodinated Diagnostic Agents]     Fenofibrate GI Intolerance     Reaction Date: [id number];     Hydrocodone Vomiting    Niacin      Reaction Date: [id number];     Niaspan  [Niacin Er]     Pitavastatin Myalgia    Pneumococcal Polysaccharide Vaccine     Pravastatin Sodium     Simvastatin     Statins Myalgia    Vicoprofen  [Hydrocodone-Ibuprofen] GI Intolerance    Cyclophosphamide Rash    Ioversol Hives       Objective   Vitals: Blood pressure 102/61, pulse (!) 115, temperature (!) 97 °F (36 1 °C), resp  rate 18, SpO2 91 %  ,There is no height or weight on file to calculate BMI  Intake/Output Summary (Last 24 hours) at 5/6/2022 1127  Last data filed at 5/6/2022 1001  Gross per 24 hour   Intake 1020 ml   Output 325 ml   Net 695 ml     Invasive Devices  Report    Peripheral Intravenous Line            Peripheral IV 05/05/22 Left Antecubital 1 day                Physical Exam  Vitals and nursing note reviewed  Constitutional:       General: He is not in acute distress  Appearance: He is well-developed  He is not diaphoretic  HENT:      Head: Normocephalic and atraumatic  Right Ear: External ear normal       Left Ear: External ear normal       Nose: Nose normal    Eyes:      General: No scleral icterus  Right eye: No discharge  Left eye: No discharge  Conjunctiva/sclera: Conjunctivae normal    Neck:      Trachea: No tracheal deviation  Cardiovascular:      Rate and Rhythm: Tachycardia present     Pulmonary:      Effort: Pulmonary effort is normal  No respiratory distress  Breath sounds: No stridor  Wheezing present  Abdominal:      General: There is no distension  Tenderness: There is no guarding  Musculoskeletal:         General: No tenderness or deformity  Right lower leg: No edema  Left lower leg: No edema  Skin:     General: Skin is warm and dry  Coloration: Skin is not pale  Findings: No erythema or rash  Neurological:      Mental Status: He is alert  Cranial Nerves: No cranial nerve deficit  Motor: No abnormal muscle tone  Psychiatric:         Behavior: Behavior normal          Thought Content: Thought content normal          Judgment: Judgment normal          Lab Results: I have personally reviewed pertinent lab results  Imaging Studies: I have personally reviewed pertinent reports  EKG, Pathology, and Other Studies: I have personally reviewed pertinent reports      VTE Prophylaxis: Heparin    Code Status: Level 3 - DNAR and DNI  Advance Directive and Living Will: Yes    Power of :    POLST:

## 2022-05-06 NOTE — ASSESSMENT & PLAN NOTE
Wt Readings from Last 3 Encounters:   05/03/22 74 8 kg (165 lb)   04/12/22 76 8 kg (169 lb 6 4 oz)   03/16/22 78 7 kg (173 lb 6 4 oz)     · Last echo 09/28/2021-systolic function normal, EF 40%, grade 1 diastolic dysfunction, trace MR, mild AR, aortic stenosis, mild TR  · Appears euvolemic on exam  · Not currently on diuretics  · Continue to monitor I&O

## 2022-05-06 NOTE — PROGRESS NOTES
[place]  Progress Note - Alexandra Tan 6/30/1932, 80 y o  male MRN: [id number]  Unit/Bed#: -Jaimie Encounter: [id number]  Primary Care Provider: Mary Jane Ceron MD   Date and time admitted to hospital: 5/5/2022  7:10 AM    * COPD exacerbation [facility])  Assessment & Plan  Patient presented to ED with increased cough and shortness of breath over the last 3 days   Outpatient medications:  Anoro, Atrovent, Xopenex, p r n   Albuterol   Was seen at  earlier in the week given cefdinir due to bronchitis   CXR:  No acute cardiopulmonary findings   Sputum cultures ordered-  Not sent    WBC of 9 35 today    procal level negative x2   Oxygen via NC: requiring 1, goal 88-92%    Respiratory protocol     Steroids solumedrol 40mg q8h    Azithromycin x 3 days    Consult pulmonary       Acute respiratory failure with hypoxia (HCC)  Assessment & Plan  · Pt requiring 1-2LNC   · Secondary to COPD exacerbation   · Wean o2 for sats >/= 88%    SIRS (systemic inflammatory response syndrome) (HCC)  Assessment & Plan  · SIRS , POA, likely due to COPD exacerbation, as evidenced by tachycardia and tachypnea, being treated respiratory protocol and steroids with azithromycin   · HR >90 and RR >20, no source of infection suspected at this time            Hyponatremia  Assessment & Plan  Lab Results   Component Value Date    SODIUM 129 (L) 05/06/2022    SODIUM 133 (L) 05/05/2022    SODIUM 138 04/12/2022   · check urine osmo, serum osmo, urine NA, TSH and uric acid level   · Repeat am bmp and monitor     Paroxysmal SVT (supraventricular tachycardia) (HCC)  Assessment & Plan  · Continue amiodarone  · Follows with Dr Natalia Higginbotham as outpatient    Chronic renal disease, stage IV [facility])  Assessment & Plan  Lab Results   Component Value Date    EGFR 32 05/06/2022    EGFR 33 05/05/2022    EGFR 31 04/12/2022    CREATININE 1 80 (H) 05/06/2022    CREATININE 1 78 (H) 05/05/2022    CREATININE 1 85 (H) 04/12/2022 · Creatinine at baseline is 1 7-1 8- stable   · Continue to monitor  · Avoid hypotension, nephrotoxic agents, NSAIDs    LIVIER (obstructive sleep apnea)  Assessment & Plan  · Patient has returned CPAP due to not using  · Encourage follow-up with primary to evaluate for other options  · Potential need for nighttime oxygen    Chronic diastolic heart failure (HCC)  Assessment & Plan  Wt Readings from Last 3 Encounters:   05/03/22 74 8 kg (165 lb)   04/12/22 76 8 kg (169 lb 6 4 oz)   03/16/22 78 7 kg (173 lb 6 4 oz)     · Last echo 09/28/2021-systolic function normal, EF 71%, grade 1 diastolic dysfunction, trace MR, mild AR, aortic stenosis, mild TR  · Appears euvolemic on exam  · Not currently on diuretics  · Continue to monitor I&O        Thrombocytopenia (HCC)  Assessment & Plan  Monitor- repeat am cbc     Acquired hypothyroidism  Assessment & Plan  Continue levothyroxine 75 mcg    Anxiety disorder due to general medical condition  Assessment & Plan  · c/w lorazepam 0 5 mg TID   · C/w lexapro 5mg daily   · Supportive care     Essential hypertension  Assessment & Plan  · Not currently on BP medications  · BP adequate continue to monitor    CAD (coronary artery disease)  Assessment & Plan  · 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  · Currently no chest pain        VTE Pharmacologic Prophylaxis: VTE Score: 7 High Risk (Score >/= 5) - Pharmacological DVT Prophylaxis Ordered: heparin  Sequential Compression Devices Ordered  Patient Centered Rounds: I performed bedside rounds with nursing staff today  Discussions with Specialists or Other Care Team Provider: d/w RN     Education and Discussions with Family / Patient: Patient declined call to   Time Spent for Care: 30 minutes  More than 50% of total time spent on counseling and coordination of care as described above      Current Length of Stay: 1 day(s)  Current Patient Status: Inpatient   Certification Statement: The patient will continue to require additional inpatient hospital stay due to COPD exacerbation/IV steroids  Discharge Plan: Anticipate discharge in >72 hrs to discharge location to be determined pending rehab evaluations  Code Status: Level 3 - DNAR and DNI    Subjective:   Pt reports he doesn't feel any better today  + strong nonproductive cough, +SOB with rest and exertion, + wheezing  Denies any other complaints  Objective:     Vitals:   Temp (24hrs), Av 5 °F (36 9 °C), Min:97 °F (36 1 °C), Max:99 3 °F (37 4 °C)    Temp:  [97 °F (36 1 °C)-99 3 °F (37 4 °C)] 97 °F (36 1 °C)  HR:  [] 115  Resp:  [18-28] 18  BP: (102-143)/(61-81) 102/61  SpO2:  [88 %-97 %] 91 %  There is no height or weight on file to calculate BMI  Input and Output Summary (last 24 hours): Intake/Output Summary (Last 24 hours) at 2022 1237  Last data filed at 2022 1001  Gross per 24 hour   Intake 1020 ml   Output 325 ml   Net 695 ml       Physical Exam:   Physical Exam  Nursing note reviewed  Constitutional:       Appearance: He is well-developed  He is ill-appearing  HENT:      Head: Normocephalic and atraumatic  Eyes:      Conjunctiva/sclera: Conjunctivae normal    Cardiovascular:      Rate and Rhythm: Normal rate and regular rhythm  Heart sounds: Normal heart sounds  No murmur heard  Pulmonary:      Effort: Respiratory distress present  Breath sounds: Wheezing present  Comments: 1LNC  Diffuse inspiratory and expiratory wheezes   Abdominal:      General: Bowel sounds are normal  There is distension  Palpations: Abdomen is soft  Tenderness: There is no abdominal tenderness  Musculoskeletal:         General: No swelling or tenderness  Skin:     General: Skin is warm and dry  Neurological:      General: No focal deficit present  Mental Status: He is alert  Mental status is at baseline     Psychiatric:         Mood and Affect: Mood normal           Additional Data:     Labs:  Results from last 7 days   Lab Units 05/06/22 0444 05/05/22 0733 05/05/22  0733   WBC Thousand/uL 9 35   < > 8 47   HEMOGLOBIN g/dL 11 8*   < > 14 0   HEMATOCRIT % 36 8   < > 44 8   PLATELETS Thousands/uL 136*   < > 157   NEUTROS PCT %  --   --  69   LYMPHS PCT %  --   --  15   MONOS PCT %  --   --  13*   EOS PCT %  --   --  2    < > = values in this interval not displayed  Results from last 7 days   Lab Units 05/06/22 0444 05/05/22 0733 05/05/22  0733   SODIUM mmol/L 129*   < > 133*   POTASSIUM mmol/L 4 6   < > 4 3   CHLORIDE mmol/L 100   < > 100   CO2 mmol/L 23   < > 23   BUN mg/dL 27*   < > 20   CREATININE mg/dL 1 80*   < > 1 78*   ANION GAP mmol/L 6   < > 10   CALCIUM mg/dL 8 4   < > 9 3   ALBUMIN g/dL  --   --  3 4*   TOTAL BILIRUBIN mg/dL  --   --  0 60   ALK PHOS U/L  --   --  144*   ALT U/L  --   --  79*   AST U/L  --   --  37   GLUCOSE RANDOM mg/dL 150*   < > 106    < > = values in this interval not displayed  Results from last 7 days   Lab Units 05/05/22  0733   INR  1 07             Results from last 7 days   Lab Units 05/06/22 0444 05/05/22  0733   LACTIC ACID mmol/L  --  1 0   PROCALCITONIN ng/ml 0 25 0 21       Lines/Drains:  Invasive Devices  Report    Peripheral Intravenous Line            Peripheral IV 05/05/22 Left Antecubital 1 day                      Imaging: Reviewed radiology reports from this admission including: chest xray    Recent Cultures (last 7 days):   Results from last 7 days   Lab Units 05/05/22  0733   BLOOD CULTURE  Received in Microbiology Lab  Culture in Progress  Received in Microbiology Lab  Culture in Progress         Last 24 Hours Medication List:   Current Facility-Administered Medications   Medication Dose Route Frequency Provider Last Rate    acetaminophen  650 mg Oral Q6H PRN JoshuaANDREW Vu-FRANKY      albuterol  2 5 mg Nebulization Q6H PRN Erika Husain DO      aluminum-magnesium hydroxide-simethicone  30 mL Oral Q6H PRN Joshua ANDREW Flannery-FRANKY      amiodarone  200 mg Oral Daily JoshuaJESSI CarlC  azithromycin  500 mg Intravenous Q24H Ansel Lasso, PA-C      benzonatate  100 mg Oral TID PRN Meagan Peacock, PA-C      escitalopram  5 mg Oral Daily Anselm Lasso, PA-C      ferrous sulfate  325 mg Oral Every Other Day Anselm Lasso, PA-C      fluticasone  1 spray Nasal BID [facility], [facility]      guaiFENesin  1,200 mg Oral Q12H [facility] L[facility], PA-C      heparin (porcine)  5,000 Units Subcutaneous [facility] L[facility], PA-C      ipratropium  0 5 mg Nebulization TID Anselm Lasso, PA-C      levalbuterol  1 25 mg Nebulization TID Anselm Lasso, PA-C      levothyroxine  75 mcg Oral Early Morning Anselm Lasso, PA-C      LORazepam  0 5 mg Oral TID Anselm Lasso, PA-C      methylPREDNISolone sodium succinate  40 mg Intravenous Q8H Anselm Lasso, PA-C      multivitamin-minerals  1 tablet Oral Daily Anselm Lasso, PA-C      ondansetron  4 mg Oral Q6H PRN [facility] Grandchild, DO      pantoprazole  20 mg Oral Early Morning Anselm Lasso, PA-C      primidone  50 mg Oral Q12H [facility] Lasso, PA-C      promethazine  6 25 mg Oral 4x Daily PRN Anselm Lasso, PA-C      senna  1 tablet Oral BID PRN [facility] Lasso, PA-C      tamsulosin  0 4 mg Oral Daily With Andi Grover, PA-C      traMADol  50 mg Oral Q6H PRN [facility] Grandchild, DO      zolpidem  5 mg Oral HS PRN [facility] Ran, PA-C          Today, Patient Was Seen By: ELISABET Marquez    **Please Note: This note may have been constructed using a voice recognition system  **

## 2022-05-06 NOTE — ASSESSMENT & PLAN NOTE
Patient presented to ED with increased cough and shortness of breath over the last 3 days   Outpatient medications:  Anoro, Atrovent, Xopenex, p r n   Albuterol   Was seen at  earlier in the week given cefdinir due to bronchitis   CXR:  No acute cardiopulmonary findings   Sputum cultures ordered-  Not sent    WBC of 9 35 today    procal level negative x2   Oxygen via NC: requiring 1, goal 88-92%    Respiratory protocol     Steroids solumedrol 40mg q8h    Azithromycin x 3 days    Consult pulmonary

## 2022-05-06 NOTE — NURSING NOTE
Pt slept during most hrly rounds overnight except when woke with cough, wheeze; RT notified and provided neb tx, tessalon jenaro admin as ordered, ineffective, and then PRN Phenergan given with relief from cough  Pt OOB at that time to void and have BM

## 2022-05-06 NOTE — PLAN OF CARE
Problem: Potential for Falls  Goal: Patient will remain free of falls  Description: INTERVENTIONS:  - Educate patient/family on patient safety including physical limitations  - Instruct patient to call for assistance with activity   - Consult OT/PT to assist with strengthening/mobility   - Keep Call bell within reach  - Keep bed low and locked with side rails adjusted as appropriate  - Keep care items and personal belongings within reach  - Initiate and maintain comfort rounds  - Make Fall Risk Sign visible to staff  - Offer Toileting every 2 Hours, in advance of need  - Initiate/Maintain alarm  - Obtain necessary fall risk management equipment:   - Apply yellow socks and bracelet for high fall risk patients  - Consider moving patient to room near nurses station  5/6/2022 1101 by Roxana Delgado RN  Outcome: Progressing  5/6/2022 1100 by Roxana Delgado RN  Outcome: Progressing     Problem: MOBILITY - ADULT  Goal: Maintain or return to baseline ADL function  Description: INTERVENTIONS:  -  Assess patient's ability to carry out ADLs; assess patient's baseline for ADL function and identify physical deficits which impact ability to perform ADLs (bathing, care of mouth/teeth, toileting, grooming, dressing, etc )  - Assess/evaluate cause of self-care deficits   - Assess range of motion  - Assess patient's mobility; develop plan if impaired  - Assess patient's need for assistive devices and provide as appropriate  - Encourage maximum independence but intervene and supervise when necessary  - Involve family in performance of ADLs  - Assess for home care needs following discharge   - Consider OT consult to assist with ADL evaluation and planning for discharge  - Provide patient education as appropriate  5/6/2022 1101 by Roxana Delgado RN  Outcome: Progressing  5/6/2022 1100 by Roxana Delgado RN  Outcome: Progressing  Goal: Maintains/Returns to pre admission functional level  Description: INTERVENTIONS:  - Perform BMAT or MOVE assessment daily    - Set and communicate daily mobility goal to care team and patient/family/caregiver     - Collaborate with rehabilitation services on mobility goals if consulted  - Out of bed for toileting  - Record patient progress and toleration of activity level   5/6/2022 1101 by Makayla Murillo RN  Outcome: Progressing  5/6/2022 1100 by Makayla Murillo RN  Outcome: Progressing     Problem: Prexisting or High Potential for Compromised Skin Integrity  Goal: Skin integrity is maintained or improved  Description: INTERVENTIONS:  - Identify patients at risk for skin breakdown  - Assess and monitor skin integrity  - Assess and monitor nutrition and hydration status  - Monitor labs   - Assess for incontinence   - Turn and reposition patient  - Assist with mobility/ambulation  - Relieve pressure over bony prominences  - Avoid friction and shearing  - Provide appropriate hygiene as needed including keeping skin clean and dry  - Evaluate need for skin moisturizer/barrier cream  - Collaborate with interdisciplinary team   - Patient/family teaching  - Consider wound care consult   5/6/2022 1101 by Makayla Murillo RN  Outcome: Progressing  5/6/2022 1100 by Makayla Murillo RN  Outcome: Progressing

## 2022-05-06 NOTE — ASSESSMENT & PLAN NOTE
Lab Results   Component Value Date    SODIUM 129 (L) 05/06/2022    SODIUM 133 (L) 05/05/2022    SODIUM 138 04/12/2022   · check urine osmo, serum osmo, urine NA, TSH and uric acid level   · Repeat am bmp and monitor

## 2022-05-06 NOTE — ASSESSMENT & PLAN NOTE
· Patient has returned CPAP due to not using  · Encourage follow-up with primary to evaluate for other options  · Potential need for nighttime oxygen

## 2022-05-06 NOTE — ASSESSMENT & PLAN NOTE
Patient presented to ED with increased cough and shortness of breath over the last 3 days   Outpatient medications:  Anoro, Atrovent, Xopenex, p r n  Albuterol   Was seen at  earlier in the week given cefdinir due to bronchitis   CXR:  No acute cardiopulmonary findings   Sputum cultures ordered-  Not sent    WBC of 9 35 today    procal level negative x2   Oxygen via NC: requiring 1, goal 88-92%    Respiratory protocol     Steroids solumedrol 40mg q8h    Azithromycin x 3 days    Consult pulmonary, appreciate ongoing recommendations   Family questioning how to prevent this in the future, discussed allergy management, possible addition of Singulair    They have an outpatient appointment 2 weeks will discuss strategies at that time

## 2022-05-06 NOTE — ASSESSMENT & PLAN NOTE
Lab Results   Component Value Date    EGFR 32 05/06/2022    EGFR 33 05/05/2022    EGFR 31 04/12/2022    CREATININE 1 80 (H) 05/06/2022    CREATININE 1 78 (H) 05/05/2022    CREATININE 1 85 (H) 04/12/2022   · Creatinine at baseline is 1 7-1 8- stable   · Continue to monitor  · Avoid hypotension, nephrotoxic agents, NSAIDs

## 2022-05-07 LAB
ANION GAP SERPL CALCULATED.3IONS-SCNC: 9 MMOL/L (ref 4–13)
BASE EXCESS BLDA CALC-SCNC: -5 MMOL/L (ref -2–3)
BUN SERPL-MCNC: 32 MG/DL (ref 5–25)
CA-I BLD-SCNC: 1.32 MMOL/L (ref 1.12–1.32)
CALCIUM SERPL-MCNC: 8.4 MG/DL (ref 8.3–10.1)
CHLORIDE SERPL-SCNC: 100 MMOL/L (ref 100–108)
CO2 SERPL-SCNC: 21 MMOL/L (ref 21–32)
CREAT SERPL-MCNC: 1.68 MG/DL (ref 0.6–1.3)
ERYTHROCYTE [DISTWIDTH] IN BLOOD BY AUTOMATED COUNT: 14 % (ref 11.6–15.1)
FIO2 GAS DIL.REBREATH: 36 L
GFR SERPL CREATININE-BSD FRML MDRD: 35 ML/MIN/1.73SQ M
GLUCOSE SERPL-MCNC: 136 MG/DL (ref 65–140)
GLUCOSE SERPL-MCNC: 153 MG/DL (ref 65–140)
HCO3 BLDA-SCNC: 20.4 MMOL/L (ref 22–28)
HCT VFR BLD AUTO: 38.7 % (ref 36.5–49.3)
HCT VFR BLD CALC: 38 % (ref 36.5–49.3)
HGB BLD-MCNC: 12.3 G/DL (ref 12–17)
HGB BLDA-MCNC: 12.9 G/DL (ref 12–17)
MCH RBC QN AUTO: 29.8 PG (ref 26.8–34.3)
MCHC RBC AUTO-ENTMCNC: 31.8 G/DL (ref 31.4–37.4)
MCV RBC AUTO: 94 FL (ref 82–98)
PCO2 BLD: 22 MMOL/L (ref 21–32)
PCO2 BLD: 38 MM HG (ref 36–44)
PH BLD: 7.34 [PH] (ref 7.35–7.45)
PLATELET # BLD AUTO: 165 THOUSANDS/UL (ref 149–390)
PMV BLD AUTO: 8.7 FL (ref 8.9–12.7)
PO2 BLD: 83 MM HG (ref 75–129)
POTASSIUM BLD-SCNC: 4.6 MMOL/L (ref 3.5–5.3)
POTASSIUM SERPL-SCNC: 4.8 MMOL/L (ref 3.5–5.3)
RBC # BLD AUTO: 4.13 MILLION/UL (ref 3.88–5.62)
SAO2 % BLD FROM PO2: 95 % (ref 60–85)
SODIUM BLD-SCNC: 132 MMOL/L (ref 136–145)
SODIUM SERPL-SCNC: 130 MMOL/L (ref 136–145)
SPECIMEN SOURCE: ABNORMAL
WBC # BLD AUTO: 13.76 THOUSAND/UL (ref 4.31–10.16)

## 2022-05-07 PROCEDURE — 82330 ASSAY OF CALCIUM: CPT

## 2022-05-07 PROCEDURE — 36600 WITHDRAWAL OF ARTERIAL BLOOD: CPT

## 2022-05-07 PROCEDURE — 85027 COMPLETE CBC AUTOMATED: CPT | Performed by: NURSE PRACTITIONER

## 2022-05-07 PROCEDURE — 82947 ASSAY GLUCOSE BLOOD QUANT: CPT

## 2022-05-07 PROCEDURE — 94640 AIRWAY INHALATION TREATMENT: CPT

## 2022-05-07 PROCEDURE — 94664 DEMO&/EVAL PT USE INHALER: CPT

## 2022-05-07 PROCEDURE — 84295 ASSAY OF SERUM SODIUM: CPT

## 2022-05-07 PROCEDURE — 94660 CPAP INITIATION&MGMT: CPT

## 2022-05-07 PROCEDURE — 94668 MNPJ CHEST WALL SBSQ: CPT

## 2022-05-07 PROCEDURE — 82803 BLOOD GASES ANY COMBINATION: CPT

## 2022-05-07 PROCEDURE — 99232 SBSQ HOSP IP/OBS MODERATE 35: CPT | Performed by: INTERNAL MEDICINE

## 2022-05-07 PROCEDURE — 94760 N-INVAS EAR/PLS OXIMETRY 1: CPT

## 2022-05-07 PROCEDURE — 99232 SBSQ HOSP IP/OBS MODERATE 35: CPT | Performed by: PHYSICIAN ASSISTANT

## 2022-05-07 PROCEDURE — 84132 ASSAY OF SERUM POTASSIUM: CPT

## 2022-05-07 PROCEDURE — 80048 BASIC METABOLIC PNL TOTAL CA: CPT | Performed by: NURSE PRACTITIONER

## 2022-05-07 PROCEDURE — 85014 HEMATOCRIT: CPT

## 2022-05-07 RX ADMIN — METHYLPREDNISOLONE SODIUM SUCCINATE 40 MG: 40 INJECTION, POWDER, FOR SOLUTION INTRAMUSCULAR; INTRAVENOUS at 07:43

## 2022-05-07 RX ADMIN — FLUTICASONE PROPIONATE 1 SPRAY: 50 SPRAY, METERED NASAL at 18:00

## 2022-05-07 RX ADMIN — ACETAMINOPHEN 650 MG: 325 TABLET, FILM COATED ORAL at 13:03

## 2022-05-07 RX ADMIN — LEVALBUTEROL HYDROCHLORIDE 1.25 MG: 1.25 SOLUTION, CONCENTRATE RESPIRATORY (INHALATION) at 08:28

## 2022-05-07 RX ADMIN — METHYLPREDNISOLONE SODIUM SUCCINATE 40 MG: 40 INJECTION, POWDER, FOR SOLUTION INTRAMUSCULAR; INTRAVENOUS at 16:01

## 2022-05-07 RX ADMIN — IPRATROPIUM BROMIDE 0.5 MG: 0.5 SOLUTION RESPIRATORY (INHALATION) at 08:28

## 2022-05-07 RX ADMIN — ACETAMINOPHEN 650 MG: 325 TABLET, FILM COATED ORAL at 20:28

## 2022-05-07 RX ADMIN — Medication 1 TABLET: at 07:43

## 2022-05-07 RX ADMIN — PRIMIDONE 50 MG: 50 TABLET ORAL at 20:29

## 2022-05-07 RX ADMIN — AZITHROMYCIN MONOHYDRATE 500 MG: 500 INJECTION, POWDER, LYOPHILIZED, FOR SOLUTION INTRAVENOUS at 11:53

## 2022-05-07 RX ADMIN — GUAIFENESIN 1200 MG: 600 TABLET ORAL at 07:44

## 2022-05-07 RX ADMIN — LEVALBUTEROL HYDROCHLORIDE 1.25 MG: 1.25 SOLUTION, CONCENTRATE RESPIRATORY (INHALATION) at 13:36

## 2022-05-07 RX ADMIN — HEPARIN SODIUM 5000 UNITS: 5000 INJECTION INTRAVENOUS; SUBCUTANEOUS at 14:00

## 2022-05-07 RX ADMIN — IPRATROPIUM BROMIDE 0.5 MG: 0.5 SOLUTION RESPIRATORY (INHALATION) at 13:36

## 2022-05-07 RX ADMIN — AMIODARONE HYDROCHLORIDE 200 MG: 200 TABLET ORAL at 07:44

## 2022-05-07 RX ADMIN — LORAZEPAM 0.5 MG: 0.5 TABLET ORAL at 07:43

## 2022-05-07 RX ADMIN — IPRATROPIUM BROMIDE 0.5 MG: 0.5 SOLUTION RESPIRATORY (INHALATION) at 19:20

## 2022-05-07 RX ADMIN — LEVOTHYROXINE SODIUM 75 MCG: 25 TABLET ORAL at 07:53

## 2022-05-07 RX ADMIN — LEVALBUTEROL HYDROCHLORIDE 1.25 MG: 1.25 SOLUTION, CONCENTRATE RESPIRATORY (INHALATION) at 19:19

## 2022-05-07 RX ADMIN — LORAZEPAM 0.5 MG: 0.5 TABLET ORAL at 16:01

## 2022-05-07 RX ADMIN — HEPARIN SODIUM 5000 UNITS: 5000 INJECTION INTRAVENOUS; SUBCUTANEOUS at 22:42

## 2022-05-07 RX ADMIN — TAMSULOSIN HYDROCHLORIDE 0.4 MG: 0.4 CAPSULE ORAL at 16:01

## 2022-05-07 RX ADMIN — FLUTICASONE PROPIONATE 1 SPRAY: 50 SPRAY, METERED NASAL at 07:47

## 2022-05-07 RX ADMIN — LORAZEPAM 0.5 MG: 0.5 TABLET ORAL at 20:29

## 2022-05-07 RX ADMIN — GUAIFENESIN 1200 MG: 600 TABLET ORAL at 20:28

## 2022-05-07 RX ADMIN — PRIMIDONE 50 MG: 50 TABLET ORAL at 07:50

## 2022-05-07 RX ADMIN — METHYLPREDNISOLONE SODIUM SUCCINATE 40 MG: 40 INJECTION, POWDER, FOR SOLUTION INTRAMUSCULAR; INTRAVENOUS at 22:42

## 2022-05-07 RX ADMIN — TRAMADOL HYDROCHLORIDE 50 MG: 50 TABLET, COATED ORAL at 16:00

## 2022-05-07 RX ADMIN — PANTOPRAZOLE SODIUM 20 MG: 20 TABLET, DELAYED RELEASE ORAL at 07:53

## 2022-05-07 RX ADMIN — ESCITALOPRAM OXALATE 5 MG: 10 TABLET ORAL at 07:43

## 2022-05-07 RX ADMIN — HEPARIN SODIUM 5000 UNITS: 5000 INJECTION INTRAVENOUS; SUBCUTANEOUS at 05:32

## 2022-05-07 NOTE — PROGRESS NOTES
Progress Note - Pulmonary   Ankit Pacer 80 y o  male MRN: [id number]  Unit/Bed#: -01 Encounter: [id number]      Assessment/Plan:    · COPD exacerbation - the patient continues to experience cough and wheezing, with minimally productive dark sputum  Currently on 1 liter/minute of oxygen  The patient was out of bed and walking, his oxygen saturation dropped to the 70%s  Continue measures design for secretion clearance and continue current dosage of Solu-Medrol for now  · History of obstructive sleep apnea - testing in 2018 showed a respiratory event index of 63 0  The patient has declined use of positive airway pressure therapy  There was not a significant degree of hypoxia on testing  Given his advanced age and his disinterest in using positive airway pressure therapy, nocturnal oxygen may be a sufficient substitute  It would be worthwhile to obtain an ABG to see if the patient is a candidate for nocturnal ventilation, but again benefits would have to outweigh disadvantages in this elderly patient  · Cardiac failure - there seems to be a minimal component, perhaps secondary to aortic stenosis  History of SVT  On amio  · Lung nodule - stable from 2018 as per recent CT scan of the chest     Subjective:   Feels about the same as yesterday    Objective:     Vitals: Blood pressure 149/88, pulse 91, temperature (!) 97 4 °F (36 3 °C), resp  rate 14, SpO2 98 %  1 lpm, There is no height or weight on file to calculate BMI        Intake/Output Summary (Last 24 hours) at 5/7/2022 0824  Last data filed at 5/7/2022 0101  Gross per 24 hour   Intake 970 ml   Output --   Net 970 ml       Physical Exam:      General:  Awake, alert, no distress   HEENT: No scleral icterus, EOMI, moist mucosa    Heart:  Regular rate and rhythm, no murmur   Lungs: Coarse bilateral expiratory wheezes   Extremities: No clubbing, cyanosis    Labs: ABG: No results found for: PHART, ABN3FNN, PO2ART, CON7DIN, X0ZGITHV, BEART, SOURCE    Results from last 7 days   Lab Units 05/07/22  0533 05/06/22 0444 05/05/22 0733   WBC Thousand/uL 13 76* 9 35 8 47   HEMOGLOBIN g/dL 12 3 11 8* 14 0   HEMATOCRIT % 38 7 36 8 44 8   PLATELETS Thousands/uL 165 136* 157         Results from last 7 days   Lab Units 05/07/22  0533 05/06/22 0444 05/05/22 0733   POTASSIUM mmol/L 4 8 4 6 4 3   CHLORIDE mmol/L 100 100 100   CO2 mmol/L 21 23 23   BUN mg/dL 32* 27* 20   CREATININE mg/dL 1 68* 1 80* 1 78*   CALCIUM mg/dL 8 4 8 4 9 3   ALK PHOS U/L  --   --  144*   ALT U/L  --   --  79*   AST U/L  --   --  37     Results from last 7 days   Lab Units 05/05/22 0733   INR  1 07   PTT seconds 38*           The patient's last ABG was in 2018    Imaging and other studies: I have personally reviewed pertinent films in PACS

## 2022-05-07 NOTE — PROGRESS NOTES
[place]  Progress Note - Pedro Dc 6/30/1932, 80 y o  male MRN: [id number]  Unit/Bed#: -Jaimie Encounter: [id number]  Primary Care Provider: Andre Martino MD   Date and time admitted to hospital: 5/5/2022  7:10 AM    * COPD exacerbation [facility])  Assessment & Plan  Patient presented to ED with increased cough and shortness of breath over the last 3 days   Outpatient medications:  Anoro, Atrovent, Xopenex, p r n   Albuterol   Was seen at  earlier in the week given cefdinir due to bronchitis   CXR:  No acute cardiopulmonary findings   Sputum cultures ordered-  Not sent    WBC of 9 35 today    procal level negative x2   Oxygen via NC: requiring 1, goal 88-92%    Respiratory protocol     Steroids solumedrol 40mg q8h    Azithromycin x 3 days    Consult pulmonary, appreciate ongoing recommendations      Hyponatremia  Assessment & Plan  Lab Results   Component Value Date    SODIUM 129 (L) 05/06/2022    SODIUM 133 (L) 05/05/2022    SODIUM 138 04/12/2022   · check urine osmo, serum osmo, urine NA, TSH and uric acid level   · Repeat am bmp and monitor     Acute respiratory failure with hypoxia (HCC)  Assessment & Plan  · Pt requiring 1-2LNC   · Secondary to COPD exacerbation   · Wean o2 for sats >/= 88%    SIRS (systemic inflammatory response syndrome) (HCC)  Assessment & Plan  · SIRS , POA, likely due to COPD exacerbation, as evidenced by tachycardia and tachypnea, being treated respiratory protocol and steroids with azithromycin   · HR >90 and RR >20, no source of infection suspected at this time            Paroxysmal SVT (supraventricular tachycardia) ([facility] [address] )  Assessment & Plan  · Continue amiodarone  · Follows with Dr Edi Napier as outpatient    Chronic renal disease, stage IV [facility])  Assessment & Plan  Lab Results   Component Value Date    EGFR 32 05/06/2022    EGFR 33 05/05/2022    EGFR 31 04/12/2022    CREATININE 1 80 (H) 05/06/2022    CREATININE 1 78 (H) 05/05/2022 CREATININE 1 85 (H) 04/12/2022   · Creatinine at baseline is 1 7-1 8- stable   · Continue to monitor  · Avoid hypotension, nephrotoxic agents, NSAIDs    LIVIER (obstructive sleep apnea)  Assessment & Plan  · Patient has returned CPAP due to not using  · Encourage follow-up with primary to evaluate for other options  · Potential need for nighttime oxygen    Chronic diastolic heart failure (HCC)  Assessment & Plan  Wt Readings from Last 3 Encounters:   05/03/22 74 8 kg (165 lb)   04/12/22 76 8 kg (169 lb 6 4 oz)   03/16/22 78 7 kg (173 lb 6 4 oz)     · Last echo 09/28/2021-systolic function normal, EF 44%, grade 1 diastolic dysfunction, trace MR, mild AR, aortic stenosis, mild TR  · Appears euvolemic on exam  · Not currently on diuretics  · Continue to monitor I&O        Thrombocytopenia (HCC)  Assessment & Plan  Monitor- repeat am cbc     Acquired hypothyroidism  Assessment & Plan  Continue levothyroxine 75 mcg    Anxiety disorder due to general medical condition  Assessment & Plan  · c/w lorazepam 0 5 mg TID   · C/w lexapro 5mg daily   · Supportive care     Essential hypertension  Assessment & Plan  · Not currently on BP medications  · BP adequate continue to monitor    CAD (coronary artery disease)  Assessment & Plan  · 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  · Currently no chest pain        VTE Pharmacologic Prophylaxis: VTE Score: 7 High Risk (Score >/= 5) - Pharmacological DVT Prophylaxis Ordered: heparin  Sequential Compression Devices Ordered  Patient Centered Rounds: I performed bedside rounds with nursing staff today  Discussions with Specialists or Other Care Team Provider:  Appreciate input from Pulmonary note  Education and Discussions with Family / Patient: Updated  (son and daughter in law) via phone  Time Spent for Care: 30 minutes  More than 50% of total time spent on counseling and coordination of care as described above      Current Length of Stay: 2 day(s)  Current Patient Status: Inpatient   Certification Statement: The patient will continue to require additional inpatient hospital stay due to IV steroids, supplemental oxygen  Discharge Plan: Anticipate discharge in 48-72 hrs to discharge location to be determined pending rehab evaluations  Code Status: Level 3 - DNAR and DNI    Subjective:   Patient denies significant shortness of breath while on oxygen  Sleeping comfortably  Minimally conversational     Objective:     Vitals:   Temp (24hrs), Av 6 °F (36 4 °C), Min:97 4 °F (36 3 °C), Max:98 °F (36 7 °C)    Temp:  [97 4 °F (36 3 °C)-98 °F (36 7 °C)] 97 4 °F (36 3 °C)  HR:  [] 106  Resp:  [14-20] 14  BP: (102-149)/(66-88) 102/74  SpO2:  [90 %-99 %] 99 %  There is no height or weight on file to calculate BMI  Input and Output Summary (last 24 hours): Intake/Output Summary (Last 24 hours) at 2022 1433  Last data filed at 2022 1301  Gross per 24 hour   Intake 1090 ml   Output --   Net 1090 ml       Physical Exam:   Physical Exam  Vitals and nursing note reviewed  Constitutional:       General: He is not in acute distress  Appearance: Normal appearance  He is well-developed  HENT:      Head: Normocephalic and atraumatic  Eyes:      General: No scleral icterus  Conjunctiva/sclera: Conjunctivae normal    Cardiovascular:      Rate and Rhythm: Normal rate and regular rhythm  Heart sounds: No murmur heard  Pulmonary:      Effort: Pulmonary effort is normal       Breath sounds: Decreased air movement present  Decreased breath sounds and wheezing present  No rhonchi or rales  Abdominal:      General: There is no distension  Palpations: Abdomen is soft  Skin:     General: Skin is warm and dry  Neurological:      General: No focal deficit present  Mental Status: He is alert     Psychiatric:         Mood and Affect: Mood normal        Additional Data:     Labs:  Results from last 7 days   Lab Units 22  0911 22  0850 05/06/22 0444 05/05/22 0733   WBC Thousand/uL  --  13 76*   < > 8 47   HEMOGLOBIN g/dL  --  12 3   < > 14 0   I STAT HEMOGLOBIN g/dl 12 9  --   --   --    HEMATOCRIT %  --  38 7   < > 44 8   HEMATOCRIT, ISTAT % 38  --   --   --    PLATELETS Thousands/uL  --  165   < > 157   NEUTROS PCT %  --   --   --  69   LYMPHS PCT %  --   --   --  15   MONOS PCT %  --   --   --  13*   EOS PCT %  --   --   --  2    < > = values in this interval not displayed  Results from last 7 days   Lab Units 05/07/22  0911 05/07/22 0533 05/06/22 0444 05/05/22 0733   SODIUM mmol/L  --  130*   < > 133*   POTASSIUM mmol/L  --  4 8   < > 4 3   CHLORIDE mmol/L  --  100   < > 100   CO2 mmol/L  --  21   < > 23   CO2, I-STAT mmol/L 22  --   --   --    BUN mg/dL  --  32*   < > 20   CREATININE mg/dL  --  1 68*   < > 1 78*   ANION GAP mmol/L  --  9   < > 10   CALCIUM mg/dL  --  8 4   < > 9 3   ALBUMIN g/dL  --   --   --  3 4*   TOTAL BILIRUBIN mg/dL  --   --   --  0 60   ALK PHOS U/L  --   --   --  144*   ALT U/L  --   --   --  79*   AST U/L  --   --   --  37   GLUCOSE RANDOM mg/dL  --  136   < > 106    < > = values in this interval not displayed  Results from last 7 days   Lab Units 05/05/22  0733   INR  1 07             Results from last 7 days   Lab Units 05/06/22 0444 05/05/22 0733   LACTIC ACID mmol/L  --  1 0   PROCALCITONIN ng/ml 0 25 0 21       Lines/Drains:  Invasive Devices  Report    Peripheral Intravenous Line            Peripheral IV 05/05/22 Left Antecubital 2 days                      Imaging: Reviewed radiology reports from this admission including: chest xray    Recent Cultures (last 7 days):   Results from last 7 days   Lab Units 05/05/22 0733   BLOOD CULTURE  No Growth at 24 hrs  No Growth at 24 hrs         Last 24 Hours Medication List:   Current Facility-Administered Medications   Medication Dose Route Frequency Provider Last Rate    acetaminophen  650 mg Oral Q6H PRN Billie Burroughs PA-C      albuterol  2 5 mg Nebulization Q6H PRN Apryl Morales, DO      aluminum-magnesium hydroxide-simethicone  30 mL Oral Q6H PRN Shabana Nur PA-C      amiodarone  200 mg Oral Daily Shabana Nur PA-C      benzonatate  100 mg Oral TID PRN Jenni Avendano PA-C      escitalopram  5 mg Oral Daily Shabana Nur PA-C      ferrous sulfate  325 mg Oral Every Other Day Shabana Nur PA-C      fluticasone  1 spray Nasal BID Shabana Nur PA-C      guaiFENesin  1,200 mg Oral Q12H [address] Shabana Nur PA-C      heparin (porcine)  5,000 Units Subcutaneous [facility] Shabana Nur PA-C      ipratropium  0 5 mg Nebulization TID Shabana Nur PA-C      levalbuterol  1 25 mg Nebulization TID Shabana Nur PA-C      levothyroxine  75 mcg Oral Early Morning Shabana Nur PA-C      LORazepam  0 5 mg Oral TID Shabana Nur PA-C      methylPREDNISolone sodium succinate  40 mg Intravenous Q8H Shabana Nur PA-C      multivitamin-minerals  1 tablet Oral Daily Shabana Nur PA-C      ondansetron  4 mg Oral Q6H PRN Apryl Morales, DO      pantoprazole  20 mg Oral Early Morning Shabana Nur PA-C      primidone  50 mg Oral Q12H [address] Shabana Nur PA-C      promethazine  6 25 mg Oral 4x Daily PRN Shabana Nur PA-C      senna  1 tablet Oral BID PRN Shabana Nur PA-C      tamsulosin  0 4 mg Oral Daily With Zelalem Hanson PA-C      traMADol  50 mg Oral Q6H PRN Apryl Morales, DO      zolpidem  5 mg Oral HS PRN Shabana Nur PA-C          Today, Patient Was Seen By: Cindy De La Rosa PA-C    **Please Note: This note may have been constructed using a voice recognition system  **

## 2022-05-08 PROCEDURE — 94640 AIRWAY INHALATION TREATMENT: CPT

## 2022-05-08 PROCEDURE — 99232 SBSQ HOSP IP/OBS MODERATE 35: CPT | Performed by: PHYSICIAN ASSISTANT

## 2022-05-08 PROCEDURE — 94664 DEMO&/EVAL PT USE INHALER: CPT

## 2022-05-08 PROCEDURE — 94760 N-INVAS EAR/PLS OXIMETRY 1: CPT

## 2022-05-08 PROCEDURE — 94668 MNPJ CHEST WALL SBSQ: CPT

## 2022-05-08 RX ADMIN — METHYLPREDNISOLONE SODIUM SUCCINATE 40 MG: 40 INJECTION, POWDER, FOR SOLUTION INTRAMUSCULAR; INTRAVENOUS at 09:05

## 2022-05-08 RX ADMIN — LORAZEPAM 0.5 MG: 0.5 TABLET ORAL at 20:33

## 2022-05-08 RX ADMIN — METHYLPREDNISOLONE SODIUM SUCCINATE 40 MG: 40 INJECTION, POWDER, FOR SOLUTION INTRAMUSCULAR; INTRAVENOUS at 23:11

## 2022-05-08 RX ADMIN — Medication 6.25 MG: at 23:10

## 2022-05-08 RX ADMIN — HEPARIN SODIUM 5000 UNITS: 5000 INJECTION INTRAVENOUS; SUBCUTANEOUS at 20:33

## 2022-05-08 RX ADMIN — IPRATROPIUM BROMIDE 0.5 MG: 0.5 SOLUTION RESPIRATORY (INHALATION) at 19:21

## 2022-05-08 RX ADMIN — IPRATROPIUM BROMIDE 0.5 MG: 0.5 SOLUTION RESPIRATORY (INHALATION) at 13:39

## 2022-05-08 RX ADMIN — PRIMIDONE 50 MG: 50 TABLET ORAL at 09:06

## 2022-05-08 RX ADMIN — PRIMIDONE 50 MG: 50 TABLET ORAL at 20:33

## 2022-05-08 RX ADMIN — Medication 6.25 MG: at 18:37

## 2022-05-08 RX ADMIN — LEVALBUTEROL HYDROCHLORIDE 1.25 MG: 1.25 SOLUTION, CONCENTRATE RESPIRATORY (INHALATION) at 19:21

## 2022-05-08 RX ADMIN — ESCITALOPRAM OXALATE 5 MG: 10 TABLET ORAL at 09:06

## 2022-05-08 RX ADMIN — FLUTICASONE PROPIONATE 1 SPRAY: 50 SPRAY, METERED NASAL at 09:04

## 2022-05-08 RX ADMIN — LEVOTHYROXINE SODIUM 75 MCG: 25 TABLET ORAL at 05:15

## 2022-05-08 RX ADMIN — HEPARIN SODIUM 5000 UNITS: 5000 INJECTION INTRAVENOUS; SUBCUTANEOUS at 13:12

## 2022-05-08 RX ADMIN — TRAMADOL HYDROCHLORIDE 50 MG: 50 TABLET, COATED ORAL at 15:48

## 2022-05-08 RX ADMIN — FERROUS SULFATE TAB 325 MG (65 MG ELEMENTAL FE) 325 MG: 325 (65 FE) TAB at 09:06

## 2022-05-08 RX ADMIN — TAMSULOSIN HYDROCHLORIDE 0.4 MG: 0.4 CAPSULE ORAL at 15:48

## 2022-05-08 RX ADMIN — GUAIFENESIN 1200 MG: 600 TABLET ORAL at 20:33

## 2022-05-08 RX ADMIN — IPRATROPIUM BROMIDE 0.5 MG: 0.5 SOLUTION RESPIRATORY (INHALATION) at 08:13

## 2022-05-08 RX ADMIN — LORAZEPAM 0.5 MG: 0.5 TABLET ORAL at 15:48

## 2022-05-08 RX ADMIN — PANTOPRAZOLE SODIUM 20 MG: 20 TABLET, DELAYED RELEASE ORAL at 05:15

## 2022-05-08 RX ADMIN — FLUTICASONE PROPIONATE 1 SPRAY: 50 SPRAY, METERED NASAL at 17:15

## 2022-05-08 RX ADMIN — AMIODARONE HYDROCHLORIDE 200 MG: 200 TABLET ORAL at 09:05

## 2022-05-08 RX ADMIN — LEVALBUTEROL HYDROCHLORIDE 1.25 MG: 1.25 SOLUTION, CONCENTRATE RESPIRATORY (INHALATION) at 08:13

## 2022-05-08 RX ADMIN — Medication 1 TABLET: at 09:06

## 2022-05-08 RX ADMIN — HEPARIN SODIUM 5000 UNITS: 5000 INJECTION INTRAVENOUS; SUBCUTANEOUS at 05:15

## 2022-05-08 RX ADMIN — METHYLPREDNISOLONE SODIUM SUCCINATE 40 MG: 40 INJECTION, POWDER, FOR SOLUTION INTRAMUSCULAR; INTRAVENOUS at 16:41

## 2022-05-08 RX ADMIN — LEVALBUTEROL HYDROCHLORIDE 1.25 MG: 1.25 SOLUTION, CONCENTRATE RESPIRATORY (INHALATION) at 13:40

## 2022-05-08 RX ADMIN — ZOLPIDEM TARTRATE 5 MG: 5 TABLET ORAL at 23:13

## 2022-05-08 RX ADMIN — LORAZEPAM 0.5 MG: 0.5 TABLET ORAL at 09:06

## 2022-05-08 RX ADMIN — ACETAMINOPHEN 650 MG: 325 TABLET, FILM COATED ORAL at 13:12

## 2022-05-08 RX ADMIN — GUAIFENESIN 1200 MG: 600 TABLET ORAL at 09:06

## 2022-05-08 RX ADMIN — ACETAMINOPHEN 650 MG: 325 TABLET, FILM COATED ORAL at 20:33

## 2022-05-08 RX ADMIN — ACETAMINOPHEN 650 MG: 325 TABLET, FILM COATED ORAL at 05:15

## 2022-05-08 NOTE — ASSESSMENT & PLAN NOTE
Lab Results   Component Value Date    SODIUM 130 (L) 05/07/2022    SODIUM 129 (L) 05/06/2022    SODIUM 133 (L) 05/05/2022   ·   · Uric Acid: 6 0  · Urine osmolality:  Pending  · Urine sodium:  Pending  · Osmolality:  290  · Repeat am bmp and monitor

## 2022-05-08 NOTE — ASSESSMENT & PLAN NOTE
Patient presented to ED with increased cough and shortness of breath over the last 3 days   Outpatient medications:  Anoro, Atrovent, Xopenex, p r n  Albuterol   Was seen at  earlier in the week given cefdinir due to bronchitis   CXR:  No acute cardiopulmonary findings   Sputum cultures ordered-  Not sent    WBC of 9 35 today    procal level negative x2   Oxygen via NC: requiring 1, goal 88-92%    Respiratory protocol     Steroids solumedrol 40mg q8h    Azithromycin x 3 days    Consult pulmonary, appreciate ongoing recommendations  o Recommending nighttime positive airway pressure, will place order for overnight oxygen study   Family questioning how to prevent this in the future, discussed allergy management, possible addition of Singulair    They have an outpatient appointment 2 weeks will discuss strategies at that time

## 2022-05-08 NOTE — ASSESSMENT & PLAN NOTE
· Patient has returned CPAP due to not using  · Encourage follow-up with primary to evaluate for other options  · Potential need for nighttime oxygen  · Overnight O2 study ordered

## 2022-05-08 NOTE — ASSESSMENT & PLAN NOTE
Lab Results   Component Value Date    EGFR 35 05/07/2022    EGFR 32 05/06/2022    EGFR 33 05/05/2022    CREATININE 1 68 (H) 05/07/2022    CREATININE 1 80 (H) 05/06/2022    CREATININE 1 78 (H) 05/05/2022   · Creatinine at baseline is 1 7-1 8- stable   · Continue to monitor  · Avoid hypotension, nephrotoxic agents, NSAIDs

## 2022-05-08 NOTE — QUICK NOTE
The patient is sleeping comfortably and oxygenation is adequate on nasal cannula  Given the patient's difficulty with positive airway pressure therapy as well as his age, I would favor nocturnal oxygen over positive airway pressure therapy  Once the patient is stable, check overnight oximetry and treat the hypoxia, if present

## 2022-05-08 NOTE — ASSESSMENT & PLAN NOTE
Wt Readings from Last 3 Encounters:   05/03/22 74 8 kg (165 lb)   04/12/22 76 8 kg (169 lb 6 4 oz)   03/16/22 78 7 kg (173 lb 6 4 oz)     · Last echo 09/28/2021-systolic function normal, EF 71%, grade 1 diastolic dysfunction, trace MR, mild AR, aortic stenosis, mild TR  · Appears euvolemic on exam  · Not currently on diuretics  · Continue to monitor I&O

## 2022-05-08 NOTE — PROGRESS NOTES
[place]  Progress Note - Burak Quinteros 6/30/1932, 80 y o  male MRN: [id number]  Unit/Bed#: -Jaimie Encounter: [id number]  Primary Care Provider: Milady Mercedes MD   Date and time admitted to hospital: 5/5/2022  7:10 AM    * COPD exacerbation [facility])  Assessment & Plan  Patient presented to ED with increased cough and shortness of breath over the last 3 days   Outpatient medications:  Anoro, Atrovent, Xopenex, p r n  Albuterol   Was seen at  earlier in the week given cefdinir due to bronchitis   CXR:  No acute cardiopulmonary findings   Sputum cultures ordered-  Not sent    WBC of 9 35 today    procal level negative x2   Oxygen via NC: requiring 1, goal 88-92%    Respiratory protocol     Steroids solumedrol 40mg q8h    Azithromycin x 3 days    Consult pulmonary, appreciate ongoing recommendations  o Recommending nighttime positive airway pressure, will place order for overnight oxygen study   Family questioning how to prevent this in the future, discussed allergy management, possible addition of Singulair    They have an outpatient appointment 2 weeks will discuss strategies at that time      Hyponatremia  Assessment & Plan  Lab Results   Component Value Date    SODIUM 130 (L) 05/07/2022    SODIUM 129 (L) 05/06/2022    SODIUM 133 (L) 05/05/2022   ·   · Uric Acid: 6 0  · Urine osmolality:  Pending  · Urine sodium:  Pending  · Osmolality:  290  · Repeat am bmp and monitor     Acute respiratory failure with hypoxia (HCC)  Assessment & Plan  · Requiring 4LNC at present  · Secondary to COPD exacerbation   · Wean O2 for sats >/= 88%    SIRS (systemic inflammatory response syndrome) (HCC)  Assessment & Plan  · SIRS , POA, likely due to COPD exacerbation, as evidenced by tachycardia and tachypnea, being treated respiratory protocol and steroids with azithromycin   · HR >90 and RR >20, no source of infection suspected at this time            Paroxysmal SVT (supraventricular tachycardia) (HCC)  Assessment & Plan  · Continue amiodarone  · Follows with Dr Maria G Coffey as outpatient    Chronic renal disease, stage IV [facility])  Assessment & Plan  Lab Results   Component Value Date    EGFR 35 05/07/2022    EGFR 32 05/06/2022    EGFR 33 05/05/2022    CREATININE 1 68 (H) 05/07/2022    CREATININE 1 80 (H) 05/06/2022    CREATININE 1 78 (H) 05/05/2022   · Creatinine at baseline is 1 7-1 8- stable   · Continue to monitor  · Avoid hypotension, nephrotoxic agents, NSAIDs    LIVIER (obstructive sleep apnea)  Assessment & Plan  · Patient has returned CPAP due to not using  · Encourage follow-up with primary to evaluate for other options  · Potential need for nighttime oxygen  · Overnight O2 study ordered    Chronic diastolic heart failure ([facility] [address] )  Assessment & Plan  Wt Readings from Last 3 Encounters:   05/03/22 74 8 kg (165 lb)   04/12/22 76 8 kg (169 lb 6 4 oz)   03/16/22 78 7 kg (173 lb 6 4 oz)     · Last echo 09/28/2021-systolic function normal, EF 08%, grade 1 diastolic dysfunction, trace MR, mild AR, aortic stenosis, mild TR  · Appears euvolemic on exam  · Not currently on diuretics  · Continue to monitor I&O        Thrombocytopenia (HCC)  Assessment & Plan  · Trend CBC    Acquired hypothyroidism  Assessment & Plan  · Continue levothyroxine 75 mcg    Anxiety disorder due to general medical condition  Assessment & Plan  · Continue lorazepam 0 5 mg TID   · C/w lexapro 5mg daily   · Supportive care     Essential hypertension  Assessment & Plan  · Not currently on BP medications  · BP adequate continue to monitor    CAD (coronary artery disease)  Assessment & Plan  · 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  · Currently no chest pain      VTE Pharmacologic Prophylaxis: VTE Score: 7 High Risk (Score >/= 5) - Pharmacological DVT Prophylaxis Ordered: heparin  Sequential Compression Devices Ordered  Patient Centered Rounds: I performed bedside rounds with nursing staff today    Discussions with Specialists or Other Care Team Provider: Appreciate Pulmonology note today, anticipate Home O2 study    Education and Discussions with Family / Patient: Updated  (son and daughter in law) via phone  Time Spent for Care: 30 minutes  More than 50% of total time spent on counseling and coordination of care as described above  Current Length of Stay: 3 day(s)  Current Patient Status: Inpatient   Certification Statement: The patient will continue to require additional inpatient hospital stay due to O2 requirements, IV steroids  Discharge Plan: Anticipate discharge in >72 hrs to discharge location to be determined pending rehab evaluations  Code Status: Level 3 - DNAR and DNI    Subjective:   Patient is sleeping comfortably, and offers no complaints today  Objective:     Vitals:   Temp (24hrs), Av 7 °F (36 5 °C), Min:97 6 °F (36 4 °C), Max:97 8 °F (36 6 °C)    Temp:  [97 6 °F (36 4 °C)-97 8 °F (36 6 °C)] 97 6 °F (36 4 °C)  HR:  [] 87  Resp:  [15-24] 15  BP: (102-142)/(74-89) 142/89  SpO2:  [90 %-99 %] 97 %  There is no height or weight on file to calculate BMI  Input and Output Summary (last 24 hours): Intake/Output Summary (Last 24 hours) at 2022 1129  Last data filed at 2022 0840  Gross per 24 hour   Intake 1140 ml   Output 500 ml   Net 640 ml       Physical Exam:   Physical Exam  Vitals and nursing note reviewed  Constitutional:       General: He is not in acute distress  Appearance: Normal appearance  He is well-developed  HENT:      Head: Normocephalic and atraumatic  Eyes:      General: No scleral icterus  Conjunctiva/sclera: Conjunctivae normal    Cardiovascular:      Rate and Rhythm: Normal rate and regular rhythm  Extrasystoles are present  Heart sounds: Murmur heard  Pulmonary:      Effort: Pulmonary effort is normal       Breath sounds: Decreased breath sounds present  No wheezing, rhonchi or rales  Abdominal:      General: There is no distension  Palpations: Abdomen is soft  Skin:     General: Skin is warm and dry  Neurological:      General: No focal deficit present  Mental Status: He is alert  Psychiatric:         Mood and Affect: Mood normal         Additional Data:     Labs:  Results from last 7 days   Lab Units 05/07/22 0911 05/07/22 0533 05/06/22 0444 05/05/22 0733   WBC Thousand/uL  --  13 76*   < > 8 47   HEMOGLOBIN g/dL  --  12 3   < > 14 0   I STAT HEMOGLOBIN g/dl 12 9  --   --   --    HEMATOCRIT %  --  38 7   < > 44 8   HEMATOCRIT, ISTAT % 38  --   --   --    PLATELETS Thousands/uL  --  165   < > 157   NEUTROS PCT %  --   --   --  69   LYMPHS PCT %  --   --   --  15   MONOS PCT %  --   --   --  13*   EOS PCT %  --   --   --  2    < > = values in this interval not displayed  Results from last 7 days   Lab Units 05/07/22 0911 05/07/22 0533 05/06/22 0444 05/05/22 0733   SODIUM mmol/L  --  130*   < > 133*   POTASSIUM mmol/L  --  4 8   < > 4 3   CHLORIDE mmol/L  --  100   < > 100   CO2 mmol/L  --  21   < > 23   CO2, I-STAT mmol/L 22  --   --   --    BUN mg/dL  --  32*   < > 20   CREATININE mg/dL  --  1 68*   < > 1 78*   ANION GAP mmol/L  --  9   < > 10   CALCIUM mg/dL  --  8 4   < > 9 3   ALBUMIN g/dL  --   --   --  3 4*   TOTAL BILIRUBIN mg/dL  --   --   --  0 60   ALK PHOS U/L  --   --   --  144*   ALT U/L  --   --   --  79*   AST U/L  --   --   --  37   GLUCOSE RANDOM mg/dL  --  136   < > 106    < > = values in this interval not displayed       Results from last 7 days   Lab Units 05/05/22 0733   INR  1 07             Results from last 7 days   Lab Units 05/06/22 0444 05/05/22 0733   LACTIC ACID mmol/L  --  1 0   PROCALCITONIN ng/ml 0 25 0 21       Lines/Drains:  Invasive Devices  Report    Peripheral Intravenous Line            Peripheral IV 05/05/22 Left Antecubital 3 days                      Imaging: Reviewed radiology reports from this admission including: chest xray    Recent Cultures (last 7 days):   Results from last 7 days   Lab Units 05/05/22  0733   BLOOD CULTURE  No Growth at 48 hrs  No Growth at 48 hrs         Last 24 Hours Medication List:   Current Facility-Administered Medications   Medication Dose Route Frequency Provider Last Rate    acetaminophen  650 mg Oral Q6H PRN [facility], PA-C      al mag oxide-diphenhydramine-lidocaine viscous  10 mL [employer]it Q4H PRN Jake Horton V, PA-C      albuterol  2 5 mg Nebulization Q6H PRN [facility], DO      aluminum-magnesium hydroxide-simethicone  30 mL Oral Q6H PRN [facility], PA-C      amiodarone  200 mg Oral Daily [facility], PA-C      benzonatate  100 mg Oral TID PRN Maco Montes, PA-C      escitalopram  5 mg Oral Daily [facility], PA-C      ferrous sulfate  325 mg Oral Every Other Day [facility], PA-C      fluticasone  1 spray Nasal BID [facility], PA-C      guaiFENesin  1,200 mg Oral Q12H [address] [facility], PA-C      heparin (porcine)  5,000 Units Subcutaneous [facility], PA-C      ipratropium  0 5 mg Nebulization TID [facility], PA-C      levalbuterol  1 25 mg Nebulization TID [facility], PA-C      levothyroxine  75 mcg Oral Early Morning [facility], PA-C      LORazepam  0 5 mg Oral TID [facility], PA-C      methylPREDNISolone sodium succinate  40 mg Intravenous Q8H [facility], PA-C      multivitamin-minerals  1 tablet Oral Daily [facility], PA-C      ondansetron  4 mg Oral Q6H PRN [facility], DO      pantoprazole  20 mg Oral Early Morning [facility], PA-C      primidone  50 mg Oral Q12H [address] [facility], PA-C      promethazine  6 25 mg Oral 4x Daily PRN [facility], PA-C      senna  1 tablet Oral BID PRN [facility], PA-C      tamsulosin  0 4 mg Oral Daily With Carter Kiser, PA-C      traMADol  50 mg Oral Q6H PRN [facility], DO      zolpidem  5 mg Oral HS PRN [facility], PA-C          Today, Patient Was Seen By: Cony Ramirez PA-C    **Please Note: This note may have been constructed using a voice recognition system  **

## 2022-05-09 ENCOUNTER — APPOINTMENT (INPATIENT)
Dept: RADIOLOGY | Facility: HOSPITAL | Age: 87
DRG: 202 | End: 2022-05-09
Payer: MEDICARE

## 2022-05-09 LAB
ATRIAL RATE: 93 BPM
P AXIS: 64 DEGREES
PR INTERVAL: 180 MS
QRS AXIS: 8 DEGREES
QRSD INTERVAL: 102 MS
QT INTERVAL: 366 MS
QTC INTERVAL: 455 MS
T WAVE AXIS: 35 DEGREES
VENTRICULAR RATE: 93 BPM

## 2022-05-09 PROCEDURE — 94760 N-INVAS EAR/PLS OXIMETRY 1: CPT

## 2022-05-09 PROCEDURE — 94660 CPAP INITIATION&MGMT: CPT

## 2022-05-09 PROCEDURE — 71045 X-RAY EXAM CHEST 1 VIEW: CPT

## 2022-05-09 PROCEDURE — 94640 AIRWAY INHALATION TREATMENT: CPT

## 2022-05-09 PROCEDURE — 93005 ELECTROCARDIOGRAM TRACING: CPT

## 2022-05-09 PROCEDURE — 94668 MNPJ CHEST WALL SBSQ: CPT

## 2022-05-09 PROCEDURE — 93010 ELECTROCARDIOGRAM REPORT: CPT | Performed by: INTERNAL MEDICINE

## 2022-05-09 PROCEDURE — 94762 N-INVAS EAR/PLS OXIMTRY CONT: CPT

## 2022-05-09 PROCEDURE — 94664 DEMO&/EVAL PT USE INHALER: CPT

## 2022-05-09 PROCEDURE — 97166 OT EVAL MOD COMPLEX 45 MIN: CPT

## 2022-05-09 PROCEDURE — 99232 SBSQ HOSP IP/OBS MODERATE 35: CPT | Performed by: PHYSICIAN ASSISTANT

## 2022-05-09 PROCEDURE — 97163 PT EVAL HIGH COMPLEX 45 MIN: CPT

## 2022-05-09 PROCEDURE — 99232 SBSQ HOSP IP/OBS MODERATE 35: CPT | Performed by: INTERNAL MEDICINE

## 2022-05-09 RX ORDER — XYLITOL/YERBA SANTA
5 AEROSOL, SPRAY WITH PUMP (ML) MUCOUS MEMBRANE 4 TIMES DAILY PRN
Status: DISCONTINUED | OUTPATIENT
Start: 2022-05-09 | End: 2022-05-12 | Stop reason: HOSPADM

## 2022-05-09 RX ORDER — FORMOTEROL FUMARATE 20 UG/2ML
20 SOLUTION RESPIRATORY (INHALATION)
Status: DISCONTINUED | OUTPATIENT
Start: 2022-05-09 | End: 2022-05-12 | Stop reason: HOSPADM

## 2022-05-09 RX ORDER — BUDESONIDE 0.5 MG/2ML
0.5 INHALANT ORAL
Status: DISCONTINUED | OUTPATIENT
Start: 2022-05-09 | End: 2022-05-12 | Stop reason: HOSPADM

## 2022-05-09 RX ORDER — METHYLPREDNISOLONE SODIUM SUCCINATE 40 MG/ML
40 INJECTION, POWDER, LYOPHILIZED, FOR SOLUTION INTRAMUSCULAR; INTRAVENOUS EVERY 12 HOURS SCHEDULED
Status: DISCONTINUED | OUTPATIENT
Start: 2022-05-09 | End: 2022-05-11

## 2022-05-09 RX ADMIN — TAMSULOSIN HYDROCHLORIDE 0.4 MG: 0.4 CAPSULE ORAL at 16:56

## 2022-05-09 RX ADMIN — METHYLPREDNISOLONE SODIUM SUCCINATE 40 MG: 40 INJECTION, POWDER, FOR SOLUTION INTRAMUSCULAR; INTRAVENOUS at 20:57

## 2022-05-09 RX ADMIN — Medication 6.25 MG: at 14:36

## 2022-05-09 RX ADMIN — PRIMIDONE 50 MG: 50 TABLET ORAL at 21:03

## 2022-05-09 RX ADMIN — BENZONATATE 100 MG: 100 CAPSULE ORAL at 04:33

## 2022-05-09 RX ADMIN — GUAIFENESIN 1200 MG: 600 TABLET ORAL at 20:57

## 2022-05-09 RX ADMIN — PRIMIDONE 50 MG: 50 TABLET ORAL at 08:14

## 2022-05-09 RX ADMIN — AMIODARONE HYDROCHLORIDE 200 MG: 200 TABLET ORAL at 08:13

## 2022-05-09 RX ADMIN — LORAZEPAM 0.5 MG: 0.5 TABLET ORAL at 16:56

## 2022-05-09 RX ADMIN — ACETAMINOPHEN 650 MG: 325 TABLET, FILM COATED ORAL at 10:09

## 2022-05-09 RX ADMIN — ESCITALOPRAM OXALATE 5 MG: 10 TABLET ORAL at 08:13

## 2022-05-09 RX ADMIN — IPRATROPIUM BROMIDE 0.5 MG: 0.5 SOLUTION RESPIRATORY (INHALATION) at 19:47

## 2022-05-09 RX ADMIN — LORAZEPAM 0.5 MG: 0.5 TABLET ORAL at 20:57

## 2022-05-09 RX ADMIN — HEPARIN SODIUM 5000 UNITS: 5000 INJECTION INTRAVENOUS; SUBCUTANEOUS at 05:09

## 2022-05-09 RX ADMIN — Medication 5 SPRAY: at 14:36

## 2022-05-09 RX ADMIN — LORAZEPAM 0.5 MG: 0.5 TABLET ORAL at 08:13

## 2022-05-09 RX ADMIN — METHYLPREDNISOLONE SODIUM SUCCINATE 40 MG: 40 INJECTION, POWDER, FOR SOLUTION INTRAMUSCULAR; INTRAVENOUS at 08:16

## 2022-05-09 RX ADMIN — FLUTICASONE PROPIONATE 1 SPRAY: 50 SPRAY, METERED NASAL at 08:16

## 2022-05-09 RX ADMIN — LEVALBUTEROL HYDROCHLORIDE 1.25 MG: 1.25 SOLUTION, CONCENTRATE RESPIRATORY (INHALATION) at 14:04

## 2022-05-09 RX ADMIN — HEPARIN SODIUM 5000 UNITS: 5000 INJECTION INTRAVENOUS; SUBCUTANEOUS at 13:50

## 2022-05-09 RX ADMIN — LEVOTHYROXINE SODIUM 75 MCG: 25 TABLET ORAL at 05:09

## 2022-05-09 RX ADMIN — LEVALBUTEROL HYDROCHLORIDE 1.25 MG: 1.25 SOLUTION, CONCENTRATE RESPIRATORY (INHALATION) at 19:46

## 2022-05-09 RX ADMIN — PANTOPRAZOLE SODIUM 20 MG: 20 TABLET, DELAYED RELEASE ORAL at 08:13

## 2022-05-09 RX ADMIN — IPRATROPIUM BROMIDE 0.5 MG: 0.5 SOLUTION RESPIRATORY (INHALATION) at 14:04

## 2022-05-09 RX ADMIN — FLUTICASONE PROPIONATE 1 SPRAY: 50 SPRAY, METERED NASAL at 16:56

## 2022-05-09 RX ADMIN — TRAMADOL HYDROCHLORIDE 50 MG: 50 TABLET, COATED ORAL at 04:33

## 2022-05-09 RX ADMIN — Medication 1 TABLET: at 08:13

## 2022-05-09 RX ADMIN — HEPARIN SODIUM 5000 UNITS: 5000 INJECTION INTRAVENOUS; SUBCUTANEOUS at 21:05

## 2022-05-09 RX ADMIN — BUDESONIDE 0.5 MG: 0.5 INHALANT ORAL at 19:47

## 2022-05-09 RX ADMIN — ACETAMINOPHEN 650 MG: 325 TABLET, FILM COATED ORAL at 18:23

## 2022-05-09 RX ADMIN — FORMOTEROL FUMARATE DIHYDRATE 20 MCG: 20 SOLUTION RESPIRATORY (INHALATION) at 20:08

## 2022-05-09 RX ADMIN — GUAIFENESIN 1200 MG: 600 TABLET ORAL at 08:13

## 2022-05-09 NOTE — NURSING NOTE
This nurse was notified that the patient stated that he was having pain in his chest  When assessed, the patient stated that the pain was on the left side of his chest and only was there for about a minute  An EKG was done which showed the patient in NSR  Heart sounds were regular with assessment  Patient denies further chest pain with assessment Vital signs stable  Provider made aware  Patient placed on telemetry per provider

## 2022-05-09 NOTE — OCCUPATIONAL THERAPY NOTE
Occupational Therapy Evaluation     Patient Name: Ben Lewis  WWGAS'T Date: 5/9/2022  Problem List  Principal Problem:    COPD exacerbation ([facility] )  Active Problems:    CAD (coronary artery disease)    Essential hypertension    Anxiety disorder due to general medical condition    Acquired hypothyroidism    Thrombocytopenia ([facility] )    Chronic diastolic heart failure (HCC)    LIVIER (obstructive sleep apnea)    Chronic renal disease, stage IV (HCC)    Paroxysmal SVT (supraventricular tachycardia) (HCC)    SIRS (systemic inflammatory response syndrome) ([facility] )    Acute respiratory failure with hypoxia ([facility] )    Hyponatremia    Past Medical History  Past Medical History:   Diagnosis Date    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    Chronic kidney disease     Compression fracture of thoracic vertebra (HCC)     last assessed 05/07/2012    COPD (chronic obstructive pulmonary disease) ([facility] )     Disease of thyroid gland     Heart attack ([facility] )     History of methicillin resistant staphylococcus aureus (MRSA) 03/28/2019    negative nasal swab     Hollenhorst plaque, right eye     last assessed 04/08/2013    Hyperlipidemia     Hypertension     Iron deficiency anemia due to chronic blood loss     last assessed 09/10/2012    Ischemic colitis ([facility] )     last assessed 05/27/2014    Osteoarthritis of both hands     last assessedn 04/30/2013    Peptic ulcer     last assessed 06/14/2013    Polymyalgia rheumatica ([facility] )     last assessesd 10/09/2012    Renal disorder     Upper GI hemorrhage     last assessed 01/29/2015    Varicose veins of lower extremity     last assessed 04/26/2013     Past Surgical History  Past Surgical History:   Procedure Laterality Date    CORONARY ARTERY BYPASS GRAFT      HEMORRHOID SURGERY      HERNIA REPAIR      RENAL CYST EXCISION      resolved 05/2010    THROMBOENDARTERECTOMY Right     carotid, last assessed 06/18/2013 05/09/22 1423   OT Last Visit OT Visit Date 05/09/22   Note Type   Note type Evaluation   Restrictions/Precautions   Weight Bearing Precautions Per Order No   Other Precautions O2;Telemetry; Visual impairment;Hard of hearing   Pain Assessment   Pain Assessment Tool Landa-Baker FACES   Landa-Baker FACES Pain Rating 0   Home Living   Type of [place]2 to live on main level with bedroom/bathroom  (0 PILAR, pt describes it as an apartment in his kids home)   Bathroom Shower/Tub Walk-in shower   [address] 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, cleaning service, (-) drive 2/2 visual impairment   Lifestyle   Autonomy Pt reports being independent with ADLs & mobility in home   Reciprocal Relationships Pt lives in an apartment attached to his children's home   Service to Others Retired bobbi   Intrinsic Gratification Listens to audio tapes   Psychosocial   Psychosocial (WDL) WDL   Subjective   Subjective "God's got a plan for you, you're an jeffrey"   ADL   Eating Assistance 7  Independent   Grooming Assistance 7  Independent   UB Bathing Assistance 6  Modified Independent   LB Bathing Assistance 6  Modified Independent   UB Dressing Assistance 7  Independent   LB Dressing Assistance 7  Independent   Toileting Assistance  7  Independent   Bed Mobility   Additional Comments Pt received OOB to recliner   Transfers   Sit to Stand 7  Independent   Stand to Sit 7  Independent   Stand pivot 7  Independent   Functional Mobility   Functional Mobility 7  Independent   Balance   Static Sitting Normal   Dynamic Sitting Normal   Static Standing Good   Dynamic Standing Good   Ambulatory Good   Activity Tolerance   Activity Tolerance Patient tolerated treatment well  (SpO2 94% on 2L, does not wear at home)   Medical Staff Made Aware PT Gwendolyn   Nurse Made Aware BRADEN Bautista   RUJAMAL Assessment   RUE Assessment WFL   LUE Assessment   LUE Assessment WFL   Vision-Basic Assessment   Visual History Macular degeneration   Patient Visual Report   ("Can see large objects")   Cognition   Overall Cognitive Status WFL   Arousal/Participation Alert   Attention Within functional limits   Orientation Level Oriented X4   Memory Within functional limits   Following Commands Follows all commands and directions without difficulty   Assessment   Prognosis Good   Assessment Pt is a 80 y o  male seen for OT evaluation at [facility], admitted 5/5/2022 w/ COPD exacerbation ([facility] )  OT completed expanded review of pt's medical and social history  Comorbidities affecting pt's functional performance at time of assessment include: macular degeneration, CHF, LIVIER, COPD, stage 4 renal disease, SIRS, acute respiratory failure with hypoxia, HTN, anxiety (please refer to chart for additional PMH)  Prior to admission, pt was living a first floor set-up apartment attached to his children's home with 0STE and was I with ADLs, A for IADLs, (-) drove, and independent for functional mobility/transfers  Upon evaluation, pt presents to OT at functional baseline  Based on findings, pt is of moderate complexity  The patient's raw score on the AM-PAC Daily Activity inpatient short form is 24, standardized score is 57 54, greater than 39 4  Patients at this level are likely to benefit from DC to home  Please refer to the recommendation of the Occupational Therapist for safe DC planning  At this time, OT recommendations at time of discharge are home with no needs  No further acute OT needs indicated at this time - Recommend pt continue to be OOB for meals, ambulation to/from BR, perform self care tasks, and mobility in hallway with nursing   D/C from OT caseload with above recommendations     Goals   Patient Goals Pt wishes to stop coughing & his throat to not hurt   Recommendation   OT Discharge Recommendation No rehabilitation needs   AM-PAC Daily Activity Inpatient   Lower Body Dressing 4   Bathing 4   Toileting 4   Upper Body Dressing 4   Grooming 4   Eating 4   Daily Activity Raw Score 24   Daily Activity Standardized Score (Calc for Raw Score >=11) 57 54   AM-PAC Applied Cognition Inpatient   Following a Speech/Presentation 4   Understanding Ordinary Conversation 4   Taking Medications 4   Remembering Where Things Are Placed or Put Away 4   Remembering List of 4-5 Errands 4   Taking Care of Complicated Tasks 4   Applied Cognition Raw Score 24   Applied Cognition Standardized Score 62 21     Sandra Thakkar, OTR/L

## 2022-05-09 NOTE — ASSESSMENT & PLAN NOTE
Wt Readings from Last 3 Encounters:   05/08/22 74 8 kg (165 lb)   05/03/22 74 8 kg (165 lb)   04/12/22 76 8 kg (169 lb 6 4 oz)     · Last echo 09/28/2021-systolic function normal, EF 60%, grade 1 diastolic dysfunction, trace MR, mild AR, aortic stenosis, mild TR  · Appears euvolemic on exam  · Not currently on diuretics  · Continue to monitor I&O

## 2022-05-09 NOTE — ASSESSMENT & PLAN NOTE
· 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  · Did have 1 episode of chest pain morning of 5/9 that quickly resolved, EKG reveals no change from prior  · Monitor on telemetry x24 hours

## 2022-05-09 NOTE — ASSESSMENT & PLAN NOTE
Lab Results   Component Value Date    SODIUM 130 (L) 05/07/2022    SODIUM 129 (L) 05/06/2022    SODIUM 133 (L) 05/05/2022     · Uric Acid: 6 0  · Urine osmolality:  Pending  · Urine sodium:  Pending  · Osmolality:  290  · Repeat BMP daily

## 2022-05-09 NOTE — PROGRESS NOTES
Pulmonary Progress Note  Derrek Jerome 80 y o  male MRN: [id number]  Unit/Bed#: -01 Encounter: [id number]      Assesment and Recommendations:    1  Acute respiratory insufficiency secondary to COPD/emphysema/chronic bronchitis with acute exacerbation  · Wean solumedrol to Q12 hours  · Start pulmicort/perforomist BID  · Plan to discharge home on Trelegy one puff daily (to replace Anoro)    2  Lung nodule - slightly increased in size since 2017 (but stable since 2018)  · Annual imaging as an outpatient    3  LIVIER  · Intolerant to CPAP - would recommend nocturnal oxygen   · Will do overnight pulse oximetry to qualify        Chief Complaint:  Shortness of breath    Subjective: The patient reports that he continues to have shortness of breath on exertion  He does believe it is better than when he was admitted  He continues to have a dry cough  Vitals: Blood pressure 141/94, pulse 98, temperature (!) 97 4 °F (36 3 °C), resp  rate 19, height 5' 4" (1 626 m), weight 74 8 kg (165 lb), SpO2 97 %  , 2 liters, Body mass index is 28 32 kg/m²  Intake/Output Summary (Last 24 hours) at 5/9/2022 1223  Last data filed at 5/9/2022 1011  Gross per 24 hour   Intake 1560 ml   Output 1150 ml   Net 410 ml       Physical exam:  General:  Patient is awake, alert, non-toxic and in no acute respiratory distress  Neck: No JVD  CV:  Regular, +S1 and S2, No murmurs, gallops or rubs appreciated  Lungs:  Diminished breath sounds bilateral without wheeze, rales or rhonchi  Abdomen: Soft, +BS, Non-tender, non-distended  Extremities: No clubbing, cyanosis or edema  Neuro: No focal deficits    Imaging and other studies: I have personally reviewed pertinent films in PACS  Chest x-ray 5/9:  Elevated right hemidiaphragm, report of new subtle left upper lobe infiltrate        Xiao Higginbotham DO      Portions of the record may have been created with voice recognition software   Occasional wrong word or "sound a like" substitutions may have occurred due to the inherent limitations of voice recognition software  Read the chart carefully and recognize, using context, where substitutions have occurred

## 2022-05-09 NOTE — PHYSICAL THERAPY NOTE
PT eval   05/09/22 1422   PT Last Visit   PT Visit Date 05/09/22   Note Type   Note type Evaluation   Restrictions/Precautions   Other Precautions O2;Telemetry   Home Living   Type of [place] to live on main level with bedroom/bathroom; Two level   Bathroom Shower/Tub Walk-in shower   Home Equipment Walker  (upright walker when out doors no AD indoors)   Prior Function   Level of [place] Independent with ADLs and functional mobility; Needs assistance with IADLs   Lives With Family   Receives Help From Family   ADL Assistance Independent   IADLs Needs assistance   Falls in the last 6 months 0   Vocational Retired   Comments poor vision   General   Additional Pertinent History adm with copd exacerbation on 02 no 02 at home   Family/Caregiver Present No   Cognition   Overall Cognitive Status WFL   Arousal/Participation Alert   Orientation Level Oriented X4   Memory Within functional limits   Following Commands Follows one step commands without difficulty   Subjective   Subjective Just get rid of this cough   RUE Assessment   RUE Assessment WFL   LUE Assessment   LUE Assessment WFL   RLE Assessment   RLE Assessment WFL   LLE Assessment   LLE Assessment WFL   Coordination   Movements are Fluid and Coordinated 1   Sensation WFL   Heel to Patel Intact   Sharp/Dull   RLE Sharp/Dull Grossly intact   LLE Sharp/Dull Grossly intact   Proprioception   RLE Proprioception Grossly intact   LLE Proprioception Grossly Intact   Transfers   Sit to Stand 7  Independent   Stand to Sit 7  Independent   Stand pivot 7  Independent   Ambulation/Elevation   Gait pattern WNL   Gait Assistance 5  Supervision   Assistive Device None   Distance 50'   Balance   Static Sitting Normal   Dynamic Sitting Normal   Static Standing Good   Dynamic Standing Good   Ambulatory Good   Endurance Deficit   Endurance Deficit No   Activity Tolerance   Activity Tolerance Patient tolerated treatment well  (02 sat 94% on 2L)   Medical Staff Made Aware HALIMA Dao   Nurse Made Aware BRADEN Macias   Assessment   Prognosis Good   Assessment Pt presents as a functional ambualtor without AD  )@ 2L currently and taying to wean  NO skilledPT needs at this time  Encourage dto keep moving and decrease 02 as able   Should prrogress to home d c  d/c PT   Barriers to Discharge   (medical clearance)   Goals   Patient Goals stop coughing   Plan   PT Frequency   (d/c PT)   Recommendation   PT Discharge Recommendation No rehabilitation needs   AM-PAC Basic Mobility Inpatient   Turning in Bed Without Bedrails 4   Lying on Back to Sitting on Edge of Flat Bed 4   Moving Bed to Chair 4   Standing Up From Chair 4   Walk in Room 4   Climb 3-5 Stairs 4   Basic Mobility Inpatient Raw Score 24   Basic Mobility Standardized Score 57 68   Highest Level Of Mobility   [facility] Goal 8: Walk 250 feet or more   Modified Dharmesh Scale   Modified Dharmesh Scale 1   Gwendolyn Calzada, PT

## 2022-05-09 NOTE — ASSESSMENT & PLAN NOTE
Patient presented to ED with increased cough and shortness of breath over the last 3 days   Outpatient medications:  Anoro, Atrovent, Xopenex, p r n  Albuterol   Was seen at  earlier in the week given cefdinir due to bronchitis   CXR:  No acute cardiopulmonary findings   Sputum cultures ordered-  Not sent    WBC of 13 76 today in the setting of steroid use   Procal level negative x2   Oxygen via NC: requiring 4L, goal 88-92%    Respiratory protocol     Steroids solumedrol 40mg q8h    Azithromycin x 3 days completed   Consult pulmonary, appreciate ongoing recommendations  o Recommending nighttime positive airway pressure, will place order for overnight oxygen study   Family questioning how to prevent this in the future, discussed allergy management, possible addition of Singulair    They have an outpatient appointment 2 weeks will discuss strategies at that time

## 2022-05-09 NOTE — CASE MANAGEMENT
Case Management Discharge Planning Note    Patient name Ankit Nailsr  Location /-72 MRN [id number]  : 1932 Date 2022       Current Admission Date: 2022  Current Admission Diagnosis:COPD exacerbation [facility])   Patient Active Problem List    Diagnosis Date Noted    Acute respiratory failure with hypoxia ([facility] ) 2022    Hyponatremia 2022    Paroxysmal SVT (supraventricular tachycardia) ([facility] ) 2022    SIRS (systemic inflammatory response syndrome) ([facility] ) 2022    Centrilobular emphysema ([place] [address] ) 02/15/2022    Current moderate episode of major depressive disorder without prior episode ([place] [address] ) 10/06/2021    Chronic renal disease, stage IV ([place] [address] ) 10/06/2021    PAC (premature atrial contraction) 10/06/2021    COPD exacerbation ([place] [address] ) 2021    Viral illness 09/15/2021    Primary osteoarthritis of left knee 2021    Patellar tendonitis of left knee 2021    Tinea cruris 2021    Partial arterial occlusion of retina 2021    Ischemic colitis ([place] [address] ) 2021    Central serous chorioretinopathy 2020    Bilateral sensorineural hearing loss 2020    Gastrointestinal hemorrhage 2020    Senile nuclear cataract 2020    Peptic ulcer with hemorrhage but without obstruction 2020    Nonspecific abnormal findings on radiological and examination of lung field 2020    TMJ arthropathy 2020    Chronic cough 2020    Impacted cerumen of left ear 03/15/2020    Dizziness 2020    Chest pain 2020    LIVIER (obstructive sleep apnea)     Snoring     Excessive daytime sleepiness     Aneurysm, pulmonary, arteriovenous 10/20/2019    Stage 3 chronic kidney disease ([facility] ) 2019    Cervical spine arthritis 2019    Chronic diastolic heart failure ([facility] ) 04/10/2019    Pneumonia of right lower lobe due to infectious organism 04/10/2019    Urinary obstruction 04/10/2019    BMI 29 0-29 9,adult 04/01/2019    Thrombocytopenia (HCC) 03/31/2019    Suprapubic tenderness 03/29/2019    Loose stools 03/29/2019    Bronchitis 03/28/2019    Eczema 08/22/2018    Seborrheic keratoses, inflamed 08/22/2018    Medicare annual wellness visit, subsequent 07/13/2018    Shortness of breath 06/04/2018    Tachycardia 06/04/2018    Other atopic dermatitis 08/16/2017    Headache 01/20/2017    Insomnia 08/18/2016    Hydronephrosis of right kidney 05/14/2016    CAD (coronary artery disease) 05/14/2016    Carotid stenosis 05/14/2016    Essential hypertension 05/14/2016    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[facility] ) 06/11/2015    Macular degeneration 11/15/2014    GERD (gastroesophageal reflux disease) 06/06/2014    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/26/2013    Iron deficiency anemia 10/09/2012    Benign prostatic hyperplasia with lower urinary tract symptoms 05/07/2012    Mixed simple and mucopurulent chronic bronchitis ([facility] ) 05/07/2012    Mixed hyperlipidemia 05/07/2012    Acquired hypothyroidism 05/07/2012    Osteoporosis 05/07/2012      LOS (days): 4  Geometric Mean LOS (GMLOS) (days): 2 90  Days to GMLOS:-1 3     OBJECTIVE:  Risk of Unplanned Readmission Score: 29         Current admission status: Inpatient   Preferred Pharmacy:   [facility] [address] [place] [address]  Phone: [phone] Fax: [address] [place] [address]  Phone: [phone] Fax: [phone]    Primary Care Provider: Jennifer Angel MD    Primary Insurance: MEDICARE  Secondary Insurance: [facility] HEALTH OPTIONS PROGRAM    DISCHARGE DETAILS:       IMM Given (Date):: 05/09/22 (Copy provided to patient and media)  IMM Given to[de-identified] Patient

## 2022-05-09 NOTE — RESPIRATORY THERAPY NOTE
Respiratory Therapy Note  Pt place on NIV as per order  Pt tolerating this well so far  VS are being monitored HR 97 Saturation 92% RR 21   Notify RN     05/09/22 8143   Non-Invasive Information   O2 Interface Device Face mask   Non-Invasive Ventilation Mode CPAP   $ Continous NIV Subsequent   $ Intermittent NIV Yes   SpO2 92 %   $ Pulse Oximetry Spot Check Charge Completed   Non-Invasive Settings   EPAP (cm) 8 cm   FiO2 (%) 28  (pt on NC 2L/M)   PEEP/CPAP (cm H2O) 8   Non-Invasive Readings   Total Rate 21   Spontaneous Vt (mL) 546   Leak (lpm) 28   Skin Intervention Skin intact   Spontaneous MV (mL) 11 5   Non-Invasive Alarms   Insp Pressure High (cm H20) 20   Insp Pressure Low (cm H20) 5   Low Insp Pressure Time (sec) 20 sec   MV Low (L/min) 3   Vt High (mL) 1000   Vt Low (mL) 250   High Resp Rate (BPM) 40 BPM   Low Resp Rate (BPM) 8 BPM

## 2022-05-09 NOTE — ASSESSMENT & PLAN NOTE
· Patient has returned CPAP due to not using  · Encourage follow-up with primary to evaluate for other options  · Potential need for nighttime oxygen  · Overnight O2 study ordered- to be informed when patient clinically improves

## 2022-05-09 NOTE — PROGRESS NOTES
[place]  Progress Note - Pedro Dc 6/30/1932, 80 y o  male MRN: [id number]  Unit/Bed#: -Jaimie Encounter: [id number]  Primary Care Provider: Andre Martino MD   Date and time admitted to hospital: 5/5/2022  7:10 AM    * COPD exacerbation [facility])  Assessment & Plan  Patient presented to ED with increased cough and shortness of breath over the last 3 days   Outpatient medications:  Anoro, Atrovent, Xopenex, p r n  Albuterol   Was seen at  earlier in the week given cefdinir due to bronchitis   CXR:  No acute cardiopulmonary findings   Sputum cultures ordered-  Not sent    WBC of 13 76 today in the setting of steroid use   Procal level negative x2   Oxygen via NC: requiring 4L, goal 88-92%    Respiratory protocol     Steroids solumedrol 40mg q8h    Azithromycin x 3 days completed   Consult pulmonary, appreciate ongoing recommendations  o Recommending nighttime positive airway pressure, will place order for overnight oxygen study   Family questioning how to prevent this in the future, discussed allergy management, possible addition of Singulair    They have an outpatient appointment 2 weeks will discuss strategies at that time      Hyponatremia  Assessment & Plan  Lab Results   Component Value Date    SODIUM 130 (L) 05/07/2022    SODIUM 129 (L) 05/06/2022    SODIUM 133 (L) 05/05/2022     · Uric Acid: 6 0  · Urine osmolality:  Pending  · Urine sodium:  Pending  · Osmolality:  290  · Repeat BMP daily    Acute respiratory failure with hypoxia (HCC)  Assessment & Plan  · Requiring 4LNC at present  · Secondary to COPD exacerbation   · Wean O2 for sats >/= 88%  · Appreciate ongoing pulmonary recommendations    SIRS (systemic inflammatory response syndrome) (HCC)  Assessment & Plan  · SIRS , POA, likely due to COPD exacerbation, as evidenced by tachycardia and tachypnea, being treated respiratory protocol and steroids with azithromycin   · HR >90 and RR >20, no source of infection suspected at this time            Paroxysmal SVT (supraventricular tachycardia) (HCC)  Assessment & Plan  · Continue amiodarone  · Follows with Dr Patricio Montes as outpatient    Chronic renal disease, stage IV [facility])  Assessment & Plan  Lab Results   Component Value Date    EGFR 35 05/07/2022    EGFR 32 05/06/2022    EGFR 33 05/05/2022    CREATININE 1 68 (H) 05/07/2022    CREATININE 1 80 (H) 05/06/2022    CREATININE 1 78 (H) 05/05/2022   · Creatinine at baseline is 1 7-1 8- stable   · Continue to monitor  · Avoid hypotension, nephrotoxic agents, NSAIDs    LIVIER (obstructive sleep apnea)  Assessment & Plan  · Patient has returned CPAP due to not using  · Encourage follow-up with primary to evaluate for other options  · Potential need for nighttime oxygen  · Overnight O2 study ordered- to be informed when patient clinically improves    Chronic diastolic heart failure (HCC)  Assessment & Plan  Wt Readings from Last 3 Encounters:   05/08/22 74 8 kg (165 lb)   05/03/22 74 8 kg (165 lb)   04/12/22 76 8 kg (169 lb 6 4 oz)     · Last echo 09/28/2021-systolic function normal, EF 75%, grade 1 diastolic dysfunction, trace MR, mild AR, aortic stenosis, mild TR  · Appears euvolemic on exam  · Not currently on diuretics  · Continue to monitor I&O        Thrombocytopenia (HCC)  Assessment & Plan  · Trend CBC    Acquired hypothyroidism  Assessment & Plan  · Continue levothyroxine 75 mcg    Anxiety disorder due to general medical condition  Assessment & Plan  · Continue lorazepam 0 5 mg TID   · Continue lexapro 5mg daily   · Supportive care     Essential hypertension  Assessment & Plan  · Not currently on BP medications  · BP adequate continue to monitor    CAD (coronary artery disease)  Assessment & Plan  · 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  · Did have 1 episode of chest pain morning of 5/9 that quickly resolved, EKG reveals no change from prior  · Monitor on telemetry x24 hours      VTE Pharmacologic Prophylaxis: VTE Score: 7 High Risk (Score >/= 5) - Pharmacological DVT Prophylaxis Ordered: heparin  Sequential Compression Devices Ordered  Patient Centered Rounds: I performed bedside rounds with nursing staff today  Discussions with Specialists or Other Care Team Provider: Discussed with Pulmonary medicine    Education and Discussions with Family / Patient: Updated  (daughter in law) via phone  Time Spent for Care: 30 minutes  More than 50% of total time spent on counseling and coordination of care as described above  Current Length of Stay: 4 day(s)  Current Patient Status: Inpatient   Certification Statement: The patient will continue to require additional inpatient hospital stay due to Ongoing supplemental oxygen in, IV steroids  Discharge Plan: Anticipate discharge in 48-72 hrs to home  Code Status: Level 3 - DNAR and DNI    Subjective:   Patient reports persistent shortness of breath, dry mouth and sore throat  Objective:     Vitals:   Temp (24hrs), Av 7 °F (36 5 °C), Min:97 4 °F (36 3 °C), Max:98 2 °F (36 8 °C)    Temp:  [97 4 °F (36 3 °C)-98 2 °F (36 8 °C)] 97 6 °F (36 4 °C)  HR:  [] 95  Resp:  [19-22] 22  BP: (135-141)/(70-94) 139/70  SpO2:  [88 %-98 %] 94 %  Body mass index is 28 32 kg/m²  Input and Output Summary (last 24 hours): Intake/Output Summary (Last 24 hours) at 2022 1740  Last data filed at 2022 1351  Gross per 24 hour   Intake 1560 ml   Output 2050 ml   Net -490 ml       Physical Exam:   Physical Exam  Vitals and nursing note reviewed  Constitutional:       General: He is not in acute distress  Appearance: Normal appearance  He is well-developed  HENT:      Head: Normocephalic and atraumatic  Eyes:      General: No scleral icterus  Conjunctiva/sclera: Conjunctivae normal    Cardiovascular:      Rate and Rhythm: Normal rate and regular rhythm  Heart sounds: No murmur heard        Pulmonary:      Effort: Pulmonary effort is normal       Breath sounds: No wheezing, rhonchi or rales  Abdominal:      General: There is no distension  Palpations: Abdomen is soft  Skin:     General: Skin is warm and dry  Neurological:      General: No focal deficit present  Mental Status: He is alert  Psychiatric:         Mood and Affect: Mood normal         Additional Data:     Labs:  Results from last 7 days   Lab Units 05/07/22 0911 05/07/22 0533 05/06/22 0444 05/05/22 0733   WBC Thousand/uL  --  13 76*   < > 8 47   HEMOGLOBIN g/dL  --  12 3   < > 14 0   I STAT HEMOGLOBIN g/dl 12 9  --   --   --    HEMATOCRIT %  --  38 7   < > 44 8   HEMATOCRIT, ISTAT % 38  --   --   --    PLATELETS Thousands/uL  --  165   < > 157   NEUTROS PCT %  --   --   --  69   LYMPHS PCT %  --   --   --  15   MONOS PCT %  --   --   --  13*   EOS PCT %  --   --   --  2    < > = values in this interval not displayed  Results from last 7 days   Lab Units 05/07/22 0911 05/07/22 0533 05/06/22 0444 05/05/22 0733   SODIUM mmol/L  --  130*   < > 133*   POTASSIUM mmol/L  --  4 8   < > 4 3   CHLORIDE mmol/L  --  100   < > 100   CO2 mmol/L  --  21   < > 23   CO2, I-STAT mmol/L 22  --   --   --    BUN mg/dL  --  32*   < > 20   CREATININE mg/dL  --  1 68*   < > 1 78*   ANION GAP mmol/L  --  9   < > 10   CALCIUM mg/dL  --  8 4   < > 9 3   ALBUMIN g/dL  --   --   --  3 4*   TOTAL BILIRUBIN mg/dL  --   --   --  0 60   ALK PHOS U/L  --   --   --  144*   ALT U/L  --   --   --  79*   AST U/L  --   --   --  37   GLUCOSE RANDOM mg/dL  --  136   < > 106    < > = values in this interval not displayed       Results from last 7 days   Lab Units 05/05/22 0733   INR  1 07             Results from last 7 days   Lab Units 05/06/22 0444 05/05/22 0733   LACTIC ACID mmol/L  --  1 0   PROCALCITONIN ng/ml 0 25 0 21       Lines/Drains:  Invasive Devices  Report    Peripheral Intravenous Line            Peripheral IV 05/09/22 Distal;Right;Ventral (anterior) Forearm <1 day                  Telemetry:  Telemetry Orders (From admission, onward)             24 Hour Telemetry Monitoring  Continuous x 24 Hours (Telem)        References:    Telemetry Guidelines   Question:  Reason for 24 Hour Telemetry  Answer:  Delirium Tremens with Hx of Heart Disease or Abnormal EKG                 Telemetry Reviewed: Normal Sinus Rhythm  Indication for Continued Telemetry Use: Acute MI/Unstable Angina/Rule out ACS             Imaging: Reviewed radiology reports from this admission including: chest xray    Recent Cultures (last 7 days):   Results from last 7 days   Lab Units 05/05/22  0733   BLOOD CULTURE  No Growth After 4 Days  No Growth After 4 Days         Last 24 Hours Medication List:   Current Facility-Administered Medications   Medication Dose Route Frequency Provider Last Rate    acetaminophen  650 mg Oral Q6H PRN Dk Reddy PA-C      al mag oxide-diphenhydramine-lidocaine viscous  10 mL [employer]ish [employer] Spit Q4H  [address] VBRYANNA      albuterol  2 5 mg Nebulization Q6H PRN Zaid Pineda,       aluminum-magnesium hydroxide-simethicone  30 mL Oral Q6H PRN Dk Reddy PA-C      amiodarone  200 mg Oral Daily Dk Reddy PA-C      benzonatate  100 mg Oral TID PRN Eufemia Goff PA-C      budesonide  0 5 mg Nebulization Q12H Alfredo Rogers, DO      escitalopram  5 mg Oral Daily Dk Reddy PA-C      ferrous sulfate  325 mg Oral Every Other Day Dk Reddy PA-C      fluticasone  1 spray Nasal BID Dk Reddy PA-C      formoterol  20 mcg Nebulization Q12H Alfredo Rogers,       guaiFENesin  1,200 mg Oral Q12H [address] Dk Reddy PA-C      heparin (porcine)  5,000 Units Subcutaneous [facility] Dk Reddy PA-C      ipratropium  0 5 mg Nebulization TID Dk Reddy PA-C      levalbuterol  1 25 mg Nebulization TID Dk Reddy PA-C      levothyroxine  75 mcg Oral Early Morning Dk Reddy PA-C      LORazepam  0 5 mg Oral TID Dk Reddy PA-C      methylPREDNISolone sodium succinate  40 mg Intravenous Q12H [address] Yesica Darling, DO      multivitamin-minerals  1 tablet Oral Daily Billie Burroughs PA-C      ondansetron  4 mg Oral Q6H PRN Vernestine Radar, DO      pantoprazole  20 mg Oral Early Morning Billie Burroughs PA-C      primidone  50 mg Oral Q12H [address] Billie Burroughs PA-C      promethazine  6 25 mg Oral 4x Daily PRN Billie Burroughs PA-C      saliva substitute  5 spray Mouth/Throat 4x Daily PRN Tedra Canavan VBRYANNA      senna  1 tablet Oral BID PRN Billie Burroughs PA-C      tamsulosin  0 4 mg Oral Daily With Cl Pabon PA-C      traMADol  50 mg Oral Q6H PRN Vernestine Radar, DO      zolpidem  5 mg Oral HS PRN Billie Burroughs PA-C          Today, Patient Was Seen By: Maricel Brand PA-C    **Please Note: This note may have been constructed using a voice recognition system  **

## 2022-05-09 NOTE — ASSESSMENT & PLAN NOTE
· Requiring 4LNC at present  · Secondary to COPD exacerbation   · Wean O2 for sats >/= 88%  · Appreciate ongoing pulmonary recommendations

## 2022-05-10 ENCOUNTER — HOME HEALTH ADMISSION (OUTPATIENT)
Dept: HOME HEALTH SERVICES | Facility: HOME HEALTHCARE | Age: 87
End: 2022-05-10

## 2022-05-10 PROBLEM — D69.6 THROMBOCYTOPENIA (HCC): Status: RESOLVED | Noted: 2019-03-31 | Resolved: 2022-05-10

## 2022-05-10 LAB
ANION GAP SERPL CALCULATED.3IONS-SCNC: 5 MMOL/L (ref 4–13)
BACTERIA BLD CULT: NORMAL
BACTERIA BLD CULT: NORMAL
BASOPHILS # BLD MANUAL: 0 THOUSAND/UL (ref 0–0.1)
BASOPHILS NFR MAR MANUAL: 0 % (ref 0–1)
BUN SERPL-MCNC: 36 MG/DL (ref 5–25)
CALCIUM SERPL-MCNC: 8.5 MG/DL (ref 8.3–10.1)
CHLORIDE SERPL-SCNC: 100 MMOL/L (ref 100–108)
CO2 SERPL-SCNC: 27 MMOL/L (ref 21–32)
CREAT SERPL-MCNC: 1.85 MG/DL (ref 0.6–1.3)
EOSINOPHIL # BLD MANUAL: 0 THOUSAND/UL (ref 0–0.4)
EOSINOPHIL NFR BLD MANUAL: 0 % (ref 0–6)
ERYTHROCYTE [DISTWIDTH] IN BLOOD BY AUTOMATED COUNT: 13.9 % (ref 11.6–15.1)
GFR SERPL CREATININE-BSD FRML MDRD: 31 ML/MIN/1.73SQ M
GLUCOSE SERPL-MCNC: 187 MG/DL (ref 65–140)
HCT VFR BLD AUTO: 41.2 % (ref 36.5–49.3)
HGB BLD-MCNC: 12.7 G/DL (ref 12–17)
LYMPHOCYTES # BLD AUTO: 28 % (ref 14–44)
LYMPHOCYTES # BLD AUTO: 3.09 THOUSAND/UL (ref 0.6–4.47)
MCH RBC QN AUTO: 29.3 PG (ref 26.8–34.3)
MCHC RBC AUTO-ENTMCNC: 30.8 G/DL (ref 31.4–37.4)
MCV RBC AUTO: 95 FL (ref 82–98)
MONOCYTES # BLD AUTO: 0 THOUSAND/UL (ref 0–1.22)
MONOCYTES NFR BLD: 0 % (ref 4–12)
NEUTROPHILS # BLD MANUAL: 7.95 THOUSAND/UL (ref 1.85–7.62)
NEUTS BAND NFR BLD MANUAL: 1 % (ref 0–8)
NEUTS SEG NFR BLD AUTO: 71 % (ref 43–75)
PLATELET # BLD AUTO: 150 THOUSANDS/UL (ref 149–390)
PLATELET BLD QL SMEAR: ADEQUATE
PMV BLD AUTO: 8.1 FL (ref 8.9–12.7)
POTASSIUM SERPL-SCNC: 5.1 MMOL/L (ref 3.5–5.3)
PROCALCITONIN SERPL-MCNC: 0.11 NG/ML
RBC # BLD AUTO: 4.33 MILLION/UL (ref 3.88–5.62)
RBC MORPH BLD: NORMAL
SODIUM SERPL-SCNC: 132 MMOL/L (ref 136–145)
WBC # BLD AUTO: 11.04 THOUSAND/UL (ref 4.31–10.16)

## 2022-05-10 PROCEDURE — 99232 SBSQ HOSP IP/OBS MODERATE 35: CPT | Performed by: PHYSICIAN ASSISTANT

## 2022-05-10 PROCEDURE — 94668 MNPJ CHEST WALL SBSQ: CPT

## 2022-05-10 PROCEDURE — 80048 BASIC METABOLIC PNL TOTAL CA: CPT | Performed by: PHYSICIAN ASSISTANT

## 2022-05-10 PROCEDURE — 99232 SBSQ HOSP IP/OBS MODERATE 35: CPT | Performed by: INTERNAL MEDICINE

## 2022-05-10 PROCEDURE — 84145 PROCALCITONIN (PCT): CPT | Performed by: PHYSICIAN ASSISTANT

## 2022-05-10 PROCEDURE — 85027 COMPLETE CBC AUTOMATED: CPT | Performed by: PHYSICIAN ASSISTANT

## 2022-05-10 PROCEDURE — 85007 BL SMEAR W/DIFF WBC COUNT: CPT | Performed by: PHYSICIAN ASSISTANT

## 2022-05-10 PROCEDURE — 94760 N-INVAS EAR/PLS OXIMETRY 1: CPT

## 2022-05-10 PROCEDURE — 94640 AIRWAY INHALATION TREATMENT: CPT

## 2022-05-10 RX ADMIN — FLUTICASONE PROPIONATE 1 SPRAY: 50 SPRAY, METERED NASAL at 09:56

## 2022-05-10 RX ADMIN — FORMOTEROL FUMARATE DIHYDRATE 20 MCG: 20 SOLUTION RESPIRATORY (INHALATION) at 19:46

## 2022-05-10 RX ADMIN — FERROUS SULFATE TAB 325 MG (65 MG ELEMENTAL FE) 325 MG: 325 (65 FE) TAB at 09:55

## 2022-05-10 RX ADMIN — METHYLPREDNISOLONE SODIUM SUCCINATE 40 MG: 40 INJECTION, POWDER, FOR SOLUTION INTRAMUSCULAR; INTRAVENOUS at 09:55

## 2022-05-10 RX ADMIN — ACETAMINOPHEN 650 MG: 325 TABLET, FILM COATED ORAL at 09:55

## 2022-05-10 RX ADMIN — LEVALBUTEROL HYDROCHLORIDE 1.25 MG: 1.25 SOLUTION, CONCENTRATE RESPIRATORY (INHALATION) at 19:31

## 2022-05-10 RX ADMIN — IPRATROPIUM BROMIDE 0.5 MG: 0.5 SOLUTION RESPIRATORY (INHALATION) at 14:58

## 2022-05-10 RX ADMIN — TAMSULOSIN HYDROCHLORIDE 0.4 MG: 0.4 CAPSULE ORAL at 16:35

## 2022-05-10 RX ADMIN — LEVALBUTEROL HYDROCHLORIDE 1.25 MG: 1.25 SOLUTION, CONCENTRATE RESPIRATORY (INHALATION) at 14:58

## 2022-05-10 RX ADMIN — IPRATROPIUM BROMIDE 0.5 MG: 0.5 SOLUTION RESPIRATORY (INHALATION) at 19:31

## 2022-05-10 RX ADMIN — ACETAMINOPHEN 650 MG: 325 TABLET, FILM COATED ORAL at 02:15

## 2022-05-10 RX ADMIN — BENZONATATE 100 MG: 100 CAPSULE ORAL at 03:26

## 2022-05-10 RX ADMIN — BUDESONIDE 0.5 MG: 0.5 INHALANT ORAL at 19:31

## 2022-05-10 RX ADMIN — PANTOPRAZOLE SODIUM 20 MG: 20 TABLET, DELAYED RELEASE ORAL at 05:35

## 2022-05-10 RX ADMIN — LEVALBUTEROL HYDROCHLORIDE 1.25 MG: 1.25 SOLUTION, CONCENTRATE RESPIRATORY (INHALATION) at 08:31

## 2022-05-10 RX ADMIN — HEPARIN SODIUM 5000 UNITS: 5000 INJECTION INTRAVENOUS; SUBCUTANEOUS at 15:01

## 2022-05-10 RX ADMIN — FLUTICASONE PROPIONATE 1 SPRAY: 50 SPRAY, METERED NASAL at 18:18

## 2022-05-10 RX ADMIN — LORAZEPAM 0.5 MG: 0.5 TABLET ORAL at 21:06

## 2022-05-10 RX ADMIN — Medication 1 TABLET: at 09:55

## 2022-05-10 RX ADMIN — IPRATROPIUM BROMIDE 0.5 MG: 0.5 SOLUTION RESPIRATORY (INHALATION) at 08:31

## 2022-05-10 RX ADMIN — Medication 1 SPRAY: at 21:16

## 2022-05-10 RX ADMIN — LORAZEPAM 0.5 MG: 0.5 TABLET ORAL at 09:55

## 2022-05-10 RX ADMIN — Medication 6.25 MG: at 21:16

## 2022-05-10 RX ADMIN — BUDESONIDE 0.5 MG: 0.5 INHALANT ORAL at 08:31

## 2022-05-10 RX ADMIN — ESCITALOPRAM OXALATE 5 MG: 10 TABLET ORAL at 09:55

## 2022-05-10 RX ADMIN — LORAZEPAM 0.5 MG: 0.5 TABLET ORAL at 16:35

## 2022-05-10 RX ADMIN — HEPARIN SODIUM 5000 UNITS: 5000 INJECTION INTRAVENOUS; SUBCUTANEOUS at 05:35

## 2022-05-10 RX ADMIN — ACETAMINOPHEN 650 MG: 325 TABLET, FILM COATED ORAL at 16:35

## 2022-05-10 RX ADMIN — LEVOTHYROXINE SODIUM 75 MCG: 25 TABLET ORAL at 05:35

## 2022-05-10 RX ADMIN — PRIMIDONE 50 MG: 50 TABLET ORAL at 09:57

## 2022-05-10 RX ADMIN — FORMOTEROL FUMARATE DIHYDRATE 20 MCG: 20 SOLUTION RESPIRATORY (INHALATION) at 08:31

## 2022-05-10 RX ADMIN — METHYLPREDNISOLONE SODIUM SUCCINATE 40 MG: 40 INJECTION, POWDER, FOR SOLUTION INTRAMUSCULAR; INTRAVENOUS at 21:08

## 2022-05-10 RX ADMIN — Medication 1 SPRAY: at 03:26

## 2022-05-10 RX ADMIN — PRIMIDONE 50 MG: 50 TABLET ORAL at 21:06

## 2022-05-10 RX ADMIN — AMIODARONE HYDROCHLORIDE 200 MG: 200 TABLET ORAL at 09:55

## 2022-05-10 RX ADMIN — GUAIFENESIN 1200 MG: 600 TABLET ORAL at 09:55

## 2022-05-10 RX ADMIN — GUAIFENESIN 1200 MG: 600 TABLET ORAL at 21:06

## 2022-05-10 RX ADMIN — HEPARIN SODIUM 5000 UNITS: 5000 INJECTION INTRAVENOUS; SUBCUTANEOUS at 21:12

## 2022-05-10 NOTE — ASSESSMENT & PLAN NOTE
Lab Results   Component Value Date    EGFR 31 05/10/2022    EGFR 35 05/07/2022    EGFR 32 05/06/2022    CREATININE 1 85 (H) 05/10/2022    CREATININE 1 68 (H) 05/07/2022    CREATININE 1 80 (H) 05/06/2022   · Creatinine at baseline is 1 7-1 8- stable   · Continue to monitor  · Avoid hypotension, nephrotoxic agents, NSAIDs  · Creatinine within baseline

## 2022-05-10 NOTE — PLAN OF CARE
Problem: Potential for Falls  Goal: Patient will remain free of falls  Description: INTERVENTIONS:  - Educate patient/family on patient safety including physical limitations  - Instruct patient to call for assistance with activity   - Consult OT/PT to assist with strengthening/mobility   - Keep Call bell within reach  - Keep bed low and locked with side rails adjusted as appropriate  - Keep care items and personal belongings within reach  - Initiate and maintain comfort rounds  - Make Fall Risk Sign visible to staff  - Offer Toileting every 2 Hours, in advance of need  - Initiate/Maintain alarm  - Obtain necessary fall risk management equipment:   - Apply yellow socks and bracelet for high fall risk patients  - Consider moving patient to room near nurses station  Outcome: Progressing     Problem: MOBILITY - ADULT  Goal: Maintain or return to baseline ADL function  Description: INTERVENTIONS:  -  Assess patient's ability to carry out ADLs; assess patient's baseline for ADL function and identify physical deficits which impact ability to perform ADLs (bathing, care of mouth/teeth, toileting, grooming, dressing, etc )  - Assess/evaluate cause of self-care deficits   - Assess range of motion  - Assess patient's mobility; develop plan if impaired  - Assess patient's need for assistive devices and provide as appropriate  - Encourage maximum independence but intervene and supervise when necessary  - Involve family in performance of ADLs  - Assess for home care needs following discharge   - Consider OT consult to assist with ADL evaluation and planning for discharge  - Provide patient education as appropriate  Outcome: Progressing  Goal: Maintains/Returns to pre admission functional level  Description: INTERVENTIONS:  - Perform BMAT or MOVE assessment daily    - Set and communicate daily mobility goal to care team and patient/family/caregiver     - Collaborate with rehabilitation services on mobility goals if consulted  - Out of bed for toileting  - Record patient progress and toleration of activity level   Outcome: Progressing     Problem: Prexisting or High Potential for Compromised Skin Integrity  Goal: Skin integrity is maintained or improved  Description: INTERVENTIONS:  - Identify patients at risk for skin breakdown  - Assess and monitor skin integrity  - Assess and monitor nutrition and hydration status  - Monitor labs   - Assess for incontinence   - Turn and reposition patient  - Assist with mobility/ambulation  - Relieve pressure over bony prominences  - Avoid friction and shearing  - Provide appropriate hygiene as needed including keeping skin clean and dry  - Evaluate need for skin moisturizer/barrier cream  - Collaborate with interdisciplinary team   - Patient/family teaching  - Consider wound care consult   Outcome: Progressing     Problem: RESPIRATORY - ADULT  Goal: Achieves optimal ventilation and oxygenation  Description: INTERVENTIONS:  - Assess for changes in respiratory status  - Assess for changes in mentation and behavior  - Position to facilitate oxygenation and minimize respiratory effort  - Oxygen administered by appropriate delivery if ordered  - Initiate smoking cessation education as indicated  - Encourage broncho-pulmonary hygiene including cough, deep breathe, Incentive Spirometry  - Assess the need for suctioning and aspirate as needed  - Assess and instruct to report SOB or any respiratory difficulty  - Respiratory Therapy support as indicated  Outcome: Progressing

## 2022-05-10 NOTE — ASSESSMENT & PLAN NOTE
Patient presented to ED with increased cough and shortness of breath over the last 3 days   Outpatient medications:  Anoro, Atrovent, Xopenex, p r n  Albuterol   Was seen at  earlier in the week given cefdinir due to bronchitis   CXR:  No acute cardiopulmonary findings   Sputum cultures ordered-  Not sent    Leukocytosis improving, 11 K   Procal level negative x2   Oxygen via NC:  Initially requiring 4 L nasal cannula, weaned to 2 L    Attempted wean to RA however desat to 84% at rest   Respiratory protocol  - started on Pulmicort/perforomist   IV Solu-Medrol decreased to Q 12 hr yesterday 5/9   Azithromycin x 3 days completed   Consult pulmonary, appreciate ongoing recommendations   Anticipate discharge on Trelegy to replace Anoro

## 2022-05-10 NOTE — ASSESSMENT & PLAN NOTE
Wt Readings from Last 3 Encounters:   05/08/22 74 8 kg (165 lb)   05/03/22 74 8 kg (165 lb)   04/12/22 76 8 kg (169 lb 6 4 oz)     · Last echo 09/28/2021-systolic function normal, EF 05%, grade 1 diastolic dysfunction, trace MR, mild AR, aortic stenosis, mild TR  · Appears euvolemic on exam  · Not currently on diuretics  · Continue to monitor I&O

## 2022-05-10 NOTE — ASSESSMENT & PLAN NOTE
· Patient has returned CPAP due to not using  · Encourage follow-up with primary to evaluate for other options  · Potential need for nighttime oxygen  · Overnight O2 study prior to discharge

## 2022-05-10 NOTE — PROGRESS NOTES
[place]     Progress Note - Ran Milian 6/30/1932, 80 y o  male MRN: [id number]  Unit/Bed#: -Jaimie Encounter: [id number]  Primary Care Provider: Reinaldo Damon MD   Date and time admitted to hospital: 5/5/2022  7:10 AM    * COPD exacerbation [facility])  Assessment & Plan  Patient presented to ED with increased cough and shortness of breath over the last 3 days   Outpatient medications:  Anoro, Atrovent, Xopenex, p r n  Albuterol   Was seen at  earlier in the week given cefdinir due to bronchitis   CXR:  No acute cardiopulmonary findings   Sputum cultures ordered-  Not sent    Leukocytosis improving, 11 K   Procal level negative x2   Oxygen via NC:  Initially requiring 4 L nasal cannula, weaned to 2 L  Attempted wean to RA however desat to 84% at rest   Respiratory protocol  - started on Pulmicort/perforomist   IV Solu-Medrol decreased to Q 12 hr yesterday 5/9   Azithromycin x 3 days completed   Consult pulmonary, appreciate ongoing recommendations   Anticipate discharge on Trelegy to replace Anoro    Hyponatremia  Assessment & Plan  Lab Results   Component Value Date    SODIUM 132 (L) 05/10/2022    SODIUM 130 (L) 05/07/2022    SODIUM 129 (L) 05/06/2022     · Sodium improving, 132  · Trend BMP    SIRS (systemic inflammatory response syndrome) (HCC)  Assessment & Plan  · SIRS , POA, likely due to COPD exacerbation, as evidenced by tachycardia and tachypnea, being treated respiratory protocol and steroids with azithromycin   · HR >90 and RR >20, no source of infection suspected at this time  · Procalcitonin negative x2 earlier in admission  · Repeat CXR with questionable left upper lobe infiltrate, follow-up repeat procalcitonin    Leukocytosis stable in setting of IV steroids    Paroxysmal SVT (supraventricular tachycardia) (HCC)  Assessment & Plan  · Continue amiodarone  · Follows with Dr Li Ng as outpatient    Chronic renal disease, stage IV [facility])  Assessment & Plan  Lab Results   Component Value Date    EGFR 31 05/10/2022    EGFR 35 05/07/2022    EGFR 32 05/06/2022    CREATININE 1 85 (H) 05/10/2022    CREATININE 1 68 (H) 05/07/2022    CREATININE 1 80 (H) 05/06/2022   · Creatinine at baseline is 1 7-1 8- stable   · Continue to monitor  · Avoid hypotension, nephrotoxic agents, NSAIDs  · Creatinine within baseline    LIVIER (obstructive sleep apnea)  Assessment & Plan  · Patient has returned CPAP due to not using  · Encourage follow-up with primary to evaluate for other options  · Potential need for nighttime oxygen  · Overnight O2 study prior to discharge    Chronic diastolic heart failure (HCC)  Assessment & Plan  Wt Readings from Last 3 Encounters:   05/08/22 74 8 kg (165 lb)   05/03/22 74 8 kg (165 lb)   04/12/22 76 8 kg (169 lb 6 4 oz)     · Last echo 09/28/2021-systolic function normal, EF 10%, grade 1 diastolic dysfunction, trace MR, mild AR, aortic stenosis, mild TR  · Appears euvolemic on exam  · Not currently on diuretics  · Continue to monitor I&O    Acquired hypothyroidism  Assessment & Plan  · Continue levothyroxine 75 mcg    Anxiety disorder due to general medical condition  Assessment & Plan  · Continue lorazepam 0 5 mg TID   · Continue lexapro 5mg daily   · Supportive care     Essential hypertension  Assessment & Plan  · Not currently on BP medications  · BP adequate continue to monitor    CAD (coronary artery disease)  Assessment & Plan  · 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  · Had reported chest pain yesterday 5/9, resolved    Thrombocytopenia (HCC)-resolved as of 5/10/2022  Assessment & Plan  · Resolved  · Platelets 830J    VTE Pharmacologic Prophylaxis: VTE Score: 7 High Risk (Score >/= 5) - Pharmacological DVT Prophylaxis Ordered: heparin  Sequential Compression Devices Ordered  Patient Centered Rounds: I performed bedside rounds with nursing staff today    Discussions with Specialists or Other Care Team Provider: SHAKILA, will discuss with pulmonology    Education and Discussions with Family / Patient: Attempted to update  (son) via phone  Left voicemail  Time Spent for Care: 30 minutes  More than 50% of total time spent on counseling and coordination of care as described above  Current Length of Stay: 5 day(s)  Current Patient Status: Inpatient   Certification Statement: The patient will continue to require additional inpatient hospital stay due to Acute respiratory failure with hypoxia, chronic obstructive pulmonary disease exacerbation  Discharge Plan: Anticipate discharge in 24-48 hrs to home  Code Status: Level 3 - DNAR and DNI    Subjective:   Patient admits to ongoing shortness of breath, dry cough  Notes by temporal frontal headache this morning relieved with Tylenol  Denies chest pain/palpitations, nausea vomiting, abdominal pain  Objective:     Vitals:   Temp (24hrs), Av 4 °F (36 3 °C), Min:96 9 °F (36 1 °C), Max:97 6 °F (36 4 °C)    Temp:  [96 9 °F (36 1 °C)-97 6 °F (36 4 °C)] 97 6 °F (36 4 °C)  HR:  [92-97] 94  Resp:  [15-22] 15  BP: (131-154)/(70-89) 154/89  SpO2:  [92 %-98 %] 97 %  Body mass index is 28 32 kg/m²  Input and Output Summary (last 24 hours): Intake/Output Summary (Last 24 hours) at 5/10/2022 1159  Last data filed at 5/10/2022 0901  Gross per 24 hour   Intake 720 ml   Output 600 ml   Net 120 ml       Physical Exam:   Physical Exam  Vitals and nursing note reviewed  Constitutional:       Appearance: He is well-developed  Interventions: Nasal cannula in place  Comments: No acute distress   HENT:      Head: Normocephalic and atraumatic  Eyes:      General: No scleral icterus  Extraocular Movements: Extraocular movements intact  Conjunctiva/sclera: Conjunctivae normal    Cardiovascular:      Rate and Rhythm: Normal rate and regular rhythm  Heart sounds: S1 normal and S2 normal  No murmur heard        Pulmonary:      Effort: Pulmonary effort is normal  No respiratory distress  Breath sounds: Decreased breath sounds present  No rhonchi  Abdominal:      General: Bowel sounds are normal       Palpations: Abdomen is soft  Tenderness: There is no abdominal tenderness  There is no guarding or rebound  Musculoskeletal:      Cervical back: Normal range of motion  Comments: Able to move upper/lower extremities bilaterally, no edema   Skin:     General: Skin is warm and dry  Neurological:      Mental Status: He is alert and oriented to person, place, and time  Psychiatric:         Mood and Affect: Mood normal          Speech: Speech normal          Behavior: Behavior normal           Additional Data:     Labs:  Results from last 7 days   Lab Units 05/10/22  0952 05/06/22  0444 05/05/22  0733   WBC Thousand/uL 11 04*   < > 8 47   HEMOGLOBIN g/dL 12 7   < > 14 0   I STAT HEMOGLOBIN   --    < >  --    HEMATOCRIT % 41 2   < > 44 8   HEMATOCRIT, ISTAT   --    < >  --    PLATELETS Thousands/uL 150   < > 157   NEUTROS PCT %  --   --  69   LYMPHS PCT %  --   --  15   MONOS PCT %  --   --  13*   EOS PCT %  --   --  2    < > = values in this interval not displayed  Results from last 7 days   Lab Units 05/10/22  0952 05/06/22  0444 05/05/22  0733   SODIUM mmol/L 132*   < > 133*   POTASSIUM mmol/L 5 1   < > 4 3   CHLORIDE mmol/L 100   < > 100   CO2 mmol/L 27   < > 23   CO2, I-STAT   --    < >  --    BUN mg/dL 36*   < > 20   CREATININE mg/dL 1 85*   < > 1 78*   ANION GAP mmol/L 5   < > 10   CALCIUM mg/dL 8 5   < > 9 3   ALBUMIN g/dL  --   --  3 4*   TOTAL BILIRUBIN mg/dL  --   --  0 60   ALK PHOS U/L  --   --  144*   ALT U/L  --   --  79*   AST U/L  --   --  37   GLUCOSE RANDOM mg/dL 187*   < > 106    < > = values in this interval not displayed       Results from last 7 days   Lab Units 05/05/22  0733   INR  1 07             Results from last 7 days   Lab Units 05/10/22  0952 05/06/22  0444 05/05/22  0733   LACTIC ACID mmol/L  --   --  1 0   PROCALCITONIN ng/ml 0  11 0 25 0 21       Lines/Drains:  Invasive Devices  Report    Peripheral Intravenous Line            Peripheral IV 22 Distal;Right;Ventral (anterior) Forearm 1 day                  Telemetry:  Telemetry Orders (From admission, onward)             24 Hour Telemetry Monitoring  Continuous x 24 Hours (Telem)           References:    Telemetry Guidelines   Question:  Reason for 24 Hour Telemetry  Answer:  Delirium Tremens with Hx of Heart Disease or Abnormal EKG                 Telemetry Reviewed: Sinus Tachycardia  Indication for Continued Telemetry Use: No indication for continued use  Will discontinue  Imaging: Reviewed radiology reports from this admission including: chest xray    Recent Cultures (last 7 days):   Results from last 7 days   Lab Units 22  0733   BLOOD CULTURE  No Growth After 4 Days  No Growth After 4 Days         Last 24 Hours Medication List:   Current Facility-Administered Medications   Medication Dose Route Frequency Provider Last Rate    acetaminophen  650 mg Oral Q6H PRN Jordin Arce PA-C      al mag oxide-diphenhydramine-lidocaine viscous  10 mL Swish & Spit Q4H PRN Samuel MURCIA PA-C      albuterol  2 5 mg Nebulization Q6H PRN DO Radha      aluminum-magnesium hydroxide-simethicone  30 mL Oral Q6H PRN Jordin Arce PA-C      amiodarone  200 mg Oral Daily Jordin Arce PA-C      benzonatate  100 mg Oral TID PRN Lizette Mckinney PA-C      budesonide  0 5 mg Nebulization Q12H Remington Cruz DO      escitalopram  5 mg Oral Daily Jordin Arce PA-C      ferrous sulfate  325 mg Oral Every Other Day Jordin Arce PA-C      fluticasone  1 spray Nasal BID Jordin Arce PA-C      formoterol  20 mcg Nebulization Q12H Remington Cruz DO      guaiFENesin  1,200 mg Oral Q12H [address] [employer] BRYANNA Kelly      heparin (porcine)  5,000 Units Subcutaneous [facility] Jordin Arce PA-C      ipratropium  0 5 mg Nebulization TID Jordin Arce PA-C      levalbuterol  1 25 mg Nebulization TID Shad Guerrero PA-C      levothyroxine  75 mcg Oral Early Morning Shad Guerrero PA-C      LORazepam  0 5 mg Oral TID Shad Guerrero PA-C      methylPREDNISolone sodium succinate  40 mg Intravenous Q12H [address] Marguerite Gaylee, DO      multivitamin-minerals  1 tablet Oral Daily Shad Guerrero PA-C      ondansetron  4 mg Oral Q6H PRN Ines Garibay, DO      pantoprazole  20 mg Oral Early Morning Shad Guerrero PA-C      phenol  1 spray Mouth/Throat Q2H PRN Paty MURCIA PA-C      primidone  50 mg Oral Q12H [address] Shad Guerrero PA-C      promethazine  6 25 mg Oral 4x Daily PRN Shad Guerrero PA-C      saliva substitute  5 spray Mouth/Throat 4x Daily PRN Paty MURCIA PA-C      senna  1 tablet Oral BID PRN Shad Guerrero PA-C      tamsulosin  0 4 mg Oral Daily With Germán Aguero PA-C      traMADol  50 mg Oral Q6H PRN Ines Garibay, DO      zolpidem  5 mg Oral HS PRN Shad Guerrero PA-C          Today, Patient Was Seen By: Xiomara Knowles PA-C    **Please Note: This note may have been constructed using a voice recognition system  **

## 2022-05-10 NOTE — ASSESSMENT & PLAN NOTE
· SIRS , POA, likely due to COPD exacerbation, as evidenced by tachycardia and tachypnea, being treated respiratory protocol and steroids with azithromycin   · HR >90 and RR >20, no source of infection suspected at this time  · Procalcitonin negative x2 earlier in admission  · Repeat CXR with questionable left upper lobe infiltrate, follow-up repeat procalcitonin    Leukocytosis stable in setting of IV steroids

## 2022-05-10 NOTE — ASSESSMENT & PLAN NOTE
Lab Results   Component Value Date    SODIUM 132 (L) 05/10/2022    SODIUM 130 (L) 05/07/2022    SODIUM 129 (L) 05/06/2022     · Sodium improving, 132  · Trend BMP

## 2022-05-10 NOTE — PLAN OF CARE
Problem: RESPIRATORY - ADULT  Goal: Achieves optimal ventilation and oxygenation  Description: INTERVENTIONS:  - Assess for changes in respiratory status  - Assess for changes in mentation and behavior  - Position to facilitate oxygenation and minimize respiratory effort  - Oxygen administered by appropriate delivery if ordered  - Initiate smoking cessation education as indicated  - Encourage broncho-pulmonary hygiene including cough, deep breathe, Incentive Spirometry  - Assess the need for suctioning and aspirate as needed  - Assess and instruct to report SOB or any respiratory difficulty  - Respiratory Therapy support as indicated  Outcome: Progressing     Problem: MOBILITY - ADULT  Goal: Maintain or return to baseline ADL function  Description: INTERVENTIONS:  -  Assess patient's ability to carry out ADLs; assess patient's baseline for ADL function and identify physical deficits which impact ability to perform ADLs (bathing, care of mouth/teeth, toileting, grooming, dressing, etc )  - Assess/evaluate cause of self-care deficits   - Assess range of motion  - Assess patient's mobility; develop plan if impaired  - Assess patient's need for assistive devices and provide as appropriate  - Encourage maximum independence but intervene and supervise when necessary  - Involve family in performance of ADLs  - Assess for home care needs following discharge   - Consider OT consult to assist with ADL evaluation and planning for discharge  - Provide patient education as appropriate  5/9/2022 2330 by Jose G Samson RN  Outcome: Progressing  5/9/2022 2329 by Jose G Samson RN  Outcome: Progressing  Goal: Maintains/Returns to pre admission functional level  Description: INTERVENTIONS:  - Perform BMAT or MOVE assessment daily    - Set and communicate daily mobility goal to care team and patient/family/caregiver     - Collaborate with rehabilitation services on mobility goals if consulted  - Out of bed for toileting  - Record patient progress and toleration of activity level   5/9/2022 2330 by Ivory Coker RN  Outcome: Progressing  5/9/2022 2329 by Ivory Coker RN  Outcome: Progressing     Problem: Potential for Falls  Goal: Patient will remain free of falls  Description: INTERVENTIONS:  - Educate patient/family on patient safety including physical limitations  - Instruct patient to call for assistance with activity   - Consult OT/PT to assist with strengthening/mobility   - Keep Call bell within reach  - Keep bed low and locked with side rails adjusted as appropriate  - Keep care items and personal belongings within reach  - Initiate and maintain comfort rounds  - Make Fall Risk Sign visible to staff  - Offer Toileting every 2 Hours, in advance of need  - Initiate/Maintain alarm  - Obtain necessary fall risk management equipment:   - Apply yellow socks and bracelet for high fall risk patients  - Consider moving patient to room near nurses station  5/9/2022 2330 by Ivory Coker RN  Outcome: Progressing  5/9/2022 2329 by Ivory Coker RN  Outcome: Progressing

## 2022-05-10 NOTE — PROGRESS NOTES
Pulmonary Progress Note  Mekhi Alfaro 80 y o  male MRN: [id number]  Unit/Bed#: -01 Encounter: [id number]      Assesment and Recommendations:    1  Acute respiratory insufficiency secondary to COPD/emphysema/chronic bronchitis with acute exacerbation  · Will decrease Solu-Medrol to once daily starting 05/11/2022-if stable, could transition to prednisone in the next 24-48 hours  · Continue Pulmicort/Perforomist b i d , Xopenex/Atrovent t i d   · Continue to wean supplemental oxygen  · Plan to discharge home on Trelegy one puff daily (to replace Anoro)     2  Lung nodule - slightly increased in size since 2017 (but stable since 2018)  · Continued outpatient pulmonary follow-up     3  LIVIER  · Intolerant to CPAP - would recommend nocturnal oxygen   · Will do overnight pulse oximetry tomorrow with anticipated discharge 05/12        Chief Complaint:  Headache    Subjective:  Overall the patient states he is doing better from a respiratory standpoint  He continues to have a headache  He has cough with anterior muscle wall abdominal discomfort with coughing  No phlegm production  Vitals: Blood pressure 154/89, pulse 94, temperature 97 6 °F (36 4 °C), resp  rate 15, height 5' 4" (1 626 m), weight 74 8 kg (165 lb), SpO2 97 %  , 2 L, Body mass index is 28 32 kg/m²        Intake/Output Summary (Last 24 hours) at 5/10/2022 1255  Last data filed at 5/10/2022 0901  Gross per 24 hour   Intake 420 ml   Output 600 ml   Net -180 ml       Physical exam:  General:  Patient is awake, alert, non-toxic and in no acute respiratory distress  Neck: No JVD  CV:  Regular, +S1 and S2, No murmurs, gallops or rubs appreciated  Lungs:  Diminished breath sounds bilateral without significant wheeze, rales or rhonchi  Abdomen: Soft, +BS, Non-tender, non-distended  Extremities: No clubbing, cyanosis or edema  Neuro: No focal deficits    Labs:   CBC:   Lab Results   Component Value Date    WBC 11 04 (H) 05/10/2022    HGB 12 7 05/10/2022 HCT 41 2 05/10/2022    MCV 95 05/10/2022     05/10/2022    MCH 29 3 05/10/2022    MCHC 30 8 (L) 05/10/2022    RDW 13 9 05/10/2022    MPV 8 1 (L) 05/10/2022   , CMP:   Lab Results   Component Value Date    SODIUM 132 (L) 05/10/2022    K 5 1 05/10/2022     05/10/2022    CO2 27 05/10/2022    BUN 36 (H) 05/10/2022    CREATININE 1 85 (H) 05/10/2022    CALCIUM 8 5 05/10/2022    EGFR 31 05/10/2022       Asa Garcia DO      Portions of the record may have been created with voice recognition software  Occasional wrong word or "sound a like" substitutions may have occurred due to the inherent limitations of voice recognition software  Read the chart carefully and recognize, using context, where substitutions have occurred

## 2022-05-11 PROCEDURE — 99232 SBSQ HOSP IP/OBS MODERATE 35: CPT | Performed by: PHYSICIAN ASSISTANT

## 2022-05-11 PROCEDURE — 94640 AIRWAY INHALATION TREATMENT: CPT

## 2022-05-11 PROCEDURE — 94760 N-INVAS EAR/PLS OXIMETRY 1: CPT

## 2022-05-11 PROCEDURE — 99232 SBSQ HOSP IP/OBS MODERATE 35: CPT | Performed by: INTERNAL MEDICINE

## 2022-05-11 PROCEDURE — 94762 N-INVAS EAR/PLS OXIMTRY CONT: CPT

## 2022-05-11 RX ORDER — PREDNISONE 20 MG/1
40 TABLET ORAL DAILY
Status: DISCONTINUED | OUTPATIENT
Start: 2022-05-12 | End: 2022-05-12 | Stop reason: HOSPADM

## 2022-05-11 RX ADMIN — GUAIFENESIN 1200 MG: 600 TABLET ORAL at 09:30

## 2022-05-11 RX ADMIN — LORAZEPAM 0.5 MG: 0.5 TABLET ORAL at 17:21

## 2022-05-11 RX ADMIN — PRIMIDONE 50 MG: 50 TABLET ORAL at 09:32

## 2022-05-11 RX ADMIN — ACETAMINOPHEN 650 MG: 325 TABLET, FILM COATED ORAL at 07:40

## 2022-05-11 RX ADMIN — Medication 1 TABLET: at 09:29

## 2022-05-11 RX ADMIN — ACETAMINOPHEN 650 MG: 325 TABLET, FILM COATED ORAL at 13:36

## 2022-05-11 RX ADMIN — AMIODARONE HYDROCHLORIDE 200 MG: 200 TABLET ORAL at 09:30

## 2022-05-11 RX ADMIN — BUDESONIDE 0.5 MG: 0.5 INHALANT ORAL at 07:46

## 2022-05-11 RX ADMIN — FLUTICASONE PROPIONATE 1 SPRAY: 50 SPRAY, METERED NASAL at 17:21

## 2022-05-11 RX ADMIN — PRIMIDONE 50 MG: 50 TABLET ORAL at 21:01

## 2022-05-11 RX ADMIN — IPRATROPIUM BROMIDE 0.5 MG: 0.5 SOLUTION RESPIRATORY (INHALATION) at 19:40

## 2022-05-11 RX ADMIN — FLUTICASONE PROPIONATE 1 SPRAY: 50 SPRAY, METERED NASAL at 09:30

## 2022-05-11 RX ADMIN — IPRATROPIUM BROMIDE 0.5 MG: 0.5 SOLUTION RESPIRATORY (INHALATION) at 14:06

## 2022-05-11 RX ADMIN — LEVALBUTEROL HYDROCHLORIDE 1.25 MG: 1.25 SOLUTION, CONCENTRATE RESPIRATORY (INHALATION) at 19:40

## 2022-05-11 RX ADMIN — METHYLPREDNISOLONE SODIUM SUCCINATE 40 MG: 40 INJECTION, POWDER, FOR SOLUTION INTRAMUSCULAR; INTRAVENOUS at 09:30

## 2022-05-11 RX ADMIN — HEPARIN SODIUM 5000 UNITS: 5000 INJECTION INTRAVENOUS; SUBCUTANEOUS at 06:10

## 2022-05-11 RX ADMIN — HEPARIN SODIUM 5000 UNITS: 5000 INJECTION INTRAVENOUS; SUBCUTANEOUS at 21:01

## 2022-05-11 RX ADMIN — LORAZEPAM 0.5 MG: 0.5 TABLET ORAL at 21:00

## 2022-05-11 RX ADMIN — GUAIFENESIN 1200 MG: 600 TABLET ORAL at 21:00

## 2022-05-11 RX ADMIN — FORMOTEROL FUMARATE DIHYDRATE 20 MCG: 20 SOLUTION RESPIRATORY (INHALATION) at 20:13

## 2022-05-11 RX ADMIN — ESCITALOPRAM OXALATE 5 MG: 10 TABLET ORAL at 09:30

## 2022-05-11 RX ADMIN — IPRATROPIUM BROMIDE 0.5 MG: 0.5 SOLUTION RESPIRATORY (INHALATION) at 07:46

## 2022-05-11 RX ADMIN — LORAZEPAM 0.5 MG: 0.5 TABLET ORAL at 09:30

## 2022-05-11 RX ADMIN — BUDESONIDE 0.5 MG: 0.5 INHALANT ORAL at 19:41

## 2022-05-11 RX ADMIN — FORMOTEROL FUMARATE DIHYDRATE 20 MCG: 20 SOLUTION RESPIRATORY (INHALATION) at 07:46

## 2022-05-11 RX ADMIN — LEVALBUTEROL HYDROCHLORIDE 1.25 MG: 1.25 SOLUTION, CONCENTRATE RESPIRATORY (INHALATION) at 07:46

## 2022-05-11 RX ADMIN — TAMSULOSIN HYDROCHLORIDE 0.4 MG: 0.4 CAPSULE ORAL at 17:21

## 2022-05-11 RX ADMIN — Medication 6.25 MG: at 21:03

## 2022-05-11 RX ADMIN — LEVOTHYROXINE SODIUM 75 MCG: 25 TABLET ORAL at 06:11

## 2022-05-11 RX ADMIN — ZOLPIDEM TARTRATE 5 MG: 5 TABLET ORAL at 21:00

## 2022-05-11 RX ADMIN — PANTOPRAZOLE SODIUM 20 MG: 20 TABLET, DELAYED RELEASE ORAL at 06:11

## 2022-05-11 RX ADMIN — HEPARIN SODIUM 5000 UNITS: 5000 INJECTION INTRAVENOUS; SUBCUTANEOUS at 13:36

## 2022-05-11 RX ADMIN — LEVALBUTEROL HYDROCHLORIDE 1.25 MG: 1.25 SOLUTION, CONCENTRATE RESPIRATORY (INHALATION) at 14:06

## 2022-05-11 NOTE — ASSESSMENT & PLAN NOTE
· Initially requiring 4 L nasal cannula on admission, now weaned to 1-2 L  · Secondary to COPD exacerbation   · Wean O2 for sats >/= 88%  · Appreciate ongoing pulmonary recommendations

## 2022-05-11 NOTE — PROGRESS NOTES
Pulmonary Progress Note  Elva Falcon 80 y o  male MRN: [id number]  Unit/Bed#: -01 Encounter: [id number]      Assesment and Recommendations:    1  Acute respiratory insufficiency secondary to COPD/emphysema/chronic bronchitis with acute exacerbation  · Decrease Solu-Medrol to 40 mg IV once daily and transition to prednisone in a m     Will need to be discharged home on a prednisone taper starting at 40 mg daily and decreasing by 10 mg every 3 days  · Continue Pulmicort/Perforomist b i d , Xopenex/Atrovent t i d   · I placed patient on room air  · Upon discharge would start Trelegy once daily with albuterol p r n     Would discontinue Pulmicort/Perforomist/Xopenex/Atrovent/home Anoro     2  Lung nodule - slightly increased in size since 2017 (but stable since 2018)  · Will follow-up in our pulmonary office for routine imaging     3  LIVIER  · Intolerant to CPAP - would recommend nocturnal oxygen   · Overnight pulse oximetry tonight to see if patient needs nocturnal oxygen upon discharge      Chief Complaint:  Feeling much better    Subjective:  Overall the patient states he is feeling much better  No significant shortness of breath   Less cough  No chest pain  Vitals: Blood pressure 155/88, pulse 103, temperature 97 5 °F (36 4 °C), resp  rate 18, height 5' 4" (1 626 m), weight 74 8 kg (165 lb), SpO2 96 %  , 1L, Body mass index is 28 32 kg/m²        Intake/Output Summary (Last 24 hours) at 5/11/2022 1008  Last data filed at 5/11/2022 0901  Gross per 24 hour   Intake 180 ml   Output 350 ml   Net -170 ml       Physical exam:  General:  Patient is awake, alert, non-toxic and in no acute respiratory distress  Neck: No JVD  CV:  Regular, +S1 and S2, No murmurs, gallops or rubs appreciated  Lungs:  Diminished breath sounds bilateral without wheeze, rales or rhonchi  Abdomen: Soft, +BS, Non-tender, non-distended  Extremities: No clubbing, cyanosis or edema  Neuro: No focal deficits      Obi Clark DO      Portions of the record may have been created with voice recognition software  Occasional wrong word or "sound a like" substitutions may have occurred due to the inherent limitations of voice recognition software  Read the chart carefully and recognize, using context, where substitutions have occurred

## 2022-05-11 NOTE — ASSESSMENT & PLAN NOTE
Wt Readings from Last 3 Encounters:   05/08/22 74 8 kg (165 lb)   05/03/22 74 8 kg (165 lb)   04/12/22 76 8 kg (169 lb 6 4 oz)     · Last echo 09/28/2021-systolic function normal, EF 66%, grade 1 diastolic dysfunction, trace MR, mild AR, aortic stenosis, mild TR  · Appears euvolemic on exam  · Not currently on diuretics  · Continue to monitor I&O

## 2022-05-11 NOTE — PROGRESS NOTES
[place]     Progress Note - Mekhi Alfaro 6/30/1932, 80 y o  male MRN: [id number]  Unit/Bed#: -Jaimie Encounter: [id number]  Primary Care Provider: Liv Nelson MD   Date and time admitted to hospital: 5/5/2022  7:10 AM    * COPD exacerbation [facility])  Assessment & Plan  Patient presented to ED with increased cough and shortness of breath over the last 3 days   Outpatient medications:  Anoro, Atrovent, Xopenex, p r n  Albuterol   Was seen at  earlier in the week given cefdinir due to bronchitis   CXR:  No acute cardiopulmonary findings   Sputum cultures ordered-  Not sent    Leukocytosis improving, 11 K   Procal level negative x2   Oxygen via NC:  Initially requiring 4 L nasal cannula, weaned to 2 L  Currently on 1 L nasal cannula   Respiratory protocol  - started on Pulmicort/perforomist   IV Solu-Medrol decreased to Q 12 hr 5/9, weaned to daily starting today   Azithromycin x 3 days completed   Consult pulmonary, appreciate ongoing recommendations   Anticipate discharge on Trelegy to replace Anoro   Likely transition to oral prednisone starting tomorrow with subsequent taper    Acute respiratory failure with hypoxia ([facility] [address] )  Assessment & Plan  · Initially requiring 4 L nasal cannula on admission, now weaned to 1-2 L  · Secondary to COPD exacerbation   · Wean O2 for sats >/= 88%  · Appreciate ongoing pulmonary recommendations    SIRS (systemic inflammatory response syndrome) (HCC)  Assessment & Plan  · SIRS , POA, likely due to COPD exacerbation, as evidenced by tachycardia and tachypnea, being treated respiratory protocol and steroids with azithromycin   · HR >90 and RR >20, no source of infection suspected at this time  · Procalcitonin negative x2 earlier in admission  · Repeat CXR with questionable left upper lobe infiltrate, follow-up repeat procalcitonin    Leukocytosis stable in setting of IV steroids    Chronic renal disease, stage IV (HCC)  Assessment & Plan  Lab Results   Component Value Date    EGFR 31 05/10/2022    EGFR 35 05/07/2022    EGFR 32 05/06/2022    CREATININE 1 85 (H) 05/10/2022    CREATININE 1 68 (H) 05/07/2022    CREATININE 1 80 (H) 05/06/2022   · Creatinine at baseline is 1 7-1 8- stable   · Continue to monitor  · Avoid hypotension, nephrotoxic agents, NSAIDs  · Creatinine within baseline    LIVIER (obstructive sleep apnea)  Assessment & Plan  · Patient has returned CPAP due to not using  · Encourage follow-up with primary to evaluate for other options  · Potential need for nighttime oxygen  · Overnight O2 study prior to discharge    Chronic diastolic heart failure (HCC)  Assessment & Plan  Wt Readings from Last 3 Encounters:   05/08/22 74 8 kg (165 lb)   05/03/22 74 8 kg (165 lb)   04/12/22 76 8 kg (169 lb 6 4 oz)     · Last echo 09/28/2021-systolic function normal, EF 56%, grade 1 diastolic dysfunction, trace MR, mild AR, aortic stenosis, mild TR  · Appears euvolemic on exam  · Not currently on diuretics  · Continue to monitor I&O    Acquired hypothyroidism  Assessment & Plan  · Continue levothyroxine 75 mcg    Anxiety disorder due to general medical condition  Assessment & Plan  · Continue lorazepam 0 5 mg TID   · Continue lexapro 5mg daily   · Supportive care     Essential hypertension  Assessment & Plan  · Not currently on BP medications  · BP adequate continue to monitor    CAD (coronary artery disease)  Assessment & Plan  · 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  · Had reported chest pain yesterday 5/9, resolved    VTE Pharmacologic Prophylaxis: VTE Score: 7 High Risk (Score >/= 5) - Pharmacological DVT Prophylaxis Ordered: heparin  Sequential Compression Devices Ordered  Patient Centered Rounds: I performed bedside rounds with nursing staff today  Discussions with Specialists or Other Care Team Provider: CM, pulmonology    Education and Discussions with Family / Patient: Updated  (daughter in law) via phone      Time Spent for Care: 30 minutes  More than 50% of total time spent on counseling and coordination of care as described above  Current Length of Stay: 6 day(s)  Current Patient Status: Inpatient   Certification Statement: The patient will continue to require additional inpatient hospital stay due to Weaning IV steroids  Discharge Plan: Anticipate discharge tomorrow to home with home services  Code Status: Level 3 - DNAR and DNI    Subjective:   Patient reports feeling much better today, shortness of breath is improving  Still with occasional cough however this is largely nonproductive  Denies chest pain/palpitations, nausea vomiting, abdominal pain  Objective:     Vitals:   Temp (24hrs), Av 2 °F (36 2 °C), Min:97 °F (36 1 °C), Max:97 5 °F (36 4 °C)    Temp:  [97 °F (36 1 °C)-97 5 °F (36 4 °C)] 97 5 °F (36 4 °C)  HR:  [] 103  Resp:  [16-18] 18  BP: (133-156)/(79-89) 155/88  SpO2:  [92 %-100 %] 96 %  Body mass index is 28 32 kg/m²  Input and Output Summary (last 24 hours): Intake/Output Summary (Last 24 hours) at 2022 1301  Last data filed at 2022 1150  Gross per 24 hour   Intake 300 ml   Output 550 ml   Net -250 ml       Physical Exam:   Physical Exam  Vitals and nursing note reviewed  Constitutional:       Appearance: He is well-developed  Interventions: Nasal cannula in place  Comments: Appears comfortable, no acute distress   HENT:      Head: Normocephalic and atraumatic  Eyes:      General: No scleral icterus  Extraocular Movements: Extraocular movements intact  Conjunctiva/sclera: Conjunctivae normal    Cardiovascular:      Rate and Rhythm: Normal rate and regular rhythm  Heart sounds: S1 normal and S2 normal  No murmur heard  Pulmonary:      Effort: Pulmonary effort is normal  No respiratory distress  Breath sounds: Decreased breath sounds present  No wheezing, rhonchi or rales     Abdominal:      General: Bowel sounds are normal       Palpations: Abdomen is soft       Tenderness: There is no abdominal tenderness  There is no guarding or rebound  Musculoskeletal:      Cervical back: Normal range of motion  Comments: Able to move upper/lower extremities bilaterally, no edema   Skin:     General: Skin is warm and dry  Neurological:      Mental Status: He is alert and oriented to person, place, and time  Psychiatric:         Mood and Affect: Mood normal          Speech: Speech normal          Behavior: Behavior normal           Additional Data:     Labs:  Results from last 7 days   Lab Units 05/10/22  0952 05/06/22  0444 05/05/22  0733   WBC Thousand/uL 11 04*   < > 8 47   HEMOGLOBIN g/dL 12 7   < > 14 0   I STAT HEMOGLOBIN   --    < >  --    HEMATOCRIT % 41 2   < > 44 8   HEMATOCRIT, ISTAT   --    < >  --    PLATELETS Thousands/uL 150   < > 157   BANDS PCT % 1  --   --    NEUTROS PCT %  --   --  69   LYMPHS PCT %  --   --  15   LYMPHO PCT % 28  --   --    MONOS PCT %  --   --  13*   MONO PCT % 0*  --   --    EOS PCT % 0  --  2    < > = values in this interval not displayed  Results from last 7 days   Lab Units 05/10/22  0952 05/06/22  0444 05/05/22  0733   SODIUM mmol/L 132*   < > 133*   POTASSIUM mmol/L 5 1   < > 4 3   CHLORIDE mmol/L 100   < > 100   CO2 mmol/L 27   < > 23   CO2, I-STAT   --    < >  --    BUN mg/dL 36*   < > 20   CREATININE mg/dL 1 85*   < > 1 78*   ANION GAP mmol/L 5   < > 10   CALCIUM mg/dL 8 5   < > 9 3   ALBUMIN g/dL  --   --  3 4*   TOTAL BILIRUBIN mg/dL  --   --  0 60   ALK PHOS U/L  --   --  144*   ALT U/L  --   --  79*   AST U/L  --   --  37   GLUCOSE RANDOM mg/dL 187*   < > 106    < > = values in this interval not displayed       Results from last 7 days   Lab Units 05/05/22  0733   INR  1 07             Results from last 7 days   Lab Units 05/10/22  0952 05/06/22  0444 05/05/22  0733   LACTIC ACID mmol/L  --   --  1 0   PROCALCITONIN ng/ml 0 11 0 25 0 21       Lines/Drains:  Invasive Devices  Report    Peripheral Intravenous Line Duration           Peripheral IV 05/09/22 Distal;Right;Ventral (anterior) Forearm 2 days                      Imaging: No pertinent imaging reviewed  Recent Cultures (last 7 days):   Results from last 7 days   Lab Units 05/05/22  0733   BLOOD CULTURE  No Growth After 5 Days  No Growth After 5 Days         Last 24 Hours Medication List:   Current Facility-Administered Medications   Medication Dose Route Frequency Provider Last Rate    acetaminophen  650 mg Oral Q6H PRN Donald Felling, PA-C      al mag oxide-diphenhydramine-lidocaine viscous  10 mL Swish & Spit Q4H PRN Delia Killian V, PA-C      albuterol  2 5 mg Nebulization Q6H PRN Lindsay Gasca, DO      aluminum-magnesium hydroxide-simethicone  30 mL Oral Q6H PRN [facility] Felling, PA-C      amiodarone  200 mg Oral Daily Donald Felling, PA-C      benzonatate  100 mg Oral TID PRN Eleuterio Staton, PA-C      budesonide  0 5 mg Nebulization Q12H Kayley Downing, DO      escitalopram  5 mg Oral Daily Donald Felling, PA-C      ferrous sulfate  325 mg Oral Every Other Day Donald Felling, PA-C      fluticasone  1 spray Nasal BID Donald Felling, PA-C      formoterol  20 mcg Nebulization Q12H Kayley Downing, DO      guaiFENesin  1,200 mg Oral Q12H [address] Donald Felling, PA-C      heparin (porcine)  5,000 Units Subcutaneous [facility] Donald Felling, PA-C      ipratropium  0 5 mg Nebulization TID [facility] Felling, PA-C      levalbuterol  1 25 mg Nebulization TID [facility] Felling, PA-C      levothyroxine  75 mcg Oral Early Morning Donald Felling, PA-C      LORazepam  0 5 mg Oral TID [facility] Felling, PA-C      multivitamin-minerals  1 tablet Oral Daily Donald Felling, PA-C      ondansetron  4 mg Oral Q6H PRN Lindsay Gasca, DO      pantoprazole  20 mg Oral Early Morning Donald Felling, PA-C      phenol  1 spray Mouth/Throat Q2H PRN Delia Killian V PAPaolaC      [START ON 5/12/2022] predniSONE  40 mg Oral Daily Kayley Downing, DO      primidone  50 mg Oral Q12H [address] Donald Perez PA-C      promethazine  6 25 mg Oral 4x Daily PRN Karlos Dumont PA-C      saliva substitute  5 spray Mouth/Throat 4x Daily  [address]      senna  1 tablet Oral BID PRN Karlos Dumont PA-C      tamsulosin  0 4 mg Oral Daily With Avtar Wyatt PA-C      traMADol  50 mg Oral Q6H PRN Bao Perrin DO      zolpidem  5 mg Oral HS PRN Karlos Dumont PA-C          Today, Patient Was Seen By: Tressa Armendariz PA-C    **Please Note: This note may have been constructed using a voice recognition system  **

## 2022-05-11 NOTE — ASSESSMENT & PLAN NOTE
Patient presented to ED with increased cough and shortness of breath over the last 3 days   Outpatient medications:  Anoro, Atrovent, Xopenex, p r n  Albuterol   Was seen at  earlier in the week given cefdinir due to bronchitis   CXR:  No acute cardiopulmonary findings   Sputum cultures ordered-  Not sent    Leukocytosis improving, 11 K   Procal level negative x2   Oxygen via NC:  Initially requiring 4 L nasal cannula, weaned to 2 L   Currently on 1 L nasal cannula   Respiratory protocol  - started on Pulmicort/perforomist   IV Solu-Medrol decreased to Q 12 hr 5/9, weaned to daily starting today   Azithromycin x 3 days completed   Consult pulmonary, appreciate ongoing recommendations   Anticipate discharge on Trelegy to replace Anoro   Likely transition to oral prednisone starting tomorrow with subsequent taper

## 2022-05-11 NOTE — CASE MANAGEMENT
Case Management Discharge Planning Note    Patient name Jasmin Roof  Location /-99 MRN [id number]  : 1932 Date 2022       Current Admission Date: 2022  Current Admission Diagnosis:COPD exacerbation [facility])   Patient Active Problem List    Diagnosis Date Noted    [facility] 2022    Hyponatremia 2022    Paroxysmal SVT (supraventricular tachycardia) ([facility] ) 2022    SIRS (systemic inflammatory response syndrome) ([facility] ) 2022    Centrilobular emphysema ([place] [address] ) 02/15/2022    Current moderate episode of major depressive disorder without prior episode ([place] [address] ) 10/06/2021    Chronic renal disease, stage IV ([place] [address] ) 10/06/2021    PAC (premature atrial contraction) 10/06/2021    COPD exacerbation ([place] [address] ) 2021    Viral illness 09/15/2021    Primary osteoarthritis of left knee 2021    Patellar tendonitis of left knee 2021    Tinea cruris 2021    Partial arterial occlusion of retina 2021    Ischemic colitis ([place] [address] ) 2021    Central serous chorioretinopathy 2020    Bilateral sensorineural hearing loss 2020    Gastrointestinal hemorrhage 2020    Senile nuclear cataract 2020    Peptic ulcer with hemorrhage but without obstruction 2020    Nonspecific abnormal findings on radiological and examination of lung field 2020    TMJ arthropathy 2020    Chronic cough 2020    Impacted cerumen of left ear 03/15/2020    Dizziness 2020    Chest pain 2020    LIVIER (obstructive sleep apnea)     Snoring     Excessive daytime sleepiness     Aneurysm, pulmonary, arteriovenous 10/20/2019    Stage 3 chronic kidney disease ([facility] ) 2019    Cervical spine arthritis 2019    Chronic diastolic heart failure ([facility] ) 04/10/2019    Pneumonia of right lower lobe due to infectious organism 04/10/2019    Urinary obstruction 04/10/2019    BMI 29 0-29 9,adult 04/01/2019    Suprapubic tenderness 03/29/2019    Loose stools 03/29/2019    Bronchitis 03/28/2019    Eczema 08/22/2018    Seborrheic keratoses, inflamed 08/22/2018    Medicare annual wellness visit, subsequent 07/13/2018    Shortness of breath 06/04/2018    Tachycardia 06/04/2018    Other atopic dermatitis 08/16/2017    Headache 01/20/2017    Insomnia 08/18/2016    Hydronephrosis of right kidney 05/14/2016    CAD (coronary artery disease) 05/14/2016    Carotid stenosis 05/14/2016    Essential hypertension 05/14/2016    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[facility] [address] ) 06/11/2015    Macular degeneration 11/15/2014    GERD (gastroesophageal reflux disease) 06/06/2014    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/26/2013    Iron deficiency anemia 10/09/2012    Benign prostatic hyperplasia with lower urinary tract symptoms 05/07/2012    Mixed simple and mucopurulent chronic bronchitis ([facility] [address] ) 05/07/2012    Mixed hyperlipidemia 05/07/2012    Acquired hypothyroidism 05/07/2012    Osteoporosis 05/07/2012      LOS (days): 6  Geometric Mean LOS (GMLOS) (days): 3 00  Days to GMLOS:-3 2     OBJECTIVE:  Risk of Unplanned Readmission Score: 30 54         Current admission status: Inpatient   Preferred Pharmacy:   [facility] [address]  [address] [place] [address]  Phone: [phone] Fax: [address]  [address] [place] [address]  Phone: [phone] Fax: [phone]    Primary Care Provider: Jennifer Angel MD    Primary Insurance: MEDICARE  Secondary Insurance: [facility] HEALTH OPTIONS PROGRAM    DISCHARGE DETAILS:    Discharge planning discussed with[de-identified] Patient and son Koby Chatters of Choice: Yes     CM contacted family/caregiver?: Yes     Did patient/caregiver verbalize understanding of patient care needs?: Yes  Were patient/caregiver advised of the risks associated with not following Treatment Team discharge recommendations?: Yes    Contacts  Patient Contacts: Claude Hair  Relationship to Patient[de-identified] Family  Contact Method: In Person  Reason/Outcome: Discharge [address]         Is the patient interested in [facility] at discharge?: Yes  [address] requested[de-identified] [address] Name[de-identified] [facility] Provider[de-identified] PCP  Home Health Services Needed[de-identified] COPD Management  Oxygen LPM Ordered (if applicable based on home health services needed):: 2 LPM  Homebound Criteria Met[de-identified] Uses an Assist Device (i e  cane, walker, etc)  Supporting Clincal Findings[de-identified] Requires Oxygen, Dyspnea with Exertion, Fatigues Easliy in [employer], Limited Endurance    Other Referral/Resources/Interventions Provided:  Interventions: [facility]    Treatment Team Recommendation: Home with [address] [employer]     Additional Comments: Met with patient and discussed [facility]  Patient requested that aleshia Powell be called to discuss  Spoke with aleshia Powell who is agreeable to [facility]    [facility] can accept referral   Discussed discharge planning with son and possible need for home O2 following overnight pulse Ox study

## 2022-05-11 NOTE — PLAN OF CARE
Problem: Potential for Falls  Goal: Patient will remain free of falls  Description: INTERVENTIONS:  - Educate patient/family on patient safety including physical limitations  - Instruct patient to call for assistance with activity   - Consult OT/PT to assist with strengthening/mobility   - Keep Call bell within reach  - Keep bed low and locked with side rails adjusted as appropriate  - Keep care items and personal belongings within reach  - Initiate and maintain comfort rounds  - Make Fall Risk Sign visible to staff  - Offer Toileting every 2 Hours, in advance of need  - Initiate/Maintain alarm  - Obtain necessary fall risk management equipment:   - Apply yellow socks and bracelet for high fall risk patients  - Consider moving patient to room near nurses station  Outcome: Progressing     Problem: RESPIRATORY - ADULT  Goal: Achieves optimal ventilation and oxygenation  Description: INTERVENTIONS:  - Assess for changes in respiratory status  - Assess for changes in mentation and behavior  - Position to facilitate oxygenation and minimize respiratory effort  - Oxygen administered by appropriate delivery if ordered  - Initiate smoking cessation education as indicated  - Encourage broncho-pulmonary hygiene including cough, deep breathe, Incentive Spirometry  - Assess the need for suctioning and aspirate as needed  - Assess and instruct to report SOB or any respiratory difficulty  - Respiratory Therapy support as indicated  Outcome: Progressing     Problem: MOBILITY - ADULT  Goal: Maintain or return to baseline ADL function  Description: INTERVENTIONS:  -  Assess patient's ability to carry out ADLs; assess patient's baseline for ADL function and identify physical deficits which impact ability to perform ADLs (bathing, care of mouth/teeth, toileting, grooming, dressing, etc )  - Assess/evaluate cause of self-care deficits   - Assess range of motion  - Assess patient's mobility; develop plan if impaired  - Assess patient's need for assistive devices and provide as appropriate  - Encourage maximum independence but intervene and supervise when necessary  - Involve family in performance of ADLs  - Assess for home care needs following discharge   - Consider OT consult to assist with ADL evaluation and planning for discharge  - Provide patient education as appropriate  Outcome: Progressing  Goal: Maintains/Returns to pre admission functional level  Description: INTERVENTIONS:  - Perform BMAT or MOVE assessment daily    - Set and communicate daily mobility goal to care team and patient/family/caregiver     - Collaborate with rehabilitation services on mobility goals if consulted  - Out of bed for toileting  - Record patient progress and toleration of activity level   Outcome: Progressing

## 2022-05-12 VITALS
RESPIRATION RATE: 20 BRPM | HEART RATE: 104 BPM | WEIGHT: 165 LBS | BODY MASS INDEX: 28.17 KG/M2 | SYSTOLIC BLOOD PRESSURE: 131 MMHG | OXYGEN SATURATION: 92 % | TEMPERATURE: 97.8 F | DIASTOLIC BLOOD PRESSURE: 82 MMHG | HEIGHT: 64 IN

## 2022-05-12 PROBLEM — R65.10 SIRS (SYSTEMIC INFLAMMATORY RESPONSE SYNDROME) (HCC): Status: RESOLVED | Noted: 2022-05-05 | Resolved: 2022-05-12

## 2022-05-12 LAB
ANION GAP SERPL CALCULATED.3IONS-SCNC: 3 MMOL/L (ref 4–13)
BASOPHILS # BLD MANUAL: 0 THOUSAND/UL (ref 0–0.1)
BASOPHILS NFR MAR MANUAL: 0 % (ref 0–1)
BUN SERPL-MCNC: 41 MG/DL (ref 5–25)
CALCIUM SERPL-MCNC: 8.2 MG/DL (ref 8.3–10.1)
CHLORIDE SERPL-SCNC: 99 MMOL/L (ref 100–108)
CO2 SERPL-SCNC: 29 MMOL/L (ref 21–32)
CREAT SERPL-MCNC: 1.75 MG/DL (ref 0.6–1.3)
DME PARACHUTE DELIVERY DATE ACTUAL: NORMAL
DME PARACHUTE DELIVERY DATE EXPECTED: NORMAL
DME PARACHUTE DELIVERY DATE REQUESTED: NORMAL
DME PARACHUTE ITEM DESCRIPTION: NORMAL
DME PARACHUTE ORDER STATUS: NORMAL
DME PARACHUTE SUPPLIER NAME: NORMAL
DME PARACHUTE SUPPLIER PHONE: NORMAL
EOSINOPHIL # BLD MANUAL: 0 THOUSAND/UL (ref 0–0.4)
EOSINOPHIL NFR BLD MANUAL: 0 % (ref 0–6)
ERYTHROCYTE [DISTWIDTH] IN BLOOD BY AUTOMATED COUNT: 14.4 % (ref 11.6–15.1)
GFR SERPL CREATININE-BSD FRML MDRD: 33 ML/MIN/1.73SQ M
GLUCOSE SERPL-MCNC: 102 MG/DL (ref 65–140)
HCT VFR BLD AUTO: 39.5 % (ref 36.5–49.3)
HGB BLD-MCNC: 12.6 G/DL (ref 12–17)
LYMPHOCYTES # BLD AUTO: 2.51 THOUSAND/UL (ref 0.6–4.47)
LYMPHOCYTES # BLD AUTO: 21 % (ref 14–44)
MCH RBC QN AUTO: 30.2 PG (ref 26.8–34.3)
MCHC RBC AUTO-ENTMCNC: 31.9 G/DL (ref 31.4–37.4)
MCV RBC AUTO: 95 FL (ref 82–98)
METAMYELOCYTES NFR BLD MANUAL: 2 % (ref 0–1)
MONOCYTES # BLD AUTO: 0.96 THOUSAND/UL (ref 0–1.22)
MONOCYTES NFR BLD: 8 % (ref 4–12)
NEUTROPHILS # BLD MANUAL: 8.13 THOUSAND/UL (ref 1.85–7.62)
NEUTS BAND NFR BLD MANUAL: 6 % (ref 0–8)
NEUTS SEG NFR BLD AUTO: 62 % (ref 43–75)
PLATELET # BLD AUTO: 155 THOUSANDS/UL (ref 149–390)
PLATELET BLD QL SMEAR: ADEQUATE
PMV BLD AUTO: 8.7 FL (ref 8.9–12.7)
POTASSIUM SERPL-SCNC: 4.5 MMOL/L (ref 3.5–5.3)
POTASSIUM SERPL-SCNC: 5.4 MMOL/L (ref 3.5–5.3)
RBC # BLD AUTO: 4.17 MILLION/UL (ref 3.88–5.62)
RBC MORPH BLD: NORMAL
SODIUM SERPL-SCNC: 131 MMOL/L (ref 136–145)
VARIANT LYMPHS # BLD AUTO: 1 %
WBC # BLD AUTO: 11.96 THOUSAND/UL (ref 4.31–10.16)

## 2022-05-12 PROCEDURE — 94762 N-INVAS EAR/PLS OXIMTRY CONT: CPT

## 2022-05-12 PROCEDURE — 99232 SBSQ HOSP IP/OBS MODERATE 35: CPT | Performed by: PHYSICIAN ASSISTANT

## 2022-05-12 PROCEDURE — 80048 BASIC METABOLIC PNL TOTAL CA: CPT | Performed by: INTERNAL MEDICINE

## 2022-05-12 PROCEDURE — 85007 BL SMEAR W/DIFF WBC COUNT: CPT | Performed by: INTERNAL MEDICINE

## 2022-05-12 PROCEDURE — 85027 COMPLETE CBC AUTOMATED: CPT | Performed by: INTERNAL MEDICINE

## 2022-05-12 PROCEDURE — 84132 ASSAY OF SERUM POTASSIUM: CPT | Performed by: PHYSICIAN ASSISTANT

## 2022-05-12 PROCEDURE — 99239 HOSP IP/OBS DSCHRG MGMT >30: CPT | Performed by: PHYSICIAN ASSISTANT

## 2022-05-12 RX ORDER — BENZONATATE 100 MG/1
100 CAPSULE ORAL 3 TIMES DAILY PRN
Qty: 20 CAPSULE | Refills: 0 | Status: SHIPPED | OUTPATIENT
Start: 2022-05-12

## 2022-05-12 RX ORDER — PREDNISONE 10 MG/1
TABLET ORAL
Qty: 30 TABLET | Refills: 0 | Status: SHIPPED | OUTPATIENT
Start: 2022-05-13 | End: 2022-05-25

## 2022-05-12 RX ORDER — FLUTICASONE FUROATE, UMECLIDINIUM BROMIDE AND VILANTEROL TRIFENATATE 100; 62.5; 25 UG/1; UG/1; UG/1
1 POWDER RESPIRATORY (INHALATION) DAILY
Qty: 60 BLISTER | Refills: 0 | Status: SHIPPED | OUTPATIENT
Start: 2022-05-12 | End: 2022-06-11

## 2022-05-12 RX ADMIN — FLUTICASONE PROPIONATE 1 SPRAY: 50 SPRAY, METERED NASAL at 08:49

## 2022-05-12 RX ADMIN — GUAIFENESIN 1200 MG: 600 TABLET ORAL at 08:47

## 2022-05-12 RX ADMIN — FERROUS SULFATE TAB 325 MG (65 MG ELEMENTAL FE) 325 MG: 325 (65 FE) TAB at 08:47

## 2022-05-12 RX ADMIN — HEPARIN SODIUM 5000 UNITS: 5000 INJECTION INTRAVENOUS; SUBCUTANEOUS at 05:28

## 2022-05-12 RX ADMIN — Medication 1 TABLET: at 08:47

## 2022-05-12 RX ADMIN — PANTOPRAZOLE SODIUM 20 MG: 20 TABLET, DELAYED RELEASE ORAL at 08:46

## 2022-05-12 RX ADMIN — PRIMIDONE 50 MG: 50 TABLET ORAL at 08:47

## 2022-05-12 RX ADMIN — LEVOTHYROXINE SODIUM 75 MCG: 25 TABLET ORAL at 05:27

## 2022-05-12 RX ADMIN — PREDNISONE 40 MG: 20 TABLET ORAL at 08:48

## 2022-05-12 RX ADMIN — AMIODARONE HYDROCHLORIDE 200 MG: 200 TABLET ORAL at 08:47

## 2022-05-12 RX ADMIN — LORAZEPAM 0.5 MG: 0.5 TABLET ORAL at 08:48

## 2022-05-12 RX ADMIN — ESCITALOPRAM OXALATE 5 MG: 10 TABLET ORAL at 08:48

## 2022-05-12 NOTE — RESPIRATORY THERAPY NOTE
Home Oxygen Qualifying Test     Patient name: Yasir Gandara        : 1932   Date of Test:  May 12, 2022  Diagnosis:    Home Oxygen Test:    **Medicare Guidelines require item(s) 1-5 on all ambulatory patients or 1 and 2 on non-ambulatory patients  1  Baseline SPO2 on Room Air at rest 93 %   1  SPO2 during exertion on Room Air 95  %  a  During exertion monitor SPO2  If SPO2 increases >=89%, do not add supplemental oxygen    2  SPO2 on Oxygen at Rest 96% % at 2 LPM    3  SPO2 during exertion on Oxygen 96 % at 2 LPM    4  Test performed during exertion activity  []  Supplemental Home Oxygen is indicated  [x]  Client does not qualify for home oxygen      Respiratory Additional Notes-    Rock Sher, RT

## 2022-05-12 NOTE — PROGRESS NOTES
Progress Note - Pulmonary   Leilani Siddiqi 80 y o  male MRN: [id number]  Unit/Bed#: -01 Encounter: [id number]    Assessment & Plan:  Acute respiratory failure with hypoxia  COPD with acute exacerbation  Lung nodule  LIVIER    · Overnight study suggests that patient requires 2 L nocturnally  Desaturation screen will be performed today  · Patient transition to prednisone 40 mg can decrease by 10 mg every 3 days until course is complete  · Recommend Trelegy Ellipta 1 puff daily and albuterol as needed upon discharge  · Follow-up CT imaging will be ordered as an outpatient  · He does not tolerate CPAP    Outpatient pulmonary follow-up    D/w Dr Mary CLINE Level    Subjective:   Patient says he still has a cough but his breathing is much better than it was  12 point review of systems otherwise negative  Objective:     Vitals: Blood pressure 117/68, pulse 100, temperature (!) 97 3 °F (36 3 °C), resp  rate 20, height 5' 4" (1 626 m), weight 74 8 kg (165 lb), SpO2 94 %  ,Body mass index is 28 32 kg/m²  Intake/Output Summary (Last 24 hours) at 5/12/2022 0939  Last data filed at 5/12/2022 0845  Gross per 24 hour   Intake 540 ml   Output 1350 ml   Net -810 ml       Invasive Devices  Report    Peripheral Intravenous Line  Duration           Peripheral IV 05/09/22 Distal;Right;Ventral (anterior) Forearm 3 days                Physical Exam:   General appearance: Alert and oriented, in no acute distress  Head: Normocephalic, without obvious abnormality, atraumatic  Eyes: EOMI  No discharge bilaterally  No scleral icterus  Neck: Supple, symmetrical, trachea midline  Lungs: +Wheezing  Heart: Regular rate and rhythm, S1, S2 normal, no murmur  Abdomen:  No appreciable distension or tenderness  Extremities: No edema or tenderness  Skin: Warm and dry  Neurologic: No acute focal deficits are noted      Labs: I have personally reviewed pertinent lab results    Imaging and other studies: I have personally reviewed pertinent reports

## 2022-05-12 NOTE — ASSESSMENT & PLAN NOTE
· Patient has returned CPAP due to not using  · Encourage follow-up with primary to evaluate for other options  · Potential need for nighttime oxygen  · Overnight O2 study - patient was put on 2 L nasal cannula overnight due to frequent desaturations

## 2022-05-12 NOTE — ASSESSMENT & PLAN NOTE
Lab Results   Component Value Date    SODIUM 131 (L) 05/12/2022    SODIUM 132 (L) 05/10/2022    SODIUM 130 (L) 05/07/2022     · Sodium stable at 131  · Trend BMP

## 2022-05-12 NOTE — ASSESSMENT & PLAN NOTE
Lab Results   Component Value Date    EGFR 33 05/12/2022    EGFR 31 05/10/2022    EGFR 35 05/07/2022    CREATININE 1 75 (H) 05/12/2022    CREATININE 1 85 (H) 05/10/2022    CREATININE 1 68 (H) 05/07/2022   · Creatinine at baseline is 1 7-1 8- stable   · Continue to monitor  · Avoid hypotension, nephrotoxic agents, NSAIDs  · Creatinine within baseline

## 2022-05-12 NOTE — ASSESSMENT & PLAN NOTE
· Initially requiring 4 L nasal cannula on admission, now weaned to 1-2 L  · Secondary to COPD exacerbation   · Wean O2 for sats >/= 88%  · Appreciate ongoing pulmonary recommendations  · Home O2 eval ordered prior to discharge

## 2022-05-12 NOTE — DISCHARGE INSTR - AVS FIRST PAGE
Follow-up with PCP within 1 week for post hospitalization follow-up  Follow-up with pulmonology as outpatient - an office appointment has been scheduled for May 20th at 2:00 PM (office information listed below)  Continue steroid (prednisone) taper as prescribed  Pulmonology has recommended switching inhaler regimen to Trelegy Ellipta daily and albuterol as needed    Return to the emergency department for further evaluation with any worsening shortness of breath, chest pain/palpitations, fever/chills, nausea vomiting, abdominal pain

## 2022-05-12 NOTE — ASSESSMENT & PLAN NOTE
Patient presented to ED with increased cough and shortness of breath over the last 3 days   Outpatient medications:  Anoro, Atrovent, Xopenex, p r n  Albuterol   Was seen at  earlier in the week given cefdinir due to bronchitis   CXR:  No acute cardiopulmonary findings   Leukocytosis improving, 11 K   Procal level negative x2   Oxygen via NC:  Initially requiring 4 L nasal cannula, weaned to 2 L  Currently on 1 L nasal cannula   Respiratory protocol  - started on Pulmicort/perforomist   Azithromycin x 3 days completed   Consult pulmonary, appreciate ongoing recommendations   Discharge on Trelegy to replace Anoro  Discontinue atrovent/xopenex       Albuterol PRN   Transition to oral prednisone starting 40 mg daily and decrease by 10 mg every 3 days until complete  Wily Morrow Outpatient pulmonology appointment scheduled for May 20th

## 2022-05-12 NOTE — CASE MANAGEMENT
Case Management Discharge Planning Note    Patient name Ned Staley  Location /-53 MRN [id number]  : 1932 Date 2022       Current Admission Date: 2022  Current Admission Diagnosis:COPD exacerbation [facility])   Patient Active Problem List    Diagnosis Date Noted    Acute respiratory failure with hypoxia ([facility] ) 2022    Hyponatremia 2022    Paroxysmal SVT (supraventricular tachycardia) ([facility] [address] ) 2022    Centrilobular emphysema ([facility] ) 02/15/2022    Current moderate episode of major depressive disorder without prior episode ([facility] ) 10/06/2021    Chronic renal disease, stage IV ([facility] ) 10/06/2021    PAC (premature atrial contraction) 10/06/2021    COPD exacerbation ([facility] ) 2021    Viral illness 09/15/2021    Primary osteoarthritis of left knee 2021    Patellar tendonitis of left knee 2021    Tinea cruris 2021    Partial arterial occlusion of retina 2021    Ischemic colitis ([facility] ) 2021    Central serous chorioretinopathy 2020    Bilateral sensorineural hearing loss 2020    Gastrointestinal hemorrhage 2020    Senile nuclear cataract 2020    Peptic ulcer with hemorrhage but without obstruction 2020    Nonspecific abnormal findings on radiological and examination of lung field 2020    TMJ arthropathy 2020    Chronic cough 2020    Impacted cerumen of left ear 03/15/2020    Dizziness 2020    Chest pain 2020    LIVIER (obstructive sleep apnea)     Snoring     Excessive daytime sleepiness     Aneurysm, pulmonary, arteriovenous 10/20/2019    Stage 3 chronic kidney disease ([facility] [facility] ) 2019    Cervical spine arthritis 2019    Chronic diastolic heart failure ([facility] ) 04/10/2019    Pneumonia of right lower lobe due to infectious organism 04/10/2019    Urinary obstruction 04/10/2019    BMI 29 0-29 9,adult 2019    Suprapubic tenderness 2019    Loose stools 03/29/2019    Bronchitis 03/28/2019    Eczema 08/22/2018    Seborrheic keratoses, inflamed 08/22/2018    Medicare annual wellness visit, subsequent 07/13/2018    Shortness of breath 06/04/2018    Tachycardia 06/04/2018    Other atopic dermatitis 08/16/2017    Headache 01/20/2017    Insomnia 08/18/2016    Hydronephrosis of right kidney 05/14/2016    CAD (coronary artery disease) 05/14/2016    Carotid stenosis 05/14/2016    Essential hypertension 05/14/2016    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[facility] ) 06/11/2015    Macular degeneration 11/15/2014    GERD (gastroesophageal reflux disease) 06/06/2014    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/26/2013    Iron deficiency anemia 10/09/2012    Benign prostatic hyperplasia with lower urinary tract symptoms 05/07/2012    Mixed simple and mucopurulent chronic bronchitis ([facility] ) 05/07/2012    Mixed hyperlipidemia 05/07/2012    Acquired hypothyroidism 05/07/2012    Osteoporosis 05/07/2012      LOS (days): 7  Geometric Mean LOS (GMLOS) (days): 3 00  Days to GMLOS:-4 2     OBJECTIVE:  Risk of Unplanned Readmission Score: 35 43         Current admission status: Inpatient   Preferred Pharmacy:   [address]  [address] [place] [address]  Phone: [phone] Fax: [address]  [address] [place] [address]  Phone: [phone] Fax: [phone]    Primary Care Provider: Mia Avalos MD    Primary Insurance: MEDICARE  Secondary Insurance: [facility] [employer] PROGRAM    DISCHARGE DETAILS:  Continuing to follow patient  As per overnight sleep study patient will need O2 for night  Met with patient and discussed his need for O2 at night, he is agreeable and has no DME preference  Referral to [employer] through [facility] was made  Patient does not need portable O2 for daytime    Spoke with his son Mallika Estrella at  about the above and he is agreeable  VNASL's for follow patient at home  Family to transport/  IMM discussed with patient, signed and copy given to yolande

## 2022-05-12 NOTE — RESPIRATORY THERAPY NOTE
Patient's overnight sleep study complete  Patient started study on RA, and was placed on  2L NC by nurse @ 23:50 for multiple periods of desaturation

## 2022-05-12 NOTE — DISCHARGE SUMMARY
[place]     Discharge- Rafael Hernandez 6/30/1932, 80 y o  male MRN: [id number]  Unit/Bed#: -01 Encounter: [id number]  Primary Care Provider: Chris Guerra MD   Date and time admitted to hospital: 5/5/2022  7:10 AM    * COPD exacerbation [facility])  Assessment & Plan  Patient presented to ED with increased cough and shortness of breath over the last 3 days   Outpatient medications:  Anoro, Atrovent, Xopenex, p r n  Albuterol   Was seen at  earlier in the week given cefdinir due to bronchitis   CXR:  No acute cardiopulmonary findings   Leukocytosis improving, 11 K   Procal level negative x2   Oxygen via NC:  Initially requiring 4 L nasal cannula, weaned to 2 L  Currently on 1 L nasal cannula   Respiratory protocol  - started on Pulmicort/perforomist   Azithromycin x 3 days completed   Consult pulmonary, appreciate ongoing recommendations   Discharge on Trelegy to replace Anoro  Discontinue atrovent/xopenex       Albuterol PRN   Transition to oral prednisone starting 40 mg daily and decrease by 10 mg every 3 days until complete  Christi Ortiz Outpatient pulmonology appointment scheduled for May 20th    Acute respiratory failure with hypoxia ([facility] [address] )  Assessment & Plan  · Initially requiring 4 L nasal cannula on admission, now weaned to 1-2 L  · Secondary to COPD exacerbation   · Wean O2 for sats >/= 88%  · Appreciate ongoing pulmonary recommendations  · Home O2 eval ordered prior to discharge    Hyponatremia  Assessment & Plan  Lab Results   Component Value Date    SODIUM 131 (L) 05/12/2022    SODIUM 132 (L) 05/10/2022    SODIUM 130 (L) 05/07/2022     · Sodium stable at 131  · Trend BMP    Paroxysmal SVT (supraventricular tachycardia) (HCC)  Assessment & Plan  · Continue amiodarone  · Follows with Dr Fede Irvin as outpatient    Chronic renal disease, stage IV [facility])  Assessment & Plan  Lab Results   Component Value Date    EGFR 33 05/12/2022    EGFR 31 05/10/2022    EGFR 35 05/07/2022    CREATININE 1 75 (H) 05/12/2022    CREATININE 1 85 (H) 05/10/2022    CREATININE 1 68 (H) 05/07/2022   · Creatinine at baseline is 1 7-1 8- stable   · Continue to monitor  · Avoid hypotension, nephrotoxic agents, NSAIDs  · Creatinine within baseline    LIVIER (obstructive sleep apnea)  Assessment & Plan  · Patient has returned CPAP due to not using  · Encourage follow-up with primary to evaluate for other options  · Potential need for nighttime oxygen  · Overnight O2 study - patient was put on 2 L nasal cannula overnight due to frequent desaturations    Chronic diastolic heart failure (HCC)  Assessment & Plan  Wt Readings from Last 3 Encounters:   05/08/22 74 8 kg (165 lb)   05/03/22 74 8 kg (165 lb)   04/12/22 76 8 kg (169 lb 6 4 oz)     · Last echo 09/28/2021-systolic function normal, EF 35%, grade 1 diastolic dysfunction, trace MR, mild AR, aortic stenosis, mild TR  · Appears euvolemic on exam  · Not currently on diuretics  · Continue to monitor I&O    Acquired hypothyroidism  Assessment & Plan  · Continue levothyroxine 75 mcg    Anxiety disorder due to general medical condition  Assessment & Plan  · Continue lorazepam 0 5 mg TID   · Continue lexapro 5mg daily   · Supportive care     Essential hypertension  Assessment & Plan  · Not currently on BP medications  · BP adequate continue to monitor    CAD (coronary artery disease)  Assessment & Plan  · 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  · Had reported chest pain yesterday 5/9, resolved    Medical Problems             Resolved Problems  Date Reviewed: 5/12/2022          Resolved    Thrombocytopenia ([facility] ) 5/10/2022     Resolved by  Giselle Bustillos PA-C    Overview Deleted 3/31/2019 11:38 AM by Miah Pham MD              SIRS (systemic inflammatory response syndrome) ([facility] [address] ) 5/12/2022     Resolved by  Giselle Bustillos PA-C              Discharging Physician / Practitioner: Giselle Bustillos PA-C  PCP: Janelle Arias MD  Admission Date:   Admission Orders (From admission, onward)     Ordered        05/05/22 0902  Inpatient Admission  Once                      Discharge Date: 05/12/22    Consultations During Hospital Stay:  · Pulmonology  · PT/OT  · Case management    Procedures Performed:   · None    Significant Findings / Test Results:   · CXR 5/5:  No acute cardiopulmonary disease  · CXR 5/9:  New subtle left upper lobe infiltrate  · Procalcitonin negative x3  · TSH WNL  · COVID-19/influenza/RSV negative  · Blood cultures negative x2 after 5 days    Incidental Findings:   · As above     Test Results Pending at Discharge (will require follow up): · None     Outpatient Tests Requested:  · Outpatient CT chest    Complications:  None    Reason for Admission:  Chronic obstructive pulmonary disease exacerbation    Hospital Course:   Derrek Jerome is a 80 y o  male patient who originally presented to the hospital on 5/5/2022 due to shortness of breath, cough  Past medical history significant for chronic obstructive pulmonary disease, hypothyroidism, LIVIER, congestive heart failure, chronic kidney disease coronary artery disease, anxiety  Patient presented to the emergency department due to worsening shortness of breath, cough  Patient previously had been prescribed cefdinir  Patient was admitted for chronic obstructive pulmonary disease exacerbation and started on scheduled nebulizer treatments, IV steroids and pulmonology was consulted  Infectious workup was negative  Patient continued to clinically improve, was unable to tolerate CPAP at home therefore overnight O2 study was completed in which patient qualified for home oxygen at bedtime  Home O2 eval was also completed to assess for oxygen requirements while awake and with exertion  Patient was transitioned to oral steroids for discharge and outpatient Anoro was discontinued and replaced with Trelegy Ellipta  Patient has outpatient follow-up appointment scheduled with pulmonology on May 20th  PT/OT recommended discharge home with VNA services  On day of discharge patient was hemodynamically stable and verbalized understanding for requested outpatient follow-up  Please see above list of diagnoses and related plan for additional information  Condition at Discharge: stable    Discharge Day Visit / Exam:   Subjective:  Patient reports feeling much better since admission  Alex Valerio for discharge home  Vitals: Blood Pressure: 117/68 (05/12/22 0811)  Pulse: 100 (05/12/22 0811)  Temperature: (!) 97 3 °F (36 3 °C) (05/12/22 0811)  Temp Source: Oral (05/08/22 2315)  Respirations: 20 (05/11/22 1606)  Height: 5' 4" (162 6 cm) (05/08/22 1100)  Weight - Scale: 74 8 kg (165 lb) (05/08/22 1100)  SpO2: 94 % (05/12/22 0811)  Exam:   Physical Exam  Vitals and nursing note reviewed  Constitutional:       Appearance: He is well-developed  Interventions: Nasal cannula in place  Comments: Appears comfortable, no acute distress   HENT:      Head: Normocephalic and atraumatic  Eyes:      General: No scleral icterus  Extraocular Movements: Extraocular movements intact  Conjunctiva/sclera: Conjunctivae normal    Cardiovascular:      Rate and Rhythm: Normal rate and regular rhythm  Heart sounds: S1 normal and S2 normal  No murmur heard  Pulmonary:      Effort: Pulmonary effort is normal  No respiratory distress  Breath sounds: Normal breath sounds  No wheezing, rhonchi or rales  Abdominal:      General: Bowel sounds are normal       Palpations: Abdomen is soft  Tenderness: There is no abdominal tenderness  There is no guarding or rebound  Musculoskeletal:      Cervical back: Normal range of motion  Comments: Able to move upper/lower extremities bilaterally, no edema   Skin:     General: Skin is warm and dry  Neurological:      Mental Status: He is alert and oriented to person, place, and time     Psychiatric:         Mood and Affect: Mood normal          Speech: Speech normal  Behavior: Behavior normal           Discussion with Family: Updated  (daughter in law) via phone  Discharge instructions/Information to patient and family:   See after visit summary for information provided to patient and family  Provisions for Follow-Up Care:  See after visit summary for information related to follow-up care and any pertinent home health orders  Disposition:   Home with VNA Services (Reminder: Complete face to face encounter)    Planned Readmission: None     Discharge Statement:  I spent 60 minutes discharging the patient  This time was spent on the day of discharge  I had direct contact with the patient on the day of discharge  Greater than 50% of the total time was spent examining patient, answering all patient questions, arranging and discussing plan of care with patient as well as directly providing post-discharge instructions  Additional time then spent on discharge activities  Discharge Medications:  See after visit summary for reconciled discharge medications provided to patient and/or family        **Please Note: This note may have been constructed using a voice recognition system**

## 2022-05-12 NOTE — ASSESSMENT & PLAN NOTE
Wt Readings from Last 3 Encounters:   05/08/22 74 8 kg (165 lb)   05/03/22 74 8 kg (165 lb)   04/12/22 76 8 kg (169 lb 6 4 oz)     · Last echo 09/28/2021-systolic function normal, EF 88%, grade 1 diastolic dysfunction, trace MR, mild AR, aortic stenosis, mild TR  · Appears euvolemic on exam  · Not currently on diuretics  · Continue to monitor I&O

## 2022-05-13 ENCOUNTER — HOME CARE VISIT (OUTPATIENT)
Dept: HOME HEALTH SERVICES | Facility: HOME HEALTHCARE | Age: 87
End: 2022-05-13

## 2022-05-13 ENCOUNTER — TRANSITIONAL CARE MANAGEMENT (OUTPATIENT)
Dept: FAMILY MEDICINE CLINIC | Facility: HOSPITAL | Age: 87
End: 2022-05-13

## 2022-05-13 DIAGNOSIS — Z71.89 COMPLEX CARE COORDINATION: Primary | ICD-10-CM

## 2022-05-16 ENCOUNTER — PATIENT OUTREACH (OUTPATIENT)
Dept: FAMILY MEDICINE CLINIC | Facility: HOSPITAL | Age: 87
End: 2022-05-16

## 2022-05-16 ENCOUNTER — HOME CARE VISIT (OUTPATIENT)
Dept: HOME HEALTH SERVICES | Facility: HOME HEALTHCARE | Age: 87
End: 2022-05-16

## 2022-05-16 NOTE — PROGRESS NOTES
Outpatient Care Management Note:    New HRR referral received  Voice mail message left for Rubén Lobodior and his daughter in law, Sixto Acevedo, with my contact information, introducing myself and requesting a call back

## 2022-05-16 NOTE — CASE COMMUNICATION
TC to pt caregiver Gali Cue  The pt and caregiver are declining services at this time  Physician attached to this message

## 2022-05-20 ENCOUNTER — OFFICE VISIT (OUTPATIENT)
Dept: PULMONOLOGY | Facility: HOSPITAL | Age: 87
End: 2022-05-20
Payer: MEDICARE

## 2022-05-20 VITALS
BODY MASS INDEX: 28.13 KG/M2 | OXYGEN SATURATION: 94 % | WEIGHT: 164.8 LBS | HEART RATE: 87 BPM | SYSTOLIC BLOOD PRESSURE: 122 MMHG | HEIGHT: 64 IN | DIASTOLIC BLOOD PRESSURE: 68 MMHG | TEMPERATURE: 97.6 F

## 2022-05-20 DIAGNOSIS — J44.1 COPD EXACERBATION (HCC): ICD-10-CM

## 2022-05-20 DIAGNOSIS — G47.33 OSA (OBSTRUCTIVE SLEEP APNEA): ICD-10-CM

## 2022-05-20 DIAGNOSIS — J43.2 CENTRILOBULAR EMPHYSEMA (HCC): Primary | ICD-10-CM

## 2022-05-20 DIAGNOSIS — J96.11 CHRONIC RESPIRATORY FAILURE WITH HYPOXIA (HCC): ICD-10-CM

## 2022-05-20 DIAGNOSIS — R93.89 ABNORMAL CHEST X-RAY: ICD-10-CM

## 2022-05-20 PROCEDURE — 99214 OFFICE O/P EST MOD 30 MIN: CPT | Performed by: PHYSICIAN ASSISTANT

## 2022-05-20 RX ORDER — LEVALBUTEROL INHALATION SOLUTION 1.25 MG/3ML
1.25 SOLUTION RESPIRATORY (INHALATION) 3 TIMES DAILY
Qty: 1080 ML | Refills: 3
Start: 2022-05-20

## 2022-05-20 NOTE — PATIENT INSTRUCTIONS
Continue Trelegy Ellipta 1 puff daily rinse mouth after use  You can just use the levalbuterol with the nebulizer up to 3 x daily as needed  You may occasionally add ipratropium when needed

## 2022-05-20 NOTE — PROGRESS NOTES
Assessment & Plan:      1  Centrilobular emphysema (HCC)  levalbuterol (Xopenex) 1 25 mg/3 mL nebulizer solution   2  Abnormal chest x-ray  XR chest pa & lateral   3  COPD exacerbation ([address] )     4  LIVIER (obstructive sleep apnea)     5  Chronic respiratory failure with hypoxia [facility])         · Patient presenting for hospital follow-up after recent admission for COPD exacerbation  He is doing much better  · Discharged on 2 L to be used nocturnally  No daytime oxygen requirements  Pulse oximeter is monitored at home and saturations have been consistently above 89%  · Doing well on his prednisone taper  Advised to call the office if any breathing difficulties as he continues to come down on the dose of prednisone and eventually come off of it  If he continues to have frequent exacerbations will likely start him on Daliresp versus low-dose daily prednisone  Hopefully he will do better after being started on Trelegy to replace Anoro  · Follow-up chest x-ray ordered  · Continue Trelegy Ellipta 1 puff daily, Xopenex nebs up to 3 times daily, albuterol inhaler as needed  He may occasionally use ipratropium nebulizer treatments as needed however advised of the potential side effects of using ipratropium with Ricardo Aiden as this would be duplicate anticholinergic therapy  · At his next visit, will order CT chest follow-up regarding his pulmonary nodule which has been stable since 2018  · Patient refuses to use CPAP  · I recommend getting another COVID-19 booster    Patient is accompanied by his family member, who helps provide history    Subjective:     Patient ID: Deanna Castillo is a 80 y o  male  Chief Complaint:    Dulce Ferris is a very pleasant 77-year-old male with past medical history including COPD, hypoxia, LIVIER presenting for follow-up  He was admitted to the hospital with shortness of breath and treated for acute COPD exacerbation    He was sent home on a prolonged prednisone taper and he is feeling much better  He does still have some wheezing  He is not really using his nebulizer very often  He quit smoking over 20 years ago  He was also hospitalized for COPD in September 2021  The following portions of the patient's history were reviewed in this encounter and updated as appropriate: Past medical, social, surgical, family, allergies    Review of Systems   Respiratory: Positive for cough and shortness of breath  Cardiovascular: Negative for leg swelling  All other systems reviewed and are negative  Objective:  Vitals:    05/20/22 1403   BP: 122/68   BP Location: Left arm   Patient Position: Sitting   Cuff Size: Standard   Pulse: 87   Temp: 97 6 °F (36 4 °C)   SpO2: 94%   Weight: 74 8 kg (164 lb 12 8 oz)   Height: 5' 4" (1 626 m)       Physical Exam  Vitals and nursing note reviewed  Constitutional:       General: He is not in acute distress  Appearance: He is well-developed  He is not diaphoretic  HENT:      Head: Normocephalic and atraumatic  Right Ear: External ear normal       Left Ear: External ear normal       Ears:      Comments: Extremely hard of hearing     Nose: Nose normal    Eyes:      General: No scleral icterus  Right eye: No discharge  Left eye: No discharge  Neck:      Trachea: No tracheal deviation  Cardiovascular:      Rate and Rhythm: Normal rate and regular rhythm  Heart sounds: Normal heart sounds  No murmur heard  No friction rub  No gallop  Pulmonary:      Effort: Pulmonary effort is normal  No respiratory distress  Breath sounds: No stridor  Wheezing present  Musculoskeletal:         General: No tenderness or deformity  Right lower leg: No edema  Left lower leg: No edema  Skin:     General: Skin is warm and dry  Neurological:      Mental Status: He is alert and oriented to person, place, and time  Cranial Nerves: No cranial nerve deficit  Motor: No abnormal muscle tone     Psychiatric: Behavior: Behavior normal          Thought Content: Thought content normal          Judgment: Judgment normal          Lab Review:   No results displayed because visit has over 200 results        Office Visit on 05/03/2022   Component Date Value    SARS-CoV-2 05/03/2022 Negative     INFLUENZA A PCR 05/03/2022 Negative     INFLUENZA B PCR 05/03/2022 Negative    Admission on 04/12/2022, Discharged on 04/12/2022   Component Date Value    WBC 04/12/2022 5 40     RBC 04/12/2022 5 07     Hemoglobin 04/12/2022 15 0     Hematocrit 04/12/2022 47 5     MCV 04/12/2022 94     MCH 04/12/2022 29 6     MCHC 04/12/2022 31 6     RDW 04/12/2022 14 0     MPV 04/12/2022 8 8 (A)    Platelets 88/12/9204 135 (A)    nRBC 04/12/2022 0     Neutrophils Relative 04/12/2022 60     Immat GRANS % 04/12/2022 0     Lymphocytes Relative 04/12/2022 27     Monocytes Relative 04/12/2022 11     Eosinophils Relative 04/12/2022 2     Basophils Relative 04/12/2022 0     Neutrophils Absolute 04/12/2022 3 23     Immature Grans Absolute 04/12/2022 0 02     Lymphocytes Absolute 04/12/2022 1 44     Monocytes Absolute 04/12/2022 0 60     Eosinophils Absolute 04/12/2022 0 09     Basophils Absolute 04/12/2022 0 02     Sodium 04/12/2022 138     Potassium 04/12/2022 4 8     Chloride 04/12/2022 103     CO2 04/12/2022 25     ANION GAP 04/12/2022 10     BUN 04/12/2022 24     Creatinine 04/12/2022 1 85 (A)    Glucose 04/12/2022 94     Calcium 04/12/2022 9 0     AST 04/12/2022 17     ALT 04/12/2022 39     Alkaline Phosphatase 04/12/2022 110     Total Protein 04/12/2022 7 5     Albumin 04/12/2022 3 6     Total Bilirubin 04/12/2022 0 35     eGFR 04/12/2022 31     Lipase 04/12/2022 177     LACTIC ACID 04/12/2022 1 2     Color, UA 04/12/2022 Yellow     Clarity, UA 04/12/2022 Clear     Specific Gravity, UA 04/12/2022 1 020     pH, UA 04/12/2022 6 0     Leukocytes, UA 04/12/2022 Negative     Nitrite, UA 04/12/2022 Negative     Protein, UA 04/12/2022 Negative     Glucose, UA 04/12/2022 Negative     Ketones, UA 04/12/2022 Negative     Urobilinogen, UA 04/12/2022 0 2     Bilirubin, UA 04/12/2022 Negative     Blood, UA 04/12/2022 Negative    Appointment on 04/02/2022   Component Date Value    Sodium 04/02/2022 134 (A)    Potassium 04/02/2022 5 2     Chloride 04/02/2022 104     CO2 04/02/2022 27     ANION GAP 04/02/2022 3 (A)    BUN 04/02/2022 30 (A)    Creatinine 04/02/2022 2 24 (A)    Glucose, Fasting 04/02/2022 105 (A)    Calcium 04/02/2022 9 6     eGFR 04/02/2022 25     Color, UA 04/02/2022 Yellow     Clarity, UA 04/02/2022 Clear     Specific Gravity, UA 04/02/2022 1 018     pH, UA 04/02/2022 6 0     Leukocytes, UA 04/02/2022 Negative     Nitrite, UA 04/02/2022 Negative     Protein, UA 04/02/2022 Trace (A)    Glucose, UA 04/02/2022 Negative     Ketones, UA 04/02/2022 Negative     Urobilinogen, UA 04/02/2022 <2 0     Bilirubin, UA 04/02/2022 Negative     Blood, UA 04/02/2022 Negative     RBC, UA 04/02/2022 None Seen     WBC, UA 04/02/2022 1-2     Epithelial Cells 04/02/2022 None Seen     Bacteria, UA 04/02/2022 None Seen     Hyaline Casts, UA 04/02/2022 3-5 (A)    Creatinine, Ur 04/02/2022 144 0     Protein Urine Random 04/02/2022 25     Prot/Creat Ratio, Ur 04/02/2022 0 17 (A)

## 2022-05-23 ENCOUNTER — PATIENT OUTREACH (OUTPATIENT)
Dept: FAMILY MEDICINE CLINIC | Facility: HOSPITAL | Age: 87
End: 2022-05-23

## 2022-05-23 NOTE — PROGRESS NOTES
Outpatient Care Management Note:    Care manager called Leeanne Brittle and his daughter in law, Hunter Arce, answered the phone  She states that Leeanne Brittle is doing "really well"  He saw pulmonary on Friday  He will be finishing his steroid taper tomorrow  Hunter Arce denies any questions or concerns  She declined completing a med review noting that they did review all medications at the pulmonologist  We did discuss that Leeanne Brittle should be rinsing his mouth after using the trelegy inhaler  Hunter Arce noted that this was discussed  She will double check to be sure Leeanne Brittle is doing it  Hunter Arce took my contact information  She declined ongoing outreach  She will call with any questions

## 2022-06-09 ENCOUNTER — TELEPHONE (OUTPATIENT)
Dept: NEPHROLOGY | Facility: CLINIC | Age: 87
End: 2022-06-09

## 2022-06-10 DIAGNOSIS — E03.9 ACQUIRED HYPOTHYROIDISM: ICD-10-CM

## 2022-06-10 RX ORDER — LEVOTHYROXINE SODIUM 0.07 MG/1
TABLET ORAL
Qty: 30 TABLET | Refills: 0 | Status: SHIPPED | OUTPATIENT
Start: 2022-06-10

## 2022-06-20 NOTE — TELEPHONE ENCOUNTER
Patient's daughter Sunshine Cox asked to be removed from the recall list as they will call when the patient is well enough to schedule appt

## 2022-06-21 ENCOUNTER — PATIENT OUTREACH (OUTPATIENT)
Dept: FAMILY MEDICINE CLINIC | Facility: HOSPITAL | Age: 87
End: 2022-06-21

## 2022-06-21 NOTE — PROGRESS NOTES
Outpatient Care Management Note:    Voice mail message received from Cristobal's daughter in law, Sunshine Cox , requesting a call back  Care manager called Sunshine Cox back  She states that Gonzalo Cobian is in [facility] 19 with fractured ribs after a fall  He will be going to [facility] for rehab  She wanted to update Dr Clemente Dean  CM asked if Sunshine Cox would like CM to track his progress and follow up after he is released from rehab  Sunshine Halla declined presently  She has my contact information and will call me once he is discharged if she has any questions

## 2022-06-28 ENCOUNTER — APPOINTMENT (EMERGENCY)
Dept: RADIOLOGY | Facility: HOSPITAL | Age: 87
DRG: 871 | End: 2022-06-28
Payer: MEDICARE

## 2022-06-28 ENCOUNTER — HOSPITAL ENCOUNTER (INPATIENT)
Facility: HOSPITAL | Age: 87
LOS: 7 days | Discharge: NURSING, REHAB OR LONG-TERM CARE | DRG: 871 | End: 2022-07-05
Attending: EMERGENCY MEDICINE | Admitting: INTERNAL MEDICINE
Payer: MEDICARE

## 2022-06-28 DIAGNOSIS — N18.4 CHRONIC RENAL DISEASE, STAGE IV (HCC): ICD-10-CM

## 2022-06-28 DIAGNOSIS — A41.9 SEPSIS (HCC): ICD-10-CM

## 2022-06-28 DIAGNOSIS — S22.49XA MULTIPLE RIB FRACTURES: ICD-10-CM
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
## 2022-06-28 DIAGNOSIS — J18.9 PNEUMONIA: Primary | ICD-10-CM

## 2022-06-28 DIAGNOSIS — E87.1 HYPONATREMIA: ICD-10-CM

## 2022-06-28 DIAGNOSIS — J96.01 ACUTE RESPIRATORY FAILURE WITH HYPOXIA (HCC): ICD-10-CM

## 2022-06-28 DIAGNOSIS — J18.9 HOSPITAL ACQUIRED PNA: ICD-10-CM

## 2022-06-28 DIAGNOSIS — I47.1 PAROXYSMAL SVT (SUPRAVENTRICULAR TACHYCARDIA) (HCC): ICD-10-CM

## 2022-06-28 DIAGNOSIS — J43.2 CENTRILOBULAR EMPHYSEMA (HCC): ICD-10-CM

## 2022-06-28 DIAGNOSIS — J44.1 COPD EXACERBATION (HCC): ICD-10-CM

## 2022-06-28 DIAGNOSIS — F41.9 ANXIETY: ICD-10-CM

## 2022-06-28 DIAGNOSIS — N17.9 AKI (ACUTE KIDNEY INJURY) (HCC): ICD-10-CM

## 2022-06-28 LAB
ALBUMIN SERPL BCP-MCNC: 3 G/DL (ref 3.5–5)
ALP SERPL-CCNC: 204 U/L (ref 46–116)
ALT SERPL W P-5'-P-CCNC: 24 U/L (ref 12–78)
ANION GAP SERPL CALCULATED.3IONS-SCNC: 5 MMOL/L (ref 4–13)
APTT PPP: 32 SECONDS (ref 23–37)
AST SERPL W P-5'-P-CCNC: 12 U/L (ref 5–45)
BACTERIA UR QL AUTO: ABNORMAL /HPF
BASE EXCESS BLDA CALC-SCNC: 2 MMOL/L (ref -2–3)
BASOPHILS # BLD AUTO: 0.03 THOUSANDS/ΜL (ref 0–0.1)
BASOPHILS NFR BLD AUTO: 0 % (ref 0–1)
BILIRUB SERPL-MCNC: 0.4 MG/DL (ref 0.2–1)
BILIRUB UR QL STRIP: NEGATIVE
BUN SERPL-MCNC: 42 MG/DL (ref 5–25)
CA-I BLD-SCNC: 1.15 MMOL/L (ref 1.12–1.32)
CALCIUM ALBUM COR SERPL-MCNC: 9.5 MG/DL (ref 8.3–10.1)
CALCIUM SERPL-MCNC: 8.7 MG/DL (ref 8.3–10.1)
CARDIAC TROPONIN I PNL SERPL HS: 7 NG/L
CHLORIDE SERPL-SCNC: 95 MMOL/L (ref 100–108)
CLARITY UR: CLEAR
CO2 SERPL-SCNC: 27 MMOL/L (ref 21–32)
COLOR UR: YELLOW
CREAT SERPL-MCNC: 2.26 MG/DL (ref 0.6–1.3)
EOSINOPHIL # BLD AUTO: 0.05 THOUSAND/ΜL (ref 0–0.61)
EOSINOPHIL NFR BLD AUTO: 0 % (ref 0–6)
ERYTHROCYTE [DISTWIDTH] IN BLOOD BY AUTOMATED COUNT: 14.1 % (ref 11.6–15.1)
FLUAV RNA RESP QL NAA+PROBE: NEGATIVE
FLUBV RNA RESP QL NAA+PROBE: NEGATIVE
GFR SERPL CREATININE-BSD FRML MDRD: 24 ML/MIN/1.73SQ M
GLUCOSE SERPL-MCNC: 163 MG/DL (ref 65–140)
GLUCOSE SERPL-MCNC: 169 MG/DL (ref 65–140)
GLUCOSE UR STRIP-MCNC: NEGATIVE MG/DL
HCO3 BLDA-SCNC: 26.6 MMOL/L (ref 24–30)
HCT VFR BLD AUTO: 36.8 % (ref 36.5–49.3)
HCT VFR BLD CALC: 37 % (ref 36.5–49.3)
HGB BLD-MCNC: 11.6 G/DL (ref 12–17)
HGB BLDA-MCNC: 12.6 G/DL (ref 12–17)
HGB UR QL STRIP.AUTO: ABNORMAL
IMM GRANULOCYTES # BLD AUTO: 0.11 THOUSAND/UL (ref 0–0.2)
IMM GRANULOCYTES NFR BLD AUTO: 1 % (ref 0–2)
INR PPP: 1.03 (ref 0.84–1.19)
KETONES UR STRIP-MCNC: NEGATIVE MG/DL
LACTATE SERPL-SCNC: 2 MMOL/L (ref 0.5–2)
LEUKOCYTE ESTERASE UR QL STRIP: ABNORMAL
LYMPHOCYTES # BLD AUTO: 0.91 THOUSANDS/ΜL (ref 0.6–4.47)
LYMPHOCYTES NFR BLD AUTO: 6 % (ref 14–44)
MCH RBC QN AUTO: 30.4 PG (ref 26.8–34.3)
MCHC RBC AUTO-ENTMCNC: 31.5 G/DL (ref 31.4–37.4)
MCV RBC AUTO: 97 FL (ref 82–98)
MONOCYTES # BLD AUTO: 1.32 THOUSAND/ΜL (ref 0.17–1.22)
MONOCYTES NFR BLD AUTO: 9 % (ref 4–12)
NEUTROPHILS # BLD AUTO: 12.77 THOUSANDS/ΜL (ref 1.85–7.62)
NEUTS SEG NFR BLD AUTO: 84 % (ref 43–75)
NITRITE UR QL STRIP: NEGATIVE
NON-SQ EPI CELLS URNS QL MICRO: ABNORMAL /HPF
NRBC BLD AUTO-RTO: 0 /100 WBCS
NT-PROBNP SERPL-MCNC: 409 PG/ML
PCO2 BLD: 28 MMOL/L (ref 21–32)
PCO2 BLD: 42.1 MM HG (ref 42–50)
PH BLD: 7.41 [PH] (ref 7.3–7.4)
PH UR STRIP.AUTO: 5.5 [PH]
PLATELET # BLD AUTO: 184 THOUSANDS/UL (ref 149–390)
PMV BLD AUTO: 9 FL (ref 8.9–12.7)
PO2 BLD: 25 MM HG (ref 35–45)
POTASSIUM BLD-SCNC: 4.8 MMOL/L (ref 3.5–5.3)
POTASSIUM SERPL-SCNC: 4.7 MMOL/L (ref 3.5–5.3)
PROCALCITONIN SERPL-MCNC: 0.26 NG/ML
PROT SERPL-MCNC: 7.3 G/DL (ref 6.4–8.2)
PROT UR STRIP-MCNC: ABNORMAL MG/DL
PROTHROMBIN TIME: 13.4 SECONDS (ref 11.6–14.5)
RBC # BLD AUTO: 3.81 MILLION/UL (ref 3.88–5.62)
RBC #/AREA URNS AUTO: ABNORMAL /HPF
RSV RNA RESP QL NAA+PROBE: NEGATIVE
SAO2 % BLD FROM PO2: 45 % (ref 60–85)
SARS-COV-2 RNA RESP QL NAA+PROBE: NEGATIVE
SODIUM BLD-SCNC: 131 MMOL/L (ref 136–145)
SODIUM SERPL-SCNC: 127 MMOL/L (ref 136–145)
SP GR UR STRIP.AUTO: 1.02 (ref 1–1.03)
SPECIMEN SOURCE: ABNORMAL
UROBILINOGEN UR QL STRIP.AUTO: 0.2 E.U./DL
WBC # BLD AUTO: 15.19 THOUSAND/UL (ref 4.31–10.16)
WBC #/AREA URNS AUTO: ABNORMAL /HPF

## 2022-06-28 PROCEDURE — 85610 PROTHROMBIN TIME: CPT | Performed by: EMERGENCY MEDICINE

## 2022-06-28 PROCEDURE — 71045 X-RAY EXAM CHEST 1 VIEW: CPT

## 2022-06-28 PROCEDURE — 84132 ASSAY OF SERUM POTASSIUM: CPT

## 2022-06-28 PROCEDURE — 83880 ASSAY OF NATRIURETIC PEPTIDE: CPT | Performed by: EMERGENCY MEDICINE

## 2022-06-28 PROCEDURE — 84145 PROCALCITONIN (PCT): CPT | Performed by: EMERGENCY MEDICINE

## 2022-06-28 PROCEDURE — 82330 ASSAY OF CALCIUM: CPT

## 2022-06-28 PROCEDURE — 81001 URINALYSIS AUTO W/SCOPE: CPT | Performed by: EMERGENCY MEDICINE

## 2022-06-28 PROCEDURE — 87040 BLOOD CULTURE FOR BACTERIA: CPT | Performed by: EMERGENCY MEDICINE

## 2022-06-28 PROCEDURE — 36415 COLL VENOUS BLD VENIPUNCTURE: CPT | Performed by: EMERGENCY MEDICINE

## 2022-06-28 PROCEDURE — 99285 EMERGENCY DEPT VISIT HI MDM: CPT | Performed by: EMERGENCY MEDICINE

## 2022-06-28 PROCEDURE — 82947 ASSAY GLUCOSE BLOOD QUANT: CPT

## 2022-06-28 PROCEDURE — 80048 BASIC METABOLIC PNL TOTAL CA: CPT | Performed by: PHYSICIAN ASSISTANT

## 2022-06-28 PROCEDURE — 87081 CULTURE SCREEN ONLY: CPT | Performed by: INTERNAL MEDICINE

## 2022-06-28 PROCEDURE — 85014 HEMATOCRIT: CPT

## 2022-06-28 PROCEDURE — 85730 THROMBOPLASTIN TIME PARTIAL: CPT | Performed by: EMERGENCY MEDICINE

## 2022-06-28 PROCEDURE — 82803 BLOOD GASES ANY COMBINATION: CPT

## 2022-06-28 PROCEDURE — 96365 THER/PROPH/DIAG IV INF INIT: CPT

## 2022-06-28 PROCEDURE — 83605 ASSAY OF LACTIC ACID: CPT | Performed by: EMERGENCY MEDICINE

## 2022-06-28 PROCEDURE — 99285 EMERGENCY DEPT VISIT HI MDM: CPT

## 2022-06-28 PROCEDURE — 84295 ASSAY OF SERUM SODIUM: CPT

## 2022-06-28 PROCEDURE — 85025 COMPLETE CBC W/AUTO DIFF WBC: CPT | Performed by: EMERGENCY MEDICINE

## 2022-06-28 PROCEDURE — 80053 COMPREHEN METABOLIC PANEL: CPT | Performed by: EMERGENCY MEDICINE

## 2022-06-28 PROCEDURE — 84484 ASSAY OF TROPONIN QUANT: CPT | Performed by: EMERGENCY MEDICINE

## 2022-06-28 PROCEDURE — 0241U HB NFCT DS VIR RESP RNA 4 TRGT: CPT | Performed by: EMERGENCY MEDICINE

## 2022-06-28 RX ORDER — ESCITALOPRAM OXALATE 5 MG/1
5 TABLET ORAL DAILY
Status: DISCONTINUED | OUTPATIENT
Start: 2022-06-29 | End: 2022-07-05 | Stop reason: HOSPADM

## 2022-06-28 RX ORDER — TAMSULOSIN HYDROCHLORIDE 0.4 MG/1
0.4 CAPSULE ORAL
Status: DISCONTINUED | OUTPATIENT
Start: 2022-06-29 | End: 2022-07-05 | Stop reason: HOSPADM

## 2022-06-28 RX ORDER — FENTANYL CITRATE 50 UG/ML
25 INJECTION, SOLUTION INTRAMUSCULAR; INTRAVENOUS EVERY 2 HOUR PRN
Status: DISCONTINUED | OUTPATIENT
Start: 2022-06-28 | End: 2022-06-29

## 2022-06-28 RX ORDER — GUAIFENESIN 600 MG/1
1200 TABLET, EXTENDED RELEASE ORAL EVERY 12 HOURS SCHEDULED
Status: DISCONTINUED | OUTPATIENT
Start: 2022-06-28 | End: 2022-07-05 | Stop reason: HOSPADM

## 2022-06-28 RX ORDER — HEPARIN SODIUM 5000 [USP'U]/ML
5000 INJECTION, SOLUTION INTRAVENOUS; SUBCUTANEOUS EVERY 8 HOURS SCHEDULED
Status: DISCONTINUED | OUTPATIENT
Start: 2022-06-28 | End: 2022-07-02

## 2022-06-28 RX ORDER — ACETAMINOPHEN 325 MG/1
650 TABLET ORAL EVERY 6 HOURS PRN
Status: DISCONTINUED | OUTPATIENT
Start: 2022-06-28 | End: 2022-06-29

## 2022-06-28 RX ORDER — LEVOTHYROXINE SODIUM 0.07 MG/1
75 TABLET ORAL
Status: DISCONTINUED | OUTPATIENT
Start: 2022-06-29 | End: 2022-07-05 | Stop reason: HOSPADM

## 2022-06-28 RX ORDER — LIDOCAINE 50 MG/G
1 PATCH TOPICAL DAILY
Status: DISCONTINUED | OUTPATIENT
Start: 2022-06-29 | End: 2022-07-05 | Stop reason: HOSPADM

## 2022-06-28 RX ORDER — AMIODARONE HYDROCHLORIDE 200 MG/1
200 TABLET ORAL DAILY
Status: DISCONTINUED | OUTPATIENT
Start: 2022-06-29 | End: 2022-07-05 | Stop reason: HOSPADM

## 2022-06-28 RX ORDER — CEFEPIME HYDROCHLORIDE 2 G/50ML
2000 INJECTION, SOLUTION INTRAVENOUS ONCE
Status: COMPLETED | OUTPATIENT
Start: 2022-06-28 | End: 2022-06-28

## 2022-06-28 RX ORDER — LEVALBUTEROL 1.25 MG/.5ML
1.25 SOLUTION, CONCENTRATE RESPIRATORY (INHALATION)
Status: DISCONTINUED | OUTPATIENT
Start: 2022-06-29 | End: 2022-07-05 | Stop reason: HOSPADM

## 2022-06-28 RX ORDER — ONDANSETRON 2 MG/ML
4 INJECTION INTRAMUSCULAR; INTRAVENOUS EVERY 6 HOURS PRN
Status: DISCONTINUED | OUTPATIENT
Start: 2022-06-28 | End: 2022-07-05 | Stop reason: HOSPADM

## 2022-06-28 RX ORDER — ACETAMINOPHEN 325 MG/1
975 TABLET ORAL ONCE
Status: COMPLETED | OUTPATIENT
Start: 2022-06-28 | End: 2022-06-28

## 2022-06-28 RX ORDER — BENZONATATE 100 MG/1
100 CAPSULE ORAL 3 TIMES DAILY PRN
Status: DISCONTINUED | OUTPATIENT
Start: 2022-06-28 | End: 2022-07-05 | Stop reason: HOSPADM

## 2022-06-28 RX ORDER — CEFEPIME HYDROCHLORIDE 1 G/50ML
1000 INJECTION, SOLUTION INTRAVENOUS EVERY 12 HOURS
Status: DISCONTINUED | OUTPATIENT
Start: 2022-06-29 | End: 2022-07-04

## 2022-06-28 RX ORDER — FLUTICASONE PROPIONATE 50 MCG
1 SPRAY, SUSPENSION (ML) NASAL 2 TIMES DAILY
Status: DISCONTINUED | OUTPATIENT
Start: 2022-06-29 | End: 2022-07-05 | Stop reason: HOSPADM

## 2022-06-28 RX ORDER — LIDOCAINE HYDROCHLORIDE 20 MG/ML
1 JELLY TOPICAL ONCE
Status: COMPLETED | OUTPATIENT
Start: 2022-06-28 | End: 2022-06-28

## 2022-06-28 RX ORDER — LORAZEPAM 0.5 MG/1
0.5 TABLET ORAL EVERY 8 HOURS PRN
Status: DISCONTINUED | OUTPATIENT
Start: 2022-06-28 | End: 2022-07-05 | Stop reason: HOSPADM

## 2022-06-28 RX ADMIN — LIDOCAINE HYDROCHLORIDE 1 APPLICATION: 20 JELLY TOPICAL at 23:56

## 2022-06-28 RX ADMIN — ACETAMINOPHEN 975 MG: 325 TABLET, FILM COATED ORAL at 21:01

## 2022-06-28 RX ADMIN — CEFEPIME HYDROCHLORIDE 2000 MG: 2 INJECTION, SOLUTION INTRAVENOUS at 21:01

## 2022-06-28 RX ADMIN — VANCOMYCIN HYDROCHLORIDE 1500 MG: 5 INJECTION, POWDER, LYOPHILIZED, FOR SOLUTION INTRAVENOUS at 21:44

## 2022-06-28 RX ADMIN — SODIUM CHLORIDE 1000 ML: 0.9 INJECTION, SOLUTION INTRAVENOUS at 20:59

## 2022-06-29 LAB
2HR DELTA HS TROPONIN: 2 NG/L
4HR DELTA HS TROPONIN: 1 NG/L
ALBUMIN SERPL BCP-MCNC: 2.5 G/DL (ref 3.5–5)
ALP SERPL-CCNC: 171 U/L (ref 46–116)
ALT SERPL W P-5'-P-CCNC: 20 U/L (ref 12–78)
ANION GAP SERPL CALCULATED.3IONS-SCNC: 5 MMOL/L (ref 4–13)
ANION GAP SERPL CALCULATED.3IONS-SCNC: 6 MMOL/L (ref 4–13)
AST SERPL W P-5'-P-CCNC: 10 U/L (ref 5–45)
BASOPHILS # BLD AUTO: 0.04 THOUSANDS/ΜL (ref 0–0.1)
BASOPHILS NFR BLD AUTO: 0 % (ref 0–1)
BILIRUB SERPL-MCNC: 0.6 MG/DL (ref 0.2–1)
BUN SERPL-MCNC: 40 MG/DL (ref 5–25)
BUN SERPL-MCNC: 44 MG/DL (ref 5–25)
CALCIUM ALBUM COR SERPL-MCNC: 9.5 MG/DL (ref 8.3–10.1)
CALCIUM SERPL-MCNC: 7.6 MG/DL (ref 8.3–10.1)
CALCIUM SERPL-MCNC: 8.3 MG/DL (ref 8.3–10.1)
CARDIAC TROPONIN I PNL SERPL HS: 8 NG/L
CARDIAC TROPONIN I PNL SERPL HS: 9 NG/L
CHLORIDE SERPL-SCNC: 100 MMOL/L (ref 100–108)
CHLORIDE SERPL-SCNC: 98 MMOL/L (ref 100–108)
CO2 SERPL-SCNC: 24 MMOL/L (ref 21–32)
CO2 SERPL-SCNC: 27 MMOL/L (ref 21–32)
CREAT SERPL-MCNC: 2.09 MG/DL (ref 0.6–1.3)
CREAT SERPL-MCNC: 2.12 MG/DL (ref 0.6–1.3)
EOSINOPHIL # BLD AUTO: 0.04 THOUSAND/ΜL (ref 0–0.61)
EOSINOPHIL NFR BLD AUTO: 0 % (ref 0–6)
ERYTHROCYTE [DISTWIDTH] IN BLOOD BY AUTOMATED COUNT: 14.3 % (ref 11.6–15.1)
GFR SERPL CREATININE-BSD FRML MDRD: 26 ML/MIN/1.73SQ M
GFR SERPL CREATININE-BSD FRML MDRD: 27 ML/MIN/1.73SQ M
GLUCOSE SERPL-MCNC: 107 MG/DL (ref 65–140)
GLUCOSE SERPL-MCNC: 129 MG/DL (ref 65–140)
HCT VFR BLD AUTO: 32.2 % (ref 36.5–49.3)
HGB BLD-MCNC: 10.1 G/DL (ref 12–17)
IMM GRANULOCYTES # BLD AUTO: 0.09 THOUSAND/UL (ref 0–0.2)
IMM GRANULOCYTES NFR BLD AUTO: 1 % (ref 0–2)
LYMPHOCYTES # BLD AUTO: 1.53 THOUSANDS/ΜL (ref 0.6–4.47)
LYMPHOCYTES NFR BLD AUTO: 10 % (ref 14–44)
MCH RBC QN AUTO: 30.5 PG (ref 26.8–34.3)
MCHC RBC AUTO-ENTMCNC: 31.4 G/DL (ref 31.4–37.4)
MCV RBC AUTO: 97 FL (ref 82–98)
MONOCYTES # BLD AUTO: 1.75 THOUSAND/ΜL (ref 0.17–1.22)
MONOCYTES NFR BLD AUTO: 11 % (ref 4–12)
NEUTROPHILS # BLD AUTO: 12.26 THOUSANDS/ΜL (ref 1.85–7.62)
NEUTS SEG NFR BLD AUTO: 78 % (ref 43–75)
NRBC BLD AUTO-RTO: 0 /100 WBCS
PLATELET # BLD AUTO: 153 THOUSANDS/UL (ref 149–390)
PLATELET # BLD AUTO: 163 THOUSANDS/UL (ref 149–390)
PMV BLD AUTO: 8.6 FL (ref 8.9–12.7)
PMV BLD AUTO: 9.2 FL (ref 8.9–12.7)
POTASSIUM SERPL-SCNC: 4.9 MMOL/L (ref 3.5–5.3)
POTASSIUM SERPL-SCNC: 4.9 MMOL/L (ref 3.5–5.3)
PROCALCITONIN SERPL-MCNC: 0.7 NG/ML
PROT SERPL-MCNC: 6.3 G/DL (ref 6.4–8.2)
RBC # BLD AUTO: 3.31 MILLION/UL (ref 3.88–5.62)
SODIUM SERPL-SCNC: 130 MMOL/L (ref 136–145)
SODIUM SERPL-SCNC: 130 MMOL/L (ref 136–145)
WBC # BLD AUTO: 15.71 THOUSAND/UL (ref 4.31–10.16)

## 2022-06-29 PROCEDURE — 99497 ADVNCD CARE PLAN 30 MIN: CPT | Performed by: PHYSICIAN ASSISTANT

## 2022-06-29 PROCEDURE — C9113 INJ PANTOPRAZOLE SODIUM, VIA: HCPCS | Performed by: PHYSICIAN ASSISTANT

## 2022-06-29 PROCEDURE — 80053 COMPREHEN METABOLIC PANEL: CPT | Performed by: PHYSICIAN ASSISTANT

## 2022-06-29 PROCEDURE — 92610 EVALUATE SWALLOWING FUNCTION: CPT

## 2022-06-29 PROCEDURE — 99232 SBSQ HOSP IP/OBS MODERATE 35: CPT | Performed by: HOSPITALIST

## 2022-06-29 PROCEDURE — 93005 ELECTROCARDIOGRAM TRACING: CPT

## 2022-06-29 PROCEDURE — 94760 N-INVAS EAR/PLS OXIMETRY 1: CPT

## 2022-06-29 PROCEDURE — 94664 DEMO&/EVAL PT USE INHALER: CPT

## 2022-06-29 PROCEDURE — 94640 AIRWAY INHALATION TREATMENT: CPT

## 2022-06-29 PROCEDURE — 85025 COMPLETE CBC W/AUTO DIFF WBC: CPT | Performed by: PHYSICIAN ASSISTANT

## 2022-06-29 PROCEDURE — 99223 1ST HOSP IP/OBS HIGH 75: CPT | Performed by: PHYSICIAN ASSISTANT

## 2022-06-29 PROCEDURE — 97163 PT EVAL HIGH COMPLEX 45 MIN: CPT

## 2022-06-29 PROCEDURE — 84145 PROCALCITONIN (PCT): CPT | Performed by: PHYSICIAN ASSISTANT

## 2022-06-29 PROCEDURE — 85049 AUTOMATED PLATELET COUNT: CPT | Performed by: PHYSICIAN ASSISTANT

## 2022-06-29 PROCEDURE — 97167 OT EVAL HIGH COMPLEX 60 MIN: CPT

## 2022-06-29 PROCEDURE — 84484 ASSAY OF TROPONIN QUANT: CPT | Performed by: EMERGENCY MEDICINE

## 2022-06-29 RX ORDER — OXYCODONE HYDROCHLORIDE 5 MG/1
2.5 TABLET ORAL EVERY 6 HOURS PRN
Status: DISCONTINUED | OUTPATIENT
Start: 2022-06-29 | End: 2022-06-30

## 2022-06-29 RX ORDER — ACETAMINOPHEN 325 MG/1
975 TABLET ORAL EVERY 8 HOURS
Status: DISCONTINUED | OUTPATIENT
Start: 2022-06-29 | End: 2022-07-05 | Stop reason: HOSPADM

## 2022-06-29 RX ORDER — METHOCARBAMOL 500 MG/1
500 TABLET, FILM COATED ORAL EVERY 6 HOURS PRN
Status: DISCONTINUED | OUTPATIENT
Start: 2022-06-29 | End: 2022-07-05 | Stop reason: HOSPADM

## 2022-06-29 RX ORDER — OXYCODONE HYDROCHLORIDE 5 MG/1
5 TABLET ORAL EVERY 6 HOURS PRN
Status: DISCONTINUED | OUTPATIENT
Start: 2022-06-29 | End: 2022-06-30

## 2022-06-29 RX ORDER — HYDROMORPHONE HCL/PF 1 MG/ML
0.5 SYRINGE (ML) INJECTION EVERY 4 HOURS PRN
Status: DISCONTINUED | OUTPATIENT
Start: 2022-06-29 | End: 2022-06-30

## 2022-06-29 RX ORDER — PANTOPRAZOLE SODIUM 40 MG/10ML
40 INJECTION, POWDER, LYOPHILIZED, FOR SOLUTION INTRAVENOUS
Status: DISCONTINUED | OUTPATIENT
Start: 2022-06-29 | End: 2022-07-05 | Stop reason: HOSPADM

## 2022-06-29 RX ADMIN — FENTANYL CITRATE 25 MCG: 50 INJECTION, SOLUTION INTRAMUSCULAR; INTRAVENOUS at 05:34

## 2022-06-29 RX ADMIN — TAMSULOSIN HYDROCHLORIDE 0.4 MG: 0.4 CAPSULE ORAL at 15:26

## 2022-06-29 RX ADMIN — LEVALBUTEROL HYDROCHLORIDE 1.25 MG: 1.25 SOLUTION, CONCENTRATE RESPIRATORY (INHALATION) at 13:34

## 2022-06-29 RX ADMIN — ACETAMINOPHEN 975 MG: 325 TABLET, FILM COATED ORAL at 23:59

## 2022-06-29 RX ADMIN — HEPARIN SODIUM 5000 UNITS: 5000 INJECTION INTRAVENOUS; SUBCUTANEOUS at 05:06

## 2022-06-29 RX ADMIN — IPRATROPIUM BROMIDE 0.5 MG: 0.5 SOLUTION RESPIRATORY (INHALATION) at 19:48

## 2022-06-29 RX ADMIN — IPRATROPIUM BROMIDE 0.5 MG: 0.5 SOLUTION RESPIRATORY (INHALATION) at 13:34

## 2022-06-29 RX ADMIN — LIDOCAINE 5% 1 PATCH: 700 PATCH TOPICAL at 10:05

## 2022-06-29 RX ADMIN — VANCOMYCIN HYDROCHLORIDE 750 MG: 750 INJECTION, SOLUTION INTRAVENOUS at 21:32

## 2022-06-29 RX ADMIN — FENTANYL CITRATE 25 MCG: 50 INJECTION, SOLUTION INTRAMUSCULAR; INTRAVENOUS at 15:18

## 2022-06-29 RX ADMIN — FLUTICASONE PROPIONATE 1 SPRAY: 50 SPRAY, METERED NASAL at 17:25

## 2022-06-29 RX ADMIN — PANTOPRAZOLE SODIUM 40 MG: 40 INJECTION, POWDER, FOR SOLUTION INTRAVENOUS at 10:30

## 2022-06-29 RX ADMIN — CEFEPIME HYDROCHLORIDE 1000 MG: 1 INJECTION, SOLUTION INTRAVENOUS at 10:30

## 2022-06-29 RX ADMIN — GUAIFENESIN 1200 MG: 600 TABLET ORAL at 00:12

## 2022-06-29 RX ADMIN — OXYCODONE HYDROCHLORIDE 5 MG: 5 TABLET ORAL at 21:31

## 2022-06-29 RX ADMIN — ACETAMINOPHEN 650 MG: 325 TABLET ORAL at 10:04

## 2022-06-29 RX ADMIN — IPRATROPIUM BROMIDE 0.5 MG: 0.5 SOLUTION RESPIRATORY (INHALATION) at 07:30

## 2022-06-29 RX ADMIN — LORAZEPAM 0.5 MG: 0.5 TABLET ORAL at 15:43

## 2022-06-29 RX ADMIN — HEPARIN SODIUM 5000 UNITS: 5000 INJECTION INTRAVENOUS; SUBCUTANEOUS at 00:12

## 2022-06-29 RX ADMIN — LEVALBUTEROL HYDROCHLORIDE 1.25 MG: 1.25 SOLUTION, CONCENTRATE RESPIRATORY (INHALATION) at 07:30

## 2022-06-29 RX ADMIN — LEVALBUTEROL HYDROCHLORIDE 1.25 MG: 1.25 SOLUTION, CONCENTRATE RESPIRATORY (INHALATION) at 19:48

## 2022-06-29 RX ADMIN — AMIODARONE HYDROCHLORIDE 200 MG: 200 TABLET ORAL at 10:04

## 2022-06-29 RX ADMIN — GUAIFENESIN 1200 MG: 600 TABLET ORAL at 10:04

## 2022-06-29 RX ADMIN — LORAZEPAM 0.5 MG: 0.5 TABLET ORAL at 23:59

## 2022-06-29 RX ADMIN — FENTANYL CITRATE 25 MCG: 50 INJECTION, SOLUTION INTRAMUSCULAR; INTRAVENOUS at 20:34

## 2022-06-29 RX ADMIN — HYDROMORPHONE HYDROCHLORIDE 0.5 MG: 1 INJECTION, SOLUTION INTRAMUSCULAR; INTRAVENOUS; SUBCUTANEOUS at 22:46

## 2022-06-29 RX ADMIN — CEFEPIME HYDROCHLORIDE 1000 MG: 1 INJECTION, SOLUTION INTRAVENOUS at 20:38

## 2022-06-29 RX ADMIN — METHOCARBAMOL 500 MG: 500 TABLET ORAL at 17:24

## 2022-06-29 RX ADMIN — ACETAMINOPHEN 975 MG: 325 TABLET, FILM COATED ORAL at 17:24

## 2022-06-29 RX ADMIN — GUAIFENESIN 1200 MG: 600 TABLET ORAL at 20:41

## 2022-06-29 RX ADMIN — HEPARIN SODIUM 5000 UNITS: 5000 INJECTION INTRAVENOUS; SUBCUTANEOUS at 21:30

## 2022-06-29 RX ADMIN — FLUTICASONE PROPIONATE 1 SPRAY: 50 SPRAY, METERED NASAL at 10:53

## 2022-06-29 RX ADMIN — LEVOTHYROXINE SODIUM 75 MCG: 75 TABLET ORAL at 05:06

## 2022-06-29 RX ADMIN — FENTANYL CITRATE 25 MCG: 50 INJECTION, SOLUTION INTRAMUSCULAR; INTRAVENOUS at 17:24

## 2022-06-29 RX ADMIN — HEPARIN SODIUM 5000 UNITS: 5000 INJECTION INTRAVENOUS; SUBCUTANEOUS at 15:25

## 2022-06-29 RX ADMIN — ESCITALOPRAM 5 MG: 5 TABLET, FILM COATED ORAL at 10:04

## 2022-06-29 NOTE — CASE MANAGEMENT
Case Management Discharge Planning Note    Patient name Abdulaziz Harmon  Location  SDU/-01 S* MRN [id number]  : 1932 Date 2022       Current Admission Date: 2022  Current Admission Diagnosis:Hospital acquired PNA   Patient Active Problem List    Diagnosis Date Noted   [facility] acquired PNA 2022    Sepsis ([facility] ) 2022    Multiple rib fractures 2022    Acute respiratory failure with hypoxia ([facility] ) 2022    Hyponatremia 2022    Paroxysmal SVT (supraventricular tachycardia) ([facility] ) 2022    Centrilobular emphysema ([facility] ) 02/15/2022    Current moderate episode of major depressive disorder without prior episode ([facility] ) 10/06/2021    Chronic renal disease, stage IV ([facility] ) 10/06/2021    PAC (premature atrial contraction) 10/06/2021    COPD exacerbation ([facility] ) 2021    Viral illness 09/15/2021    Primary osteoarthritis of left knee 2021    Patellar tendonitis of left knee 2021    Tinea cruris 2021    Partial arterial occlusion of retina 2021    Ischemic colitis ([facility] ) 2021    Central serous chorioretinopathy 2020    Bilateral sensorineural hearing loss 2020    Gastrointestinal hemorrhage 2020    Senile nuclear cataract 2020    Peptic ulcer with hemorrhage but without obstruction 2020    Nonspecific abnormal findings on radiological and examination of lung field 2020    TMJ arthropathy 2020    Chronic cough 2020    Impacted cerumen of left ear 03/15/2020    Dizziness 2020    Chest pain 2020    LIVIER (obstructive sleep apnea)     Snoring     Excessive daytime sleepiness     Aneurysm, pulmonary, arteriovenous 10/20/2019    Stage 3 chronic kidney disease ([facility] ) 2019    Cervical spine arthritis 2019    Chronic diastolic heart failure ([address] ) 04/10/2019    Community acquired pneumonia of right lower lobe of lung 04/10/2019    Urinary obstruction 04/10/2019    BMI 29 0-29 9,adult 04/01/2019    Suprapubic tenderness 03/29/2019    Loose stools 03/29/2019    Bronchitis 03/28/2019    Eczema 08/22/2018    Seborrheic keratoses, inflamed 08/22/2018    Medicare annual wellness visit, subsequent 07/13/2018    Shortness of breath 06/04/2018    Tachycardia 06/04/2018    Other atopic dermatitis 08/16/2017    Headache 01/20/2017    Insomnia 08/18/2016    Hydronephrosis of right kidney 05/14/2016    CAD (coronary artery disease) 05/14/2016    Carotid stenosis 05/14/2016    Essential hypertension 05/14/2016    Acute renal failure superimposed on stage 4 chronic kidney disease ([facility] ) 05/14/2016    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[facility] ) 06/11/2015    Macular degeneration 11/15/2014    GERD (gastroesophageal reflux disease) 06/06/2014    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/26/2013    Iron deficiency anemia 10/09/2012    Benign prostatic hyperplasia with lower urinary tract symptoms 05/07/2012    Mixed simple and mucopurulent chronic bronchitis ([facility] ) 05/07/2012    Mixed hyperlipidemia 05/07/2012    Acquired hypothyroidism 05/07/2012    Osteoporosis 05/07/2012      LOS (days): 1  Geometric Mean LOS (GMLOS) (days): 4 80  Days to GMLOS:4     OBJECTIVE:  Risk of Unplanned Readmission Score: 30     Current admission status: Inpatient   Preferred Pharmacy:   [address] [place] [id number]  Phone: [phone] Fax: [phone]    Primary Care Provider: Blanka Mock MD    Primary Insurance: MEDICARE  Secondary Insurance: [facility] HEALTH OPTIONS PROGRAM    DISCHARGE DETAILS:  Additional Comments: Call received from Pt's dtr-in-law(Juliette), she and Pt's son(Brian) are interested in information session with [facility]   Referral sent to [facility] via [employer]

## 2022-06-29 NOTE — ACP (ADVANCE CARE PLANNING)
Record of Family Meeting - Palliative and [address] 80 y o  male [id number]      Recommendations and Plan:  · Will review patient's hospice eligibility with hospice team   Family endorses recent deterioration after Cristobal's fall  They are concerned regarding cycle of readmissions and state this is not acceptable quality of life for Mercy Ace  · Family will review patient's Living Will and discuss with him; they are not certain if Mercy Ace would desire continued antibiotics for pneumonia  · Of note, Mercy Ace has 2 children however Santiago Jones is the only one involved in his life  The other is estranged  · Will seek to optimize symptom management  Mercy Ace becomes drowsy with narcotics however is otherwise in severe pain  · Will complete POLST form if Mercy Ace is not immediately a hospice candidate  A family meeting was held for [employer]  This meeting was necessary for determine the appropriate course of treatment  Time of Meetin:30PM  Meeting Location:  Multipurpose room on 3rd floor  Participants: son Santiago Jones and daughter-in-law sees am  Gary Carmichael PA-C     Patient Participation:  Mercy Ace unable to participate in this conversation due to acuity of condition    Advanced Directive of POLST available: yes    Topics of Discussion:  Quality of life for Mercy Ace  Health status prior to fall vs after  Cristobal's Living Will and his expressed desires  Cristobal's family support  Criteria for hospice eligibility  Palliative versus hospice philosophy  Prognosis  Time Involved in Meetinmin, beginning at approximately  2:30 and ending at approximately 3:05P  Gary Carmichael PA-C   Palliative and Supportive Care  Clinic/Answering Service: [phone]  You can find me on [employer]!

## 2022-06-29 NOTE — RESPIRATORY THERAPY NOTE
RT Protocol Note  Sunny Marquez 80 y o  male MRN: [id number]  Unit/Bed#: -01 SDU Encounter: [id number]    Assessment    Principal Problem:    Hospital acquired PNA  Active Problems:    Essential hypertension    Acute renal failure superimposed on stage 4 chronic kidney disease (HCC)    Benign prostatic hyperplasia with lower urinary tract symptoms    GERD (gastroesophageal reflux disease)    Acquired hypothyroidism    Chronic diastolic heart failure (HCC)    LIVIER (obstructive sleep apnea)    Centrilobular emphysema ([facility])    Hyponatremia    Multiple rib fractures    Sepsis ([facility] [facility] )      Home Pulmonary Medications: Trelegy, Xopenex         Past Medical History:   Diagnosis Date    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    Chronic kidney disease     Compression fracture of thoracic vertebra ([facility] )     last assessed 05/07/2012    COPD (chronic obstructive pulmonary disease) ([facility])     Disease of thyroid gland     Heart attack ([facility] [facility] )     History of methicillin resistant staphylococcus aureus (MRSA) 03/28/2019    negative nasal swab     Hollenhorst plaque, right eye     last assessed 04/08/2013    Hyperlipidemia     Hypertension     Iron deficiency anemia due to chronic blood loss     last assessed 09/10/2012    Ischemic colitis ([facility] )     last assessed 05/27/2014    Osteoarthritis of both hands     last assessedn 04/30/2013    Peptic ulcer     last assessed 06/14/2013    Polymyalgia rheumatica ([facility] )     last assessesd 10/09/2012    Renal disorder     Upper GI hemorrhage     last assessed 01/29/2015    Varicose veins of lower extremity     last assessed 04/26/2013     Social History     Socioeconomic History    Marital status:       Spouse name: None    Number of children: None    Years of education: None    Highest education level: None   Occupational History    None   Tobacco Use    Smoking status: Former Smoker     Packs/day: 2 00     Years: 50 00     Pack years: 100 00     Types: Cigarettes     Start date: 65     Quit date: 46     Years since quittin 5    Smokeless tobacco: Never Used    Tobacco comment: Quit 40 yrs  ago   Vaping Use    Vaping Use: Never used   Substance and Sexual Activity    Alcohol use: Never     Alcohol/week: 1 0 standard drink     Types: 1 Glasses of wine per week     Comment: social    Drug use: Never    Sexual activity: Never   Other Topics Concern    None   Social History Narrative     per [employer]    Always uses seat belt     Social Determinants of Health     Financial Resource Strain: Not on file   Food Insecurity: No Food Insecurity    Worried About Running Out of Food in the Last Year: Never true    Ran Out of Food in the Last Year: Never true   Transportation Needs: No Transportation Needs    Lack of Transportation (Medical): No    Lack of Transportation (Non-Medical): No   Physical Activity: Not on file   Stress: Not on file   Social Connections: Not on file   Intimate Partner Violence: Not on file   Housing Stability: Low Risk     Unable to Pay for Housing in the Last Year: No    Number of Places Lived in the Last Year: 1    Unstable Housing in the Last Year: No       Subjective         Objective    Physical Exam:   Assessment Type: (P) Assess only  General Appearance: (P) Alert, Awake  Respiratory Pattern: (P) Dyspnea with exertion, Shallow  Chest Assessment: (P) Chest expansion symmetrical, Trachea midline  Bilateral Breath Sounds: (P) Rales, Rhonchi  Cough: (P) Non-productive    Vitals:  Blood pressure 114/56, pulse 97, temperature 99 68 °F (37 6 °C), resp  rate 17, height 5' 4" (1 626 m), weight 74 4 kg (164 lb), SpO2 96 %  Imaging and other studies: I have personally reviewed pertinent reports              Plan    Respiratory Plan: (P) Mild Distress pathway

## 2022-06-29 NOTE — OCCUPATIONAL THERAPY NOTE
Occupational Therapy Evaluation     Patient Name: Sharee Julio  WMDAE'M Date: 6/29/2022  Problem List  Principal Problem:    Hospital acquired PNA  Active Problems:    Essential hypertension    Acute renal failure superimposed on stage 4 chronic kidney disease (HCC)    Benign prostatic hyperplasia with lower urinary tract symptoms    GERD (gastroesophageal reflux disease)    Acquired hypothyroidism    Chronic diastolic heart failure (HCC)    LIVIER (obstructive sleep apnea)    Centrilobular emphysema (HCC)    Hyponatremia    Multiple rib fractures    Sepsis [facility])    Past Medical History  Past Medical History:   Diagnosis Date    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    Chronic kidney disease     Compression fracture of thoracic vertebra (HCC)     last assessed 05/07/2012    COPD (chronic obstructive pulmonary disease) ([facility])     Disease of thyroid gland     Heart attack ([facility] [facility] )     History of methicillin resistant staphylococcus aureus (MRSA) 03/28/2019    negative nasal swab     Hollenhorst plaque, right eye     last assessed 04/08/2013    Hyperlipidemia     Hypertension     Iron deficiency anemia due to chronic blood loss     last assessed 09/10/2012    Ischemic colitis ([facility] [facility] )     last assessed 05/27/2014    Osteoarthritis of both hands     last assessedn 04/30/2013    Peptic ulcer     last assessed 06/14/2013    Polymyalgia rheumatica ([facility] [facility] )     last assessesd 10/09/2012    Renal disorder     Upper GI hemorrhage     last assessed 01/29/2015    Varicose veins of lower extremity     last assessed 04/26/2013     Past Surgical History  Past Surgical History:   Procedure Laterality Date    CORONARY ARTERY BYPASS GRAFT      HEMORRHOID SURGERY      HERNIA REPAIR      RENAL CYST EXCISION      resolved 05/2010    THROMBOENDARTERECTOMY Right     carotid, last assessed 06/18/2013 06/29/22 1428   OT Last Visit   OT Visit Date 06/29/22   Note Type   Note type Evaluation Restrictions/Precautions   Other Precautions Cognitive; Chair Alarm; Fall Risk   Pain Assessment   Pain Assessment Tool Landa-Baker FACES   Landa-Baker FACES Pain Rating 4   Pain Location/Orientation Orientation: Left; Location: Rib Cage   Hospital Pain Intervention(s) Repositioned   Home Living   Type of Home SNF   Home Equipment Walker   Prior Function   Level of [place] Needs assistance with ADLs and functional mobility; Needs assistance with IADLs   Lives With Facility staff   Receives Help From Personal care attendant   ADL Assistance Needs assistance   IADLs Needs assistance   Subjective   Subjective Pt received in supine position  Pt lethargic  Arousable after multiple attempts/stimulation provided  ADL   Eating Assistance 2  Maximal Assistance   Grooming Assistance 2  Maximal Assistance   UB Bathing Assistance 2  Maximal Assistance   LB Bathing Assistance 2  Maximal Assistance   UB Dressing Assistance 2  Maximal [address] 2  Maximal [address]  2  Maximal Assistance   Additional Comments BUE tremors/jerking noted with volitional movement     Bed Mobility   Supine to Sit 3  Moderate assistance   Additional items Assist x 2;Verbal cues   Additional Comments Pt remained OOB by end of sesssion   Transfers   Sit to Stand 4  Minimal assistance   Additional items Assist x 2;Verbal cues   Stand to Sit 4  Minimal assistance   Additional items Assist x 2;Verbal cues   Stand pivot 4  Minimal assistance   Additional items Assist x 2;Verbal cues  (RW)   Balance   Static Sitting Fair +   Dynamic Sitting Fair   Static Standing Fair -   Dynamic Standing Fair -   Activity Tolerance   Activity Tolerance Patient limited by fatigue   Medical Staff Made Aware PT Trisha   Nurse Made Aware BRADEN CHONG Assessment   RUE Assessment   (PROM WFL)   LUE Assessment   LUE Assessment   (PROM WFL)   Vision-Basic Assessment   Visual History Macular degeneration  (Per family at bedside, pt can only see dark shadows )   Cognition   Overall Cognitive Status Impaired   Arousal/Participation Arousable   Attention Attends with cues to redirect   Orientation Level Oriented to person;Oriented to place  (Aware that his birthday is tomorrow  Aware that he has broken ribs )   Memory Decreased recall of precautions;Decreased recall of recent events   Following Commands Follows one step commands with increased time or repetition   Assessment   Limitation Decreased ADL status; Decreased self-care trans;Decreased high-level ADLs; Decreased cognition   Prognosis Fair   Assessment Pt is a 80 y o  male seen for OT evaluation at [facility], admitted 6/28/2022 w/ Hospital acquired PNA  OT completed extensive review of pt's medical and social history  Comorbidities affecting pt's functional performance at time of assessment include: diastolic HF, COPD, CAP, recent left rib fracture 6-11, etc (see chart)  Pt with recent admission to [facility] 6/12 -6/21 and discharged to [facility] for rehab  Personal factors affecting pt at time of IE include:difficulty performing ADLS, difficulty performing IADLS  and decreased functional mobility  Prior to admission, pt was living undergoing STR at [facility]  Pt required assist w/  ADLS and IADLS & required walker PTA  Upon evaluation: Pt requires mod Ax 2 for bed mobility, min Ax  for functional mobility/transfers, max A for UB ADLs and max A for LB ADLS 2* the following deficits impacting occupational performance: weakness, decreased strength, decreased balance, decreased tolerance, impaired arousal, impaired problem solving and decreased safety awareness  Full objective findings from OT assessment regarding body systems outlined above  Pt to benefit from continued skilled OT tx while in the hospital to address deficits as defined above and maximize level of functional independence w ADL's and functional mobility   Occupational Performance areas to address include: eating, grooming, bathing/shower, toilet hygiene, dressing and functional mobility  Based on findings, pt is of high complexity  The patient's raw score on the AM-PAC Daily Activity inpatient short form is 12, standardized score is 30 6, less than 39 4  Patients at this level are likely to benefit from DC to post-acute rehabilitation services, which DOES coincide with CURRENT above OT recommendations  However please refer to therapist recommendation for discharge planning given other factors that may influence destination  At this time, OT recommendations at time of discharge are short term rehab  Goals   Patient Goals Pt not verbalizing goals at this time  Plan   Treatment Interventions ADL retraining;Functional transfer training;Patient/family training;Cognitive reorientation; Compensatory technique education; Activityengagement   Goal Expiration Date 07/09/22   OT Treatment Day 0   OT Frequency 3-5x/wk   Recommendation   OT Discharge Recommendation Post acute rehabilitation services   [facility] Daily Activity Inpatient   Lower Body Dressing 2   Bathing 2   Toileting 2   Upper Body Dressing 2   Grooming 2   Eating 2   Daily Activity Raw Score 12   Daily Activity Standardized Score (Calc for Raw Score >=11) 30 6   [facility] Applied Cognition Inpatient   Following a Speech/Presentation 2   Understanding Ordinary Conversation 3   Taking Medications 1   Remembering Where Things Are Placed or Put Away 1   Remembering List of 4-5 Errands 1   Taking Care of Complicated Tasks 1   Applied Cognition Raw Score 9   Applied Cognition Standardized Score 22 48       Pt will achieve the following goals within 10 days      *Pt will complete grooming with min A     *Pt will complete UB bathing and dressing with min A     *Pt will complete LB bathing and dressing with mod A      * Pt will complete toileting w/ mod A w/ G hygiene/thoroughness using DME PRN    *Pt will complete bed mobility with min A x 1, with bed flat and no side rail to prep for purposeful tasks    *Pt will perform functional transfers with on/off all surfaces with supervision using DME as needed w/ G balance/safety  *Pt will sit on EOB for 20 minutes for increased safety with seated activity tolerance during ADL tasks          Lizzeth Sanchez OTR/L

## 2022-06-29 NOTE — ASSESSMENT & PLAN NOTE
· Patient is an 80year-old patient with past medical history of diastolic HF, COPD, CAP, left rib fracture 6-11, [facility]  Patient presents to the hospital due to worsening fever, shortness of breath, cough, and increased oxygen requirement that started today, 6/29   Patient noted to be hypoxic upon EMS arrival    · CXR 6/29-- showed new subtle infiltrates  · Procalcitonin- 0 26  · WBC 15 19  · Start Vancomycin and cefepime  · Obtain ABG-- pending  · Obtain chest CT-- Pending  · Continue guaifenesin 1200 bid  · PRN Tessalon perles  · Ipratropium TID  · levalbuterol TID  · Repeat am labs

## 2022-06-29 NOTE — ASSESSMENT & PLAN NOTE
Lab Results   Component Value Date    EGFR 26 06/28/2022    EGFR 24 06/28/2022    EGFR 33 05/12/2022    CREATININE 2 12 (H) 06/28/2022    CREATININE 2 26 (H) 06/28/2022    CREATININE 1 75 (H) 05/12/2022   · Baseline creatinine between 1 7 - 1 8  · Insert harp cath for accurate I&Os  · Avoid nephrotoxins  · Monitor UO

## 2022-06-29 NOTE — ASSESSMENT & PLAN NOTE
Lab Results   Component Value Date    EGFR 24 06/28/2022    EGFR 33 05/12/2022    EGFR 31 05/10/2022    CREATININE 2 26 (H) 06/28/2022    CREATININE 1 75 (H) 05/12/2022    CREATININE 1 85 (H) 05/10/2022     · Baseline creatinine between 1 7 - 1 8  · Insert harp cath for accurate I&Os  · Avoid nephrotoxins  · Monitor UO

## 2022-06-29 NOTE — ASSESSMENT & PLAN NOTE
Wt Readings from Last 3 Encounters:   06/28/22 74 4 kg (164 lb)   05/20/22 74 8 kg (164 lb 12 8 oz)   05/08/22 74 8 kg (165 lb)       · Echo 9/28/21-- EF 55%    Grade 1 diastolic dysfunction  · Hold home furosemide due to GRETCHEN

## 2022-06-29 NOTE — ASSESSMENT & PLAN NOTE
· Sodium on admission 127 --- Repeat 130 after fluid resuscitation   · Stable  May have SIADH from pain  Will follow

## 2022-06-29 NOTE — ASSESSMENT & PLAN NOTE
· Admitted at [facility] 6/12-6/21 with d/c to [facility]   · Left rib fractures 6-11  · Pulmonary toileting  · Lidocaine patch  · Fentanyl 25 mcg IV every 2 hours as needed for pain

## 2022-06-29 NOTE — PROGRESS NOTES
Vancomycin IV Pharmacy-to-Dose Consultation    Divine De La O is a 80 y o  male who is currently receiving Vancomycin IV with management by the Pharmacy Consult service  Assessment/Plan:  The patient was reviewed  Renal function is stable and no signs or symptoms of nephrotoxicity and/or infusion reactions were documented in the chart  Based on todays assessment, continue current vancomycin (day # 2) dosing of 750mg IV Q24H, with a plan for trough to be drawn at 2030 on 7/1/22  We will continue to follow the patients culture results and clinical progress daily      Ino Joseph, Pharmacist

## 2022-06-29 NOTE — PLAN OF CARE
Problem: PHYSICAL THERAPY ADULT  Goal: Performs mobility at highest level of function for planned discharge setting  See evaluation for individualized goals  Description: Treatment/Interventions: ADL retraining, Functional transfer training, LE strengthening/ROM, Therapeutic exercise, Patient/family training, Cognitive reorientation, Endurance training, Equipment eval/education, Bed mobility, Gait training, Compensatory technique education, Continued evaluation, Spoke to nursing, Spoke to case management, OT          See flowsheet documentation for full assessment, interventions and recommendations  Note: Prognosis: Guarded  Problem List: Decreased endurance, Impaired balance, Decreased mobility, Decreased coordination, Decreased cognition, Impaired judgement, Decreased safety awareness, Impaired vision, Impaired hearing, Pain  Assessment: Pt is an 80 y o  male who presented to ED 6/28/22 from rehab with c/o fever, cough, hypoxia  Dx:  PNA, sepsis  Personal factors affecting pt at time of IE include: lethargy, multiple lines/tubes, harp, telemetry, BUE tremors  PLOF and home set up listed above; Upon evaluation: Pt requires mod A x 2 for bed mobility, min A x 2 for sit to stand and SPT with RW  Full objective findings from PT assessment regarding body systems outlined above  Current limitations include impaired balance, decreased endurance/activity tolerance, gait deviations, fall risk and lethargy  Pt's clinical presentation is currently unstable/unpredictable seen in pt's presentation of continuous monitoring in ICU, fall risk, and lethargy  Pt would benefit from continued PT while in hospital and follow up with inpt rehab at D/C to increase strength, balance, endurance, independence with funcitonal mobility to return to PLOF, maximize independence, decrease caregiver burden and improve quality of life   PT/OT co-evaluation for pt's current medical status, mobility/balance deficits, and cognitive deficits and lethargy requiring 2 skilled therapists  The patient's AM-PAC Basic Mobility Inpatient Short Form Raw Score is 11  A Raw score of less than 17 suggests the patient may benefit from discharge to inpt rehab  Please also refer to the recommendation of the Physical Therapist for safe discharge planning  PT Discharge Recommendation: Post acute rehabilitation services          See flowsheet documentation for full assessment

## 2022-06-29 NOTE — ASSESSMENT & PLAN NOTE
· Admitted at [facility] 6/12-6/21 with d/c to [facility]   · Left rib fractures 6-11  · Pulmonary toileting  · Lidocaine patch  · Tylenol for pain  Will try to avoid narcotics as we are able to

## 2022-06-29 NOTE — ASSESSMENT & PLAN NOTE
· Patient is an 80year-old patient with past medical history of diastolic HF, COPD, CAP, left rib fracture 6-11, recent discharge from [facility] to [facility] for rehab on 6/21  Patient presents to the hospital due to worsening fever, shortness of breath, cough, and increased oxygen requirement that started today, 6/29   Patient noted to be hypoxic upon EMS arrival    · CXR 6/29-- showed new subtle infiltrates  · Procalcitonin- 0 26  · WBC 15 19  · Cont Vancomycin and cefepime  · Obtain chest CT-- Pending  · Continue guaifenesin 1200 bid  · PRN Tessalon perles  · Ipratropium TID  · levalbuterol TID

## 2022-06-29 NOTE — SPEECH THERAPY NOTE
Speech Language/Pathology    Speech-Language Pathology Bedside Swallow Evaluation      Patient Name: Gera RHODES Date: 6/29/2022     Problem List  Principal Problem:    Hospital acquired PNA  Active Problems:    Essential hypertension    Acute renal failure superimposed on stage 4 chronic kidney disease (HCC)    Benign prostatic hyperplasia with lower urinary tract symptoms    GERD (gastroesophageal reflux disease)    Acquired hypothyroidism    Chronic diastolic heart failure (HCC)    LIVIER (obstructive sleep apnea)    Centrilobular emphysema (HCC)    Hyponatremia    Multiple rib fractures    Sepsis ([facility] )      Past Medical History  Past Medical History:   Diagnosis Date    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    Chronic kidney disease     Compression fracture of thoracic vertebra (HCC)     last assessed 05/07/2012    COPD (chronic obstructive pulmonary disease) ([facility] )     Disease of thyroid gland     Heart attack ([facility] )     History of methicillin resistant staphylococcus aureus (MRSA) 03/28/2019    negative nasal swab     Hollenhorst plaque, right eye     last assessed 04/08/2013    Hyperlipidemia     Hypertension     Iron deficiency anemia due to chronic blood loss     last assessed 09/10/2012    Ischemic colitis ([facility] )     last assessed 05/27/2014    Osteoarthritis of both hands     last assessedn 04/30/2013    Peptic ulcer     last assessed 06/14/2013    Polymyalgia rheumatica ([facility] )     last assessesd 10/09/2012    Renal disorder     Upper GI hemorrhage     last assessed 01/29/2015    Varicose veins of lower extremity     last assessed 04/26/2013       Past Surgical History  Past Surgical History:   Procedure Laterality Date    CORONARY ARTERY BYPASS GRAFT      HEMORRHOID SURGERY      HERNIA REPAIR      RENAL CYST EXCISION      resolved 05/2010    THROMBOENDARTERECTOMY Right     carotid, last assessed 06/18/2013       Summary   Pt presented with s/s suggestive of min-mild oral dysphagia, no suspected pharyngeal impairments  Mastication and oral manipulation of hard solids min-mild prolonged  Transfers are adequate, no significant oral residue  Swallow initiation suspected fairly prompt  No overt s/s aspiration w/ PO trials administered today  S/w pt regarding resuming baseline diet dysphagia 3 w/ thin liquids, pt agreeable at this time  Silent aspiration cannot be ruled out without instrumentation, if current pna not improving or suspected as related to aspiration vs other per physician, consider VBS to further assess  Risk/s for Aspiration: Mild     Recommended Diet: soft/level 3 diet and thin liquids   Recommended Form of Meds: whole with liquid   Aspiration precautions and swallowing strategies: upright posture, only feed when fully alert and slow rate of feeding  Other Recommendations: Continue frequent oral care       Current Medical Status  Pt is a 80 y o  male who presented to [address] [place] [address] with past medical history of diastolic HF, COPD, CAP, left rib fracture 6-11, recent admission to [facility] 6/12 -6/21 and discharged [facility] for rehab  Patient presents to the hospital due to worsening fever, shortness of breath, cough, and increased oxygen requirement that started today, 6/29  Patient hypoxic upon EMS arrival to [facility] with oxygen saturation 80% and required non-rebreather; however, oxygen saturation low 90% on 2L upon arrival to emergency department upon chart review  CXR 6/29 demonstrated new subtle infiltrates with concerns for pneumonia  Patient admitted for sepsis secondary to pneumonia  Current Precautions: Aspiration     Allergies:  No known food allergies    Past medical history:  Please see H&P for details    Special Studies:   6/28: New left rib fractures, appear acute to subacute      Social/Education/Vocational Hx:  Pt lives in SNF/ECF    Swallow Information   Current Risks for Dysphagia & Aspiration: known history of dysphagia  Current Symptoms/Concerns: change in respiratory status and ? pna  Current Diet: Clear liquids   Baseline Diet: soft/level 3 diet and thin liquids      Baseline Assessment   Behavior/Cognition: alert  Speech/Language Status: able to participate in conversation and able to follow commands  Patient Positioning: upright in chair  Pain Status/Interventions/Response to Interventions:  No report of or nonverbal indications of pain  Swallow Mechanism Exam  Facial: symmetrical  Labial: WFL  Lingual: WFL  Velum: symmetrical  Mandible: adequate ROM  Dentition: full dentures  Vocal quality:clear/adequate   Volitional Cough: strong/productive   Respiratory Status: on 1 5 L O2       Consistencies Assessed and Performance   Consistencies Administered: thin liquids, puree, soft solids and hard solids    Oral Stage: min-mild  Mastication was min-mild prolonged w/ hard solids, ultimately adequate for bolus break down and formation  Transfers were functional with no significant oral residue noted  No overt s/s reduced oral control  Pharyngeal Stage: suspected WFL   Swallow Mechanics:  Swallowing initiation appeared prompt  Laryngeal rise was palpated and judged to be within functional limits  No coughing, throat clearing, change in vocal quality or respiratory status noted today  Esophageal Concerns: belching    Strategies and Efficacy: pt independently requests frequent liquid wash     Summary and Recommendations (see above)    Results Reviewed with: patient, RN and MD     Treatment Recommended: Yes     Frequency of treatment: As able pending [address]     Patient Stated Goal: "coffee"     Dysphagia LTG  -Patient will demonstrate safe and effective oral intake (without overt s/s significant oral/pharyngeal dysphagia including s/s penetration or aspiration) for the highest appropriate diet level       Short Term Goals:  -Pt will tolerate Dysphagia 3/advanced (dental soft) diet and thin liquid with no significant s/s oral or pharyngeal dysphagia across 1-3 diagnostic session/s        Speech Therapy Prognosis   Prognosis: fair    Prognosis Considerations: age, medical status, prior medical history and cognitive status

## 2022-06-29 NOTE — PLAN OF CARE
Problem: OCCUPATIONAL THERAPY ADULT  Goal: Performs self-care activities at highest level of function for planned discharge setting  See evaluation for individualized goals  Description: Treatment Interventions: ADL retraining, Functional transfer training, Patient/family training, Cognitive reorientation, Compensatory technique education, Activityengagement          See flowsheet documentation for full assessment, interventions and recommendations  Note: Limitation: Decreased ADL status, Decreased self-care trans, Decreased high-level ADLs, Decreased cognition  Prognosis: Fair  Assessment: Pt is a 80 y o  male seen for OT evaluation at [facility], admitted 6/28/2022 w/ Hospital acquired PNA  OT completed extensive review of pt's medical and social history  Comorbidities affecting pt's functional performance at time of assessment include: diastolic HF, COPD, CAP, recent left rib fracture 6-11, etc (see chart)  Pt with recent admission to [facility] 6/12 -6/21 and discharged to [facility] for rehab  Personal factors affecting pt at time of IE include:difficulty performing ADLS, difficulty performing IADLS  and decreased functional mobility  Prior to admission, pt was living undergoing STR at [facility]  Pt required assist w/  ADLS and IADLS & required walker PTA  Upon evaluation: Pt requires mod Ax 2 for bed mobility, min Ax  for functional mobility/transfers, max A for UB ADLs and max A for LB ADLS 2* the following deficits impacting occupational performance: weakness, decreased strength, decreased balance, decreased tolerance, impaired arousal, impaired problem solving and decreased safety awareness  Full objective findings from OT assessment regarding body systems outlined above  Pt to benefit from continued skilled OT tx while in the hospital to address deficits as defined above and maximize level of functional independence w ADL's and functional mobility   Occupational Performance areas to address include: eating, grooming, bathing/shower, toilet hygiene, dressing and functional mobility  Based on findings, pt is of high complexity  The patient's raw score on the AM-PAC Daily Activity inpatient short form is 12, standardized score is 30 6, less than 39 4  Patients at this level are likely to benefit from DC to post-acute rehabilitation services, which DOES coincide with CURRENT above OT recommendations  However please refer to therapist recommendation for discharge planning given other factors that may influence destination  At this time, OT recommendations at time of discharge are short term rehab       OT Discharge Recommendation: Post acute rehabilitation services

## 2022-06-29 NOTE — PLAN OF CARE
Problem: Potential for Falls  Goal: Patient will remain free of falls  Description: INTERVENTIONS:  - Educate patient/family on patient safety including physical limitations  - Instruct patient to call for assistance with activity   - Consult OT/PT to assist with strengthening/mobility   - Keep Call bell within reach  - Keep bed low and locked with side rails adjusted as appropriate  - Keep care items and personal belongings within reach  - Initiate and maintain comfort rounds  - Make Fall Risk Sign visible to staff  - Offer Toileting every 2 Hours, in advance of need  - Initiate/Maintain bed alarm  - Apply yellow socks and bracelet for high fall risk patients  - Consider moving patient to room near nurses station  Outcome: Progressing     Problem: MOBILITY - ADULT  Goal: Maintain or return to baseline ADL function  Description: INTERVENTIONS:  -  Assess patient's ability to carry out ADLs; assess patient's baseline for ADL function and identify physical deficits which impact ability to perform ADLs (bathing, care of mouth/teeth, toileting, grooming, dressing, etc )  - Assess/evaluate cause of self-care deficits   - Assess range of motion  - Assess patient's mobility; develop plan if impaired  - Assess patient's need for assistive devices and provide as appropriate  - Encourage maximum independence but intervene and supervise when necessary  - Involve family in performance of ADLs  - Assess for home care needs following discharge   - Consider OT consult to assist with ADL evaluation and planning for discharge  - Provide patient education as appropriate  Outcome: Progressing  Goal: Maintains/Returns to pre admission functional level  Description: INTERVENTIONS:  - Perform BMAT or MOVE assessment daily    - Set and communicate daily mobility goal to care team and patient/family/caregiver  - Collaborate with rehabilitation services on mobility goals if consulted  - Perform Range of Motion 3 times a day    - Reposition patient every 2 hours    - Record patient progress and toleration of activity level   Outcome: Progressing     Problem: Prexisting or High Potential for Compromised Skin Integrity  Goal: Skin integrity is maintained or improved  Description: INTERVENTIONS:  - Identify patients at risk for skin breakdown  - Assess and monitor skin integrity  - Assess and monitor nutrition and hydration status  - Monitor labs   - Assess for incontinence   - Turn and reposition patient  - Assist with mobility/ambulation  - Relieve pressure over bony prominences  - Avoid friction and shearing  - Provide appropriate hygiene as needed including keeping skin clean and dry  - Evaluate need for skin moisturizer/barrier cream  - Collaborate with interdisciplinary team   - Patient/family teaching  - Consider wound care consult   Outcome: Progressing     Problem: CARDIOVASCULAR - ADULT  Goal: Absence of cardiac dysrhythmias or at baseline rhythm  Description: INTERVENTIONS:  - Continuous cardiac monitoring, vital signs, obtain 12 lead EKG if ordered  - Administer antiarrhythmic and heart rate control medications as ordered  - Monitor electrolytes and administer replacement therapy as ordered  Outcome: Progressing     Problem: RESPIRATORY - ADULT  Goal: Achieves optimal ventilation and oxygenation  Description: INTERVENTIONS:  - Assess for changes in respiratory status  - Assess for changes in mentation and behavior  - Position to facilitate oxygenation and minimize respiratory effort  - Oxygen administered by appropriate delivery if ordered  - Initiate smoking cessation education as indicated  - Encourage broncho-pulmonary hygiene including cough, deep breathe, Incentive Spirometry  - Assess the need for suctioning and aspirate as needed  - Assess and instruct to report SOB or any respiratory difficulty  - Respiratory Therapy support as indicated  Outcome: Progressing     Problem: GENITOURINARY - ADULT  Goal: Maintains or returns to baseline urinary function  Description: INTERVENTIONS:  - Assess urinary function  - Encourage oral fluids to ensure adequate hydration if ordered  - Administer IV fluids as ordered to ensure adequate hydration  - Administer ordered medications as needed  - Offer frequent toileting  - Follow urinary retention protocol if ordered  Outcome: Progressing  Goal: Urinary catheter remains patent  Description: INTERVENTIONS:  - Assess patency of urinary catheter  - If patient has a chronic harp, consider changing catheter if non-functioning  - Follow guidelines for intermittent irrigation of non-functioning urinary catheter  Outcome: Progressing     Problem: METABOLIC, FLUID AND ELECTROLYTES - ADULT  Goal: Electrolytes maintained within normal limits  Description: INTERVENTIONS:  - Monitor labs and assess patient for signs and symptoms of electrolyte imbalances  - Administer electrolyte replacement as ordered  - Monitor response to electrolyte replacements, including repeat lab results as appropriate  - Instruct patient on fluid and nutrition as appropriate  Outcome: Progressing  Goal: Fluid balance maintained  Description: INTERVENTIONS:  - Monitor labs   - Monitor I/O and WT  - Instruct patient on fluid and nutrition as appropriate  - Assess for signs & symptoms of volume excess or deficit  Outcome: Progressing     Problem: SKIN/TISSUE INTEGRITY - ADULT  Goal: Skin Integrity remains intact(Skin Breakdown Prevention)  Description: Assess:  -Perform Chris assessment every shift   -Inspect skin when repositioning, toileting, and assisting with ADLS  -Assess extremities for adequate circulation and sensation     Bed Management:  -Have minimal linens on bed & keep smooth, unwrinkled  -Change linens as needed when moist or perspiring  -Keep HOB at 30 degrees     Toileting:  -Assess for incontinence every 2    Activity:  -Encourage or provide ROM exercises   -Turn and reposition patient every 2 Hours  -Use appropriate equipment to lift or move patient in bed      Skin Care:  -Avoid use of baby powder, tape, friction and shearing, hot water or constrictive clothing    -Do not massage red bony areas      Outcome: Progressing     Problem: HEMATOLOGIC - ADULT  Goal: Maintains hematologic stability  Description: INTERVENTIONS  - Assess for signs and symptoms of bleeding or hemorrhage  - Monitor labs  - Administer supportive blood products/factors as ordered and appropriate  Outcome: Progressing     Problem: MUSCULOSKELETAL - ADULT  Goal: Maintain or return mobility to safest level of function  Description: INTERVENTIONS:  - Assess patient's ability to carry out ADLs; assess patient's baseline for ADL function and identify physical deficits which impact ability to perform ADLs (bathing, care of mouth/teeth, toileting, grooming, dressing, etc )  - Assess/evaluate cause of self-care deficits   - Assess range of motion  - Assess patient's mobility  - Assess patient's need for assistive devices and provide as appropriate  - Encourage maximum independence but intervene and supervise when necessary  - Involve family in performance of ADLs  - Assess for home care needs following discharge   - Consider OT consult to assist with ADL evaluation and planning for discharge  - Provide patient education as appropriate  Outcome: Progressing

## 2022-06-29 NOTE — ASSESSMENT & PLAN NOTE
· Patient is an 80year-old patient with past medical history of diastolic HF, COPD, CAP, left rib fracture 6-11, recent discharge from [facility] to [facility] for rehab on 6/21  Patient presents to the hospital due to worsening fever, shortness of breath, cough, and increased oxygen requirement that started today, 6/29   Patient noted to be hypoxic upon EMS arrival    · CXR 6/29-- showed new subtle infiltrates  · Procalcitonin- 0 26  · WBC 15 19  · Start Vancomycin and cefepime  · Obtain ABG-- pending  · Obtain chest CT-- Pending  · Continue guaifenesin 1200 bid  · PRN Tessalon perles  · Ipratropium TID  · levalbuterol TID  · Repeat am labs

## 2022-06-29 NOTE — CONSULTS
Consultation - Palliative and Supportive Care   Jd Restorationism 80 y o  male [id number]    Assessment/Problems Actively Addressed:  Goals of care counseling  Pneumonia   Sepsis due to unspecified organism (ICD A41  9)   Acute renal failure on CKD stage 4  (N18 4)  End stage renal disease  Dementia   CHF (I50 22)  CAD  Emphysema  Fall, rib fractures  Urinary retention      Goals:  · Level 3 code status  · 6/29 - I spoke with son Nikki Murillo  I introduced the role of palliative and supportive care and he is interested in goals of care discussions  · Nikki Murillo is currently in meeting, however he plans to visit hospital later today  I gave him my office number so we can coordinate a time to talk, hopefully later today  · I also discussed case with primary MD, Dr Paul Knox, and primary RN, Den Joe:  · Plan to treat rib pain with non-sedating medications such as acetaminophen, Lidoderm patches  · Plan to review SLP recommendations to ensure discharge suitability  · Plan to treat underlying PNA; may be able to discharge to SNF for further [address] discussion there  · When I speak with Nikki Murillo, I plan to discuss POLST form and preliminary goals of care  Further consideration for hospice could occur at SNF  · I also plan to clarify who is surrogate decision-maker for Hiram Moh  · I plan to ascertain Cristobal's baseline functional status, PPS score  · Palliative will follow  Further recommendations forthcoming  Plan:  Symptom management:  -Per primary   -Plan to limit fentanyl and start Lidoderm patches for rib pain  Tylenol for headache  Social support:   Time spent providing supportive listening   Validated family's experience               I have reviewed the patient's controlled substance dispensing history in the Prescription Drug Monitoring Program in compliance with the [facility] regulations before prescribing any controlled substances    Last refills -N/A    Decisional apparatus:  Patient is not competent on exam today   If competence is lost, patient's substitute decision maker would default to son Maura Garcia* by [place] Act 169  * surrogate/default decision maker requires clarification at this moment  Will discuss with family later today  Advance Directive/Living Will:  A Living Will is on file  It lists Guerrero Johnson as primary surrogate decision maker and Hazle Severin as secondary  Will plan to clarify this today, ? if these individuals have passed away  POLST: not on file  POA Forms: see above    We appreciate the invitation to be involved in this patient's care  We will continue to follow throughout this hospitalization  Please do not hesitate to reach our on call provider through our clinic answering service at  should you have acute symptom control concerns  TYRONE Guadalupe  Palliative and Supportive Care  Clinic/Answering Service: [phone]  You can find me on [employer]! IDENTIFICATION:  Inpatient consult to Palliative Care  Consult performed by: Bess Carranza PA-C  Consult ordered by: Leonel Padilla PA-C        Physician Requesting Consult: Jose Tafoya MD  Reason for Consult / Principal Problem: GOC counseling and SM secondary to pneumonia, COPD, end stage renal disease      History of Present Illness:  Lore Buenrostro is a 80 y o  male who presents with a palliative diagnosis of COPD, CHF, CKD4 was brought into the ED at [address] on 6/29/22 secondary to increased work of breathing with fever and cough  Yary Rudd has had multiple recent admissions due to exacerbations of his chronic underlying conditions  Most recently, he had a fall and was admitted at [address] [place] [address] from 06/12 to 6/21 with rib fractures  He was discharged to rehab  At the rehab facility, staff noticed halls worsening fever with cough in increasing oxygen needs  EMS reportedly found called to be hypoxic with oxygen saturation in the 80s    His respiratory status improved with non-rebreather and he only required minimal oxygen upon presentation to the emergency department  Chest x-ray reveals pneumonia  All has been admitted for further care  Goals of care were preliminarily discussed with patient's son upon admission  In that discussion, patient's family did not want aggressive or heroic measures  They noted that patient is unlikely to be able to return home as he is medically frail  They were interested in further palliative care discussion  It is noted that patient is DNR/DNI  I called emergency contact, son Santiago Jones, on 06/29/2022  He is receptive to goals of care discussions and is coming to the hospital later today  He is currently heading into a meeting and does not have availability to talk  He mentions he may want his wife present for conversations as well  I gave him my office number so that we can remain in contact throughout the day      Review of Systems:   Review of Systems   Unable to perform ROS: acuity of condition       Past Medical History:   Diagnosis Date    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    Chronic kidney disease     Compression fracture of thoracic vertebra ([facility] [address] )     last assessed 05/07/2012    COPD (chronic obstructive pulmonary disease) ([facility] [address] )     Disease of thyroid gland     Heart attack ([facility] [address] [facility] )     History of methicillin resistant staphylococcus aureus (MRSA) 03/28/2019    negative nasal swab     Hollenhorst plaque, right eye     last assessed 04/08/2013    Hyperlipidemia     Hypertension     Iron deficiency anemia due to chronic blood loss     last assessed 09/10/2012    Ischemic colitis ([facility] [address] )     last assessed 05/27/2014    Osteoarthritis of both hands     last assessedn 04/30/2013    Peptic ulcer     last assessed 06/14/2013    Polymyalgia rheumatica ([facility] [address] )     last assessesd 10/09/2012    Renal disorder     Upper GI hemorrhage     last assessed 01/29/2015    Varicose veins of lower extremity     last assessed 2013     Past Surgical History:   Procedure Laterality Date    CORONARY ARTERY BYPASS GRAFT      HEMORRHOID SURGERY      HERNIA REPAIR      RENAL CYST EXCISION      resolved 2010    THROMBOENDARTERECTOMY Right     carotid, last assessed 2013     Social History     Socioeconomic History    Marital status:      Spouse name: Not on file    Number of children: Not on file    Years of education: Not on file    Highest education level: Not on file   Occupational History    Not on file   Tobacco Use    Smoking status: Former Smoker     Packs/day: 2 00     Years: 50 00     Pack years: 100 00     Types: Cigarettes     Start date:      Quit date:      Years since quittin 5    Smokeless tobacco: Never Used    Tobacco comment: Quit 40 yrs  ago   Vaping Use    Vaping Use: Never used   Substance and Sexual Activity    Alcohol use: Never     Alcohol/week: 1 0 standard drink     Types: 1 Glasses of wine per week     Comment: social    Drug use: Never    Sexual activity: Never   Other Topics Concern    Not on file   Social History Narrative     per [employer]    Always uses seat belt     Social Determinants of Health     Financial Resource Strain: Not on file   Food Insecurity: No Food Insecurity    Worried About Running Out of Food in the Last Year: Never true    Behzad of Food in the Last Year: Never true   Transportation Needs: No Transportation Needs    Lack of Transportation (Medical): No    Lack of Transportation (Non-Medical):  No   Physical Activity: Not on file   Stress: Not on file   Social Connections: Not on file   Intimate Partner Violence: Not on file   Housing Stability: Low Risk     Unable to Pay for Housing in the Last Year: No    Number of Places Lived in the Last Year: 1    Unstable Housing in the Last Year: No     Family History   Problem Relation Age of Onset    Hypertension Mother     Alcohol abuse Mother     Hypertension Father    Denisha Leisure Alcohol abuse Father     Alcohol abuse Son     Heart disease Family     Stroke Family         syndrome       Medications:  all current active meds have been reviewed and current meds:   Current Facility-Administered Medications   Medication Dose Route Frequency    acetaminophen (TYLENOL) tablet 650 mg  650 mg Oral Q6H PRN    amiodarone tablet 200 mg  200 mg Oral Daily    benzonatate (TESSALON PERLES) capsule 100 mg  100 mg Oral TID PRN    cefepime (MAXIPIME) IVPB (premix in dextrose) 1,000 mg 50 mL  1,000 mg Intravenous Q12H    escitalopram (LEXAPRO) tablet 5 mg  5 mg Oral Daily    fentanyl citrate (PF) 100 MCG/2ML 25 mcg  25 mcg Intravenous Q2H PRN    fluticasone (FLONASE) 50 mcg/act nasal spray 1 spray  1 spray Nasal BID    guaiFENesin (MUCINEX) 12 hr tablet 1,200 mg  1,200 mg Oral Q12H [facility]    heparin (porcine) subcutaneous injection 5,000 Units  5,000 Units Subcutaneous Q8H [facility]    ipratropium (ATROVENT) 0 02 % inhalation solution 0 5 mg  0 5 mg Nebulization TID    levalbuterol (XOPENEX) inhalation solution 1 25 mg  1 25 mg Nebulization TID    levothyroxine tablet 75 mcg  75 mcg Oral Early Morning    lidocaine (LIDODERM) 5 % patch 1 patch  1 patch Topical Daily    LORazepam (ATIVAN) tablet 0 5 mg  0 5 mg Oral Q8H PRN    ondansetron (ZOFRAN) injection 4 mg  4 mg Intravenous Q6H PRN    pantoprazole (PROTONIX) injection 40 mg  40 mg Intravenous Q24H ROSA    tamsulosin (FLOMAX) capsule 0 4 mg  0 4 mg Oral Daily With Dinner    vancomycin (VANCOCIN) IVPB (premix in dextrose) 750 mg 150 mL  10 mg/kg Intravenous Q24H       Allergies   Allergen Reactions    Pravastatin Anaphylaxis, Photosensitivity and Headache     Reaction Date: [id number];    Other reaction(s): Headache    Acetaminophen-Codeine GI Intolerance    Atorvastatin      Other reaction(s): Leg Cramps  Other reaction(s): Leg Cramps    Codeine Nausea Only    Colestipol GI Intolerance     Reaction Date: [id number];     Contrast  [Iodinated Diagnostic Agents]     Fenofibrate GI Intolerance     Reaction Date: [id number];     Hydrocodone Vomiting    Niacin      Reaction Date: [id number];     Niaspan  [Niacin Er]     Pitavastatin Myalgia    Pneumococcal Polysaccharide Vaccine     Pravastatin Sodium     Simvastatin     Statins Myalgia    Vicoprofen  [Hydrocodone-Ibuprofen] GI Intolerance    Cyclophosphamide Rash    Ioversol Hives         Medications    Current Facility-Administered Medications:     acetaminophen (TYLENOL) tablet 650 mg, 650 mg, Oral, Q6H PRN, Jaya Benítez PA-C    amiodarone tablet 200 mg, 200 mg, Oral, Daily, Jaya Benítez PA-C    benzonatate (TESSALON PERLES) capsule 100 mg, 100 mg, Oral, TID PRN, Jaya Benítez PA-C    cefepime (MAXIPIME) IVPB (premix in dextrose) 1,000 mg 50 mL, 1,000 mg, Intravenous, Q12H, Jaya Benítez PA-C    escitalopram (LEXAPRO) tablet 5 mg, 5 mg, Oral, Daily, Jaya Benítez PA-C    fentanyl citrate (PF) 100 MCG/2ML 25 mcg, 25 mcg, Intravenous, Q2H PRN, Jaya Benítez PA-C, 25 mcg at 06/29/22 0534    fluticasone (FLONASE) 50 mcg/act nasal spray 1 spray, 1 spray, Nasal, BID, Jaya Benítez PA-C    guaiFENesin (MUCINEX) 12 hr tablet 1,200 mg, 1,200 mg, Oral, Q12H ROSA, Jaya Benítez PA-C, 1,200 mg at 06/29/22 0012    heparin (porcine) subcutaneous injection 5,000 Units, 5,000 Units, Subcutaneous, Q8H [address], 5,000 Units at 06/29/22 0506 **AND** [COMPLETED] Platelet count, , , Once, Jaya Benítez PA-C    ipratropium (ATROVENT) 0 02 % inhalation solution 0 5 mg, 0 5 mg, Nebulization, TID, Michelle Fly, DO, 0 5 mg at 06/29/22 0730    levalbuterol (XOPENEX) inhalation solution 1 25 mg, 1 25 mg, Nebulization, TID, Jaya Benítez PA-C, 1 25 mg at 06/29/22 0730    levothyroxine tablet 75 mcg, 75 mcg, Oral, Early Morning, Jaya Benítez PA-C, 75 mcg at 06/29/22 0506    lidocaine (LIDODERM) 5 % patch 1 patch, 1 patch, Topical, Daily, Jaya Benítez PA-C    LORazepam (ATIVAN) tablet 0 5 mg, 0 5 mg, Oral, Q8H PRN, Jaya Benítez PA-C    ondansetron (ZOFRAN) injection 4 mg, 4 mg, Intravenous, Q6H PRN, Jaya Benítez PA-C    pantoprazole (PROTONIX) injection 40 mg, 40 mg, Intravenous, Q24H ROSA, Jaya Benítez PA-C    tamsulosin (FLOMAX) capsule 0 4 mg, 0 4 mg, Oral, Daily With Dinner, Uzma Meneses PA-C    Pharmacy general consult, , , Once **AND** vancomycin (VANCOCIN) IVPB (premix in dextrose) 750 mg 150 mL, 10 mg/kg, Intravenous, Q24H **AND** MRSA culture, , , Once, Jaya Benítez PA-C    Objective  /66   Pulse 86   Temp 99 5 °F (37 5 °C) (Bladder)   Resp 20   Ht 5' 4" (1 626 m)   Wt 74 4 kg (164 lb)   SpO2 98%   BMI 28 15 kg/m²     Physical Exam:   Physical Exam  Vitals and nursing note reviewed  Constitutional:       Appearance: He is well-developed  He is ill-appearing  HENT:      Head: Normocephalic and atraumatic  Eyes:      Conjunctiva/sclera: Conjunctivae normal    Cardiovascular:      Rate and Rhythm: Normal rate and regular rhythm  Heart sounds: No murmur heard  Pulmonary:      Effort: Pulmonary effort is normal  No respiratory distress  Breath sounds: Normal breath sounds  Abdominal:      Palpations: Abdomen is soft  Tenderness: There is no abdominal tenderness  Musculoskeletal:      Cervical back: Neck supple  Skin:     General: Skin is warm and dry  Coloration: Skin is pale  Neurological:      Mental Status: He is alert  Lab Results:   I have personally reviewed pertinent labs  , CBC:   Lab Results   Component Value Date    WBC 15 71 (H) 06/29/2022    HGB 10 1 (L) 06/29/2022    HCT 32 2 (L) 06/29/2022    MCV 97 06/29/2022     06/29/2022    MCH 30 5 06/29/2022    MCHC 31 4 06/29/2022    RDW 14 3 06/29/2022    MPV 9 2 06/29/2022    NRBC 0 06/29/2022   , CMP:   Lab Results   Component Value Date    SODIUM 130 (L) 06/29/2022    K 4 9 06/29/2022    CL 98 (L) 06/29/2022    CO2 27 06/29/2022    CO2 28 06/28/2022    BUN 44 (H) 06/29/2022    CREATININE 2 09 (H) 06/29/2022    GLUCOSE 169 (H) 06/28/2022    CALCIUM 8 3 06/29/2022    AST 10 06/29/2022    ALT 20 06/29/2022    ALKPHOS 171 (H) 06/29/2022    EGFR 27 06/29/2022     Imaging Studies: I have personally reviewed pertinent reports  EKG, Pathology, and Other Studies: I have personally reviewed pertinent reports  Counseling / Coordination of Care  Total floor / unit time spent today 70 minutes  Greater than 50% of total time was spent with the patient and / or family counseling and / or coordination of care  A description of the counseling / coordination of care: Reviewed chart, provided medical updates, determined goals of care, discussed palliative care and symptom management, discussed comfort care and hospice care, discussed code status, determined competency and POA/HCA, determined social/family support, provided psychosocial support  Reviewed with SLIM, ICU team, RN and CM  Portions of this document may have been created using dictation software and as such some "sound alike" terms may have been generated by the system  Do not hesitate to contact me with any questions or clarifications

## 2022-06-29 NOTE — ASSESSMENT & PLAN NOTE
Lab Results   Component Value Date    EGFR 27 06/29/2022    EGFR 26 06/28/2022    EGFR 24 06/28/2022    CREATININE 2 09 (H) 06/29/2022    CREATININE 2 12 (H) 06/28/2022    CREATININE 2 26 (H) 06/28/2022   · Baseline creatinine between 1 7 - 1 8  · Insert harp cath for accurate I&Os  · Avoid nephrotoxins  · Monitor UO

## 2022-06-29 NOTE — ASSESSMENT & PLAN NOTE
· Blood cultures drawn 6/29-- pending  · WBC: 15 19  · See PNA above  · Maintain MAP> 65  · Received 1 liter NS - no hypotension / lactic normal

## 2022-06-29 NOTE — PHYSICAL THERAPY NOTE
PHYSICAL THERAPY Evaluation    Performed at least 2 patient identifiers during session:  Patient Active Problem List   Diagnosis    Hydronephrosis of right kidney    CAD (coronary artery disease)    Carotid stenosis    Essential hypertension    Acute renal failure superimposed on stage 4 chronic kidney disease (HCC)    Anxiety disorder due to general medical condition    Benign prostatic hyperplasia with lower urinary tract symptoms    GERD (gastroesophageal reflux disease)    Mixed simple and mucopurulent chronic bronchitis (HCC)    Headache    Mixed hyperlipidemia    Acquired hypothyroidism    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[facility] [address] )    Insomnia    Iron deficiency anemia    Macular degeneration    Osteoporosis    Other atopic dermatitis    Shortness of breath    Tachycardia    Medicare annual wellness visit, subsequent    Eczema    Seborrheic keratoses, inflamed    Bronchitis    Suprapubic tenderness    Loose stools    BMI 29 0-29 9,adult    Chronic diastolic heart failure ([facility] [address] )    Community acquired pneumonia of right lower lobe of lung    Urinary obstruction    Cervical spine arthritis    Stage 3 chronic kidney disease (HCC)    Aneurysm, pulmonary, arteriovenous    LIVIER (obstructive sleep apnea)    Snoring    Excessive daytime sleepiness    Chest pain    Dizziness    Impacted cerumen of left ear    TMJ arthropathy    Chronic cough    Central serous chorioretinopathy    Bilateral sensorineural hearing loss    Gastrointestinal hemorrhage    Senile nuclear cataract    Peptic ulcer with hemorrhage but without obstruction    Nonspecific abnormal findings on radiological and examination of lung field    Tinea cruris    Partial arterial occlusion of retina    Ischemic colitis ([facility] [address] )    Primary osteoarthritis of left knee    Patellar tendonitis of left knee    Viral illness    COPD exacerbation (HCC)    Current moderate episode of major depressive disorder without prior episode ([place] [address] )    Chronic renal disease, stage IV (HCC)    PAC (premature atrial contraction)    Centrilobular emphysema (HCC)    Paroxysmal SVT (supraventricular tachycardia) (HCC)    Acute respiratory failure with hypoxia (HCC)    Hyponatremia    Multiple rib fractures    Hospital acquired PNA    Sepsis [facility])       Past Medical History:   Diagnosis Date    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    Chronic kidney disease     Compression fracture of thoracic vertebra ([place] [address] )     last assessed 05/07/2012    COPD (chronic obstructive pulmonary disease) ([place] [address] )     Disease of thyroid gland     Heart attack ([place] [address] )     History of methicillin resistant staphylococcus aureus (MRSA) 03/28/2019    negative nasal swab     Hollenhorst plaque, right eye     last assessed 04/08/2013    Hyperlipidemia     Hypertension     Iron deficiency anemia due to chronic blood loss     last assessed 09/10/2012    Ischemic colitis ([place] [address] )     last assessed 05/27/2014    Osteoarthritis of both hands     last assessedn 04/30/2013    Peptic ulcer     last assessed 06/14/2013    Polymyalgia rheumatica ([place] [address] )     last assessesd 10/09/2012    Renal disorder     Upper GI hemorrhage     last assessed 01/29/2015    Varicose veins of lower extremity     last assessed 04/26/2013       Past Surgical History:   Procedure Laterality Date    CORONARY ARTERY BYPASS GRAFT      HEMORRHOID SURGERY      HERNIA REPAIR      RENAL CYST EXCISION      resolved 05/2010    THROMBOENDARTERECTOMY Right     carotid, last assessed 06/18/2013 06/29/22 1425   PT Last Visit   PT Visit Date 06/29/22   Note Type   Note type Evaluation   Pain Assessment   Pain Assessment Tool Landa-Baker FACES   Landa-Baker FACES Pain Rating 4   Pain Location/Orientation Orientation: Left; Location: Rib Cage   Hospital Pain Intervention(s) Repositioned   Home Living   Type of Home SNF  (Pt from rehab)   Home Layout One level   Prior Function   Level of [place] Needs assistance with IADLs; Needs assistance with ADLs and functional mobility   Lives With Facility staff   Receives Help From Personal care attendant   ADL Assistance Needs assistance   IADLs Needs assistance   General   Additional Pertinent History Pt is lethargic, able to wake with significant VC and tactile stim, pt wakes with wet washcloth to face  Pt pleasant and cooperative; Pt with BUE tremors at rest and with intention, having difficulty feeding self, son reports this has been going on for a few days-1 week  Family/Caregiver Present   (Family present at end of session for Palliative team meeting at 1430)   Cognition   Overall Cognitive Status Impaired   Arousal/Participation Cooperative   Orientation Level Oriented to person;Oriented to place  (pt knows he is in hospital, unable to state name of hospital pt able to state name and , pt knows he is turning 89 tomorrow )   Memory Decreased recall of recent events;Decreased recall of precautions   Following Commands Follows one step commands with increased time or repetition   RUE Assessment   RUE Assessment WFL   LUE Assessment   LUE Assessment WFL   RLE Assessment   RLE Assessment WFL   LLE Assessment   LLE Assessment WFL   Bed Mobility   Supine to Sit 3  Moderate assistance   Additional items Assist x 2   Additional Comments Pt remains OOB in chair at bedside at end of session  Transfers   Sit to Stand 4  Minimal assistance   Additional items Assist x 2  (RW)   Stand to Sit 4  Minimal assistance   Additional items Assist x 2   Stand pivot 4  Minimal assistance   Additional items Assist x 2; Increased time required;Verbal cues  (RW)   Ambulation/Elevation   Gait pattern Not appropriate  (SPT to chair only this session, limited by fatigue and tremors)   Balance   Static Sitting Fair   Dynamic Sitting Fair -   Static Standing Fair -  (RW)   Dynamic Standing Fair -  (RW)   Activity Tolerance   Activity Tolerance Patient limited by fatigue   Nurse Made Aware [place]   Assessment   Prognosis Guarded   Problem List Decreased endurance; Impaired balance;Decreased mobility; Decreased coordination;Decreased cognition; Impaired judgement;Decreased safety awareness; Impaired vision; Impaired hearing;Pain   Assessment Pt is an 80 y o  male who presented to ED 6/28/22 from rehab with c/o fever, cough, hypoxia  Dx:  PNA, sepsis  Personal factors affecting pt at time of IE include: lethargy, multiple lines/tubes, harp, telemetry, BUE tremors  PLOF and home set up listed above; Upon evaluation: Pt requires mod A x 2 for bed mobility, min A x 2 for sit to stand and SPT with RW  Full objective findings from PT assessment regarding body systems outlined above  Current limitations include impaired balance, decreased endurance/activity tolerance, gait deviations, fall risk and lethargy  Pt's clinical presentation is currently unstable/unpredictable seen in pt's presentation of continuous monitoring in ICU, fall risk, and lethargy  Pt would benefit from continued PT while in hospital and follow up with inpt rehab at D/C to increase strength, balance, endurance, independence with funcitonal mobility to return to PLOF, maximize independence, decrease caregiver burden and improve quality of life  PT/OT co-evaluation for pt's current medical status, mobility/balance deficits, and cognitive deficits and lethargy requiring 2 skilled therapists  The patient's AM-PAC Basic Mobility Inpatient Short Form Raw Score is 11  A Raw score of less than 17 suggests the patient may benefit from discharge to inpt rehab  Please also refer to the recommendation of the Physical Therapist for safe discharge planning  Goals   Patient Goals pt does not state goal this session   STG Expiration Date 07/13/22   Short Term Goal #1 Pt will be able to demo:  min A x 1 for sit to/from supine, CGA x 1 for sit to/from standing with RW, CGA x 1 for ambulation 25ft with RW;  to progress independence     Plan Treatment/Interventions ADL retraining;Functional transfer training;LE strengthening/ROM; Therapeutic exercise;Patient/family training;Cognitive reorientation; Endurance training;Equipment eval/education; Bed mobility;Gait training; Compensatory technique education;Continued evaluation;Spoke to nursing;Spoke to case management;OT   PT Frequency 3-5x/wk   Recommendation   PT Discharge Recommendation Post acute rehabilitation services   AM-PAC Basic Mobility Inpatient   Turning in Bed Without Bedrails 2   Lying on Back to Sitting on Edge of Flat Bed 2   Moving Bed to Chair 2   Standing Up From Chair 2   Walk in Room 2   Climb 3-5 Stairs 1   Basic Mobility Inpatient Raw Score 11   Basic Mobility Standardized Score 30 25   Highest Level Of Mobility   JH-HLM Goal 4: Move to chair/commode   JH-HLM Achieved 4: Move to chair/commode     Jose Smith, PT      Patient Name: Abdulaziz Harmon  AMXGM'W Date: 6/29/2022

## 2022-06-29 NOTE — ASSESSMENT & PLAN NOTE
· Left rib fractures 6-11  · Pulmonary toileting  · Lidocaine patch  · Fentanyl 25 mcg IV every 2 hours as needed for pain

## 2022-06-29 NOTE — PROGRESS NOTES
[facility]  [facility] - Kal Mchugh 6/30/1932, 80 y o  male MRN: [id number]  Unit/Bed#: -01 SDU Encounter: [id number]  Primary Care Provider: Clemente Kaba MD   Date and time admitted to hospital: 6/28/2022  8:23 PM    Sepsis [facility])  Assessment & Plan  · Blood cultures drawn 6/29-- pending  · WBC: 15 19  · See PNA above  · Maintain MAP> 65  · Received 1 liter NS - no hypotension / lactic normal       Multiple rib fractures  Assessment & Plan  · Admitted at [facility] 6/12-6/21 with d/c to [facility]   · Left rib fractures 6-11  · Pulmonary toileting  · Lidocaine patch  · Tylenol for pain  Will try to avoid narcotics as we are able to  Hyponatremia  Assessment & Plan  · Sodium on admission 127 --- Repeat 130 after fluid resuscitation   · Stable  May have SIADH from pain  Will follow  Centrilobular emphysema (HCC)  Assessment & Plan  · Resp  protocol  · Ipratropium TID  · levalbuterol TID      LIVIER (obstructive sleep apnea)  Assessment & Plan  · Patient non-compliant with CPAP  · Continue to monitor oxygenation       Chronic diastolic heart failure [facility])  Assessment & Plan  Wt Readings from Last 3 Encounters:   06/28/22 74 4 kg (164 lb)   05/20/22 74 8 kg (164 lb 12 8 oz)   05/08/22 74 8 kg (165 lb)       · Echo 9/28/21-- EF 55%    Grade 1 diastolic dysfunction  · Hold home furosemide due to GRETCHEN        Acquired hypothyroidism  Assessment & Plan  Continue home levothyroxine    GERD (gastroesophageal reflux disease)  Assessment & Plan  - Protonix 40 mg IV daily     Benign prostatic hyperplasia with lower urinary tract symptoms  Assessment & Plan  · Continue home Flomax      Acute renal failure superimposed on stage 4 chronic kidney disease [facility])  Assessment & Plan  Lab Results   Component Value Date    EGFR 27 06/29/2022    EGFR 26 06/28/2022    EGFR 24 06/28/2022    CREATININE 2 09 (H) 06/29/2022    CREATININE 2 12 (H) 06/28/2022    CREATININE 2 26 (H) 2022   · Baseline creatinine between  7 - 1 8  · Insert harp cath for accurate I&Os  · Avoid nephrotoxins  · Monitor UO      Essential hypertension  Assessment & Plan  · Bp 106/55  · Continuous cardiac monitoring      * Hospital acquired PNA  Assessment & Plan  · Patient is an 80-year-old patient with past medical history of diastolic HF, COPD, CAP, left rib fracture , recent discharge from [address] [place] [address] to [facility] for rehab on   Patient presents to the hospital due to worsening fever, shortness of breath, cough, and increased oxygen requirement that started today,   Patient noted to be hypoxic upon EMS arrival    · CXR -- showed new subtle infiltrates  · Procalcitonin- 0 26  · WBC 15 19  · Cont Vancomycin and cefepime  · Obtain chest CT-- Pending  · Continue guaifenesin 1200 bid  · PRN Tessalon perles  · Ipratropium TID  · levalbuterol TID           VTE  Prophylaxis:   Pharmacologic: in place    Patient Centered Rounds: I have performed bedside rounds with nursing staff today  Discussions with Specialists or Other Care Team Provider: case management     Education and Discussions with Family / Patient: pt, deferred to family meeting      Current Length of Stay: 1 day(s)    Current Patient Status: Inpatient        Code Status: Level 3 - DNAR and DNI      Subjective:   Pt seen this am  Limited historian denies significant pain    Patient is seen and examined at bedside  All other ROS are negative  Objective:     Vitals:   Temp (24hrs), Av 7 °F (37 6 °C), Min:98 2 °F (36 8 °C), Max:102 °F (38 9 °C)    Temp:  [98 2 °F (36 8 °C)-102 °F (38 9 °C)] 99 86 °F (37 7 °C)  HR:  [] 97  Resp:  [17-40] 32  BP: ()/(47-67) 106/57  SpO2:  [90 %-99 %] 96 %  Body mass index is 28 15 kg/m²  Input and Output Summary (last 24 hours):        Intake/Output Summary (Last 24 hours) at 2022 1353  Last data filed at 2022 1100  Gross per 24 hour   Intake 500 ml   Output 725 ml Net -225 ml       Physical Exam:       GEN: No acute distress, comfortable, elderly , chronically ill  HEEENT: No JVD, PERRLA, no scleral icterus  RESP: Lungs clear to auscultation bilaterally  CV: RRR, +s1/s2   ABD: SOFT NON TENDER, POSITIVE BOWEL SOUNDS, NO DISTENTION  PSYCH: CALM  NEURO:  NO FOCAL DEFICITS  SKIN: NO RASH  EXTREM: NO EDEMA    Additional Data:     Labs:    Results from last 7 days   Lab Units 06/29/22  0521   WBC Thousand/uL 15 71*   HEMOGLOBIN g/dL 10 1*   HEMATOCRIT % 32 2*   PLATELETS Thousands/uL 163   NEUTROS PCT % 78*   LYMPHS PCT % 10*   MONOS PCT % 11   EOS PCT % 0     Results from last 7 days   Lab Units 06/29/22  0521   SODIUM mmol/L 130*   POTASSIUM mmol/L 4 9   CHLORIDE mmol/L 98*   CO2 mmol/L 27   BUN mg/dL 44*   CREATININE mg/dL 2 09*   ANION GAP mmol/L 5   CALCIUM mg/dL 8 3   ALBUMIN g/dL 2 5*   TOTAL BILIRUBIN mg/dL 0 60   ALK PHOS U/L 171*   ALT U/L 20   AST U/L 10   GLUCOSE RANDOM mg/dL 107     Results from last 7 days   Lab Units 06/28/22 2030   INR  1 03             Results from last 7 days   Lab Units 06/29/22 0521 06/28/22 2030   LACTIC ACID mmol/L  --  2 0   PROCALCITONIN ng/ml 0 70* 0 26*           * I Have Reviewed All Lab Data Listed Above  Imaging:     Results for orders placed during the hospital encounter of 06/28/22    XR chest 1 view portable    Narrative  CHEST    INDICATION:   resp distress  COMPARISON:  5/9/22    EXAM PERFORMED/VIEWS:  XR CHEST PORTABLE      FINDINGS:    Lordotic positioning  Patient rotated to the right  Low lung volumes  Similar mild right diaphragm elevation  Increased, bilateral central and basilar interstitial markings  Small left pleural effusion  No pneumothorax  Stable appearance of the cardiomediastinal silhouette, given technique differences  New left 6th and 7th axillary rib fractures, no definite callous formation  Right neck surgical clips      The study was marked in [facility] for immediate notification  Impression  Low lung volumes with new interstitial changes compared to 5/9/2022, edema versus infection  Evidence of small left pleural effusion  New left rib fractures, appear acute to subacute  Workstation performed: [id number]    Results for orders placed in visit on 05/03/22    XR chest pa & lateral    Narrative  CHEST    INDICATION:   R05 9: Cough, unspecified  R06 2: Wheezing  COMPARISON:  Chest radiograph from 1/19/2022 and 11/26/2021, abdomen CT from 4/12/2022, chest CT from 2/24/2022  EXAM PERFORMED/VIEWS:  XR CHEST PA & LATERAL  DUAL ENERGY SUBTRACTION  FINDINGS:    Normal heart size  Severe coronary artery calcification  No acute disease  Left lower lobe nodule corresponding with the CT  Benign linear scar  No effusion or pneumothorax  Mild benign eventration of the right hemidiaphragm  Osseous structures appear within normal limits for patient age  Old T12 compression deformity  Impression  No acute cardiopulmonary disease  Workstation performed: [id number]      *I have reviewed all imaging reports listed above      Recent Cultures (last 7 days):     Results from last 7 days   Lab Units 06/28/22 2030   BLOOD CULTURE  Received in Microbiology Lab  Culture in Progress  Received in Microbiology Lab  Culture in Progress         Last 24 Hours Medication List:   Current Facility-Administered Medications   Medication Dose Route Frequency Provider Last Rate    acetaminophen  650 mg Oral Q6H PRN Jaya Benítez PA-C      amiodarone  200 mg Oral Daily Jaya Benítez PA-C      benzonatate  100 mg Oral TID PRN Jaya Benítez PA-C      cefepime  1,000 mg Intravenous Q12H Jaya Benítez PA-C Stopped (06/29/22 1100)    escitalopram  5 mg Oral Daily Jaya Benítez PA-C      fentanyl citrate (PF)  25 mcg Intravenous Q2H PRN Jaya Benítez PA-C      fluticasone  1 spray Nasal BID Jaya Benítez PA-C      guaiFENesin  1,200 mg Oral Q12H [facility] LAXMI Benítez PA-C      heparin (porcine)  5,000 Units Subcutaneous Q8H [facility] Benítez PA-C      ipratropium  0 5 mg Nebulization TID Michelle Andrew, DO      levalbuterol  1 25 mg Nebulization TID [facility] BRYANNA Benítez      levothyroxine  75 mcg Oral Early Morning [facility]      lidocaine  1 patch Topical Daily [facility] LAXMI Benítez PA-C      LORazepam  0 5 mg Oral Q8H PRN [facility]      ondansetron  4 mg Intravenous Q6H PRN [facility] LAXMI Benítez PA-C      pantoprazole  40 mg Intravenous Q24H [facility]-C      tamsulosin  0 4 mg Oral Daily With Asha Schuler  BRYANNA Benítez      vancomycin  10 mg/kg Intravenous Q24H [facility] Ben[facility]te[facility] PA-C          Today, Patient Was Seen By: Derik Douglass MD    ** Please Note: Dictation voice to text software may have been used in the creation of this document   **

## 2022-06-29 NOTE — PROGRESS NOTES
Vancomycin Assessment    Sari Barrett is a 80 y o  male who is currently receiving vancomycin 750mg every 24 hours for Pneumonia     Relevant clinical data and objective history reviewed:  Creatinine   Date Value Ref Range Status   06/28/2022 2 26 (H) 0 60 - 1 30 mg/dL Final     Comment:     Standardized to IDMS reference method   05/12/2022 1 75 (H) 0 60 - 1 30 mg/dL Final     Comment:     Standardized to IDMS reference method   05/10/2022 1 85 (H) 0 60 - 1 30 mg/dL Final     Comment:     Standardized to IDMS reference method   08/28/2015 1 79 (H) 0 60 - 1 30 mg/dL Final     Comment:     Standardized to IDMS reference method   06/10/2015 1 65 (H) 0 60 - 1 30 mg/dL Final     Comment:     Standardized to IDMS reference method   03/14/2015 1 57 (H) 0 60 - 1 30 mg/dL Final     Comment:     Standardized to IDMS reference method     /55   Pulse 101   Temp 98 2 °F (36 8 °C) (Oral)   Resp (!) 26   Ht 5' 4" (1 626 m)   Wt 74 4 kg (164 lb)   SpO2 96%   BMI 28 15 kg/m²   No intake/output data recorded  Lab Results   Component Value Date/Time    BUN 42 (H) 06/28/2022 08:30 PM    BUN 28 (H) 08/28/2015 05:30 PM    WBC 15 19 (H) 06/28/2022 08:30 PM    WBC 5 52 10/16/2015 07:39 AM    HGB 12 6 06/28/2022 08:37 PM    HGB 11 6 (L) 06/28/2022 08:30 PM    HGB 13 8 10/16/2015 07:39 AM    HCT 37 06/28/2022 08:37 PM    HCT 36 8 06/28/2022 08:30 PM    HCT 43 0 10/16/2015 07:39 AM    MCV 97 06/28/2022 08:30 PM    MCV 91 10/16/2015 07:39 AM     06/28/2022 08:30 PM     (L) 10/16/2015 07:39 AM     Temp Readings from Last 3 Encounters:   06/28/22 98 2 °F (36 8 °C) (Oral)   05/20/22 97 6 °F (36 4 °C)   05/12/22 97 8 °F (36 6 °C)     Vancomycin Days of Therapy: 1    Assessment/Plan  The patient is currently on vancomycin utilizing scheduled dosing  Baseline risks associated with therapy include: pre-existing renal impairment and advanced age    The patient received loading dose of 1500mg once and will continue with 750mg every 24 hours regimen for a goal trough of 15-20 (appropriate for most indications)  Pharmacy will continue to follow closely for s/sx of nephrotoxicity, infusion reactions, and appropriateness of therapy  BMP and CBC will be ordered per protocol  Plan for trough as patient approaches steady state, prior to the 4th  dose at approximately 2030 on 7/1/22  If the patient renal function worsens, will draw a random level  Also cefepime dose will be adjusted according to the renal function  Pharmacy will continue to follow the patients culture results and clinical progress daily      Colonel Ball, Pharmacist, PharmD, BCPS

## 2022-06-29 NOTE — H&P
[place]  H&P- Josh Yi 6/30/1932, 80 y o  male MRN: [id number]  Unit/Bed#: -01 SDU Encounter: [id number]  Primary Care Provider: Mary Arellano MD   Date and time admitted to hospital: 6/28/2022  8:23 PM    * Hospital acquired PNA  Assessment & Plan  · Patient is an 59-year-old patient with past medical history of diastolic HF, COPD, CAP, left rib fracture 6-11, recent discharge from [address] [place] [address] to [facility] for rehab on 6/21  Patient presents to the hospital due to worsening fever, shortness of breath, cough, and increased oxygen requirement that started today, 6/29   Patient noted to be hypoxic upon EMS arrival    · CXR 6/29-- showed new subtle infiltrates  · Procalcitonin- 0 26  · WBC 15 19  · Start Vancomycin and cefepime  · Obtain ABG-- pending  · Obtain chest CT-- Pending  · Continue guaifenesin 1200 bid  · PRN Tessalon perles  · Ipratropium TID  · levalbuterol TID  · Repeat am labs       Sepsis ([address] )  Assessment & Plan  · Blood cultures drawn 6/29-- pending  · WBC: 15 19  · See PNA above  · Maintain MAP> 65  · Received 1 liter NS - no hypotension / lactic normal       Multiple rib fractures  Assessment & Plan  · Admitted at [facility] 6/12-6/21 with d/c to [facility]   · Left rib fractures 6-11  · Pulmonary toileting  · Lidocaine patch  · Fentanyl 25 mcg IV every 2 hours as needed for pain     Acute renal failure superimposed on stage 4 chronic kidney disease [facility])  Assessment & Plan  Lab Results   Component Value Date    EGFR 26 06/28/2022    EGFR 24 06/28/2022    EGFR 33 05/12/2022    CREATININE 2 12 (H) 06/28/2022    CREATININE 2 26 (H) 06/28/2022    CREATININE 1 75 (H) 05/12/2022   · Baseline creatinine between 1 7 - 1 8  · Insert harp cath for accurate I&Os  · Avoid nephrotoxins  · Monitor UO      Chronic diastolic heart failure (HCC)  Assessment & Plan  Wt Readings from Last 3 Encounters:   06/28/22 74 4 kg (164 lb)   05/20/22 74 8 kg (164 lb 12 8 oz)   05/08/22 74 8 kg (165 lb)       · Echo 9/28/21-- EF 55%  Grade 1 diastolic dysfunction  · Hold home furosemide due to GRETCHEN        Hyponatremia  Assessment & Plan  · Sodium on admission 127 --- Repeat 130 after fluid resuscitation   · Trend labs      Centrilobular emphysema (HCC)  Assessment & Plan  · Resp  protocol  · Ipratropium TID  · levalbuterol TID      LIVIER (obstructive sleep apnea)  Assessment & Plan  · Patient non-compliant with CPAP  · Continue to monitor oxygenation       Acquired hypothyroidism  Assessment & Plan  Continue home levothyroxine    GERD (gastroesophageal reflux disease)  Assessment & Plan  - Protonix 40 mg IV daily     Benign prostatic hyperplasia with lower urinary tract symptoms  Assessment & Plan  · Continue home Flomax      Essential hypertension  Assessment & Plan  · Bp 106/55  · Continuous cardiac monitoring        VTE Prophylaxis: Heparin  / sequential compression device   Code Status: DNR/DNI  POLST: There is no POLST form on file for this patient (pre-hospital)    Anticipated Length of Stay:  Patient will be admitted on an Inpatient basis with an anticipated length of stay of  > 2 midnights  Justification for Hospital Stay: Pneumonia    Total Time for Visit, including Counseling / Coordination of Care: 45 minutes  Greater than 50% of this total time spent on direct patient counseling and coordination of care  Chief Complaint:   Fever, shortness of breath    History of Present Illness:    Timmy Kelly is a 80 y o  male  past medical history of diastolic HF, COPD, CAP, left rib fracture 6-11, recent admission to [facility] 6/12 -6/21 and discharged to [facility] for rehab  Patient presents to the hospital due to worsening fever, shortness of breath, cough, and increased oxygen requirement that started today, 6/29   Patient hypoxic upon EMS arrival to [facility] with oxygen saturation 80% and required non-rebreather; however, oxygen saturation low 90% on 2L upon arrival to emergency department upon chart review  CXR 6/29 demonstrated new subtle infiltrates with concerns for pneumonia  Patient admitted for sepsis secondary to pneumonia  Review of Systems:    Review of Systems   Constitutional: Positive for fatigue and fever  Negative for chills  HENT: Positive for congestion and hearing loss  Respiratory: Positive for cough and shortness of breath  Negative for chest tightness  Cardiovascular: Negative for chest pain and palpitations  Gastrointestinal: Negative for diarrhea, nausea and vomiting  Genitourinary: Positive for frequency  Negative for difficulty urinating  Neurological: Positive for tremors  Negative for seizures, speech difficulty, weakness, light-headedness and headaches  Psychiatric/Behavioral: Negative for agitation, behavioral problems and confusion         Past Medical and Surgical History:     Past Medical History:   Diagnosis Date    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    Chronic kidney disease     Compression fracture of thoracic vertebra ([facility] )     last assessed 05/07/2012    COPD (chronic obstructive pulmonary disease) ([facility] )     Disease of thyroid gland     Heart attack ([facility] )     History of methicillin resistant staphylococcus aureus (MRSA) 03/28/2019    negative nasal swab     Hollenhorst plaque, right eye     last assessed 04/08/2013    Hyperlipidemia     Hypertension     Iron deficiency anemia due to chronic blood loss     last assessed 09/10/2012    Ischemic colitis ([facility] )     last assessed 05/27/2014    Osteoarthritis of both hands     last assessedn 04/30/2013    Peptic ulcer     last assessed 06/14/2013    Polymyalgia rheumatica ([facility] )     last assessesd 10/09/2012    Renal disorder     Upper GI hemorrhage     last assessed 01/29/2015    Varicose veins of lower extremity     last assessed 04/26/2013       Past Surgical History:   Procedure Laterality Date    CORONARY ARTERY BYPASS GRAFT      HEMORRHOID SURGERY      HERNIA REPAIR      RENAL CYST EXCISION      resolved 05/2010    THROMBOENDARTERECTOMY Right     carotid, last assessed 06/18/2013       Meds/Allergies:    Prior to Admission medications    Medication Sig Start Date End Date Taking? Authorizing Provider   albuterol (PROVENTIL HFA,VENTOLIN HFA) 90 mcg/act inhaler Inhale 2 puffs every 6 (six) hours as needed for wheezing 9/27/21   Perez Webster MD   amiodarone 200 mg tablet Take 1 tablet (200 mg total) by mouth daily For fourteen days followed by 200 mg once a day  12/23/21   Sobeida Traylor MD   B Complex Vitamins (B COMPLEX PO) Take by mouth daily    Historical Provider, MD   benzonatate (TESSALON PERLES) 100 mg capsule Take 1 capsule (100 mg total) by mouth as needed in the morning and 1 capsule (100 mg total) as needed at noon and 1 capsule (100 mg total) as needed in the evening (cough; may alternate with phenergan or if no relief with phenergan)  Patient taking differently: Take 100 mg by mouth as needed in the morning and 100 mg as needed at noon and 100 mg as needed in the evening (cough; may alternate with phenergan or if no relief with phenergan)  prn  5/12/22   Janna Larkin PA-C   Cholecalciferol 50 MCG (2000 UT) CAPS Take by mouth daily     Historical Provider, MD   Cyanocobalamin (B-12 PO) Take by mouth daily    Historical Provider, MD   escitalopram (LEXAPRO) 5 mg tablet take 1 tablet by mouth once daily 5/24/22   Perez Webster MD   Ferrous Sulfate (IRON) 325 (65 Fe) MG TABS Take by mouth every other day     Historical Provider, MD   fluticasone (FLONASE) 50 mcg/act nasal spray 1 spray into each nostril 2 (two) times a day    Historical Provider, MD   fluticasone-umeclidinium-vilanterol (Trelegy Ellipta) 100-62 5-25 MCG/INH inhaler Inhale 1 puff  in the morning  Rinse mouth after use    5/12/22 6/11/22  Janna Larkin PA-C   guaiFENesin (MUCINEX) 600 mg 12 hr tablet Take 1,200 mg by mouth every 12 (twelve) hours    Historical Provider, MD   KRILL OIL PO Take by mouth daily    Historical Provider, MD   levalbuterol (Xopenex) 1 25 mg/3 mL nebulizer solution Take 3 mL (1 25 mg total) by nebulization in the morning and 3 mL (1 25 mg total) in the evening and 3 mL (1 25 mg total) before bedtime  5/20/22   Eder Hensley PA-C   levothyroxine 75 mcg tablet take 1 tablet by mouth once daily 6/10/22   Aretha Thomson MD   LORazepam (ATIVAN) 0 5 mg tablet take 1 tablet by mouth every 8 hours if needed for anxiety 5/25/22   Aretha Thomson MD   Multiple Vitamins-Minerals (VISION FORMULA/LUTEIN PO) Take by mouth 11/15/14   Historical Provider, MD   nystatin-triamcinolone (MYCOLOG-II) cream APPLY TO THE AFFECTED AREA(S) SPARINGLY TWICE A DAY 1/30/22   Aretha Thomson MD   omeprazole (PriLOSEC) 20 mg delayed release capsule take 1 capsule by mouth once daily 12/25/21   Aretha Thomson MD   primidone (MYSOLINE) 50 mg tablet Take 1 tablet (50 mg total) by mouth every 12 (twelve) hours 4/25/22   Aretha Thomson MD   promethazine (PHENERGAN) 12 5 mg/10 mL syrup Take 5 mL (6 25 mg total) by mouth 4 (four) times a day as needed (cough)  Patient taking differently: Take 6 25 mg by mouth as needed in the morning and 6 25 mg as needed at noon and 6 25 mg as needed in the evening and 6 25 mg as needed before bedtime (cough)   prn  3/16/22   Aretha Thomson MD   promethazine-dextromethorphan [facility]) 6 25-15 mg/5 mL oral syrup take 5 milliliter by mouth four times a day if needed for cough  Patient taking differently: Prn 2/3/22   Aretha Thomson MD   Respiratory Therapy Supplies (SPIROMETER) KIT by Does not apply route 4 (four) times a day 2/25/20   Aretha Thomson MD   sodium chloride 0 9 % nebulizer solution TAKE 3 MILLILITERS BY NEBULIZATION ROUTE AS NEEDED FOR WHEEZING  Patient not taking: Reported on 5/20/2022 11/16/19   Aretha Thomson MD   tamsulosin [facility]) 0 4 mg take 1 capsule by mouth once daily with dinner 3/12/22   Marybelle Homans, MD   zolpidem [facility]) 5 mg tablet take 1 tablet by mouth at bedtime if needed for sleep  Patient taking differently: prn 22   Marybelle Homans, MD     I have reveiwed home medications using records provided by [facility]  Allergies: Allergies   Allergen Reactions    Pravastatin Anaphylaxis, Photosensitivity and Headache     Reaction Date: ; Other reaction(s): Headache    Acetaminophen-Codeine GI Intolerance    Atorvastatin      Other reaction(s): Leg Cramps  Other reaction(s): Leg Cramps    Codeine Nausea Only    Colestipol GI Intolerance     Reaction Date: ;     Contrast  [Iodinated Diagnostic Agents]     Fenofibrate GI Intolerance     Reaction Date: ;     Hydrocodone Vomiting    Niacin      Reaction Date: ;     Niaspan  [Niacin Er]     Pitavastatin Myalgia    Pneumococcal Polysaccharide Vaccine     Pravastatin Sodium     Simvastatin     Statins Myalgia    Vicoprofen  [Hydrocodone-Ibuprofen] GI Intolerance    Cyclophosphamide Rash    Ioversol Hives       Social History:     Marital Status:      Substance Use History:   Social History     Substance and Sexual Activity   Alcohol Use Never    Alcohol/week: 1 0 standard drink    Types: 1 Glasses of wine per week    Comment: social     Social History     Tobacco Use   Smoking Status Former Smoker    Packs/day: 2 00    Years: 50 00    Pack years: 100 00    Types: Cigarettes    Start date: 65    Quit date: 46    Years since quittin 5   Smokeless Tobacco Never Used   Tobacco Comment    Quit 40 yrs   ago     Social History     Substance and Sexual Activity   Drug Use Never       Family History:    Family History   Problem Relation Age of Onset    Hypertension Mother     Alcohol abuse Mother     Hypertension Father     Alcohol abuse Father     Alcohol abuse Son     Heart disease Family     Stroke Family         syndrome Physical Exam:     Vitals:   Blood Pressure: 101/56 (06/29/22 0000)  Pulse: 99 (06/29/22 0000)  Temperature: 98 2 °F (36 8 °C) (06/28/22 2300)  Temp Source: Oral (06/28/22 2300)  Respirations: 18 (06/29/22 0000)  Height: 5' 4" (162 6 cm) (06/28/22 2026)  Weight - Scale: 74 4 kg (164 lb) (06/28/22 2026)  SpO2: 96 % (06/29/22 0000)    Physical Exam  Constitutional:       General: He is in acute distress  Appearance: He is ill-appearing  HENT:      Head: Normocephalic  Mouth/Throat:      Mouth: Mucous membranes are dry  Cardiovascular:      Rate and Rhythm: Tachycardia present  Pulses: Normal pulses  Radial pulses are 2+ on the right side and 2+ on the left side  Dorsalis pedis pulses are 2+ on the right side and 2+ on the left side  Heart sounds: No friction rub  No gallop  Pulmonary:      Effort: Respiratory distress present  No accessory muscle usage  Breath sounds: Rhonchi present  Abdominal:      General: Bowel sounds are normal       Palpations: Abdomen is soft  Skin:     General: Skin is warm and dry  Capillary Refill: Capillary refill takes less than 2 seconds  Neurological:      Mental Status: He is alert and oriented to person, place, and time  Coordination: Coordination abnormal       Gait: Gait abnormal       Comments: Bilateral upper extremity tremors           Additional Data:     Lab Results: I have personally reviewed pertinent reports        Results from last 7 days   Lab Units 06/29/22  0040 06/28/22 2037 06/28/22 2030   WBC Thousand/uL  --   --  15 19*   HEMOGLOBIN g/dL  --   --  11 6*   I STAT HEMOGLOBIN g/dl  --  12 6  --    HEMATOCRIT %  --   --  36 8   HEMATOCRIT, ISTAT %  --  37  --    PLATELETS Thousands/uL 153  --  184   NEUTROS PCT %  --   --  84*   LYMPHS PCT %  --   --  6*   MONOS PCT %  --   --  9   EOS PCT %  --   --  0     Results from last 7 days   Lab Units 06/28/22 2355 06/28/22 2037 06/28/22 2030 POTASSIUM mmol/L 4 9  --  4 7   CHLORIDE mmol/L 100  --  95*   CO2 mmol/L 24  --  27   CO2, I-STAT mmol/L  --  28  --    BUN mg/dL 40*  --  42*   CREATININE mg/dL 2 12*  --  2 26*   CALCIUM mg/dL 7 6*  --  8 7   ALK PHOS U/L  --   --  204*   ALT U/L  --   --  24   AST U/L  --   --  12   GLUCOSE, ISTAT mg/dl  --  169*  --      Results from last 7 days   Lab Units 06/28/22  2030   INR  1 03       Imaging: I have personally reviewed pertinent reports  No results found  EKG, Pathology, and Other Studies Reviewed on Admission:   · EKG: Sinus tach on the telemetry    Epic / Care Everywhere Records Reviewed: Yes     ** Please Note: This note has been constructed using a voice recognition system   **

## 2022-06-29 NOTE — ED PROVIDER NOTES
History  Chief Complaint   Patient presents with    Fever - 9 weeks to 74 years     Pt present to the er with fever od 8     80year-old male trending presents for evaluation of fever, cough that started this morning  EMS initially report that patient's oxygen saturation was 80% on their arrival to the nursing home  Non-rebreather was placed with improvement into the high 90s  Patient is oriented to self and location  He complains of feeling short of breath with cough  Per chart review, patient with a history of COPD  No known history of CHF  Echo in 2021 reveals EF of 55%  Prior to Admission Medications   Prescriptions Last Dose Informant Patient Reported? Taking? B Complex Vitamins (B COMPLEX PO)  Family Member Yes No   Sig: Take by mouth daily   Cholecalciferol 50 MCG (2000 UT) CAPS  Family Member Yes No   Sig: Take by mouth daily    Cyanocobalamin (B-12 PO)  Family Member Yes No   Sig: Take by mouth daily   Ferrous Sulfate (IRON) 325 (65 Fe) MG TABS  Family Member Yes No   Sig: Take by mouth every other day    KRILL OIL PO  Family Member Yes No   Sig: Take by mouth daily   LORazepam (ATIVAN) 0 5 mg tablet   No No   Sig: take 1 tablet by mouth every 8 hours if needed for anxiety   Multiple Vitamins-Minerals (VISION FORMULA/LUTEIN PO)  Family Member Yes No   Sig: Take by mouth   Respiratory Therapy Supplies (SPIROMETER) KIT  Family Member No No   Sig: by Does not apply route 4 (four) times a day   albuterol (PROVENTIL HFA,VENTOLIN HFA) 90 mcg/act inhaler  Family Member No No   Sig: Inhale 2 puffs every 6 (six) hours as needed for wheezing   amiodarone 200 mg tablet  Family Member No No   Sig: Take 1 tablet (200 mg total) by mouth daily For fourteen days followed by 200 mg once a day     benzonatate (TESSALON PERLES) 100 mg capsule  Family Member No No   Sig: Take 1 capsule (100 mg total) by mouth as needed in the morning and 1 capsule (100 mg total) as needed at noon and 1 capsule (100 mg total) as needed in the evening (cough; may alternate with phenergan or if no relief with phenergan)  Patient taking differently: Take 100 mg by mouth as needed in the morning and 100 mg as needed at noon and 100 mg as needed in the evening (cough; may alternate with phenergan or if no relief with phenergan)  prn    escitalopram (LEXAPRO) 5 mg tablet   No No   Sig: take 1 tablet by mouth once daily   fluticasone (FLONASE) 50 mcg/act nasal spray  Family Member Yes No   Si spray into each nostril 2 (two) times a day   fluticasone-umeclidinium-vilanterol (Trelegy Ellipta) 100-62 5-25 MCG/INH inhaler   No No   Sig: Inhale 1 puff  in the morning  Rinse mouth after use  Racheal Nissen guaiFENesin (MUCINEX) 600 mg 12 hr tablet  Family Member Yes No   Sig: Take 1,200 mg by mouth every 12 (twelve) hours   levalbuterol (Xopenex) 1 25 mg/3 mL nebulizer solution   No No   Sig: Take 3 mL (1 25 mg total) by nebulization in the morning and 3 mL (1 25 mg total) in the evening and 3 mL (1 25 mg total) before bedtime  levothyroxine 75 mcg tablet   No No   Sig: take 1 tablet by mouth once daily   nystatin-triamcinolone (MYCOLOG-II) cream  Family Member No No   Sig: APPLY TO THE AFFECTED AREA(S) SPARINGLY TWICE A DAY   omeprazole (PriLOSEC) 20 mg delayed release capsule  Family Member No No   Sig: take 1 capsule by mouth once daily   primidone (MYSOLINE) 50 mg tablet   No No   Sig: Take 1 tablet (50 mg total) by mouth every 12 (twelve) hours   promethazine (PHENERGAN) 12 5 mg/10 mL syrup  Family Member No No   Sig: Take 5 mL (6 25 mg total) by mouth 4 (four) times a day as needed (cough)   Patient taking differently: Take 6 25 mg by mouth as needed in the morning and 6 25 mg as needed at noon and 6 25 mg as needed in the evening and 6 25 mg as needed before bedtime (cough)   prn    promethazine-dextromethorphan (PHENERGAN-DM) 6 25-15 mg/5 mL oral syrup  Family Member No No   Sig: take 5 milliliter by mouth four times a day if needed for cough   Patient taking differently: Prn   sodium chloride 0 9 % nebulizer solution  Family Member No No   Sig: TAKE 3 MILLILITERS BY NEBULIZATION ROUTE AS NEEDED FOR WHEEZING   Patient not taking: Reported on 5/20/2022   tamsulosin (FLOMAX) 0 4 mg   No No   Sig: take 1 capsule by mouth once daily with dinner   zolpidem (AMBIEN) 5 mg tablet  Family Member No No   Sig: take 1 tablet by mouth at bedtime if needed for sleep   Patient taking differently: prn      Facility-Administered Medications: None       Past Medical History:   Diagnosis Date    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    Chronic kidney disease     [facility]ression fracture [facility] thoracic vertebra [facility] )     last assessed 05/07/2012    COPD (chronic obstructive pulmonary disease) ([facility] )     Disease of thyroid gland     Heart attack ([facility] )     History of methicillin resistant staphylococcus aureus (MRSA) 03/28/2019    negative nasal swab     Hollenhorst plaque, right eye     last assessed 04/08/2013    Hyperlipidemia     Hypertension     Iron deficiency anemia due to chronic blood loss     last assessed 09/10/2012    Ischemic colitis ([facility] )     last assessed 05/27/2014    Osteoarthritis of both hands     last assessedn 04/30/2013    Peptic ulcer     last assessed 06/14/2013    Polym[facility]algia rheumatica ([facility] )     last assessesd 10/09/2012    Renal disorder     Upper GI hemorrhage     last assessed 01/29/2015    Varicose veins of lower extremity     last assessed 04/26/2013       Past Surgical History:   Procedure Laterality Date    CORONARY ARTERY BYPASS GRAFT      HEMORRHOID SURGERY      HERNIA REPAIR      RENAL CYST EXCISION      resolved 05/2010    THROMBOENDARTERECTOMY Right     carotid, last assessed 06/18/2013       Family History   Problem Relation Age of Onset    Hypertension Mother     Alcohol abuse Mother     Hypertension Father     Alcohol abuse Father     Alcohol abuse Son     Heart disease Family     Stroke Family         syndrome     I have reviewed and agree with the history as documented  E-Cigarette/Vaping    E-Cigarette Use Never User      E-Cigarette/Vaping Substances     Social History     Tobacco Use    Smoking status: Former Smoker     Packs/day: 2 00     Years: 50 00     Pack years: 100 00     Types: Cigarettes     Start date: 65     Quit date:      Years since quittin 5    Smokeless tobacco: Never Used    Tobacco comment: Quit 40 yrs  ago   Vaping Use    Vaping Use: Never used   Substance Use Topics    Alcohol use: Never     Alcohol/week: 1 0 standard drink     Types: 1 Glasses of wine per week     Comment: social    Drug use: Never       Review of Systems   Constitutional: Positive for fever  Respiratory: Positive for cough and shortness of breath  All other systems reviewed and are negative  Physical Exam  Physical Exam  Vitals and nursing note reviewed  Constitutional:       General: He is not in acute distress  Appearance: He is well-developed  HENT:      Head: Normocephalic and atraumatic  Right Ear: External ear normal       Left Ear: External ear normal       Nose: Nose normal    Eyes:      General: No scleral icterus  Cardiovascular:      Rate and Rhythm: Tachycardia present  Pulmonary:      Effort: No respiratory distress  Breath sounds: Rhonchi and rales present  Abdominal:      General: There is no distension  Palpations: Abdomen is soft  Musculoskeletal:         General: No deformity  Normal range of motion  Cervical back: Normal range of motion and neck supple  Skin:     General: Skin is warm  Findings: No rash  Neurological:      General: No focal deficit present  Mental Status: He is alert        Gait: Gait normal    Psychiatric:         Mood and Affect: Mood normal          Vital Signs  ED Triage Vitals   Temperature Pulse Respirations Blood Pressure SpO2   22 2026 06/28/22 2026   (!) 102 °F (38 9 °C) (!) 126 (!) 24 119/67 90 %      Temp Source Heart Rate Source Patient Position - Orthostatic VS BP Location FiO2 (%)   06/28/22 2026 06/28/22 2130 06/28/22 2026 06/28/22 2026 --   Rectal Monitor Lying Right arm       Pain Score       06/28/22 2059       Med Not Given for Pain - for MAR use only           Vitals:    06/28/22 2026 06/28/22 2130 06/28/22 2145   BP: 119/67 108/58 108/55   Pulse: (!) 126 (!) 115 (!) 110   Patient Position - Orthostatic VS: Lying           Visual Acuity      ED Medications  Medications   sodium chloride 0 9 % bolus 1,000 mL (1,000 mL Intravenous New Bag 6/28/22 2059)   vancomycin (VANCOCIN) 1500 mg in sodium chloride 0 9% 250 mL IVPB (1,500 mg Intravenous New Bag 6/28/22 2144)   acetaminophen (TYLENOL) tablet 975 mg (975 mg Oral Given 6/28/22 2101)   cefepime (MAXIPIME) IVPB (premix in dextrose) 2,000 mg 50 mL (0 mg Intravenous Stopped 6/28/22 2140)       Diagnostic Studies  Results Reviewed     Procedure Component Value Units Date/Time    COVID19, Influenza A/B, RSV PCR, [facility] [[id number]]  (Normal) Collected: 06/28/22 2050    Lab Status: Final result Specimen: Nares from Nose Updated: 06/28/22 2128     SARS-CoV-2 Negative     INFLUENZA A PCR Negative     INFLUENZA B PCR Negative     RSV PCR Negative    Narrative:      FOR PEDIATRIC PATIENTS - copy/paste COVID Guidelines URL to browser: [email or web address]    SARS-CoV-2 assay is a Nucleic Acid Amplification assay intended for the  qualitative detection of nucleic acid from SARS-CoV-2 in nasopharyngeal  swabs  Results are for the presumptive identification of SARS-CoV-2 RNA  Positive results are indicative of infection with SARS-CoV-2, the virus  causing COVID-19, but do not rule out bacterial infection or co-infection  with other viruses   Laboratories within the [place] and its  territories are required to report all positive results to the appropriate  public health authorities  Negative results do not preclude SARS-CoV-2  infection and should not be used as the sole basis for treatment or other  patient management decisions  Negative results must be combined with  clinical observations, patient history, and epidemiological information  This test has not been FDA cleared or approved  This test has been authorized by FDA under an Emergency Use Authorization  (EUA)  This test is only authorized for the duration of time the  declaration that circumstances exist justifying the authorization of the  emergency use of an in vitro diagnostic tests for detection of SARS-CoV-2  virus and/or diagnosis of COVID-19 infection under section 564(b)(1) of  the Act, 21 U  S C  603AMP-0(A)(0), unless the authorization is terminated  or revoked sooner  The test has been validated but independent review by FDA  and CLIA is pending  Test performed using [employer] GeneXpert: This RT-PCR assay targets N2,  a region unique to SARS-CoV-2  A conserved region in the E-gene was chosen  for pan-Sarbecovirus detection which includes SARS-CoV-2      Comprehensive metabolic panel [[id number]]  (Abnormal) Collected: 06/28/22 2030    Lab Status: Final result Specimen: Blood from Arm, Left Updated: 06/28/22 2121     Sodium 127 mmol/L      Potassium 4 7 mmol/L      Chloride 95 mmol/L      CO2 27 mmol/L      ANION GAP 5 mmol/L      BUN 42 mg/dL      Creatinine 2 26 mg/dL      Glucose 163 mg/dL      Calcium 8 7 mg/dL      Corrected Calcium 9 5 mg/dL      AST 12 U/L      ALT 24 U/L      Alkaline Phosphatase 204 U/L      Total Protein 7 3 g/dL      Albumin 3 0 g/dL      Total Bilirubin 0 40 mg/dL      eGFR 24 ml/min/1 73sq m     Narrative:      Robbin guidelines for Chronic Kidney Disease (CKD):     Stage 1 with normal or high GFR (GFR > 90 mL/min/1 73 square meters)    Stage 2 Mild CKD (GFR = 60-89 mL/min/1 73 square meters)    Stage 3A Moderate CKD (GFR = 45-59 mL/min/1 73 square meters)    Stage 3B Moderate CKD (GFR = 30-44 mL/min/1 73 square meters)    Stage 4 Severe CKD (GFR = 15-29 mL/min/1 73 square meters)    Stage 5 End Stage CKD (GFR <15 mL/min/1 73 square meters)  Note: GFR calculation is accurate only with a steady state creatinine    NT-BNP PRO [[id number]]  (Normal) Collected: 06/28/22 2030    Lab Status: Final result Specimen: Blood from Arm, Left Updated: 06/28/22 2121     NT-proBNP 409 pg/mL     Urine Microscopic [[id number]]  (Abnormal) Collected: 06/28/22 2102    Lab Status: Final result Specimen: Urine, Clean Catch Updated: 06/28/22 2118     RBC, UA 2-4 /hpf      WBC, UA 2-4 /hpf      Epithelial Cells Occasional /hpf      Bacteria, UA Moderate /hpf     Procalcitonin [[id number]]  (Abnormal) Collected: 06/28/22 2030    Lab Status: Final result Specimen: Blood from Arm, Left Updated: 06/28/22 2117     Procalcitonin 0 26 ng/ml     HS Troponin 0hr (reflex protocol) [[id number]]  (Normal) Collected: 06/28/22 2030    Lab Status: Final result Specimen: Blood from Arm, Left Updated: 06/28/22 2114     hs TnI 0hr 7 ng/L     HS Troponin I 2hr [[id number]]     Lab Status: No result Specimen: Blood     Lactic Acid [[id number]]  (Normal) Collected: 06/28/22 2030    Lab Status: Final result Specimen: Blood from Arm, Left Updated: 06/28/22 2110     LACTIC ACID 2 0 mmol/L     Narrative:      Result may be elevated if tourniquet was used during collection      UA w Reflex to Microscopic w Reflex to Culture [[id number]]  (Abnormal) Collected: 06/28/22 2102    Lab Status: Final result Specimen: Urine, Clean Catch Updated: 06/28/22 2107     Color, UA Yellow     Clarity, UA Clear     Specific [place], UA 1 020     pH, UA 5 5     Leukocytes, UA Trace     Nitrite, UA Negative     Protein, UA Trace mg/dl      Glucose, UA Negative mg/dl      Ketones, UA Negative mg/dl      Urobilinogen, UA 0 2 E U /dl      Bilirubin, UA Negative     Occult Blood, UA Moderate    Protime-INR [[id number]]  (Normal) Collected: 06/28/22 2030    Lab Status: Final result Specimen: Blood from Arm, Left Updated: 06/28/22 2100     Protime 13 4 seconds      INR 1 03    APTT [[id number]]  (Normal) Collected: 06/28/22 2030    Lab Status: Final result Specimen: Blood from Arm, Left Updated: 06/28/22 2100     PTT 32 seconds     CBC and differential [[id number]]  (Abnormal) Collected: 06/28/22 2030    Lab Status: Final result Specimen: Blood from Arm, Left Updated: 06/28/22 2046     WBC 15 19 Thousand/uL      RBC 3 81 Million/uL      Hemoglobin 11 6 g/dL      Hematocrit 36 8 %      MCV 97 fL      MCH 30 4 pg      MCHC 31 5 g/dL      RDW 14 1 %      MPV 9 0 fL      Platelets 516 Thousands/uL      nRBC 0 /100 WBCs      Neutrophils Relative 84 %      Immat GRANS % 1 %      Lymphocytes Relative 6 %      Monocytes Relative 9 %      Eosinophils Relative 0 %      Basophils Relative 0 %      Neutrophils Absolute 12 77 Thousands/µL      Immature Grans Absolute 0 11 Thousand/uL      Lymphocytes Absolute 0 91 Thousands/µL      Monocytes Absolute 1 32 Thousand/µL      Eosinophils Absolute 0 05 Thousand/µL      Basophils Absolute 0 03 Thousands/µL     Blood culture #2 [[id number]] Collected: 06/28/22 2030    Lab Status: In process Specimen: Blood from Arm, Left Updated: 06/28/22 2046    Blood culture #1 [[id number]] Collected: 06/28/22 2030    Lab Status:  In process Specimen: Blood from Arm, Left Updated: 06/28/22 2046    POCT Blood Gas (CG8+) [[id number]]  (Abnormal) Collected: 06/28/22 2037    Lab Status: Final result Specimen: Venous Updated: 06/28/22 2043     ph, Sterling ISTAT 7 408     pCO2, Sterling i-STAT 42 1 mm HG      pO2, Sterling i-STAT 25 0 mm HG      BE, i-STAT 2 mmol/L      HCO3, Sterling i-STAT 26 6 mmol/L      CO2, i-STAT 28 mmol/L      O2 Sat, i-STAT 45 %      SODIUM, I-STAT 131 mmol/l      Potassium, i-STAT 4 8 mmol/L      Calcium, Ionized i-STAT 1 15 mmol/L      Hct, i-STAT 37 %      Hgb, i-STAT 12 6 g/dl      Glucose, i-STAT 169 mg/dl      Specimen Type VENOUS                 XR chest 1 view portable    (Results Pending)              Procedures  Procedures         ED Course                                             MDM  Number of Diagnoses or Management Options  Pneumonia: new and requires workup  Sepsis [facility]): new and requires workup  Diagnosis management comments: 59-year-old male presenting with fever, tachycardia  Obtain cardiopulmonary evaluation with sepsis labs  Chest x-ray consistent with pneumonia/aspiration? Will administer broad-spectrum antibiotics, obtain COVID swab  Discussed case with nida  Will admit  Amount and/or Complexity of Data Reviewed  Clinical lab tests: reviewed and ordered  Tests in the radiology section of CPT®: reviewed and ordered  Tests in the medicine section of CPT®: ordered and reviewed  Decide to obtain previous medical records or to obtain history from someone other than the patient: yes  Review and summarize past medical records: yes        Disposition  Final diagnoses:   Pneumonia   Sepsis ([facility] )   GRETCHEN (acute kidney injury) ([facility] )   Hyponatremia     Time reflects when diagnosis was documented in both MDM as applicable and the Disposition within this note     Time User Action Codes Description Comment    6/28/2022  9:33 PM Burtis Box Add [J18 9] Pneumonia     6/28/2022  9:34 PM Burtis Box Add [A41 9] Sepsis ([facility] )     6/28/2022  9:49 PM Burtis Box Add [N17 9] G[facility]CHEN [facility]acute kidney [facility]) ([facility] )     6/28/2022  9:49 PM Burtis Box Add [E87 1] Hyponatremia       ED Disposition     ED Disposition   Admit    Condition   Stable    Date/Time   Tue Jun 28, 2022  9:33 PM    Comment   Case was discussed with NIDA and the patient's admission status was agreed to be Admission Status: inpatient status to the service of Dr Luc Pederson   Follow-up Information    None         Patient's Medications   Discharge Prescriptions    No medications on file       No discharge procedures on file      PDMP Review       Value Time User    PDMP Reviewed  Yes 5/25/2022  6:41 AM Jesus King MD          ED Provider  Electronically Signed by           Tahira Guzman DO  06/28/22 2935

## 2022-06-29 NOTE — ACP (ADVANCE CARE PLANNING)
Advanced Care Planning Note  Kal Mchugh 80 y o  male MRN: [id number]  Unit/Bed#: -01 SDU Encounter: [id number]    Kal Mchugh is a 80 y o  male requiring  evaluation and advanced care planning  The patient has chronic comorbidities, including but not limited to COPD, CAD, CVD, anxiety, recent fall with 6 rib fractures  , which is now further complicated by the following acute conditions: rib fractures and PNA   Due to the severity of the patient's acute condition and/or the extent of chronic conditions, additional conversations pertaining to advanced care planning were required  Today's discussion, which was held in a face-to-face meeting, included with his son , and it was established that all stake holders understood the rationale for the advanced care planning  The patient was unable to participate in the discussion due to acuity of condition  Summary of Discussion: To discussion with the son the severity of current condition, patient is admitted with sepsis like picture, pneumonia on chest xray with her recent fall and 6 rib fracture, patient has monoclonal like jerking to increase in severity over the last couple days  Patient's son did not want any heroic measure, no central lines are A-line or blood pressure medication  Due to the advanced stages of age the rib fractures and now concurrent pneumonia it appears to be that he would moved back and forth between the nursing home and realistically not to be able to come back home  Family is interested to discuss with palliative Care and possible hospice kind of setting to make him comfortable  Total time spent, (30) minutes (1145 to 1215)        CODE STATUS: DNR/DNI - Level 3  POA:    POLST:        Iraida Cat PA-C  DATE: June 29, 2022  TIME: 2:13 AM

## 2022-06-30 ENCOUNTER — HOME CARE VISIT (OUTPATIENT)
Dept: HOME HEALTH SERVICES | Facility: HOME HEALTHCARE | Age: 87
End: 2022-06-30

## 2022-06-30 ENCOUNTER — TRANSCRIBE ORDERS (OUTPATIENT)
Dept: HOME HEALTH SERVICES | Facility: HOME HEALTHCARE | Age: 87
End: 2022-06-30
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
## 2022-06-30 DIAGNOSIS — J18.9 HOSPITAL-ACQUIRED PNEUMONIA: Primary | ICD-10-CM

## 2022-06-30 LAB
ALBUMIN SERPL BCP-MCNC: 2.3 G/DL (ref 3.5–5)
ALP SERPL-CCNC: 157 U/L (ref 46–116)
ALT SERPL W P-5'-P-CCNC: 20 U/L (ref 12–78)
ANION GAP SERPL CALCULATED.3IONS-SCNC: 2 MMOL/L (ref 4–13)
AST SERPL W P-5'-P-CCNC: 10 U/L (ref 5–45)
BASOPHILS # BLD AUTO: 0.03 THOUSANDS/ΜL (ref 0–0.1)
BASOPHILS NFR BLD AUTO: 0 % (ref 0–1)
BILIRUB SERPL-MCNC: 0.5 MG/DL (ref 0.2–1)
BUN SERPL-MCNC: 34 MG/DL (ref 5–25)
CALCIUM ALBUM COR SERPL-MCNC: 9.7 MG/DL (ref 8.3–10.1)
CALCIUM SERPL-MCNC: 8.3 MG/DL (ref 8.3–10.1)
CHLORIDE SERPL-SCNC: 97 MMOL/L (ref 100–108)
CO2 SERPL-SCNC: 28 MMOL/L (ref 21–32)
CREAT SERPL-MCNC: 1.78 MG/DL (ref 0.6–1.3)
EOSINOPHIL # BLD AUTO: 0.19 THOUSAND/ΜL (ref 0–0.61)
EOSINOPHIL NFR BLD AUTO: 2 % (ref 0–6)
ERYTHROCYTE [DISTWIDTH] IN BLOOD BY AUTOMATED COUNT: 14.3 % (ref 11.6–15.1)
GFR SERPL CREATININE-BSD FRML MDRD: 32 ML/MIN/1.73SQ M
GLUCOSE SERPL-MCNC: 93 MG/DL (ref 65–140)
HCT VFR BLD AUTO: 29.8 % (ref 36.5–49.3)
HGB BLD-MCNC: 9.2 G/DL (ref 12–17)
IMM GRANULOCYTES # BLD AUTO: 0.06 THOUSAND/UL (ref 0–0.2)
IMM GRANULOCYTES NFR BLD AUTO: 1 % (ref 0–2)
LYMPHOCYTES # BLD AUTO: 1.23 THOUSANDS/ΜL (ref 0.6–4.47)
LYMPHOCYTES NFR BLD AUTO: 15 % (ref 14–44)
MCH RBC QN AUTO: 29.8 PG (ref 26.8–34.3)
MCHC RBC AUTO-ENTMCNC: 30.9 G/DL (ref 31.4–37.4)
MCV RBC AUTO: 96 FL (ref 82–98)
MONOCYTES # BLD AUTO: 1.03 THOUSAND/ΜL (ref 0.17–1.22)
MONOCYTES NFR BLD AUTO: 13 % (ref 4–12)
MRSA NOSE QL CULT: NORMAL
NEUTROPHILS # BLD AUTO: 5.54 THOUSANDS/ΜL (ref 1.85–7.62)
NEUTS SEG NFR BLD AUTO: 69 % (ref 43–75)
NRBC BLD AUTO-RTO: 0 /100 WBCS
PLATELET # BLD AUTO: 131 THOUSANDS/UL (ref 149–390)
PMV BLD AUTO: 9 FL (ref 8.9–12.7)
POTASSIUM SERPL-SCNC: 4.2 MMOL/L (ref 3.5–5.3)
PROT SERPL-MCNC: 6 G/DL (ref 6.4–8.2)
RBC # BLD AUTO: 3.09 MILLION/UL (ref 3.88–5.62)
SODIUM SERPL-SCNC: 127 MMOL/L (ref 136–145)
WBC # BLD AUTO: 8.08 THOUSAND/UL (ref 4.31–10.16)

## 2022-06-30 PROCEDURE — 80053 COMPREHEN METABOLIC PANEL: CPT | Performed by: PHYSICIAN ASSISTANT

## 2022-06-30 PROCEDURE — 94760 N-INVAS EAR/PLS OXIMETRY 1: CPT

## 2022-06-30 PROCEDURE — C9113 INJ PANTOPRAZOLE SODIUM, VIA: HCPCS | Performed by: PHYSICIAN ASSISTANT

## 2022-06-30 PROCEDURE — 94640 AIRWAY INHALATION TREATMENT: CPT

## 2022-06-30 PROCEDURE — 99232 SBSQ HOSP IP/OBS MODERATE 35: CPT | Performed by: HOSPITALIST

## 2022-06-30 PROCEDURE — 85025 COMPLETE CBC W/AUTO DIFF WBC: CPT | Performed by: PHYSICIAN ASSISTANT

## 2022-06-30 RX ORDER — OXYCODONE HYDROCHLORIDE 5 MG/1
5 TABLET ORAL EVERY 4 HOURS PRN
Status: DISCONTINUED | OUTPATIENT
Start: 2022-06-30 | End: 2022-07-05 | Stop reason: HOSPADM

## 2022-06-30 RX ORDER — OXYCODONE HYDROCHLORIDE 5 MG/1
2.5 TABLET ORAL EVERY 4 HOURS PRN
Status: DISCONTINUED | OUTPATIENT
Start: 2022-06-30 | End: 2022-07-05 | Stop reason: HOSPADM

## 2022-06-30 RX ORDER — HYDROMORPHONE HCL/PF 1 MG/ML
0.5 SYRINGE (ML) INJECTION
Status: DISCONTINUED | OUTPATIENT
Start: 2022-06-30 | End: 2022-07-05 | Stop reason: HOSPADM

## 2022-06-30 RX ADMIN — HEPARIN SODIUM 5000 UNITS: 5000 INJECTION INTRAVENOUS; SUBCUTANEOUS at 21:43

## 2022-06-30 RX ADMIN — HYDROMORPHONE HYDROCHLORIDE 0.5 MG: 1 INJECTION, SOLUTION INTRAMUSCULAR; INTRAVENOUS; SUBCUTANEOUS at 06:31

## 2022-06-30 RX ADMIN — GUAIFENESIN 1200 MG: 600 TABLET ORAL at 21:41

## 2022-06-30 RX ADMIN — LEVALBUTEROL HYDROCHLORIDE 1.25 MG: 1.25 SOLUTION, CONCENTRATE RESPIRATORY (INHALATION) at 07:45

## 2022-06-30 RX ADMIN — LEVALBUTEROL HYDROCHLORIDE 1.25 MG: 1.25 SOLUTION, CONCENTRATE RESPIRATORY (INHALATION) at 19:07

## 2022-06-30 RX ADMIN — ACETAMINOPHEN 975 MG: 325 TABLET, FILM COATED ORAL at 16:45

## 2022-06-30 RX ADMIN — PANTOPRAZOLE SODIUM 40 MG: 40 INJECTION, POWDER, FOR SOLUTION INTRAVENOUS at 08:22

## 2022-06-30 RX ADMIN — IPRATROPIUM BROMIDE 0.5 MG: 0.5 SOLUTION RESPIRATORY (INHALATION) at 07:45

## 2022-06-30 RX ADMIN — ACETAMINOPHEN 975 MG: 325 TABLET, FILM COATED ORAL at 08:22

## 2022-06-30 RX ADMIN — TAMSULOSIN HYDROCHLORIDE 0.4 MG: 0.4 CAPSULE ORAL at 16:46

## 2022-06-30 RX ADMIN — HYDROMORPHONE HYDROCHLORIDE 0.5 MG: 1 INJECTION, SOLUTION INTRAMUSCULAR; INTRAVENOUS; SUBCUTANEOUS at 16:16

## 2022-06-30 RX ADMIN — IPRATROPIUM BROMIDE 0.5 MG: 0.5 SOLUTION RESPIRATORY (INHALATION) at 13:12

## 2022-06-30 RX ADMIN — OXYCODONE HYDROCHLORIDE 5 MG: 5 TABLET ORAL at 09:25

## 2022-06-30 RX ADMIN — LEVALBUTEROL HYDROCHLORIDE 1.25 MG: 1.25 SOLUTION, CONCENTRATE RESPIRATORY (INHALATION) at 13:12

## 2022-06-30 RX ADMIN — VANCOMYCIN HYDROCHLORIDE 750 MG: 750 INJECTION, SOLUTION INTRAVENOUS at 21:40

## 2022-06-30 RX ADMIN — METHOCARBAMOL 500 MG: 500 TABLET ORAL at 05:59

## 2022-06-30 RX ADMIN — HYDROMORPHONE HYDROCHLORIDE 0.5 MG: 1 INJECTION, SOLUTION INTRAMUSCULAR; INTRAVENOUS; SUBCUTANEOUS at 02:43

## 2022-06-30 RX ADMIN — AMIODARONE HYDROCHLORIDE 200 MG: 200 TABLET ORAL at 08:22

## 2022-06-30 RX ADMIN — METHOCARBAMOL 500 MG: 500 TABLET ORAL at 18:12

## 2022-06-30 RX ADMIN — OXYCODONE HYDROCHLORIDE 5 MG: 5 TABLET ORAL at 19:30

## 2022-06-30 RX ADMIN — HYDROMORPHONE HYDROCHLORIDE 0.5 MG: 1 INJECTION, SOLUTION INTRAMUSCULAR; INTRAVENOUS; SUBCUTANEOUS at 12:58

## 2022-06-30 RX ADMIN — CEFEPIME HYDROCHLORIDE 1000 MG: 1 INJECTION, SOLUTION INTRAVENOUS at 20:49

## 2022-06-30 RX ADMIN — HEPARIN SODIUM 5000 UNITS: 5000 INJECTION INTRAVENOUS; SUBCUTANEOUS at 05:03

## 2022-06-30 RX ADMIN — LIDOCAINE 5% 1 PATCH: 700 PATCH TOPICAL at 08:21

## 2022-06-30 RX ADMIN — LEVOTHYROXINE SODIUM 75 MCG: 75 TABLET ORAL at 05:03

## 2022-06-30 RX ADMIN — HEPARIN SODIUM 5000 UNITS: 5000 INJECTION INTRAVENOUS; SUBCUTANEOUS at 13:05

## 2022-06-30 RX ADMIN — METHOCARBAMOL 500 MG: 500 TABLET ORAL at 00:04

## 2022-06-30 RX ADMIN — FLUTICASONE PROPIONATE 1 SPRAY: 50 SPRAY, METERED NASAL at 08:43

## 2022-06-30 RX ADMIN — CEFEPIME HYDROCHLORIDE 1000 MG: 1 INJECTION, SOLUTION INTRAVENOUS at 08:23

## 2022-06-30 RX ADMIN — METHOCARBAMOL 500 MG: 500 TABLET ORAL at 12:17

## 2022-06-30 RX ADMIN — ONDANSETRON 4 MG: 2 INJECTION INTRAMUSCULAR; INTRAVENOUS at 13:06

## 2022-06-30 RX ADMIN — GUAIFENESIN 1200 MG: 600 TABLET ORAL at 08:22

## 2022-06-30 RX ADMIN — ESCITALOPRAM 5 MG: 5 TABLET, FILM COATED ORAL at 08:22

## 2022-06-30 RX ADMIN — OXYCODONE HYDROCHLORIDE 5 MG: 5 TABLET ORAL at 15:02

## 2022-06-30 RX ADMIN — OXYCODONE HYDROCHLORIDE 5 MG: 5 TABLET ORAL at 04:55

## 2022-06-30 RX ADMIN — IPRATROPIUM BROMIDE 0.5 MG: 0.5 SOLUTION RESPIRATORY (INHALATION) at 19:07

## 2022-06-30 RX ADMIN — ACETAMINOPHEN 975 MG: 325 TABLET, FILM COATED ORAL at 23:56

## 2022-06-30 NOTE — ASSESSMENT & PLAN NOTE
· Patient is an 80year-old patient with past medical history of diastolic HF, COPD, CAP, left rib fracture 6-11, recent discharge from [address] [place] [address] to [facility] for rehab on 6/21  Patient presents to the hospital due to worsening fever, shortness of breath, cough, and increased oxygen requirement that started today, 6/29   Patient noted to be hypoxic upon EMS arrival    · CXR 6/29-- showed new subtle infiltrates  · BC x2: no growth at 48h  · Cefepime day 4, will discontinue Vancomycin since MRSA swab negative  · Continue guaifenesin 1200 bid  · PRN Tessalon perles  · Ipratropium TID  · levalbuterol TID  · Leukocytosis resolved, patient afebrile, still requiring 4 L supplemental oxygen  · Trend procalcitonin

## 2022-06-30 NOTE — ASSESSMENT & PLAN NOTE
Lab Results   Component Value Date    EGFR 32 06/30/2022    EGFR 27 06/29/2022    EGFR 26 06/28/2022    CREATININE 1 78 (H) 06/30/2022    CREATININE 2 09 (H) 06/29/2022    CREATININE 2 12 (H) 06/28/2022   · Baseline creatinine between 1 7 - 1 8  · Insert harp cath for accurate I&Os  · Avoid nephrotoxins  Approaching baseline     · Monitor UO  · Creatinine now back to baseline- will resume Lasix today  · Continue monitor BMP

## 2022-06-30 NOTE — ASSESSMENT & PLAN NOTE
· Admitted at [facility] 6/12-6/21 with d/c to [facility]   · Left rib fractures 6-11  · Pulmonary toileting  · Lidocaine patch  · Tylenol for pain   Requiring narcotics for pain control

## 2022-06-30 NOTE — ASSESSMENT & PLAN NOTE
· Blood cultures - ng x 48h  · See PNA above  · Maintain MAP> 65  · Received 1 liter NS - no hypotension / lactic normal

## 2022-06-30 NOTE — CASE COMMUNICATION
Referral from [address]  Jovi Ely 79 yo  6 30 1932    Admitted 6 29 from [facility] with increased WOB cough fever and hypoxia  Found to have sepsis dt PNA  Third hospitalization within last two months for exacerbation of chronic underlying conditions  Recent fall 6 21 resulted hospitalizations for fx ribs followed by DC to [facility]  Multiple comorbidities: copd emphysema Dementia chronic diastolic heart failure CAD GRETCHEN on CKD 4, BUN 34 Cr 1 78 GFR 32  PCM  Eneida Carroll albumin 2 3  Labs: Hb 9 2 platelets 569 Na 017  Covid neg 6 28 22  PPS 30  Pale cachectic debilitated  Requires o2 3lnc for SOB  He appears appropriate for RLOC based on Multiple comorbidities age frequent hospitalizations along with overall debilitated condition  Pt and family do not want any heroic measures and are hoping for hospice services possibly at SNF

## 2022-06-30 NOTE — ASSESSMENT & PLAN NOTE
· Blood cultures - ng x 24h  · See PNA above  · Maintain MAP> 65  · Received 1 liter NS - no hypotension / lactic normal

## 2022-06-30 NOTE — PROGRESS NOTES
Vancomycin IV Pharmacy-to-Dose Consultation    Morgan Schofield is a 80 y o  male who is currently receiving Vancomycin IV with management by the Pharmacy Consult service  Assessment/Plan:  The patient was reviewed  Renal function is improving and no signs or symptoms of nephrotoxicity and/or infusion reactions were documented in the chart  Based on todays assessment, continue current vancomycin (day # 3) dosing of 750mg IV Q24h, with a plan for trough to be drawn at 2030 on 7/1/22  We will continue to follow the patients culture results and clinical progress daily      Sid Whalen, Pharmacist

## 2022-06-30 NOTE — PLAN OF CARE
Problem: Potential for Falls  Goal: Patient will remain free of falls  Description: INTERVENTIONS:  - Educate patient/family on patient safety including physical limitations  - Instruct patient to call for assistance with activity   - Consult OT/PT to assist with strengthening/mobility   - Keep Call bell within reach  - Keep bed low and locked with side rails adjusted as appropriate  - Keep care items and personal belongings within reach  - Initiate and maintain comfort rounds  - Make Fall Risk Sign visible to staff  - Apply yellow socks and bracelet for high fall risk patients  - Consider moving patient to room near nurses station  Outcome: Progressing     Problem: MOBILITY - ADULT  Goal: Maintain or return to baseline ADL function  Description: INTERVENTIONS:  -  Assess patient's ability to carry out ADLs; assess patient's baseline for ADL function and identify physical deficits which impact ability to perform ADLs (bathing, care of mouth/teeth, toileting, grooming, dressing, etc )  - Assess/evaluate cause of self-care deficits   - Assess range of motion  - Assess patient's mobility; develop plan if impaired  - Assess patient's need for assistive devices and provide as appropriate  - Encourage maximum independence but intervene and supervise when necessary  - Involve family in performance of ADLs  - Assess for home care needs following discharge   - Consider OT consult to assist with ADL evaluation and planning for discharge  - Provide patient education as appropriate  Outcome: Progressing  Goal: Maintains/Returns to pre admission functional level  Description: INTERVENTIONS:  - Perform BMAT or MOVE assessment daily    - Set and communicate daily mobility goal to care team and patient/family/caregiver     - Collaborate with rehabilitation services on mobility goals if consulted  - Out of bed for toileting  - Record patient progress and toleration of activity level   Outcome: Progressing     Problem: Prexisting or High Potential for Compromised Skin Integrity  Goal: Skin integrity is maintained or improved  Description: INTERVENTIONS:  - Identify patients at risk for skin breakdown  - Assess and monitor skin integrity  - Assess and monitor nutrition and hydration status  - Monitor labs   - Assess for incontinence   - Turn and reposition patient  - Assist with mobility/ambulation  - Relieve pressure over bony prominences  - Avoid friction and shearing  - Provide appropriate hygiene as needed including keeping skin clean and dry  - Evaluate need for skin moisturizer/barrier cream  - Collaborate with interdisciplinary team   - Patient/family teaching  - Consider wound care consult   Outcome: Progressing     Problem: CARDIOVASCULAR - ADULT  Goal: Absence of cardiac dysrhythmias or at baseline rhythm  Description: INTERVENTIONS:  - Continuous cardiac monitoring, vital signs, obtain 12 lead EKG if ordered  - Administer antiarrhythmic and heart rate control medications as ordered  - Monitor electrolytes and administer replacement therapy as ordered  Outcome: Progressing     Problem: RESPIRATORY - ADULT  Goal: Achieves optimal ventilation and oxygenation  Description: INTERVENTIONS:  - Assess for changes in respiratory status  - Assess for changes in mentation and behavior  - Position to facilitate oxygenation and minimize respiratory effort  - Oxygen administered by appropriate delivery if ordered  - Initiate smoking cessation education as indicated  - Encourage broncho-pulmonary hygiene including cough, deep breathe, Incentive Spirometry  - Assess the need for suctioning and aspirate as needed  - Assess and instruct to report SOB or any respiratory difficulty  - Respiratory Therapy support as indicated  Outcome: Progressing     Problem: GENITOURINARY - ADULT  Goal: Maintains or returns to baseline urinary function  Description: INTERVENTIONS:  - Assess urinary function  - Encourage oral fluids to ensure adequate hydration if ordered  - Administer IV fluids as ordered to ensure adequate hydration  - Administer ordered medications as needed  - Offer frequent toileting  - Follow urinary retention protocol if ordered  Outcome: Progressing  Goal: Urinary catheter remains patent  Description: INTERVENTIONS:  - Assess patency of urinary catheter  - If patient has a chronic harp, consider changing catheter if non-functioning  - Follow guidelines for intermittent irrigation of non-functioning urinary catheter  Outcome: Progressing

## 2022-06-30 NOTE — PLAN OF CARE
Problem: Potential for Falls  Goal: Patient will remain free of falls  Description: INTERVENTIONS:  - Educate patient/family on patient safety including physical limitations  - Instruct patient to call for assistance with activity   - Consult OT/PT to assist with strengthening/mobility   - Keep Call bell within reach  - Keep bed low and locked with side rails adjusted as appropriate  - Keep care items and personal belongings within reach  - Initiate and maintain comfort rounds  - Make Fall Risk Sign visible to staff  - Offer Toileting every 2 Hours, in advance of need  - Initiate/Maintain bed alarm  - Apply yellow socks and bracelet for high fall risk patients  - Consider moving patient to room near nurses station  Outcome: Progressing     Problem: MOBILITY - ADULT  Goal: Maintain or return to baseline ADL function  Description: INTERVENTIONS:  -  Assess patient's ability to carry out ADLs; assess patient's baseline for ADL function and identify physical deficits which impact ability to perform ADLs (bathing, care of mouth/teeth, toileting, grooming, dressing, etc )  - Assess/evaluate cause of self-care deficits   - Assess range of motion  - Assess patient's mobility; develop plan if impaired  - Assess patient's need for assistive devices and provide as appropriate  - Encourage maximum independence but intervene and supervise when necessary  - Involve family in performance of ADLs  - Assess for home care needs following discharge   - Consider OT consult to assist with ADL evaluation and planning for discharge  - Provide patient education as appropriate  Outcome: Progressing  Goal: Maintains/Returns to pre admission functional level  Description: INTERVENTIONS:  - Perform BMAT or MOVE assessment daily    - Set and communicate daily mobility goal to care team and patient/family/caregiver  - Collaborate with rehabilitation services on mobility goals if consulted  - Perform Range of Motion 3 times a day    - Reposition patient every 2 hours    - Dangle patient 3 times a day  - Stand patient 3 times a day  - Ambulate patient 3 times a day  - Out of bed to chair 3 times a day   - Out of bed for meals 3 times a day  - Out of bed for toileting  - Record patient progress and toleration of activity level   Outcome: Progressing     Problem: Prexisting or High Potential for Compromised Skin Integrity  Goal: Skin integrity is maintained or improved  Description: INTERVENTIONS:  - Identify patients at risk for skin breakdown  - Assess and monitor skin integrity  - Assess and monitor nutrition and hydration status  - Monitor labs   - Assess for incontinence   - Turn and reposition patient  - Assist with mobility/ambulation  - Relieve pressure over bony prominences  - Avoid friction and shearing  - Provide appropriate hygiene as needed including keeping skin clean and dry  - Evaluate need for skin moisturizer/barrier cream  - Collaborate with interdisciplinary team   - Patient/family teaching  - Consider wound care consult   Outcome: Progressing     Problem: CARDIOVASCULAR - ADULT  Goal: Absence of cardiac dysrhythmias or at baseline rhythm  Description: INTERVENTIONS:  - Continuous cardiac monitoring, vital signs, obtain 12 lead EKG if ordered  - Administer antiarrhythmic and heart rate control medications as ordered  - Monitor electrolytes and administer replacement therapy as ordered  Outcome: Progressing     Problem: GENITOURINARY - ADULT  Goal: Maintains or returns to baseline urinary function  Description: INTERVENTIONS:  - Assess urinary function  - Encourage oral fluids to ensure adequate hydration if ordered  - Administer IV fluids as ordered to ensure adequate hydration  - Administer ordered medications as needed  - Offer frequent toileting  - Follow urinary retention protocol if ordered  Outcome: Progressing  Goal: Urinary catheter remains patent  Description: INTERVENTIONS:  - Assess patency of urinary catheter  - If patient has a chronic harp, consider changing catheter if non-functioning  - Follow guidelines for intermittent irrigation of non-functioning urinary catheter  Outcome: Progressing     Problem: METABOLIC, FLUID AND ELECTROLYTES - ADULT  Goal: Electrolytes maintained within normal limits  Description: INTERVENTIONS:  - Monitor labs and assess patient for signs and symptoms of electrolyte imbalances  - Administer electrolyte replacement as ordered  - Monitor response to electrolyte replacements, including repeat lab results as appropriate  - Instruct patient on fluid and nutrition as appropriate  Outcome: Progressing  Goal: Fluid balance maintained  Description: INTERVENTIONS:  - Monitor labs   - Monitor I/O and WT  - Instruct patient on fluid and nutrition as appropriate  - Assess for signs & symptoms of volume excess or deficit  Outcome: Progressing     Problem: SKIN/TISSUE INTEGRITY - ADULT  Goal: Skin Integrity remains intact(Skin Breakdown Prevention)  Description: Assess:  -Perform Chris assessment every shift  -Inspect skin when repositioning, toileting, and assisting with ADLS  -Assess extremities for adequate circulation and sensation     Bed Management:  -Have minimal linens on bed & keep smooth, unwrinkled  -Change linens as needed when moist or perspiring  -Avoid sitting or lying in one position for more than 2 hours while in bed  -Keep HOB at 30degrees     Toileting:  -Offer bedside commode  -Assess for incontinence every 2 hours      Activity:  -Encourage activity and walks on unit  -Encourage or provide ROM exercises   -Use appropriate equipment to lift or move patient in bed      Skin Care:  -Avoid use of baby powder, tape, friction and shearing, hot water or constrictive clothing  -Do not massage red bony areas      Outcome: Progressing     Problem: HEMATOLOGIC - ADULT  Goal: Maintains hematologic stability  Description: INTERVENTIONS  - Assess for signs and symptoms of bleeding or hemorrhage  - Monitor labs  - Administer supportive blood products/factors as ordered and appropriate  Outcome: Progressing     Problem: MUSCULOSKELETAL - ADULT  Goal: Maintain or return mobility to safest level of function  Description: INTERVENTIONS:  - Assess patient's ability to carry out ADLs; assess patient's baseline for ADL function and identify physical deficits which impact ability to perform ADLs (bathing, care of mouth/teeth, toileting, grooming, dressing, etc )  - Assess/evaluate cause of self-care deficits   - Assess range of motion  - Assess patient's mobility  - Assess patient's need for assistive devices and provide as appropriate  - Encourage maximum independence but intervene and supervise when necessary  - Involve family in performance of ADLs  - Assess for home care needs following discharge   - Consider OT consult to assist with ADL evaluation and planning for discharge  - Provide patient education as appropriate  Outcome: Progressing

## 2022-06-30 NOTE — ASSESSMENT & PLAN NOTE
· Admitted at [facility] 6/12-6/21 with d/c to [facility]   · Left rib fractures 6-11  · Pulmonary toileting  · Lidocaine patch  · Tylenol for pain   Requiring narcotics for pain control

## 2022-06-30 NOTE — ASSESSMENT & PLAN NOTE
Lab Results   Component Value Date    EGFR 32 06/30/2022    EGFR 27 06/29/2022    EGFR 26 06/28/2022    CREATININE 1 78 (H) 06/30/2022    CREATININE 2 09 (H) 06/29/2022    CREATININE 2 12 (H) 06/28/2022   · Baseline creatinine between 1 7 - 1 8  · Insert harp cath for accurate I&Os  · Avoid nephrotoxins  Approaching baseline     · Monitor UO

## 2022-06-30 NOTE — PROGRESS NOTES
[place]  Progress Note - Latrice Bryan 6/30/1932, 80 y o  male MRN: [id number]  Unit/Bed#: -01 SDU Encounter: [id number]  Primary Care Provider: Henry Boss MD   Date and time admitted to hospital: 6/28/2022  8:23 PM    Possible Gram-negative Pneumonia (await cultures) in a 81 yo patient with recent hospital admission admitted with new infiltrate on CXR, requiring treatment with IV Vancomycin and Cefepime  Sepsis ([address] )  Assessment & Plan  · Blood cultures - ng x 24h  · See PNA above  · Maintain MAP> 65  · Received 1 liter NS - no hypotension / lactic normal       Multiple rib fractures  Assessment & Plan  · Admitted at [facility] 6/12-6/21 with [place] [facility]   · Left rib fractures 6-11  · Pulmonary toileting  · Lidocaine patch  · Tylenol for pain  Requiring narcotics for pain control    Hyponatremia  Assessment & Plan  · Sodium on admission 127 --- Repeat 130 after fluid resuscitation   Stable  May have SIADH from pain  If decreases below 127 will involve nephrology if hospice is not elected  Centrilobular emphysema (HCC)  Assessment & Plan  · Resp  protocol  · Ipratropium TID  · levalbuterol TID      LIVIER (obstructive sleep apnea)  Assessment & Plan  · Patient non-compliant with CPAP  · Continue to monitor oxygenation       Chronic diastolic heart failure [facility])  Assessment & Plan  Wt Readings from Last 3 Encounters:   06/28/22 74 4 kg (164 lb)   05/20/22 74 8 kg (164 lb 12 8 oz)   05/08/22 74 8 kg (165 lb)       · Echo 9/28/21-- EF 55%    Grade 1 diastolic dysfunction  · Hold home furosemide due to GRETCHEN        Acquired hypothyroidism  Assessment & Plan  Continue home levothyroxine    GERD (gastroesophageal reflux disease)  Assessment & Plan  - Protonix 40 mg IV daily     Benign prostatic hyperplasia with lower urinary tract symptoms  Assessment & Plan  · Continue home Flomax      Acute renal failure superimposed on stage 4 chronic kidney disease [facility])  Assessment & Plan  Lab Results   Component Value Date    EGFR 32 06/30/2022    EGFR 27 06/29/2022    EGFR 26 06/28/2022    CREATININE 1 78 (H) 06/30/2022    CREATININE 2 09 (H) 06/29/2022    CREATININE 2 12 (H) 06/28/2022   · Baseline creatinine between 1 7 - 1 8  · Insert harp cath for accurate I&Os  · Avoid nephrotoxins  Approaching baseline  · Monitor UO      Essential hypertension  Assessment & Plan  · Bp 106/55  · Continuous cardiac monitoring      * Hospital acquired PNA  Assessment & Plan  · Patient is an 78-year-old patient with past medical history of diastolic HF, COPD, CAP, left rib fracture 6-11, [facility] [place] [facility] [place]  Patient presents to the hospital due to worsening fever, shortness of breath, cough, and increased oxygen requirement that started today, 6/29  Patient noted to be hypoxic upon EMS arrival    · CXR 6/29-- showed new subtle infiltrates  · Cont Vancomycin and cefepime  · Continue guaifenesin 1200 bid  · PRN Tessalon perles  · Ipratropium TID  · levalbuterol TID  · 6/29 Goals of care discussion with palliative care, myself  Pt family concerned about repetitive admissions to hospital, pt's advanced age, pneumonia in setting of rib fractures, ckd stage 4 and overall poor quality of life  They are contemplating but have not agreed to hospice yet          VTE  Prophylaxis:   Pharmacologic: in place    Patient Centered Rounds: I have performed bedside rounds with nursing staff today  Discussions with Specialists or Other Care Team Provider: case management    Education and Discussions with Family / Patient:  Family meeting 6/29      Current Length of Stay: 2 day(s)    Current Patient Status: Inpatient        Code Status: Level 3 - DNAR and DNI      Subjective:     Pt appears more alert   Does endorse rib frx pain  States he is suctioning secretions related to pna    Patient is seen and examined at bedside    All other ROS are negative  Objective:     Vitals:   Temp (24hrs), Av 9 °F (37 2 °C), Min:97 7 °F (36 5 °C), Max:100 04 °F (37 8 °C)    Temp:  [97 7 °F (36 5 °C)-100 04 °F (37 8 °C)] 99 1 °F (37 3 °C)  HR:  [] 96  Resp:  [18-32] 24  BP: ()/(52-65) 111/63  SpO2:  [90 %-97 %] 90 %  Body mass index is 28 15 kg/m²  Input and Output Summary (last 24 hours): Intake/Output Summary (Last 24 hours) at 2022 1250  Last data filed at 2022 1201  Gross per 24 hour   Intake 545 ml   Output 1000 ml   Net -455 ml       Physical Exam:       GEN: No acute distress, comfortable, elderly male, pain, o2  HEEENT: No JVD, PERRLA, no scleral icterus  RESP: Lungs dimished  CV: RRR, +s1/s2   ABD: SOFT NON TENDER, POSITIVE BOWEL SOUNDS, NO DISTENTION  PSYCH: CALM  NEURO NO FOCAL DEFICITS  SKIN: NO RASH  EXTREM: NO EDEMA    Additional Data:     Labs:    Results from last 7 days   Lab Units 22  0512   WBC Thousand/uL 8 08   HEMOGLOBIN g/dL 9 2*   HEMATOCRIT % 29 8*   PLATELETS Thousands/uL 131*   NEUTROS PCT % 69   LYMPHS PCT % 15   MONOS PCT % 13*   EOS PCT % 2     Results from last 7 days   Lab Units 22  0512   SODIUM mmol/L 127*   POTASSIUM mmol/L 4 2   CHLORIDE mmol/L 97*   CO2 mmol/L 28   BUN mg/dL 34*   CREATININE mg/dL 1 78*   ANION GAP mmol/L 2*   CALCIUM mg/dL 8 3   ALBUMIN g/dL 2 3*   TOTAL BILIRUBIN mg/dL 0 50   ALK PHOS U/L 157*   ALT U/L 20   AST U/L 10   GLUCOSE RANDOM mg/dL 93     Results from last 7 days   Lab Units 22   INR  1 03             Results from last 7 days   Lab Units 2221 22   LACTIC ACID mmol/L  --  2 0   PROCALCITONIN ng/ml 0 70* 0 26*           * I Have Reviewed All Lab Data Listed Above  Imaging:     Results for orders placed during the hospital encounter of 22    XR chest 1 view portable    Narrative  CHEST    INDICATION:   resp distress      COMPARISON:  22    EXAM PERFORMED/VIEWS:  XR CHEST PORTABLE      FINDINGS:    Lordotic positioning  Patient rotated to the right  Low lung volumes  Similar mild right diaphragm elevation  Increased, bilateral central and basilar interstitial markings  Small left pleural effusion  No pneumothorax  Stable appearance of the cardiomediastinal silhouette, given technique differences  New left 6th and 7th axillary rib fractures, no definite callous formation  Right neck surgical clips  The study was marked in [facility] for immediate notification  Impression  Low lung volumes with new interstitial changes compared to 5/9/2022, edema versus infection  Evidence of small left pleural effusion  New left rib fractures, appear acute to subacute  Workstation performed: [id number]    Results for orders placed in visit on 05/03/22    XR chest pa & lateral    Narrative  CHEST    INDICATION:   R05 9: Cough, unspecified  R06 2: Wheezing  COMPARISON:  Chest radiograph from 1/19/2022 and 11/26/2021, abdomen CT from 4/12/2022, chest CT from 2/24/2022  EXAM PERFORMED/VIEWS:  XR CHEST PA & LATERAL  DUAL ENERGY SUBTRACTION  FINDINGS:    Normal heart size  Severe coronary artery calcification  No acute disease  Left lower lobe nodule corresponding with the CT  Benign linear scar  No effusion or pneumothorax  Mild benign eventration of the right hemidiaphragm  Osseous structures appear within normal limits for patient age  Old T12 compression deformity  Impression  No acute cardiopulmonary disease  Workstation performed: [id number]      *I have reviewed all imaging reports listed above      Recent Cultures (last 7 days):     Results from last 7 days   Lab Units 06/28/22 2030   BLOOD CULTURE  No Growth at 24 hrs  No Growth at 24 hrs         Last 24 Hours Medication List:   Current Facility-Administered Medications   Medication Dose Route Frequency Provider Last Rate    acetaminophen  975 mg Oral Q8H ELISABET Mcclelland      amiodarone  200 mg Oral Daily [facility]      benzonatate  100 mg Oral TID PRN [facility]      cefepime  1,000 mg Intravenous Q12H [facility] BRYANNA Benítez 1,000 mg (06/30/22 0823)    escitalopram  5 mg Oral Daily [facility] LAXMI Benítez PA-C      fluticasone  1 spray Nasal BID [facility] LAXMI Benítez PA-C      guaiFENesin  1,200 mg Oral Q12H [address] [facility] LAXMI Benítez PA-C      heparin (porcine)  5,000 Units Subcutaneous Q8H [address] [facility]      HYDROmorphone  0 5 mg Intravenous Q3H PRN Valente Dhillon MD      ipratropium  0 5 mg Nebulization TID Luz Cooper DO      levalbuterol  1 25 mg Nebulization TID [facility] PA[facility]      levothyroxine  75 mcg Oral Early Morning [facility] LAXMI Benítez PA-C      lidocaine  1 patch Topical Daily [facility] LAXMI Benítez PA-C      LORazepam  0 5 mg Oral Q8H PRN [facility]      methocarbamol  500 mg Oral Q6H PRN ELISABET Armas      ondansetron  4 mg Intravenous Q6H PRN [facility] PA[facility]      oxyCODONE  2 5 mg Oral Q4H PRN Valente Dhillon MD      Or   Mariam Herzog oxyCODONE  5 mg Oral Q4H PRN Valente Dhillon MD      pantoprazole  40 mg Intravenous Q24H [address] [facility] PA-C      tamsulosin  0 4 mg Oral Daily With Asha Schuler PA-C      vancomycin  10 mg/kg Intravenous Q24H [facility] PA-C 750 mg (06/29/22 2132)        Today, Patient Was Seen By: Valente Dhillon MD    ** Please Note: Dictation voice to text software may have been used in the creation of this document   **

## 2022-06-30 NOTE — CASE MANAGEMENT
Case Management Discharge Planning Note    Patient name Ivania Form  Location  SDU/-01 S* MRN [id number]  : 1932 Date 2022       Current Admission Date: 2022  Current Admission Diagnosis:Hospital acquired PNA   Patient Active Problem List    Diagnosis Date Noted   [facility] acquired PNA 2022    Sepsis ([facility] [facility] ) 2022    Multiple rib fractures 2022    Acute respiratory failure with hypoxia ([address] ) 2022    Hyponatremia 2022    Paroxysmal SVT (supraventricular tachycardia) ([address] ) 2022    Centrilobular emphysema ([facility] ) 02/15/2022    Current moderate episode of major depressive disorder without prior episode ([facility] [facility] ) 10/06/2021    Chronic renal disease, stage IV ([address] ) 10/06/2021    PAC (premature atrial contraction) 10/06/2021    COPD exacerbation ([facility] ) 2021    Viral illness 09/15/2021    Primary osteoarthritis of left knee 2021    Patellar tendonitis of left knee 2021    Tinea cruris 2021    Partial arterial occlusion of retina 2021    Ischemic colitis ([facility] ) 2021    Central serous chorioretinopathy 2020    Bilateral sensorineural hearing loss 2020    Gastrointestinal hemorrhage 2020    Senile nuclear cataract 2020    Peptic ulcer with hemorrhage but without obstruction 2020    Nonspecific abnormal findings on radiological and examination of lung field 2020    TMJ arthropathy 2020    Chronic cough 2020    Impacted cerumen of left ear 03/15/2020    Dizziness 2020    Chest pain 2020    LIVIER (obstructive sleep apnea)     Snoring     Excessive daytime sleepiness     Aneurysm, pulmonary, arteriovenous 10/20/2019    Stage 3 chronic kidney disease ([address] ) 2019    Cervical spine arthritis 2019    Chronic diastolic heart failure ([address] ) 04/10/2019    Community acquired pneumonia of right lower lobe of lung 04/10/2019    Urinary obstruction 04/10/2019    BMI 29 0-29 9,adult 04/01/2019    Suprapubic tenderness 03/29/2019    Loose stools 03/29/2019    Bronchitis 03/28/2019    Eczema 08/22/2018    Seborrheic keratoses, inflamed 08/22/2018    Medicare annual wellness visit, subsequent 07/13/2018    Shortness of breath 06/04/2018    Tachycardia 06/04/2018    Other atopic dermatitis 08/16/2017    Headache 01/20/2017    Insomnia 08/18/2016    Hydronephrosis of right kidney 05/14/2016    CAD (coronary artery disease) 05/14/2016    Carotid stenosis 05/14/2016    Essential hypertension 05/14/2016    Acute renal failure superimposed on stage 4 chronic kidney disease ([facility] ) 05/14/2016    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[facility] ) 06/11/2015    Macular degeneration 11/15/2014    GERD (gastroesophageal reflux disease) 06/06/2014    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/26/2013    Iron deficiency anemia 10/09/2012    Benign prostatic hyperplasia with lower urinary tract symptoms 05/07/2012    Mixed simple and mucopurulent chronic bronchitis ([facility] [facility] ) 05/07/2012    Mixed hyperlipidemia 05/07/2012    Acquired hypothyroidism 05/07/2012    Osteoporosis 05/07/2012      LOS (days): 2  Geometric Mean LOS (GMLOS) (days): 4 80  Days to GMLOS:3 1     OBJECTIVE:  Risk of Unplanned Readmission Score: 31 14         Current admission status: Inpatient   Preferred Pharmacy:   [address] [place] [address] [address] [place] [id number]  Phone: [phone] Fax: [phone]    Primary Care Provider: Zac Brady MD    Primary Insurance: MEDICARE  Secondary Insurance: [facility] HEALTH OPTIONS PROGRAM    DISCHARGE DETAILS:   Met with patient, his son Isabel Solorzano and kaylininPaolal Pablo Phillips  A meeting with Sarahy Moeller of [facility] is scheduled for 10 am Friday as today is patient;s 80 th birthday  Discussed the philosophy of Hospice   Informed them patient had been determined to be eligible for Home Hospice which could be done at home or at [facility]  Educated them the patient would be responsible for room and board if Hospice is done at [facility] and cost is >$400 00 a day  Further discussed SNF rehab with no Hospice as an option  CM to follow

## 2022-06-30 NOTE — ASSESSMENT & PLAN NOTE
Wt Readings from Last 3 Encounters:   06/28/22 74 4 kg (164 lb)   05/20/22 74 8 kg (164 lb 12 8 oz)   05/08/22 74 8 kg (165 lb)       · Echo 9/28/21-- EF 55%    Grade 1 diastolic dysfunction  · Initially holding home furosemide due to GRETCHEN, will resume this today

## 2022-06-30 NOTE — ASSESSMENT & PLAN NOTE
· Sodium on admission 127 --- Repeat 130 after fluid resuscitation   Stable  May have SIADH from pain  If decreases below 127 will involve nephrology if hospice is not elected

## 2022-06-30 NOTE — ASSESSMENT & PLAN NOTE
· Patient is an 80year-old patient with past medical history of diastolic HF, COPD, CAP, left rib fracture 6-11, recent discharge from [address] [place] [address] to [facility] for rehab on 6/21  Patient presents to the hospital due to worsening fever, shortness of breath, cough, and increased oxygen requirement that started today, 6/29   Patient noted to be hypoxic upon EMS arrival    · CXR 6/29-- showed new subtle infiltrates  · Cont Vancomycin and cefepime  · Continue guaifenesin 1200 bid  · PRN Tessalon perles  · Ipratropium TID  · levalbuterol TID

## 2022-06-30 NOTE — ASSESSMENT & PLAN NOTE
· Sodium on admission 127 improved to 130 with IVF then decreased again to 127   · Today slightly increased to 129   · Consider SIADH 2/2 pain   · Check serum osmo, urine osmo, urine sodium, TSH, uric acid, cortisol   · Consult nephrology

## 2022-07-01 PROBLEM — Z71.89 GOALS OF CARE, COUNSELING/DISCUSSION: Status: ACTIVE | Noted: 2022-07-01

## 2022-07-01 LAB
ALBUMIN SERPL BCP-MCNC: 2.4 G/DL (ref 3.5–5)
ALP SERPL-CCNC: 164 U/L (ref 46–116)
ALT SERPL W P-5'-P-CCNC: 18 U/L (ref 12–78)
ANION GAP SERPL CALCULATED.3IONS-SCNC: 6 MMOL/L (ref 4–13)
AST SERPL W P-5'-P-CCNC: 9 U/L (ref 5–45)
ATRIAL RATE: 94 BPM
BASOPHILS # BLD AUTO: 0.02 THOUSANDS/ΜL (ref 0–0.1)
BASOPHILS NFR BLD AUTO: 0 % (ref 0–1)
BILIRUB SERPL-MCNC: 0.6 MG/DL (ref 0.2–1)
BUN SERPL-MCNC: 30 MG/DL (ref 5–25)
CALCIUM ALBUM COR SERPL-MCNC: 9.9 MG/DL (ref 8.3–10.1)
CALCIUM SERPL-MCNC: 8.6 MG/DL (ref 8.3–10.1)
CHLORIDE SERPL-SCNC: 98 MMOL/L (ref 100–108)
CO2 SERPL-SCNC: 25 MMOL/L (ref 21–32)
CREAT SERPL-MCNC: 1.71 MG/DL (ref 0.6–1.3)
EOSINOPHIL # BLD AUTO: 0.14 THOUSAND/ΜL (ref 0–0.61)
EOSINOPHIL NFR BLD AUTO: 2 % (ref 0–6)
ERYTHROCYTE [DISTWIDTH] IN BLOOD BY AUTOMATED COUNT: 14.1 % (ref 11.6–15.1)
GFR SERPL CREATININE-BSD FRML MDRD: 34 ML/MIN/1.73SQ M
GLUCOSE SERPL-MCNC: 105 MG/DL (ref 65–140)
HCT VFR BLD AUTO: 30.6 % (ref 36.5–49.3)
HGB BLD-MCNC: 9.7 G/DL (ref 12–17)
IMM GRANULOCYTES # BLD AUTO: 0.06 THOUSAND/UL (ref 0–0.2)
IMM GRANULOCYTES NFR BLD AUTO: 1 % (ref 0–2)
LYMPHOCYTES # BLD AUTO: 0.87 THOUSANDS/ΜL (ref 0.6–4.47)
LYMPHOCYTES NFR BLD AUTO: 12 % (ref 14–44)
MCH RBC QN AUTO: 30.5 PG (ref 26.8–34.3)
MCHC RBC AUTO-ENTMCNC: 31.7 G/DL (ref 31.4–37.4)
MCV RBC AUTO: 96 FL (ref 82–98)
MONOCYTES # BLD AUTO: 0.8 THOUSAND/ΜL (ref 0.17–1.22)
MONOCYTES NFR BLD AUTO: 11 % (ref 4–12)
NEUTROPHILS # BLD AUTO: 5.11 THOUSANDS/ΜL (ref 1.85–7.62)
NEUTS SEG NFR BLD AUTO: 74 % (ref 43–75)
NRBC BLD AUTO-RTO: 0 /100 WBCS
OSMOLALITY UR/SERPL-RTO: 294 MMOL/KG (ref 282–298)
OSMOLALITY UR: 350 MMOL/KG
P AXIS: 66 DEGREES
PLATELET # BLD AUTO: 135 THOUSANDS/UL (ref 149–390)
PMV BLD AUTO: 8.6 FL (ref 8.9–12.7)
POTASSIUM SERPL-SCNC: 4.8 MMOL/L (ref 3.5–5.3)
PR INTERVAL: 178 MS
PROT SERPL-MCNC: 6.4 G/DL (ref 6.4–8.2)
QRS AXIS: 91 DEGREES
QRSD INTERVAL: 92 MS
QT INTERVAL: 350 MS
QTC INTERVAL: 437 MS
RBC # BLD AUTO: 3.18 MILLION/UL (ref 3.88–5.62)
SODIUM 24H UR-SCNC: 50 MOL/L
SODIUM SERPL-SCNC: 129 MMOL/L (ref 136–145)
T WAVE AXIS: 63 DEGREES
TSH SERPL DL<=0.05 MIU/L-ACNC: 2.82 UIU/ML (ref 0.45–4.5)
URATE SERPL-MCNC: 6.3 MG/DL (ref 4.2–8)
VANCOMYCIN TROUGH SERPL-MCNC: 15.4 UG/ML (ref 10–20)
VENTRICULAR RATE: 94 BPM
WBC # BLD AUTO: 7 THOUSAND/UL (ref 4.31–10.16)

## 2022-07-01 PROCEDURE — 84300 ASSAY OF URINE SODIUM: CPT | Performed by: PHYSICIAN ASSISTANT

## 2022-07-01 PROCEDURE — C9113 INJ PANTOPRAZOLE SODIUM, VIA: HCPCS | Performed by: PHYSICIAN ASSISTANT

## 2022-07-01 PROCEDURE — 94760 N-INVAS EAR/PLS OXIMETRY 1: CPT

## 2022-07-01 PROCEDURE — 83930 ASSAY OF BLOOD OSMOLALITY: CPT | Performed by: PHYSICIAN ASSISTANT

## 2022-07-01 PROCEDURE — 85025 COMPLETE CBC W/AUTO DIFF WBC: CPT | Performed by: PHYSICIAN ASSISTANT

## 2022-07-01 PROCEDURE — 99222 1ST HOSP IP/OBS MODERATE 55: CPT | Performed by: INTERNAL MEDICINE

## 2022-07-01 PROCEDURE — 99233 SBSQ HOSP IP/OBS HIGH 50: CPT | Performed by: PHYSICIAN ASSISTANT

## 2022-07-01 PROCEDURE — 80202 ASSAY OF VANCOMYCIN: CPT | Performed by: PHYSICIAN ASSISTANT

## 2022-07-01 PROCEDURE — 84550 ASSAY OF BLOOD/URIC ACID: CPT | Performed by: PHYSICIAN ASSISTANT

## 2022-07-01 PROCEDURE — 83935 ASSAY OF URINE OSMOLALITY: CPT | Performed by: PHYSICIAN ASSISTANT

## 2022-07-01 PROCEDURE — 93010 ELECTROCARDIOGRAM REPORT: CPT | Performed by: INTERNAL MEDICINE

## 2022-07-01 PROCEDURE — 84443 ASSAY THYROID STIM HORMONE: CPT | Performed by: PHYSICIAN ASSISTANT

## 2022-07-01 PROCEDURE — 94640 AIRWAY INHALATION TREATMENT: CPT

## 2022-07-01 PROCEDURE — 80053 COMPREHEN METABOLIC PANEL: CPT | Performed by: PHYSICIAN ASSISTANT

## 2022-07-01 RX ORDER — TORSEMIDE 10 MG/1
10 TABLET ORAL DAILY
Status: DISCONTINUED | OUTPATIENT
Start: 2022-07-01 | End: 2022-07-05

## 2022-07-01 RX ORDER — FUROSEMIDE 20 MG/1
20 TABLET ORAL EVERY OTHER DAY
Status: DISCONTINUED | OUTPATIENT
Start: 2022-07-01 | End: 2022-07-01

## 2022-07-01 RX ADMIN — LORAZEPAM 0.5 MG: 0.5 TABLET ORAL at 11:58

## 2022-07-01 RX ADMIN — CEFEPIME HYDROCHLORIDE 1000 MG: 1 INJECTION, SOLUTION INTRAVENOUS at 08:25

## 2022-07-01 RX ADMIN — HYDROMORPHONE HYDROCHLORIDE 0.5 MG: 1 INJECTION, SOLUTION INTRAMUSCULAR; INTRAVENOUS; SUBCUTANEOUS at 22:08

## 2022-07-01 RX ADMIN — HYDROMORPHONE HYDROCHLORIDE 0.5 MG: 1 INJECTION, SOLUTION INTRAMUSCULAR; INTRAVENOUS; SUBCUTANEOUS at 19:10

## 2022-07-01 RX ADMIN — HEPARIN SODIUM 5000 UNITS: 5000 INJECTION INTRAVENOUS; SUBCUTANEOUS at 21:04

## 2022-07-01 RX ADMIN — AMIODARONE HYDROCHLORIDE 200 MG: 200 TABLET ORAL at 09:46

## 2022-07-01 RX ADMIN — LEVALBUTEROL HYDROCHLORIDE 1.25 MG: 1.25 SOLUTION, CONCENTRATE RESPIRATORY (INHALATION) at 14:44

## 2022-07-01 RX ADMIN — ACETAMINOPHEN 975 MG: 325 TABLET, FILM COATED ORAL at 16:13

## 2022-07-01 RX ADMIN — PANTOPRAZOLE SODIUM 40 MG: 40 INJECTION, POWDER, FOR SOLUTION INTRAVENOUS at 08:25

## 2022-07-01 RX ADMIN — ONDANSETRON 4 MG: 2 INJECTION INTRAMUSCULAR; INTRAVENOUS at 10:32

## 2022-07-01 RX ADMIN — HEPARIN SODIUM 5000 UNITS: 5000 INJECTION INTRAVENOUS; SUBCUTANEOUS at 14:16

## 2022-07-01 RX ADMIN — IPRATROPIUM BROMIDE 0.5 MG: 0.5 SOLUTION RESPIRATORY (INHALATION) at 14:44

## 2022-07-01 RX ADMIN — ESCITALOPRAM 5 MG: 5 TABLET, FILM COATED ORAL at 09:47

## 2022-07-01 RX ADMIN — METHOCARBAMOL 500 MG: 500 TABLET ORAL at 20:32

## 2022-07-01 RX ADMIN — LEVOTHYROXINE SODIUM 75 MCG: 75 TABLET ORAL at 05:00

## 2022-07-01 RX ADMIN — HYDROMORPHONE HYDROCHLORIDE 0.5 MG: 1 INJECTION, SOLUTION INTRAMUSCULAR; INTRAVENOUS; SUBCUTANEOUS at 14:16

## 2022-07-01 RX ADMIN — IPRATROPIUM BROMIDE 0.5 MG: 0.5 SOLUTION RESPIRATORY (INHALATION) at 07:11

## 2022-07-01 RX ADMIN — GUAIFENESIN 1200 MG: 600 TABLET ORAL at 20:32

## 2022-07-01 RX ADMIN — OXYCODONE HYDROCHLORIDE 5 MG: 5 TABLET ORAL at 00:54

## 2022-07-01 RX ADMIN — LEVALBUTEROL HYDROCHLORIDE 1.25 MG: 1.25 SOLUTION, CONCENTRATE RESPIRATORY (INHALATION) at 07:11

## 2022-07-01 RX ADMIN — IPRATROPIUM BROMIDE 0.5 MG: 0.5 SOLUTION RESPIRATORY (INHALATION) at 19:30

## 2022-07-01 RX ADMIN — BENZONATATE 100 MG: 100 CAPSULE ORAL at 20:32

## 2022-07-01 RX ADMIN — TAMSULOSIN HYDROCHLORIDE 0.4 MG: 0.4 CAPSULE ORAL at 16:13

## 2022-07-01 RX ADMIN — LIDOCAINE 5% 1 PATCH: 700 PATCH TOPICAL at 08:25

## 2022-07-01 RX ADMIN — FLUTICASONE PROPIONATE 1 SPRAY: 50 SPRAY, METERED NASAL at 17:25

## 2022-07-01 RX ADMIN — GUAIFENESIN 1200 MG: 600 TABLET ORAL at 09:46

## 2022-07-01 RX ADMIN — FLUTICASONE PROPIONATE 1 SPRAY: 50 SPRAY, METERED NASAL at 09:46

## 2022-07-01 RX ADMIN — LORAZEPAM 0.5 MG: 0.5 TABLET ORAL at 20:32

## 2022-07-01 RX ADMIN — CEFEPIME HYDROCHLORIDE 1000 MG: 1 INJECTION, SOLUTION INTRAVENOUS at 20:31

## 2022-07-01 RX ADMIN — HYDROMORPHONE HYDROCHLORIDE 0.5 MG: 1 INJECTION, SOLUTION INTRAMUSCULAR; INTRAVENOUS; SUBCUTANEOUS at 08:26

## 2022-07-01 RX ADMIN — TORSEMIDE 10 MG: 20 TABLET ORAL at 14:39

## 2022-07-01 RX ADMIN — HEPARIN SODIUM 5000 UNITS: 5000 INJECTION INTRAVENOUS; SUBCUTANEOUS at 05:00

## 2022-07-01 RX ADMIN — ACETAMINOPHEN 975 MG: 325 TABLET, FILM COATED ORAL at 09:46

## 2022-07-01 RX ADMIN — LEVALBUTEROL HYDROCHLORIDE 1.25 MG: 1.25 SOLUTION, CONCENTRATE RESPIRATORY (INHALATION) at 19:30

## 2022-07-01 NOTE — PLAN OF CARE
Problem: Potential for Falls  Goal: Patient will remain free of falls  Description: INTERVENTIONS:  - Educate patient/family on patient safety including physical limitations  - Instruct patient to call for assistance with activity   - Consult OT/PT to assist with strengthening/mobility   - Keep Call bell within reach  - Keep bed low and locked with side rails adjusted as appropriate  - Keep care items and personal belongings within reach  - Initiate and maintain comfort rounds  - Make Fall Risk Sign visible to staff  - Apply yellow socks and bracelet for high fall risk patients  - Consider moving patient to room near nurses station  Outcome: Progressing     Problem: MOBILITY - ADULT  Goal: Maintain or return to baseline ADL function  Description: INTERVENTIONS:  -  Assess patient's ability to carry out ADLs; assess patient's baseline for ADL function and identify physical deficits which impact ability to perform ADLs (bathing, care of mouth/teeth, toileting, grooming, dressing, etc )  - Assess/evaluate cause of self-care deficits   - Assess range of motion  - Assess patient's mobility; develop plan if impaired  - Assess patient's need for assistive devices and provide as appropriate  - Encourage maximum independence but intervene and supervise when necessary  - Involve family in performance of ADLs  - Assess for home care needs following discharge   - Consider OT consult to assist with ADL evaluation and planning for discharge  - Provide patient education as appropriate  Outcome: Progressing  Goal: Maintains/Returns to pre admission functional level  Description: INTERVENTIONS:  - Perform BMAT or MOVE assessment daily    - Set and communicate daily mobility goal to care team and patient/family/caregiver     - Collaborate with rehabilitation services on mobility goals if consulted  - Out of bed for toileting  - Record patient progress and toleration of activity level   Outcome: Progressing     Problem: Prexisting or High Potential for Compromised Skin Integrity  Goal: Skin integrity is maintained or improved  Description: INTERVENTIONS:  - Identify patients at risk for skin breakdown  - Assess and monitor skin integrity  - Assess and monitor nutrition and hydration status  - Monitor labs   - Assess for incontinence   - Turn and reposition patient  - Assist with mobility/ambulation  - Relieve pressure over bony prominences  - Avoid friction and shearing  - Provide appropriate hygiene as needed including keeping skin clean and dry  - Evaluate need for skin moisturizer/barrier cream  - Collaborate with interdisciplinary team   - Patient/family teaching  - Consider wound care consult   Outcome: Progressing     Problem: CARDIOVASCULAR - ADULT  Goal: Absence of cardiac dysrhythmias or at baseline rhythm  Description: INTERVENTIONS:  - Continuous cardiac monitoring, vital signs, obtain 12 lead EKG if ordered  - Administer antiarrhythmic and heart rate control medications as ordered  - Monitor electrolytes and administer replacement therapy as ordered  Outcome: Progressing     Problem: RESPIRATORY - ADULT  Goal: Achieves optimal ventilation and oxygenation  Description: INTERVENTIONS:  - Assess for changes in respiratory status  - Assess for changes in mentation and behavior  - Position to facilitate oxygenation and minimize respiratory effort  - Oxygen administered by appropriate delivery if ordered  - Initiate smoking cessation education as indicated  - Encourage broncho-pulmonary hygiene including cough, deep breathe, Incentive Spirometry  - Assess the need for suctioning and aspirate as needed  - Assess and instruct to report SOB or any respiratory difficulty  - Respiratory Therapy support as indicated  Outcome: Progressing     Problem: GENITOURINARY - ADULT  Goal: Maintains or returns to baseline urinary function  Description: INTERVENTIONS:  - Assess urinary function  - Encourage oral fluids to ensure adequate hydration if ordered  - Administer IV fluids as ordered to ensure adequate hydration  - Administer ordered medications as needed  - Offer frequent toileting  - Follow urinary retention protocol if ordered  Outcome: Progressing  Goal: Urinary catheter remains patent  Description: INTERVENTIONS:  - Assess patency of urinary catheter  - If patient has a chronic harp, consider changing catheter if non-functioning  - Follow guidelines for intermittent irrigation of non-functioning urinary catheter  Outcome: Progressing     Problem: METABOLIC, FLUID AND ELECTROLYTES - ADULT  Goal: Electrolytes maintained within normal limits  Description: INTERVENTIONS:  - Monitor labs and assess patient for signs and symptoms of electrolyte imbalances  - Administer electrolyte replacement as ordered  - Monitor response to electrolyte replacements, including repeat lab results as appropriate  - Instruct patient on fluid and nutrition as appropriate  Outcome: Progressing  Goal: Fluid balance maintained  Description: INTERVENTIONS:  - Monitor labs   - Monitor I/O and WT  - Instruct patient on fluid and nutrition as appropriate  - Assess for signs & symptoms of volume excess or deficit  Outcome: Progressing     Problem: SKIN/TISSUE INTEGRITY - ADULT  Goal: Skin Integrity remains intact(Skin Breakdown Prevention)  Description: Assess:  -Assess extremities for adequate circulation and sensation     Bed Management:  -Have minimal linens on bed & keep smooth, unwrinkled  -Change linens as needed when moist or perspiring    Toileting:  -Offer bedside commode    Activity:  -Encourage activity and walks on unit  -Encourage or provide ROM exercises   Skin Care:  -Avoid use of baby powder, tape, friction and shearing, hot water or constrictive clothing  -Do not massage red bony areas    Outcome: Progressing     Problem: HEMATOLOGIC - ADULT  Goal: Maintains hematologic stability  Description: INTERVENTIONS  - Assess for signs and symptoms of bleeding or hemorrhage  - Monitor labs  - Administer supportive blood products/factors as ordered and appropriate  Outcome: Progressing     Problem: MUSCULOSKELETAL - ADULT  Goal: Maintain or return mobility to safest level of function  Description: INTERVENTIONS:  - Assess patient's ability to carry out ADLs; assess patient's baseline for ADL function and identify physical deficits which impact ability to perform ADLs (bathing, care of mouth/teeth, toileting, grooming, dressing, etc )  - Assess/evaluate cause of self-care deficits   - Assess range of motion  - Assess patient's mobility  - Assess patient's need for assistive devices and provide as appropriate  - Encourage maximum independence but intervene and supervise when necessary  - Involve family in performance of ADLs  - Assess for home care needs following discharge   - Consider OT consult to assist with ADL evaluation and planning for discharge  - Provide patient education as appropriate  Outcome: Progressing     Problem: Nutrition/Hydration-ADULT  Goal: Nutrient/Hydration intake appropriate for improving, restoring or maintaining nutritional needs  Description: Monitor and assess patient's nutrition/hydration status for malnutrition  Collaborate with interdisciplinary team and initiate plan and interventions as ordered  Monitor patient's weight and dietary intake as ordered or per policy  Utilize nutrition screening tool and intervene as necessary  Determine patient's food preferences and provide high-protein, high-caloric foods as appropriate       INTERVENTIONS:  - Monitor oral intake, urinary output, labs, and treatment plans  - Assess nutrition and hydration status and recommend course of action  - Evaluate amount of meals eaten  - Assist patient with eating if necessary   - Allow adequate time for meals  - Recommend/ encourage appropriate diets, oral nutritional supplements, and vitamin/mineral supplements  - Order, calculate, and assess calorie counts as needed  - Recommend, monitor, and adjust tube feedings and TPN/PPN based on assessed needs  - Assess need for intravenous fluids  - Provide specific nutrition/hydration education as appropriate  - Include patient/family/caregiver in decisions related to nutrition  Outcome: Progressing

## 2022-07-01 NOTE — ASSESSMENT & PLAN NOTE
· Requiring up to 4 L of supplemental oxygen currently  · Likely secondary to pneumonia  · Not on oxygen at home  · Continue to wean off oxygen as able

## 2022-07-01 NOTE — NURSING NOTE
Pt noted with hematuria and attempting to pull out his harp catheter  Urine not flowing well into tubing  KXEN pa notifed  Orders obtained to irrigate harp  2 large blood clots removed and urine now flowing  Will continue to monitor

## 2022-07-01 NOTE — PROGRESS NOTES
[place]  Progress Note - Marcello Schraderr 6/30/1932, 80 y o  male MRN: [id number]  Unit/Bed#: -01 SDU Encounter: [id number]  Primary Care Provider: Nataly Summers MD   Date and time admitted to hospital: 6/28/2022  8:23 PM    * Hospital acquired PNA  Assessment & Plan  · Patient is an 42-year-old patient with past medical history of diastolic HF, COPD, CAP, left rib fracture 6-11, recent [place] [facility] [address] [place] [facility] [place]  Patient presents to the hospital due to worsening fever, shortness of breath, cough, and increased oxygen requirement that started today, 6/29  Patient noted to be hypoxic upon EMS arrival    · CXR 6/29-- showed new subtle infiltrates  · BC x2: no growth at 48h  · Cefepime day 4, will discontinue Vancomycin since MRSA swab negative  · Continue guaifenesin 1200 bid  · PRN Tessalon perles  · Ipratropium TID  · levalbuterol TID  · Leukocytosis resolved, patient afebrile, still requiring 4 L supplemental oxygen  · Trend procalcitonin         Goals of care, counseling/discussion  Assessment & Plan  · S/p Goals of care discussion with palliative care- Pt family concerned about repetitive admissions to hospital, pt's advanced age, pneumonia in setting of rib fractures, ckd stage 4 and overall poor quality of life  · They are contemplating but have not agreed to hospice yet    · Seen by hospice liaison today who states patient is hospice appropriate   · Son and daughter in law to decide whether to purse STR with transition to hospice vs home hospice vs hospice and SNF- in the meantime, continue medical management until decision is made    Sepsis [facility])  Assessment & Plan  · Blood cultures - ng x 48h  · See PNA above  · Maintain MAP> 65  · Received 1 liter NS - no hypotension / lactic normal       Hyponatremia  Assessment & Plan  · Sodium on admission 127 improved to 130 with IVF then decreased again to 127   · Today slightly increased to 129   · Consider SIADH 2/2 pain   · Check serum osmo, urine osmo, urine sodium, TSH, uric acid, cortisol   · Consult nephrology     Acute respiratory failure with hypoxia (HCC)  Assessment & Plan  · Requiring up to 4 L of supplemental oxygen currently  · Likely secondary to pneumonia  · Not on oxygen at home  · Continue to wean off oxygen as able    Acute renal failure superimposed on stage 4 chronic kidney disease [facility])  Assessment & Plan  Lab Results   Component Value Date    EGFR 32 06/30/2022    EGFR 27 06/29/2022    EGFR 26 06/28/2022    CREATININE 1 78 (H) 06/30/2022    CREATININE 2 09 (H) 06/29/2022    CREATININE 2 12 (H) 06/28/2022   · Baseline creatinine between 1 7 - 1 8  · Insert harp cath for accurate I&Os  · Avoid nephrotoxins  Approaching baseline  · Monitor UO  · Creatinine now back to baseline- will resume Lasix today  · Continue monitor BMP      Chronic diastolic heart failure [facility])  Assessment & Plan  Wt Readings from Last 3 Encounters:   06/28/22 74 4 kg (164 lb)   05/20/22 74 8 kg (164 lb 12 8 oz)   05/08/22 74 8 kg (165 lb)       · Echo 9/28/21-- EF 55%  Grade 1 diastolic dysfunction  · Initially holding home furosemide due to GRETCHEN, will resume this today        Multiple rib fractures  Assessment & Plan  · Admitted at [facility] 6/12-6/21 with d/c to [facility]   · Left rib fractures 6-11  · Pulmonary toileting  · Lidocaine patch  · Tylenol for pain   Requiring narcotics for pain control    Centrilobular emphysema (HCC)  Assessment & Plan  · Resp  protocol  · Ipratropium TID  · levalbuterol TID      LIVIER (obstructive sleep apnea)  Assessment & Plan  · Patient non-compliant with CPAP  · Continue to monitor oxygenation       Acquired hypothyroidism  Assessment & Plan  Continue home levothyroxine    GERD (gastroesophageal reflux disease)  Assessment & Plan  - Protonix 40 mg IV daily     Benign prostatic hyperplasia with lower urinary tract symptoms  Assessment & Plan  · Continue home Flomax      Essential hypertension  Assessment & Plan  · Bp 106/55  · Continuous cardiac monitoring          VTE Pharmacologic Prophylaxis: VTE Score: 6 High Risk (Score >/= 5) - Pharmacological DVT Prophylaxis Ordered: heparin  Sequential Compression Devices Ordered  Patient Centered Rounds: I performed bedside rounds with nursing staff today  Discussions with Specialists or Other Care Team Provider: SHAKILA,RN    Education and Discussions with Family / Patient: Updated  (son and daughter in law) at bedside  Time Spent for Care: 60 minutes  More than 50% of total time spent on counseling and coordination of care as described above  Current Length of Stay: 3 day(s)  Current Patient Status: Inpatient   Certification Statement: The patient will continue to require additional inpatient hospital stay due to Hospital-acquired pneumonia on IV antibiotics, acute hypoxic respiratory failure, consideration for hospice  Discharge Plan: Anticipate discharge in 48-72 hrs to Pending family decision on hospice    Code Status: Level 3 - DNAR and DNI    Subjective:   Patient was in bed, on 4 L of supplemental oxygen, appears uncomfortable and moaning/groaning  Patient only said "hi" and then after that was not responding to any questions  After receiving IV Dilaudid, patient improved and was able to speak  Patient is not been eating and drinking very much  Had long discussion with son and daughter-in-law at bedside who are trying to decide on whether or not to pursue hospice versus short-term rehab with transition to hospice  They also expressed a lot of concern in regards to patient's sodium levels and were worried that if his sodium order dropped very low that he would be very uncomfortable while on hospice      Objective:     Vitals:   Temp (24hrs), Av 7 °F (37 1 °C), Min:98 2 °F (36 8 °C), Max:99 3 °F (37 4 °C)    Temp:  [98 2 °F (36 8 °C)-99 3 °F (37 4 °C)] 98 24 °F (36 8 °C)  HR:  [] 103  Resp:  [14-34] 34  BP: (103-164)/() 144/70  SpO2:  [94 %-98 %] 96 %  Body mass index is 27 17 kg/m²  Input and Output Summary (last 24 hours): Intake/Output Summary (Last 24 hours) at 7/1/2022 1332  Last data filed at 7/1/2022 0901  Gross per 24 hour   Intake 310 ml   Output 1060 ml   Net -750 ml       Physical Exam:   Physical Exam  Constitutional:       General: He is not in acute distress  Appearance: He is obese  HENT:      Mouth/Throat:      Mouth: Mucous membranes are moist    Eyes:      General: No scleral icterus  Cardiovascular:      Rate and Rhythm: Normal rate and regular rhythm  Heart sounds: Normal heart sounds  Pulmonary:      Breath sounds: Normal breath sounds  Comments: 4L supplemental oxygen     Abdominal:      General: Abdomen is flat  Bowel sounds are normal       Palpations: Abdomen is soft  Tenderness: There is no abdominal tenderness  Musculoskeletal:      Right lower leg: No edema  Left lower leg: No edema  Skin:     General: Skin is warm  Neurological:      Mental Status: He is alert  He is disoriented  Cranial Nerves: No cranial nerve deficit     Psychiatric:         Mood and Affect: Mood normal           Additional Data:     Labs:  Results from last 7 days   Lab Units 07/01/22  0454   WBC Thousand/uL 7 00   HEMOGLOBIN g/dL 9 7*   HEMATOCRIT % 30 6*   PLATELETS Thousands/uL 135*   NEUTROS PCT % 74   LYMPHS PCT % 12*   MONOS PCT % 11   EOS PCT % 2     Results from last 7 days   Lab Units 07/01/22  0454   SODIUM mmol/L 129*   POTASSIUM mmol/L 4 8   CHLORIDE mmol/L 98*   CO2 mmol/L 25   BUN mg/dL 30*   CREATININE mg/dL 1 71*   ANION GAP mmol/L 6   CALCIUM mg/dL 8 6   ALBUMIN g/dL 2 4*   TOTAL BILIRUBIN mg/dL 0 60   ALK PHOS U/L 164*   ALT U/L 18   AST U/L 9   GLUCOSE RANDOM mg/dL 105     Results from last 7 days   Lab Units 06/28/22  2030   INR  1 03             Results from last 7 days   Lab Units 06/29/22  0521 22   LACTIC ACID mmol/L  --  2 0   PROCALCITONIN ng/ml 0 70* 0 26*       Lines/Drains:  Invasive Devices  Report    Peripheral Intravenous Line  Duration           Peripheral IV 22 Distal;Left;Upper;Ventral (anterior) Arm 2 days          Drain  Duration           Urethral Catheter Temperature probe 2 days              Urinary Catheter:  Goal for removal: Voiding trial when ambulation improves           Telemetry:  Telemetry Orders (From admission, onward)             24 Hour Telemetry Monitoring  Continuous x 24 Hours (Telem)           References:    Telemetry Guidelines   Question:  Reason for 24 Hour Telemetry  Answer:  Metabolic/Electrolyte Disturbance with High Probability of Dysrhythmia (K level <3 or >6, or KCL infusion >=10mEq/hr)                 Telemetry Reviewed: Normal Sinus Rhythm occasional sinus tachycardia  Indication for Continued Telemetry Use: No indication for continued use  Will discontinue  Imaging: No pertinent imaging reviewed  Recent Cultures (last 7 days):   Results from last 7 days   Lab Units 22   BLOOD CULTURE  No Growth at 48 hrs  No Growth at 48 hrs         Last 24 Hours Medication List:   Current Facility-Administered Medications   Medication Dose Route Frequency Provider Last Rate    acetaminophen  975 mg Oral Q8H ELISABET Armas      amiodarone  200 mg Oral Daily Jaya Benítez PA-C      benzonatate  100 mg Oral TID PRN Jaya Benítez PA-C      cefepime  1,000 mg Intravenous Q12H Jaya Benítez PA-C Stopped (22 0901)    escitalopram  5 mg Oral Daily Jaya Benítez PA-C      fluticasone  1 spray Nasal BID Jaya Benítez PA-C      furosemide  20 mg Oral Every Other Day Jorge Bahena PA-C      guaiFENesin  1,200 mg Oral Q12H [address] Jaya Benítez PA-C      heparin (porcine)  5,000 Units Subcutaneous Q8H [address] Jaya Benítez PA-C      HYDROmorphone 0 5 mg Intravenous Q3H PRN Arley Mir MD      ipratropium  0 5 mg Nebulization TID Petra Barajas DO      levalbuterol  1 25 mg Nebulization TID Jaya Benítez PA-C      levothyroxine  75 mcg Oral Early Morning Jaya Benítez PA-C      lidocaine  1 patch Topical Daily Jaya Benítez PA-C      LORazepam  0 5 mg Oral Q8H PRN Jaya Benítez PA-C      methocarbamol  500 mg Oral Q6H PRN ELISABET Armas      ondansetron  4 mg Intravenous Q6H PRN Jaya Benítez PA-C      oxyCODONE  2 5 mg Oral Q4H PRN Adrian Archuleta MD      Or   [facility] oxyCODONE  5 mg Oral Q4H PRN Arley Mir MD      pantoprazole  40 mg Intravenous Q24H [address] Jaya Benítez PA-C      tamsulosin  0 4 mg Oral Daily With Dinner Jaya Benítez PA-C          Today, Patient Was Seen By: Keturah Patton PA-C    **Please Note: This note may have been constructed using a voice recognition system  **

## 2022-07-01 NOTE — CONSULTS
Consultation - Nephrology   Morgan Schofield 80 y o  male MRN: [id number]  Unit/Bed#: -01 SDU Encounter: [id number]    Inpatient consult to Nephrology  Consult performed by: Alfredo Carmichael MD  Consult ordered by: Belem Bustillos PA-C          History of Present Illness   Physician Requesting Consult: Indio Hill MD  Reason for Consult / Principal Problem:  Hyponatremia and CKD  Hx and PE limited by:  Patient is a very poor historian most history is from the chart the old records which I completely reviewed      HPI: Morgan Schofield is a 80y o  year old male with PMH of diastolic CHF/COPD/CAPD/recent admission to [facility] on 06/12-6/21 and then to [facility] for rehab where he presented with fever shortness of breath cough increase oxygen  Chest x-ray demonstrated infiltrates for in patient admitted with sepsis secondary to pneumonia  Of note patient had a recent left rib fractures 6/11  He is being treated for ongoing pneumonia with antibiotics  We are asked to see the patient for hyponatremia  Patient's sodium has been chronically and intermittently low for the last few months  Admission sodium was 127  Sodium has varied between 127-130  It is actually better today 129  Patient also with history of CKD stage 3/4 with a baseline creatinine 1 7-2 2 most recently 1 71 from today  He is a poor historian he denies any kidney problems but has a Jett catheter currently cannot give any more significant history    He states he has shortness of breath and coughing with pleuritic-type chest pain and intermittent abdominal soreness from the cough      History obtained from the patient, the old records in the chart which I completely reviewed        Review of systems:  Difficult to obtain as patient is confused in certain fashion and cannot give a very good history  Please see HPI  Of note he denies any nausea or vomiting or diarrhea    Again he denies any urinary symptoms but he has a Jett catheter in place  Historical Information   Past Medical History:   Diagnosis Date    Anxiety disorder due to general medical condition 2013    Chronic kidney disease     Compression fracture of thoracic vertebra ([facility] )     last assessed 2012    COPD (chronic obstructive pulmonary disease) ([place] [address] )     Disease of thyroid gland     Heart attack ([place] [address] )     History of methicillin resistant staphylococcus aureus (MRSA) 2019    negative nasal swab     Hollenhorst plaque, right eye     last assessed 2013    Hyperlipidemia     Hypertension     Iron deficiency anemia due to chronic blood loss     last assessed 09/10/2012    Ischemic colitis ([place] [address] )     last assessed 2014    Osteoarthritis of both hands     last assessedn 2013    Peptic ulcer     last assessed 2013    Polymyalgia rheumatica ([place] [address] )     last assessesd 10/09/2012    Renal disorder     Upper GI hemorrhage     last assessed 2015    Varicose veins of lower extremity     last assessed 2013     Past Surgical History:   Procedure Laterality Date    CORONARY ARTERY BYPASS GRAFT      HEMORRHOID SURGERY      HERNIA REPAIR      RENAL CYST EXCISION      resolved 2010    THROMBOENDARTERECTOMY Right     carotid, last assessed 2013     Social History   Social History     Substance and Sexual Activity   Alcohol Use Never    Alcohol/week: 1 0 standard drink    Types: 1 Glasses of wine per week    Comment: social     Social History     Substance and Sexual Activity   Drug Use Never     Social History     Tobacco Use   Smoking Status Former Smoker    Packs/day: 2 00    Years: 50 00    Pack years: 100 00    Types: Cigarettes    Start date: 65    Quit date: 46    Years since quittin 5   Smokeless Tobacco Never Used   Tobacco Comment    Quit 40 yrs   ago     Family History   Problem Relation Age of Onset    Hypertension Mother     Alcohol abuse Mother     Hypertension Father     Alcohol abuse Father     Alcohol abuse Son     Heart disease Family     Stroke Family         syndrome       Meds/Allergies   all current active meds have been reviewed, current meds:   Current Facility-Administered Medications   Medication Dose Route Frequency    acetaminophen (TYLENOL) tablet 975 mg  975 mg Oral Q8H    amiodarone tablet 200 mg  200 mg Oral Daily    benzonatate (TESSALON PERLES) capsule 100 mg  100 mg Oral TID PRN    cefepime (MAXIPIME) IVPB (premix in dextrose) 1,000 mg 50 mL  1,000 mg Intravenous Q12H    escitalopram (LEXAPRO) tablet 5 mg  5 mg Oral Daily    fluticasone (FLONASE) 50 mcg/act nasal spray 1 spray  1 spray Nasal BID    furosemide (LASIX) tablet 20 mg  20 mg Oral Every Other Day    guaiFENesin (MUCINEX) 12 hr tablet 1,200 mg  1,200 mg Oral Q12H [address]    heparin (porcine) subcutaneous injection 5,000 Units  5,000 Units Subcutaneous Q8H [address]    HYDROmorphone (DILAUDID) injection 0 5 mg  0 5 mg Intravenous Q3H PRN    ipratropium (ATROVENT) 0 02 % inhalation solution 0 5 mg  0 5 mg Nebulization TID    levalbuterol (XOPENEX) inhalation solution 1 25 mg  1 25 mg Nebulization TID    levothyroxine tablet 75 mcg  75 mcg Oral Early Morning    lidocaine (LIDODERM) 5 % patch 1 patch  1 patch Topical Daily    LORazepam (ATIVAN) tablet 0 5 mg  0 5 mg Oral Q8H PRN    methocarbamol (ROBAXIN) tablet 500 mg  500 mg Oral Q6H PRN    ondansetron (ZOFRAN) injection 4 mg  4 mg Intravenous Q6H PRN    oxyCODONE (ROXICODONE) IR tablet 2 5 mg  2 5 mg Oral Q4H PRN    Or    oxyCODONE (ROXICODONE) IR tablet 5 mg  5 mg Oral Q4H PRN    pantoprazole (PROTONIX) injection 40 mg  40 mg Intravenous Q24H ROSA    tamsulosin (FLOMAX) capsule 0 4 mg  0 4 mg Oral Daily With Dinner    and PTA meds:    Medications Prior to Admission   Medication    albuterol (PROVENTIL HFA,VENTOLIN HFA) 90 mcg/act inhaler    amiodarone 200 mg tablet    B Complex Vitamins (B COMPLEX PO)  benzonatate (TESSALON PERLES) 100 mg capsule    Cholecalciferol 50 MCG (2000 UT) CAPS    Cyanocobalamin (B-12 PO)    escitalopram (LEXAPRO) 5 mg tablet    Ferrous Sulfate (IRON) 325 (65 Fe) MG TABS    fluticasone (FLONASE) 50 mcg/act nasal spray    fluticasone-umeclidinium-vilanterol (Trelegy Ellipta) 100-62 5-25 MCG/INH inhaler    guaiFENesin (MUCINEX) 600 mg 12 hr tablet    KRILL OIL PO    levalbuterol (Xopenex) 1 25 mg/3 mL nebulizer solution    levothyroxine 75 mcg tablet    LORazepam (ATIVAN) 0 5 mg tablet    Multiple Vitamins-Minerals (VISION FORMULA/LUTEIN PO)    nystatin-triamcinolone (MYCOLOG-II) cream    omeprazole (PriLOSEC) 20 mg delayed release capsule    primidone (MYSOLINE) 50 mg tablet    promethazine (PHENERGAN) 12 5 mg/10 mL syrup    promethazine-dextromethorphan (PHENERGAN-DM) 6 25-15 mg/5 mL oral syrup    Respiratory Therapy Supplies (SPIROMETER) KIT    sodium chloride 0 9 % nebulizer solution    tamsulosin (FLOMAX) 0 4 mg    zolpidem (AMBIEN) 5 mg tablet       Allergies   Allergen Reactions    Pravastatin Anaphylaxis, Photosensitivity and Headache     Reaction Date: [id number];    Other reaction(s): Headache    Acetaminophen-Codeine GI Intolerance    Atorvastatin      Other reaction(s): Leg Cramps  Other reaction(s): Leg Cramps    Codeine Nausea Only    Colestipol GI Intolerance     Reaction Date: [id number];     Contrast  [Iodinated Diagnostic Agents]     Fenofibrate GI Intolerance     Reaction Date: [id number];     Hydrocodone Vomiting    Niacin      Reaction Date: [id number];     Niaspan  [Niacin Er]     Pitavastatin Myalgia    Pneumococcal Polysaccharide Vaccine     Pravastatin Sodium     Simvastatin     Statins Myalgia    Vicoprofen  [Hydrocodone-Ibuprofen] GI Intolerance    Cyclophosphamide Rash    Ioversol Hives       Objective     Intake/Output Summary (Last 24 hours) at 7/1/2022 1343  Last data filed at 7/1/2022 0901  Gross per 24 hour   Intake 310 ml   Output 1060 ml   Net -750 ml     Patient Vitals for the past 24 hrs:   BP Temp Temp src Pulse Resp SpO2 Weight   07/01/22 0817 144/70 98 24 °F (36 8 °C) Bladder 103 (!) 34 96 % --   07/01/22 0711 -- -- -- -- -- 97 % --   07/01/22 0400 110/55 98 2 °F (36 8 °C) -- 86 (!) 25 98 % 71 8 kg (158 lb 4 6 oz)   07/01/22 0200 103/59 98 2 °F (36 8 °C) -- 93 17 97 % --   07/01/22 0100 148/71 98 4 °F (36 9 °C) -- 99 (!) 29 96 % --   07/01/22 0000 (!) 164/123 98 6 °F (37 °C) -- 101 (!) 32 96 % --   06/30/22 2300 -- 99 °F (37 2 °C) -- 88 14 97 % --   06/30/22 2200 118/60 99 °F (37 2 °C) -- 100 18 97 % --   06/30/22 2100 -- 99 1 °F (37 3 °C) -- 100 17 95 % --   06/30/22 2000 112/66 99 1 °F (37 3 °C) -- 103 16 96 % --   06/30/22 1930 -- 99 3 °F (37 4 °C) -- 99 16 98 % --   06/30/22 1907 -- -- -- 99 -- 95 % --   06/30/22 1600 122/73 99 °F (37 2 °C) -- 96 19 94 % --       Invasive Devices:   Urethral Catheter Temperature probe (Active)   Amt returned on insertion(mL) 225 mL 06/29/22 0530   Reasons to continue Urinary Catheter  Accurate I&O assessment in critically ill patients (48 hr  max) 06/29/22 0800   Goal for Removal No longer needed- Will place order to discontinue 06/29/22 0800   Site Assessment Clean;Skin intact; Patent 06/30/22 2000   Jett Care Done 06/30/22 2000   Collection Container Standard drainage bag 06/30/22 2000   Securement Method Securing device (Describe) 06/30/22 2000   Output (mL) 350 mL 07/01/22 0901     Vitals:    07/01/22 0817   BP: 144/70   Pulse: 103   Resp: (!) 34   Temp: 98 24 °F (36 8 °C)   SpO2: 96%     General:  Chronically ill-appearing gentleman but no acute distress, well-developed and appears well-nourished  Skin:  No acute rash  Eyes:  No scleral icterus and noninjected  ENT:  Normocephalic/atraumatic, mucous membranes slightly dry  Neck:  Supple, no jugular venous distention, no carotid bruits, 1+ carotid upstroke, no thyromegaly and trachea midline  Back:  No overt CVA tenderness  Chest: Mild inspiratory but mostly expiratory rhonchi throughout, no overt dullness to percussion, fair respiratory effort, no use of accessory respiratory muscles  CVS:  Regular rate and rhythm without a murmur rub or gallops appreciable  Abdomen:  Soft and nontender normal bowel sounds, no hepatosplenomegaly or bruits appreciable  Extremities:  No clubbing, no cyanosis, no edema, 1+ dorsalis pedis pulse on the right, poor distal pulses on the left  Neuro:  No overt focality but patient not very cooperative  Psych:  Alert but not completely oriented    Current Weight: Weight - Scale: 71 8 kg (158 lb 4 6 oz)  First Weight: Weight - Scale: 74 4 kg (164 lb)    Lab Results:  I have personally reviewed pertinent labs    CBC:   Lab Results   Component Value Date    WBC 7 00 07/01/2022    WBC 5 52 10/16/2015    RBC 3 18 (L) 07/01/2022    RBC 4 73 10/16/2015     CMP:   Lab Results   Component Value Date     08/28/2015    CL 98 (L) 07/01/2022     08/28/2015    CO2 25 07/01/2022    CO2 28 06/28/2022    ANIONGAP 10 08/28/2015    BUN 30 (H) 07/01/2022    BUN 28 (H) 08/28/2015    CREATININE 1 71 (H) 07/01/2022    CREATININE 1 79 (H) 08/28/2015    GLUCOSE 169 (H) 06/28/2022    GLUCOSE 92 08/28/2015    CALCIUM 8 6 07/01/2022    CALCIUM 9 7 08/28/2015    AST 9 07/01/2022    AST 18 08/28/2015    ALT 18 07/01/2022    ALT 27 08/28/2015    ALKPHOS 164 (H) 07/01/2022    ALKPHOS 73 08/28/2015    PROT 7 7 08/28/2015    BILITOT 0 45 08/28/2015    EGFR 34 07/01/2022     Phosphorus:   Lab Results   Component Value Date    PHOS 2 7 02/22/2020     Magnesium:   Lab Results   Component Value Date    MG 2 2 09/29/2021    MG 2 1 04/06/2014     Urinalysis:   Lab Results   Component Value Date    COLORU Yellow 06/28/2022    COLORU Yellow 03/14/2015    CLARITYU Clear 06/28/2022    CLARITYU Clear 03/14/2015    SPECGRAV 1 020 06/28/2022    SPECGRAV <=1 005 03/14/2015    PHUR 5 5 06/28/2022    PHUR 6 0 03/18/2018    PHUR 6 0 03/14/2015 LEUKOCYTESUR Trace (A) 06/28/2022    LEUKOCYTESUR Negative 03/14/2015    NITRITE Negative 06/28/2022    NITRITE Negative 03/14/2015    PROTEINUA Negative 03/14/2015    GLUCOSEU Negative 06/28/2022    GLUCOSEU Negative 03/14/2015    KETONESU Negative 06/28/2022    KETONESU Negative 03/14/2015    BILIRUBINUR Negative 06/28/2022    BILIRUBINUR Negative 03/14/2015    BLOODU Moderate (A) 06/28/2022    BLOODU Negative 03/14/2015     BMP:   Lab Results   Component Value Date    GLUCOSE 169 (H) 06/28/2022    GLUCOSE 92 08/28/2015    SODIUM 129 (L) 07/01/2022    CO2 25 07/01/2022    CO2 28 06/28/2022    BUN 30 (H) 07/01/2022    BUN 28 (H) 08/28/2015    CREATININE 1 71 (H) 07/01/2022    CREATININE 1 79 (H) 08/28/2015    CALCIUM 8 6 07/01/2022    CALCIUM 9 7 08/28/2015     ABGs:   Lab Results   Component Value Date    PH 7 338 (L) 05/07/2022     Radiology:  Chest x-ray reveals low lung volumes with new interstitial changes compared to 05/09/2022 edema versus worsening infection  Small left pleural effusion  New left rib fractures appear to be acute to subacute        Assessment/Plan   80y o  year old male with PMH of diastolic CHF/COPD/CAPD/recent admission to [facility] on 06/12-6/21 and then to [facility] for rehab where he presented with fever shortness of breath cough increase oxygen  Chest x-ray demonstrated infiltrates for in patient admitted with sepsis secondary to pneumonia  Of note patient had a recent left rib fractures 6/11  He is being treated for ongoing pneumonia with antibiotics  We are asked to see the patient for hyponatremia/CKD    1  Hyponatremia:  There is definitely a degree of chronicity  There may be an acute component related to prerenal/decreased solute intake with adequate water excretion/SIADH from pulmonary status    There may also be other chronic causes for SIADH for example anti depressive medications that he is on/even amiodarone and proton pump inhibitors     In addition we need to rule out adrenal insufficiency  Thyroid profile recently May was normal     Recommend:  Workup:  Urine for sodium/urine for osmolality/a m  Cortisol/serum for osmolality/uric acid    Treatment:  · Modest fluid restriction  · Change furosemide to torsemide 10 mg daily for volume and water excretion  · May require some degree of salt intake    Ideal goal is to maintain serum sodium 130 or higher to prevent symptoms associated with the low sodium  2  CKD stage IIIB:  Baseline creatinine appears to be 1 7-2 2  Patient back to baseline  In full discussion with the son Yessi Martinez and his wife it appears they are strongly leaning towards hospice  They do not want any aggressive evaluation at this time or treatment but just wanted to understand the implications of low-sodium  I explained to them as long as the sodium stays where it is it should not cause any discomfort or major problems  And once he make the decision for hospice I would recommend not following the labs but making sure the patient is comfortable and enjoys his remaining days  They are agreeable to that plan  Until they make that final decision, I will place him on torsemide 10 mg daily for volume and comfort in breathing to treat CHF  I will place him modest fluid restriction for now which we can relieve  Once we had made the decision for hospice  In regards his kidney function no further investigation at this time   Discussed with SLIM          Portions of the record may have been created with voice recognition software  Occasional wrong word or "sound a like" substitutions may have occurred due to the inherent limitations of voice recognition software  Read the chart carefully and recognize, using context, where substitutions have occurred

## 2022-07-01 NOTE — CASE MANAGEMENT
Case Management Discharge Planning Note    Patient name Kalani Matthew  Location  SDU/-01 S* MRN [id number]  : 1932 Date 2022       Current Admission Date: 2022  Current Admission Diagnosis:Hospital acquired PNA   Patient Active Problem List    Diagnosis Date Noted    Goals of care, counseling/discussion 2022   [facility] acquired PNA 2022    Sepsis ([facility] ) 2022    Multiple rib fractures 2022    Acute respiratory failure with hypoxia ([facility] ) 2022    Hyponatremia 2022    Paroxysmal SVT (supraventricular tachycardia) ([facility] ) 2022    Centrilobular emphysema ([facility] ) 02/15/2022    Current moderate episode of major depressive disorder without prior episode ([facility] ) 10/06/2021    Chronic renal disease, stage IV ([facility] ) 10/06/2021    PAC (premature atrial contraction) 10/06/2021    COPD exacerbation ([facility] ) 2021    Viral illness 09/15/2021    Primary osteoarthritis of left knee 2021    Patellar tendonitis of left knee 2021    Tinea cruris 2021    Partial arterial occlusion of retina 2021    Ischemic colitis ([facility] ) 2021    Central serous chorioretinopathy 2020    Bilateral sensorineural hearing loss 2020    Gastrointestinal hemorrhage 2020    Senile nuclear cataract 2020    Peptic ulcer with hemorrhage but without obstruction 2020    Nonspecific abnormal findings on radiological and examination of lung field 2020    TMJ arthropathy 2020    Chronic cough 2020    Impacted cerumen of left ear 03/15/2020    Dizziness 2020    Chest pain 2020    LIVIER (obstructive sleep apnea)     Snoring     Excessive daytime sleepiness     Aneurysm, pulmonary, arteriovenous 10/20/2019    Stage 3 chronic kidney disease ([address] ) 2019    Cervical spine arthritis 2019    Chronic diastolic heart failure ([facility] ) 04/10/2019    Community acquired pneumonia of right lower lobe of lung 04/10/2019    Urinary obstruction 04/10/2019    BMI 29 0-29 9,adult 04/01/2019    Suprapubic tenderness 03/29/2019    Loose stools 03/29/2019    Bronchitis 03/28/2019    Eczema 08/22/2018    Seborrheic keratoses, inflamed 08/22/2018    Medicare annual wellness visit, subsequent 07/13/2018    Shortness of breath 06/04/2018    Tachycardia 06/04/2018    Other atopic dermatitis 08/16/2017    Headache 01/20/2017    Insomnia 08/18/2016    Hydronephrosis of right kidney 05/14/2016    CAD (coronary artery disease) 05/14/2016    Carotid stenosis 05/14/2016    Essential hypertension 05/14/2016    Acute renal failure superimposed on stage 4 chronic kidney disease ([facility] ) 05/14/2016    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[facility] ) 06/11/2015    Macular degeneration 11/15/2014    GERD (gastroesophageal reflux disease) 06/06/2014    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/26/2013    Iron deficiency anemia 10/09/2012    Benign prostatic hyperplasia with lower urinary tract symptoms 05/07/2012    Mixed simple and mucopurulent chronic bronchitis ([facility] ) 05/07/2012    Mixed hyperlipidemia 05/07/2012    Acquired hypothyroidism 05/07/2012    Osteoporosis 05/07/2012      LOS (days): 3  Geometric Mean LOS (GMLOS) (days): 4 80  Days to GMLOS:2     OBJECTIVE:  Risk of Unplanned Readmission Score: 31 52     Current admission status: Inpatient   Preferred Pharmacy:   [address] [place] [address] [id number]  Phone: [phone] Fax: [phone]    Primary Care Provider: Henry Boss MD    Primary Insurance: MEDICARE  Secondary Insurance: [facility] HEALTH OPTIONS PROGRAM    DISCHARGE DETAILS:    Additional Comments: Call placed to Pt's son(Brian), dtr-in-law on speaker phone  Pt's son and dtr-in-law report that they met with hospice liaison and are deciding what to do for discharge plan   Pt's son and dtr-in-law requesting list of private pay agencies emailed to them  Pt's son and dtr-in-law are also in agreement to speak with DARREN STOCK [facility] liaison re: Pt returning back to facility on hospice vs comfort care  Emailed list of private pay agencies to Pt's son(Brian) at [email or web address]  [email or web address] and Pt's dtr-in-law(Juliette) at [email or web address]  [facility] Clark liaison to also contact Pt's son  SHAKILA to follow

## 2022-07-01 NOTE — PHYSICAL THERAPY NOTE
Physical Therapy Cancellation Note           07/01/22 0732   PT Last Visit   PT Visit Date 07/01/22   Note Type   Note Type Cancelled Session   Cancel Reasons Medical status;  Per CM note 6/30/22:  A meeting with Dominique Echevarria of [facility] is scheduled for 10 am Friday  Will follow up after GOC/hospice meeting as appropriate and schedule allows       Mariely Vanessa, PT

## 2022-07-01 NOTE — QUICK NOTE
7/1/2022 5:07 PM -  Vincent Otto Ronnarosinarajani's chart and case were reviewed by Erica Damon PA-C  Mode of review included electronic chart check  Per review, symptoms remain controlled on current regimen and no changes are made at this time  Please continue the regimen in place, and review our last note for details  For dispo plan, please review Case Management notes  Palliative care will return on 7/5/2022 if patient is still admitted  Please contact on-call service if symptom management assistance is needed        For urgent issues or any questions/concerns, please notify on-call provider via [facility]  You may also call our answering service 24/7 at   Erica Damon PA-C  Palliative and Supportive Care  Clinic/Answering Service:   You can find me on [employer]!

## 2022-07-01 NOTE — PROGRESS NOTES
Pastoral Care Progress Note    2022  Patient: Rut Ochoa : 1932  Admission Date & Time: 2022  MRN: [id number] CSN: [id number]       visited with Patient, held his hand to provide comfort and support in an anxious time  He had two visitor for a brief time while I was here- friends of the family  They were able to bring him to a more alert state and he recognized them enough to tell them, "I am going to sleep forever soon "  But shortly after he returned to the semi alert place  Chaplaincy Interventions Utilized:   Relationship Building: Provided silent and supportive presence    Ritual: Provided prayer and Read sacred text  Jhonny spear  Chaplaincy Outcomes Achieved:  Catharsis  Patient was in a "spiritual state" when I arrived in his room  He nona me into that space by speaking to me as various members of his family that are "gone but not forgotten " I allowed him to do this         Spiritual Coping Strategies Utilized:   Surrender    Patient was praying and surrendering to go "home "       22 1317   Clinical Encounter Type   Visited With Patient   Routine Visit Introduction   Continue Visiting Yes   Anabaptism Encounters   Anabaptism Needs [facility] Text;Prayer

## 2022-07-01 NOTE — ASSESSMENT & PLAN NOTE
· Blood cultures - NGTD  · See PNA above  · Maintain MAP> 65  · Received 1 liter NS - no hypotension / lactic normal   · Leukocytosis resolved, afebrile, still with occasional sinus tachycardia

## 2022-07-01 NOTE — HOSPICE NOTE
MET WITH PT SON CORY AND DELTA BRAVO AT [facility] [address]  PT SLEEPING AND DID NOT PARTICIPATE  FAMILY AWARE PT APPROPRIATE FOR RLOC WHICH HOSPICE COULD PROVIDE AT HOME OR SNF  REVIEWED HOSPICE SERVICES AND BENEFITS PER  GUIDELINES  GAVE HOSPICE PACKET AND MY CONTACT  ALONG WITH MAIN HOSPICE   FAMILY IS DEBATING HOME HOSPICE VS HOSPICE AT SNF  THEY ARE ALSO DEBATING STR WITH TRANSITION TO HOSPICE  UPDATED ROLA RN PT NURSE  I SPOKE WITH LUIS ALFREDO JHAVERI  SHE IS GOING TO SPEAK WITH FAMILY THIS AFTERNOON AND PROVIDE INFORMATION RE SNF AND PRIVATE CG FOR HOME  HOSPICE WEEKEND LIAISON ALVAREZ BALLARD RN  WILL BE AVAILABLE ALL WEEKEND IF THERE  ARE ANY QUESTIONS OR FOLLOW UP NEEDED

## 2022-07-02 PROBLEM — R31.9 HEMATURIA: Status: ACTIVE | Noted: 2022-07-02

## 2022-07-02 LAB
ANION GAP SERPL CALCULATED.3IONS-SCNC: 8 MMOL/L (ref 4–13)
BASOPHILS # BLD AUTO: 0.03 THOUSANDS/ΜL (ref 0–0.1)
BASOPHILS NFR BLD AUTO: 1 % (ref 0–1)
BUN SERPL-MCNC: 27 MG/DL (ref 5–25)
CALCIUM SERPL-MCNC: 8.8 MG/DL (ref 8.3–10.1)
CHLORIDE SERPL-SCNC: 98 MMOL/L (ref 100–108)
CO2 SERPL-SCNC: 24 MMOL/L (ref 21–32)
CORTIS AM PEAK SERPL-MCNC: 21.7 UG/DL (ref 4.2–22.4)
CREAT SERPL-MCNC: 1.81 MG/DL (ref 0.6–1.3)
EOSINOPHIL # BLD AUTO: 0.12 THOUSAND/ΜL (ref 0–0.61)
EOSINOPHIL NFR BLD AUTO: 2 % (ref 0–6)
ERYTHROCYTE [DISTWIDTH] IN BLOOD BY AUTOMATED COUNT: 13.9 % (ref 11.6–15.1)
GFR SERPL CREATININE-BSD FRML MDRD: 32 ML/MIN/1.73SQ M
GLUCOSE SERPL-MCNC: 105 MG/DL (ref 65–140)
HCT VFR BLD AUTO: 31.1 % (ref 36.5–49.3)
HGB BLD-MCNC: 9.8 G/DL (ref 12–17)
IMM GRANULOCYTES # BLD AUTO: 0.07 THOUSAND/UL (ref 0–0.2)
IMM GRANULOCYTES NFR BLD AUTO: 1 % (ref 0–2)
LYMPHOCYTES # BLD AUTO: 1.01 THOUSANDS/ΜL (ref 0.6–4.47)
LYMPHOCYTES NFR BLD AUTO: 16 % (ref 14–44)
MCH RBC QN AUTO: 30.4 PG (ref 26.8–34.3)
MCHC RBC AUTO-ENTMCNC: 31.5 G/DL (ref 31.4–37.4)
MCV RBC AUTO: 97 FL (ref 82–98)
MONOCYTES # BLD AUTO: 0.82 THOUSAND/ΜL (ref 0.17–1.22)
MONOCYTES NFR BLD AUTO: 13 % (ref 4–12)
NEUTROPHILS # BLD AUTO: 4.41 THOUSANDS/ΜL (ref 1.85–7.62)
NEUTS SEG NFR BLD AUTO: 67 % (ref 43–75)
NRBC BLD AUTO-RTO: 0 /100 WBCS
PLATELET # BLD AUTO: 159 THOUSANDS/UL (ref 149–390)
PMV BLD AUTO: 8.7 FL (ref 8.9–12.7)
POTASSIUM SERPL-SCNC: 4.4 MMOL/L (ref 3.5–5.3)
PROCALCITONIN SERPL-MCNC: 0.38 NG/ML
RBC # BLD AUTO: 3.22 MILLION/UL (ref 3.88–5.62)
SODIUM SERPL-SCNC: 130 MMOL/L (ref 136–145)
WBC # BLD AUTO: 6.46 THOUSAND/UL (ref 4.31–10.16)

## 2022-07-02 PROCEDURE — 99232 SBSQ HOSP IP/OBS MODERATE 35: CPT | Performed by: INTERNAL MEDICINE

## 2022-07-02 PROCEDURE — 94760 N-INVAS EAR/PLS OXIMETRY 1: CPT

## 2022-07-02 PROCEDURE — 85025 COMPLETE CBC W/AUTO DIFF WBC: CPT | Performed by: PHYSICIAN ASSISTANT

## 2022-07-02 PROCEDURE — 94640 AIRWAY INHALATION TREATMENT: CPT

## 2022-07-02 PROCEDURE — 80048 BASIC METABOLIC PNL TOTAL CA: CPT | Performed by: PHYSICIAN ASSISTANT

## 2022-07-02 PROCEDURE — C9113 INJ PANTOPRAZOLE SODIUM, VIA: HCPCS | Performed by: PHYSICIAN ASSISTANT

## 2022-07-02 PROCEDURE — 82533 TOTAL CORTISOL: CPT | Performed by: PHYSICIAN ASSISTANT

## 2022-07-02 PROCEDURE — 99232 SBSQ HOSP IP/OBS MODERATE 35: CPT | Performed by: PHYSICIAN ASSISTANT

## 2022-07-02 PROCEDURE — 84145 PROCALCITONIN (PCT): CPT | Performed by: PHYSICIAN ASSISTANT

## 2022-07-02 RX ADMIN — METHOCARBAMOL 500 MG: 500 TABLET ORAL at 20:43

## 2022-07-02 RX ADMIN — OXYCODONE HYDROCHLORIDE 5 MG: 5 TABLET ORAL at 17:07

## 2022-07-02 RX ADMIN — AMIODARONE HYDROCHLORIDE 200 MG: 200 TABLET ORAL at 08:14

## 2022-07-02 RX ADMIN — LIDOCAINE 5% 1 PATCH: 700 PATCH TOPICAL at 08:15

## 2022-07-02 RX ADMIN — LEVALBUTEROL HYDROCHLORIDE 1.25 MG: 1.25 SOLUTION, CONCENTRATE RESPIRATORY (INHALATION) at 19:19

## 2022-07-02 RX ADMIN — GUAIFENESIN 1200 MG: 600 TABLET ORAL at 20:43

## 2022-07-02 RX ADMIN — CEFEPIME HYDROCHLORIDE 1000 MG: 1 INJECTION, SOLUTION INTRAVENOUS at 20:40

## 2022-07-02 RX ADMIN — FLUTICASONE PROPIONATE 1 SPRAY: 50 SPRAY, METERED NASAL at 17:09

## 2022-07-02 RX ADMIN — CEFEPIME HYDROCHLORIDE 1000 MG: 1 INJECTION, SOLUTION INTRAVENOUS at 08:15

## 2022-07-02 RX ADMIN — HYDROMORPHONE HYDROCHLORIDE 0.5 MG: 1 INJECTION, SOLUTION INTRAMUSCULAR; INTRAVENOUS; SUBCUTANEOUS at 23:51

## 2022-07-02 RX ADMIN — LEVOTHYROXINE SODIUM 75 MCG: 75 TABLET ORAL at 05:53

## 2022-07-02 RX ADMIN — ACETAMINOPHEN 975 MG: 325 TABLET, FILM COATED ORAL at 23:51

## 2022-07-02 RX ADMIN — LEVALBUTEROL HYDROCHLORIDE 1.25 MG: 1.25 SOLUTION, CONCENTRATE RESPIRATORY (INHALATION) at 08:46

## 2022-07-02 RX ADMIN — OXYCODONE HYDROCHLORIDE 5 MG: 5 TABLET ORAL at 01:43

## 2022-07-02 RX ADMIN — TORSEMIDE 10 MG: 20 TABLET ORAL at 08:14

## 2022-07-02 RX ADMIN — BENZONATATE 100 MG: 100 CAPSULE ORAL at 20:43

## 2022-07-02 RX ADMIN — FLUTICASONE PROPIONATE 1 SPRAY: 50 SPRAY, METERED NASAL at 09:00

## 2022-07-02 RX ADMIN — LEVALBUTEROL HYDROCHLORIDE 1.25 MG: 1.25 SOLUTION, CONCENTRATE RESPIRATORY (INHALATION) at 14:23

## 2022-07-02 RX ADMIN — LORAZEPAM 0.5 MG: 0.5 TABLET ORAL at 20:43

## 2022-07-02 RX ADMIN — OXYCODONE HYDROCHLORIDE 5 MG: 5 TABLET ORAL at 07:38

## 2022-07-02 RX ADMIN — PANTOPRAZOLE SODIUM 40 MG: 40 INJECTION, POWDER, FOR SOLUTION INTRAVENOUS at 08:13

## 2022-07-02 RX ADMIN — ACETAMINOPHEN 975 MG: 325 TABLET, FILM COATED ORAL at 17:07

## 2022-07-02 RX ADMIN — BENZONATATE 100 MG: 100 CAPSULE ORAL at 07:38

## 2022-07-02 RX ADMIN — ESCITALOPRAM 5 MG: 5 TABLET, FILM COATED ORAL at 08:14

## 2022-07-02 RX ADMIN — ACETAMINOPHEN 975 MG: 325 TABLET, FILM COATED ORAL at 08:14

## 2022-07-02 RX ADMIN — GUAIFENESIN 1200 MG: 600 TABLET ORAL at 08:13

## 2022-07-02 RX ADMIN — IPRATROPIUM BROMIDE 0.5 MG: 0.5 SOLUTION RESPIRATORY (INHALATION) at 08:46

## 2022-07-02 RX ADMIN — HYDROMORPHONE HYDROCHLORIDE 0.5 MG: 1 INJECTION, SOLUTION INTRAMUSCULAR; INTRAVENOUS; SUBCUTANEOUS at 17:38

## 2022-07-02 RX ADMIN — ACETAMINOPHEN 975 MG: 325 TABLET, FILM COATED ORAL at 00:51

## 2022-07-02 RX ADMIN — IPRATROPIUM BROMIDE 0.5 MG: 0.5 SOLUTION RESPIRATORY (INHALATION) at 14:23

## 2022-07-02 RX ADMIN — IPRATROPIUM BROMIDE 0.5 MG: 0.5 SOLUTION RESPIRATORY (INHALATION) at 19:19

## 2022-07-02 RX ADMIN — TAMSULOSIN HYDROCHLORIDE 0.4 MG: 0.4 CAPSULE ORAL at 17:07

## 2022-07-02 RX ADMIN — HEPARIN SODIUM 5000 UNITS: 5000 INJECTION INTRAVENOUS; SUBCUTANEOUS at 05:52

## 2022-07-02 NOTE — ASSESSMENT & PLAN NOTE
· Newly noted 07/01 which resolves with manual irrigation   · Patient reportedly pulling on Jett   · Suspect 2/2 trauma   · Will hold DVT ppx   · Manually irrigate Jett catheter PRN hematuria   · Hgb currently stable 9 8   · Family has mentioned that they do not want any aggressive intervention, if hematuria doesn't resolve with manual irrigation will discuss CBI with family and consult urology   · Family still pending decision on hospice

## 2022-07-02 NOTE — ASSESSMENT & PLAN NOTE
· Patient is an 80year-old patient with past medical history of diastolic HF, COPD, CAP, left rib fracture 6-11, recent discharge from [facility] to [facility] for rehab on 6/21  Patient presents to the hospital due to worsening fever, shortness of breath, cough, and increased oxygen requirement that started today, 6/29   Patient noted to be hypoxic upon EMS arrival    · CXR 6/29-- showed new subtle infiltrates  · BC x2: no growth at 72h  · Cefepime day 5  · MRSA swab negative, Vanco d/c   · Continue guaifenesin 1200 bid  · PRN Tessalon perles  · Ipratropium TID  · levalbuterol TID  · Leukocytosis resolved, patient afebrile, oxygen requirements improved from 4 to 2L today   · Trend procalcitonin- downtrending 0 7 > 0 38

## 2022-07-02 NOTE — ASSESSMENT & PLAN NOTE
Lab Results   Component Value Date    EGFR 32 07/02/2022    EGFR 34 07/01/2022    EGFR 32 06/30/2022    CREATININE 1 81 (H) 07/02/2022    CREATININE 1 71 (H) 07/01/2022    CREATININE 1 78 (H) 06/30/2022   · Baseline creatinine between 1 7 - 1 8  · Insert harp cath for accurate I&Os  · Avoid nephrotoxins  Approaching baseline     · Monitor UO  · Currently on torsemide 10mg daily- monitor creatinine closely   · Continue monitor BMP

## 2022-07-02 NOTE — PROGRESS NOTES
[place]  Progress Note - Lori Dawson 6/30/1932, 80 y o  male MRN: [id number]  Unit/Bed#: -01 SDU Encounter: [id number]  Primary Care Provider: Leslye Payne MD   Date and time admitted to hospital: 6/28/2022  8:23 PM    Updated son and daughter-in-law at bedside,they state that they want to wait to see after patient completes course of abx for PNA before making decision on hospice  In regards to his hematuria, they do not want any aggressive intervention such as cystoscopy but are okay with irrigation  * Hospital acquired PNA  Assessment & Plan  · Patient is an 80year-old patient with past medical history of diastolic HF, COPD, CAP, left rib fracture 6-11, recent discharge from [facility] to [facility] for rehab on 6/21  Patient presents to the hospital due to worsening fever, shortness of breath, cough, and increased oxygen requirement that started today, 6/29  Patient noted to be hypoxic upon EMS arrival    · CXR 6/29-- showed new subtle infiltrates  · BC x2: no growth at 72h  · Cefepime day 5/7  · MRSA swab negative, Vanco d/c   · Continue guaifenesin 1200 bid  · PRN Tessalon perles  · Ipratropium TID  · levalbuterol TID  · Leukocytosis resolved, patient afebrile, oxygen requirements improved from 4 to 2L today   · Trend procalcitonin- downtrending 0 7 > 0 38          Goals of care, counseling/discussion  Assessment & Plan  · S/p Goals of care discussion with palliative care- Pt family concerned about repetitive admissions to hospital, pt's advanced age, pneumonia in setting of rib fractures, ckd stage 4 and overall poor quality of life  · They are contemplating but have not agreed to hospice yet    · Seen by hospice liaison today who states patient is hospice appropriate   · Son and daughter in law to decide whether to purse STR with possible transition to hospice vs home hospice vs hospice and SNF- in the meantime, continue medical management until decision is made    Sepsis [facility])  Assessment & Plan  · Blood cultures - NGTD  · See PNA above  · Maintain MAP> 65  · Received 1 liter NS - no hypotension / lactic normal   · Leukocytosis resolved, afebrile, still with occasional sinus tachycardia      Hyponatremia  Assessment & Plan  · Sodium on admission 127 improved to 130 with IVF then decreased again to 127   · Improved today to 130   · 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   · Nephrology consult appreciated: switched home lasix 20mg every other day to torsemide 10mg daily  · Family does not want any aggressive intervention   · Continue to monitor     Acute respiratory failure with hypoxia ([address] )  Assessment & Plan  · Requiring up to 4 L of supplemental oxygen- now down to 2L   · Likely secondary to pneumonia and underlying COPD   · Not on oxygen at home  · Continue to wean off oxygen as able    Hematuria  Assessment & Plan  · Newly noted 07/01 which resolves with manual irrigation   · Patient reportedly pulling on Harp   · Suspect 2/2 trauma   · Will hold DVT ppx   · Manually irrigate Harp catheter PRN hematuria   · Hgb currently stable 9 8   · Family has mentioned that they do not want any aggressive intervention, if hematuria doesn't resolve with manual irrigation will discuss CBI with family and consult urology   · Family still pending decision on hospice    Acute renal failure superimposed on stage 4 chronic kidney disease [facility])  Assessment & Plan  Lab Results   Component Value Date    EGFR 32 07/02/2022    EGFR 34 07/01/2022    EGFR 32 06/30/2022    CREATININE 1 81 (H) 07/02/2022    CREATININE 1 71 (H) 07/01/2022    CREATININE 1 78 (H) 06/30/2022   · Baseline creatinine between 1 7 - 1 8  · Insert harp cath for accurate I&Os  · Avoid nephrotoxins  Approaching baseline     · Monitor UO  · Currently on torsemide 10mg daily- monitor creatinine closely   · Continue monitor BMP      Multiple rib fractures  Assessment & Plan  · Admitted at [facility] 6/12-6/21 with d/c to [facility]   · Left rib fractures 6-11  · Pulmonary toileting  · Lidocaine patch  · Tylenol for pain  Requiring narcotics for pain control    Chronic diastolic heart failure [facility])  Assessment & Plan  Wt Readings from Last 3 Encounters:   07/01/22 71 8 kg (158 lb 4 6 oz)   05/20/22 74 8 kg (164 lb 12 8 oz)   05/08/22 74 8 kg (165 lb)       · Echo 9/28/21-- EF 55%  Grade 1 diastolic dysfunction  · PTA diuretic: lasix 20mg every other day   · Currently on torsemide 10mg daily   · Does not appear volume overloaded         Centrilobular emphysema (HCC)  Assessment & Plan  · Resp  protocol  · Ipratropium TID  · levalbuterol TID      LIVIER (obstructive sleep apnea)  Assessment & Plan  · Patient non-compliant with CPAP  · Continue to monitor oxygenation       Acquired hypothyroidism  Assessment & Plan  Continue home levothyroxine    GERD (gastroesophageal reflux disease)  Assessment & Plan  - Protonix 40 mg IV daily     Benign prostatic hyperplasia with lower urinary tract symptoms  Assessment & Plan  · Continue home Flomax  · Currently with harp catheter      Essential hypertension  Assessment & Plan  · BP stable   · Continuous cardiac monitoring          VTE Pharmacologic Prophylaxis: VTE Score: 6 High Risk (Score >/= 5) - Pharmacological DVT Prophylaxis Contraindicated  Sequential Compression Devices Ordered  Patient Centered Rounds: I performed bedside rounds with nursing staff today  Discussions with Specialists or Other Care Team Provider: SHAKILA,RN    Education and Discussions with Family / Patient: Attempted to call son and daughter in law x2 and left voicemail     Time Spent for Care: 30 minutes  More than 50% of total time spent on counseling and coordination of care as described above      Current Length of Stay: 4 day(s)  Current Patient Status: Inpatient   Certification Statement: The patient will continue to require additional inpatient hospital stay due to HAP, hematuria, acute hypoxic respiratory failure   Discharge Plan: Pending family decision on hospice versus short-term rehab    Code Status: Level 3 - DNAR and DNI    Subjective:   Patient resting in bed comfortably, appears to be more awake and oriented today  Patient answering questions appropriately  States that he is comfortable and does not have any pain  He denies any chest pain or shortness of breath  Per RN, , patient has not been eating patient has not been eating very much  He had an episode of hematuria yesterday through his Jett catheter which resolved with manual irrigation  Urine remained clear overnight however now with recurrence of hematuria which resolved with manual irrigation  Objective:     Vitals:   Temp (24hrs), Av 4 °F (36 9 °C), Min:96 44 °F (35 8 °C), Max:99 5 °F (37 5 °C)    Temp:  [96 44 °F (35 8 °C)-99 5 °F (37 5 °C)] 98 6 °F (37 °C)  HR:  [105-114] 112  Resp:  [24-27] 24  BP: (128-144)/(69-74) 144/74  SpO2:  [96 %-98 %] 97 %  Body mass index is 27 17 kg/m²  Input and Output Summary (last 24 hours): Intake/Output Summary (Last 24 hours) at 2022 1300  Last data filed at 2022 1057  Gross per 24 hour   Intake 440 ml   Output 2100 ml   Net -1660 ml       Physical Exam:   Physical Exam  Constitutional:       General: He is not in acute distress  HENT:      Mouth/Throat:      Mouth: Mucous membranes are moist    Eyes:      General: No scleral icterus  Cardiovascular:      Rate and Rhythm: Normal rate and regular rhythm  Heart sounds: Normal heart sounds  Pulmonary:      Breath sounds: No wheezing or rales  Abdominal:      General: Abdomen is flat  Bowel sounds are normal       Palpations: Abdomen is soft  Tenderness: There is no abdominal tenderness  Genitourinary:     Comments: Jett catheter with clear yellow urine after manual irrigation  Musculoskeletal:      Right lower leg: No edema  Left lower leg: No edema  Skin:     General: Skin is warm     Neurological: General: No focal deficit present  Mental Status: He is alert  Mental status is at baseline  Psychiatric:         Mood and Affect: Mood normal           Additional Data:     Labs:  Results from last 7 days   Lab Units 07/02/22  0551   WBC Thousand/uL 6 46   HEMOGLOBIN g/dL 9 8*   HEMATOCRIT % 31 1*   PLATELETS Thousands/uL 159   NEUTROS PCT % 67   LYMPHS PCT % 16   MONOS PCT % 13*   EOS PCT % 2     Results from last 7 days   Lab Units 07/02/22  0551 07/01/22  0454   SODIUM mmol/L 130* 129*   POTASSIUM mmol/L 4 4 4 8   CHLORIDE mmol/L 98* 98*   CO2 mmol/L 24 25   BUN mg/dL 27* 30*   CREATININE mg/dL 1 81* 1 71*   ANION GAP mmol/L 8 6   CALCIUM mg/dL 8 8 8 6   ALBUMIN g/dL  --  2 4*   TOTAL BILIRUBIN mg/dL  --  0 60   ALK PHOS U/L  --  164*   ALT U/L  --  18   AST U/L  --  9   GLUCOSE RANDOM mg/dL 105 105     Results from last 7 days   Lab Units 06/28/22 2030   INR  1 03             Results from last 7 days   Lab Units 07/02/22  0551 06/29/22 0521 06/28/22 2030   LACTIC ACID mmol/L  --   --  2 0   PROCALCITONIN ng/ml 0 38* 0 70* 0 26*       Lines/Drains:  Invasive Devices  Report    Peripheral Intravenous Line  Duration           Peripheral IV 07/01/22 Dorsal (posterior); Left Forearm 1 day          Drain  Duration           Urethral Catheter Temperature probe 3 days              Urinary Catheter:  Goal for removal: Voiding trial when ambulation improves               Imaging: No pertinent imaging reviewed  Recent Cultures (last 7 days):   Results from last 7 days   Lab Units 06/28/22 2030   BLOOD CULTURE  No Growth at 72 hrs  No Growth at 72 hrs         Last 24 Hours Medication List:   Current Facility-Administered Medications   Medication Dose Route Frequency Provider Last Rate    acetaminophen  975 mg Oral Q8H ELISABET Armas      amiodarone  200 mg Oral Daily Jaya Benítez PA-C      benzonatate  100 mg Oral TID PRN Jaya Benítez PA-C      cefepime  1,000 mg Intravenous Q12H Jaya Benítez PA-C 1,000 mg (07/02/22 0815)    escitalopram  5 mg Oral Daily Jaya Benítez PA-C      fluticasone  1 spray Nasal BID Carolinemarmiko Benítez PA-C      guaiFENesin  1,200 mg Oral Q12H [address] usha Benítez PA-C      HYDROmorphone  0 5 mg Intravenous Q3H PRN Kristy Lofton MD      ipratropium  0 5 mg Nebulization TID Jesica Ingram DO      levalbuterol  1 25 mg Nebulization TID Jaya Benítez PA-C      levothyroxine  75 mcg Oral Early Morning Jaya Benítez PA-C      lidocaine  1 patch Topical Daily Jaya Benítez PA-C      LORazepam  0 5 mg Oral Q8H PRN Jaya Benítez PA-C      methocarbamol  500 mg Oral Q6H PRN ELISABET Armas      ondansetron  4 mg Intravenous Q6H PRN usha Benítez PA-C      oxyCODONE  2 5 mg Oral Q4H PRN Kristy Lofton MD      Or   Aletha Moreno oxyCODONE  5 mg Oral Q4H PRN Kristy Lofton MD      pantoprazole  40 mg Intravenous Q24H [address] usha Benítez PA-C      tamsulosin  0 4 mg Oral Daily With Asha Schuler PA-C      torsemide  10 mg Oral Daily Zoltan Almanza MD          Today, Patient Was Seen By: Shelli Kilpatrick PA-C    **Please Note: This note may have been constructed using a voice recognition system  **

## 2022-07-02 NOTE — ASSESSMENT & PLAN NOTE
· Requiring up to 4 L of supplemental oxygen- now down to 2L   · Likely secondary to pneumonia and underlying COPD   · Not on oxygen at home  · Continue to wean off oxygen as able

## 2022-07-02 NOTE — ASSESSMENT & PLAN NOTE
Wt Readings from Last 3 Encounters:   07/01/22 71 8 kg (158 lb 4 6 oz)   05/20/22 74 8 kg (164 lb 12 8 oz)   05/08/22 74 8 kg (165 lb)       · Echo 9/28/21-- EF 55%    Grade 1 diastolic dysfunction  · PTA diuretic: lasix 20mg every other day   · Currently on torsemide 10mg daily   · Does not appear volume overloaded

## 2022-07-02 NOTE — ASSESSMENT & PLAN NOTE
· Admitted at [facility] 6/12-6/21 with d/c to [facility]   · Left rib fractures 6-11  · Pulmonary toileting  · Lidocaine patch  · Tylenol for pain   Requiring narcotics for pain control

## 2022-07-02 NOTE — ASSESSMENT & PLAN NOTE
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

## 2022-07-02 NOTE — PROGRESS NOTES
NEPHROLOGY PROGRESS NOTE   Marcello Baltazar 80 y o  male MRN: [id number]  Unit/Bed#: -01 SDU Encounter: [id number]  Reason for Consult:  Hyponatremia    ASSESSMENT/PLAN:  1  Hyponatremia, multifactorial suspected prerenal/decreased solute intake/SIADH  Overall is improving with loop diuretic therapy  · Urine sodium 50, urine osmolality 350, serum uric acid 6 3, a m  Cortisol 21 7, TSH 2 8  2  Chronic kidney disease, stage III, baseline creatinine 0 722  3  Sepsis/Hospital-acquired pneumonia treatment as per service  4  Hematuria, possibly traumatic as patient has been pulling Jett catheter  5  Diastolic heart failure, continue with current diuretic regimen  6  Goals of care:  Family discussing possible transition to hospice care    PLAN:  · Renal function as well as serum sodium appears stable  · Continue with low-dose loop diuretic therapy  · No other changes from Nephrology standpoint  · Ongoing goals of care discussion regards to possible transition to hospice    SUBJECTIVE:  Patient appears confused  Does not appear short of breath  Complains of abdominal pain although no guarding with palpation  Review of Systems    OBJECTIVE:  Current Weight: Weight - Scale: 71 8 kg (158 lb 4 6 oz)  Vitals:    07/02/22 0847 07/02/22 1047 07/02/22 1425 07/02/22 1520   BP:  144/74  132/66   BP Location:  Right arm  Right arm   Pulse:  (!) 112  (!) 108   Resp:    20   Temp:  98 6 °F (37 °C)  98 78 °F (37 1 °C)   TempSrc:  Bladder  Bladder   SpO2: 98% 97% 97% 96%   Weight:       Height:           Intake/Output Summary (Last 24 hours) at 7/2/2022 1617  Last data filed at 7/2/2022 1529  Gross per 24 hour   Intake 440 ml   Output 2650 ml   Net -2210 ml       Physical Exam  Eyes:      General: No scleral icterus  Cardiovascular:      Rate and Rhythm: Normal rate and regular rhythm  Pulmonary:      Effort: Pulmonary effort is normal       Breath sounds: Normal breath sounds     Abdominal:      Palpations: Abdomen is soft  Musculoskeletal:      Right lower leg: No edema  Left lower leg: No edema  Skin:     General: Skin is warm and dry  Neurological:      Mental Status: He is alert  He is disoriented           Medications:    Current Facility-Administered Medications:     acetaminophen (TYLENOL) tablet 975 mg, 975 mg, Oral, Q8H, ELISABET Armas, 975 mg at 07/02/22 5483    amiodarone tablet 200 mg, 200 mg, Oral, Daily, Jaya Benítez PA-C, 200 mg at 07/02/22 0814    benzonatate (TESSALON PERLES) capsule 100 mg, 100 mg, Oral, TID PRN, Jaya Benítez PA-C, 100 mg at 07/02/22 0738    cefepime (MAXIPIME) IVPB (premix in dextrose) 1,000 mg 50 mL, 1,000 mg, Intravenous, Q12H, Jaya Benítez PA-C, Last Rate: 100 mL/hr at 07/02/22 0815, 1,000 mg at 07/02/22 0815    escitalopram (LEXAPRO) tablet 5 mg, 5 mg, Oral, Daily, Jaya Benítez PA-C, 5 mg at 07/02/22 0814    fluticasone (FLONASE) 50 mcg/act nasal spray 1 spray, 1 spray, Nasal, BID, Jaya Benítez PA-C, 1 spray at 07/02/22 0900    guaiFENesin (MUCINEX) 12 hr tablet 1,200 mg, 1,200 mg, Oral, Q12H ROSA, Jaya Benítez PA-C, 1,200 mg at 07/02/22 0813    HYDROmorphone (DILAUDID) injection 0 5 mg, 0 5 mg, Intravenous, Q3H PRN, Lisseth Baez MD, 0 5 mg at 07/01/22 2208    ipratropium (ATROVENT) 0 02 % inhalation solution 0 5 mg, 0 5 mg, Nebulization, TID, Michelle Morales DO, 0 5 mg at 07/02/22 1423    levalbuterol (XOPENEX) inhalation solution 1 25 mg, 1 25 mg, Nebulization, TID, Jaya Benítez PA-C, 1 25 mg at 07/02/22 1423    levothyroxine tablet 75 mcg, 75 mcg, Oral, Early Morning, Jaya Benítez PA-C, 75 mcg at 07/02/22 0553    lidocaine (LIDODERM) 5 % patch 1 patch, 1 patch, Topical, Daily, Jaya Benítez PA-C, 1 patch at 07/02/22 0815    LORazepam (ATIVAN) tablet 0 5 mg, 0 5 mg, Oral, Q8H PRN, Jaya Benítez PA-C, 0 5 mg at 07/01/22 2032    methocarbamol (ROBAXIN) tablet 500 mg, 500 mg, Oral, Q6H PRN, ELISABET Trujillo, 500 mg at 07/01/22 2032    ondansetron (ZOFRAN) injection 4 mg, 4 mg, Intravenous, Q6H PRN, Jaya Benítez PA-C, 4 mg at 07/01/22 1032    oxyCODONE (ROXICODONE) IR tablet 2 5 mg, 2 5 mg, Oral, Q4H PRN **OR** oxyCODONE (ROXICODONE) IR tablet 5 mg, 5 mg, Oral, Q4H PRN, Sherita Gillespie MD, 5 mg at 07/02/22 0738    pantoprazole (PROTONIX) injection 40 mg, 40 mg, Intravenous, Q24H ROSA, Jaya Benítez PA-C, 40 mg at 07/02/22 0813    tamsulosin (FLOMAX) capsule 0 4 mg, 0 4 mg, Oral, Daily With Dinner, Jaya Benítez PA-C, 0 4 mg at 07/01/22 1613    torsemide (DEMADEX) tablet 10 mg, 10 mg, Oral, Daily, Roldan Velásquez MD, 10 mg at 07/02/22 7385    Laboratory Results:  Results from last 7 days   Lab Units 07/02/22  0551 07/01/22  0454 06/30/22  0512 06/29/22  0521 06/29/22  0040 06/28/22  2355 06/28/22  2037 06/28/22  2030   WBC Thousand/uL 6 46 7 00 8 08 15 71*  --   --   --  15 19*   HEMOGLOBIN g/dL 9 8* 9 7* 9 2* 10 1*  --   --   --  11 6*   I STAT HEMOGLOBIN g/dl  --   --   --   --   --   --  12 6  --    HEMATOCRIT % 31 1* 30 6* 29 8* 32 2*  --   --   --  36 8   HEMATOCRIT, ISTAT %  --   --   --   --   --   --  37  --    PLATELETS Thousands/uL 159 135* 131* 163 153  --   --  184   POTASSIUM mmol/L 4 4 4 8 4 2 4 9  --  4 9  --  4 7   CHLORIDE mmol/L 98* 98* 97* 98*  --  100  --  95*   CO2 mmol/L 24 25 28 27  --  24  --  27   CO2, I-STAT mmol/L  --   --   --   --   --   --  28  --    BUN mg/dL 27* 30* 34* 44*  --  40*  --  42*   CREATININE mg/dL 1 81* 1 71* 1 78* 2 09*  --  2 12*  --  2 26*   CALCIUM mg/dL 8 8 8 6 8 3 8 3  --  7 6*  --  8 7   GLUCOSE, ISTAT mg/dl  --   --   --   --   --   --  169*  --

## 2022-07-02 NOTE — ASSESSMENT & PLAN NOTE
· S/p Goals of care discussion with palliative care- Pt family concerned about repetitive admissions to hospital, pt's advanced age, pneumonia in setting of rib fractures, ckd stage 4 and overall poor quality of life  · They are contemplating but have not agreed to hospice yet    · Seen by hospice liaison today who states patient is hospice appropriate   · Son and daughter in law to decide whether to purse STR with possible transition to hospice vs home hospice vs hospice and SNF- in the meantime, continue medical management until decision is made

## 2022-07-02 NOTE — PLAN OF CARE
Problem: Potential for Falls  Goal: Patient will remain free of falls  Description: INTERVENTIONS:  - Educate patient/family on patient safety including physical limitations  - Instruct patient to call for assistance with activity   - Consult OT/PT to assist with strengthening/mobility   - Keep Call bell within reach  - Keep bed low and locked with side rails adjusted as appropriate  - Keep care items and personal belongings within reach  - Initiate and maintain comfort rounds  - Make Fall Risk Sign visible to staff  - Offer Toileting every 2 Hours, in advance of need  - Initiate/Maintain bed alarm  -- Apply yellow socks and bracelet for high fall risk patients  - Consider moving patient to room near nurses station  Outcome: Progressing     Problem: MOBILITY - ADULT  Goal: Maintain or return to baseline ADL function  Description: INTERVENTIONS:  -  Assess patient's ability to carry out ADLs; assess patient's baseline for ADL function and identify physical deficits which impact ability to perform ADLs (bathing, care of mouth/teeth, toileting, grooming, dressing, etc )  - Assess/evaluate cause of self-care deficits   - Assess range of motion  - Assess patient's mobility; develop plan if impaired  - Assess patient's need for assistive devices and provide as appropriate  - Encourage maximum independence but intervene and supervise when necessary  - Involve family in performance of ADLs  - Assess for home care needs following discharge   - Consider OT consult to assist with ADL evaluation and planning for discharge  - Provide patient education as appropriate  Outcome: Progressing  Goal: Maintains/Returns to pre admission functional level  Description: INTERVENTIONS:  - Perform BMAT or MOVE assessment daily    - Set and communicate daily mobility goal to care team and patient/family/caregiver  - Collaborate with rehabilitation services on mobility goals if consulted  - Perform Range of Motion 2 times a day    - Reposition patient every 2 hours  --- Record patient progress and toleration of activity level   Outcome: Progressing     Problem: Prexisting or High Potential for Compromised Skin Integrity  Goal: Skin integrity is maintained or improved  Description: INTERVENTIONS:  - Identify patients at risk for skin breakdown  - Assess and monitor skin integrity  - Assess and monitor nutrition and hydration status  - Monitor labs   - Assess for incontinence   - Turn and reposition patient  - Assist with mobility/ambulation  - Relieve pressure over bony prominences  - Avoid friction and shearing  - Provide appropriate hygiene as needed including keeping skin clean and dry  - Evaluate need for skin moisturizer/barrier cream  - Collaborate with interdisciplinary team   - Patient/family teaching  - Consider wound care consult   Outcome: Progressing     Problem: CARDIOVASCULAR - ADULT  Goal: Absence of cardiac dysrhythmias or at baseline rhythm  Description: INTERVENTIONS:  - Continuous cardiac monitoring, vital signs, obtain 12 lead EKG if ordered  - Administer antiarrhythmic and heart rate control medications as ordered  - Monitor electrolytes and administer replacement therapy as ordered  Outcome: Progressing     Problem: RESPIRATORY - ADULT  Goal: Achieves optimal ventilation and oxygenation  Description: INTERVENTIONS:  - Assess for changes in respiratory status  - Assess for changes in mentation and behavior  - Position to facilitate oxygenation and minimize respiratory effort  - Oxygen administered by appropriate delivery if ordered  - Initiate smoking cessation education as indicated  - Encourage broncho-pulmonary hygiene including cough, deep breathe, Incentive Spirometry  - Assess the need for suctioning and aspirate as needed  - Assess and instruct to report SOB or any respiratory difficulty  - Respiratory Therapy support as indicated  Outcome: Progressing     Problem: GENITOURINARY - ADULT  Goal: Maintains or returns to baseline urinary function  Description: INTERVENTIONS:  - Assess urinary function  - Encourage oral fluids to ensure adequate hydration if ordered  - Administer IV fluids as ordered to ensure adequate hydration  - Administer ordered medications as needed  - Offer frequent toileting  - Follow urinary retention protocol if ordered  Outcome: Progressing  Goal: Urinary catheter remains patent  Description: INTERVENTIONS:  - Assess patency of urinary catheter  - If patient has a chronic harp, consider changing catheter if non-functioning  - Follow guidelines for intermittent irrigation of non-functioning urinary catheter  Outcome: Progressing     Problem: METABOLIC, FLUID AND ELECTROLYTES - ADULT  Goal: Electrolytes maintained within normal limits  Description: INTERVENTIONS:  - Monitor labs and assess patient for signs and symptoms of electrolyte imbalances  - Administer electrolyte replacement as ordered  - Monitor response to electrolyte replacements, including repeat lab results as appropriate  - Instruct patient on fluid and nutrition as appropriate  Outcome: Progressing  Goal: Fluid balance maintained  Description: INTERVENTIONS:  - Monitor labs   - Monitor I/O and WT  - Instruct patient on fluid and nutrition as appropriate  - Assess for signs & symptoms of volume excess or deficit  Outcome: Progressing     Problem: SKIN/TISSUE INTEGRITY - ADULT  Goal: Skin Integrity remains intact(Skin Breakdown Prevention)  Description: Assess:  -Perform Chris assessment every shift  -Clean and moisturize skin every day  -Inspect skin when repositioning, toileting, and assisting with ADLS  -Assess extremities for adequate circulation and sensation     Bed Management:  -Have minimal linens on bed & keep smooth, unwrinkled  -Change linens as needed when moist or perspiring  -Avoid sitting or lying in one position for more than 2 hours while in bed  -Keep HOB at 30degrees     Toileting:  -Offer bedside commode  -Assess for incontinence every 2 hours    Activity:  -Mobilize patient 2 times a day  -Encourage activity and walks on unit  -Encourage or provide ROM exercises   -Turn and reposition patient every 2 Hours  -Use appropriate equipment to lift or move patient in bed  -Instruct/ Assist with weight shifting every 60 minutes when out of bed in chair  -Consider limitation of chair time 4 hour intervals    Skin Care:  -Avoid use of baby powder, tape, friction and shearing, hot water or constrictive clothing  -Relieve pressure over bony prominences using allevyn  -Do not massage red bony areas    Next Steps:  -Outcome: Progressing     Problem: HEMATOLOGIC - ADULT  Goal: Maintains hematologic stability  Description: INTERVENTIONS  - Assess for signs and symptoms of bleeding or hemorrhage  - Monitor labs  - Administer supportive blood products/factors as ordered and appropriate  Outcome: Progressing     Problem: MUSCULOSKELETAL - ADULT  Goal: Maintain or return mobility to safest level of function  Description: INTERVENTIONS:  - Assess patient's ability to carry out ADLs; assess patient's baseline for ADL function and identify physical deficits which impact ability to perform ADLs (bathing, care of mouth/teeth, toileting, grooming, dressing, etc )  - Assess/evaluate cause of self-care deficits   - Assess range of motion  - Assess patient's mobility  - Assess patient's need for assistive devices and provide as appropriate  - Encourage maximum independence but intervene and supervise when necessary  - Involve family in performance of ADLs  - Assess for home care needs following discharge   - Consider OT consult to assist with ADL evaluation and planning for discharge  - Provide patient education as appropriate  Outcome: Progressing     Problem: Nutrition/Hydration-ADULT  Goal: Nutrient/Hydration intake appropriate for improving, restoring or maintaining nutritional needs  Description: Monitor and assess patient's nutrition/hydration status for malnutrition  Collaborate with interdisciplinary team and initiate plan and interventions as ordered  Monitor patient's weight and dietary intake as ordered or per policy  Utilize nutrition screening tool and intervene as necessary  Determine patient's food preferences and provide high-protein, high-caloric foods as appropriate       INTERVENTIONS:  - Monitor oral intake, urinary output, labs, and treatment plans  - Assess nutrition and hydration status and recommend course of action  - Evaluate amount of meals eaten  - Assist patient with eating if necessary   - Allow adequate time for meals  - Recommend/ encourage appropriate diets, oral nutritional supplements, and vitamin/mineral supplements  - Order, calculate, and assess calorie counts as needed  - Recommend, monitor, and adjust tube feedings and TPN/PPN based on assessed needs  - Assess need for intravenous fluids  - Provide specific nutrition/hydration education as appropriate  - Include patient/family/caregiver in decisions related to nutrition  Outcome: Progressing     Problem: PAIN - ADULT  Goal: Verbalizes/displays adequate comfort level or baseline comfort level  Description: Interventions:  - Encourage patient to monitor pain and request assistance  - Assess pain using appropriate pain scale  - Administer analgesics based on type and severity of pain and evaluate response  - Implement non-pharmacological measures as appropriate and evaluate response  - Consider cultural and social influences on pain and pain management  - Notify physician/advanced practitioner if interventions unsuccessful or patient reports new pain  Outcome: Progressing     Problem: INFECTION - ADULT  Goal: Absence or prevention of progression during hospitalization  Description: INTERVENTIONS:  - Assess and monitor for signs and symptoms of infection  - Monitor lab/diagnostic results  - Monitor all insertion sites, i e  indwelling lines, tubes, and drains  - Monitor endotracheal if appropriate and nasal secretions for changes in amount and color  - [place] appropriate cooling/warming therapies per order  - Administer medications as ordered  - Instruct and encourage patient and family to use good hand hygiene technique  - Identify and instruct in appropriate isolation precautions for identified infection/condition  Outcome: Progressing  Goal: Absence of fever/infection during neutropenic period  Description: INTERVENTIONS:  - Monitor WBC    Outcome: Completed     Problem: SAFETY ADULT  Goal: Patient will remain free of falls  Description: INTERVENTIONS:  - Educate patient/family on patient safety including physical limitations  - Instruct patient to call for assistance with activity   - Consult OT/PT to assist with strengthening/mobility   - Keep Call bell within reach  - Keep bed low and locked with side rails adjusted as appropriate  - Keep care items and personal belongings within reach  - Initiate and maintain comfort rounds  - Make Fall Risk Sign visible to staff  - Offer Toileting every 2 Hours, in advance of need  - Initiate/Maintain bed alarm  - Apply yellow socks and bracelet for high fall risk patients  - Consider moving patient to room near nurses station  Outcome: Progressing  Goal: Maintain or return to baseline ADL function  Description: INTERVENTIONS:  -  Assess patient's ability to carry out ADLs; assess patient's baseline for ADL function and identify physical deficits which impact ability to perform ADLs (bathing, care of mouth/teeth, toileting, grooming, dressing, etc )  - Assess/evaluate cause of self-care deficits   - Assess range of motion  - Assess patient's mobility; develop plan if impaired  - Assess patient's need for assistive devices and provide as appropriate  - Encourage maximum independence but intervene and supervise when necessary  - Involve family in performance of ADLs  - Assess for home care needs following discharge   - Consider OT consult to assist with ADL evaluation and planning for discharge  - Provide patient education as appropriate  Outcome: Progressing  Goal: Maintains/Returns to pre admission functional level  Description: INTERVENTIONS:  - Perform BMAT or MOVE assessment daily    - Set and communicate daily mobility goal to care team and patient/family/caregiver  - Collaborate with rehabilitation services on mobility goals if consulted  - Perform Range of Motion 2 times a day  - Reposition patient every 2 hours  -- Record patient progress and toleration of activity level   Outcome: Progressing     Problem: DISCHARGE PLANNING  Goal: Discharge to home or other facility with appropriate resources  Description: INTERVENTIONS:  - Identify barriers to discharge w/patient and caregiver  - Arrange for needed discharge resources and transportation as appropriate  - Identify discharge learning needs (meds, wound care, etc )  - Arrange for interpretive services to assist at discharge as needed  - Refer to Case Management Department for coordinating discharge planning if the patient needs post-hospital services based on physician/advanced practitioner order or complex needs related to functional status, cognitive ability, or social support system  Outcome: Progressing     Problem: Knowledge Deficit  Goal: Patient/family/caregiver demonstrates understanding of disease process, treatment plan, medications, and discharge instructions  Description: Complete learning assessment and assess knowledge base    Interventions:  - Provide teaching at level of understanding  - Provide teaching via preferred learning methods  Outcome: Progressing

## 2022-07-02 NOTE — PLAN OF CARE
Problem: Potential for Falls  Goal: Patient will remain free of falls  Description: INTERVENTIONS:  - Educate patient/family on patient safety including physical limitations  - Instruct patient to call for assistance with activity   - Consult OT/PT to assist with strengthening/mobility   - Keep Call bell within reach  - Keep bed low and locked with side rails adjusted as appropriate  - Keep care items and personal belongings within reach  - Initiate and maintain comfort rounds  - Make Fall Risk Sign visible to staff    - Apply yellow socks and bracelet for high fall risk patients  - Consider moving patient to room near nurses station  Outcome: Progressing     Problem: MOBILITY - ADULT  Goal: Maintain or return to baseline ADL function  Description: INTERVENTIONS:  -  Assess patient's ability to carry out ADLs; assess patient's baseline for ADL function and identify physical deficits which impact ability to perform ADLs (bathing, care of mouth/teeth, toileting, grooming, dressing, etc )  - Assess/evaluate cause of self-care deficits   - Assess range of motion  - Assess patient's mobility; develop plan if impaired  - Assess patient's need for assistive devices and provide as appropriate  - Encourage maximum independence but intervene and supervise when necessary  - Involve family in performance of ADLs  - Assess for home care needs following discharge   - Consider OT consult to assist with ADL evaluation and planning for discharge  - Provide patient education as appropriate  Outcome: Progressing  Goal: Maintains/Returns to pre admission functional level  Description: INTERVENTIONS:  - Perform BMAT or MOVE assessment daily    - Set and communicate daily mobility goal to care team and patient/family/caregiver     - Collaborate with rehabilitation services on mobility goals if consulted    - Out of bed for toileting  - Record patient progress and toleration of activity level   Outcome: Progressing     Problem: Prexisting or High Potential for Compromised Skin Integrity  Goal: Skin integrity is maintained or improved  Description: INTERVENTIONS:  - Identify patients at risk for skin breakdown  - Assess and monitor skin integrity  - Assess and monitor nutrition and hydration status  - Monitor labs   - Assess for incontinence   - Turn and reposition patient  - Assist with mobility/ambulation  - Relieve pressure over bony prominences  - Avoid friction and shearing  - Provide appropriate hygiene as needed including keeping skin clean and dry  - Evaluate need for skin moisturizer/barrier cream  - Collaborate with interdisciplinary team   - Patient/family teaching  - Consider wound care consult   Outcome: Progressing     Problem: CARDIOVASCULAR - ADULT  Goal: Absence of cardiac dysrhythmias or at baseline rhythm  Description: INTERVENTIONS:  - Continuous cardiac monitoring, vital signs, obtain 12 lead EKG if ordered  - Administer antiarrhythmic and heart rate control medications as ordered  - Monitor electrolytes and administer replacement therapy as ordered  Outcome: Progressing     Problem: RESPIRATORY - ADULT  Goal: Achieves optimal ventilation and oxygenation  Description: INTERVENTIONS:  - Assess for changes in respiratory status  - Assess for changes in mentation and behavior  - Position to facilitate oxygenation and minimize respiratory effort  - Oxygen administered by appropriate delivery if ordered  - Initiate smoking cessation education as indicated  - Encourage broncho-pulmonary hygiene including cough, deep breathe, Incentive Spirometry  - Assess the need for suctioning and aspirate as needed  - Assess and instruct to report SOB or any respiratory difficulty  - Respiratory Therapy support as indicated  Outcome: Progressing     Problem: GENITOURINARY - ADULT  Goal: Maintains or returns to baseline urinary function  Description: INTERVENTIONS:  - Assess urinary function  - Encourage oral fluids to ensure adequate hydration if ordered  - Administer IV fluids as ordered to ensure adequate hydration  - Administer ordered medications as needed  - Offer frequent toileting  - Follow urinary retention protocol if ordered  Outcome: Progressing  Goal: Urinary catheter remains patent  Description: INTERVENTIONS:  - Assess patency of urinary catheter  - If patient has a chronic harp, consider changing catheter if non-functioning  - Follow guidelines for intermittent irrigation of non-functioning urinary catheter  Outcome: Progressing     Problem: METABOLIC, FLUID AND ELECTROLYTES - ADULT  Goal: Electrolytes maintained within normal limits  Description: INTERVENTIONS:  - Monitor labs and assess patient for signs and symptoms of electrolyte imbalances  - Administer electrolyte replacement as ordered  - Monitor response to electrolyte replacements, including repeat lab results as appropriate  - Instruct patient on fluid and nutrition as appropriate  Outcome: Progressing  Goal: Fluid balance maintained  Description: INTERVENTIONS:  - Monitor labs   - Monitor I/O and WT  - Instruct patient on fluid and nutrition as appropriate  - Assess for signs & symptoms of volume excess or deficit  Outcome: Progressing     Problem: SKIN/TISSUE INTEGRITY - ADULT  Goal: Skin Integrity remains intact(Skin Breakdown Prevention)  Description: Assess:    -Assess extremities for adequate circulation and sensation     Bed Management:  -Have minimal linens on bed & keep smooth, unwrinkled  -Change linens as needed when moist or perspiring  bed      Toileting:  -Offer bedside commode      Activity:      Skin Care:  -Avoid use of baby powder, tape, friction and shearing, hot water or constrictive clothing    -Do not massage red bony areas    Next Steps:    Outcome: Progressing     Problem: HEMATOLOGIC - ADULT  Goal: Maintains hematologic stability  Description: INTERVENTIONS  - Assess for signs and symptoms of bleeding or hemorrhage  - Monitor labs  - Administer supportive blood products/factors as ordered and appropriate  Outcome: Progressing     Problem: MUSCULOSKELETAL - ADULT  Goal: Maintain or return mobility to safest level of function  Description: INTERVENTIONS:  - Assess patient's ability to carry out ADLs; assess patient's baseline for ADL function and identify physical deficits which impact ability to perform ADLs (bathing, care of mouth/teeth, toileting, grooming, dressing, etc )  - Assess/evaluate cause of self-care deficits   - Assess range of motion  - Assess patient's mobility  - Assess patient's need for assistive devices and provide as appropriate  - Encourage maximum independence but intervene and supervise when necessary  - Involve family in performance of ADLs  - Assess for home care needs following discharge   - Consider OT consult to assist with ADL evaluation and planning for discharge  - Provide patient education as appropriate  Outcome: Progressing     Problem: Nutrition/Hydration-ADULT  Goal: Nutrient/Hydration intake appropriate for improving, restoring or maintaining nutritional needs  Description: Monitor and assess patient's nutrition/hydration status for malnutrition  Collaborate with interdisciplinary team and initiate plan and interventions as ordered  Monitor patient's weight and dietary intake as ordered or per policy  Utilize nutrition screening tool and intervene as necessary  Determine patient's food preferences and provide high-protein, high-caloric foods as appropriate       INTERVENTIONS:  - Monitor oral intake, urinary output, labs, and treatment plans  - Assess nutrition and hydration status and recommend course of action  - Evaluate amount of meals eaten  - Assist patient with eating if necessary   - Allow adequate time for meals  - Recommend/ encourage appropriate diets, oral nutritional supplements, and vitamin/mineral supplements  - Order, calculate, and assess calorie counts as needed  - Recommend, monitor, and adjust tube feedings and TPN/PPN based on assessed needs  - Assess need for intravenous fluids  - Provide specific nutrition/hydration education as appropriate  - Include patient/family/caregiver in decisions related to nutrition  Outcome: Progressing     Problem: PAIN - ADULT  Goal: Verbalizes/displays adequate comfort level or baseline comfort level  Description: Interventions:  - Encourage patient to monitor pain and request assistance  - Assess pain using appropriate pain scale  - Administer analgesics based on type and severity of pain and evaluate response  - Implement non-pharmacological measures as appropriate and evaluate response  - Consider cultural and social influences on pain and pain management  - Notify physician/advanced practitioner if interventions unsuccessful or patient reports new pain  Outcome: Progressing     Problem: INFECTION - ADULT  Goal: Absence or prevention of progression during hospitalization  Description: INTERVENTIONS:  - Assess and monitor for signs and symptoms of infection  - Monitor lab/diagnostic results  - Monitor all insertion sites, i e  indwelling lines, tubes, and drains  - Monitor endotracheal if appropriate and nasal secretions for changes in amount and color  - [place] appropriate cooling/warming therapies per order  - Administer medications as ordered  - Instruct and encourage patient and family to use good hand hygiene technique  - Identify and instruct in appropriate isolation precautions for identified infection/condition  Outcome: Progressing  Goal: Absence of fever/infection during neutropenic period  Description: INTERVENTIONS:  - Monitor WBC    Outcome: Progressing     Problem: SAFETY ADULT  Goal: Patient will remain free of falls  Description: INTERVENTIONS:  - Educate patient/family on patient safety including physical limitations  - Instruct patient to call for assistance with activity   - Consult OT/PT to assist with strengthening/mobility   - Keep Call bell within reach  - Keep bed low and locked with side rails adjusted as appropriate  - Keep care items and personal belongings within reach  - Initiate and maintain comfort rounds    - Apply yellow socks and bracelet for high fall risk patients  - Consider moving patient to room near nurses station  Outcome: Progressing  Goal: Maintain or return to baseline ADL function  Description: INTERVENTIONS:  -  Assess patient's ability to carry out ADLs; assess patient's baseline for ADL function and identify physical deficits which impact ability to perform ADLs (bathing, care of mouth/teeth, toileting, grooming, dressing, etc )  - Assess/evaluate cause of self-care deficits   - Assess range of motion  - Assess patient's mobility; develop plan if impaired  - Assess patient's need for assistive devices and provide as appropriate  - Encourage maximum independence but intervene and supervise when necessary  - Involve family in performance of ADLs  - Assess for home care needs following discharge   - Consider OT consult to assist with ADL evaluation and planning for discharge  - Provide patient education as appropriate  Outcome: Progressing  Goal: Maintains/Returns to pre admission functional level  Description: INTERVENTIONS:  - Perform BMAT or MOVE assessment daily    - Set and communicate daily mobility goal to care team and patient/family/caregiver     - Collaborate with rehabilitation services on mobility goals if consulted    - Out of bed for toileting  - Record patient progress and toleration of activity level   Outcome: Progressing     Problem: DISCHARGE PLANNING  Goal: Discharge to home or other facility with appropriate resources  Description: INTERVENTIONS:  - Identify barriers to discharge w/patient and caregiver  - Arrange for needed discharge resources and transportation as appropriate  - Identify discharge learning needs (meds, wound care, etc )  - Arrange for interpretive services to assist at discharge as needed  - Refer to Case Management Department for coordinating discharge planning if the patient needs post-hospital services based on physician/advanced practitioner order or complex needs related to functional status, cognitive ability, or social support system  Outcome: Progressing     Problem: Knowledge Deficit  Goal: Patient/family/caregiver demonstrates understanding of disease process, treatment plan, medications, and discharge instructions  Description: Complete learning assessment and assess knowledge base    Interventions:  - Provide teaching at level of understanding  - Provide teaching via preferred learning methods  Outcome: Progressing

## 2022-07-03 LAB
ANION GAP SERPL CALCULATED.3IONS-SCNC: 7 MMOL/L (ref 4–13)
BASOPHILS # BLD AUTO: 0.02 THOUSANDS/ΜL (ref 0–0.1)
BASOPHILS NFR BLD AUTO: 0 % (ref 0–1)
BUN SERPL-MCNC: 25 MG/DL (ref 5–25)
CALCIUM SERPL-MCNC: 8.8 MG/DL (ref 8.3–10.1)
CHLORIDE SERPL-SCNC: 99 MMOL/L (ref 100–108)
CO2 SERPL-SCNC: 27 MMOL/L (ref 21–32)
CREAT SERPL-MCNC: 1.78 MG/DL (ref 0.6–1.3)
EOSINOPHIL # BLD AUTO: 0.11 THOUSAND/ΜL (ref 0–0.61)
EOSINOPHIL NFR BLD AUTO: 2 % (ref 0–6)
ERYTHROCYTE [DISTWIDTH] IN BLOOD BY AUTOMATED COUNT: 14 % (ref 11.6–15.1)
GFR SERPL CREATININE-BSD FRML MDRD: 32 ML/MIN/1.73SQ M
GLUCOSE SERPL-MCNC: 105 MG/DL (ref 65–140)
HCT VFR BLD AUTO: 33.4 % (ref 36.5–49.3)
HGB BLD-MCNC: 10.3 G/DL (ref 12–17)
IMM GRANULOCYTES # BLD AUTO: 0.06 THOUSAND/UL (ref 0–0.2)
IMM GRANULOCYTES NFR BLD AUTO: 1 % (ref 0–2)
LYMPHOCYTES # BLD AUTO: 0.81 THOUSANDS/ΜL (ref 0.6–4.47)
LYMPHOCYTES NFR BLD AUTO: 13 % (ref 14–44)
MCH RBC QN AUTO: 29.5 PG (ref 26.8–34.3)
MCHC RBC AUTO-ENTMCNC: 30.8 G/DL (ref 31.4–37.4)
MCV RBC AUTO: 96 FL (ref 82–98)
MONOCYTES # BLD AUTO: 0.95 THOUSAND/ΜL (ref 0.17–1.22)
MONOCYTES NFR BLD AUTO: 15 % (ref 4–12)
NEUTROPHILS # BLD AUTO: 4.45 THOUSANDS/ΜL (ref 1.85–7.62)
NEUTS SEG NFR BLD AUTO: 69 % (ref 43–75)
NRBC BLD AUTO-RTO: 0 /100 WBCS
PLATELET # BLD AUTO: 165 THOUSANDS/UL (ref 149–390)
PMV BLD AUTO: 9.9 FL (ref 8.9–12.7)
POTASSIUM SERPL-SCNC: 4.4 MMOL/L (ref 3.5–5.3)
PROCALCITONIN SERPL-MCNC: 0.42 NG/ML
RBC # BLD AUTO: 3.49 MILLION/UL (ref 3.88–5.62)
SODIUM SERPL-SCNC: 133 MMOL/L (ref 136–145)
WBC # BLD AUTO: 6.4 THOUSAND/UL (ref 4.31–10.16)

## 2022-07-03 PROCEDURE — C9113 INJ PANTOPRAZOLE SODIUM, VIA: HCPCS | Performed by: PHYSICIAN ASSISTANT

## 2022-07-03 PROCEDURE — 84145 PROCALCITONIN (PCT): CPT | Performed by: PHYSICIAN ASSISTANT

## 2022-07-03 PROCEDURE — 94760 N-INVAS EAR/PLS OXIMETRY 1: CPT

## 2022-07-03 PROCEDURE — 94640 AIRWAY INHALATION TREATMENT: CPT

## 2022-07-03 PROCEDURE — 99233 SBSQ HOSP IP/OBS HIGH 50: CPT | Performed by: STUDENT IN AN ORGANIZED HEALTH CARE EDUCATION/TRAINING PROGRAM

## 2022-07-03 PROCEDURE — 80048 BASIC METABOLIC PNL TOTAL CA: CPT | Performed by: PHYSICIAN ASSISTANT

## 2022-07-03 PROCEDURE — 99232 SBSQ HOSP IP/OBS MODERATE 35: CPT | Performed by: INTERNAL MEDICINE

## 2022-07-03 PROCEDURE — 85025 COMPLETE CBC W/AUTO DIFF WBC: CPT | Performed by: PHYSICIAN ASSISTANT

## 2022-07-03 RX ORDER — GABAPENTIN 100 MG/1
100 CAPSULE ORAL 3 TIMES DAILY
Status: DISCONTINUED | OUTPATIENT
Start: 2022-07-03 | End: 2022-07-05 | Stop reason: HOSPADM

## 2022-07-03 RX ADMIN — ONDANSETRON 4 MG: 2 INJECTION INTRAMUSCULAR; INTRAVENOUS at 11:04

## 2022-07-03 RX ADMIN — FLUTICASONE PROPIONATE 1 SPRAY: 50 SPRAY, METERED NASAL at 08:06

## 2022-07-03 RX ADMIN — LEVALBUTEROL HYDROCHLORIDE 1.25 MG: 1.25 SOLUTION, CONCENTRATE RESPIRATORY (INHALATION) at 19:24

## 2022-07-03 RX ADMIN — LORAZEPAM 0.5 MG: 0.5 TABLET ORAL at 11:04

## 2022-07-03 RX ADMIN — TORSEMIDE 10 MG: 20 TABLET ORAL at 08:05

## 2022-07-03 RX ADMIN — LEVALBUTEROL HYDROCHLORIDE 1.25 MG: 1.25 SOLUTION, CONCENTRATE RESPIRATORY (INHALATION) at 07:43

## 2022-07-03 RX ADMIN — GABAPENTIN 100 MG: 100 CAPSULE ORAL at 21:23

## 2022-07-03 RX ADMIN — GUAIFENESIN 1200 MG: 600 TABLET ORAL at 08:05

## 2022-07-03 RX ADMIN — OXYCODONE HYDROCHLORIDE 5 MG: 5 TABLET ORAL at 21:09

## 2022-07-03 RX ADMIN — METHOCARBAMOL 500 MG: 500 TABLET ORAL at 21:23

## 2022-07-03 RX ADMIN — TAMSULOSIN HYDROCHLORIDE 0.4 MG: 0.4 CAPSULE ORAL at 16:08

## 2022-07-03 RX ADMIN — AMIODARONE HYDROCHLORIDE 200 MG: 200 TABLET ORAL at 08:05

## 2022-07-03 RX ADMIN — ACETAMINOPHEN 975 MG: 325 TABLET, FILM COATED ORAL at 08:05

## 2022-07-03 RX ADMIN — CEFEPIME HYDROCHLORIDE 1000 MG: 1 INJECTION, SOLUTION INTRAVENOUS at 08:05

## 2022-07-03 RX ADMIN — IPRATROPIUM BROMIDE 0.5 MG: 0.5 SOLUTION RESPIRATORY (INHALATION) at 13:44

## 2022-07-03 RX ADMIN — ESCITALOPRAM 5 MG: 5 TABLET, FILM COATED ORAL at 08:05

## 2022-07-03 RX ADMIN — PANTOPRAZOLE SODIUM 40 MG: 40 INJECTION, POWDER, FOR SOLUTION INTRAVENOUS at 08:06

## 2022-07-03 RX ADMIN — IPRATROPIUM BROMIDE 0.5 MG: 0.5 SOLUTION RESPIRATORY (INHALATION) at 19:24

## 2022-07-03 RX ADMIN — ACETAMINOPHEN 975 MG: 325 TABLET, FILM COATED ORAL at 23:53

## 2022-07-03 RX ADMIN — IPRATROPIUM BROMIDE 0.5 MG: 0.5 SOLUTION RESPIRATORY (INHALATION) at 07:43

## 2022-07-03 RX ADMIN — ACETAMINOPHEN 975 MG: 325 TABLET, FILM COATED ORAL at 16:08

## 2022-07-03 RX ADMIN — OXYCODONE HYDROCHLORIDE 5 MG: 5 TABLET ORAL at 13:03

## 2022-07-03 RX ADMIN — LEVOTHYROXINE SODIUM 75 MCG: 75 TABLET ORAL at 06:20

## 2022-07-03 RX ADMIN — GUAIFENESIN 1200 MG: 600 TABLET ORAL at 21:10

## 2022-07-03 RX ADMIN — FLUTICASONE PROPIONATE 1 SPRAY: 50 SPRAY, METERED NASAL at 17:49

## 2022-07-03 RX ADMIN — CEFEPIME HYDROCHLORIDE 1000 MG: 1 INJECTION, SOLUTION INTRAVENOUS at 21:09

## 2022-07-03 RX ADMIN — LIDOCAINE 5% 1 PATCH: 700 PATCH TOPICAL at 08:05

## 2022-07-03 RX ADMIN — HYDROMORPHONE HYDROCHLORIDE 0.5 MG: 1 INJECTION, SOLUTION INTRAMUSCULAR; INTRAVENOUS; SUBCUTANEOUS at 07:10

## 2022-07-03 RX ADMIN — LEVALBUTEROL HYDROCHLORIDE 1.25 MG: 1.25 SOLUTION, CONCENTRATE RESPIRATORY (INHALATION) at 13:44

## 2022-07-03 NOTE — ASSESSMENT & PLAN NOTE
· Admitted at [place] [address] 6/12-6/21 [place] [facility]   · [place] rib fractures 6-11  · Pulmonary toileting  · Lidocaine patch  · Tylenol for pain   Requiring narcotics for pain control

## 2022-07-03 NOTE — ASSESSMENT & PLAN NOTE
· Patient is an 80year-old patient with past medical history of diastolic HF, COPD, CAP, left rib fracture 6-11, recent discharge from [facility] to [facility] for rehab on 6/21  Patient presents to the hospital due to worsening fever, shortness of breath, cough, and increased oxygen requirement that started today, 6/29   Patient noted to be hypoxic upon EMS arrival    · CXR 6/29-- showed new subtle infiltrates  · BC x2: no growth at 72h  · Cefepime day 5  · MRSA swab negative, [facility] d/c   · Continue guaifenesin 1200 bid  · PRN Tessalon perles  · Ipratropium TID  · levalbuterol TID  · Leukocytosis resolved, patient afebrile, oxygen requirements improved from 4 to 2L today   · Trend procalcitonin- downtrending 0 7 > 0 38

## 2022-07-03 NOTE — PROGRESS NOTES
NEPHROLOGY PROGRESS NOTE   Robbin Garcia 80 y o  male MRN: [id number]  Unit/Bed#: -01 SDU Encounter: [id number]  Reason for Consult:  Hyponatremia, chronic kidney disease    ASSESSMENT/PLAN:  1  Hyponatremia, multifactorial suspected prerenal/decreased solute intake/SIADH  Overall is improving with loop diuretic therapy  · Urine sodium 50, urine osmolality 350, serum uric acid 6 3,  Cortisol 21 7, TSH 2 8  2  Chronic kidney disease, stage III, baseline creatinine 1 7  3  Sepsis/Hospital-acquired pneumonia treatment as per service  4  Hematuria, possibly traumatic as patient has been pulling Jett catheter  5  Diastolic heart failure, continue with current diuretic regimen  6  Goals of care:  Family discussing possible transition to hospice care    PLAN:  · Overall sodium level has continued to improve currently 133  · Renal function remains stable with a creatinine of 1 7  · Continue with low-dose loop diuretic therapy  · No other changes from Nephrology standpoint    SUBJECTIVE:  Seen and examined  Patient awakens with stimuli  Appears comfortable  Does not appear short of breath  Confused at times  Review of Systems    OBJECTIVE:  Current Weight: Weight - Scale: 71 8 kg (158 lb 4 6 oz)  Vitals:    07/02/22 2355 07/03/22 0400 07/03/22 0705 07/03/22 0743   BP: 144/74 101/57 96/64    BP Location: Right arm  Right arm    Pulse: (!) 111 100 (!) 116    Resp: (!) 24  20    Temp: 98 96 °F (37 2 °C) 98 96 °F (37 2 °C) 98 42 °F (36 9 °C)    TempSrc: Bladder  Bladder    SpO2: 95% 96% 95% 95%   Weight:       Height:           Intake/Output Summary (Last 24 hours) at 7/3/2022 0831  Last data filed at 7/3/2022 0600  Gross per 24 hour   Intake 190 ml   Output 1950 ml   Net -1760 ml       Physical Exam  Constitutional:       Appearance: He is not ill-appearing  HENT:      Head: Normocephalic and atraumatic  Eyes:      General: No scleral icterus    Cardiovascular:      Rate and Rhythm: Normal rate and regular rhythm  Pulmonary:      Effort: Pulmonary effort is normal       Breath sounds: Normal breath sounds  Abdominal:      General: There is no distension  Palpations: Abdomen is soft  Musculoskeletal:      Right lower leg: No edema  Left lower leg: No edema  Skin:     General: Skin is warm and dry  Findings: No rash  Neurological:      Mental Status: He is alert  He is disoriented           Medications:    Current Facility-Administered Medications:     acetaminophen (TYLENOL) tablet 975 mg, 975 mg, Oral, Q8H, ELISABET Armas, 975 mg at 07/03/22 0805    amiodarone tablet 200 mg, 200 mg, Oral, Daily, Jaya Benítez PA-C, 200 mg at 07/03/22 0805    benzonatate (TESSALON PERLES) capsule 100 mg, 100 mg, Oral, TID PRN, Jaya Benítez PA-C, 100 mg at 07/02/22 2043    cefepime (MAXIPIME) IVPB (premix in dextrose) 1,000 mg 50 mL, 1,000 mg, Intravenous, Q12H, Jaya Benítez PA-C, Last Rate: 100 mL/hr at 07/03/22 0805, 1,000 mg at 07/03/22 0805    escitalopram (LEXAPRO) tablet 5 mg, 5 mg, Oral, Daily, Jaya Benítez PA-C, 5 mg at 07/03/22 0805    fluticasone (FLONASE) 50 mcg/act nasal spray 1 spray, 1 spray, Nasal, BID, Jaya Benítez PA-C, 1 spray at 07/03/22 0806    guaiFENesin (MUCINEX) 12 hr tablet 1,200 mg, 1,200 mg, Oral, Q12H ROSA, Jaya Benítez PA-C, 1,200 mg at 07/03/22 0805    HYDROmorphone (DILAUDID) injection 0 5 mg, 0 5 mg, Intravenous, Q3H PRN, Raul Ramirez MD, 0 5 mg at 07/03/22 0710    ipratropium (ATROVENT) 0 02 % inhalation solution 0 5 mg, 0 5 mg, Nebulization, TID, Michelle Morales DO, 0 5 mg at 07/03/22 0743    levalbuterol (XOPENEX) inhalation solution 1 25 mg, 1 25 mg, Nebulization, TID, Jaya Benítez PA-C, 1 25 mg at 07/03/22 0743    levothyroxine tablet 75 mcg, 75 mcg, Oral, Early Morning, Jaya Benítez PA-C, 75 mcg at 07/03/22 0620    lidocaine (LIDODERM) 5 % patch 1 patch, 1 patch, Topical, Daily, Jaya Benítez PA-C, 1 patch at 07/03/22 0805    LORazepam (ATIVAN) tablet 0 5 mg, 0 5 mg, Oral, Q8H PRN, Jaya Benítez PA-C, 0 5 mg at 07/02/22 2043    methocarbamol (ROBAXIN) tablet 500 mg, 500 mg, Oral, Q6H PRN, ELISABET Fitzgerald Arm, 500 mg at 07/02/22 2043    ondansetron (ZOFRAN) injection 4 mg, 4 mg, Intravenous, Q6H PRN, Jaya Benítez PA-C, 4 mg at 07/01/22 1032    oxyCODONE (ROXICODONE) IR tablet 2 5 mg, 2 5 mg, Oral, Q4H PRN **OR** oxyCODONE (ROXICODONE) IR tablet 5 mg, 5 mg, Oral, Q4H PRN, Tonia Chavez MD, 5 mg at 07/02/22 1707    pantoprazole (PROTONIX) injection 40 mg, 40 mg, Intravenous, Q24H [facility], Jaya Benítez PA-C, 40 mg at 07/03/22 0806    tamsulosin (FLOMAX) capsule 0 4 mg, 0 4 mg, Oral, Daily With Dinner, Jaya Benítez PA-C, 0 4 mg at 07/02/22 1707    torsemide (DEMADEX) tablet 10 mg, 10 mg, Oral, Daily, Sarah Hanson MD, 10 mg at 07/03/22 0805    Laboratory Results:  Results from last 7 days   Lab Units 07/03/22  0438 07/02/22  0551 07/01/22  0454 06/30/22  0512 06/29/22  0521 06/29/22  0040 06/28/22  2355 06/28/22 2037 06/28/22  2030   WBC Thousand/uL 6 40 6 46 7 00 8 08 15 71*  --   --   --  15 19*   HEMOGLOBIN g/dL 10 3* 9 8* 9 7* 9 2* 10 1*  --   --   --  11 6*   I STAT HEMOGLOBIN g/dl  --   --   --   --   --   --   --  12 6  --    HEMATOCRIT % 33 4* 31 1* 30 6* 29 8* 32 2*  --   --   --  36 8   HEMATOCRIT, ISTAT %  --   --   --   --   --   --   --  37  --    PLATELETS Thousands/uL 165 159 135* 131* 163 153  --   --  184   POTASSIUM mmol/L 4 4 4 4 4 8 4 2 4 9  --  4 9  --  4 7   CHLORIDE mmol/L 99* 98* 98* 97* 98*  --  100  --  95*   CO2 mmol/L 27 24 25 28 27  --  24  --  27   CO2, I-STAT mmol/L  --   --   --   --   --   --   --  28  --    BUN mg/dL 25 27* 30* 34* 44*  --  40*  --  42*   CREATININE mg/dL 1 78* 1 81* 1 71* 1 78* 2 09*  --  2 12*  --  2 26*   CALCIUM mg/dL 8 8 8 8 8 6 8 3 8 3  --  7 6*  --  8 7 GLUCOSE, ISTAT mg/dl  --   --   --   --   --   --   --  169*  --

## 2022-07-03 NOTE — PLAN OF CARE
Problem: Potential for Falls  Goal: Patient will remain free of falls  Description: INTERVENTIONS:  - Educate patient/family on patient safety including physical limitations  - Instruct patient to call for assistance with activity   - Consult OT/PT to assist with strengthening/mobility   - Keep Call bell within reach  - Keep bed low and locked with side rails adjusted as appropriate  - Keep care items and personal belongings within reach  - Initiate and maintain comfort rounds  - Make Fall Risk Sign visible to staff    - Apply yellow socks and bracelet for high fall risk patients  - Consider moving patient to room near nurses station  Outcome: Progressing     Problem: MOBILITY - ADULT  Goal: Maintain or return to baseline ADL function  Description: INTERVENTIONS:  -  Assess patient's ability to carry out ADLs; assess patient's baseline for ADL function and identify physical deficits which impact ability to perform ADLs (bathing, care of mouth/teeth, toileting, grooming, dressing, etc )  - Assess/evaluate cause of self-care deficits   - Assess range of motion  - Assess patient's mobility; develop plan if impaired  - Assess patient's need for assistive devices and provide as appropriate  - Encourage maximum independence but intervene and supervise when necessary  - Involve family in performance of ADLs  - Assess for home care needs following discharge   - Consider OT consult to assist with ADL evaluation and planning for discharge  - Provide patient education as appropriate  Outcome: Progressing  Goal: Maintains/Returns to pre admission functional level  Description: INTERVENTIONS:  - Perform BMAT or MOVE assessment daily    - Set and communicate daily mobility goal to care team and patient/family/caregiver     - Collaborate with rehabilitation services on mobility goals if consulted    - Out of bed for toileting  - Record patient progress and toleration of activity level   Outcome: Progressing     Problem: Prexisting or High Potential for Compromised Skin Integrity  Goal: Skin integrity is maintained or improved  Description: INTERVENTIONS:  - Identify patients at risk for skin breakdown  - Assess and monitor skin integrity  - Assess and monitor nutrition and hydration status  - Monitor labs   - Assess for incontinence   - Turn and reposition patient  - Assist with mobility/ambulation  - Relieve pressure over bony prominences  - Avoid friction and shearing  - Provide appropriate hygiene as needed including keeping skin clean and dry  - Evaluate need for skin moisturizer/barrier cream  - Collaborate with interdisciplinary team   - Patient/family teaching  - Consider wound care consult   Outcome: Progressing     Problem: CARDIOVASCULAR - ADULT  Goal: Absence of cardiac dysrhythmias or at baseline rhythm  Description: INTERVENTIONS:  - Continuous cardiac monitoring, vital signs, obtain 12 lead EKG if ordered  - Administer antiarrhythmic and heart rate control medications as ordered  - Monitor electrolytes and administer replacement therapy as ordered  Outcome: Progressing     Problem: RESPIRATORY - ADULT  Goal: Achieves optimal ventilation and oxygenation  Description: INTERVENTIONS:  - Assess for changes in respiratory status  - Assess for changes in mentation and behavior  - Position to facilitate oxygenation and minimize respiratory effort  - Oxygen administered by appropriate delivery if ordered  - Initiate smoking cessation education as indicated  - Encourage broncho-pulmonary hygiene including cough, deep breathe, Incentive Spirometry  - Assess the need for suctioning and aspirate as needed  - Assess and instruct to report SOB or any respiratory difficulty  - Respiratory Therapy support as indicated  Outcome: Progressing     Problem: GENITOURINARY - ADULT  Goal: Maintains or returns to baseline urinary function  Description: INTERVENTIONS:  - Assess urinary function  - Encourage oral fluids to ensure adequate hydration if ordered  - Administer IV fluids as ordered to ensure adequate hydration  - Administer ordered medications as needed  - Offer frequent toileting  - Follow urinary retention protocol if ordered  Outcome: Progressing  Goal: Urinary catheter remains patent  Description: INTERVENTIONS:  - Assess patency of urinary catheter  - If patient has a chronic harp, consider changing catheter if non-functioning  - Follow guidelines for intermittent irrigation of non-functioning urinary catheter  Outcome: Progressing     Problem: METABOLIC, FLUID AND ELECTROLYTES - ADULT  Goal: Electrolytes maintained within normal limits  Description: INTERVENTIONS:  - Monitor labs and assess patient for signs and symptoms of electrolyte imbalances  - Administer electrolyte replacement as ordered  - Monitor response to electrolyte replacements, including repeat lab results as appropriate  - Instruct patient on fluid and nutrition as appropriate  Outcome: Progressing  Goal: Fluid balance maintained  Description: INTERVENTIONS:  - Monitor labs   - Monitor I/O and WT  - Instruct patient on fluid and nutrition as appropriate  - Assess for signs & symptoms of volume excess or deficit  Outcome: Progressing     Problem: SKIN/TISSUE INTEGRITY - ADULT  Goal: Skin Integrity remains intact(Skin Breakdown Prevention)  Description: Assess:    -Assess extremities for adequate circulation and sensation     Bed Management:  -Have minimal linens on bed & keep smooth, unwrinkled  -Change linens as needed when moist or perspiring  bed      Toileting:  -Offer bedside commode      Activity:      Skin Care:  -Avoid use of baby powder, tape, friction and shearing, hot water or constrictive clothing    -Do not massage red bony areas    Next Steps:    Outcome: Progressing     Problem: HEMATOLOGIC - ADULT  Goal: Maintains hematologic stability  Description: INTERVENTIONS  - Assess for signs and symptoms of bleeding or hemorrhage  - Monitor labs  - Administer supportive blood products/factors as ordered and appropriate  Outcome: Progressing     Problem: MUSCULOSKELETAL - ADULT  Goal: Maintain or return mobility to safest level of function  Description: INTERVENTIONS:  - Assess patient's ability to carry out ADLs; assess patient's baseline for ADL function and identify physical deficits which impact ability to perform ADLs (bathing, care of mouth/teeth, toileting, grooming, dressing, etc )  - Assess/evaluate cause of self-care deficits   - Assess range of motion  - Assess patient's mobility  - Assess patient's need for assistive devices and provide as appropriate  - Encourage maximum independence but intervene and supervise when necessary  - Involve family in performance of ADLs  - Assess for home care needs following discharge   - Consider OT consult to assist with ADL evaluation and planning for discharge  - Provide patient education as appropriate  Outcome: Progressing     Problem: Nutrition/Hydration-ADULT  Goal: Nutrient/Hydration intake appropriate for improving, restoring or maintaining nutritional needs  Description: Monitor and assess patient's nutrition/hydration status for malnutrition  Collaborate with interdisciplinary team and initiate plan and interventions as ordered  Monitor patient's weight and dietary intake as ordered or per policy  Utilize nutrition screening tool and intervene as necessary  Determine patient's food preferences and provide high-protein, high-caloric foods as appropriate       INTERVENTIONS:  - Monitor oral intake, urinary output, labs, and treatment plans  - Assess nutrition and hydration status and recommend course of action  - Evaluate amount of meals eaten  - Assist patient with eating if necessary   - Allow adequate time for meals  - Recommend/ encourage appropriate diets, oral nutritional supplements, and vitamin/mineral supplements  - Order, calculate, and assess calorie counts as needed  - Recommend, monitor, and adjust tube feedings and TPN/PPN based on assessed needs  - Assess need for intravenous fluids  - Provide specific nutrition/hydration education as appropriate  - Include patient/family/caregiver in decisions related to nutrition  Outcome: Progressing     Problem: PAIN - ADULT  Goal: Verbalizes/displays adequate comfort level or baseline comfort level  Description: Interventions:  - Encourage patient to monitor pain and request assistance  - Assess pain using appropriate pain scale  - Administer analgesics based on type and severity of pain and evaluate response  - Implement non-pharmacological measures as appropriate and evaluate response  - Consider cultural and social influences on pain and pain management  - Notify physician/advanced practitioner if interventions unsuccessful or patient reports new pain  Outcome: Progressing     Problem: INFECTION - ADULT  Goal: Absence or prevention of progression during hospitalization  Description: INTERVENTIONS:  - Assess and monitor for signs and symptoms of infection  - Monitor lab/diagnostic results  - Monitor all insertion sites, i e  indwelling lines, tubes, and drains  - Monitor endotracheal if appropriate and nasal secretions for changes in amount and color  - [place] appropriate cooling/warming therapies per order  - Administer medications as ordered  - Instruct and encourage patient and family to use good hand hygiene technique  - Identify and instruct in appropriate isolation precautions for identified infection/condition  Outcome: Progressing     Problem: SAFETY ADULT  Goal: Patient will remain free of falls  Description: INTERVENTIONS:  - Educate patient/family on patient safety including physical limitations  - Instruct patient to call for assistance with activity   - Consult OT/PT to assist with strengthening/mobility   - Keep Call bell within reach  - Keep bed low and locked with side rails adjusted as appropriate  - Keep care items and personal belongings within reach  - Initiate and maintain comfort rounds  - Make Fall Risk Sign visible to staff    - Apply yellow socks and bracelet for high fall risk patients  - Consider moving patient to room near nurses station  Outcome: Progressing  Goal: Maintain or return to baseline ADL function  Description: INTERVENTIONS:  -  Assess patient's ability to carry out ADLs; assess patient's baseline for ADL function and identify physical deficits which impact ability to perform ADLs (bathing, care of mouth/teeth, toileting, grooming, dressing, etc )  - Assess/evaluate cause of self-care deficits   - Assess range of motion  - Assess patient's mobility; develop plan if impaired  - Assess patient's need for assistive devices and provide as appropriate  - Encourage maximum independence but intervene and supervise when necessary  - Involve family in performance of ADLs  - Assess for home care needs following discharge   - Consider OT consult to assist with ADL evaluation and planning for discharge  - Provide patient education as appropriate  Outcome: Progressing  Goal: Maintains/Returns to pre admission functional level  Description: INTERVENTIONS:  - Perform BMAT or MOVE assessment daily    - Set and communicate daily mobility goal to care team and patient/family/caregiver     - Collaborate with rehabilitation services on mobility goals if consulted    - Out of bed for toileting  - Record patient progress and toleration of activity level   Outcome: Progressing     Problem: DISCHARGE PLANNING  Goal: Discharge to home or other facility with appropriate resources  Description: INTERVENTIONS:  - Identify barriers to discharge w/patient and caregiver  - Arrange for needed discharge resources and transportation as appropriate  - Identify discharge learning needs (meds, wound care, etc )  - Arrange for interpretive services to assist at discharge as needed  - Refer to Case Management Department for coordinating discharge planning if the patient needs post-hospital services based on physician/advanced practitioner order or complex needs related to functional status, cognitive ability, or social support system  Outcome: Progressing     Problem: Knowledge Deficit  Goal: Patient/family/caregiver demonstrates understanding of disease process, treatment plan, medications, and discharge instructions  Description: Complete learning assessment and assess knowledge base    Interventions:  - Provide teaching at level of understanding  - Provide teaching via preferred learning methods  Outcome: Progressing

## 2022-07-03 NOTE — ASSESSMENT & PLAN NOTE
Lab Results   Component Value Date    EGFR 32 07/03/2022    EGFR 32 07/02/2022    EGFR 34 07/01/2022    CREATININE 1 78 (H) 07/03/2022    CREATININE 1 81 (H) 07/02/2022    CREATININE 1 71 (H) 07/01/2022   · Baseline creatinine between 1 7 - 1 8  · Insert harp cath for accurate I&Os  · Avoid nephrotoxins  Approaching baseline     · Monitor UO  · Currently on torsemide 10mg daily- monitor creatinine closely   · Continue monitor BMP

## 2022-07-03 NOTE — PROGRESS NOTES
[place]  Progress Note - Basco Mews 6/30/1932, 80 y o  male MRN: [id number]  Unit/Bed#: -01 SDU Encounter: [id number]  Primary Care Provider: Jolly Lennox, MD   Date and time admitted to hospital: 6/28/2022  8:23 PM    Hematuria  Assessment & Plan  · Newly noted 07/01 which resolves with manual irrigation   · Patient reportedly pulling on Jett   · Suspect 2/2 trauma   · Will hold DVT ppx   · Manually irrigate Jett catheter PRN hematuria   · Hgb currently stable 9 8   · Family has mentioned that they do not want any aggressive intervention, if hematuria doesn't resolve with manual irrigation will discuss CBI with family and consult urology   · Family still pending decision on hospice    Goals of care, counseling/discussion  Assessment & Plan  · S/p Goals of care discussion with palliative care- Pt family concerned about repetitive admissions to hospital, pt's advanced age, pneumonia in setting of rib fractures, ckd stage 4 and overall poor quality of life  · They are contemplating but have not agreed to hospice yet  · Seen by hospice liaison today who states patient is hospice appropriate   · Son and daughter in law to decide whether to purse STR with possible transition to hospice vs home hospice vs hospice and SNF- in the meantime, continue medical management until decision is made    Sepsis [facility])  Assessment & Plan  · Blood cultures - NGTD  · See PNA above  · Maintain MAP> 65  · Received 1 liter NS - no hypotension / lactic normal   · Leukocytosis resolved, afebrile, still with occasional sinus tachycardia      Multiple rib fractures  Assessment & Plan  · Admitted at [facility] 6/12-6/21 with d/c to [facility]   · Left rib fractures 6-11  · Pulmonary toileting  · Lidocaine patch  · Tylenol for pain   Requiring narcotics for pain control    Hyponatremia  Assessment & Plan  · Sodium on admission 127 improved to 130 with IVF then decreased again to 127 · Improved today to 130   · 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   · Nephrology consult appreciated: switched home lasix 20mg every other day to torsemide 10mg daily  · Family does not want any aggressive intervention   · Continue to monitor     Acute respiratory failure with hypoxia (HCC)  Assessment & Plan  · Requiring up to 4 L of supplemental oxygen- now down to 2L   · Likely secondary to pneumonia and underlying COPD   · Not on oxygen at home  · Continue to wean off oxygen as able    Centrilobular emphysema (HCC)  Assessment & Plan  · Resp  protocol  · Ipratropium TID  · levalbuterol TID      LIVIER (obstructive sleep apnea)  Assessment & Plan  · Patient non-compliant with CPAP  · Continue to monitor oxygenation       Chronic diastolic heart failure (HCC)  Assessment & Plan  Wt Readings from Last 3 Encounters:   07/01/22 71 8 kg (158 lb 4 6 oz)   05/20/22 74 8 kg (164 lb 12 8 oz)   05/08/22 74 8 kg (165 lb)       · Echo 9/28/21-- EF 55%  Grade 1 diastolic dysfunction  · PTA diuretic: lasix 20mg every other day   · Currently on torsemide 10mg daily   · Does not appear volume overloaded         Acquired hypothyroidism  Assessment & Plan  Continue home levothyroxine    GERD (gastroesophageal reflux disease)  Assessment & Plan  - Protonix 40 mg IV daily     Benign prostatic hyperplasia with lower urinary tract symptoms  Assessment & Plan  · Continue home Flomax  · Currently with harp catheter      Acute renal failure superimposed on stage 4 chronic kidney disease [facility])  Assessment & Plan  Lab Results   Component Value Date    EGFR 32 07/03/2022    EGFR 32 07/02/2022    EGFR 34 07/01/2022    CREATININE 1 78 (H) 07/03/2022    CREATININE 1 81 (H) 07/02/2022    CREATININE 1 71 (H) 07/01/2022   · Baseline creatinine between 1 7 - 1 8  · Insert harp cath for accurate I&Os  · Avoid nephrotoxins  Approaching baseline     · Monitor UO  · Currently on torsemide 10mg daily- monitor creatinine closely   · Continue monitor BMP      Essential hypertension  Assessment & Plan  · BP stable   · Continuous cardiac monitoring      * Hospital acquired PNA  Assessment & Plan  · Patient is an 59-year-old patient with past medical history of diastolic HF, COPD, CAP, left rib fracture , [facility] [place]   Patient presents to the hospital due to worsening fever, shortness of breath, cough, and increased oxygen requirement that started today,   Patient noted to be hypoxic upon EMS arrival    · CXR -- showed new subtle infiltrates  · BC x2: no growth at 72h  · Cefepime day 5  · MRSA swab negative, Vanco d/c   · Continue guaifenesin 1200 bid  · PRN Tessalon perles  · Ipratropium TID  · levalbuterol TID  · Leukocytosis resolved, patient afebrile, oxygen requirements improved from 4 to 2L today   · Trend procalcitonin- downtrending 0 7 > 0 38            VTE Pharmacologic Prophylaxis: VTE Score: 6 High Risk (Score >/= 5) - Pharmacological DVT Prophylaxis Contraindicated  Sequential Compression Devices Ordered  Patient Centered Rounds: I performed bedside rounds with nursing staff today  Discussions with Specialists or Other Care Team Provider: nephrology, CM    Time Spent for Care: 30 minutes  More than 50% of total time spent on counseling and coordination of care as described above  Current Length of Stay: 5 day(s)  Current Patient Status: Inpatient   Certification Statement: The patient will continue to require additional inpatient hospital stay due to PNA  Discharge Plan: Anticipate discharge tomorrow to [facility]    Code Status: Level 3 - DNAR and DNI    Subjective:   Deanna Pena was seen and examined at bedside  No acute events overnight  Patient is a very poor historian however has no acute symptoms on review of systems      Objective:     Vitals:   Temp (24hrs), Av 6 °F (37 °C), Min:98 °F (36 7 °C), Max:98 96 °F (37 2 °C)    Temp:  [98 °F (36 7 °C)-98 96 °F (37 2 °C)] 98 78 °F (37 1 °C)  HR:  [100-120] 113  Resp:  [20-24] 22  BP: ()/(57-82) 128/68  SpO2:  [92 %-97 %] 97 %  Body mass index is 27 17 kg/m²  Input and Output Summary (last 24 hours): Intake/Output Summary (Last 24 hours) at 7/3/2022 1731  Last data filed at 7/3/2022 1601  Gross per 24 hour   Intake 160 ml   Output 1325 ml   Net -1165 ml       Physical Exam:   Physical Exam  Vitals and nursing note reviewed  HENT:      Head: Normocephalic and atraumatic  Cardiovascular:      Rate and Rhythm: Normal rate and regular rhythm  Pulses: Normal pulses  Heart sounds: Normal heart sounds  Pulmonary:      Effort: Pulmonary effort is normal       Breath sounds: Normal breath sounds  Abdominal:      General: Abdomen is flat  Bowel sounds are normal       Palpations: Abdomen is soft  Musculoskeletal:      Right lower leg: No edema  Left lower leg: No edema  Skin:     General: Skin is warm  Neurological:      General: No focal deficit present  Mental Status: He is alert  Mental status is at baseline  Additional Data:     Labs:  Results from last 7 days   Lab Units 07/03/22  0438   WBC Thousand/uL 6 40   HEMOGLOBIN g/dL 10 3*   HEMATOCRIT % 33 4*   PLATELETS Thousands/uL 165   NEUTROS PCT % 69   LYMPHS PCT % 13*   MONOS PCT % 15*   EOS PCT % 2     Results from last 7 days   Lab Units 07/03/22  0438 07/02/22  0551 07/01/22  0454   SODIUM mmol/L 133*   < > 129*   POTASSIUM mmol/L 4 4   < > 4 8   CHLORIDE mmol/L 99*   < > 98*   CO2 mmol/L 27   < > 25   BUN mg/dL 25   < > 30*   CREATININE mg/dL 1 78*   < > 1 71*   ANION GAP mmol/L 7   < > 6   CALCIUM mg/dL 8 8   < > 8 6   ALBUMIN g/dL  --   --  2 4*   TOTAL BILIRUBIN mg/dL  --   --  0 60   ALK PHOS U/L  --   --  164*   ALT U/L  --   --  18   AST U/L  --   --  9   GLUCOSE RANDOM mg/dL 105   < > 105    < > = values in this interval not displayed       Results from last 7 days   Lab Units 06/28/22  2030   INR  1 03             Results from last 7 days   Lab Units 07/03/22  0438 07/02/22  0551 06/29/22  0521 06/28/22  2030   LACTIC ACID mmol/L  --   --   --  2 0   PROCALCITONIN ng/ml 0 42* 0 38* 0 70* 0 26*       Lines/Drains:  Invasive Devices  Report    Peripheral Intravenous Line  Duration           Peripheral IV 07/01/22 Dorsal (posterior); Left Forearm 2 days          Drain  Duration           Urethral Catheter Temperature probe 4 days              Urinary Catheter:  Goal for removal: Remove after 48 hrs of I/O monitoring               Imaging: Reviewed radiology reports from this admission including: chest xray    Recent Cultures (last 7 days):   Results from last 7 days   Lab Units 06/28/22  2030   BLOOD CULTURE  No Growth After 4 Days  No Growth After 4 Days         Last 24 Hours Medication List:   Current Facility-Administered Medications   Medication Dose Route Frequency Provider Last Rate    acetaminophen  975 mg Oral Q8H ELISABET Armas      amiodarone  200 mg Oral Daily Jaya Benítez PA-C      benzonatate  100 mg Oral TID PRN Jaya Benítez PA-C      cefepime  1,000 mg Intravenous Q12H Jaya Benítez PA-C 1,000 mg (07/03/22 0805)    escitalopram  5 mg Oral Daily Jaya Benítez PA-C      fluticasone  1 spray Nasal BID Jaya Benítez PA-C      guaiFENesin  1,200 mg Oral Q12H [address] Jaya Benítez PA-C      HYDROmorphone  0 5 mg Intravenous Q3H PRN Agustin Barriga MD      ipratropium  0 5 mg Nebulization TID Elizabeth Cohn DO      levalbuterol  1 25 mg Nebulization TID Jaya Benítez PA-C      levothyroxine  75 mcg Oral Early Morning Jaya Benítez PA-C      lidocaine  1 patch Topical Daily Jaya Benítez PA-C      LORazepam  0 5 mg Oral Q8H PRN Jaya Benítez PA-C      methocarbamol  500 mg Oral Q6H PRN ELISABET Armas      ondansetron  4 mg Intravenous Q6H PRN JESSI WongC      oxyCODONE  2 5 mg Oral Q4H PRN Adrian Lupe Navarro MD      Or   Dario Funes oxyCODONE  5 mg Oral Q4H PRN Jimmy Noonan MD      pantoprazole  40 mg Intravenous Q24H [facility] Jaya Benítez PA-C      tamsulosin  0 4 mg Oral Daily With Asha Schuler PA-C      torsemide  10 mg Oral Daily Shantelle Casillas MD          Today, Patient Was Seen By: Chucky Jane MD    **Please Note: This note may have been constructed using a voice recognition system  **

## 2022-07-03 NOTE — PLAN OF CARE
Problem: Potential for Falls  Goal: Patient will remain free of falls  Description: INTERVENTIONS:  - Educate patient/family on patient safety including physical limitations  - Instruct patient to call for assistance with activity   - Consult OT/PT to assist with strengthening/mobility   - Keep Call bell within reach  - Keep bed low and locked with side rails adjusted as appropriate  - Keep care items and personal belongings within reach  - Initiate and maintain comfort rounds  - Make Fall Risk Sign visible to staff  - Offer Toileting every 2 Hours, in advance of need  - Initiate/Maintain bed alarm  - Apply yellow socks and bracelet for high fall risk patients  - Consider moving patient to room near nurses station  Outcome: Progressing     Problem: MOBILITY - ADULT  Goal: Maintain or return to baseline ADL function  Description: INTERVENTIONS:  -  Assess patient's ability to carry out ADLs; assess patient's baseline for ADL function and identify physical deficits which impact ability to perform ADLs (bathing, care of mouth/teeth, toileting, grooming, dressing, etc )  - Assess/evaluate cause of self-care deficits   - Assess range of motion  - Assess patient's mobility; develop plan if impaired  - Assess patient's need for assistive devices and provide as appropriate  - Encourage maximum independence but intervene and supervise when necessary  - Involve family in performance of ADLs  - Assess for home care needs following discharge   - Consider OT consult to assist with ADL evaluation and planning for discharge  - Provide patient education as appropriate  Outcome: Progressing  Goal: Maintains/Returns to pre admission functional level  Description: INTERVENTIONS:  - Perform BMAT or MOVE assessment daily    - Set and communicate daily mobility goal to care team and patient/family/caregiver  - Collaborate with rehabilitation services on mobility goals if consulted  - Perform Range of Motion 2 times a day    - Reposition patient every 2 hours    - Record patient progress and toleration of activity level   Outcome: Progressing     Problem: Prexisting or High Potential for Compromised Skin Integrity  Goal: Skin integrity is maintained or improved  Description: INTERVENTIONS:  - Identify patients at risk for skin breakdown  - Assess and monitor skin integrity  - Assess and monitor nutrition and hydration status  - Monitor labs   - Assess for incontinence   - Turn and reposition patient  - Assist with mobility/ambulation  - Relieve pressure over bony prominences  - Avoid friction and shearing  - Provide appropriate hygiene as needed including keeping skin clean and dry  - Evaluate need for skin moisturizer/barrier cream  - Collaborate with interdisciplinary team   - Patient/family teaching  - Consider wound care consult   Outcome: Progressing     Problem: CARDIOVASCULAR - ADULT  Goal: Absence of cardiac dysrhythmias or at baseline rhythm  Description: INTERVENTIONS:  - Continuous cardiac monitoring, vital signs, obtain 12 lead EKG if ordered  - Administer antiarrhythmic and heart rate control medications as ordered  - Monitor electrolytes and administer replacement therapy as ordered  Outcome: Progressing     Problem: RESPIRATORY - ADULT  Goal: Achieves optimal ventilation and oxygenation  Description: INTERVENTIONS:  - Assess for changes in respiratory status  - Assess for changes in mentation and behavior  - Position to facilitate oxygenation and minimize respiratory effort  - Oxygen administered by appropriate delivery if ordered  - Initiate smoking cessation education as indicated  - Encourage broncho-pulmonary hygiene including cough, deep breathe, Incentive Spirometry  - Assess the need for suctioning and aspirate as needed  - Assess and instruct to report SOB or any respiratory difficulty  - Respiratory Therapy support as indicated  Outcome: Progressing     Problem: GENITOURINARY - ADULT  Goal: Maintains or returns to baseline urinary function  Description: INTERVENTIONS:  - Assess urinary function  - Encourage oral fluids to ensure adequate hydration if ordered  - Administer IV fluids as ordered to ensure adequate hydration  - Administer ordered medications as needed  - Offer frequent toileting  - Follow urinary retention protocol if ordered  Outcome: Progressing  Goal: Urinary catheter remains patent  Description: INTERVENTIONS:  - Assess patency of urinary catheter  - If patient has a chronic harp, consider changing catheter if non-functioning  - Follow guidelines for intermittent irrigation of non-functioning urinary catheter  Outcome: Progressing     Problem: METABOLIC, FLUID AND ELECTROLYTES - ADULT  Goal: Electrolytes maintained within normal limits  Description: INTERVENTIONS:  - Monitor labs and assess patient for signs and symptoms of electrolyte imbalances  - Administer electrolyte replacement as ordered  - Monitor response to electrolyte replacements, including repeat lab results as appropriate  - Instruct patient on fluid and nutrition as appropriate  Outcome: Progressing  Goal: Fluid balance maintained  Description: INTERVENTIONS:  - Monitor labs   - Monitor I/O and WT  - Instruct patient on fluid and nutrition as appropriate  - Assess for signs & symptoms of volume excess or deficit  Outcome: Progressing     Problem: SKIN/TISSUE INTEGRITY - ADULT  Goal: Skin Integrity remains intact(Skin Breakdown Prevention)  Description: Assess:  -Perform Chris assessment every shift  -Clean and moisturize skin every day  -Inspect skin when repositioning, toileting, and assisting with ADLS  -Assess extremities for adequate circulation and sensation     Bed Management:  -Have minimal linens on bed & keep smooth, unwrinkled  -Change linens as needed when moist or perspiring  -Avoid sitting or lying in one position for more than 2 hours while in bed  -Keep HOB at 30 degrees     Toileting:  -Offer bedside commode  -Assess for incontinence every 2 hours  Activity:  -Encourage activity and walks on unit  -Encourage or provide ROM exercises   -Turn and reposition patient every 2 Hours  -Use appropriate equipment to lift or move patient in bed  -Instruct/ Assist with weight shifting every 6 minutes  when out of bed in chair  -Consider limitation of chair time 4 hour intervals    Skin Care:  -Avoid use of baby powder, tape, friction and shearing, hot water or constrictive clothing  -Relieve pressure over bony prominences using allevyn  -Do not massage red bony areas    Next Steps:  Outcome: Progressing     Problem: HEMATOLOGIC - ADULT  Goal: Maintains hematologic stability  Description: INTERVENTIONS  - Assess for signs and symptoms of bleeding or hemorrhage  - Monitor labs  - Administer supportive blood products/factors as ordered and appropriate  Outcome: Progressing     Problem: MUSCULOSKELETAL - ADULT  Goal: Maintain or return mobility to safest level of function  Description: INTERVENTIONS:  - Assess patient's ability to carry out ADLs; assess patient's baseline for ADL function and identify physical deficits which impact ability to perform ADLs (bathing, care of mouth/teeth, toileting, grooming, dressing, etc )  - Assess/evaluate cause of self-care deficits   - Assess range of motion  - Assess patient's mobility  - Assess patient's need for assistive devices and provide as appropriate  - Encourage maximum independence but intervene and supervise when necessary  - Involve family in performance of ADLs  - Assess for home care needs following discharge   - Consider OT consult to assist with ADL evaluation and planning for discharge  - Provide patient education as appropriate  Outcome: Progressing     Problem: Nutrition/Hydration-ADULT  Goal: Nutrient/Hydration intake appropriate for improving, restoring or maintaining nutritional needs  Description: Monitor and assess patient's nutrition/hydration status for malnutrition   Collaborate with interdisciplinary team and initiate plan and interventions as ordered  Monitor patient's weight and dietary intake as ordered or per policy  Utilize nutrition screening tool and intervene as necessary  Determine patient's food preferences and provide high-protein, high-caloric foods as appropriate       INTERVENTIONS:  - Monitor oral intake, urinary output, labs, and treatment plans  - Assess nutrition and hydration status and recommend course of action  - Evaluate amount of meals eaten  - Assist patient with eating if necessary   - Allow adequate time for meals  - Recommend/ encourage appropriate diets, oral nutritional supplements, and vitamin/mineral supplements  - Order, calculate, and assess calorie counts as needed  - Recommend, monitor, and adjust tube feedings and TPN/PPN based on assessed needs  - Assess need for intravenous fluids  - Provide specific nutrition/hydration education as appropriate  - Include patient/family/caregiver in decisions related to nutrition  Outcome: Progressing     Problem: PAIN - ADULT  Goal: Verbalizes/displays adequate comfort level or baseline comfort level  Description: Interventions:  - Encourage patient to monitor pain and request assistance  - Assess pain using appropriate pain scale  - Administer analgesics based on type and severity of pain and evaluate response  - Implement non-pharmacological measures as appropriate and evaluate response  - Consider cultural and social influences on pain and pain management  - Notify physician/advanced practitioner if interventions unsuccessful or patient reports new pain  Outcome: Progressing     Problem: INFECTION - ADULT  Goal: Absence or prevention of progression during hospitalization  Description: INTERVENTIONS:  - Assess and monitor for signs and symptoms of infection  - Monitor lab/diagnostic results  - Monitor all insertion sites, i e  indwelling lines, tubes, and drains  - Monitor endotracheal if appropriate and nasal secretions for changes in amount and color  - [place] appropriate cooling/warming therapies per order  - Administer medications as ordered  - Instruct and encourage patient and family to use good hand hygiene technique  - Identify and instruct in appropriate isolation precautions for identified infection/condition  Outcome: Progressing     Problem: SAFETY ADULT  Goal: Patient will remain free of falls  Description: INTERVENTIONS:  - Educate patient/family on patient safety including physical limitations  - Instruct patient to call for assistance with activity   - Consult OT/PT to assist with strengthening/mobility   - Keep Call bell within reach  - Keep bed low and locked with side rails adjusted as appropriate  - Keep care items and personal belongings within reach  - Initiate and maintain comfort rounds  - Make Fall Risk Sign visible to staff  - Offer Toileting every 2 Hours, in advance of need  - Initiate/Maintain bed alarm  -- Apply yellow socks and bracelet for high fall risk patients  - Consider moving patient to room near nurses station  Outcome: Progressing  Goal: Maintain or return to baseline ADL function  Description: INTERVENTIONS:  -  Assess patient's ability to carry out ADLs; assess patient's baseline for ADL function and identify physical deficits which impact ability to perform ADLs (bathing, care of mouth/teeth, toileting, grooming, dressing, etc )  - Assess/evaluate cause of self-care deficits   - Assess range of motion  - Assess patient's mobility; develop plan if impaired  - Assess patient's need for assistive devices and provide as appropriate  - Encourage maximum independence but intervene and supervise when necessary  - Involve family in performance of ADLs  - Assess for home care needs following discharge   - Consider OT consult to assist with ADL evaluation and planning for discharge  - Provide patient education as appropriate  Outcome: Progressing  Goal: Maintains/Returns to pre admission functional level  Description: INTERVENTIONS:  - Perform BMAT or MOVE assessment daily    - Set and communicate daily mobility goal to care team and patient/family/caregiver  - Collaborate with rehabilitation services on mobility goals if consulted  - Perform Range of Motion 2 times a day  - Reposition patient every 2 hours  -- Record patient progress and toleration of activity level   Outcome: Progressing     Problem: DISCHARGE PLANNING  Goal: Discharge to home or other facility with appropriate resources  Description: INTERVENTIONS:  - Identify barriers to discharge w/patient and caregiver  - Arrange for needed discharge resources and transportation as appropriate  - Identify discharge learning needs (meds, wound care, etc )  - Arrange for interpretive services to assist at discharge as needed  - Refer to Case Management Department for coordinating discharge planning if the patient needs post-hospital services based on physician/advanced practitioner order or complex needs related to functional status, cognitive ability, or social support system  Outcome: Progressing     Problem: Knowledge Deficit  Goal: Patient/family/caregiver demonstrates understanding of disease process, treatment plan, medications, and discharge instructions  Description: Complete learning assessment and assess knowledge base    Interventions:  - Provide teaching at level of understanding  - Provide teaching via preferred learning methods  Outcome: Progressing

## 2022-07-04 LAB
ANION GAP SERPL CALCULATED.3IONS-SCNC: 11 MMOL/L (ref 4–13)
BACTERIA BLD CULT: NORMAL
BACTERIA BLD CULT: NORMAL
BUN SERPL-MCNC: 31 MG/DL (ref 5–25)
CALCIUM SERPL-MCNC: 9.1 MG/DL (ref 8.3–10.1)
CHLORIDE SERPL-SCNC: 99 MMOL/L (ref 100–108)
CO2 SERPL-SCNC: 24 MMOL/L (ref 21–32)
CREAT SERPL-MCNC: 2.04 MG/DL (ref 0.6–1.3)
ERYTHROCYTE [DISTWIDTH] IN BLOOD BY AUTOMATED COUNT: 14.2 % (ref 11.6–15.1)
GFR SERPL CREATININE-BSD FRML MDRD: 27 ML/MIN/1.73SQ M
GLUCOSE SERPL-MCNC: 111 MG/DL (ref 65–140)
HCT VFR BLD AUTO: 37.2 % (ref 36.5–49.3)
HGB BLD-MCNC: 11.9 G/DL (ref 12–17)
MCH RBC QN AUTO: 30.8 PG (ref 26.8–34.3)
MCHC RBC AUTO-ENTMCNC: 32 G/DL (ref 31.4–37.4)
MCV RBC AUTO: 96 FL (ref 82–98)
PLATELET # BLD AUTO: 172 THOUSANDS/UL (ref 149–390)
PMV BLD AUTO: 8.5 FL (ref 8.9–12.7)
POTASSIUM SERPL-SCNC: 4.3 MMOL/L (ref 3.5–5.3)
RBC # BLD AUTO: 3.86 MILLION/UL (ref 3.88–5.62)
SODIUM SERPL-SCNC: 134 MMOL/L (ref 136–145)
WBC # BLD AUTO: 8.02 THOUSAND/UL (ref 4.31–10.16)

## 2022-07-04 PROCEDURE — 99232 SBSQ HOSP IP/OBS MODERATE 35: CPT | Performed by: HOSPITALIST

## 2022-07-04 PROCEDURE — 80048 BASIC METABOLIC PNL TOTAL CA: CPT | Performed by: PHYSICIAN ASSISTANT

## 2022-07-04 PROCEDURE — 94760 N-INVAS EAR/PLS OXIMETRY 1: CPT

## 2022-07-04 PROCEDURE — 94640 AIRWAY INHALATION TREATMENT: CPT

## 2022-07-04 PROCEDURE — 85027 COMPLETE CBC AUTOMATED: CPT | Performed by: PHYSICIAN ASSISTANT

## 2022-07-04 PROCEDURE — C9113 INJ PANTOPRAZOLE SODIUM, VIA: HCPCS | Performed by: PHYSICIAN ASSISTANT

## 2022-07-04 RX ORDER — DOXYCYCLINE HYCLATE 100 MG/1
100 CAPSULE ORAL EVERY 12 HOURS SCHEDULED
Status: DISCONTINUED | OUTPATIENT
Start: 2022-07-04 | End: 2022-07-05 | Stop reason: HOSPADM

## 2022-07-04 RX ADMIN — ONDANSETRON 4 MG: 2 INJECTION INTRAMUSCULAR; INTRAVENOUS at 14:34

## 2022-07-04 RX ADMIN — GUAIFENESIN 1200 MG: 600 TABLET ORAL at 20:42

## 2022-07-04 RX ADMIN — FLUTICASONE PROPIONATE 1 SPRAY: 50 SPRAY, METERED NASAL at 09:03

## 2022-07-04 RX ADMIN — TAMSULOSIN HYDROCHLORIDE 0.4 MG: 0.4 CAPSULE ORAL at 16:21

## 2022-07-04 RX ADMIN — LORAZEPAM 0.5 MG: 0.5 TABLET ORAL at 16:21

## 2022-07-04 RX ADMIN — HYDROMORPHONE HYDROCHLORIDE 0.5 MG: 1 INJECTION, SOLUTION INTRAMUSCULAR; INTRAVENOUS; SUBCUTANEOUS at 14:34

## 2022-07-04 RX ADMIN — LIDOCAINE 5% 1 PATCH: 700 PATCH TOPICAL at 09:02

## 2022-07-04 RX ADMIN — AMIODARONE HYDROCHLORIDE 200 MG: 200 TABLET ORAL at 09:02

## 2022-07-04 RX ADMIN — LEVALBUTEROL HYDROCHLORIDE 1.25 MG: 1.25 SOLUTION, CONCENTRATE RESPIRATORY (INHALATION) at 19:31

## 2022-07-04 RX ADMIN — ACETAMINOPHEN 975 MG: 325 TABLET, FILM COATED ORAL at 09:02

## 2022-07-04 RX ADMIN — IPRATROPIUM BROMIDE 0.5 MG: 0.5 SOLUTION RESPIRATORY (INHALATION) at 07:43

## 2022-07-04 RX ADMIN — LEVALBUTEROL HYDROCHLORIDE 1.25 MG: 1.25 SOLUTION, CONCENTRATE RESPIRATORY (INHALATION) at 13:06

## 2022-07-04 RX ADMIN — GABAPENTIN 100 MG: 100 CAPSULE ORAL at 09:03

## 2022-07-04 RX ADMIN — LEVALBUTEROL HYDROCHLORIDE 1.25 MG: 1.25 SOLUTION, CONCENTRATE RESPIRATORY (INHALATION) at 07:43

## 2022-07-04 RX ADMIN — CEFEPIME HYDROCHLORIDE 1000 MG: 1 INJECTION, SOLUTION INTRAVENOUS at 09:02

## 2022-07-04 RX ADMIN — TORSEMIDE 10 MG: 20 TABLET ORAL at 09:02

## 2022-07-04 RX ADMIN — PANTOPRAZOLE SODIUM 40 MG: 40 INJECTION, POWDER, FOR SOLUTION INTRAVENOUS at 09:01

## 2022-07-04 RX ADMIN — DOXYCYCLINE 100 MG: 100 CAPSULE ORAL at 20:42

## 2022-07-04 RX ADMIN — OXYCODONE HYDROCHLORIDE 5 MG: 5 TABLET ORAL at 16:21

## 2022-07-04 RX ADMIN — GABAPENTIN 100 MG: 100 CAPSULE ORAL at 16:21

## 2022-07-04 RX ADMIN — OXYCODONE HYDROCHLORIDE 5 MG: 5 TABLET ORAL at 04:18

## 2022-07-04 RX ADMIN — ACETAMINOPHEN 975 MG: 325 TABLET, FILM COATED ORAL at 16:21

## 2022-07-04 RX ADMIN — DOXYCYCLINE 100 MG: 100 CAPSULE ORAL at 12:07

## 2022-07-04 RX ADMIN — GUAIFENESIN 1200 MG: 600 TABLET ORAL at 09:03

## 2022-07-04 RX ADMIN — ESCITALOPRAM 5 MG: 5 TABLET, FILM COATED ORAL at 09:02

## 2022-07-04 RX ADMIN — LEVOTHYROXINE SODIUM 75 MCG: 75 TABLET ORAL at 04:18

## 2022-07-04 RX ADMIN — GABAPENTIN 100 MG: 100 CAPSULE ORAL at 20:42

## 2022-07-04 RX ADMIN — FLUTICASONE PROPIONATE 1 SPRAY: 50 SPRAY, METERED NASAL at 17:27

## 2022-07-04 RX ADMIN — IPRATROPIUM BROMIDE 0.5 MG: 0.5 SOLUTION RESPIRATORY (INHALATION) at 19:31

## 2022-07-04 RX ADMIN — IPRATROPIUM BROMIDE 0.5 MG: 0.5 SOLUTION RESPIRATORY (INHALATION) at 13:06

## 2022-07-04 NOTE — ASSESSMENT & PLAN NOTE
· Admitted at [facility] 6/12-6/21 with d/c to [facility]   · Left rib fractures 6-11  · Pulmonary toileting  · Lidocaine patch  · Tylenol for pain   Requiring narcotics for pain control

## 2022-07-04 NOTE — QUICK NOTE
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

## 2022-07-04 NOTE — ASSESSMENT & PLAN NOTE
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

## 2022-07-04 NOTE — PROGRESS NOTES
[place]  Progress Note - Lori Dawson 6/30/1932, 80 y o  male MRN: [id number]  Unit/Bed#: -01 SDU Encounter: [id number]  Primary Care Provider: Leslye Payne MD   Date and time admitted to hospital: 6/28/2022  8:23 PM    Hematuria  Assessment & Plan  · Newly noted 07/01 which resolves with manual irrigation   · Patient reportedly pulling on Jett   · Suspect 2/2 trauma   · Will hold DVT ppx   · Manually irrigate Jett catheter PRN hematuria   · Hgb currently stable 9 8   · Family has mentioned that they do not want any aggressive intervention, if hematuria doesn't resolve with manual irrigation will discuss CBI with family and consult urology   · Family still pending decision on hospice    Goals of care, counseling/discussion  Assessment & Plan  · S/p Goals of care discussion with palliative care- Pt family concerned about repetitive admissions to hospital, pt's advanced age, pneumonia in setting of rib fractures, ckd stage 4 and overall poor quality of life  · They are contemplating but have not agreed to hospice yet  · Seen by hospice liaison today who states patient is hospice appropriate   · Son and daughter in law to decide whether to purse STR with possible transition to hospice vs home hospice vs hospice and SNF- in the meantime, continue medical management until decision is made    Sepsis [facility])  Assessment & Plan  · Blood cultures - NGTD  · See PNA above  · Maintain MAP> 65  · Received 1 liter NS - no hypotension / lactic normal   · Leukocytosis resolved, afebrile, still with occasional sinus tachycardia      Multiple rib fractures  Assessment & Plan  · Admitted at [facility] 6/12-6/21 with d/c to [facility]   · Left rib fractures 6-11  · Pulmonary toileting  · Lidocaine patch  · Tylenol for pain   Requiring narcotics for pain control    Hyponatremia  Assessment & Plan  · Sodium on admission 127 improved to 130 with IVF then decreased again to 127 · resolving  · 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   · Nephrology consult appreciated: switched home lasix 20mg every other day to torsemide 10mg daily  · Family does not want any aggressive intervention   · Continue to monitor     Acute respiratory failure with hypoxia ([address] )  Assessment & Plan  · Requiring up to 4 L of supplemental oxygen- now down to 2L   · Likely secondary to pneumonia and underlying COPD   · Not on oxygen at home  · Continue to wean off oxygen as able    Centrilobular emphysema (HCC)  Assessment & Plan  · Resp  protocol  · Ipratropium TID  · levalbuterol TID      LIVIER (obstructive sleep apnea)  Assessment & Plan  · Patient non-compliant with CPAP  · Continue to monitor oxygenation       Chronic diastolic heart failure (HCC)  Assessment & Plan  Wt Readings from Last 3 Encounters:   07/01/22 71 8 kg (158 lb 4 6 oz)   05/20/22 74 8 kg (164 lb 12 8 oz)   05/08/22 74 8 kg (165 lb)       · Echo 9/28/21-- EF 55%  Grade 1 diastolic dysfunction  · PTA diuretic: lasix 20mg every other day   · Currently on torsemide 10mg daily   · Does not appear volume overloaded         Acquired hypothyroidism  Assessment & Plan  Continue home levothyroxine    GERD (gastroesophageal reflux disease)  Assessment & Plan  - Protonix 40 mg IV daily     Benign prostatic hyperplasia with lower urinary tract symptoms  Assessment & Plan  · Continue home Flomax  · Currently with harp catheter      Acute renal failure superimposed on stage 4 chronic kidney disease [facility])  Assessment & Plan  Lab Results   Component Value Date    EGFR 27 07/04/2022    EGFR 32 07/03/2022    EGFR 32 07/02/2022    CREATININE 2 04 (H) 07/04/2022    CREATININE 1 78 (H) 07/03/2022    CREATININE 1 81 (H) 07/02/2022   · Baseline creatinine between 1 7 - 1 8  · Insert harp cath for accurate I&Os  · Avoid nephrotoxins  Slightly above the baseline     · Monitor UO  · Currently on torsemide 10mg daily- monitor creatinine closely   · Continue monitor BMP      Essential hypertension  Assessment & Plan  · BP stable   · Continuous cardiac monitoring      * Hospital acquired PNA  Assessment & Plan  · Patient is an 70-year-old patient with past medical history of diastolic HF, COPD, CAP, left rib fracture , [facility] on   Patient presents to the hospital due to worsening fever, shortness of breath, cough, and increased oxygen requirement that started today,   Patient noted to be hypoxic upon EMS arrival    · CXR -- showed new subtle infiltrates  · BC x2: no growth at 72h  · Transition to doxycycline  · MRSA swab negative, Vanco d/c   · Continue guaifenesin 1200 bid  · PRN Tessalon perles  · Ipratropium TID  · levalbuterol TID  · Leukocytosis resolved, patient afebrile appears stable on 2L  · Trend procalcitonin- downtrending 0 7 > 0 38               VTE  Prophylaxis:   Pharmacologic: in place    Patient Centered Rounds: I have performed bedside rounds with nursing staff today  Discussions with Specialists or Other Care Team Provider: case management    Education and Discussions with Family / Patient: pt      Current Length of Stay: 6 day(s)    Current Patient Status: Inpatient        Code Status: Level 3 - DNAR and DNI      Subjective:     Pt seen  States rib pain present  No other complaints    Patient is seen and examined at bedside  All other ROS are negative  Objective:     Vitals:   Temp (24hrs), Av 7 °F (37 1 °C), Min:98 °F (36 7 °C), Max:98 96 °F (37 2 °C)    Temp:  [98 °F (36 7 °C)-98 96 °F (37 2 °C)] 98 78 °F (37 1 °C)  HR:  [101-124] 113  Resp:  [22-24] 22  BP: ()/(55-82) 133/77  SpO2:  [92 %-100 %] 97 %  Body mass index is 27 17 kg/m²  Input and Output Summary (last 24 hours):        Intake/Output Summary (Last 24 hours) at 2022 1258  Last data filed at 2022 1000  Gross per 24 hour   Intake 190 ml   Output 1065 ml   Net -875 ml       Physical Exam:       GEN: No acute distress, comfortable, chronically ill  HEEENT: No JVD, PERRLA, no scleral icterus  RESP: Lungs clear to auscultation bilaterally  CV: RRR, +s1/s2   ABD: SOFT NON TENDER, POSITIVE BOWEL SOUNDS, NO DISTENTION  PSYCH: dementia  NEURO:  NO FOCAL DEFICITS  SKIN: NO RASH  EXTREM: NO EDEMA    Additional Data:     Labs:    Results from last 7 days   Lab Units 07/04/22 0419 07/03/22 0438   WBC Thousand/uL 8 02 6 40   HEMOGLOBIN g/dL 11 9* 10 3*   HEMATOCRIT % 37 2 33 4*   PLATELETS Thousands/uL 172 165   NEUTROS PCT %  --  69   LYMPHS PCT %  --  13*   MONOS PCT %  --  15*   EOS PCT %  --  2     Results from last 7 days   Lab Units 07/04/22 0419 07/02/22  0551 07/01/22 0454   SODIUM mmol/L 134*   < > 129*   POTASSIUM mmol/L 4 3   < > 4 8   CHLORIDE mmol/L 99*   < > 98*   CO2 mmol/L 24   < > 25   BUN mg/dL 31*   < > 30*   CREATININE mg/dL 2 04*   < > 1 71*   ANION GAP mmol/L 11   < > 6   CALCIUM mg/dL 9 1   < > 8 6   ALBUMIN g/dL  --   --  2 4*   TOTAL BILIRUBIN mg/dL  --   --  0 60   ALK PHOS U/L  --   --  164*   ALT U/L  --   --  18   AST U/L  --   --  9   GLUCOSE RANDOM mg/dL 111   < > 105    < > = values in this interval not displayed  Results from last 7 days   Lab Units 06/28/22 2030   INR  1 03             Results from last 7 days   Lab Units 07/03/22 0438 07/02/22  0551 06/29/22 0521 06/28/22 2030   LACTIC ACID mmol/L  --   --   --  2 0   PROCALCITONIN ng/ml 0 42* 0 38* 0 70* 0 26*           * I Have Reviewed All Lab Data Listed Above  Imaging:     Results for orders placed during the hospital encounter of 06/28/22    XR chest 1 view portable    Narrative  CHEST    INDICATION:   resp distress  COMPARISON:  5/9/22    EXAM PERFORMED/VIEWS:  XR CHEST PORTABLE      FINDINGS:    Lordotic positioning  Patient rotated to the right  Low lung volumes  Similar mild right diaphragm elevation  Increased, bilateral central and basilar interstitial markings  Small left pleural effusion    No pneumothorax  Stable appearance of the cardiomediastinal silhouette, given technique differences  New left 6th and 7th axillary rib fractures, no definite callous formation  Right neck surgical clips  The study was marked in [facility] for immediate notification  Impression  Low lung volumes with new interstitial changes compared to 5/9/2022, edema versus infection  Evidence of small left pleural effusion  New left rib fractures, appear acute to subacute  Workstation performed: [id number]    Results for orders placed in visit on 05/03/22    XR chest pa & lateral    Narrative  CHEST    INDICATION:   R05 9: Cough, unspecified  R06 2: Wheezing  COMPARISON:  Chest radiograph from 1/19/2022 and 11/26/2021, abdomen CT from 4/12/2022, chest CT from 2/24/2022  EXAM PERFORMED/VIEWS:  XR CHEST PA & LATERAL  DUAL ENERGY SUBTRACTION  FINDINGS:    Normal heart size  Severe coronary artery calcification  No acute disease  Left lower lobe nodule corresponding with the CT  Benign linear scar  No effusion or pneumothorax  Mild benign eventration of the right hemidiaphragm  Osseous structures appear within normal limits for patient age  Old T12 compression deformity  Impression  No acute cardiopulmonary disease  Workstation performed: [id number]      *I have reviewed all imaging reports listed above      Recent Cultures (last 7 days):     Results from last 7 days   Lab Units 06/28/22 2030   BLOOD CULTURE  No Growth After 5 Days  No Growth After 5 Days         Last 24 Hours Medication List:   Current Facility-Administered Medications   Medication Dose Route Frequency Provider Last Rate    acetaminophen  975 mg Oral Q8H ELISABET Armas      amiodarone  200 mg Oral Daily Jaya Benítez PA-C      benzonatate  100 mg Oral TID PRN Jaya Benítez PA-C      doxycycline hyclate  100 mg Oral Q12H MD Sarah Ruano escitalopram  5 mg Oral Daily Jaya Benítez PA-C      fluticasone  1 spray Nasal BID Jaya Benítez PA-C      gabapentin  100 mg Oral TID Simba Blum PA-C      guaiFENesin  1,200 mg Oral Q12H [address] [facility]      HYDROmorphone  0 5 mg Intravenous Q3H PRN Karey Shine MD      ipratropium  0 5 mg Nebulization TID Reinaldo Soto DO      levalbuterol  1 25 mg Nebulization TID [facility]-C      levothyroxine  75 mcg Oral Early Morning Jaya Benítez PA-C      lidocaine  1 patch Topical Daily [facility]      LORazepam  0 5 mg Oral Q8H PRN [facility] PA-C      methocarbamol  500 mg Oral Q6H PRN ELISABET Armas      ondansetron  4 mg Intravenous Q6H PRN [facility] Ben[facility] PA-C      oxyCODONE  2 5 mg Oral Q4H PRN Adrian Méndez MD      Or   [facility] oxyCODONE  5 mg Oral Q4H PRN Karey Shine MD      pantoprazole  40 mg Intravenous Q24H [address] [facility] Ben[facility]te[facility] PA-C      tamsulosin  0 4 mg Oral Daily With Asha Schuler PA-C      torsemide  10 mg Oral Daily Kyle Mckeon MD          Today, Patient Was Seen By: Karey Shine MD    ** Please Note: Dictation voice to text software may have been used in the creation of this document   **

## 2022-07-04 NOTE — ASSESSMENT & PLAN NOTE
· Patient is an 80year-old patient with past medical history of diastolic HF, COPD, CAP, left rib fracture 6-11, recent discharge from [address] [facility] to [facility] for rehab on 6/21  Patient presents to the hospital due to worsening fever, shortness of breath, cough, and increased oxygen requirement that started today, 6/29  Patient noted to be hypoxic upon EMS arrival    · CXR 6/29-- showed new subtle infiltrates  · BC x2: no growth at 72h  · Transition to doxycycline     · MRSA swab negative, Vanco d/c   · Continue guaifenesin 1200 bid  · PRN Tessalon perles  · Ipratropium TID  · levalbuterol TID  · Leukocytosis resolved, patient afebrile appears stable on 2L  · Trend procalcitonin- downtrending 0 7 > 0 38

## 2022-07-04 NOTE — ASSESSMENT & PLAN NOTE
Lab Results   Component Value Date    EGFR 27 07/04/2022    EGFR 32 07/03/2022    EGFR 32 07/02/2022    CREATININE 2 04 (H) 07/04/2022    CREATININE 1 78 (H) 07/03/2022    CREATININE 1 81 (H) 07/02/2022   · Baseline creatinine between 1 7 - 1 8  · Insert harp cath for accurate I&Os  · Avoid nephrotoxins  Slightly above the baseline     · Monitor UO  · Currently on torsemide 10mg daily- monitor creatinine closely   · Continue monitor BMP

## 2022-07-04 NOTE — PLAN OF CARE
Problem: Potential for Falls  Goal: Patient will remain free of falls  Description: INTERVENTIONS:  - Educate patient/family on patient safety including physical limitations  - Instruct patient to call for assistance with activity   - Consult OT/PT to assist with strengthening/mobility   - Keep Call bell within reach  - Keep bed low and locked with side rails adjusted as appropriate  - Keep care items and personal belongings within reach  - Initiate and maintain comfort rounds  - Apply yellow socks and bracelet for high fall risk patients  - Consider moving patient to room near nurses station  Outcome: Progressing     Problem: MOBILITY - ADULT  Goal: Maintain or return to baseline ADL function  Description: INTERVENTIONS:  -  Assess patient's ability to carry out ADLs; assess patient's baseline for ADL function and identify physical deficits which impact ability to perform ADLs (bathing, care of mouth/teeth, toileting, grooming, dressing, etc )  - Assess/evaluate cause of self-care deficits   - Assess range of motion  - Assess patient's mobility; develop plan if impaired  - Assess patient's need for assistive devices and provide as appropriate  - Encourage maximum independence but intervene and supervise when necessary  - Involve family in performance of ADLs  - Assess for home care needs following discharge   - Consider OT consult to assist with ADL evaluation and planning for discharge  - Provide patient education as appropriate  Outcome: Progressing  Goal: Maintains/Returns to pre admission functional level  Description: INTERVENTIONS:  - Perform BMAT or MOVE assessment daily    - Set and communicate daily mobility goal to care team and patient/family/caregiver     - Collaborate with rehabilitation services on mobility goals if consulted  - Out of bed for toileting  - Record patient progress and toleration of activity level   Outcome: Progressing     Problem: Prexisting or High Potential for Compromised Skin Integrity  Goal: Skin integrity is maintained or improved  Description: INTERVENTIONS:  - Identify patients at risk for skin breakdown  - Assess and monitor skin integrity  - Assess and monitor nutrition and hydration status  - Monitor labs   - Assess for incontinence   - Turn and reposition patient  - Assist with mobility/ambulation  - Relieve pressure over bony prominences  - Avoid friction and shearing  - Provide appropriate hygiene as needed including keeping skin clean and dry  - Evaluate need for skin moisturizer/barrier cream  - Collaborate with interdisciplinary team   - Patient/family teaching  - Consider wound care consult   Outcome: Progressing     Problem: CARDIOVASCULAR - ADULT  Goal: Absence of cardiac dysrhythmias or at baseline rhythm  Description: INTERVENTIONS:  - Continuous cardiac monitoring, vital signs, obtain 12 lead EKG if ordered  - Administer antiarrhythmic and heart rate control medications as ordered  - Monitor electrolytes and administer replacement therapy as ordered  Outcome: Progressing     Problem: RESPIRATORY - ADULT  Goal: Achieves optimal ventilation and oxygenation  Description: INTERVENTIONS:  - Assess for changes in respiratory status  - Assess for changes in mentation and behavior  - Position to facilitate oxygenation and minimize respiratory effort  - Oxygen administered by appropriate delivery if ordered  - Initiate smoking cessation education as indicated  - Encourage broncho-pulmonary hygiene including cough, deep breathe, Incentive Spirometry  - Assess the need for suctioning and aspirate as needed  - Assess and instruct to report SOB or any respiratory difficulty  - Respiratory Therapy support as indicated  Outcome: Progressing     Problem: GENITOURINARY - ADULT  Goal: Maintains or returns to baseline urinary function  Description: INTERVENTIONS:  - Assess urinary function  - Encourage oral fluids to ensure adequate hydration if ordered  - Administer IV fluids as ordered to ensure adequate hydration  - Administer ordered medications as needed  - Offer frequent toileting  - Follow urinary retention protocol if ordered  Outcome: Progressing  Goal: Urinary catheter remains patent  Description: INTERVENTIONS:  - Assess patency of urinary catheter  - If patient has a chronic harp, consider changing catheter if non-functioning  - Follow guidelines for intermittent irrigation of non-functioning urinary catheter  Outcome: Progressing     Problem: METABOLIC, FLUID AND ELECTROLYTES - ADULT  Goal: Electrolytes maintained within normal limits  Description: INTERVENTIONS:  - Monitor labs and assess patient for signs and symptoms of electrolyte imbalances  - Administer electrolyte replacement as ordered  - Monitor response to electrolyte replacements, including repeat lab results as appropriate  - Instruct patient on fluid and nutrition as appropriate  Outcome: Progressing  Goal: Fluid balance maintained  Description: INTERVENTIONS:  - Monitor labs   - Monitor I/O and WT  - Instruct patient on fluid and nutrition as appropriate  - Assess for signs & symptoms of volume excess or deficit  Outcome: Progressing     Problem: SKIN/TISSUE INTEGRITY - ADULT  Goal: Skin Integrity remains intact(Skin Breakdown Prevention)  Description: Assess:  -Assess extremities for adequate circulation and sensation     Bed Management:  -Have minimal linens on bed & keep smooth, unwrinkled  -Change linens as needed when moist or perspiring    Toileting:  -Offer bedside commode    Skin Care:  -Avoid use of baby powder, tape, friction and shearing, hot water or constrictive clothing  Outcome: Progressing     Problem: HEMATOLOGIC - ADULT  Goal: Maintains hematologic stability  Description: INTERVENTIONS  - Assess for signs and symptoms of bleeding or hemorrhage  - Monitor labs  - Administer supportive blood products/factors as ordered and appropriate  Outcome: Progressing     Problem: MUSCULOSKELETAL - ADULT  Goal: Maintain or return mobility to safest level of function  Description: INTERVENTIONS:  - Assess patient's ability to carry out ADLs; assess patient's baseline for ADL function and identify physical deficits which impact ability to perform ADLs (bathing, care of mouth/teeth, toileting, grooming, dressing, etc )  - Assess/evaluate cause of self-care deficits   - Assess range of motion  - Assess patient's mobility  - Assess patient's need for assistive devices and provide as appropriate  - Encourage maximum independence but intervene and supervise when necessary  - Involve family in performance of ADLs  - Assess for home care needs following discharge   - Consider OT consult to assist with ADL evaluation and planning for discharge  - Provide patient education as appropriate  Outcome: Progressing     Problem: Nutrition/Hydration-ADULT  Goal: Nutrient/Hydration intake appropriate for improving, restoring or maintaining nutritional needs  Description: Monitor and assess patient's nutrition/hydration status for malnutrition  Collaborate with interdisciplinary team and initiate plan and interventions as ordered  Monitor patient's weight and dietary intake as ordered or per policy  Utilize nutrition screening tool and intervene as necessary  Determine patient's food preferences and provide high-protein, high-caloric foods as appropriate       INTERVENTIONS:  - Monitor oral intake, urinary output, labs, and treatment plans  - Assess nutrition and hydration status and recommend course of action  - Evaluate amount of meals eaten  - Assist patient with eating if necessary   - Allow adequate time for meals  - Recommend/ encourage appropriate diets, oral nutritional supplements, and vitamin/mineral supplements  - Order, calculate, and assess calorie counts as needed  - Recommend, monitor, and adjust tube feedings and TPN/PPN based on assessed needs  - Assess need for intravenous fluids  - Provide specific nutrition/hydration education as appropriate  - Include patient/family/caregiver in decisions related to nutrition  Outcome: Progressing     Problem: PAIN - ADULT  Goal: Verbalizes/displays adequate comfort level or baseline comfort level  Description: Interventions:  - Encourage patient to monitor pain and request assistance  - Assess pain using appropriate pain scale  - Administer analgesics based on type and severity of pain and evaluate response  - Implement non-pharmacological measures as appropriate and evaluate response  - Consider cultural and social influences on pain and pain management  - Notify physician/advanced practitioner if interventions unsuccessful or patient reports new pain  Outcome: Progressing     Problem: INFECTION - ADULT  Goal: Absence or prevention of progression during hospitalization  Description: INTERVENTIONS:  - Assess and monitor for signs and symptoms of infection  - Monitor lab/diagnostic results  - Monitor all insertion sites, i e  indwelling lines, tubes, and drains  - Monitor endotracheal if appropriate and nasal secretions for changes in amount and color  - [place] appropriate cooling/warming therapies per order  - Administer medications as ordered  - Instruct and encourage patient and family to use good hand hygiene technique  - Identify and instruct in appropriate isolation precautions for identified infection/condition  Outcome: Progressing     Problem: SAFETY ADULT  Goal: Patient will remain free of falls  Description: INTERVENTIONS:  - Educate patient/family on patient safety including physical limitations  - Instruct patient to call for assistance with activity   - Consult OT/PT to assist with strengthening/mobility   - Keep Call bell within reach  - Keep bed low and locked with side rails adjusted as appropriate  - Keep care items and personal belongings within reach  - Initiate and maintain comfort rounds  - Make Fall Risk Sign visible to staff  - Apply yellow socks and bracelet for high fall risk patients  - Consider moving patient to room near nurses station  Outcome: Progressing  Goal: Maintain or return to baseline ADL function  Description: INTERVENTIONS:  -  Assess patient's ability to carry out ADLs; assess patient's baseline for ADL function and identify physical deficits which impact ability to perform ADLs (bathing, care of mouth/teeth, toileting, grooming, dressing, etc )  - Assess/evaluate cause of self-care deficits   - Assess range of motion  - Assess patient's mobility; develop plan if impaired  - Assess patient's need for assistive devices and provide as appropriate  - Encourage maximum independence but intervene and supervise when necessary  - Involve family in performance of ADLs  - Assess for home care needs following discharge   - Consider OT consult to assist with ADL evaluation and planning for discharge  - Provide patient education as appropriate  Outcome: Progressing  Goal: Maintains/Returns to pre admission functional level  Description: INTERVENTIONS:  - Perform BMAT or MOVE assessment daily    - Set and communicate daily mobility goal to care team and patient/family/caregiver     - Collaborate with rehabilitation services on mobility goals if consulted  - Out of bed for toileting  - Record patient progress and toleration of activity level   Outcome: Progressing     Problem: DISCHARGE PLANNING  Goal: Discharge to home or other facility with appropriate resources  Description: INTERVENTIONS:  - Identify barriers to discharge w/patient and caregiver  - Arrange for needed discharge resources and transportation as appropriate  - Identify discharge learning needs (meds, wound care, etc )  - Arrange for interpretive services to assist at discharge as needed  - Refer to Case Management Department for coordinating discharge planning if the patient needs post-hospital services based on physician/advanced practitioner order or complex needs related to functional status, cognitive ability, or social support system  Outcome: Progressing     Problem: Knowledge Deficit  Goal: Patient/family/caregiver demonstrates understanding of disease process, treatment plan, medications, and discharge instructions  Description: Complete learning assessment and assess knowledge base    Interventions:  - Provide teaching at level of understanding  - Provide teaching via preferred learning methods  Outcome: Progressing

## 2022-07-05 VITALS
OXYGEN SATURATION: 96 % | HEART RATE: 101 BPM | DIASTOLIC BLOOD PRESSURE: 63 MMHG | SYSTOLIC BLOOD PRESSURE: 118 MMHG | TEMPERATURE: 97.6 F | RESPIRATION RATE: 18 BRPM | BODY MASS INDEX: 25.03 KG/M2 | WEIGHT: 146.6 LBS | HEIGHT: 64 IN

## 2022-07-05 LAB
ALBUMIN SERPL BCP-MCNC: 2.8 G/DL (ref 3.5–5)
ALP SERPL-CCNC: 206 U/L (ref 46–116)
ALT SERPL W P-5'-P-CCNC: 38 U/L (ref 12–78)
ANION GAP SERPL CALCULATED.3IONS-SCNC: 10 MMOL/L (ref 4–13)
AST SERPL W P-5'-P-CCNC: 27 U/L (ref 5–45)
BASOPHILS # BLD AUTO: 0.06 THOUSANDS/ΜL (ref 0–0.1)
BASOPHILS NFR BLD AUTO: 1 % (ref 0–1)
BILIRUB SERPL-MCNC: 0.5 MG/DL (ref 0.2–1)
BUN SERPL-MCNC: 38 MG/DL (ref 5–25)
CALCIUM ALBUM COR SERPL-MCNC: 10 MG/DL (ref 8.3–10.1)
CALCIUM SERPL-MCNC: 9 MG/DL (ref 8.3–10.1)
CHLORIDE SERPL-SCNC: 97 MMOL/L (ref 100–108)
CO2 SERPL-SCNC: 25 MMOL/L (ref 21–32)
CREAT SERPL-MCNC: 2.54 MG/DL (ref 0.6–1.3)
EOSINOPHIL # BLD AUTO: 0.17 THOUSAND/ΜL (ref 0–0.61)
EOSINOPHIL NFR BLD AUTO: 2 % (ref 0–6)
ERYTHROCYTE [DISTWIDTH] IN BLOOD BY AUTOMATED COUNT: 14.4 % (ref 11.6–15.1)
FLUAV RNA RESP QL NAA+PROBE: NEGATIVE
FLUBV RNA RESP QL NAA+PROBE: NEGATIVE
GFR SERPL CREATININE-BSD FRML MDRD: 21 ML/MIN/1.73SQ M
GLUCOSE SERPL-MCNC: 97 MG/DL (ref 65–140)
HCT VFR BLD AUTO: 37.8 % (ref 36.5–49.3)
HGB BLD-MCNC: 11.7 G/DL (ref 12–17)
IMM GRANULOCYTES # BLD AUTO: 0.09 THOUSAND/UL (ref 0–0.2)
IMM GRANULOCYTES NFR BLD AUTO: 1 % (ref 0–2)
LYMPHOCYTES # BLD AUTO: 1.4 THOUSANDS/ΜL (ref 0.6–4.47)
LYMPHOCYTES NFR BLD AUTO: 14 % (ref 14–44)
MCH RBC QN AUTO: 30.1 PG (ref 26.8–34.3)
MCHC RBC AUTO-ENTMCNC: 31 G/DL (ref 31.4–37.4)
MCV RBC AUTO: 97 FL (ref 82–98)
MONOCYTES # BLD AUTO: 1.13 THOUSAND/ΜL (ref 0.17–1.22)
MONOCYTES NFR BLD AUTO: 12 % (ref 4–12)
NEUTROPHILS # BLD AUTO: 7.01 THOUSANDS/ΜL (ref 1.85–7.62)
NEUTS SEG NFR BLD AUTO: 70 % (ref 43–75)
NRBC BLD AUTO-RTO: 0 /100 WBCS
PLATELET # BLD AUTO: 192 THOUSANDS/UL (ref 149–390)
PMV BLD AUTO: 8.9 FL (ref 8.9–12.7)
POTASSIUM SERPL-SCNC: 4.1 MMOL/L (ref 3.5–5.3)
PROT SERPL-MCNC: 7.3 G/DL (ref 6.4–8.2)
RBC # BLD AUTO: 3.89 MILLION/UL (ref 3.88–5.62)
RSV RNA RESP QL NAA+PROBE: NEGATIVE
SARS-COV-2 RNA RESP QL NAA+PROBE: NEGATIVE
SODIUM SERPL-SCNC: 132 MMOL/L (ref 136–145)
WBC # BLD AUTO: 9.86 THOUSAND/UL (ref 4.31–10.16)

## 2022-07-05 PROCEDURE — 94760 N-INVAS EAR/PLS OXIMETRY 1: CPT

## 2022-07-05 PROCEDURE — 94640 AIRWAY INHALATION TREATMENT: CPT

## 2022-07-05 PROCEDURE — C9113 INJ PANTOPRAZOLE SODIUM, VIA: HCPCS

## 2022-07-05 PROCEDURE — 85025 COMPLETE CBC W/AUTO DIFF WBC: CPT

## 2022-07-05 PROCEDURE — 0241U HB NFCT DS VIR RESP RNA 4 TRGT: CPT | Performed by: HOSPITALIST

## 2022-07-05 PROCEDURE — 99233 SBSQ HOSP IP/OBS HIGH 50: CPT | Performed by: INTERNAL MEDICINE

## 2022-07-05 PROCEDURE — 99239 HOSP IP/OBS DSCHRG MGMT >30: CPT | Performed by: HOSPITALIST

## 2022-07-05 PROCEDURE — 80053 COMPREHEN METABOLIC PANEL: CPT

## 2022-07-05 RX ORDER — DOXYCYCLINE HYCLATE 100 MG/1
100 CAPSULE ORAL EVERY 12 HOURS SCHEDULED
Refills: 0
Start: 2022-07-05 | End: 2022-07-19

## 2022-07-05 RX ORDER — LIDOCAINE 50 MG/G
1 PATCH TOPICAL DAILY
Refills: 0
Start: 2022-07-06

## 2022-07-05 RX ORDER — OXYCODONE HYDROCHLORIDE 5 MG/1
5 TABLET ORAL EVERY 4 HOURS PRN
Qty: 8 TABLET | Refills: 0 | Status: SHIPPED | OUTPATIENT
Start: 2022-07-05 | End: 2022-07-08

## 2022-07-05 RX ORDER — GABAPENTIN 100 MG/1
100 CAPSULE ORAL 3 TIMES DAILY
Refills: 0
Start: 2022-07-05

## 2022-07-05 RX ORDER — LORAZEPAM 0.5 MG/1
0.5 TABLET ORAL EVERY 8 HOURS PRN
Qty: 6 TABLET | Refills: 0 | Status: SHIPPED | OUTPATIENT
Start: 2022-07-05 | End: 2022-07-08

## 2022-07-05 RX ADMIN — OXYCODONE HYDROCHLORIDE 5 MG: 5 TABLET ORAL at 14:10

## 2022-07-05 RX ADMIN — LEVALBUTEROL HYDROCHLORIDE 1.25 MG: 1.25 SOLUTION, CONCENTRATE RESPIRATORY (INHALATION) at 09:03

## 2022-07-05 RX ADMIN — LEVOTHYROXINE SODIUM 75 MCG: 75 TABLET ORAL at 05:21

## 2022-07-05 RX ADMIN — ACETAMINOPHEN 975 MG: 325 TABLET, FILM COATED ORAL at 00:03

## 2022-07-05 RX ADMIN — OXYCODONE HYDROCHLORIDE 5 MG: 5 TABLET ORAL at 00:03

## 2022-07-05 RX ADMIN — DOXYCYCLINE 100 MG: 100 CAPSULE ORAL at 09:43

## 2022-07-05 RX ADMIN — ACETAMINOPHEN 975 MG: 325 TABLET, FILM COATED ORAL at 09:44

## 2022-07-05 RX ADMIN — ESCITALOPRAM 5 MG: 5 TABLET, FILM COATED ORAL at 09:43

## 2022-07-05 RX ADMIN — IPRATROPIUM BROMIDE 0.5 MG: 0.5 SOLUTION RESPIRATORY (INHALATION) at 14:49

## 2022-07-05 RX ADMIN — FLUTICASONE PROPIONATE 1 SPRAY: 50 SPRAY, METERED NASAL at 09:58

## 2022-07-05 RX ADMIN — GABAPENTIN 100 MG: 100 CAPSULE ORAL at 17:00

## 2022-07-05 RX ADMIN — PANTOPRAZOLE SODIUM 40 MG: 40 INJECTION, POWDER, FOR SOLUTION INTRAVENOUS at 09:46

## 2022-07-05 RX ADMIN — LIDOCAINE 5% 1 PATCH: 700 PATCH TOPICAL at 09:45

## 2022-07-05 RX ADMIN — TAMSULOSIN HYDROCHLORIDE 0.4 MG: 0.4 CAPSULE ORAL at 17:00

## 2022-07-05 RX ADMIN — ACETAMINOPHEN 975 MG: 325 TABLET, FILM COATED ORAL at 17:00

## 2022-07-05 RX ADMIN — LEVALBUTEROL HYDROCHLORIDE 1.25 MG: 1.25 SOLUTION, CONCENTRATE RESPIRATORY (INHALATION) at 14:49

## 2022-07-05 RX ADMIN — FLUTICASONE PROPIONATE 1 SPRAY: 50 SPRAY, METERED NASAL at 17:01

## 2022-07-05 RX ADMIN — OXYCODONE HYDROCHLORIDE 5 MG: 5 TABLET ORAL at 09:44

## 2022-07-05 RX ADMIN — GABAPENTIN 100 MG: 100 CAPSULE ORAL at 09:44

## 2022-07-05 RX ADMIN — HYDROMORPHONE HYDROCHLORIDE 0.5 MG: 1 INJECTION, SOLUTION INTRAMUSCULAR; INTRAVENOUS; SUBCUTANEOUS at 11:39

## 2022-07-05 RX ADMIN — IPRATROPIUM BROMIDE 0.5 MG: 0.5 SOLUTION RESPIRATORY (INHALATION) at 09:03

## 2022-07-05 RX ADMIN — AMIODARONE HYDROCHLORIDE 200 MG: 200 TABLET ORAL at 09:44

## 2022-07-05 RX ADMIN — GUAIFENESIN 1200 MG: 600 TABLET ORAL at 10:01

## 2022-07-05 NOTE — CASE MANAGEMENT
Case Management Discharge Planning Note    Patient name Sharee Julio  Location [address] 224/-73 MRN [id number]  : 1932 Date 2022       Current Admission Date: 2022  Current Admission Diagnosis:Hospital acquired PNA   Patient Active Problem List    Diagnosis Date Noted    Hematuria 2022    Goals of care, counseling/discussion 2022   [facility] acquired PNA 2022    Sepsis ([facility] ) 2022    Multiple rib fractures 2022    Acute respiratory failure with hypoxia ([facility] ) 2022    Hyponatremia 2022    Paroxysmal SVT (supraventricular tachycardia) ([facility] ) 2022    Centrilobular emphysema ([facility] ) 02/15/2022    Current moderate episode of major depressive disorder without prior episode ([facility] ) 10/06/2021    Chronic renal disease, stage IV ([facility] ) 10/06/2021    PAC (premature atrial contraction) 10/06/2021    COPD exacerbation ([facility] ) 2021    Viral illness 09/15/2021    Primary osteoarthritis of left knee 2021    Patellar tendonitis of left knee 2021    Tinea cruris 2021    Partial arterial occlusion of retina 2021    Ischemic colitis ([facility] ) 2021    Central serous chorioretinopathy 2020    Bilateral sensorineural hearing loss 2020    Gastrointestinal hemorrhage 2020    Senile nuclear cataract 2020    Peptic ulcer with hemorrhage but without obstruction 2020    Nonspecific abnormal findings on radiological and examination of lung field 2020    TMJ arthropathy 2020    Chronic cough 2020    Impacted cerumen of left ear 03/15/2020    Dizziness 2020    Chest pain 2020    LIVIER (obstructive sleep apnea)     Snoring     Excessive daytime sleepiness     Aneurysm, pulmonary, arteriovenous 10/20/2019    Stage 3 chronic kidney disease ([address] ) 2019    Cervical spine arthritis 2019    Chronic diastolic heart failure ([address] ) 04/10/2019    [facility] acquired pneumonia of right lower lobe of lung 04/10/2019    Urinary obstruction 04/10/2019    BMI 29 0-29 9,adult 04/01/2019    Suprapubic tenderness 03/29/2019    Loose stools 03/29/2019    Bronchitis 03/28/2019    Eczema 08/22/2018    Seborrheic keratoses, inflamed 08/22/2018    Medicare annual wellness visit, subsequent 07/13/2018    Shortness of breath 06/04/2018    Tachycardia 06/04/2018    Other atopic dermatitis 08/16/2017    Headache 01/20/2017    Insomnia 08/18/2016    Hydronephrosis of right kidney 05/14/2016    CAD (coronary artery disease) 05/14/2016    Carotid stenosis 05/14/2016    Essential hypertension 05/14/2016    Acute renal failure superimposed on stage 4 chronic kidney disease ([facility] ) 05/14/2016    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[facility] [facility] ) 06/11/2015    Macular degeneration 11/15/2014    GERD (gastroesophageal reflux disease) 06/06/2014    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/26/2013    Iron deficiency anemia 10/09/2012    Benign prostatic hyperplasia with lower urinary tract symptoms 05/07/2012    Mixed simple and mucopurulent chronic bronchitis ([facility] ) 05/07/2012    Mixed hyperlipidemia 05/07/2012    Acquired hypothyroidism 05/07/2012    Osteoporosis 05/07/2012      LOS (days): 7  Geometric Mean LOS (GMLOS) (days): 4 80  Days to GMLOS:-1 9     OBJECTIVE:  Risk of Unplanned Readmission Score: 31 05         Current admission status: Inpatient   Preferred Pharmacy:   [address] [place] [id number]  Phone: [phone] Fax: [phone]    Primary Care Provider: Mary Arellano MD    Primary Insurance: MEDICARE  Secondary Insurance: [facility] HEALTH OPTIONS PROGRAM    DISCHARGE DETAILS:      Treatment Team Recommendation: Short Term Rehab  Discharge Destination Plan[de-identified] Short Term Rehab  Transport at Discharge : [facility]  Dispatcher Contacted: Yes  Number/Name of Dispatcher: [facility]  Transported by [employer] and Unit #): [id number]  ETA of Transport (Date): 07/05/22  ETA of Transport (Time): 1800     Transfer Mode: Stretcher        IMM Given (Date):: 07/05/22  IMM Given to[de-identified] Family  Family notified[de-identified] javier (son)   IMM reviewed with patient's caregiver, patient's caregiver agrees with discharge determination  Additional Comments: CM was informed that pt is stable for dc to return to [facility] with comfort measures and transition to hospice  CM arranged with TRAVON ANDRADE for an 1800  time to [facility]  CM notified pt's son Bao Vieira, pt's bedside RN Laine Canales, and Valeriy at [facility] of dc time  Facility transfer form and CMN completed  CMN signed and placed in medical record bin      Accepting Facility Name, [address] : [facility]  Receiving Facility/Agency Phone Number: [phone]  Facility/Agency Fax Number: [phone]

## 2022-07-05 NOTE — ASSESSMENT & PLAN NOTE
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

## 2022-07-05 NOTE — QUICK NOTE
7/5/2022 3:55 PM -  Queenie Mihir Cartwrightgino's chart and case were reviewed by Sheryle Alice, PA-C  Mode of review included electronic chart check  Family electing for comfort cares  Discharge arrangements made for today  Per review, symptoms remain controlled on current regimen and no changes are made at this time  Please continue the regimen in place, and review our last note for details  For dispo plan, please review Case Management notes  Palliative will sign off at this time  For urgent issues or any questions/concerns, please notify on-call provider via [employer]  You may also call our answering service 24/7 at   Sheryle Alice, PA-C  Palliative and Supportive Care  Clinic/Answering Service: [phone]  You can find me on [employer]!

## 2022-07-05 NOTE — QUICK NOTE
I spoke to [employer] via telephone  Update given including update of acute kidney injury, patients son prefers a comfort measures based approach and requests we discharge  They will make arrangements for hospice at facility

## 2022-07-05 NOTE — ASSESSMENT & PLAN NOTE
Lab Results   Component Value Date    EGFR 21 07/05/2022    EGFR 27 07/04/2022    EGFR 32 07/03/2022    CREATININE 2 54 (H) 07/05/2022    CREATININE 2 04 (H) 07/04/2022    CREATININE 1 78 (H) 07/03/2022   · Baseline creatinine between 1 7 - 1 8  · Insert harp cath for accurate I&Os  · Avoid nephrotoxins  Slightly above the baseline     · Monitor UO  · Torsemide held d/t rise in Cr to 2 5  · Continue monitor BMP

## 2022-07-05 NOTE — ASSESSMENT & PLAN NOTE
· Admitted at [facility] 6/12-6/21 with d/c to [facility]   · Left rib fractures 6-11  · Pulmonary toileting  · Lidocaine patch  · Tylenol for pain   Requiring narcotics for pain control

## 2022-07-05 NOTE — ASSESSMENT & PLAN NOTE
· Patient is an 80year-old patient with past medical history of diastolic HF, COPD, CAP, left rib fracture 6-11, recent discharge from [facility] to [facility] for rehab on 6/21  Patient presents to the hospital due to worsening fever, shortness of breath, cough, and increased oxygen requirement that started today, 6/29  Patient noted to be hypoxic upon EMS arrival    · CXR 6/29-- showed new subtle infiltrates  · BC x2: no growth at 72h  · Transitioned to doxycycline     · MRSA swab negative, Vanco d/c   · Continue guaifenesin 1200 bid  · PRN Tessalon perles  · Ipratropium TID  · levalbuterol TID  · Leukocytosis resolved, patient afebrile appears stable on 2L  · Trend procalcitonin- downtrending 0 7 > 0 38

## 2022-07-05 NOTE — ASSESSMENT & PLAN NOTE
· Newly noted 07/01 which resolves with manual irrigation   · Patient reportedly pulling on Jett   · Suspect 2/2 trauma   · Will hold DVT ppx   · Manually irrigate Jett catheter PRN hematuria   · Hgb currently stable 9 8   · Appears resolving  No aggressive interventions wished to be pursued by family    · Family still pending decision on hospice

## 2022-07-05 NOTE — PROGRESS NOTES
NEPHROLOGY PROGRESS NOTE   Sari Barrett 80 y o  male MRN: [id number]  Unit/Bed#: -01 Encounter: [id number]      HPI/24hr EVENTS:    -79 yo male, PMH HFpEF, COPD, recent hospitalization at [address] [place] [address] 6/12 to 6/21 with discharge to pending read [facility] for rehab  Presented with fevers/shortness of breath/cough/increased oxygen requirement starting 6/29, was hypoxic for EMS requiring non-rebreather then transition to nasal cannula    Nephrology consulted for management of hyponatremia    -creatinine increased from 2 04-2 54, this morning's torsemide dose was held, sodium decreased from 134-132     ASSESSMENT/PLAN:    GRETCHEN  -baseline creatinine 1 7-2 2  -admitted with serum creatinine of 2 26, improved to 1 71, now up trending to 2 5  -etiology:  Likely diuretic induced  -565 mL urine output over 24 hours noted  -renal imaging:  None currently  -check bladder scan with PVR  -was started on torsemide 10 mg 7/1, held this morning due to worsening creatinine, agree with holding today's dose  -patient reports poor oral intake due to persistent nausea    Hyponatremia  -sodium on admission 127, most recently 132 (has peaked to 134 yesterday)  -Etiology:  Suspect multifactorial hypovolemic hyponatremia/decreased solute intake/SIADH  -overall has been improving with addition of loop diuretic  -Workup:  Urine sodium 50, urine osmolality 350, uric acid 6 3, cortisol 21 7, TSH 2 8  -was started on torsemide 10 mg 7/1 with overall improvement, however unfortunately the patient's creatinine continues to worsen in the setting of poor oral intake, his torsemide dose was held this morning  -would recommend salt tabs 1 g t i d  Could consider starting torsemide at a lower dose in the next 48 hours depending on goals of care/clinical course    CKD IIIB  -baseline appears 1 7-2 2  -Etiology:  Suspect age associated nephron loss versus CRS versus hypertensive nephrosclerosis    Hypertension  -outpatient regimen:  No [employer] [facility] antihypertensives  -reviewed recent blood pressure trends, isolated systolic blood pressure 99 otherwise acceptable    Sepsis/pneumonia  -treatment per primary team, currently on antibiotics    Heart failure with preserved ejection fraction  -9/21 TTE EF 55% with grade 1 diastolic dysfunction  -diuretics currently on hold    Goals of care  -prior documented conversations regarding consideration of hospice care, according to prior notes son and daughter-in-law would like to see patient complete a course of antibiotics before making decision on hospice    Additional medical problems:  Rib fractures, emphysema, LIVIER, hypothyroidism on Synthroid, GERD    SUBJECTIVE:  "My chest and abdomen hurt" reports poor oral intake 2nd ongoing nausea and concern he will throw up, reports diffuse lower chest and upper abdominal pain    ROS:  Review of Systems   Constitutional: Positive for fatigue  Respiratory: Negative  Cardiovascular: Positive for chest pain  Negative for leg swelling  Gastrointestinal: Positive for abdominal pain  Genitourinary: Negative  Musculoskeletal: Negative  Neurological: Negative  Hematological: Negative  All other systems reviewed and are negative  OBJECTIVE:  Current Weight: Weight - Scale: 71 8 kg (158 lb 4 6 oz)  Vitals:    07/04/22 1931 07/04/22 2232 07/05/22 0751 07/05/22 0904   BP:  118/63 118/63    BP Location:  Left arm Left arm    Pulse:  (!) 106 101    Resp:   18    Temp:  97 8 °F (36 6 °C) 97 6 °F (36 4 °C)    TempSrc:  Oral Oral    SpO2: 97% 93% 97% 98%   Weight:       Height:           Intake/Output Summary (Last 24 hours) at 7/5/2022 0958  Last data filed at 7/5/2022 0751  Gross per 24 hour   Intake 190 ml   Output 565 ml   Net -375 ml     Physical Exam  Vitals and nursing note reviewed  Constitutional:       General: He is not in acute distress  Appearance: He is not toxic-appearing or diaphoretic        Comments: Awake sitting in bed   HENT:      Head: Normocephalic and atraumatic  Mouth/Throat:      Mouth: Mucous membranes are dry  Eyes:      General: No scleral icterus  Cardiovascular:      Rate and Rhythm: Normal rate  Pulses: Normal pulses  Pulmonary:      Effort: Pulmonary effort is normal  No respiratory distress  Breath sounds: No wheezing  Chest:       Abdominal:      General: Abdomen is flat  Palpations: Abdomen is soft  Tenderness: There is abdominal tenderness  Musculoskeletal:      Cervical back: Neck supple  Right lower leg: No edema  Left lower leg: No edema  Skin:     General: Skin is warm and dry  Capillary Refill: Capillary refill takes less than 2 seconds  Neurological:      Mental Status: He is alert  GCS: GCS eye subscore is 4  GCS verbal subscore is 4  GCS motor subscore is 6            Medications:    Current Facility-Administered Medications:     acetaminophen (TYLENOL) tablet 975 mg, 975 mg, Oral, Q8H, [facility] [place], PA-C, 975 mg at 07/05/22 0944    amiodarone tablet 200 mg, 200 mg, Oral, Daily, [facility] [place], PA-C, 200 mg at 07/05/22 0944    benzonatate (TESSALON PERLES) capsule 100 mg, 100 mg, Oral, TID PRN, [facility] [place], PA-C, 100 mg at 07/02/22 2043    doxycycline hyclate (VIBRAMYCIN) capsule 100 mg, 100 mg, Oral, Q12H [facility], [facility] [place], PA-C, 100 mg at 07/05/22 6784    escitalopram (LEXAPRO) tablet 5 mg, 5 mg, Oral, Daily, [facility] [place], PA-C, 5 mg at 07/05/22 0943    fluticasone (FLONASE) 50 mcg/act nasal spray 1 spray, 1 spray, Nasal, BID, [facility] [place], PA-C, 1 spray at 07/04/22 1727    gabapentin (NEURONTIN) capsule 100 mg, 100 mg, Oral, TID, [facility] [place], PA-C, 100 mg at 07/05/22 0944    guaiFENesin (MUCINEX) 12 hr tablet 1,200 mg, 1,200 mg, Oral, Q12H [facility], [facility] [place], PA-C, 1,200 mg at 07/04/22 2042    HYDROmorphone (DILAUDID) injection 0 5 mg, 0 5 mg, Intravenous, Q3H PRN, Jesús Caldron Fountaine, PA-C, 0 5 mg at 07/04/22 1434    ipratropium (ATROVENT) 0 02 % inhalation solution 0 5 mg, 0 5 mg, Nebulization, TID, Jesús Caldron Fountaine, PA-C, 0 5 mg at 07/05/22 6200    levalbuterol Jackieraffaele Davis) inhalation solution 1 25 mg, 1 25 mg, Nebulization, TID, Jesús Caldron Fountaine, PA-C, 1 25 mg at 07/05/22 0136    levothyroxine tablet 75 mcg, 75 mcg, Oral, Early Morning, Jesús Caldron Fountaine, PA-C, 75 mcg at 07/05/22 0521    lidocaine (LIDODERM) 5 % patch 1 patch, 1 patch, Topical, Daily, Jesús Caldron Fountaine, PA-C, 1 patch at 07/05/22 0945    LORazepam (ATIVAN) tablet 0 5 mg, 0 5 mg, Oral, Q8H PRN, Jesús Caldron Fountaine, PA-C, 0 5 mg at 07/04/22 1621    methocarbamol (ROBAXIN) tablet 500 mg, 500 mg, Oral, Q6H PRN, Jesús Caldron Fountaine, PA-C, 500 mg at 07/03/22 2123    ondansetron (ZOFRAN) injection 4 mg, 4 mg, Intravenous, Q6H PRN, Jesús Caldron Fountaine, PA-C, 4 mg at 07/04/22 1434    oxyCODONE (ROXICODONE) IR tablet 2 5 mg, 2 5 mg, Oral, Q4H PRN **OR** oxyCODONE (ROXICODONE) IR tablet 5 mg, 5 mg, Oral, Q4H PRN, Jesús Caldron Fountaine, PA-C, 5 mg at 07/05/22 0944    pantoprazole (PROTONIX) injection 40 mg, 40 mg, Intravenous, Q24H [address], Jesús Caldron Fountaine, PA-C, 40 mg at 07/05/22 0946    tamsulosin (FLOMAX) capsule 0 4 mg, 0 4 mg, Oral, Daily With Jean Luna PA-C, 0 4 mg at 07/04/22 1621    Laboratory Results:  Results from last 7 days   Lab Units 07/05/22  0438 07/04/22  0419 07/03/22  0438 07/02/22  0551 07/01/22  0454 06/30/22  0512 06/29/22  0521 06/28/22  2355 06/28/22  2037   WBC Thousand/uL 9 86 8 02 6 40 6 46 7 00 8 08 15 71*  --   --    HEMOGLOBIN g/dL 11 7* 11 9* 10 3* 9 8* 9 7* 9 2* 10 1*  --   --    I STAT HEMOGLOBIN g/dl  --   --   --   --   --   --   --   --  12 6   HEMATOCRIT % 37 8 37 2 33 4* 31 1* 30 6* 29 8* 32 2*  --   --    HEMATOCRIT, ISTAT %  --   --   --   --   --   --   --   --  37 PLATELETS Thousands/uL 192 172 165 159 135* 131* 163   < >  --    POTASSIUM mmol/L 4 1 4 3 4 4 4 4 4 8 4 2 4 9   < >  --    CHLORIDE mmol/L 97* 99* 99* 98* 98* 97* 98*   < >  --    CO2 mmol/L 25 24 27 24 25 28 27   < >  --    CO2, I-STAT mmol/L  --   --   --   --   --   --   --   --  28   BUN mg/dL 38* 31* 25 27* 30* 34* 44*   < >  --    CREATININE mg/dL 2 54* 2 04* 1 78* 1 81* 1 71* 1 78* 2 09*   < >  --    CALCIUM mg/dL 9 0 9 1 8 8 8 8 8 6 8 3 8 3   < >  --    GLUCOSE, ISTAT mg/dl  --   --   --   --   --   --   --   --  169*    < > = values in this interval not displayed  I have personally reviewed the blood work as stated above and in my note  I have personally reviewed internal medicine note

## 2022-07-05 NOTE — PLAN OF CARE
Problem: Potential for Falls  Goal: Patient will remain free of falls  Description: INTERVENTIONS:  - Educate patient/family on patient safety including physical limitations  - Instruct patient to call for assistance with activity   - Consult OT/PT to assist with strengthening/mobility   - Keep Call bell within reach  - Keep bed low and locked with side rails adjusted as appropriate  - Keep care items and personal belongings within reach  - Initiate and maintain comfort rounds  - Make Fall Risk Sign visible to staff  - Offer Toileting every x Hours, in advance of need  - Initiate/Maintain xalarm  - Obtain necessary fall risk management equipment: x  - Apply yellow socks and bracelet for high fall risk patients  - Consider moving patient to room near nurses station  Outcome: Progressing     Problem: MOBILITY - ADULT  Goal: Maintain or return to baseline ADL function  Description: INTERVENTIONS:  -  Assess patient's ability to carry out ADLs; assess patient's baseline for ADL function and identify physical deficits which impact ability to perform ADLs (bathing, care of mouth/teeth, toileting, grooming, dressing, etc )  - Assess/evaluate cause of self-care deficits   - Assess range of motion  - Assess patient's mobility; develop plan if impaired  - Assess patient's need for assistive devices and provide as appropriate  - Encourage maximum independence but intervene and supervise when necessary  - Involve family in performance of ADLs  - Assess for home care needs following discharge   - Consider OT consult to assist with ADL evaluation and planning for discharge  - Provide patient education as appropriate  Outcome: Progressing  Goal: Maintains/Returns to pre admission functional level  Description: INTERVENTIONS:  - Perform BMAT or MOVE assessment daily    - Set and communicate daily mobility goal to care team and patient/family/caregiver     - Collaborate with rehabilitation services on mobility goals if consulted  - Perform Range of Motion x times a day  - Reposition patient every x hours    - Dangle patient x times a day  - Stand patient x times a day  - Ambulate patient x times a day  - Out of bed to chair x times a day   - Out of bed for meals x times a day  - Out of bed for toileting  - Record patient progress and toleration of activity level   Outcome: Progressing     Problem: Prexisting or High Potential for Compromised Skin Integrity  Goal: Skin integrity is maintained or improved  Description: INTERVENTIONS:  - Identify patients at risk for skin breakdown  - Assess and monitor skin integrity  - Assess and monitor nutrition and hydration status  - Monitor labs   - Assess for incontinence   - Turn and reposition patient  - Assist with mobility/ambulation  - Relieve pressure over bony prominences  - Avoid friction and shearing  - Provide appropriate hygiene as needed including keeping skin clean and dry  - Evaluate need for skin moisturizer/barrier cream  - Collaborate with interdisciplinary team   - Patient/family teaching  - Consider wound care consult   Outcome: Progressing     Problem: CARDIOVASCULAR - ADULT  Goal: Absence of cardiac dysrhythmias or at baseline rhythm  Description: INTERVENTIONS:  - Continuous cardiac monitoring, vital signs, obtain 12 lead EKG if ordered  - Administer antiarrhythmic and heart rate control medications as ordered  - Monitor electrolytes and administer replacement therapy as ordered  Outcome: Progressing     Problem: RESPIRATORY - ADULT  Goal: Achieves optimal ventilation and oxygenation  Description: INTERVENTIONS:  - Assess for changes in respiratory status  - Assess for changes in mentation and behavior  - Position to facilitate oxygenation and minimize respiratory effort  - Oxygen administered by appropriate delivery if ordered  - Initiate smoking cessation education as indicated  - Encourage broncho-pulmonary hygiene including cough, deep breathe, Incentive Spirometry  - Assess the need for suctioning and aspirate as needed  - Assess and instruct to report SOB or any respiratory difficulty  - Respiratory Therapy support as indicated  Outcome: Progressing     Problem: GENITOURINARY - ADULT  Goal: Maintains or returns to baseline urinary function  Description: INTERVENTIONS:  - Assess urinary function  - Encourage oral fluids to ensure adequate hydration if ordered  - Administer IV fluids as ordered to ensure adequate hydration  - Administer ordered medications as needed  - Offer frequent toileting  - Follow urinary retention protocol if ordered  Outcome: Progressing  Goal: Urinary catheter remains patent  Description: INTERVENTIONS:  - Assess patency of urinary catheter  - If patient has a chronic harp, consider changing catheter if non-functioning  - Follow guidelines for intermittent irrigation of non-functioning urinary catheter  Outcome: Progressing     Problem: METABOLIC, FLUID AND ELECTROLYTES - ADULT  Goal: Electrolytes maintained within normal limits  Description: INTERVENTIONS:  - Monitor labs and assess patient for signs and symptoms of electrolyte imbalances  - Administer electrolyte replacement as ordered  - Monitor response to electrolyte replacements, including repeat lab results as appropriate  - Instruct patient on fluid and nutrition as appropriate  Outcome: Progressing  Goal: Fluid balance maintained  Description: INTERVENTIONS:  - Monitor labs   - Monitor I/O and WT  - Instruct patient on fluid and nutrition as appropriate  - Assess for signs & symptoms of volume excess or deficit  Outcome: Progressing     Problem: SKIN/TISSUE INTEGRITY - ADULT  Goal: Skin Integrity remains intact(Skin Breakdown Prevention)  Description: Assess:  -Perform Chris assessment every x  -Clean and moisturize skin every x  -Inspect skin when repositioning, toileting, and assisting with ADLS  -Assess under medical devices such as xx every x  -Assess extremities for adequate circulation and sensation     Bed Management:  -Have minimal linens on bed & keep smooth, unwrinkled  -Change linens as needed when moist or perspiring  -Avoid sitting or lying in one position for more than x hours while in bed  -Keep HOB at [facility]     Toileting:  -Offer bedside commode  -Assess for incontinence every x  -Use incontinent care products after each incontinent episode such as x    Activity:  -Mobilize patient x times a day  -Encourage activity and walks on unit  -Encourage or provide ROM exercises   -Turn and reposition patient every x Hours  -Use appropriate equipment to lift or move patient in bed  -Instruct/ Assist with weight shifting every x when out of bed in chair  -Consider limitation of chair time x hour intervals    Skin Care:  -Avoid use of baby powder, tape, friction and shearing, hot water or constrictive clothing  -Relieve pressure over bony prominences using x  -Do not massage red bony areas    Next Steps:  -Teach patient strategies to minimize risks such as x   -Consider consults to  interdisciplinary teams such as x  Outcome: Progressing  Goal: Incision(s), wounds(s) or drain site(s) healing without S/S of infection  Description: INTERVENTIONS  - Assess and document dressing, incision, wound bed, drain sites and surrounding tissue  - Provide patient and family education  - Perform skin care/dressing changes every x  Outcome: Progressing     Problem: HEMATOLOGIC - ADULT  Goal: Maintains hematologic stability  Description: INTERVENTIONS  - Assess for signs and symptoms of bleeding or hemorrhage  - Monitor labs  - Administer supportive blood products/factors as ordered and appropriate  Outcome: Progressing     Problem: MUSCULOSKELETAL - ADULT  Goal: Maintain or return mobility to safest level of function  Description: INTERVENTIONS:  - Assess patient's ability to carry out ADLs; assess patient's baseline for ADL function and identify physical deficits which impact ability to perform ADLs (bathing, care of mouth/teeth, toileting, grooming, dressing, etc )  - Assess/evaluate cause of self-care deficits   - Assess range of motion  - Assess patient's mobility  - Assess patient's need for assistive devices and provide as appropriate  - Encourage maximum independence but intervene and supervise when necessary  - Involve family in performance of ADLs  - Assess for home care needs following discharge   - Consider OT consult to assist with ADL evaluation and planning for discharge  - Provide patient education as appropriate  Outcome: Progressing     Problem: Nutrition/Hydration-ADULT  Goal: Nutrient/Hydration intake appropriate for improving, restoring or maintaining nutritional needs  Description: Monitor and assess patient's nutrition/hydration status for malnutrition  Collaborate with interdisciplinary team and initiate plan and interventions as ordered  Monitor patient's weight and dietary intake as ordered or per policy  Utilize nutrition screening tool and intervene as necessary  Determine patient's food preferences and provide high-protein, high-caloric foods as appropriate       INTERVENTIONS:  - Monitor oral intake, urinary output, labs, and treatment plans  - Assess nutrition and hydration status and recommend course of action  - Evaluate amount of meals eaten  - Assist patient with eating if necessary   - Allow adequate time for meals  - Recommend/ encourage appropriate diets, oral nutritional supplements, and vitamin/mineral supplements  - Order, calculate, and assess calorie counts as needed  - Recommend, monitor, and adjust tube feedings and TPN/PPN based on assessed needs  - Assess need for intravenous fluids  - Provide specific nutrition/hydration education as appropriate  - Include patient/family/caregiver in decisions related to nutrition  Outcome: Progressing     Problem: PAIN - ADULT  Goal: Verbalizes/displays adequate comfort level or baseline comfort level  Description: Interventions:  - Encourage patient to monitor pain and request assistance  - Assess pain using appropriate pain scale  - Administer analgesics based on type and severity of pain and evaluate response  - Implement non-pharmacological measures as appropriate and evaluate response  - Consider cultural and social influences on pain and pain management  - Notify physician/advanced practitioner if interventions unsuccessful or patient reports new pain  Outcome: Progressing     Problem: INFECTION - ADULT  Goal: Absence or prevention of progression during hospitalization  Description: INTERVENTIONS:  - Assess and monitor for signs and symptoms of infection  - Monitor lab/diagnostic results  - Monitor all insertion sites, i e  indwelling lines, tubes, and drains  - Monitor endotracheal if appropriate and nasal secretions for changes in amount and color  - [place] appropriate cooling/warming therapies per order  - Administer medications as ordered  - Instruct and encourage patient and family to use good hand hygiene technique  - Identify and instruct in appropriate isolation precautions for identified infection/condition  Outcome: Progressing     Problem: SAFETY ADULT  Goal: Patient will remain free of falls  Description: INTERVENTIONS:  - Educate patient/family on patient safety including physical limitations  - Instruct patient to call for assistance with activity   - Consult OT/PT to assist with strengthening/mobility   - Keep Call bell within reach  - Keep bed low and locked with side rails adjusted as appropriate  - Keep care items and personal belongings within reach  - Initiate and maintain comfort rounds  - Make Fall Risk Sign visible to staff  - Offer Toileting every x Hours, in advance of need  - Initiate/Maintain xalarm  - Obtain necessary fall risk management equipment: x  - Apply yellow socks and bracelet for high fall risk patients  - Consider moving patient to room near nurses station  Outcome: Progressing  Goal: Maintain or return to baseline ADL function  Description: INTERVENTIONS:  -  Assess patient's ability to carry out ADLs; assess patient's baseline for ADL function and identify physical deficits which impact ability to perform ADLs (bathing, care of mouth/teeth, toileting, grooming, dressing, etc )  - Assess/evaluate cause of self-care deficits   - Assess range of motion  - Assess patient's mobility; develop plan if impaired  - Assess patient's need for assistive devices and provide as appropriate  - Encourage maximum independence but intervene and supervise when necessary  - Involve family in performance of ADLs  - Assess for home care needs following discharge   - Consider OT consult to assist with ADL evaluation and planning for discharge  - Provide patient education as appropriate  Outcome: Progressing  Goal: Maintains/Returns to pre admission functional level  Description: INTERVENTIONS:  - Perform BMAT or MOVE assessment daily    - Set and communicate daily mobility goal to care team and patient/family/caregiver  - Collaborate with rehabilitation services on mobility goals if consulted  - Perform Range of Motion xx times a day  - Reposition patient every x hours    - Dangle patient x times a day  - Stand patient x times a day  - Ambulate patient x times a day  - Out of bed to chair x times a day   - Out of bed for meals xx times a day  - Out of bed for toileting  - Record patient progress and toleration of activity level   Outcome: Progressing     Problem: DISCHARGE PLANNING  Goal: Discharge to home or other facility with appropriate resources  Description: INTERVENTIONS:  - Identify barriers to discharge w/patient and caregiver  - Arrange for needed discharge resources and transportation as appropriate  - Identify discharge learning needs (meds, wound care, etc )  - Arrange for interpretive services to assist at discharge as needed  - Refer to Case Management Department for coordinating discharge planning if the patient needs post-hospital services based on physician/advanced practitioner order or complex needs related to functional status, cognitive ability, or social support system  Outcome: Progressing     Problem: Knowledge Deficit  Goal: Patient/family/caregiver demonstrates understanding of disease process, treatment plan, medications, and discharge instructions  Description: Complete learning assessment and assess knowledge base    Interventions:  - Provide teaching at level of understanding  - Provide teaching via preferred learning methods  Outcome: Progressing

## 2022-07-05 NOTE — PROGRESS NOTES
[place]  Progress Note - Divine De La O 6/30/1932, 80 y o  male MRN: [id number]  Unit/Bed#: -01 Encounter: [id number]  Primary Care Provider: Sam Phillips MD   Date and time admitted to hospital: 6/28/2022  8:23 PM    Hematuria  Assessment & Plan  · Newly noted 07/01 which resolves with manual irrigation   · Patient reportedly pulling on Jett   · Suspect 2/2 trauma   · Will hold DVT ppx   · Manually irrigate Jett catheter PRN hematuria   · Hgb currently stable 9 8   · Appears resolving  No aggressive interventions wished to be pursued by family  · Family still pending decision on hospice    Goals of care, counseling/discussion  Assessment & Plan  · S/p Goals of care discussion with palliative care- Pt family concerned about repetitive admissions to hospital, pt's advanced age, pneumonia in setting of rib fractures, ckd stage 4 and overall poor quality of life  · They are contemplating but have not agreed to hospice yet  · Seen by hospice liaison today who states patient is hospice appropriate   · Son and daughter in law to decide whether to purse STR with possible transition to hospice vs home hospice vs hospice and SNF- in the meantime, continue medical management until decision is made    Sepsis [facility])  Assessment & Plan  · Blood cultures - NGTD  · See PNA above  · Maintain MAP> 65  · Received 1 liter NS - no hypotension / lactic normal   · Leukocytosis resolved, afebrile, still with occasional sinus tachycardia      Multiple rib fractures  Assessment & Plan  · Admitted at [facility] 6/12-6/21 with d/c to [facility]   · Left rib fractures 6-11  · Pulmonary toileting  · Lidocaine patch  · Tylenol for pain   Requiring narcotics for pain control    Hyponatremia  Assessment & Plan  · Sodium on admission 127 improved to 130 with IVF then decreased again to 127   · resolving  · 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   · Nephrology consult appreciated: switched home lasix 20mg every other day to torsemide 10mg daily  · Held torsemide 7/5 d/t rising Cr  · Family does not want any aggressive intervention   · Continue to monitor     Acute respiratory failure with hypoxia (HCC)  Assessment & Plan  · Requiring up to 4 L of supplemental oxygen- now down to 2L   · Likely secondary to pneumonia and underlying COPD   · Not on oxygen at home  · Continue to wean off oxygen as able    Centrilobular emphysema (HCC)  Assessment & Plan  · Resp  protocol  · Ipratropium TID  · levalbuterol TID      LIVIER (obstructive sleep apnea)  Assessment & Plan  · Patient non-compliant with CPAP  · Continue to monitor oxygenation       Chronic diastolic heart failure (HCC)  Assessment & Plan  Wt Readings from Last 3 Encounters:   07/01/22 71 8 kg (158 lb 4 6 oz)   05/20/22 74 8 kg (164 lb 12 8 oz)   05/08/22 74 8 kg (165 lb)       · Echo 9/28/21-- EF 55%  Grade 1 diastolic dysfunction  · PTA diuretic: lasix 20mg every other day   · Currently on torsemide 10mg daily   · Does not appear volume overloaded         Acquired hypothyroidism  Assessment & Plan  Continue home levothyroxine    GERD (gastroesophageal reflux disease)  Assessment & Plan  - Protonix 40 mg IV daily     Benign prostatic hyperplasia with lower urinary tract symptoms  Assessment & Plan  · Continue home Flomax  · Currently with harp catheter      Acute renal failure superimposed on stage 4 chronic kidney disease [facility])  Assessment & Plan  Lab Results   Component Value Date    EGFR 21 07/05/2022    EGFR 27 07/04/2022    EGFR 32 07/03/2022    CREATININE 2 54 (H) 07/05/2022    CREATININE 2 04 (H) 07/04/2022    CREATININE 1 78 (H) 07/03/2022   · Baseline creatinine between 1 7 - 1 8  · Insert harp cath for accurate I&Os  · Avoid nephrotoxins  Slightly above the baseline     · Monitor UO  · Torsemide held d/t rise in Cr to 2 5  · Continue monitor BMP      Essential hypertension  Assessment & Plan  · BP stable   · Continuous cardiac monitoring      * Hospital acquired PNA  Assessment & Plan  · Patient is an 80year-old patient with past medical history of diastolic HF, COPD, CAP, left rib fracture , recent discharge from [facility] to [facility] for rehab on   Patient presents to the hospital due to worsening fever, shortness of breath, cough, and increased oxygen requirement that started today,   Patient noted to be hypoxic upon EMS arrival    · CXR -- showed new subtle infiltrates  · BC x2: no growth at 72h  · Transitioned to doxycycline  · MRSA swab negative, Vanco d/c   · Continue guaifenesin 1200 bid  · PRN Tessalon perles  · Ipratropium TID  · levalbuterol TID  · Leukocytosis resolved, patient afebrile appears stable on 2L  · Trend procalcitonin- downtrending 0 7 > 0 38             VTE  Prophylaxis:   Pharmacologic: in place    Patient Centered Rounds: I have performed bedside rounds with nursing staff today  Discussions with Specialists or Other Care Team Provider: case management    Education and Discussions with Family / Patient: pt    Current Length of Stay: 7 day(s)    Current Patient Status: Inpatient        Code Status: Level 3 - DNAR and DNI      Subjective:   Pt seen  States rib pain starting to be better    Patient is seen and examined at bedside  All other ROS are negative  Objective:     Vitals:   Temp (24hrs), Av 1 °F (36 7 °C), Min:97 6 °F (36 4 °C), Max:98 78 °F (37 1 °C)    Temp:  [97 6 °F (36 4 °C)-98 78 °F (37 1 °C)] 97 6 °F (36 4 °C)  HR:  [101-113] 101  Resp:  [18-22] 18  BP: (118-139)/(63-77) 118/63  SpO2:  [93 %-98 %] 98 %  Body mass index is 27 17 kg/m²  Input and Output Summary (last 24 hours):        Intake/Output Summary (Last 24 hours) at 2022 1009  Last data filed at 2022 0751  Gross per 24 hour   Intake 50 ml   Output 525 ml   Net -475 ml       Physical Exam:       GEN: No acute distress, comfortable, elderly male on o2  HEEENT: No JVD, PERRLA, no scleral icterus  RESP: Lungs clear to auscultation bilaterally  CV: RRR, +s1/s2   ABD: SOFT NON TENDER, POSITIVE BOWEL SOUNDS, NO DISTENTION  PSYCH: CALM  NEURO:   NO FOCAL DEFICITS  SKIN: NO RASH  EXTREM: NO EDEMA    Additional Data:     Labs:    Results from last 7 days   Lab Units 07/05/22  0438   WBC Thousand/uL 9 86   HEMOGLOBIN g/dL 11 7*   HEMATOCRIT % 37 8   PLATELETS Thousands/uL 192   NEUTROS PCT % 70   LYMPHS PCT % 14   MONOS PCT % 12   EOS PCT % 2     Results from last 7 days   Lab Units 07/05/22 0438   SODIUM mmol/L 132*   POTASSIUM mmol/L 4 1   CHLORIDE mmol/L 97*   CO2 mmol/L 25   BUN mg/dL 38*   CREATININE mg/dL 2 54*   ANION GAP mmol/L 10   CALCIUM mg/dL 9 0   ALBUMIN g/dL 2 8*   TOTAL BILIRUBIN mg/dL 0 50   ALK PHOS U/L 206*   ALT U/L 38   AST U/L 27   GLUCOSE RANDOM mg/dL 97     Results from last 7 days   Lab Units 06/28/22 2030   INR  1 03             Results from last 7 days   Lab Units 07/03/22 0438 07/02/22  0551 06/29/22 0521 06/28/22 2030   LACTIC ACID mmol/L  --   --   --  2 0   PROCALCITONIN ng/ml 0 42* 0 38* 0 70* 0 26*           * I Have Reviewed All Lab Data Listed Above  Imaging:     Results for orders placed during the hospital encounter of 06/28/22    XR chest 1 view portable    Narrative  CHEST    INDICATION:   resp distress  COMPARISON:  5/9/22    EXAM PERFORMED/VIEWS:  XR CHEST PORTABLE      FINDINGS:    Lordotic positioning  Patient rotated to the right  Low lung volumes  Similar mild right diaphragm elevation  Increased, bilateral central and basilar interstitial markings  Small left pleural effusion  No pneumothorax  Stable appearance of the cardiomediastinal silhouette, given technique differences  New left 6th and 7th axillary rib fractures, no definite callous formation  Right neck surgical clips  The study was marked in [facility] for immediate notification      Impression  Low lung volumes with new interstitial changes compared to 5/9/2022, edema versus infection  Evidence of small left pleural effusion  New left rib fractures, appear acute to subacute  Workstation performed: [id number]    Results for orders placed in visit on 05/03/22    XR chest pa & lateral    Narrative  CHEST    INDICATION:   R05 9: Cough, unspecified  R06 2: Wheezing  COMPARISON:  Chest radiograph from 1/19/2022 and 11/26/2021, abdomen CT from 4/12/2022, chest CT from 2/24/2022  EXAM PERFORMED/VIEWS:  XR CHEST PA & LATERAL  DUAL ENERGY SUBTRACTION  FINDINGS:    Normal heart size  Severe coronary artery calcification  No acute disease  Left lower lobe nodule corresponding with the CT  Benign linear scar  No effusion or pneumothorax  Mild benign eventration of the right hemidiaphragm  Osseous structures appear within normal limits for patient age  Old T12 compression deformity  Impression  No acute cardiopulmonary disease  Workstation performed: [id number]      *I have reviewed all imaging reports listed above      Recent Cultures (last 7 days):     Results from last 7 days   Lab Units 06/28/22 2030   BLOOD CULTURE  No Growth After 5 Days  No Growth After 5 Days         Last 24 Hours Medication List:   Current Facility-Administered Medications   Medication Dose Route Frequency Provider Last Rate    acetaminophen  975 mg Oral Q8H [address] [place], [place] [address]BRYANNA      amiodarone  200 mg Oral Daily [address] [place] [address]      benzonatate  100 mg Oral TID PRN [address] [place] [address] BRYANNA Luna      doxycycline hyclate  100 mg Oral Q12H [facility] Maddi Vázquez [place], [place]      escitalopram  5 mg Oral Daily [address] [place] [address]      fluticasone  1 spray Nasal BID [address] [place] [address] NormangeeBRYANNA      gabapentin  100 mg Oral TID [address] [place], [place] [address]BRYANNA      guaiFENesin  1,200 mg Oral Q12H [address]      HYDROmorphone  0 5 mg Intravenous Q3H PRN Mena Arango PA-C  ipratropium  0 5 mg Nebulization TID Anel Sluder [place]      levalbuterol  1 25 mg Nebulization TID Anel [place], [place]      levothyroxine  75 mcg Oral Early Morning Anel Sluder [place], [place]      lidocaine  1 patch Topical Daily Anel Sluder [place], [place]      LORazepam  0 5 mg Oral Q8H PRN Anel Sluder [place], [place]      methocarbamol  500 mg Oral Q6H PRN Anel Sluder BRYANNA Luna      ondansetron  4 mg Intravenous Q6H PRN Anel Sluder [place], [place]      oxyCODONE  2 5 mg Oral Q4H PRN Anel Sluder BRYANNA Luna      Or    oxyCODONE  5 mg Oral Q4H PRN Anel Sluder [place]YANNA carrillo      pantoprazole  40 mg Intravenous Q24H [address] Anel [place], [place]      tamsulosin  0 4 mg Oral Daily With Dinner Lissette Mata PA-C          Today, Patient Was Seen By: Raul Ramirez MD    ** Please Note: Dictation voice to text software may have been used in the creation of this document   **

## 2022-07-06 NOTE — ASSESSMENT & PLAN NOTE
Wt Readings from Last 3 Encounters:   07/05/22 66 5 kg (146 lb 9 6 oz)   05/20/22 74 8 kg (164 lb 12 8 oz)   05/08/22 74 8 kg (165 lb)       · Echo 9/28/21-- EF 55%    Grade 1 diastolic dysfunction  · PTA diuretic: lasix 20mg every other day   · Currently on torsemide 10mg daily   · Does not appear volume overloaded

## 2022-07-06 NOTE — ASSESSMENT & PLAN NOTE
· Patient is an 80year-old patient with past medical history of diastolic HF, COPD, CAP, left rib fracture 6-11, recent discharge from [facility] on 6/21  Patient presents to the hospital due to worsening fever, shortness of breath, cough, and increased oxygen requirement that started today, 6/29  Patient noted to be hypoxic upon EMS arrival    · CXR 6/29-- showed new subtle infiltrates  · BC x2: no growth at 72h  · Transitioned to doxycycline     · MRSA swab negative, Vanco d/c   · Continue guaifenesin 1200 bid  · PRN Tessalon perles  · Ipratropium TID  · levalbuterol TID  · Leukocytosis resolved, patient afebrile appears stable on 2L  · Trend procalcitonin- downtrending 0 7 > 0 38

## 2022-07-06 NOTE — ASSESSMENT & PLAN NOTE
· S/p Goals of care discussion with palliative care- Pt family concerned about repetitive admissions to hospital, pt's advanced age, pneumonia in setting of rib fractures, ckd stage 4 and overall poor quality of life  · They are contemplating but have not agreed to hospice yet    · Seen by hospice liaison today who states patient is hospice appropriate   · 7/5 family decision for comfort care - spoke with son, no further management of hyponatremia etc focus on patients comfort of paramount importance to family

## 2022-07-06 NOTE — ASSESSMENT & PLAN NOTE
· Admitted at [facility] 6/12-6/21 with d/c to [facility]   · Left rib fractures 6-11  · Pulmonary toileting  · Lidocaine patch  · Tylenol for pain   Requiring narcotics for pain control

## 2022-07-08 NOTE — ASSESSMENT & PLAN NOTE
[place] [address] [address]  Phone: [phone] Fax: [phone]    Physical Therapy Treatment Note/ Progress Report:     Date:  2022    Patient Name:  Carmen Bustillo    :  1973  MRN: [id number]  Restrictions/Precautions:    Medical/Treatment Diagnosis Information:  · Diagnosis: L shoulder arthroscopy, SAD, DCE, shoulder pain M25. 512  · Treatment Diagnosis: L shoulder arthroscopy, SAD, DCE, shoulder pain M25. 747  Insurance/Certification information:     Physician Information:  Referring Practitioner: Dr Rk Ceja of care signed (Y/N):     Date of Patient follow up with Physician:      Progress Report: []  Yes  [x]  No     Date Range for reporting period:  Beginnin21  Endin22     Progress report due (10 Rx/or 30 days whichever is less):  0/3/23    Recertification due (POC duration/ or 90 days whichever is less):   22    Visit # Insurance Allowable Auth Needed   42  (23 in ) 40  6 visits left as of 22 []Yes    []No     Pain level:  1-2/10 soreness, achiness in L shoulder and soreness in the back     SUBJECTIVE:  Will follow back up with her again in 3 weeks at the end of July. Does feel like her L shoulder is finally feeling more normal with her everyday activities and her work activities. Still cannot fully reach all the way up over her head without bending her elbow at the top of the range. Seeing a chiro 2x per week to further help achieve that end range of motion in her L shoulder. OBJECTIVE: 22   Observation:    Test measurements:    27% disability - Quick Dash; 44% disability - Kathee Duron Work     ROM Left (passive)  Post-op day 1 Left (passive)  22 Left (active)  22 Right   Shoulder Flex 160 140 125 140 170   Shoulder Abd 155 175 140 140 170   Shoulder ER @ 90 75 40  35 in scapular plane Reaches behind head to C7.  Reaches behind head to T2. 90 · Continue levothyroxine 75 mcg Shoulder IR @ 90 65 45 Reaches behind back to L1. Reaches behind back to L1. T5                     Strength  Left    Right   Shoulder Flex    6.4# 13/3#   Shoulder Scap    6.8# 16.0#   Shoulder ER    11.0# 16.6#   Shoulder IR    22.5# 24.2#       RESTRICTIONS/PRECAUTIONS: L shoulder arthroscopy 11/5/21. L shoulder scope with scar tissue debridement 3/25/22.     Exercises/Interventions:   Therapeutic Ex (93426) Sets/sec Reps CUES/Notes   UBE 4 min1/2 retro   Supine cane AA flexion - supine - varying angle 1 10 Used red tube for active flexion, scaption, abd   Supine wand ER stretch - varying angle 1 10 Used yellow ball for LLLD today   Table Slides - flexion/abduction  1 10 R UE assist   Subscap/lat stretch - hand on mat, backing up for OH flexion stretch 5s 10    Wall slides flexion/abduction/washes 1 10 Wall slide+lift-off per emily   IR towel stretch behind back 10s 10    Doorway ER stretch - 70 deg elevation 10s 10 Corner stretch   Beach chair ER stretch - 2# weight  3-5 min LLLD   Wall walk flexion w/45 deg rot away  10s 10 Targeting posterior capsule restrictions   Cross body adduction stretch - scapular blocked on wall, straight arm 20s 5    Sidelying open book - hand in beach chair position for posterior capsule stretch 15s 10 R sidelying   IR stretch behind back w/strap 10s 10 Ant shoulder blocked against wall   Posterior capsule stretch w/wall OP in ER behind head 10s 10    Prone posterior capsule stretch - 2#  3 min    Supine beach chair ER stretch - 2#  3 min    Doorway subscap stretch 10s 10    Wall rainbows - half kneeling 1 10          Seated shoulder flexion stretch 3 min  Hands on table, scooting stool back   Seated lat stretch 2 min  L hand on table, rotating trunk   Supine SA punch - 5# cane      Active shoulder flexion    Staying within comfortable range   Sidelying shoulder ER      Sidelying shoulder abd to 90deg      Wrist extensor stretch 20sec 4    Prone snow timo  10    Supine snow timo control with self care, reaching, carrying, lifting, house/yardwork, driving/computer work. Therapeutic Activities:    [] (40086 or 89393) Provided verbal/tactile cueing for activities related to improving balance, coordination, kinesthetic sense, posture, motor skill, proprioception and motor activation to allow for proper function of scapular, scapulothoracic and UE control with self care, carrying, lifting, driving/computer work.      Home Exercise Program:    [x] (79493) Reviewed/Progressed HEP activities related to strengthening, flexibility, endurance, ROM of scapular, scapulothoracic and UE control with self care, reaching, carrying, lifting, house/yardwork, driving/computer work  [] (19879) Reviewed/Progressed HEP activities related to improving balance, coordination, kinesthetic sense, posture, motor skill, proprioception of scapular, scapulothoracic and UE control with self care, reaching, carrying, lifting, house/yardwork, driving/computer work      Manual Treatments:  PROM / STM / Oscillations-Mobs:  G-I, II, III, IV (PA's, Inf., Post.)  [x] (47323) Provided manual therapy to mobilize soft tissue/joints of cervical/CT, scapular GHJ and UE for the purpose of modulating pain, promoting relaxation,  increasing ROM, reducing/eliminating soft tissue swelling/inflammation/restriction, improving soft tissue extensibility and allowing for proper ROM for normal function with self care, reaching, carrying, lifting, house/yardwork, driving/computer work    Modalities:       Charges:  Timed Code Treatment Minutes: 60   Total Treatment Minutes: 60       [] EVAL (LOW) 52693 (typically 20 minutes face-to-face)  [] EVAL (MOD) 88727 (typically 30 minutes face-to-face)  [] EVAL (HIGH) 49576 (typically 45 minutes face-to-face)  [] RE-EVAL     [x] WL(05737) x 1  [] DRY NEEDLE 1 OR 2 MUSCLES  [x] NMR (58813) x 1    [] DRY NEEDLE 3+ MUSCLES  [x] Manual (69712) x 2      [] TA (25505) x     [] Mech Traction (11972)  [] ES(attended) (09674)     [] ES (un) (86804):   [] VASO (75563)  [] Other:     GOALS:  Patient stated goal: work pain free  [x] Progressing: [] Met: [] Not Met: [] Adjusted    Therapist goals for Patient:   Short Term Goals: To be achieved in: 2 weeks  1. Independent in HEP and progression per patient tolerance, in order to prevent re-injury. [] Progressing: [x] Met: [] Not Met: [] Adjusted  2. Patient will have a decrease in pain to facilitate improvement in movement, function, and ADLs as indicated by Functional Deficits. [x] Progressing: [] Met: [] Not Met: [] Adjusted    Long Term Goals: To be achieved in: 10 weeks  1. Disability index score of 10% or less for the Quick DASH to assist with reaching prior level of function. [x] Progressing: [] Met: [] Not Met: [] Adjusted Comment:  27% disability on Quick Dash as of 5/6/22  2. Patient will demonstrate increased AROM to [facility] to allow for proper joint functioning as indicated by Functional Deficits. [x] Progressing: [] Met: [] Not Met: [] Adjusted  3. Patient will demonstrate an increase in NM recruitment/activation and overall GH and scapular strength to within n5lbs HHD or WNL for proper functional mobility as indicated by patients Functional Deficits. [x] Progressing: [] Met: [] Not Met: [] Adjusted  4. Patient will return to working out without pain. [x] Progressing: [] Met: [] Not Met: [] Adjusted  5. No pain with sleeping. [x] Progressing: [] Met: [] Not Met: [] Adjusted    ASSESSMENT:  L shoulder still stiff all the way up over her head where she is unable to maintain a straight elbow at full end range flexion. Continued session focus primarily on manual techniques, self stretching addressing posterior [address] joint capsule restrictions to address those mild remaining end range restrictions followed by posterior shoulder, scapular activation, strengthening tasks to improve volitional muscle activation through those end ranges.  Supposed to follow back up with Lila again in 3 more weeks at the end of July. Return to Play: (if applicable)    []  Stage 1: Intro to Strength   []  Stage 2: Dynamic Strength and Intro to Plyometrics   []  Stage 3: Advanced Plyometrics and Intro to Throwing   []  Stage 4: Sport specific Training/Return to Sport     []  Ready to Return to Play, [employer]   []  Not Ready for Return to Sports   Comments:      Treatment/Activity Tolerance:  [x] Patient tolerated treatment well [] Patient limited by fatique  [] Patient limited by pain  [] Patient limited by other medical complications  [] Other:     Overall Progression Towards Functional goals/ Treatment Progress Update:  [x] Patient is progressing as expected towards functional goals listed. [] Progression is slowed due to complexities/Impairments listed. - presenting like potential frozen shoulder resulting from fall after surgery  [] Progression has been slowed due to co-morbidities. [] Plan just implemented, too soon to assess goals progression <30days   [] Goals require adjustment due to lack of progress  [] Patient is not progressing as expected and requires additional follow up with physician  [] Other    Prognosis for POC: [x] Good [] Fair  [] Poor    Patient requires continued skilled intervention: [x] Yes  [] No      PLAN: Continue PT 1-2x/week for 4 weeks  [x] Continue per plan of care [] Alter current plan (see comments)  [] Plan of care initiated [] Hold pending MD visit [] Discharge    Electronically signed by: Esau Grant, PT, DPT, MS, SCS    Note: If patient does not return for scheduled/recommended follow up visits, this note will serve as a discharge from care along with the most recent update on progress.

## 2022-08-02 NOTE — PHYSICAL THERAPY NOTE
Physical Therapy Cancellation Note         06/30/22 0739   PT Last Visit   PT Visit Date 06/30/22   Note Type   Note Type Cancelled Session; Pt pending GOC/hospice decision;  will follow up as schedule allows and pt appropriate       Merlin Conch, PT [employer]

## 2023-08-31 NOTE — PLAN OF CARE
Problem: Potential for Falls  Goal: Patient will remain free of falls  Description: INTERVENTIONS:  - Educate patient/family on patient safety including physical limitations  - Instruct patient to call for assistance with activity   - Consult OT/PT to assist with strengthening/mobility   - Keep Call bell within reach  - Keep bed low and locked with side rails adjusted as appropriate  - Keep care items and personal belongings within reach  - Initiate and maintain comfort rounds  - Make Fall Risk Sign visible to staff  - Offer Toileting every 2 Hours, in advance of need  - Initiate/Maintain alarm  - Obtain necessary fall risk management equipment:   - Apply yellow socks and bracelet for high fall risk patients  - Consider moving patient to room near nurses station  Outcome: Progressing     Problem: MOBILITY - ADULT  Goal: Maintain or return to baseline ADL function  Description: INTERVENTIONS:  -  Assess patient's ability to carry out ADLs; assess patient's baseline for ADL function and identify physical deficits which impact ability to perform ADLs (bathing, care of mouth/teeth, toileting, grooming, dressing, etc )  - Assess/evaluate cause of self-care deficits   - Assess range of motion  - Assess patient's mobility; develop plan if impaired  - Assess patient's need for assistive devices and provide as appropriate  - Encourage maximum independence but intervene and supervise when necessary  - Involve family in performance of ADLs  - Assess for home care needs following discharge   - Consider OT consult to assist with ADL evaluation and planning for discharge  - Provide patient education as appropriate  Outcome: Progressing  Goal: Maintains/Returns to pre admission functional level  Description: INTERVENTIONS:  - Perform BMAT or MOVE assessment daily    - Set and communicate daily mobility goal to care team and patient/family/caregiver     - Collaborate with rehabilitation services on mobility goals if consulted  - Out of bed for toileting  - Record patient progress and toleration of activity level   Outcome: Progressing     Problem: Prexisting or High Potential for Compromised Skin Integrity  Goal: Skin integrity is maintained or improved  Description: INTERVENTIONS:  - Identify patients at risk for skin breakdown  - Assess and monitor skin integrity  - Assess and monitor nutrition and hydration status  - Monitor labs   - Assess for incontinence   - Turn and reposition patient  - Assist with mobility/ambulation  - Relieve pressure over bony prominences  - Avoid friction and shearing  - Provide appropriate hygiene as needed including keeping skin clean and dry  - Evaluate need for skin moisturizer/barrier cream  - Collaborate with interdisciplinary team   - Patient/family teaching  - Consider wound care consult   Outcome: Progressing     Problem: RESPIRATORY - ADULT  Goal: Achieves optimal ventilation and oxygenation  Description: INTERVENTIONS:  - Assess for changes in respiratory status  - Assess for changes in mentation and behavior  - Position to facilitate oxygenation and minimize respiratory effort  - Oxygen administered by appropriate delivery if ordered  - Initiate smoking cessation education as indicated  - Encourage broncho-pulmonary hygiene including cough, deep breathe, Incentive Spirometry  - Assess the need for suctioning and aspirate as needed  - Assess and instruct to report SOB or any respiratory difficulty  - Respiratory Therapy support as indicated  Outcome: Progressing Rhofade Counseling: Rhofade is a topical medication which can decrease superficial blood flow where applied. Side effects are uncommon and include stinging, redness and allergic reactions.

## 2023-09-29 NOTE — PROGRESS NOTES
Oncology Social Work:    This writer met with family this morning and introduced to role of SW on oncology team. Provided with this writer's business card. Mother, paternal uncle, and family friend were present at bedside. The family declined an , and uncle stated he would interpret for the family. Uncle explained that Charli and mother have been in the [place] for a year and a half, and father is in the [employer] in [facility]. The family is asking for a medical letter about Charli's medical situation in hopes that father will be released from the army and join the family in the [place]     Letter provided later in the day. This writer will continue to follow for support and resources.     Cindy Almendarez, [facility]  Pediatric Oncology Social Work  [phone]     Outpatient Care Management Note:    Initial assessment completed with Cristobal's daughter in law, Debbrah Holstein  She states that Laly Eaton is attending vestibular therapy  She is hoping this will help relieve some of Cristobal's concerns  When he gets dizziness symptoms, he gets anxious and increased shortness of breath  Cristobal's son and daughter in law are very involved in Cristobal's care  Their grandson lives with Laly Eaton will be moving in with Swetair[place] Holstein and her family in approximately 4 months when their new home is built  He currently has private caregivers 2-3 hours per day  Debbrah Holstein feels Laly Eaton is independent in the majority of his care  He is alert and oriented but having some issues with short term memory  He needs reinforcement of instructions and reminders at times  lidya Eaton is a   CM will mail Debbrah Holstein some information on veterans benefits for the future  Debbrah Holstein has my contact information and will call with any questions or concerns  She agreed to [facility] calling back in 2-3 weeks

## 2024-03-25 NOTE — PROGRESS NOTES
Encounter Date: 3/24/2024    SCRIBE #1 NOTE: I, Juli Belcher, am scribing for, and in the presence of,  Viraj Crocker NP. I have scribed the following portions of the note - Other sections scribed: HPI, ROS.       History     Chief Complaint   Patient presents with    Diarrhea     PT with n/v/d x 1 day after eating guido cheung with family.     Anabela Scott is a 21 y.o. female with no relevant PHMx that presents to the ED for diarrhea that began about 5 hours ago. The patient notes associated symptoms of intermediate abdominal pain, emesis, chills, and nausea. She also notes that she is unable to keep down foods and liquids. She reports that other family members are experiencing similar symptoms.    The history is provided by the patient. No  was used.     Review of patient's allergies indicates:  No Known Allergies  Past Medical History:   Diagnosis Date    Anxiety disorder, unspecified     Unspecified pre-eclampsia, unspecified trimester      No past surgical history on file.  No family history on file.  Social History     Tobacco Use    Smoking status: Former     Types: Cigarettes    Smokeless tobacco: Never   Substance Use Topics    Alcohol use: Never    Drug use: Never     Review of Systems   Constitutional:  Positive for chills.        Inability to tolerate PO.   Gastrointestinal:  Positive for abdominal pain, diarrhea, nausea and vomiting.       Physical Exam     Initial Vitals [03/24/24 2105]   BP Pulse Resp Temp SpO2   125/80 (!) 130 18 98.5 °F (36.9 °C) 100 %      MAP       --         Physical Exam    Nursing note and vitals reviewed.  Constitutional: She appears well-developed and well-nourished. She is not diaphoretic. No distress.   HENT:   Head: Normocephalic and atraumatic.   Right Ear: External ear normal.   Left Ear: External ear normal.   Nose: Nose normal.   Eyes: Conjunctivae and EOM are normal. Right eye exhibits no discharge. Left eye exhibits no discharge.   Neck: Neck  NEPHROLOGY OUTPATIENT CONSULTATION   Jd German 80 y o  male MRN: [id number]  Date: 1/18/2022  Reason for consultation:   Chief Complaint   Patient presents with    Chronic Kidney Disease    Consult       Patient instructions:  Patient Instructions   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, age related nephron loss and probable cardiorenal syndrome as well as chronic right sided hydronephrosis  -b/l sCr 1 5-1 8 since Oct  2013, last sCr 1 7 as of 1/9/22  -in office urine sample bland(no blood/protein)  -recent UA with microscopy shows trace protein with 0-1 WBCs   -avoid nonsteroidals including ibuprofen, aleve, advil, motrin, naproxen, celebrex, indomethacin, toradol  -chronic severe right hydronephrosis with cortical thinning without left hydronephrosis noted on CT abdomen performed August 2021  -continue to monitor BMP, repeat testing in April and July 2022     2  HTN - BP in office well controlled  -avoid high salt/sodium diet  -avoid caffeine  -continue flomax, amiodarone 200mg daily   -no longer on lasix every other day as weight stable    3  Chronic diastolic CHF - monitor daily weight, no longer on lasix, grade 1 diastolic dysfunction with EF 55% as of echo performed Sept 2021    4  Anemia - Hgb normal range on oral iron, monitor CBC    5  Right hydronephrosis of kidney - obtain renal ultrasound ASAP, refer to urology    RTC in 6 months  Call cardiology if you notice increased weight gain or swelling in the legs  Hiram Hurley was seen today for chronic kidney disease and consult  Diagnoses and all orders for this visit:    Stage 3b chronic kidney disease ([address] )  -     US kidney and bladder; Future  -     Ambulatory Referral to Urology; Future  -     Basic metabolic panel; Future  -     Urinalysis with microscopic; Future  -     Protein / creatinine ratio, urine; Future  -     Basic metabolic panel;  Future  -     Urinalysis with microscopic; Future  -     Protein / creatinine ratio, urine; Future    Chronic diastolic heart failure (HCC)    Essential hypertension    Hydronephrosis of right kidney  -     US kidney and bladder; Future  -     Ambulatory Referral to Urology; Future    Other iron deficiency anemia    Stage 3 chronic kidney disease, unspecified whether stage 3a or 3b CKD ([facility] [address] )  -     Ambulatory referral to Nephrology        ASSESSMENT and PLAN:  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, age related nephron loss and probable cardiorenal syndrome as well as chronic right sided hydronephrosis  -b/l sCr 1 5-1 8 since Oct  2013, last sCr 1 7 as of 1/9/22  -in office urine sample bland(no blood/protein)  -recent UA with microscopy shows trace protein with 0-1 WBCs   -avoid nonsteroidals including ibuprofen, aleve, advil, motrin, naproxen, celebrex, indomethacin, toradol  -chronic severe right hydronephrosis with cortical thinning without left hydronephrosis noted on CT abdomen performed August 2021  -continue to monitor BMP    2  HTN - BP in office well controlled  -avoid high salt/sodium diet  -avoid caffeine  -continue flomax, amiodarone 200mg daily   -no longer on lasix every other day as weight stable    3  Chronic diastolic CHF - monitor daily weight, no longer on lasix, grade 1 diastolic dysfunction with EF 55% as of echo performed Sept 2021    4  Anemia - Hgb normal range on oral iron, monitor CBC    5  Right hydronephrosis of kidney - obtain renal ultrasound, refer to urology    HISTORY OF PRESENT ILLNESS:  Requesting Physician: Harsha Dsouza MD    Lore Buenrostro is a 80 y o  male with history of COPD, HLD, HTN, OA who presents in consultation for CKD  Denies history of recent NSAID use, herbal/OTC medications, history of UTIs or nephrolithiasis  Denies history of prior known kidney disease  HTN - BP has been well controlled in the last few months  Has seen cardiology       CHF - supple. No tracheal deviation present.   Normal range of motion.  Pulmonary/Chest: No stridor. No respiratory distress.   Abdominal: Abdomen is soft. She exhibits no distension. There is no abdominal tenderness.   Musculoskeletal:         General: No tenderness. Normal range of motion.      Cervical back: Normal range of motion and neck supple.     Neurological: She is alert and oriented to person, place, and time. She has normal strength. No cranial nerve deficit or sensory deficit.   Skin: Skin is warm and dry.   Psychiatric: She has a normal mood and affect. Her behavior is normal. Judgment and thought content normal.         ED Course   Procedures  Labs Reviewed   URINALYSIS, REFLEX TO URINE CULTURE - Abnormal; Notable for the following components:       Result Value    Specific Gravity, UA >1.030 (*)     Protein, UA Trace (*)     Ketones, UA 1+ (*)     Leukocytes, UA 1+ (*)     All other components within normal limits    Narrative:     Specimen Source->Urine   CBC W/ AUTO DIFFERENTIAL - Abnormal; Notable for the following components:    MCH 31.3 (*)     Gran # (ANC) 9.4 (*)     Lymph # 0.5 (*)     Gran % 89.4 (*)     Lymph % 4.7 (*)     All other components within normal limits   COMPREHENSIVE METABOLIC PANEL - Abnormal; Notable for the following components:    CO2 21 (*)     Total Bilirubin 1.1 (*)     All other components within normal limits   URINALYSIS MICROSCOPIC - Abnormal; Notable for the following components:    WBC, UA 7 (*)     All other components within normal limits    Narrative:     Specimen Source->Urine   LIPASE   POCT URINE PREGNANCY   SARS-COV-2 RDRP GENE   POCT INFLUENZA A/B MOLECULAR     EKG Readings: (Independently Interpreted)   Initial Reading: No STEMI. Rhythm: Sinus Tachycardia. Heart Rate: 127. Ectopy: No Ectopy. Conduction: Normal. ST Segments: Normal ST Segments. T Waves Flipped: III. Clinical Impression: Sinus Tachycardia     ECG Results              EKG 12-lead (Final result)         Collection Time Result Time QRS Duration OHS QTC Calculation    03/24/24 21:48:35 03/28/24 22:42:49 86 433                     [facility] (03/28/24 22:42:54)                   Narrative:    Test Reason : R00.0,    Vent. Rate : 127 BPM     Atrial Rate : 127 BPM     P-R Int : 148 ms          QRS Dur : 086 ms      QT Int : 298 ms       P-R-T Axes : 068 070 036 degrees     QTc Int : 433 ms    Sinus tachycardia  Nonspecific T wave abnormality  Abnormal ECG  When compared with ECG of 26-OCT-2023 19:03,  No significant change was found  Confirmed by Nimesh ROSARIO, Mango CARLTON ([id number]) on 3/28/2024 10:42:45 PM    Referred By: [email or web address]   SELF           Confirmed By:Mango Beavers MD                                     EKG 12-LEAD (Final result)  Result time 03/25/24 11:26:18      Final result by Unknown User (03/25/24 11:26:18)                                      Imaging Results    None          Medications   aluminum-magnesium hydroxide-simethicone 200-200-20 mg/5 mL suspension 30 mL (30 mLs Oral Given 3/24/24 2140)     And   LIDOcaine viscous HCl 2% oral solution 15 mL (15 mLs Oral Given 3/24/24 2140)   sodium chloride 0.9% bolus 1,000 mL 1,000 mL (0 mLs Intravenous Stopped 3/24/24 2336)   ondansetron injection 8 mg (8 mg Intravenous Given 3/24/24 2214)     Medical Decision Making  Amount and/or Complexity of Data Reviewed  Labs: ordered. Decision-making details documented in ED Course.  Radiology: ordered. Decision-making details documented in ED Course.  ECG/medicine tests: ordered and independent interpretation performed. Decision-making details documented in ED Course.    Risk  OTC drugs.  Prescription drug management.            Scribe Attestation:   Scribe #1: I performed the above scribed service and the documentation accurately describes the services I performed. I attest to the accuracy of the note.                         I, Viraj Crocker NP, personally performed the services  weight has been stable  Denies leg edema, no longer on lasix    PAST MEDICAL HISTORY:  Past Medical History:   Diagnosis Date    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    Chronic kidney disease     Compression fracture of thoracic vertebra ([place] [address] )     last assessed 05/07/2012    COPD (chronic obstructive pulmonary disease) ([place] [address] )     Disease of thyroid gland     Heart attack ([place] [address] )     History of methicillin resistant staphylococcus aureus (MRSA) 03/28/2019    negative nasal swab     Hollenhorst plaque, right eye     last assessed 04/08/2013    Hyperlipidemia     Hypertension     Iron deficiency anemia due to chronic blood loss     last assessed 09/10/2012    Ischemic colitis ([place] [address] )     last assessed 05/27/2014    Osteoarthritis of both hands     last assessedn 04/30/2013    Peptic ulcer     last assessed 06/14/2013    Polymyalgia rheumatica ([place] [address] )     last assessesd 10/09/2012    Renal disorder     Upper GI hemorrhage     last assessed 01/29/2015    Varicose veins of lower extremity     last assessed 04/26/2013       PAST SURGICAL HISTORY:  Past Surgical History:   Procedure Laterality Date    CORONARY ARTERY BYPASS GRAFT      HEMORRHOID SURGERY      HERNIA REPAIR      RENAL CYST EXCISION      resolved 05/2010    THROMBOENDARTERECTOMY Right     carotid, last assessed 06/18/2013       ALLERGIES:  Allergies   Allergen Reactions    Pravastatin Anaphylaxis, Photosensitivity and Headache     Reaction Date: [id number];    Other reaction(s): Headache    Acetaminophen-Codeine GI Intolerance    Atorvastatin      Other reaction(s): Leg Cramps  Other reaction(s): Leg Cramps    Codeine Nausea Only    Colestipol GI Intolerance     Reaction Date: [id number];     Contrast  [Iodinated Diagnostic Agents]     Fenofibrate GI Intolerance     Reaction Date: [id number];     Hydrocodone Vomiting    Niacin      Reaction Date: [id number];     Niaspan  [Niacin Er]     Pitavastatin Myalgia    Pneumococcal Polysaccharide Vaccine     Pravastatin Sodium     Simvastatin     Statins Myalgia    Vicoprofen  [Hydrocodone-Ibuprofen] GI Intolerance    Cyclophosphamide Rash    Ioversol Hives       SOCIAL HISTORY:  Social History     Substance and Sexual Activity   Alcohol Use Never    Alcohol/week: 1 0 standard drink    Types: 1 Glasses of wine per week    Comment: social     Social History     Substance and Sexual Activity   Drug Use Never     Social History     Tobacco Use   Smoking Status Former Smoker    Packs/day: 2 00    Years: 50 00    Pack years: 100 00    Types: Cigarettes    Start date: 65    Quit date: 46    Years since quittin 0   Smokeless Tobacco Never Used   Tobacco Comment    Quit 40 yrs   ago       FAMILY HISTORY:  Family History   Problem Relation Age of Onset    Hypertension Mother     Alcohol abuse Mother     Hypertension Father     Alcohol abuse Father     Alcohol abuse Son     Heart disease Family     Stroke Family         syndrome       MEDICATIONS:    Current Outpatient Medications:     albuterol (PROVENTIL HFA,VENTOLIN HFA) 90 mcg/act inhaler, Inhale 2 puffs every 6 (six) hours as needed for wheezing, Disp: 18 g, Rfl: 3    amiodarone 200 mg tablet, Take 1 tablet (200 mg total) by mouth daily For fourteen days followed by 200 mg once a day , Disp: 90 tablet, Rfl: 3    Anoro Ellipta 62 5-25 MCG/INH inhaler, inhale 1 puff by mouth and INTO THE LUNGS once daily, Disp: 60 blister, Rfl: 3    B Complex Vitamins (B COMPLEX PO), Take by mouth daily, Disp: , Rfl:     Cholecalciferol (CVS VITAMIN D) 2000 units CAPS, Take by mouth daily , Disp: , Rfl:     Cyanocobalamin (B-12 PO), Take by mouth daily, Disp: , Rfl:     escitalopram (LEXAPRO) 5 mg tablet, take 1 tablet by mouth once daily, Disp: 30 tablet, Rfl: 1    Ferrous Sulfate (IRON) 325 (65 Fe) MG TABS, Take by mouth every other day , Disp: , Rfl:     fluticasone (FLONASE) 50 mcg/act nasal spray, 1 spray into described in this documentation. All medical record entries made by the scribe were at my direction and in my presence. I have reviewed the chart and agree that the record reflects my personal performance and is accurate and complete.        Clinical Impression:  Final diagnoses:  [R00.0] Tachycardia  [R11.2, R19.7] Nausea, vomiting, and diarrhea (Primary)  [A08.4] Viral gastroenteritis          ED Disposition Condition    Discharge Stable          ED Prescriptions       Medication Sig Dispense Start Date End Date Auth. Provider    ondansetron (ZOFRAN-ODT) 4 MG TbDL Take 1 tablet (4 mg total) by mouth every 6 (six) hours as needed (Nausea). 20 tablet 3/24/2024 -- Viraj Crocker, MALINDA    dicyclomine (BENTYL) 20 mg tablet Take 1 tablet (20 mg total) by mouth 2 (two) times daily as needed (Stomach cramping/pain). 20 tablet 3/24/2024 -- Viraj Crocker, MALINDA          Follow-up Information       Follow up With Specialties Details Why Contact Info    Michael Salas MD Obstetrics and Gynecology Schedule an appointment as soon as possible for a visit in 1 week For further evaluation [address]  [address]  [place] [address]  [phone]      [facility] [place] - Emergency Dept Emergency Medicine Go to  If symptoms worsen, As needed [address] [facility] [place] [id number] [phone]             Viraj Crocker, MALINDA  04/03/24 1146     each nostril 2 (two) times a day, Disp: , Rfl:     guaiFENesin (MUCINEX) 600 mg 12 hr tablet, Take 1,200 mg by mouth every 12 (twelve) hours, Disp: , Rfl:     ipratropium (ATROVENT) 0 02 % nebulizer solution, Take 2 5 mL (0 5 mg total) by nebulization 3 (three) times a day, Disp: 225 mL, Rfl: 3    KRILL OIL PO, Take by mouth daily, Disp: , Rfl:     levalbuterol (XOPENEX) 1 25 mg/3 mL nebulizer solution, Take 1 vial (1 25 mg total) by nebulization 3 (three) times a day, Disp: 225 mL, Rfl: 3    levothyroxine 75 mcg tablet, take 1 tablet by mouth once daily, Disp: 30 tablet, Rfl: 0    LORazepam (ATIVAN) 0 5 mg tablet, take 1 tablet by mouth every 8 hours if needed for anxiety, Disp: 90 tablet, Rfl: 1    Multiple Vitamins-Minerals (VISION FORMULA/LUTEIN PO), Take by mouth, Disp: , Rfl:     nystatin-triamcinolone (MYCOLOG-II) cream, APPLY TO THE AFFECTED AREA(S) SPARINGLY TWICE A DAY, Disp: 60 g, Rfl: 2    omeprazole (PriLOSEC) 20 mg delayed release capsule, take 1 capsule by mouth once daily, Disp: 30 capsule, Rfl: 5    primidone (MYSOLINE) 50 mg tablet, Take 1 tablet (50 mg total) by mouth every 12 (twelve) hours, Disp: 60 tablet, Rfl: 1    promethazine-dextromethorphan (PHENERGAN-DM) 6 25-15 mg/5 mL oral syrup, take 5 milliliter by mouth four times a day if needed for cough, Disp: 180 mL, Rfl: 1    Respiratory Therapy Supplies (SPIROMETER) KIT, by Does not apply route 4 (four) times a day, Disp: 1 kit, Rfl: 0    sodium chloride 0 9 % nebulizer solution, TAKE 3 MILLILITERS BY NEBULIZATION ROUTE AS NEEDED FOR WHEEZING, Disp: 90 mL, Rfl: 1    tamsulosin (FLOMAX) 0 4 mg, take 1 capsule by mouth once daily with dinner, Disp: 30 capsule, Rfl: 5    zolpidem (AMBIEN) 5 mg tablet, take 1 tablet by mouth at bedtime if needed for sleep, Disp: 30 tablet, Rfl: 1    furosemide (LASIX) 20 mg tablet, Take 1 tablet (20 mg total) by mouth every other day (Patient not taking: Reported on 11/30/2021 ), Disp: 15 tablet, Rfl: 3    REVIEW OF SYSTEMS:  Review of Systems   Constitutional: Negative for chills and fever  HENT: Negative for sore throat  Eyes: Negative for visual disturbance  Respiratory: Negative for cough and shortness of breath  Cardiovascular: Negative for chest pain and leg swelling  Gastrointestinal: Negative for abdominal pain, constipation, diarrhea, nausea and vomiting  Endocrine: Negative for polyuria  Genitourinary: Negative for decreased urine volume, difficulty urinating, dysuria and hematuria  Musculoskeletal: Negative for back pain and myalgias  Skin: Negative for rash  Neurological: Positive for dizziness (at [employer] while standing)  Negative for light-headedness and numbness  Psychiatric/Behavioral: Negative for confusion  PHYSICAL EXAM:   Vitals:    01/18/22 1343   BP: 123/75   BP Location: Left arm   Patient Position: Sitting   Cuff Size: Standard   Pulse: 99   Weight: 76 3 kg (168 lb 3 2 oz)   Height: 5' 4" (1 626 m)     Body mass index is 28 87 kg/m²  Physical Exam  Vitals reviewed  Constitutional:       General: He is not in acute distress  Appearance: Normal appearance  He is well-developed  He is not diaphoretic  HENT:      Head: Normocephalic and atraumatic  Nose: Nose normal       Mouth/Throat:      Mouth: Mucous membranes are moist       Pharynx: No oropharyngeal exudate  Eyes:      General: No scleral icterus  Right eye: No discharge  Left eye: No discharge  Neck:      Thyroid: No thyromegaly  Cardiovascular:      Rate and Rhythm: Normal rate and regular rhythm  Heart sounds: Normal heart sounds  Pulmonary:      Effort: Pulmonary effort is normal       Breath sounds: Normal breath sounds  No wheezing or rales  Abdominal:      General: Bowel sounds are normal  There is no distension  Palpations: Abdomen is soft  Tenderness: There is no abdominal tenderness  Musculoskeletal:         General: No swelling   Normal range of motion  Cervical back: Neck supple  Lymphadenopathy:      Cervical: No cervical adenopathy  Skin:     General: Skin is warm and dry  Findings: No rash  Neurological:      General: No focal deficit present  Mental Status: He is alert  Comments: awake   Psychiatric:         Mood and Affect: Mood normal          Behavior: Behavior normal            Laboratory results:   Lab Results   Component Value Date    SODIUM 137 01/09/2022    K 3 9 01/09/2022     01/09/2022    CO2 24 01/09/2022    BUN 18 01/09/2022    CREATININE 1 70 (H) 01/09/2022    GLUC 100 01/09/2022    CALCIUM 8 3 01/09/2022        Imaging: CT abdomen performed August 2021: chronic severe right hydronephrosis and cortical thinning without hydroureter     Portions of the record may have been created with voice recognition software   Occasional wrong word or "sound a like" substitutions may have occurred due to the inherent limitations of voice recognition software   Read the chart carefully and recognize, using context, where substitutions have occurred

## 2025-07-08 NOTE — ASSESSMENT & PLAN NOTE
Abdomen , soft, nontender, nondistended , no guarding or rigidity , no masses palpable · S/p Goals of care discussion with palliative care- Pt family concerned about repetitive admissions to hospital, pt's advanced age, pneumonia in setting of rib fractures, ckd stage 4 and overall poor quality of life  · They are contemplating but have not agreed to hospice yet    · Seen by hospice liaison today who states patient is hospice appropriate   · Son and daughter in law to decide whether to purse STR with transition to hospice vs home hospice vs hospice and SNF- in the meantime, continue medical management until decision is made